# Patient Record
Sex: MALE | Race: ASIAN | NOT HISPANIC OR LATINO | Employment: FULL TIME | URBAN - METROPOLITAN AREA
[De-identification: names, ages, dates, MRNs, and addresses within clinical notes are randomized per-mention and may not be internally consistent; named-entity substitution may affect disease eponyms.]

---

## 2018-08-23 ENCOUNTER — TELEPHONE (OUTPATIENT)
Dept: NEPHROLOGY | Facility: CLINIC | Age: 58
End: 2018-08-23

## 2018-08-23 NOTE — TELEPHONE ENCOUNTER
I called the pt's home phone and it was a rectory, the  said that he can be reached at [facility], the pt is a .     [facility] phone # is [phone].    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

## 2018-08-23 NOTE — TELEPHONE ENCOUNTER
There may be a problem with his appointment time.  We have to ask Dr. Sweet if he can see this patient at 1pm.

## 2021-04-26 PROBLEM — Z79.4 TYPE 2 DIABETES MELLITUS WITH KIDNEY COMPLICATION, WITH LONG-TERM CURRENT USE OF INSULIN: Status: ACTIVE | Noted: 2021-04-26

## 2021-04-26 PROBLEM — R55 SYNCOPE: Status: ACTIVE | Noted: 2021-04-26

## 2021-04-26 PROBLEM — N17.9 AKI (ACUTE KIDNEY INJURY): Status: ACTIVE | Noted: 2021-04-26

## 2021-04-26 PROBLEM — S02.31XA CLOSED FRACTURE OF RIGHT ORBITAL FLOOR: Status: ACTIVE | Noted: 2021-04-26

## 2021-04-26 PROBLEM — I10 ESSENTIAL HYPERTENSION: Status: ACTIVE | Noted: 2021-04-26

## 2021-04-26 PROBLEM — E11.29 TYPE 2 DIABETES MELLITUS WITH KIDNEY COMPLICATION, WITH LONG-TERM CURRENT USE OF INSULIN: Status: ACTIVE | Noted: 2021-04-26

## 2021-04-26 PROBLEM — R91.1 PULMONARY NODULE: Status: ACTIVE | Noted: 2021-04-26

## 2021-04-28 PROBLEM — R79.89 ELEVATED TROPONIN: Status: ACTIVE | Noted: 2021-04-28

## 2021-05-02 PROBLEM — E16.2 HYPOGLYCEMIA: Status: RESOLVED | Noted: 2021-04-27 | Resolved: 2021-05-02

## 2021-05-02 PROBLEM — R79.89 ELEVATED TROPONIN: Status: RESOLVED | Noted: 2021-04-28 | Resolved: 2021-05-02

## 2021-05-02 PROBLEM — E16.2 HYPOGLYCEMIA: Status: ACTIVE | Noted: 2021-04-27

## 2021-05-02 PROBLEM — R55 SYNCOPE: Status: RESOLVED | Noted: 2021-04-26 | Resolved: 2021-05-02

## 2023-01-06 PROBLEM — N18.9 CHRONIC KIDNEY DISEASE, UNSPECIFIED: Status: ACTIVE | Noted: 2023-01-06

## 2023-01-06 PROBLEM — N18.5 CHRONIC KIDNEY DISEASE, STAGE V: Status: ACTIVE | Noted: 2023-01-06

## 2023-06-08 ENCOUNTER — TELEPHONE (OUTPATIENT)
Dept: TRANSPLANT | Facility: CLINIC | Age: 63
End: 2023-06-08
Payer: COMMERCIAL

## 2023-06-12 ENCOUNTER — TELEPHONE (OUTPATIENT)
Dept: TRANSPLANT | Facility: CLINIC | Age: 63
End: 2023-06-12
Payer: COMMERCIAL

## 2023-06-20 ENCOUNTER — TELEPHONE (OUTPATIENT)
Dept: TRANSPLANT | Facility: CLINIC | Age: 63
End: 2023-06-20
Payer: COMMERCIAL

## 2023-06-20 PROBLEM — N18.6 ESRD (END STAGE RENAL DISEASE): Status: ACTIVE | Noted: 2023-06-20

## 2023-06-27 ENCOUNTER — TELEPHONE (OUTPATIENT)
Dept: TRANSPLANT | Facility: CLINIC | Age: 63
End: 2023-06-27
Payer: COMMERCIAL

## 2023-07-19 ENCOUNTER — TELEPHONE (OUTPATIENT)
Dept: TRANSPLANT | Facility: CLINIC | Age: 63
End: 2023-07-19
Payer: COMMERCIAL

## 2023-07-20 ENCOUNTER — TELEPHONE (OUTPATIENT)
Dept: TRANSPLANT | Facility: CLINIC | Age: 63
End: 2023-07-20
Payer: COMMERCIAL

## 2023-07-21 DIAGNOSIS — Z76.82 ORGAN TRANSPLANT CANDIDATE: Primary | ICD-10-CM

## 2023-07-21 DIAGNOSIS — Z91.89 CARDIOVASCULAR EVENT RISK: ICD-10-CM

## 2023-09-08 ENCOUNTER — TELEPHONE (OUTPATIENT)
Dept: TRANSPLANT | Facility: CLINIC | Age: 63
End: 2023-09-08
Payer: MEDICARE

## 2023-09-12 ENCOUNTER — TELEPHONE (OUTPATIENT)
Dept: TRANSPLANT | Facility: CLINIC | Age: 63
End: 2023-09-12
Payer: MEDICARE

## 2023-09-12 NOTE — TELEPHONE ENCOUNTER
MA notes per adherence form   PD PT    FOR THE PAST THREE MONTHS:    0-AMA's  0-No-shows    No concerns with care giving, transportation, or mental health    Scanned in pt's media    Letty Pollard  Abdominal Transplant MA

## 2023-09-13 ENCOUNTER — HOSPITAL ENCOUNTER (OUTPATIENT)
Dept: RADIOLOGY | Facility: HOSPITAL | Age: 63
Discharge: HOME | End: 2023-09-13
Attending: NURSE PRACTITIONER
Payer: COMMERCIAL

## 2023-09-13 ENCOUNTER — OFFICE VISIT (OUTPATIENT)
Dept: TRANSPLANT | Facility: CLINIC | Age: 63
End: 2023-09-13
Payer: COMMERCIAL

## 2023-09-13 ENCOUNTER — TELEPHONE (OUTPATIENT)
Dept: TRANSPLANT | Facility: CLINIC | Age: 63
End: 2023-09-13
Payer: MEDICARE

## 2023-09-13 ENCOUNTER — HOSPITAL ENCOUNTER (OUTPATIENT)
Dept: RADIOLOGY | Facility: HOSPITAL | Age: 63
Discharge: HOME | End: 2023-09-13
Attending: NURSE PRACTITIONER
Payer: MEDICARE

## 2023-09-13 VITALS
TEMPERATURE: 97 F | DIASTOLIC BLOOD PRESSURE: 70 MMHG | SYSTOLIC BLOOD PRESSURE: 146 MMHG | BODY MASS INDEX: 36.41 KG/M2 | HEIGHT: 63 IN | HEART RATE: 66 BPM | OXYGEN SATURATION: 96 % | WEIGHT: 205.5 LBS

## 2023-09-13 DIAGNOSIS — Z76.82 ORGAN TRANSPLANT CANDIDATE: ICD-10-CM

## 2023-09-13 DIAGNOSIS — E11.21 DIABETIC NEPHROPATHY ASSOCIATED WITH TYPE 2 DIABETES MELLITUS: ICD-10-CM

## 2023-09-13 DIAGNOSIS — N18.6 ESRD ON PERITONEAL DIALYSIS: ICD-10-CM

## 2023-09-13 DIAGNOSIS — N25.81 SECONDARY HYPERPARATHYROIDISM: ICD-10-CM

## 2023-09-13 DIAGNOSIS — E11.649 HYPOGLYCEMIA ASSOCIATED WITH TYPE 2 DIABETES MELLITUS: Primary | ICD-10-CM

## 2023-09-13 DIAGNOSIS — I10 ESSENTIAL HYPERTENSION: ICD-10-CM

## 2023-09-13 DIAGNOSIS — Z99.2 ESRD ON PERITONEAL DIALYSIS: ICD-10-CM

## 2023-09-13 DIAGNOSIS — Z01.818 PRE-TRANSPLANT EVALUATION FOR KIDNEY TRANSPLANT: Primary | ICD-10-CM

## 2023-09-13 DIAGNOSIS — Z01.818 PRE-TRANSPLANT EVALUATION FOR KIDNEY TRANSPLANT: ICD-10-CM

## 2023-09-13 LAB — GLUCOSE SERPL-MCNC: 59 MG/DL (ref 70–110)

## 2023-09-13 PROCEDURE — 76770 US EXAM ABDO BACK WALL COMP: CPT | Mod: TC,TXP,59

## 2023-09-13 PROCEDURE — 99205 OFFICE O/P NEW HI 60 MIN: CPT | Mod: S$GLB,TXP,, | Performed by: NURSE PRACTITIONER

## 2023-09-13 PROCEDURE — 3077F SYST BP >= 140 MM HG: CPT | Mod: CPTII,S$GLB,TXP, | Performed by: NURSE PRACTITIONER

## 2023-09-13 PROCEDURE — 3008F PR BODY MASS INDEX (BMI) DOCUMENTED: ICD-10-PCS | Mod: CPTII,S$GLB,TXP, | Performed by: NURSE PRACTITIONER

## 2023-09-13 PROCEDURE — 1160F PR REVIEW ALL MEDS BY PRESCRIBER/CLIN PHARMACIST DOCUMENTED: ICD-10-PCS | Mod: CPTII,S$GLB,TXP, | Performed by: NURSE PRACTITIONER

## 2023-09-13 PROCEDURE — 3044F HG A1C LEVEL LT 7.0%: CPT | Mod: CPTII,S$GLB,TXP, | Performed by: NURSE PRACTITIONER

## 2023-09-13 PROCEDURE — 71046 XR CHEST PA AND LATERAL: ICD-10-PCS | Mod: 26,TXP,, | Performed by: RADIOLOGY

## 2023-09-13 PROCEDURE — 3008F BODY MASS INDEX DOCD: CPT | Mod: CPTII,S$GLB,TXP, | Performed by: NURSE PRACTITIONER

## 2023-09-13 PROCEDURE — 93978 VASCULAR STUDY: CPT | Mod: 26,TXP,, | Performed by: RADIOLOGY

## 2023-09-13 PROCEDURE — 3061F NEG MICROALBUMINURIA REV: CPT | Mod: CPTII,S$GLB,TXP, | Performed by: NURSE PRACTITIONER

## 2023-09-13 PROCEDURE — 82962 GLUCOSE BLOOD TEST: CPT | Mod: S$GLB,TXP,, | Performed by: NURSE PRACTITIONER

## 2023-09-13 PROCEDURE — 3066F PR DOCUMENTATION OF TREATMENT FOR NEPHROPATHY: ICD-10-PCS | Mod: CPTII,S$GLB,TXP, | Performed by: NURSE PRACTITIONER

## 2023-09-13 PROCEDURE — 76770 US EXAM ABDO BACK WALL COMP: CPT | Mod: 26,TXP,, | Performed by: RADIOLOGY

## 2023-09-13 PROCEDURE — 99204 PR OFFICE/OUTPT VISIT, NEW, LEVL IV, 45-59 MIN: ICD-10-PCS | Mod: S$GLB,TXP,, | Performed by: SURGERY

## 2023-09-13 PROCEDURE — 1160F RVW MEDS BY RX/DR IN RCRD: CPT | Mod: CPTII,S$GLB,TXP, | Performed by: NURSE PRACTITIONER

## 2023-09-13 PROCEDURE — 99203 PR OFFICE/OUTPT VISIT, NEW, LEVL III, 30-44 MIN: ICD-10-PCS | Mod: S$GLB,TXP,, | Performed by: REGISTERED NURSE

## 2023-09-13 PROCEDURE — 99999 PR PBB SHADOW E&M-EST. PATIENT-LVL V: ICD-10-PCS | Mod: PBBFAC,TXP,, | Performed by: NURSE PRACTITIONER

## 2023-09-13 PROCEDURE — 3078F PR MOST RECENT DIASTOLIC BLOOD PRESSURE < 80 MM HG: ICD-10-PCS | Mod: CPTII,S$GLB,TXP, | Performed by: NURSE PRACTITIONER

## 2023-09-13 PROCEDURE — 76770 US RETROPERITONEAL COMPLETE: ICD-10-PCS | Mod: 26,TXP,, | Performed by: RADIOLOGY

## 2023-09-13 PROCEDURE — 3078F DIAST BP <80 MM HG: CPT | Mod: CPTII,S$GLB,TXP, | Performed by: NURSE PRACTITIONER

## 2023-09-13 PROCEDURE — 99205 PR OFFICE/OUTPT VISIT, NEW, LEVL V, 60-74 MIN: ICD-10-PCS | Mod: S$GLB,TXP,, | Performed by: NURSE PRACTITIONER

## 2023-09-13 PROCEDURE — 3066F NEPHROPATHY DOC TX: CPT | Mod: CPTII,S$GLB,TXP, | Performed by: NURSE PRACTITIONER

## 2023-09-13 PROCEDURE — 99999 PR PBB SHADOW E&M-EST. PATIENT-LVL V: CPT | Mod: PBBFAC,TXP,, | Performed by: NURSE PRACTITIONER

## 2023-09-13 PROCEDURE — 3044F PR MOST RECENT HEMOGLOBIN A1C LEVEL <7.0%: ICD-10-PCS | Mod: CPTII,S$GLB,TXP, | Performed by: NURSE PRACTITIONER

## 2023-09-13 PROCEDURE — 1159F MED LIST DOCD IN RCRD: CPT | Mod: CPTII,S$GLB,TXP, | Performed by: NURSE PRACTITIONER

## 2023-09-13 PROCEDURE — 93978 VASCULAR STUDY: CPT | Mod: TC,TXP

## 2023-09-13 PROCEDURE — 99204 OFFICE O/P NEW MOD 45 MIN: CPT | Mod: S$GLB,TXP,, | Performed by: SURGERY

## 2023-09-13 PROCEDURE — 72170 X-RAY EXAM OF PELVIS: CPT | Mod: TC,TXP

## 2023-09-13 PROCEDURE — 1159F PR MEDICATION LIST DOCUMENTED IN MEDICAL RECORD: ICD-10-PCS | Mod: CPTII,S$GLB,TXP, | Performed by: NURSE PRACTITIONER

## 2023-09-13 PROCEDURE — 71046 X-RAY EXAM CHEST 2 VIEWS: CPT | Mod: 26,TXP,, | Performed by: RADIOLOGY

## 2023-09-13 PROCEDURE — 3061F PR NEG MICROALBUMINURIA RESULT DOCUMENTED/REVIEW: ICD-10-PCS | Mod: CPTII,S$GLB,TXP, | Performed by: NURSE PRACTITIONER

## 2023-09-13 PROCEDURE — 99203 OFFICE O/P NEW LOW 30 MIN: CPT | Mod: S$GLB,TXP,, | Performed by: REGISTERED NURSE

## 2023-09-13 PROCEDURE — 93978 US DOPP ILIACS BILATERAL: ICD-10-PCS | Mod: 26,TXP,, | Performed by: RADIOLOGY

## 2023-09-13 PROCEDURE — 72170 X-RAY EXAM OF PELVIS: CPT | Mod: 26,TXP,, | Performed by: RADIOLOGY

## 2023-09-13 PROCEDURE — 82962 POCT GLUCOSE, HAND-HELD DEVICE: ICD-10-PCS | Mod: S$GLB,TXP,, | Performed by: NURSE PRACTITIONER

## 2023-09-13 PROCEDURE — 71046 X-RAY EXAM CHEST 2 VIEWS: CPT | Mod: TC,TXP

## 2023-09-13 PROCEDURE — 3077F PR MOST RECENT SYSTOLIC BLOOD PRESSURE >= 140 MM HG: ICD-10-PCS | Mod: CPTII,S$GLB,TXP, | Performed by: NURSE PRACTITIONER

## 2023-09-13 PROCEDURE — 72170 XR PELVIS ROUTINE AP: ICD-10-PCS | Mod: 26,TXP,, | Performed by: RADIOLOGY

## 2023-09-13 RX ORDER — AMLODIPINE BESYLATE 5 MG/1
5 TABLET ORAL DAILY
Status: ON HOLD | COMMUNITY
End: 2024-02-29 | Stop reason: HOSPADM

## 2023-09-13 RX ORDER — FERRIC CITRATE 210 MG/1
3 TABLET, COATED ORAL
Status: ON HOLD | COMMUNITY
Start: 2023-08-16 | End: 2024-02-29 | Stop reason: HOSPADM

## 2023-09-13 RX ORDER — INSULIN LISPRO 100 [IU]/ML
INJECTION, SOLUTION INTRAVENOUS; SUBCUTANEOUS
COMMUNITY
Start: 2023-08-07

## 2023-09-13 NOTE — PROGRESS NOTES
Patient has eaten a granola bar and drank an apple juice. Blood glucose via finger stick now 115. Patient AA/Ox4 with NAD and no patient complaints. He has completed [facility] and is going to his afternoon appointments with his caretaker now. Patient does have an Insulin pump with a monitor that reads blood glucose levels.  Patient was instructed to monitor his blood sugar levels. Patient verbalize understanding.

## 2023-09-13 NOTE — LETTER
September 15, 2023        Fredi Lyon  [address]  [facility] [address]  Phone: [phone]  Fax: [phone]             [facility] Trans[facility] 1st Fl  [address]  [facility] [address]  Phone: [phone]   Patient: Mark Lopez   MR Number: [id number]   YOB: 1960   Date of Visit: 9/13/2023       Dear Dr. Fredi Lyon    Thank you for referring Mark Lopez to me for evaluation. Attached you will find relevant portions of my assessment and plan of care.    If you have questions, please do not hesitate to call me. I look forward to following Mark Lopez along with you.    Sincerely,    Heidi Wyman, BRADLEY    Enclosure    If you would like to receive this communication electronically, please contact [email or web address] or [phone] to request [employer] access.    [employer] is a tool which provides read-only access to select patient information with whom you have a relationship. Its easy to use and provides real time access to review your patients record including encounter summaries, notes, results, and demographic information.    If you feel you have received this communication in error or would no longer like to receive these types of communications, please e-mail [email or web address]

## 2023-09-13 NOTE — PROGRESS NOTES
"[place] LPN received call from [place] in lab dept with glucose result of 49 reported from specimen drawn in Transplant clinic this am. Heidi Wyman NP had just completed her visit with the patient and was notified. Patient AA/Ox4 with NAD. Patient has an insulin pump and was NPO when labs were drawn. Rechecked glucose level via finger stick as ordered and now at 59. Per M. MISAEL Phipps " Tanya Wyman NP instructed " Give patient a snack " Pt given a granola bar and apple. Eating snack without difficulty. Care ongoing.    "

## 2023-09-13 NOTE — PROGRESS NOTES
PHARM.D. PRE-TRANSPLANT NOTE:    This patient's medication therapy was evaluated as part of his pre-transplant evaluation.      The following general pharmacologic concerns were noted: aspirin increases risk of shannon-op bleeding    The following concerns for post-operative pain management were noted: none    The following pharmacologic concerns related to HCV therapy were noted: none      This patient's medication profile was reviewed for considerations for DAA Hepatitis C therapy:    [x]  No current inducers of CYP 3A4 or PGP  [x]  No amiodarone on this patient's EMR profile in the last 24 months  [x]  No past or current atrial fibrillation on this patient's EMR profile       Current Outpatient Medications   Medication Sig Dispense Refill    amLODIPine (NORVASC) 5 MG tablet Take 5 mg by mouth once daily.      ascorbic acid (RIKA-C ORAL) Take 1,400 mg by mouth every evening.      aspirin (ECOTRIN) 81 MG EC tablet Take 81 mg by mouth once daily.      AURYXIA 210 mg iron Tab Take 3 tablets by mouth 3 (three) times daily with meals.      cholecalciferol, vitamin D3, (VITAMIN D3) 50 mcg (2,000 unit) Cap capsule Take 1 capsule by mouth once daily.      febuxostat (ULORIC) 40 mg Tab Take 40 mg by mouth once daily.      furosemide (LASIX) 40 MG tablet Take 80 mg by mouth once daily. Takes once a day      HUMALOG U-100 INSULIN 100 unit/mL injection Inject into the skin. FOR INSULIN PUMP (BASAL 1.2 UNITS/HR)      latanoprost 0.005 % ophthalmic solution Place 1 drop into the right eye every other day. At night      levothyroxine (SYNTHROID) 50 MCG tablet Take 50 mcg by mouth before breakfast.      LOKELMA 10 gram packet Take 10 g by mouth every other day.      loratadine (CLARITIN) 10 mg tablet Take 10 mg by mouth as needed for Allergies.      methylPREDNISolone (MEDROL DOSEPACK) 4 mg tablet Take by mouth. AS NEEDED FOR GOUT      metoprolol tartrate (LOPRESSOR) 25 MG tablet Take 1 tablet (25 mg total) by mouth 2 (two) times  daily. 180 tablet 0    multivit-min-FA-lycopen-lutein (CENTRUM SILVER MEN) 300-600-300 mcg Tab Take 2 tablets by mouth once daily.      NEXLETOL 180 mg Tab Take 1 tablet by mouth once daily.      sodium bicarbonate 650 MG tablet Take 1,300 mg by mouth 2 (two) times daily.      subcutaneous insulin pump Misc Inject 1 Units/hr into the skin continuous. Insulin pump name - Tandum  Basal dose is 2 units/hr      tumeric-ging-olive-oreg-capryl 100 mg-150 mg- 50 mg-150 mg Cap Take 1 tablet by mouth once daily.      UNABLE TO FIND Take 2 tablets by mouth every evening. medication name Alerna Uric Acid support      calcitRIOL (ROCALTROL) 0.25 MCG Cap Take 0.25 mcg by mouth once daily.      MAGNESIUM GLUCONATE ORAL Take by mouth.      ONETOUCH DELICA PLUS LANCET 30 gauge Misc       spironolactone (ALDACTONE) 25 MG tablet Take 25 mg by mouth once daily.       No current facility-administered medications for this visit.           I am available for consultation and can be contacted, as needed by the other members of the Transplant team.

## 2023-09-13 NOTE — PROGRESS NOTES
Transplant Nephrology  Kidney Transplant Recipient Evaluation    Referring Physician: Fredi Lyon  Current Nephrologist: Fredi Lyon    Subjective:   CC:  Initial evaluation of kidney transplant candidacy.    HPI:  Fr. Lopez is a 63 y.o. year old  male who has presented to be evaluated as a potential kidney transplant recipient.  He has ESRD secondary to presumed diabetic nephropathy, no renal biopsy.  Patient is currently on peritoneal dialysis started on 5/19/2023 (initially on HD). Patient is dialyzing on cyclic peritoneal dialysis.  Patient reports that he is tolerating dialysis well. . He has a PD catheter, permacath, and RUE AVF for dialysis access.     DM2 for over 20 years. Denies retinopathy or gastroparesis. Has been much better controlled with insulin pump and continuous glucose monitor, A1c today 5.0.     Has been working hard on weight loss, down 70 pounds since last year through diet and exercise. He is motivated to continue with weight loss journey.    He is a , [employer]. Uses no assistive devices. Looks good, not frail.    Fellow seminarians have expressed interest in donation.    Denies MI, CVA, DVT/PE, or malignancy history.     Normal colonoscopy done 2022.    Current Outpatient Medications   Medication Sig Dispense Refill    amLODIPine (NORVASC) 5 MG tablet Take 5 mg by mouth once daily.      ascorbic acid (RIKA-C ORAL) Take 1,400 mg by mouth every evening.      aspirin (ECOTRIN) 81 MG EC tablet Take 81 mg by mouth once daily.      AURYXIA 210 mg iron Tab Take 3 tablets by mouth 3 (three) times daily with meals.      cholecalciferol, vitamin D3, (VITAMIN D3) 50 mcg (2,000 unit) Cap capsule Take 1 capsule by mouth once daily.      febuxostat (ULORIC) 40 mg Tab Take 40 mg by mouth once daily.      furosemide (LASIX) 40 MG tablet Take 80 mg by mouth once daily. Takes once a day      HUMALOG U-100 INSULIN 100 unit/mL injection Inject into the skin. FOR  INSULIN PUMP (BASAL 1.2 UNITS/HR)      latanoprost 0.005 % ophthalmic solution Place 1 drop into the right eye every other day. At night      levothyroxine (SYNTHROID) 50 MCG tablet Take 50 mcg by mouth before breakfast.      LOKELMA 10 gram packet Take 10 g by mouth every other day.      loratadine (CLARITIN) 10 mg tablet Take 10 mg by mouth as needed for Allergies.      methylPREDNISolone (MEDROL DOSEPACK) 4 mg tablet Take by mouth. AS NEEDED FOR GOUT      metoprolol tartrate (LOPRESSOR) 25 MG tablet Take 1 tablet (25 mg total) by mouth 2 (two) times daily. 180 tablet 0    multivit-min-FA-lycopen-lutein (CENTRUM SILVER MEN) 300-600-300 mcg Tab Take 2 tablets by mouth once daily.      NEXLETOL 180 mg Tab Take 1 tablet by mouth once daily.      sodium bicarbonate 650 MG tablet Take 1,300 mg by mouth 2 (two) times daily.      subcutaneous insulin pump Misc Inject 1 Units/hr into the skin continuous. Insulin pump name - Tandum  Basal dose is 2 units/hr      tumeric-ging-olive-oreg-capryl 100 mg-150 mg- 50 mg-150 mg Cap Take 1 tablet by mouth once daily.      UNABLE TO FIND Take 2 tablets by mouth every evening. medication name Alerna Uric Acid support      calcitRIOL (ROCALTROL) 0.25 MCG Cap Take 0.25 mcg by mouth once daily.      MAGNESIUM GLUCONATE ORAL Take by mouth.      ONETOUCH DELICA PLUS LANCET 30 gauge Misc       spironolactone (ALDACTONE) 25 MG tablet Take 25 mg by mouth once daily.       No current facility-administered medications for this visit.       Past Medical History:   Diagnosis Date    Anemia     Cataract     Diabetes mellitus     Diabetes mellitus, type 2     Glaucoma     Gout, unspecified     HLD (hyperlipidemia)     Hypertension     Hypothyroidism, unspecified     Kidney failure     Renal disorder        Past Medical and Surgical History: Mr. Lopez  has a past medical history of Anemia, Cataract, Diabetes mellitus, Diabetes mellitus, type 2, Glaucoma, Gout, unspecified, HLD (hyperlipidemia),  "Hypertension, Hypothyroidism, unspecified, Kidney failure, and Renal disorder.  He has a past surgical history that includes Knee surgery (Right); Colonoscopy (N/A, 05/18/2022); Dental surgery; insertion, catheter, dialysis, peritoneal, laparoscopic (N/A, 06/20/2023); and Eye surgery (Bilateral).    Past Social and Family History: Mr. Lopez reports that he has never smoked. He has never used smokeless tobacco. He reports that he does not drink alcohol and does not use drugs. His family history includes Cancer in his sister; Diabetes in his brother and father; Heart disease in his father; Hypertension in his father; Kidney disease in his brother.    Review of Systems   Constitutional:  Positive for fatigue. Negative for activity change, appetite change and fever.   HENT:  Negative for congestion, mouth sores and sore throat.    Eyes:  Negative for visual disturbance.   Respiratory:  Negative for cough, chest tightness and shortness of breath.    Cardiovascular:  Negative for chest pain, palpitations and leg swelling.   Gastrointestinal:  Negative for abdominal distention, abdominal pain, constipation, diarrhea and nausea.   Genitourinary:  Negative for difficulty urinating, frequency and hematuria.        Making ~ 3 L urine daily   Musculoskeletal:  Positive for back pain. Negative for arthralgias and gait problem.   Skin:  Negative for wound.   Allergic/Immunologic: Negative for environmental allergies, food allergies and immunocompromised state.   Neurological:  Negative for dizziness, weakness and numbness.   Psychiatric/Behavioral:  Negative for sleep disturbance. The patient is not nervous/anxious.        Objective:   Blood pressure (!) 146/70, pulse 66, temperature 97.3 °F (36.3 °C), temperature source Temporal, height 5' 3.35" (1.609 m), weight 93.2 kg (205 lb 7.5 oz), SpO2 96 %.body mass index is 36 kg/m².    Physical Exam  Vitals and nursing note reviewed.   Constitutional:       Appearance: Normal " appearance.   HENT:      Head: Normocephalic.   Cardiovascular:      Rate and Rhythm: Normal rate and regular rhythm.      Heart sounds: Normal heart sounds.      Comments: Dialysis catheter, no erythema, edema, tenderness or drainage.   Pulmonary:      Effort: Pulmonary effort is normal.      Breath sounds: Normal breath sounds.   Abdominal:      General: Bowel sounds are normal. There is no distension.      Palpations: Abdomen is soft.      Tenderness: There is no abdominal tenderness.      Comments: PD catheter , no erythema, edema, tenderness or drainage.   Musculoskeletal:         General: Normal range of motion.      Comments: RUE AV fistula + thrill    Skin:     General: Skin is warm and dry.   Neurological:      General: No focal deficit present.      Mental Status: He is alert.   Psychiatric:         Behavior: Behavior normal.         Labs:  Lab Results   Component Value Date    WBC 9.55 09/13/2023    HGB 12.5 (L) 09/13/2023    HCT 39.5 (L) 09/13/2023     09/13/2023    K 3.4 (L) 09/13/2023    CL 99 09/13/2023    CO2 24 09/13/2023    BUN 97 (H) 09/13/2023    CREATININE 10.5 (H) 09/13/2023    EGFRNORACEVR 5.0 (A) 09/13/2023    CALCIUM 9.4 09/13/2023    PHOS 6.3 (H) 09/13/2023    MG 2.2 01/25/2023    ALBUMIN 2.8 (L) 09/13/2023    AST 26 09/13/2023    ALT 24 09/13/2023    UTPCR 37574.9 (H) 10/04/2022    .6 (H) 09/13/2023       Lab Results   Component Value Date    BILIRUBINUA Negative 01/25/2023    PROTEINUA 3+ (A) 01/25/2023    NITRITE Negative 01/25/2023    RBCUA 1 01/25/2023    RBCUA 1 01/25/2023    WBCUA 3 01/25/2023    WBCUA 3 01/25/2023       Labs were reviewed with the patient.    Assessment:     1. Pre-transplant evaluation for kidney transplant    2. ESRD on peritoneal dialysis    3. Diabetic nephropathy associated with type 2 diabetes mellitus    4. Essential hypertension    5. Secondary hyperparathyroidism    6. BMI 36.0-36.9,adult      Plan:   Fr. Lopez is a 63 y.o. year old  male  who has presented to be evaluated as a potential kidney transplant recipient.  He has ESRD secondary to presumed diabetic nephropathy, no renal biopsy.  Patient is currently on peritoneal dialysis started on 5/19/2023 (initially on HD). Colonoscopy up to date. Will need afternoon imaging and updated cardiac testing. Encouraged continued weight loss.    Transplant Candidacy:   Based on available information, Mr. Lopez is a suitable kidney transplant candidate.   Meets center eligibility for accepting HCV+ donor offer - Yes.  Patient educated on HCV+ donors. Mark is willing to accept HCV+ donor offer - Yes   Patient is a candidate for KDPI > 85 kidney donor offer - No (weight)  Final determination of transplant candidacy will be made once workup is complete and reviewed by the selection committee.    Patient advised that it is recommended that all transplant candidates, and their close contacts and household members receive Covid vaccination.    UNOS Patient Status  Functional Status: 80% - Normal activity with effort: some symptoms of disease      Heidi Wyman, NP

## 2023-09-13 NOTE — TELEPHONE ENCOUNTER
Reviewed pt transplant labs.  Notified dialysis unit dietitian of the following abnormal labs via fax and requested their most recent nutrition note on this pt.  Once this note is received it will be scanned into pt's chart.     Triglycerides 174  K 3.4  Glucose 49  Albumin 2.8  Phos 6.3

## 2023-09-13 NOTE — PROGRESS NOTES
Transplant Surgery  Kidney Transplant Recipient Evaluation    Referring Physician: Fredi Lyon  Current Nephrologist: Fredi Lyon    Subjective:     Reason for Visit: evaluate transplant candidacy    History of Present Illness: Mark Lopez is a 63 y.o. year old male undergoing transplant evaluation.    Dialysis History: Mark is on peritoneal dialysis.      Transplant History: N/A    Etiology of Renal Disease: Diabetes Mellitus - Type II (based on medical records from referral).    External provider notes reviewed: No    Review of Systems   Constitutional:  Negative for fatigue.   HENT:  Negative for drooling, postnasal drip and sore throat.    Eyes:  Negative for discharge and itching.   Respiratory:  Negative for choking and stridor.    Gastrointestinal:  Negative for rectal pain.   Endocrine: Negative for polydipsia.   Genitourinary:  Negative for enuresis and genital sores.   Musculoskeletal:  Negative for back pain, neck pain and neck stiffness.   Allergic/Immunologic: Negative for immunocompromised state.   Neurological:  Negative for facial asymmetry and numbness.   Hematological:  Negative for adenopathy.   Psychiatric/Behavioral:  Negative for behavioral problems, self-injury and suicidal ideas.    Objective:     Physical Exam:  Constitutional:   Vitals reviewed: yes   Well-nourished and well-groomed: yes  Eyes:   Sclerae icteric: no   Extraocular movements intact: yes  GI:    Bowel sounds normal: yes   Tenderness: no    If yes, quadrant/location: not applicable   Palpable masses: no    If yes, quadrant/location: not applicable   Hepatosplenomegaly: no   Ascites: yes   Hernia: no    If yes, type/location: not applicable   Surgical scars: yes    If yes, type/location: laparoscopic port sites  PD cannula on right side  Resp:   Effort normal: yes   Breath sounds normal: yes    CV:   Regular rate and rhythm: yes   Heart sounds normal: yes   Femoral pulses normal: yes   Extremities edematous:  no  Skin:   Rashes or lesions present: no    If yes, describe:not applicable   Jaundice:: no    Musculoskeletal:   Gait normal: yes   Strength normal: yes  Psych:   Oriented to person, place, and time: yes   Affect and mood normal: yes    Additional comments: not applicable    Diagnostics:  The following labs have been reviewed: CBC  CMP    Counseling: We provided Mark Lopez with a group education session today.  We discussed kidney transplantation at length with him, including risks, potential complications, and alternatives in the management of his renal failure.  The discussion included complications related to anesthesia, bleeding, infection, primary nonfunction, and ATN.  I discussed the typical postoperative course, length of hospitalization, the need for long-term immunosuppression, and the need for long-term routine follow-up.  I discussed living-donor and -donor transplantation and the relative advantages and disadvantages of each.  I also discussed average waiting times for both living donation and  donation.  I discussed national and center-specific survival rates.  I also mentioned the potential benefit of multicenter listing to candidates listed with centers within more than one organ procurement organization.  All questions were answered.    Patient advised that it is recommended that all transplant candidates, and their close contacts and household members receive Covid vaccination.    Final determination of transplant candidacy will be made once evaluation is complete and reviewed by the Kidney & Kidney/Pancreas Selection Committee.    Coronavirus disease (COVID-19) caused by severe acute respiratory virus coronavirus 2 (SARS-C0V 2) is associated with increased mortality in solid organ transplant recipients (SOT) compared to non-transplant patients. Vaccine responses to vaccination are depressed against SARS-CoV2 compared to normal individuals but improve with third vaccination  doses. Vaccination prior to SOT provides both the best opportunity for transplant candidates to develop protective immunity and to reduce the risk of serious COVID19 infections post transplantation. Organ transplant candidates at [facility] will be required to receive SARS-CoV-2 vaccination prior to being listed with a an active status, whenever possible. Exceptions will be made for disability related reasons or for sincerely held [employer] beliefs.          Transplant Surgery - Candidacy   Assessment/Plan:   Mark Lopez has end stage renal disease (ESRD) on dialysis. I see no surgical contraindication to placing a kidney transplant. Based on available information, Mark Lopez is a suitable kidney transplant candidate.     Additional testing to be completed according to the Written Order Guidelines for Adult Pre-kidney and Pancreas Transplant Evaluation (KI-02).  Interpretation of tests and discussion of patient management involves all members of the multidisciplinary transplant team.    Reji Espinosa MD

## 2023-09-13 NOTE — PROGRESS NOTES
PRE-TRANSPLANT INFECTIOUS DISEASE CONSULT    Reason for Visit:  Pre-transplant evaluation  Referring Provider: Dr. Fredi Lyon     History of Present Illness:    63 y.o. male with a history of ESRD 2/2 diabetic nephropathy, currently on peritoneal dialysis, T2DM, HTN, pulmonary nodules presents for pre-kidney transplant evaluation.    Infectious History:  Recent hospital admissions: Yes, in May to start dialysis.   Recent infections: No  Recent or current antibiotic use: No  History of recurrent infections *(sinus / pneumonia / UTI / SBP)*: No  History of diabetic foot wound or bone/joint infection: No  Recent dental infections, issues or procedures: No  History of chicken pox: Yes  History of shingles: No  History of STI: No  History of COVID infection: No    History of Immunosuppression:  Prior chemotherapy / immunosuppression: No  Prior transplant: No  History of splenectomy: No    Tuberculosis:  Prior screening for latent TB: No  Prior diagnosis of latent TB: No  Risk factors for TB *known exposure, incarceration, homelessness*: Yes - 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     Geographical exposures:  Currently lives in [place], [place] with other people (dormitory facility suite).   Lived in the following states: [place], [facility], [place], [facility], [facility], [place], [place], [place]. [place], [place]   Lived or travelled to the [facility] [place]: Yes  International travel: Yes  Travel-associated illness: No    Social/Environmental:  Occupation:     Pets: No   Livestock: No  Fishing / hunting: No  Hobbies: hiking   Water: Well water  Consumption of raw/undercooked meat or seafood?  No  Tobacco: No  Alcohol: No  Recreational drug use:  No  Sexual partners: no        Past Histories:   Past Medical History:   Diagnosis Date    Anemia     Cataract     Diabetes mellitus     Diabetes mellitus, type 2     Glaucoma     Gout, unspecified     HLD  (hyperlipidemia)     Hypertension     Hypothyroidism, unspecified     Kidney failure     Renal disorder      Past Surgical History:   Procedure Laterality Date    COLONOSCOPY N/A 05/18/2022    Procedure: COLONOSCOPY;  Surgeon: Raul Dyer MD;  Location: [facility];  Service: Endoscopy;  Laterality: N/A;    DENTAL SURGERY      EYE SURGERY Bilateral     Cataract    INSERTION, CATHETER, DIALYSIS, PERITONEAL, LAPAROSCOPIC N/A 06/20/2023    Procedure: INSERTION, CATHETER, DIALYSIS, PERITONEAL, LAPAROSCOPIC;  Surgeon: Carlos Alberto Rodgers MD;  Location: [facility] [place] [facility];  Service: General;  Laterality: N/A;    KNEE SURGERY Right      Family History   Problem Relation Age of Onset    Hypertension Father     Diabetes Father     Heart disease Father     Cancer Sister         MM    Diabetes Brother     Kidney disease Brother      Social History     Tobacco Use    Smoking status: Never    Smokeless tobacco: Never   Substance Use Topics    Alcohol use: Never    Drug use: Never     Review of patient's allergies indicates:   Allergen Reactions    Allopurinol analogues Swelling    Latex, natural rubber     Statins-hmg-coa reductase inhibitors Other (See Comments)     Muscle ache    Adhesive Rash         Immunization History:  Received all childhood vaccines: Yes  All household members receive annual flu vaccine: Yes  All household members are up to date on COVID vaccine: Yes      Current antibiotics:  Antibiotics (From admission, onward)      None              Review of Systems  Review of Systems   Constitutional: Negative for chills, fever and weight loss.   Respiratory:  Negative for cough and hemoptysis.    Skin:  Negative for poor wound healing, rash and suspicious lesions.          Objective  Physical Exam  Constitutional:       General: He is not in acute distress.     Appearance: He is well-developed. He is not diaphoretic.   HENT:      Head: Normocephalic and atraumatic.      Mouth/Throat:      Dentition: Normal dentition.  "Does not have dentures. No dental caries or dental abscesses.      Pharynx: No oropharyngeal exudate.   Eyes:      General: No scleral icterus.     Conjunctiva/sclera: Conjunctivae normal.   Cardiovascular:      Rate and Rhythm: Normal rate and regular rhythm.      Heart sounds: No murmur heard.  Pulmonary:      Effort: Pulmonary effort is normal. No respiratory distress.      Breath sounds: Normal breath sounds. No wheezing or rales.   Abdominal:      General: Bowel sounds are normal. There is no distension.      Palpations: Abdomen is soft.      Tenderness: There is no abdominal tenderness. There is no guarding.   Musculoskeletal:      Cervical back: Neck supple.   Lymphadenopathy:      Cervical: No cervical adenopathy.   Skin:     General: Skin is warm and dry.      Findings: No erythema or rash.   Neurological:      Mental Status: He is alert and oriented to person, place, and time.   Psychiatric:         Behavior: Behavior normal.         Thought Content: Thought content normal.         Judgment: Judgment normal.           Labs:    CBC:   Lab Results   Component Value Date    WBC 9.55 09/13/2023    HGB 12.5 (L) 09/13/2023    HCT 39.5 (L) 09/13/2023    MCV 88 09/13/2023     (H) 09/13/2023    GRAN 6.6 09/13/2023    GRAN 68.7 09/13/2023    LYMPH 1.0 09/13/2023    LYMPH 10.5 (L) 09/13/2023    MONO 0.9 09/13/2023    MONO 9.4 09/13/2023    EOSINOPHIL 10.1 (H) 09/13/2023       Syphilis screening: No results found for: "RPR", "PRPQ", "FTAABS"     TB screening: No results found for: "TBGOLDPLUS", "TSPOTSCREN"    HIV screening: No results found for: "PTV86HBBW"    Strongyloides IgG: No results found for: "STRONGANTIGG"    Hepatitis Serologies: No results found for: "HEPAIGG", "HEPBCAB", "HEPBSAB", "HEPBSURFABQU", "HBSAG", "HEPCAB"     Varicella IgG: No results found for: "VARICELLAINT"      Immunization History   Administered Date(s) Administered    COVID-19, MRNA, LN-S, PF (Pfizer) (Gray Cap) 05/23/2022    " COVID-19, MRNA, LN-S, PF (Pfizer) (Purple Cap) 01/29/2021, 02/18/2021, 10/14/2021    Influenza - Quadrivalent 12/10/2019    Influenza - Quadrivalent - MDCK 11/19/2020    Influenza - Quadrivalent - PF *Preferred* (6 months and older) 10/14/2021    Tdap 04/26/2021    Zoster 07/11/2013          Assessment and Plan    1. Risks of Infection: Available serologies were reviewed. No unusual risks of infection or significant barriers to transplantation were identified from the infectious disease standpoint given the information available at this time.    - Acute infectious issues: None   - Pending serologies: Quantiferon gold / T-spot, RPR, Strongyloides IgG, and VZV IgG, Cocci antibody with reflex    - Please call if any pending serologic testing is positive.    2. Immunizations:  Based on the patient's immunization history and serologies, the following immunizations are recommended:  - Hepatitis A    Patient does not have immunity to hepatitis A    Vaccination ordered today: Yes   - Hepatitis B    Patient does not have immunity to hepatitis B    Vaccination ordered today: Yes   - COVID    Current CDC vaccination recommendations were discussed with the patient   - Annual high dose influenza     Vaccination ordered today: Yes   - Prevnar 20    Vaccination ordered today: Yes   - Tdap    Vaccination ordered today: No. Reason for not ordering: vaccination up to date   - Shingrix    Vaccination ordered today: No. Reason for not ordering: vaccination up to date    Recommended Pre-Transplant Immunization Schedule   Vaccine  0m 1m 2m 6m   Pneumococcal conjugate vaccine (Prevnar 20) X      Tetanus-diphtheria-pertussis (Tdap)* X      Hepatitis A Vaccine (Havrix)** X   X   Hepatitis B Vaccine (Heplisav)** X X     Influenza (annual) X      Zoster Recombinant Vaccine (Shingrix) X  X           *Administer booster every 10 years.       **Administer if no immunity demonstrated on serologies               Patient will receive vaccines today  in ID clinic, and will be scheduled for all subsequent doses to be given in ID clinic.    3. Counseling:   I discussed with the patient the risk for increased susceptibility to infections following transplantation including increased risk for infection right after transplant and if rejection should occur.  The patient has been counseled on the importance of vaccinations to decrease risk of infection and severe illness. Specific guidance has been provided to the patient regarding the patient's occupation, hobbies and activities to avoid future infectious complications.     4. Transplant Candidacy: Based on available information, there are no identified significant barriers to transplantation from an infectious disease standpoint.  Final determination of transplant candidacy will be made once evaluation is complete and reviewed by the Selection Committee.      Follow up with infectious disease as needed.       The total time for evaluation and management services performed on 09/13/2023 was greater than 30 minutes.

## 2023-09-13 NOTE — PROGRESS NOTES
INITIAL PATIENT EDUCATION NOTE    Mr. Mark Lopez was seen in pre-kidney transplant clinic for evaluation for kidney, kidney/pancreas or pancreas only transplant.  The patient attended a an individual video education session that discussed/reviewed the following aspects of transplantation: evaluation including diagnostic and laboratory testing,( Chemistries, Hematology, Serologies including HIV and Hepatitis and HLA) required for transplantation and selection committee process, UNOS waitlist management/multiple listings, types of organs offered (KDPI < 85%, KDPI > 85%, PHS risk, DCD, HCV+, HIV+ for HIV+ recipients and enbloc/dual), financial aspects, surgical procedures, dietary instruction pre- and post-transplant, health maintenance pre- and post-transplant, post-transplant hospitalization and outpatient follow-up, potential to participate in a research protocol, and medication management and side effects.  A question and answer session was provided after the presentation.    The patient was seen by all members of the multi-disciplinary team to include: Nephrologist/KALLI, Surgeon, , Transplant Coordinator, , Pharmacist and Dietician (if applicable).    The patient reviewed and signed all consents for evaluation which were witnessed and sent to scanning into the [facility] chart.    The patient was given an education book and plan for further evaluation based on his individual assessment.      Reviewed program requirement for complete COVID vaccination with documentation prior to listing.  COVID education information reviewed with patient. Patient encouraged to be up to date on all vaccinations.       The patient was informed that the transplant team would manage immediate post op pain. If the patient requires long term pain management, they will need to have that pain management addressed by their PCP or previous provider who wrote for long term pain medicines.    The patient was  encouraged to call with any questions or concerns.

## 2023-09-13 NOTE — PROGRESS NOTES
"Rechecked blood glucose via finger stick with blood glucose 67. Patient AA/Ox4, Skin, tan, warm and dry. Patient reports " I did not eat the granola bar because I didn't want to elevate my blood sugar too high. I will eat now and drink more apple juice. Notified Sita Hayden NP and she instructed "have patient eat snack and drink juice then recheck blood glucose via finger stick after patient has eaten snack.     "

## 2023-09-18 ENCOUNTER — TELEPHONE (OUTPATIENT)
Dept: TRANSPLANT | Facility: CLINIC | Age: 63
End: 2023-09-18
Payer: MEDICARE

## 2023-09-18 DIAGNOSIS — Z01.818 PREOP EXAMINATION: Primary | ICD-10-CM

## 2023-09-18 DIAGNOSIS — Z76.82 ORGAN TRANSPLANT CANDIDATE: ICD-10-CM

## 2023-09-25 ENCOUNTER — TELEPHONE (OUTPATIENT)
Dept: CARDIOLOGY | Facility: HOSPITAL | Age: 63
End: 2023-09-25
Payer: MEDICARE

## 2023-09-27 ENCOUNTER — HOSPITAL ENCOUNTER (OUTPATIENT)
Dept: CARDIOLOGY | Facility: HOSPITAL | Age: 63
Discharge: HOME | End: 2023-09-27
Attending: NURSE PRACTITIONER
Payer: MEDICARE

## 2023-09-27 ENCOUNTER — HOSPITAL ENCOUNTER (OUTPATIENT)
Dept: CARDIOLOGY | Facility: HOSPITAL | Age: 63
Discharge: HOME | End: 2023-09-27
Attending: NURSE PRACTITIONER
Payer: COMMERCIAL

## 2023-09-27 VITALS
SYSTOLIC BLOOD PRESSURE: 138 MMHG | HEART RATE: 60 BPM | DIASTOLIC BLOOD PRESSURE: 86 MMHG | WEIGHT: 205 LBS | BODY MASS INDEX: 35 KG/M2 | SYSTOLIC BLOOD PRESSURE: 149 MMHG | HEART RATE: 58 BPM | DIASTOLIC BLOOD PRESSURE: 63 MMHG | HEIGHT: 64 IN | BODY MASS INDEX: 35 KG/M2 | WEIGHT: 205 LBS | HEIGHT: 64 IN

## 2023-09-27 DIAGNOSIS — Z91.89 CARDIOVASCULAR EVENT RISK: ICD-10-CM

## 2023-09-27 DIAGNOSIS — Z76.82 ORGAN TRANSPLANT CANDIDATE: ICD-10-CM

## 2023-09-27 LAB
ASCENDING AORTA: 3.01 CM
AV INDEX (PROSTH): 0.7
AV MEAN GRADIENT: 5 MMHG
AV PEAK GRADIENT: 8 MMHG
AV VALVE AREA BY VELOCITY RATIO: 3.17 CM²
AV VALVE AREA: 3.7 CM²
AV VELOCITY RATIO: 0.6
BSA FOR ECHO PROCEDURE: 2.05 M2
CFR FLOW - ANTERIOR: 1.13
CFR FLOW - INFERIOR: 1.29
CFR FLOW - LATERAL: 2.21
CFR FLOW - MAX: 1.79
CFR FLOW - MIN: 0.94
CFR FLOW - SEPTAL: 1.44
CFR FLOW - WHOLE HEART: 1.52
CV ECHO LV RWT: 0.3 CM
CV PHARM DOSE: 0.4 MG
CV STRESS BASE HR: 60 BPM
DIASTOLIC BLOOD PRESSURE: 86 MMHG
DOP CALC AO PEAK VEL: 1.44 M/S
DOP CALC AO VTI: 36.74 CM
DOP CALC LVOT AREA: 5.3 CM2
DOP CALC LVOT DIAMETER: 2.6 CM
DOP CALC LVOT PEAK VEL: 0.86 M/S
DOP CALC LVOT STROKE VOLUME: 135.9 CM3
DOP CALC MV VTI: 42.04 CM
DOP CALCLVOT PEAK VEL VTI: 25.61 CM
E WAVE DECELERATION TIME: 317.9 MSEC
E/A RATIO: 0.73
E/E' RATIO: 21.78 M/S
ECHO LV POSTERIOR WALL: 0.9 CM (ref 0.6–1.1)
EJECTION FRACTION- HIGH: 59 %
END DIASTOLIC INDEX-HIGH: 155 ML/M2
END DIASTOLIC INDEX-LOW: 91 ML/M2
END SYSTOLIC INDEX-HIGH: 78 ML/M2
END SYSTOLIC INDEX-LOW: 40 ML/M2
FRACTIONAL SHORTENING: 35 % (ref 28–44)
INTERVENTRICULAR SEPTUM: 1 CM (ref 0.6–1.1)
LA MAJOR: 6.1 CM
LA MINOR: 6.41 CM
LA WIDTH: 4.29 CM
LEFT ATRIUM SIZE: 4.86 CM
LEFT ATRIUM VOLUME INDEX MOD: 28.9 ML/M2
LEFT ATRIUM VOLUME INDEX: 56 ML/M2
LEFT ATRIUM VOLUME MOD: 57.31 CM3
LEFT ATRIUM VOLUME: 110.78 CM3
LEFT INTERNAL DIMENSION IN SYSTOLE: 3.89 CM (ref 2.1–4)
LEFT VENTRICLE DIASTOLIC VOLUME INDEX: 85.59 ML/M2
LEFT VENTRICLE DIASTOLIC VOLUME: 169.47 ML
LEFT VENTRICLE MASS INDEX: 117 G/M2
LEFT VENTRICLE SYSTOLIC VOLUME INDEX: 33.1 ML/M2
LEFT VENTRICLE SYSTOLIC VOLUME: 65.55 ML
LEFT VENTRICULAR INTERNAL DIMENSION IN DIASTOLE: 6 CM (ref 3.5–6)
LEFT VENTRICULAR MASS: 231.1 G
LV LATERAL E/E' RATIO: 19.6 M/S
LV SEPTAL E/E' RATIO: 24.5 M/S
MV MEAN GRADIENT: 3 MMHG
MV PEAK A VEL: 1.34 M/S
MV PEAK E VEL: 0.98 M/S
MV PEAK GRADIENT: 8 MMHG
MV STENOSIS PRESSURE HALF TIME: 92.19 MS
MV VALVE AREA BY CONTINUITY EQUATION: 3.23 CM2
MV VALVE AREA P 1/2 METHOD: 2.39 CM2
NUC REST DIASTOLIC VOLUME INDEX: 180
NUC REST EJECTION FRACTION: 57
NUC REST SYSTOLIC VOLUME INDEX: 78
NUC STRESS DIASTOLIC VOLUME INDEX: 179
NUC STRESS EJECTION FRACTION: 60 %
NUC STRESS SYSTOLIC VOLUME INDEX: 72
OHS CV CPX 85 PERCENT MAX PREDICTED HEART RATE MALE: 133
OHS CV CPX MAX PREDICTED HEART RATE: 156
OHS CV CPX PATIENT IS FEMALE: 0
OHS CV CPX PATIENT IS MALE: 1
OHS CV CPX PEAK DIASTOLIC BLOOD PRESSURE: 45 MMHG
OHS CV CPX PEAK HEAR RATE: 64 BPM
OHS CV CPX PEAK RATE PRESSURE PRODUCT: 7424
OHS CV CPX PEAK SYSTOLIC BLOOD PRESSURE: 116 MMHG
OHS CV CPX PERCENT MAX PREDICTED HEART RATE ACHIEVED: 41
OHS CV CPX RATE PRESSURE PRODUCT PRESENTING: 8940
PISA TR MAX VEL: 2.33 M/S
RA MAJOR: 6.06 CM
RA PRESSURE ESTIMATED: 3 MMHG
RA WIDTH: 3.9 CM
REST FLOW - ANTERIOR: 0.8 CC/MIN/G
REST FLOW - INFERIOR: 0.77 CC/MIN/G
REST FLOW - LATERAL: 0.79 CC/MIN/G
REST FLOW - MAX: 1.03 CC/MIN/G
REST FLOW - MIN: 0.47 CC/MIN/G
REST FLOW - SEPTAL: 0.67 CC/MIN/G
REST FLOW - WHOLE HEART: 0.76 CC/MIN/G
RETIRED EF AND QEF - SEE NOTES: 47 %
RIGHT VENTRICULAR END-DIASTOLIC DIMENSION: 3.87 CM
RV TB RVSP: 5 MMHG
SINUS: 3.3 CM
STJ: 3 CM
STRESS FLOW - ANTERIOR: 0.89 CC/MIN/G
STRESS FLOW - INFERIOR: 0.99 CC/MIN/G
STRESS FLOW - LATERAL: 0.95 CC/MIN/G
STRESS FLOW - MAX: 1.25 CC/MIN/G
STRESS FLOW - MIN: 0.63 CC/MIN/G
STRESS FLOW - SEPTAL: 0.97 CC/MIN/G
STRESS FLOW - WHOLE HEART: 0.95 CC/MIN/G
SYSTOLIC BLOOD PRESSURE: 149 MMHG
TDI LATERAL: 0.05 M/S
TDI SEPTAL: 0.04 M/S
TDI: 0.05 M/S
TR MAX PG: 22 MMHG
TRICUSPID ANNULAR PLANE SYSTOLIC EXCURSION: 2.4 CM
TV REST PULMONARY ARTERY PRESSURE: 25 MMHG
Z-SCORE OF LEFT VENTRICULAR DIMENSION IN END DIASTOLE: 0.48
Z-SCORE OF LEFT VENTRICULAR DIMENSION IN END SYSTOLE: 0.81

## 2023-09-27 PROCEDURE — 93016 CARDIAC PET SCAN STRESS (CUPID ONLY): ICD-10-PCS | Mod: TXP,,, | Performed by: INTERNAL MEDICINE

## 2023-09-27 PROCEDURE — 93306 ECHO (CUPID ONLY): ICD-10-PCS | Mod: 26,TXP,, | Performed by: INTERNAL MEDICINE

## 2023-09-27 PROCEDURE — 78434 AQMBF PET REST & RX STRESS: CPT | Mod: TXP

## 2023-09-27 PROCEDURE — 78431 MYOCRD IMG PET RST&STRS CT: CPT | Mod: TXP

## 2023-09-27 PROCEDURE — 78434 CARDIAC PET SCAN STRESS (CUPID ONLY): ICD-10-PCS | Mod: 26,TXP,, | Performed by: INTERNAL MEDICINE

## 2023-09-27 PROCEDURE — 93018 CARDIAC PET SCAN STRESS (CUPID ONLY): ICD-10-PCS | Mod: TXP,,, | Performed by: INTERNAL MEDICINE

## 2023-09-27 PROCEDURE — 93018 CV STRESS TEST I&R ONLY: CPT | Mod: TXP,,, | Performed by: INTERNAL MEDICINE

## 2023-09-27 PROCEDURE — 93016 CV STRESS TEST SUPVJ ONLY: CPT | Mod: TXP,,, | Performed by: INTERNAL MEDICINE

## 2023-09-27 PROCEDURE — 78434 AQMBF PET REST & RX STRESS: CPT | Mod: 26,TXP,, | Performed by: INTERNAL MEDICINE

## 2023-09-27 PROCEDURE — 93306 TTE W/DOPPLER COMPLETE: CPT | Mod: 26,TXP,, | Performed by: INTERNAL MEDICINE

## 2023-09-27 PROCEDURE — 93306 TTE W/DOPPLER COMPLETE: CPT | Mod: TXP

## 2023-09-27 PROCEDURE — 78431 CARDIAC PET SCAN STRESS (CUPID ONLY): ICD-10-PCS | Mod: 26,TXP,, | Performed by: INTERNAL MEDICINE

## 2023-09-27 PROCEDURE — 78431 MYOCRD IMG PET RST&STRS CT: CPT | Mod: 26,TXP,, | Performed by: INTERNAL MEDICINE

## 2023-09-27 PROCEDURE — 63600175 PHARM REV CODE 636 W HCPCS: Mod: TXP | Performed by: NURSE PRACTITIONER

## 2023-09-27 RX ORDER — REGADENOSON 0.08 MG/ML
0.4 INJECTION, SOLUTION INTRAVENOUS
Status: COMPLETED | OUTPATIENT
Start: 2023-09-27 | End: 2023-09-27

## 2023-09-27 RX ADMIN — REGADENOSON 0.4 MG: 0.08 INJECTION, SOLUTION INTRAVENOUS at 11:09

## 2023-10-02 ENCOUNTER — TELEPHONE (OUTPATIENT)
Dept: TRANSPLANT | Facility: CLINIC | Age: 63
End: 2023-10-02
Payer: MEDICARE

## 2023-10-03 ENCOUNTER — TELEPHONE (OUTPATIENT)
Dept: TRANSPLANT | Facility: CLINIC | Age: 63
End: 2023-10-03
Payer: MEDICARE

## 2023-10-04 ENCOUNTER — DOCUMENTATION ONLY (OUTPATIENT)
Dept: TRANSPLANT | Facility: CLINIC | Age: 63
End: 2023-10-04
Payer: MEDICARE

## 2023-10-23 ENCOUNTER — TELEPHONE (OUTPATIENT)
Dept: HEPATOLOGY | Facility: CLINIC | Age: 63
End: 2023-10-23
Payer: MEDICARE

## 2023-10-23 ENCOUNTER — TELEPHONE (OUTPATIENT)
Dept: TRANSPLANT | Facility: CLINIC | Age: 63
End: 2023-10-23
Payer: MEDICARE

## 2023-10-23 NOTE — TELEPHONE ENCOUNTER
----- Message from Dolly Corbin sent at 10/23/2023  9:13 AM CDT -----  Regarding: Appointment      Current appt date:   10/23    Type of appt :    Np Appt    Physician:   Dr Maria Del Carmen Palacios    Reason for rescheduling:    The bridge was closed due to the fog. Pt will not be able to make it.    Caller:    Mark    Contact Preference:    [phone]

## 2023-10-23 NOTE — TELEPHONE ENCOUNTER
----- Message from Dolly Corbin sent at 10/23/2023  9:07 AM CDT -----  Regarding: Reschedule Existing Appointment      Current appt date:   10/23    Type of appt :    Ultrasound & Labs    Physician:   Ms Heidi Wyman    Reason for rescheduling:    The bridge was closed due to the fog. Pt will not be able to make it.    Caller:    Mark    Contact Preference:    [phone]

## 2023-10-30 ENCOUNTER — HOSPITAL ENCOUNTER (OUTPATIENT)
Dept: RADIOLOGY | Facility: HOSPITAL | Age: 63
Discharge: HOME | End: 2023-10-30
Attending: NURSE PRACTITIONER
Payer: COMMERCIAL

## 2023-10-30 ENCOUNTER — OFFICE VISIT (OUTPATIENT)
Dept: HEPATOLOGY | Facility: CLINIC | Age: 63
End: 2023-10-30
Attending: INTERNAL MEDICINE
Payer: COMMERCIAL

## 2023-10-30 VITALS
HEART RATE: 66 BPM | SYSTOLIC BLOOD PRESSURE: 131 MMHG | HEIGHT: 64 IN | BODY MASS INDEX: 35.12 KG/M2 | DIASTOLIC BLOOD PRESSURE: 71 MMHG | WEIGHT: 205.69 LBS | OXYGEN SATURATION: 98 %

## 2023-10-30 DIAGNOSIS — N18.6 ESRD (END STAGE RENAL DISEASE): Primary | ICD-10-CM

## 2023-10-30 DIAGNOSIS — B18.1 CHRONIC VIRAL HEPATITIS B WITHOUT DELTA AGENT AND WITHOUT COMA: Chronic | ICD-10-CM

## 2023-10-30 DIAGNOSIS — Z76.82 ORGAN TRANSPLANT CANDIDATE: ICD-10-CM

## 2023-10-30 DIAGNOSIS — Z01.818 PREOP EXAMINATION: ICD-10-CM

## 2023-10-30 PROCEDURE — 99214 PR OFFICE/OUTPT VISIT, EST, LEVL IV, 30-39 MIN: ICD-10-PCS | Mod: S$GLB,TXP,, | Performed by: INTERNAL MEDICINE

## 2023-10-30 PROCEDURE — 3008F PR BODY MASS INDEX (BMI) DOCUMENTED: ICD-10-PCS | Mod: CPTII,S$GLB,TXP, | Performed by: INTERNAL MEDICINE

## 2023-10-30 PROCEDURE — 3066F PR DOCUMENTATION OF TREATMENT FOR NEPHROPATHY: ICD-10-PCS | Mod: CPTII,S$GLB,TXP, | Performed by: INTERNAL MEDICINE

## 2023-10-30 PROCEDURE — 1160F RVW MEDS BY RX/DR IN RCRD: CPT | Mod: CPTII,S$GLB,TXP, | Performed by: INTERNAL MEDICINE

## 2023-10-30 PROCEDURE — 76700 US EXAM ABDOM COMPLETE: CPT | Mod: TC,TXP

## 2023-10-30 PROCEDURE — 3044F HG A1C LEVEL LT 7.0%: CPT | Mod: CPTII,S$GLB,TXP, | Performed by: INTERNAL MEDICINE

## 2023-10-30 PROCEDURE — 3075F PR MOST RECENT SYSTOLIC BLOOD PRESS GE 130-139MM HG: ICD-10-PCS | Mod: CPTII,S$GLB,TXP, | Performed by: INTERNAL MEDICINE

## 2023-10-30 PROCEDURE — 3044F PR MOST RECENT HEMOGLOBIN A1C LEVEL <7.0%: ICD-10-PCS | Mod: CPTII,S$GLB,TXP, | Performed by: INTERNAL MEDICINE

## 2023-10-30 PROCEDURE — 3078F DIAST BP <80 MM HG: CPT | Mod: CPTII,S$GLB,TXP, | Performed by: INTERNAL MEDICINE

## 2023-10-30 PROCEDURE — 3061F NEG MICROALBUMINURIA REV: CPT | Mod: CPTII,S$GLB,TXP, | Performed by: INTERNAL MEDICINE

## 2023-10-30 PROCEDURE — 76700 US ABDOMEN COMPLETE: ICD-10-PCS | Mod: 26,TXP,, | Performed by: RADIOLOGY

## 2023-10-30 PROCEDURE — 1160F PR REVIEW ALL MEDS BY PRESCRIBER/CLIN PHARMACIST DOCUMENTED: ICD-10-PCS | Mod: CPTII,S$GLB,TXP, | Performed by: INTERNAL MEDICINE

## 2023-10-30 PROCEDURE — 99214 OFFICE O/P EST MOD 30 MIN: CPT | Mod: S$GLB,TXP,, | Performed by: INTERNAL MEDICINE

## 2023-10-30 PROCEDURE — 1159F PR MEDICATION LIST DOCUMENTED IN MEDICAL RECORD: ICD-10-PCS | Mod: CPTII,S$GLB,TXP, | Performed by: INTERNAL MEDICINE

## 2023-10-30 PROCEDURE — 3061F PR NEG MICROALBUMINURIA RESULT DOCUMENTED/REVIEW: ICD-10-PCS | Mod: CPTII,S$GLB,TXP, | Performed by: INTERNAL MEDICINE

## 2023-10-30 PROCEDURE — 3066F NEPHROPATHY DOC TX: CPT | Mod: CPTII,S$GLB,TXP, | Performed by: INTERNAL MEDICINE

## 2023-10-30 PROCEDURE — 99999 PR PBB SHADOW E&M-EST. PATIENT-LVL V: ICD-10-PCS | Mod: PBBFAC,TXP,, | Performed by: INTERNAL MEDICINE

## 2023-10-30 PROCEDURE — 3078F PR MOST RECENT DIASTOLIC BLOOD PRESSURE < 80 MM HG: ICD-10-PCS | Mod: CPTII,S$GLB,TXP, | Performed by: INTERNAL MEDICINE

## 2023-10-30 PROCEDURE — 99999 PR PBB SHADOW E&M-EST. PATIENT-LVL V: CPT | Mod: PBBFAC,TXP,, | Performed by: INTERNAL MEDICINE

## 2023-10-30 PROCEDURE — 76700 US EXAM ABDOM COMPLETE: CPT | Mod: 26,TXP,, | Performed by: RADIOLOGY

## 2023-10-30 PROCEDURE — 3075F SYST BP GE 130 - 139MM HG: CPT | Mod: CPTII,S$GLB,TXP, | Performed by: INTERNAL MEDICINE

## 2023-10-30 PROCEDURE — 1159F MED LIST DOCD IN RCRD: CPT | Mod: CPTII,S$GLB,TXP, | Performed by: INTERNAL MEDICINE

## 2023-10-30 PROCEDURE — 3008F BODY MASS INDEX DOCD: CPT | Mod: CPTII,S$GLB,TXP, | Performed by: INTERNAL MEDICINE

## 2023-10-30 NOTE — PROGRESS NOTES
HEPATOLOGY CONSULTATION    Referring Physician: Beatrice Zee  Current Corresponding Physician: Loren Mensah MD    Reason for Consultation: Consultation for evaluation of Hepatitis B    History of Present Illness: Mark Lopez is a 63 y.o. malewho presents for evaluation of   Chief Complaint   Patient presents with    Hepatitis B   This 63-year-old gentleman has end-stage renal disease due to diabetic nephropathy and is currently on home peritoneal dialysis.  He is in evaluation for kidney transplant.  During the course of his evaluation he was found to be hepatitis-B surface antigen positive.  I do not have a hepatitis-B DNA or E antigen studies.  His liver enzymes are normal.  He is no prior history of chronic liver disease.  He was born in the [place] but his mother was born in [place].  He is 2 younger siblings.  He does not know their hepatitis-B status.  He currently has no symptoms of chronic liver disease.    Past Medical History:   Diagnosis Date    Anemia     Cataract     Diabetes mellitus     Diabetes mellitus, type 2     Glaucoma     Gout, unspecified     HLD (hyperlipidemia)     Hypertension     Hypothyroidism, unspecified     Kidney failure     Renal disorder      Outpatient Encounter Medications as of 10/30/2023   Medication Sig Dispense Refill    amLODIPine (NORVASC) 5 MG tablet Take 5 mg by mouth once daily.      ascorbic acid (RIKA-C ORAL) Take 1,400 mg by mouth every evening.      aspirin (ECOTRIN) 81 MG EC tablet Take 81 mg by mouth once daily.      AURYXIA 210 mg iron Tab Take 3 tablets by mouth 3 (three) times daily with meals.      febuxostat (ULORIC) 40 mg Tab Take 40 mg by mouth once daily.      furosemide (LASIX) 40 MG tablet Take 80 mg by mouth once daily. Takes once a day      HUMALOG U-100 INSULIN 100 unit/mL injection Inject into the skin. FOR INSULIN PUMP (BASAL 1.2 UNITS/HR)      latanoprost 0.005 % ophthalmic solution Place 1 drop into the right eye every other day. At night       levothyroxine (SYNTHROID) 50 MCG tablet Take 50 mcg by mouth before breakfast.      LOKELMA 10 gram packet Take 10 g by mouth every other day.      loratadine (CLARITIN) 10 mg tablet Take 10 mg by mouth as needed for Allergies.      MAGNESIUM GLUCONATE ORAL Take by mouth as needed.      methylPREDNISolone (MEDROL DOSEPACK) 4 mg tablet Take by mouth. AS NEEDED FOR GOUT      multivit-min-FA-lycopen-lutein (CENTRUM SILVER MEN) 300-600-300 mcg Tab Take 2 tablets by mouth once daily.      NEXLETOL 180 mg Tab Take 1 tablet by mouth once daily.      ONETOUCH DELICA PLUS LANCET 30 gauge Misc       sodium bicarbonate 650 MG tablet Take 1,300 mg by mouth 2 (two) times daily.      tumeric-ging-olive-oreg-capryl 100 mg-150 mg- 50 mg-150 mg Cap Take 1 tablet by mouth once daily.      UNABLE TO FIND Take 2 tablets by mouth every evening. medication name Alerna Uric Acid support      metoprolol tartrate (LOPRESSOR) 25 MG tablet Take 1 tablet (25 mg total) by mouth 2 (two) times daily. 180 tablet 0    subcutaneous insulin pump Misc Inject 1 Units/hr into the skin continuous. Insulin pump name - Tandum  Basal dose is 2 units/hr       No facility-administered encounter medications on file as of 10/30/2023.     Review of patient's allergies indicates:   Allergen Reactions    Allopurinol analogues Swelling    Latex, natural rubber     Statins-hmg-coa reductase inhibitors Other (See Comments)     Muscle ache    Adhesive Rash     Family History   Problem Relation Age of Onset    Hypertension Father     Diabetes Father     Heart disease Father     Cancer Sister         MM    Diabetes Brother     Kidney disease Brother        Social History     Socioeconomic History    Marital status: Single   Tobacco Use    Smoking status: Never    Smokeless tobacco: Never   Substance and Sexual Activity    Alcohol use: Never    Drug use: Never    Sexual activity: Not Currently   Social History Narrative    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   HENT:  Negative for congestion, facial swelling and tinnitus.    Eyes:  Negative for visual disturbance.   Respiratory:  Negative for cough, shortness of breath and wheezing.    Cardiovascular:  Negative for chest pain and palpitations.   Gastrointestinal:  Negative for abdominal distention.   Genitourinary:  Negative for dysuria.   Musculoskeletal:  Negative for arthralgias, joint swelling and myalgias.   Neurological:  Negative for syncope and headaches.   Hematological:  Does not bruise/bleed easily.   Psychiatric/Behavioral:  Negative for confusion.      Vitals:    10/30/23 1033   BP: 131/71   Pulse: 66       Physical Exam  Constitutional:       Appearance: He is well-developed.   Eyes:      General: No scleral icterus.  Cardiovascular:      Rate and Rhythm: Normal rate and regular rhythm.      Heart sounds: Normal heart sounds.   Pulmonary:      Effort: Pulmonary effort is normal. No respiratory distress.      Breath sounds: Normal breath sounds. No wheezing.   Abdominal:      General: Bowel sounds are normal. There is no distension.      Palpations: Abdomen is soft. There is no mass.      Tenderness: There is no abdominal tenderness. There is no rebound.       Musculoskeletal:         General: Normal range of motion.   Lymphadenopathy:      Cervical: No cervical adenopathy.   Skin:     General: Skin is warm and dry.   Neurological:      Mental Status: He is alert and oriented to person, place, and time.         MELD 3.0: 18 at 9/13/2023  8:25 AM  MELD-Na: 20 at 9/13/2023  8:25 AM  Calculated from:  Serum Creatinine: On dialysis. Using the maximum value.  Serum Sodium: 141 mmol/L (Using max of 137 mmol/L) at 9/13/2023  8:25 AM  Total Bilirubin: 0.3 mg/dL (Using min of 1 mg/dL) at 9/13/2023  8:25 AM  Serum Albumin: 2.8 g/dL at 9/13/2023  8:25 AM  INR(ratio): 0.9 (Using min of 1) at 9/13/2023  8:25  AM  Age at listing (hypothetical): 63 years  Sex: Male at 9/13/2023  8:25 AM      Lab Results   Component Value Date    GLU 49 (LL) 09/13/2023    BUN 97 (H) 09/13/2023    CREATININE 10.5 (H) 09/13/2023    CALCIUM 9.4 09/13/2023     09/13/2023    K 3.4 (L) 09/13/2023    CL 99 09/13/2023    PROT 6.8 09/13/2023    CO2 24 09/13/2023    ANIONGAP 18 (H) 09/13/2023    WBC 9.55 09/13/2023    RBC 4.51 (L) 09/13/2023    HGB 12.5 (L) 09/13/2023    HCT 39.5 (L) 09/13/2023    MCV 88 09/13/2023    MCH 27.7 09/13/2023    MCHC 31.6 (L) 09/13/2023     Lab Results   Component Value Date    RDW 14.5 09/13/2023     (H) 09/13/2023    MPV 8.9 (L) 09/13/2023    GRAN 6.6 09/13/2023    GRAN 68.7 09/13/2023    LYMPH 1.0 09/13/2023    LYMPH 10.5 (L) 09/13/2023    MONO 0.9 09/13/2023    MONO 9.4 09/13/2023    EOSINOPHIL 10.1 (H) 09/13/2023    BASOPHIL 0.7 09/13/2023    EOS 1.0 (H) 09/13/2023    BASO 0.07 09/13/2023    APTT 33.5 (H) 09/13/2023    GROUPTRH AB POS 09/27/2023    CHOL 171 09/13/2023    TRIG 174 (H) 09/13/2023    HDL 41 09/13/2023    CHOLHDL 24.0 09/13/2023    TOTALCHOLEST 4.2 09/13/2023    ALBUMIN 2.8 (L) 09/13/2023    BILIDIR 0.1 09/13/2023    AST 26 09/13/2023    ALT 24 09/13/2023    ALKPHOS 50 (L) 09/13/2023    MG 2.2 01/25/2023    LABPROT 10.2 09/13/2023    INR 0.9 09/13/2023    PSA 0.77 09/13/2023       Assessment and Plan:  Patient Active Problem List   Diagnosis    NIHARIKA (acute kidney injury)    Pulmonary nodule    Closed fracture of right orbital floor    Type 2 diabetes mellitus with kidney complication, with long-term current use of insulin    Essential hypertension    Chronic kidney disease, unspecified    Chronic kidney disease, stage V    ESRD (end stage renal disease)    Chronic viral hepatitis B without delta agent and without coma     Impression:  Hepatitis-B with unknown E antigen status and unknown viral load.    Plan:  He had an abdominal ultrasound today.  I will be checking his hepatitis-B viral  studies today and if confirmed we will be starting him on antiviral therapy.  He will be enrolled into a hepatoma surveillance program with alpha fetoprotein and ultrasound every 6 months.

## 2023-10-31 ENCOUNTER — TELEPHONE (OUTPATIENT)
Dept: HEPATOLOGY | Facility: CLINIC | Age: 63
End: 2023-10-31
Payer: MEDICARE

## 2023-10-31 NOTE — TELEPHONE ENCOUNTER
----- Message from Jim Conway MA sent at 10/31/2023 11:55 AM CDT -----  Regarding: FW: Lab Client Services  Contact: [phone]    ----- Message -----  From: Checo Marley  Sent: 10/31/2023  12:12 AM CDT  To: Yehuda Knapp MD; Aria Blackman Staff  Subject: Lab Client Services                              Good Morning,     My name is Marley Kessler I work in the Lab Client Services. We had a problem with some lab work on this patient. If someone from your office could call us at [phone] or ext. [phone] that would be great. Anyone in my department can help.      Thank you

## 2023-10-31 NOTE — TELEPHONE ENCOUNTER
Call returned to Client Services at [facility] at [phone] or ext [phone]. Spoke with Deana.  She stated the Patient will need a Redraw for the CMP from the specimen on yesterday.      Call placed to Father Mark.  Will be able to repeat Labs on Tues 11/7/23 at [facility] location.  Verbalized understanding.

## 2023-11-07 ENCOUNTER — LAB VISIT (OUTPATIENT)
Dept: LAB | Facility: HOSPITAL | Age: 63
End: 2023-11-07
Attending: INTERNAL MEDICINE
Payer: COMMERCIAL

## 2023-11-07 DIAGNOSIS — B18.1 CHRONIC VIRAL HEPATITIS B WITHOUT DELTA AGENT AND WITHOUT COMA: Chronic | ICD-10-CM

## 2023-11-07 LAB
ALBUMIN SERPL BCP-MCNC: 2.9 G/DL (ref 3.5–5.2)
ALP SERPL-CCNC: 41 U/L (ref 55–135)
ALT SERPL W/O P-5'-P-CCNC: 16 U/L (ref 10–44)
ANION GAP SERPL CALC-SCNC: 16 MMOL/L (ref 8–16)
AST SERPL-CCNC: 20 U/L (ref 10–40)
BILIRUB SERPL-MCNC: 0.3 MG/DL (ref 0.1–1)
BUN SERPL-MCNC: 104 MG/DL (ref 8–23)
CALCIUM SERPL-MCNC: 9.5 MG/DL (ref 8.7–10.5)
CHLORIDE SERPL-SCNC: 97 MMOL/L (ref 95–110)
CO2 SERPL-SCNC: 28 MMOL/L (ref 23–29)
CREAT SERPL-MCNC: 10.8 MG/DL (ref 0.5–1.4)
EST. GFR  (NO RACE VARIABLE): 4.8 ML/MIN/1.73 M^2
GLUCOSE SERPL-MCNC: 85 MG/DL (ref 70–110)
POTASSIUM SERPL-SCNC: 3.9 MMOL/L (ref 3.5–5.1)
PROT SERPL-MCNC: 6.6 G/DL (ref 6–8.4)
SODIUM SERPL-SCNC: 141 MMOL/L (ref 136–145)

## 2023-11-07 PROCEDURE — 36415 COLL VENOUS BLD VENIPUNCTURE: CPT | Mod: PO,TXP | Performed by: INTERNAL MEDICINE

## 2023-11-07 PROCEDURE — 80053 COMPREHEN METABOLIC PANEL: CPT | Mod: NTX | Performed by: INTERNAL MEDICINE

## 2024-01-02 ENCOUNTER — TELEPHONE (OUTPATIENT)
Dept: TRANSPLANT | Facility: CLINIC | Age: 64
End: 2024-01-02
Payer: MEDICARE

## 2024-01-03 ENCOUNTER — TELEPHONE (OUTPATIENT)
Dept: TRANSPLANT | Facility: CLINIC | Age: 64
End: 2024-01-03
Payer: MEDICARE

## 2024-01-04 ENCOUNTER — TELEPHONE (OUTPATIENT)
Dept: TRANSPLANT | Facility: CLINIC | Age: 64
End: 2024-01-04
Payer: MEDICARE

## 2024-01-05 ENCOUNTER — COMMITTEE REVIEW (OUTPATIENT)
Dept: TRANSPLANT | Facility: CLINIC | Age: 64
End: 2024-01-05
Payer: MEDICARE

## 2024-01-05 ENCOUNTER — EPISODE CHANGES (OUTPATIENT)
Dept: TRANSPLANT | Facility: HOSPITAL | Age: 64
End: 2024-01-05

## 2024-01-05 NOTE — COMMITTEE REVIEW
Native Organ Dx: Diabetes Mellitus - Type II      SELECTION COMMITTEE NOTE    Mark Lopez was presented at selection committee on 1/5/2024 .  Patient met selection criteria for kidney transplant related to ESRD due to   Diabetes Mellitus - Type II.  No absolute contraindications to transplant at this time.  Patient will be placed on the cadaveric wait list pending final financial approval from insurance company.  Patient will return to clinic for routine appointment in 1 year(s). Patient does not meet criteria for High KDPI kidney offer. Patient meets HCV+ kidney offer. Patient does not meet criteria for dual/enbloc, due to weight.  Planned immunosuppression thymoglobulin.  Patient was informed of the decision after the committee meeting by the phone.      Note written by   Nicole Gagnon RN    ===============================================    I was present at the meeting and attest to the general consensus of the committee.   Kory Jackson Jr.

## 2024-01-05 NOTE — LETTER
January 5, 2024    Fredi Lyon MD  [address] [facility] [address]  Phone: [phone]  Fax: [phone]     Dear Dr. Lyon:    Patient: Mark Lopez   MR Number: [id number]   YOB: 1960     Your patient, Mark Lopez, was recently discussed at the [facility] Selection Committee meeting on 1/5/2024. I am happy to inform you that Mark has been approved for transplantation.  He has met selection criteria for a kidney transplant related to ESRD secondary to primary diagnosis of Diabetes Mellitus - Type II. Your patient will be placed on the cadaveric wait list pending final financial approval from insurance company.     We appreciate your confidence in allowing us to participate in your patients care.  If you have any questions or concerns, please do not hesitate to contact me.    Sincerely,      Sailaja Dailey MD  Medical Director, Kidney & Kidney/Pancreas Transplantation    CC:  Mark Lopez (patient)           [facility]

## 2024-01-23 ENCOUNTER — TELEPHONE (OUTPATIENT)
Dept: TRANSPLANT | Facility: CLINIC | Age: 64
End: 2024-01-23
Payer: MEDICARE

## 2024-01-23 ENCOUNTER — EPISODE CHANGES (OUTPATIENT)
Dept: TRANSPLANT | Facility: CLINIC | Age: 64
End: 2024-01-23

## 2024-01-23 DIAGNOSIS — Z76.82 ORGAN TRANSPLANT CANDIDATE: Primary | ICD-10-CM

## 2024-01-23 NOTE — LETTER
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 On 2024, you were placed on  the waiting list for a  donor transplant.    Your candidacy for kidney transplant is based on the following criteria: ESRD due to   Diabetes Mellitus - Type II .    Your transplant coordinator while on the waiting list is Aziza Diamond RN. They can be reached at [phone] or [phone] with any questions.      What to do now?    Ask your living donors to call and begin testing  Give your donors our phone number, [phone]  Make sure your donors have your name and date of birth when they call  You will get transplanted much faster if you have a living donor    Have your blood sent to our Transplant Lab every month  If you are on dialysis - our Transplant Lab will work with your dialysis unit to send your blood every month  If you are not on dialysis   If you live near an [facility] lab, we will schedule you to have blood drawn every month  If you do not live near an [facility] lab, you will be sent blood kits in the mail. You will need to take a kit to your local lab or doctor to have your blood drawn every month and mail to the Transplant Lab.     Call us with ANY CHANGES  Phone numbers - we must be able to reach you anytime of the day or night when a kidney is available  Address  Insurance coverage  Dialysis unit or kidney doctor  [place]: if you have surgery, stay in the hospital, have to get blood, or have an infection    Review your Kidney/Kidney-Pancreas Transplant Guide   This will give you detailed information about what happens when  you are on the waiting list   you are called when a kidney is available    The [facility] has a transplant surgeon and physician available 365 days a year, 24 hours a day to coordinate organ acceptance, procurement, surgical placement and to address urgent patient  care issues.  You will be notified in writing of any changes to our Transplant Centers staffing plan that would impact your ability to receive a transplant.      Attached is a letter from the [employer] for Organ Sharing (UNOS). It describes the services and information offered to patients by UNOS and the Organ Procurement and Transplant Network. We look forward to working with you while on the waiting list.     We would like to inform you of an important OPTN (Organ Procurement and Transplant Network) Policy change that may affect the waiting time for some candidates on our waiting list.     Waiting time is important in identifying who receives offers for kidneys. A long wait time may increase your chance of getting an offer. Wait time is based on a test called eGFR that tells how well your kidneys are working. Wait time could also be based on how long you have been on dialysis. Government and health officials have changed the way this test is used. Before this year, hospitals used an eGFR that would include your race. For Black or  Americans, this eGFR could have shown that their kidneys were working better than they were.    Because of this change, we are looking at everyones record and assessing waiting time for people who are eligible. We will be reviewing everyones medical records and will contact you if you are eligible.     Who can I talk to if I have a question?  You can contact us if you have questions or send a message through [facility].     Please give us time to answer your questions. We are working on this for many patients.    How can I learn more about eGFR and this policy change?  Go to OPTN website > Patients > Kidney > FAQ: Understanding race-neutral eGFR calculations  Full URL: https://optn.transplant.hrsa.gov/patients/by-organ/kidney/understanding-the-proposal-to-require-race-neutral-egfr-calculations/  Call the Organ Procurement and Transplantation Network (OPTN) toll-free  patient services line at 1-412.811.4770    Congratulations,    Your Transplant Partner  [facility]   [facility] [place] [address]  [phone]  lh/enclosure  CC:   Fredi Lyon MD           [facility]                                                 The Organ Procurement and Transplantation Network   Toll-free patient services line: [phone]  Your resource for organ transplant information      Staffed 8:30 am - 5:00 pm ET Monday - Friday   Leave a message 24/7 to receive a call back    The Organ Procurement and Transplantation Network (OPTN) is the national transplant system. It makes the policies that decide how donated organs are matched to patients waiting for a transplant. The OPTN:    Makes sure donated organs get matched to people on the transplant waiting list  Tells people about the donation and transplant processes  Makes sure that the public knows about the need for more organ and tissue donations    The OPTN has a free patient services line that you can call to:  Get more information about:   o Organ donation and organ transplants   o Donation and transplant policies  Get an information kit with:   o A list of transplant hospitals   o Waiting list information  Talk about any questions you may have about your transplant hospital or organ procurement organization. The staff will do their best to help you or point you to others who may help.  Find out how you can volunteer with the OPTN and help shape transplant policy    The patient services line number is: [phone]    Patient services line staff CANNOT answer questions about your own medical care, including:  Waiting list status  Test results  Medical records  You will need to call your transplant hospital for this information.    The following websites have more information about transplantation and donation:  OPTN: https://optn.transplant.hrsa.gov/  For potential living donors  and transplant recipients:   o Living with transplant: https://www.transplantliving.org/   o Living donation process: https://optn.transplant.hrsa.gov/living-donation/     o Financial assistance: https://www.livingdonorassistance.org/  Transplantation data: https://www.srtr.org/  Organ donation: https://www.organdonor.gov/    Volunteer with the OPTN: https://optn.transplant.hrsa.gov/get-involved

## 2024-01-23 NOTE — TELEPHONE ENCOUNTER
"  KIDNEY WAIT LISTING NOTE    Date of Financial clearance to list: 2024    SSN/[facility]:     Organ: Kidney    Last Name: John  First Name: Mark SOSA: 1960       Gender: male        MRN#: [id number]                                   State of Permanent Residence: [address] [place] [id number]    Ethnicity: Not  or /a   Race:          CLINICAL INFORMATION   Candidate Medical Urgency Status: Active (1)  Number of Previous Kidney Transplants: 0  Number of Previous Solid Organ Transplants: 0  Did you enter number of previous kidney or other solid organ transplants? Yes  Is this Candidate a Prior Living Donor: No  (If yes, please generate letter to UNOS with patient's date of donation, recipient SSN, signed by Surgical Director after patient is listed in order to receive priority points).      ABO  ABO Blood Group:   AB POS     ABO Confirmation: (THESE DATES MUST BE PRIOR TO THE LIST DATE AND SUPPORTED BY SEPARATE LAB REPORTS)    Internal Results    Lab Results   Component Value Date    GROUPTRH AB POS 2023    GROUPTRH AB POS 2023     No results found for: "ABO"    External Results    ABO Date 1:    ABO Date 2  Are either of these ABO results based on External Labs? No  (If Yes, STOP and go to source document in Media Tab for verification).    VITALS  Height:  5' 3.35" (1.609 m)   Weight:  93.2 kg (205 lb 7.5 oz)   (Use height from Transplant clinic visits only).  Did you enter height/weight? Yes    HLA    Class I:  Lab Results   Component Value Date    FNEH6HE 26 2023    ZQON4VK 33 2023    EVZT0GH 52 2023    JHND6ZE 58 2023    ZBTDD7DF 4 2023    GQFZT1GI XX 2023    HJEZA4VZ 10 2023    AMOXV4KC 7 2023       Class II:  Lab Results   Component Value Date    YHQYNV38BQ 4 2023    FVFHHS11ZZ 13 2023    GDVLLE611LY 52 2023    KPNGZW6790 53 2023    JHCQS4NZ 8 2023    VWLNT6IY 6 2023 " "      Tested for HLA Antibodies: Yes, no antibodies detected     If result is "Positive" antibodies are detected     If result is "Negative or questionable" no antibodies detected    No results found for: "CIPRAS", "CIIPRAS"    DIALYSIS INFORMATION  Is patient Pre-Dialysis: No     GFR Information  Report GFR being used as the criteria for placement on the kidney list. If not, leave blank  GFR < or = 20 ml/min? n/a  If Yes, Specify value  ___   ml/min     Initial date GFR became 20 or less:   Is GFR obtained from an Outside lab Result? n/a  (If YES verify with source document scanned into media)    If patient on Dialysis:    Is candidate currently on dialysis for ESRD? Yes  If Yes,  Date Chronic Dialysis Started:   5/19/2023  (verify with source document in Media Tab)   Dialysis Unit Name: [facility]  [address] [place] [address]                        Physician Name:  Dr. Sailaja Jackson  NPI#: [id number]    DIABETES INFORMATION  Primary Native Kidney Diagnosis: Diabetes Mellitus - Type II  C-Peptide Value - No results found for: "CPEPTIDE"  Current Diabetes Status: Type II    FOR NON-KIDNEY DEPARTMENT USE ONLY:  Additional Organs Registered? none    Maximum Acceptable Number of HLA Mismatches  ABDR:     6      (0-6)               AB:               (0-4)  ADR:   _____  (0-4)              BDR: _____ (0-4)  A:        _____  (0-2)              B:      _____ (0-2)          DR: ______ (0-2)    Will Recipient Accept?   Accept HBcAB Positive Organ:            Yes  Accept HBV RAHEEM Positive Organ:        No  Accept HCV Antibody Positive Organ: Yes   Accept HCV RAHEEM Positive Organ: Yes    Dual Kidney and En Bloc Opt In : No  Dual  Local:   No  Dual Import:   No  En Bloc Local:   No  En Bloc Import: No     Accept KDPI > 85: Single: No     Local: No     Import: No  Accept KDPI > 85: Dual: No     Local: No     Import: No    ### NURSE TO VERIFY CONSENT AND MAKE ANY NECESSARY CHANGES NEEDED IN " "UNET AT THE TIME OF VERIFICATION ###    Unacceptible Antigens  If yes, list     No results found for: "YR0KRKW", "CIABCLM", "CIIAB"    ### DO NOT LIST IF ANTIGEN VALUE WEAK ###    Hep B Vaccine series completed: yes    Blood Type x2 was verified by myself and Zoë.  Blood type determination and reporting was completed according to the programs protocols and OPTN requirements.         "

## 2024-01-24 DIAGNOSIS — Z76.82 AWAITING ORGAN TRANSPLANT STATUS: Primary | ICD-10-CM

## 2024-02-05 ENCOUNTER — LAB VISIT (OUTPATIENT)
Dept: LAB | Facility: HOSPITAL | Age: 64
End: 2024-02-05
Attending: INTERNAL MEDICINE
Payer: MEDICARE

## 2024-02-05 DIAGNOSIS — Z76.82 ORGAN TRANSPLANT CANDIDATE: ICD-10-CM

## 2024-02-05 PROCEDURE — 86832 HLA CLASS I HIGH DEFIN QUAL: CPT | Mod: PO | Performed by: INTERNAL MEDICINE

## 2024-02-05 PROCEDURE — 86833 HLA CLASS II HIGH DEFIN QUAL: CPT | Mod: PO | Performed by: INTERNAL MEDICINE

## 2024-02-05 PROCEDURE — 86977 RBC SERUM PRETX INCUBJ/INHIB: CPT | Mod: PO | Performed by: INTERNAL MEDICINE

## 2024-02-27 ENCOUNTER — LAB VISIT (OUTPATIENT)
Dept: LAB | Facility: HOSPITAL | Age: 64
End: 2024-02-27
Payer: COMMERCIAL

## 2024-02-27 ENCOUNTER — TELEPHONE (OUTPATIENT)
Dept: TRANSPLANT | Facility: CLINIC | Age: 64
End: 2024-02-27
Payer: MEDICARE

## 2024-02-27 DIAGNOSIS — Z76.82 ORGAN TRANSPLANT CANDIDATE: ICD-10-CM

## 2024-02-27 DIAGNOSIS — Z76.82 AWAITING ORGAN TRANSPLANT STATUS: Primary | ICD-10-CM

## 2024-02-27 DIAGNOSIS — Z76.82 AWAITING ORGAN TRANSPLANT STATUS: ICD-10-CM

## 2024-02-27 LAB
HLA VIRTUAL CROSSMATCH INTERPRETATION: NORMAL
HLVXM TESTING DATE: NORMAL

## 2024-02-27 PROCEDURE — 86825 HLA X-MATH NON-CYTOTOXIC: CPT | Mod: PO,TXP | Performed by: TRANSPLANT SURGERY

## 2024-02-27 PROCEDURE — 86825 HLA X-MATH NON-CYTOTOXIC: CPT | Mod: 91,PO,TXP | Performed by: TRANSPLANT SURGERY

## 2024-02-27 PROCEDURE — 86826 HLA X-MATCH NONCYTOTOXC ADDL: CPT | Mod: 91,PO,TXP | Performed by: TRANSPLANT SURGERY

## 2024-02-27 PROCEDURE — 86826 HLA X-MATCH NONCYTOTOXC ADDL: CPT | Mod: PO,TXP | Performed by: TRANSPLANT SURGERY

## 2024-02-28 ENCOUNTER — HOSPITAL ENCOUNTER (INPATIENT)
Facility: HOSPITAL | Age: 64
LOS: 6 days | Discharge: HOME | DRG: 651 | End: 2024-03-05
Attending: TRANSPLANT SURGERY | Admitting: TRANSPLANT SURGERY
Payer: MEDICARE

## 2024-02-28 DIAGNOSIS — Z00.5 POSITIVE RPR TEST IN CADAVERIC DONOR: ICD-10-CM

## 2024-02-28 DIAGNOSIS — Z99.2 ESRD ON PERITONEAL DIALYSIS: Primary | ICD-10-CM

## 2024-02-28 DIAGNOSIS — Z94.0 STATUS POST DECEASED-DONOR KIDNEY TRANSPLANTATION: ICD-10-CM

## 2024-02-28 DIAGNOSIS — N32.89 BLADDER SPASM: ICD-10-CM

## 2024-02-28 DIAGNOSIS — T86.19 DELAYED GRAFT FUNCTION OF KIDNEY: ICD-10-CM

## 2024-02-28 DIAGNOSIS — A53.9 POSITIVE RPR TEST IN CADAVERIC DONOR: ICD-10-CM

## 2024-02-28 DIAGNOSIS — N18.6 ESRD (END STAGE RENAL DISEASE): ICD-10-CM

## 2024-02-28 DIAGNOSIS — Z79.4 TYPE 2 DIABETES MELLITUS WITH OTHER DIABETIC KIDNEY COMPLICATION, WITH LONG-TERM CURRENT USE OF INSULIN: ICD-10-CM

## 2024-02-28 DIAGNOSIS — Z29.89 PROPHYLACTIC IMMUNOTHERAPY: ICD-10-CM

## 2024-02-28 DIAGNOSIS — Z91.89 AT RISK FOR OPPORTUNISTIC INFECTIONS: ICD-10-CM

## 2024-02-28 DIAGNOSIS — E11.29 TYPE 2 DIABETES MELLITUS WITH OTHER DIABETIC KIDNEY COMPLICATION, WITH LONG-TERM CURRENT USE OF INSULIN: ICD-10-CM

## 2024-02-28 DIAGNOSIS — Z79.60 LONG-TERM USE OF IMMUNOSUPPRESSANT MEDICATION: ICD-10-CM

## 2024-02-28 DIAGNOSIS — N18.6 ESRD ON PERITONEAL DIALYSIS: Primary | ICD-10-CM

## 2024-02-28 DIAGNOSIS — Z01.818 PRE-OP TESTING: ICD-10-CM

## 2024-02-28 DIAGNOSIS — E87.70 FLUID OVERLOAD: ICD-10-CM

## 2024-02-28 DIAGNOSIS — D64.9 ACUTE ON CHRONIC ANEMIA: ICD-10-CM

## 2024-02-28 DIAGNOSIS — I10 ESSENTIAL HYPERTENSION: Primary | ICD-10-CM

## 2024-02-28 LAB
ABO + RH BLD: NORMAL
ALBUMIN SERPL BCP-MCNC: 3.2 G/DL (ref 3.5–5.2)
ALP SERPL-CCNC: 44 U/L (ref 55–135)
ALT SERPL W/O P-5'-P-CCNC: 20 U/L (ref 10–44)
ANION GAP SERPL CALC-SCNC: 17 MMOL/L (ref 8–16)
APPEARANCE FLD: CLEAR
APTT PPP: 28 SEC (ref 21–32)
AST SERPL-CCNC: 21 U/L (ref 10–40)
B CELL RESULTS - XM ALLO: NEGATIVE
B CELL RESULTS - XM ALLO: NEGATIVE
B MCS AVERAGE - XM ALLO: -39.8
B MCS AVERAGE - XM ALLO: -40.9
BASOPHILS # BLD AUTO: 0.06 K/UL (ref 0–0.2)
BASOPHILS NFR BLD: 0.6 % (ref 0–1.9)
BASOPHILS NFR FLD MANUAL: 1 %
BILIRUB SERPL-MCNC: 0.3 MG/DL (ref 0.1–1)
BLD GP AB SCN CELLS X3 SERPL QL: NORMAL
BODY FLD TYPE: NORMAL
BUN SERPL-MCNC: 99 MG/DL (ref 8–23)
CALCIUM SERPL-MCNC: 10 MG/DL (ref 8.7–10.5)
CHLORIDE SERPL-SCNC: 99 MMOL/L (ref 95–110)
CO2 SERPL-SCNC: 26 MMOL/L (ref 23–29)
COLOR FLD: COLORLESS
CREAT SERPL-MCNC: 12.7 MG/DL (ref 0.5–1.4)
CREAT UR-MCNC: 59 MG/DL (ref 23–375)
DIFFERENTIAL METHOD BLD: ABNORMAL
DONOR MRN: NORMAL
DONOR MRN: NORMAL
EOSINOPHIL # BLD AUTO: 1.2 K/UL (ref 0–0.5)
EOSINOPHIL NFR BLD: 12.8 % (ref 0–8)
EOSINOPHIL NFR FLD MANUAL: 7 %
ERYTHROCYTE [DISTWIDTH] IN BLOOD BY AUTOMATED COUNT: 12.9 % (ref 11.5–14.5)
EST. GFR  (NO RACE VARIABLE): 4 ML/MIN/1.73 M^2
FXMAL TESTING DATE: NORMAL
FXMAL TESTING DATE: NORMAL
GLUCOSE SERPL-MCNC: 98 MG/DL (ref 70–110)
HBV CORE AB SERPL QL IA: NORMAL
HBV CORE IGM SERPL QL IA: NORMAL
HBV SURFACE AG SERPL QL IA: NORMAL
HCT VFR BLD AUTO: 27.5 % (ref 40–54)
HCV AB SERPL QL IA: NORMAL
HGB BLD-MCNC: 9 G/DL (ref 14–18)
HIV 1+2 AB+HIV1 P24 AG SERPL QL IA: NORMAL
HLA FLOW CROSSMATCH (ALLO) INTERPRETATION: NORMAL
IMM GRANULOCYTES # BLD AUTO: 0.07 K/UL (ref 0–0.04)
IMM GRANULOCYTES NFR BLD AUTO: 0.7 % (ref 0–0.5)
INR PPP: 1 (ref 0.8–1.2)
LYMPHOCYTES # BLD AUTO: 1 K/UL (ref 1–4.8)
LYMPHOCYTES NFR BLD: 10 % (ref 18–48)
LYMPHOCYTES NFR FLD MANUAL: 32 %
MCH RBC QN AUTO: 31 PG (ref 27–31)
MCHC RBC AUTO-ENTMCNC: 32.7 G/DL (ref 32–36)
MCV RBC AUTO: 95 FL (ref 82–98)
MESOTHL CELL NFR FLD MANUAL: 1 %
MONOCYTES # BLD AUTO: 0.8 K/UL (ref 0.3–1)
MONOCYTES NFR BLD: 8.2 % (ref 4–15)
MONOS+MACROS NFR FLD MANUAL: 59 %
NEUTROPHILS # BLD AUTO: 6.4 K/UL (ref 1.8–7.7)
NEUTROPHILS NFR BLD: 67.7 % (ref 38–73)
NRBC BLD-RTO: 0 /100 WBC
PHOSPHATE SERPL-MCNC: 8 MG/DL (ref 2.7–4.5)
PLATELET # BLD AUTO: 356 K/UL (ref 150–450)
PMV BLD AUTO: 9.3 FL (ref 9.2–12.9)
POCT GLUCOSE: 97 MG/DL (ref 70–110)
POTASSIUM SERPL-SCNC: 4.1 MMOL/L (ref 3.5–5.1)
PROT SERPL-MCNC: 7 G/DL (ref 6–8.4)
PROT UR-MCNC: 92 MG/DL (ref 0–15)
PROT/CREAT UR: 1.56 MG/G{CREAT} (ref 0–0.2)
PROTHROMBIN TIME: 10.3 SEC (ref 9–12.5)
RBC # BLD AUTO: 2.9 M/UL (ref 4.6–6.2)
SARS-COV-2 RDRP RESP QL NAA+PROBE: NEGATIVE
SERUM COLLECTION DT - XM ALLO: NORMAL
SERUM COLLECTION DT - XM ALLO: NORMAL
SODIUM SERPL-SCNC: 142 MMOL/L (ref 136–145)
SPECIMEN OUTDATE: NORMAL
T CELL RESULTS - XM ALLO: NEGATIVE
T CELL RESULTS - XM ALLO: NEGATIVE
T MCS AVERAGE - XM ALLO: -16
T MCS AVERAGE - XM ALLO: -19.1
WBC # BLD AUTO: 9.48 K/UL (ref 3.9–12.7)
WBC # FLD: 13 /CU MM

## 2024-02-28 PROCEDURE — 86705 HEP B CORE ANTIBODY IGM: CPT | Performed by: PHYSICIAN ASSISTANT

## 2024-02-28 PROCEDURE — 86704 HEP B CORE ANTIBODY TOTAL: CPT | Performed by: PHYSICIAN ASSISTANT

## 2024-02-28 PROCEDURE — 85610 PROTHROMBIN TIME: CPT | Performed by: PHYSICIAN ASSISTANT

## 2024-02-28 PROCEDURE — U0002 COVID-19 LAB TEST NON-CDC: HCPCS | Performed by: PHYSICIAN ASSISTANT

## 2024-02-28 PROCEDURE — 84100 ASSAY OF PHOSPHORUS: CPT | Performed by: PHYSICIAN ASSISTANT

## 2024-02-28 PROCEDURE — 89051 BODY FLUID CELL COUNT: CPT | Performed by: PHYSICIAN ASSISTANT

## 2024-02-28 PROCEDURE — 93005 ELECTROCARDIOGRAM TRACING: CPT

## 2024-02-28 PROCEDURE — 80053 COMPREHEN METABOLIC PANEL: CPT | Performed by: PHYSICIAN ASSISTANT

## 2024-02-28 PROCEDURE — 87522 HEPATITIS C REVRS TRNSCRPJ: CPT | Performed by: PHYSICIAN ASSISTANT

## 2024-02-28 PROCEDURE — 85025 COMPLETE CBC W/AUTO DIFF WBC: CPT | Performed by: PHYSICIAN ASSISTANT

## 2024-02-28 PROCEDURE — 84156 ASSAY OF PROTEIN URINE: CPT | Performed by: PHYSICIAN ASSISTANT

## 2024-02-28 PROCEDURE — 86803 HEPATITIS C AB TEST: CPT | Performed by: PHYSICIAN ASSISTANT

## 2024-02-28 PROCEDURE — 86850 RBC ANTIBODY SCREEN: CPT | Performed by: PHYSICIAN ASSISTANT

## 2024-02-28 PROCEDURE — 87340 HEPATITIS B SURFACE AG IA: CPT | Performed by: PHYSICIAN ASSISTANT

## 2024-02-28 PROCEDURE — 85730 THROMBOPLASTIN TIME PARTIAL: CPT | Performed by: PHYSICIAN ASSISTANT

## 2024-02-28 PROCEDURE — 87389 HIV-1 AG W/HIV-1&-2 AB AG IA: CPT | Performed by: PHYSICIAN ASSISTANT

## 2024-02-28 PROCEDURE — 87075 CULTR BACTERIA EXCEPT BLOOD: CPT | Performed by: PHYSICIAN ASSISTANT

## 2024-02-28 PROCEDURE — 20600001 HC STEP DOWN PRIVATE ROOM: Mod: NTX

## 2024-02-28 PROCEDURE — 63600175 PHARM REV CODE 636 W HCPCS: Mod: NTX | Performed by: PHYSICIAN ASSISTANT

## 2024-02-28 PROCEDURE — 27200950 HC CAPD SUPPORT: Mod: NTX

## 2024-02-28 PROCEDURE — 99223 1ST HOSP IP/OBS HIGH 75: CPT | Mod: NTX,,,

## 2024-02-28 PROCEDURE — 87205 SMEAR GRAM STAIN: CPT | Performed by: PHYSICIAN ASSISTANT

## 2024-02-28 PROCEDURE — 87070 CULTURE OTHR SPECIMN AEROBIC: CPT | Performed by: PHYSICIAN ASSISTANT

## 2024-02-28 PROCEDURE — 93010 ELECTROCARDIOGRAM REPORT: CPT | Mod: ,,, | Performed by: INTERNAL MEDICINE

## 2024-02-28 PROCEDURE — 86706 HEP B SURFACE ANTIBODY: CPT | Performed by: PHYSICIAN ASSISTANT

## 2024-02-28 RX ORDER — HEPARIN SODIUM 5000 [USP'U]/ML
5000 INJECTION, SOLUTION INTRAVENOUS; SUBCUTANEOUS ONCE
Status: COMPLETED | OUTPATIENT
Start: 2024-02-28 | End: 2024-02-28

## 2024-02-28 RX ORDER — DIPHENHYDRAMINE HYDROCHLORIDE 50 MG/ML
50 INJECTION INTRAMUSCULAR; INTRAVENOUS ONCE
Status: COMPLETED | OUTPATIENT
Start: 2024-02-28 | End: 2024-02-29

## 2024-02-28 RX ORDER — MUPIROCIN 20 MG/G
OINTMENT TOPICAL
Status: DISCONTINUED | OUTPATIENT
Start: 2024-02-28 | End: 2024-02-29

## 2024-02-28 RX ORDER — DIPHENHYDRAMINE HYDROCHLORIDE 50 MG/ML
50 INJECTION INTRAMUSCULAR; INTRAVENOUS ONCE
Status: CANCELLED | OUTPATIENT
Start: 2024-02-28 | End: 2024-02-28

## 2024-02-28 RX ORDER — SODIUM CHLORIDE 0.9 % (FLUSH) 0.9 %
10 SYRINGE (ML) INJECTION
Status: CANCELLED | OUTPATIENT
Start: 2024-02-28

## 2024-02-28 RX ORDER — ACETAMINOPHEN 650 MG/20.3ML
650 LIQUID ORAL ONCE
Status: CANCELLED | OUTPATIENT
Start: 2024-02-28 | End: 2024-02-28

## 2024-02-28 RX ORDER — MUPIROCIN 20 MG/G
OINTMENT TOPICAL
Status: CANCELLED | OUTPATIENT
Start: 2024-02-28

## 2024-02-28 RX ORDER — SODIUM CHLORIDE 0.9 % (FLUSH) 0.9 %
10 SYRINGE (ML) INJECTION
Status: DISCONTINUED | OUTPATIENT
Start: 2024-02-28 | End: 2024-03-05 | Stop reason: HOSPADM

## 2024-02-28 RX ORDER — EPINEPHRINE 1 MG/ML
1 INJECTION, SOLUTION, CONCENTRATE INTRAVENOUS ONCE AS NEEDED
Status: DISCONTINUED | OUTPATIENT
Start: 2024-02-28 | End: 2024-03-03

## 2024-02-28 RX ORDER — PREGABALIN 75 MG/1
75 CAPSULE ORAL
Status: DISCONTINUED | OUTPATIENT
Start: 2024-02-28 | End: 2024-02-29

## 2024-02-28 RX ORDER — PREGABALIN 75 MG/1
75 CAPSULE ORAL
Status: CANCELLED | OUTPATIENT
Start: 2024-02-28

## 2024-02-28 RX ORDER — ACETAMINOPHEN 325 MG/1
650 TABLET ORAL ONCE AS NEEDED
Status: COMPLETED | OUTPATIENT
Start: 2024-02-28 | End: 2024-03-02

## 2024-02-28 RX ORDER — PREDNISONE 20 MG/1
20 TABLET ORAL DAILY
Status: DISCONTINUED | OUTPATIENT
Start: 2024-03-02 | End: 2024-03-05 | Stop reason: HOSPADM

## 2024-02-28 RX ORDER — DIPHENHYDRAMINE HCL 25 MG
50 CAPSULE ORAL ONCE AS NEEDED
Status: DISCONTINUED | OUTPATIENT
Start: 2024-02-28 | End: 2024-03-03

## 2024-02-28 RX ORDER — HEPARIN SODIUM 5000 [USP'U]/ML
5000 INJECTION, SOLUTION INTRAVENOUS; SUBCUTANEOUS ONCE
Status: CANCELLED | OUTPATIENT
Start: 2024-02-28 | End: 2024-02-28

## 2024-02-28 RX ORDER — ACETAMINOPHEN 650 MG/20.3ML
650 LIQUID ORAL ONCE
Status: COMPLETED | OUTPATIENT
Start: 2024-02-28 | End: 2024-02-29

## 2024-02-28 RX ADMIN — HEPARIN SODIUM 5000 UNITS: 5000 INJECTION INTRAVENOUS; SUBCUTANEOUS at 09:02

## 2024-02-28 NOTE — TELEPHONE ENCOUNTER
ON-CALL NOTE    UNOS# [id number]    Notified by Jimbo Aguilra, , that Mark Lopez is eligible for kidney offer.  Spoke with patient and identified no acute medical issues with telephone assessment. Protocol script read to patient regarding PHS risk criteria donor offer . Patient verbalized understanding, all questions answered, patient accepts organ offer. Notified by Jimbo Aguilar that virtual crossmatch was not able to be done..  Current sample of blood is available from date 2/5/2024 for crossmatch.  Patient reports no sensitizing event since last blood sample for PRA received. Notified Sita in HLA Lab to perform a prospective  crossmatch per guideline.    Patient was asked if they have had a positive COVID-19 test or if they have any signs or symptoms. Informed patient that they will be tested for COVID-19 upon arrival to the hospital, unless have a previous positive result. If tested and result is positive, the transplant will not be able to occur, they will be inactivated on the wait list for 21 days per protocol and required to quarantine.     Patient notified of plan to remain at home on standby and await updates . Patient states understanding.  He was given the contact information to call coordinator with any questions or concerns.

## 2024-02-28 NOTE — HPI
Mark Lopez is a 64 year old male with a history of ESRD secondary to presumed diabetic nephropathy. Patient is currently on peritoneal dialysis started on 2023 (initially on HD). Patient is dialyzing on cyclic peritoneal dialysis. Patient reports that he is tolerating dialysis well. He has a PD catheter, permacath, and RUE AVF for dialysis access. Patient also with insulin pump. He now presents as primary candidate for a  donor kidney transplant. OR time set for 24 at 0800 AM. Pre-op labs and imaging ordered and will be reviewed prior to transplant. Will receive Thymoglobulin for induction.

## 2024-02-28 NOTE — ASSESSMENT & PLAN NOTE
- ESRD d/t DM currently on cyclic PD  - Admit for  donor kidney transplant  - OR time 8 AM on 24  - Pre op labs and imaging to be reviewed prior to transplant

## 2024-02-29 ENCOUNTER — ANESTHESIA EVENT (OUTPATIENT)
Dept: SURGERY | Facility: HOSPITAL | Age: 64
DRG: 651 | End: 2024-02-29
Payer: MEDICARE

## 2024-02-29 ENCOUNTER — ANESTHESIA (OUTPATIENT)
Dept: SURGERY | Facility: HOSPITAL | Age: 64
DRG: 651 | End: 2024-02-29
Payer: MEDICARE

## 2024-02-29 PROBLEM — N18.6 ESRD (END STAGE RENAL DISEASE): Status: RESOLVED | Noted: 2023-06-20 | Resolved: 2024-02-29

## 2024-02-29 PROBLEM — Z79.60 LONG-TERM USE OF IMMUNOSUPPRESSANT MEDICATION: Status: ACTIVE | Noted: 2024-02-29

## 2024-02-29 PROBLEM — Z29.89 PROPHYLACTIC IMMUNOTHERAPY: Status: ACTIVE | Noted: 2024-02-29

## 2024-02-29 PROBLEM — Z91.89 AT RISK FOR OPPORTUNISTIC INFECTIONS: Status: ACTIVE | Noted: 2024-02-29

## 2024-02-29 PROBLEM — Z94.0 STATUS POST DECEASED-DONOR KIDNEY TRANSPLANTATION: Status: ACTIVE | Noted: 2024-02-29

## 2024-02-29 PROBLEM — D64.9 ACUTE ON CHRONIC ANEMIA: Status: ACTIVE | Noted: 2024-02-29

## 2024-02-29 LAB
ALBUMIN SERPL BCP-MCNC: 2.1 G/DL (ref 3.5–5.2)
ALBUMIN SERPL BCP-MCNC: 2.5 G/DL (ref 3.5–5.2)
ALBUMIN SERPL BCP-MCNC: 2.5 G/DL (ref 3.5–5.2)
ALBUMIN SERPL BCP-MCNC: 2.6 G/DL (ref 3.5–5.2)
ANION GAP SERPL CALC-SCNC: 18 MMOL/L (ref 8–16)
ANION GAP SERPL CALC-SCNC: 18 MMOL/L (ref 8–16)
ANION GAP SERPL CALC-SCNC: 19 MMOL/L (ref 8–16)
ANION GAP SERPL CALC-SCNC: 22 MMOL/L (ref 8–16)
BASOPHILS # BLD AUTO: 0.01 K/UL (ref 0–0.2)
BASOPHILS # BLD AUTO: 0.02 K/UL (ref 0–0.2)
BASOPHILS # BLD AUTO: 0.03 K/UL (ref 0–0.2)
BASOPHILS NFR BLD: 0.1 % (ref 0–1.9)
BUN SERPL-MCNC: 102 MG/DL (ref 8–23)
BUN SERPL-MCNC: 105 MG/DL (ref 8–23)
BUN SERPL-MCNC: 105 MG/DL (ref 8–23)
BUN SERPL-MCNC: 112 MG/DL (ref 8–23)
CALCIUM SERPL-MCNC: 7.9 MG/DL (ref 8.7–10.5)
CALCIUM SERPL-MCNC: 8.5 MG/DL (ref 8.7–10.5)
CALCIUM SERPL-MCNC: 8.7 MG/DL (ref 8.7–10.5)
CALCIUM SERPL-MCNC: 9.1 MG/DL (ref 8.7–10.5)
CHLORIDE SERPL-SCNC: 100 MMOL/L (ref 95–110)
CHLORIDE SERPL-SCNC: 94 MMOL/L (ref 95–110)
CHLORIDE SERPL-SCNC: 97 MMOL/L (ref 95–110)
CHLORIDE SERPL-SCNC: 97 MMOL/L (ref 95–110)
CO2 SERPL-SCNC: 16 MMOL/L (ref 23–29)
CO2 SERPL-SCNC: 19 MMOL/L (ref 23–29)
CO2 SERPL-SCNC: 20 MMOL/L (ref 23–29)
CO2 SERPL-SCNC: 21 MMOL/L (ref 23–29)
CREAT SERPL-MCNC: 11.8 MG/DL (ref 0.5–1.4)
CREAT SERPL-MCNC: 12.7 MG/DL (ref 0.5–1.4)
CREAT SERPL-MCNC: 13 MG/DL (ref 0.5–1.4)
CREAT SERPL-MCNC: 13.2 MG/DL (ref 0.5–1.4)
DIFFERENTIAL METHOD BLD: ABNORMAL
EOSINOPHIL # BLD AUTO: 0 K/UL (ref 0–0.5)
EOSINOPHIL NFR BLD: 0 % (ref 0–8)
EOSINOPHIL NFR BLD: 0.1 % (ref 0–8)
EOSINOPHIL NFR BLD: 0.1 % (ref 0–8)
ERYTHROCYTE [DISTWIDTH] IN BLOOD BY AUTOMATED COUNT: 13.2 % (ref 11.5–14.5)
ERYTHROCYTE [DISTWIDTH] IN BLOOD BY AUTOMATED COUNT: 13.4 % (ref 11.5–14.5)
ERYTHROCYTE [DISTWIDTH] IN BLOOD BY AUTOMATED COUNT: 13.5 % (ref 11.5–14.5)
EST. GFR  (NO RACE VARIABLE): 3.8 ML/MIN/1.73 M^2
EST. GFR  (NO RACE VARIABLE): 3.8 ML/MIN/1.73 M^2
EST. GFR  (NO RACE VARIABLE): 4 ML/MIN/1.73 M^2
EST. GFR  (NO RACE VARIABLE): 4.3 ML/MIN/1.73 M^2
GLUCOSE SERPL-MCNC: 235 MG/DL (ref 70–110)
GLUCOSE SERPL-MCNC: 237 MG/DL (ref 70–110)
GLUCOSE SERPL-MCNC: 238 MG/DL (ref 70–110)
GLUCOSE SERPL-MCNC: 279 MG/DL (ref 70–110)
HCT VFR BLD AUTO: 22.4 % (ref 40–54)
HCT VFR BLD AUTO: 27.4 % (ref 40–54)
HCT VFR BLD AUTO: 27.9 % (ref 40–54)
HCT VFR BLD AUTO: 29.6 % (ref 40–54)
HCV RNA SERPL QL NAA+PROBE: NOT DETECTED
HCV RNA SPEC NAA+PROBE-ACNC: NOT DETECTED IU/ML
HGB BLD-MCNC: 7.3 G/DL (ref 14–18)
HGB BLD-MCNC: 8.7 G/DL (ref 14–18)
HGB BLD-MCNC: 9 G/DL (ref 14–18)
IMM GRANULOCYTES # BLD AUTO: 0.15 K/UL (ref 0–0.04)
IMM GRANULOCYTES # BLD AUTO: 0.16 K/UL (ref 0–0.04)
IMM GRANULOCYTES # BLD AUTO: 0.26 K/UL (ref 0–0.04)
IMM GRANULOCYTES NFR BLD AUTO: 0.7 % (ref 0–0.5)
IMM GRANULOCYTES NFR BLD AUTO: 1 % (ref 0–0.5)
IMM GRANULOCYTES NFR BLD AUTO: 1 % (ref 0–0.5)
LYMPHOCYTES # BLD AUTO: 0 K/UL (ref 1–4.8)
LYMPHOCYTES # BLD AUTO: 0 K/UL (ref 1–4.8)
LYMPHOCYTES # BLD AUTO: 0.1 K/UL (ref 1–4.8)
LYMPHOCYTES NFR BLD: 0 % (ref 18–48)
LYMPHOCYTES NFR BLD: 0.1 % (ref 18–48)
LYMPHOCYTES NFR BLD: 0.3 % (ref 18–48)
MCH RBC QN AUTO: 31 PG (ref 27–31)
MCH RBC QN AUTO: 31.1 PG (ref 27–31)
MCH RBC QN AUTO: 31.6 PG (ref 27–31)
MCHC RBC AUTO-ENTMCNC: 31.8 G/DL (ref 32–36)
MCHC RBC AUTO-ENTMCNC: 32.3 G/DL (ref 32–36)
MCHC RBC AUTO-ENTMCNC: 32.6 G/DL (ref 32–36)
MCV RBC AUTO: 96 FL (ref 82–98)
MCV RBC AUTO: 97 FL (ref 82–98)
MCV RBC AUTO: 98 FL (ref 82–98)
MONOCYTES # BLD AUTO: 0.3 K/UL (ref 0.3–1)
MONOCYTES # BLD AUTO: 0.4 K/UL (ref 0.3–1)
MONOCYTES # BLD AUTO: 0.7 K/UL (ref 0.3–1)
MONOCYTES NFR BLD: 1.9 % (ref 4–15)
MONOCYTES NFR BLD: 2.1 % (ref 4–15)
MONOCYTES NFR BLD: 2.6 % (ref 4–15)
NEUTROPHILS # BLD AUTO: 14.9 K/UL (ref 1.8–7.7)
NEUTROPHILS # BLD AUTO: 21.9 K/UL (ref 1.8–7.7)
NEUTROPHILS # BLD AUTO: 24.8 K/UL (ref 1.8–7.7)
NEUTROPHILS NFR BLD: 95.9 % (ref 38–73)
NEUTROPHILS NFR BLD: 96.6 % (ref 38–73)
NEUTROPHILS NFR BLD: 97.3 % (ref 38–73)
NRBC BLD-RTO: 0 /100 WBC
OHS QRS DURATION: 108 MS
OHS QTC CALCULATION: 477 MS
PHOSPHATE SERPL-MCNC: 7.3 MG/DL (ref 2.7–4.5)
PHOSPHATE SERPL-MCNC: 8.3 MG/DL (ref 2.7–4.5)
PHOSPHATE SERPL-MCNC: 8.4 MG/DL (ref 2.7–4.5)
PHOSPHATE SERPL-MCNC: 8.9 MG/DL (ref 2.7–4.5)
PLATELET # BLD AUTO: 269 K/UL (ref 150–450)
PLATELET # BLD AUTO: 359 K/UL (ref 150–450)
PLATELET # BLD AUTO: 372 K/UL (ref 150–450)
PMV BLD AUTO: 9.6 FL (ref 9.2–12.9)
PMV BLD AUTO: 9.6 FL (ref 9.2–12.9)
PMV BLD AUTO: 9.9 FL (ref 9.2–12.9)
POCT GLUCOSE: 269 MG/DL (ref 70–110)
POCT GLUCOSE: 277 MG/DL (ref 70–110)
POCT GLUCOSE: 304 MG/DL (ref 70–110)
POCT GLUCOSE: 309 MG/DL (ref 70–110)
POCT GLUCOSE: 316 MG/DL (ref 70–110)
POCT GLUCOSE: 90 MG/DL (ref 70–110)
POTASSIUM SERPL-SCNC: 4 MMOL/L (ref 3.5–5.1)
POTASSIUM SERPL-SCNC: 4.2 MMOL/L (ref 3.5–5.1)
POTASSIUM SERPL-SCNC: 4.7 MMOL/L (ref 3.5–5.1)
POTASSIUM SERPL-SCNC: 5 MMOL/L (ref 3.5–5.1)
RBC # BLD AUTO: 2.31 M/UL (ref 4.6–6.2)
RBC # BLD AUTO: 2.8 M/UL (ref 4.6–6.2)
RBC # BLD AUTO: 2.9 M/UL (ref 4.6–6.2)
SODIUM SERPL-SCNC: 132 MMOL/L (ref 136–145)
SODIUM SERPL-SCNC: 135 MMOL/L (ref 136–145)
SODIUM SERPL-SCNC: 136 MMOL/L (ref 136–145)
SODIUM SERPL-SCNC: 138 MMOL/L (ref 136–145)
TROPONIN I SERPL DL<=0.01 NG/ML-MCNC: 0.04 NG/ML (ref 0–0.03)
WBC # BLD AUTO: 15.41 K/UL (ref 3.9–12.7)
WBC # BLD AUTO: 22.53 K/UL (ref 3.9–12.7)
WBC # BLD AUTO: 25.88 K/UL (ref 3.9–12.7)

## 2024-02-29 PROCEDURE — 82962 GLUCOSE BLOOD TEST: CPT | Performed by: TRANSPLANT SURGERY

## 2024-02-29 PROCEDURE — 27000221 HC OXYGEN, UP TO 24 HOURS

## 2024-02-29 PROCEDURE — 36000930 HC OR TIME LEV VII 1ST 15 MIN: Performed by: TRANSPLANT SURGERY

## 2024-02-29 PROCEDURE — 99233 SBSQ HOSP IP/OBS HIGH 50: CPT | Mod: ,,, | Performed by: PHYSICIAN ASSISTANT

## 2024-02-29 PROCEDURE — 50360 RNL ALTRNSPLJ W/O RCP NFRCT: CPT | Mod: RT,GC,, | Performed by: TRANSPLANT SURGERY

## 2024-02-29 PROCEDURE — 25000003 PHARM REV CODE 250: Performed by: NURSE ANESTHETIST, CERTIFIED REGISTERED

## 2024-02-29 PROCEDURE — D9220A PRA ANESTHESIA: Mod: CRNA,,, | Performed by: NURSE ANESTHETIST, CERTIFIED REGISTERED

## 2024-02-29 PROCEDURE — 63600175 PHARM REV CODE 636 W HCPCS

## 2024-02-29 PROCEDURE — 0T160ZB BYPASS RIGHT URETER TO BLADDER, OPEN APPROACH: ICD-10-PCS | Performed by: TRANSPLANT SURGERY

## 2024-02-29 PROCEDURE — 94799 UNLISTED PULMONARY SVC/PX: CPT | Mod: XB

## 2024-02-29 PROCEDURE — 36000931 HC OR TIME LEV VII EA ADD 15 MIN: Performed by: TRANSPLANT SURGERY

## 2024-02-29 PROCEDURE — 71000015 HC POSTOP RECOV 1ST HR: Performed by: TRANSPLANT SURGERY

## 2024-02-29 PROCEDURE — 99223 1ST HOSP IP/OBS HIGH 75: CPT | Mod: ,,, | Performed by: INTERNAL MEDICINE

## 2024-02-29 PROCEDURE — 36556 INSERT NON-TUNNEL CV CATH: CPT | Mod: 59,,, | Performed by: ANESTHESIOLOGY

## 2024-02-29 PROCEDURE — 99222 1ST HOSP IP/OBS MODERATE 55: CPT | Mod: ,,,

## 2024-02-29 PROCEDURE — 80069 RENAL FUNCTION PANEL: CPT | Mod: 91 | Performed by: PHYSICIAN ASSISTANT

## 2024-02-29 PROCEDURE — 37000008 HC ANESTHESIA 1ST 15 MINUTES: Performed by: TRANSPLANT SURGERY

## 2024-02-29 PROCEDURE — 63600175 PHARM REV CODE 636 W HCPCS: Performed by: TRANSPLANT SURGERY

## 2024-02-29 PROCEDURE — 63600175 PHARM REV CODE 636 W HCPCS: Performed by: ANESTHESIOLOGY

## 2024-02-29 PROCEDURE — 25000003 PHARM REV CODE 250: Mod: NTX

## 2024-02-29 PROCEDURE — 37000009 HC ANESTHESIA EA ADD 15 MINS: Performed by: TRANSPLANT SURGERY

## 2024-02-29 PROCEDURE — 25000003 PHARM REV CODE 250

## 2024-02-29 PROCEDURE — 85014 HEMATOCRIT: CPT

## 2024-02-29 PROCEDURE — D9220A PRA ANESTHESIA: Mod: ANES,,, | Performed by: ANESTHESIOLOGY

## 2024-02-29 PROCEDURE — 94761 N-INVAS EAR/PLS OXIMETRY MLT: CPT

## 2024-02-29 PROCEDURE — 71000033 HC RECOVERY, INTIAL HOUR: Performed by: TRANSPLANT SURGERY

## 2024-02-29 PROCEDURE — 81200001 HC KIDNEY ACQUISITION - CADAVER

## 2024-02-29 PROCEDURE — 85025 COMPLETE CBC W/AUTO DIFF WBC: CPT | Performed by: PHYSICIAN ASSISTANT

## 2024-02-29 PROCEDURE — 50605 INSERT URETERAL SUPPORT: CPT | Mod: 51,RT,GC, | Performed by: TRANSPLANT SURGERY

## 2024-02-29 PROCEDURE — 63600175 PHARM REV CODE 636 W HCPCS: Performed by: NURSE ANESTHETIST, CERTIFIED REGISTERED

## 2024-02-29 PROCEDURE — 0TY00Z0 TRANSPLANTATION OF RIGHT KIDNEY, ALLOGENEIC, OPEN APPROACH: ICD-10-PCS | Performed by: TRANSPLANT SURGERY

## 2024-02-29 PROCEDURE — 84484 ASSAY OF TROPONIN QUANT: CPT

## 2024-02-29 PROCEDURE — 76937 US GUIDE VASCULAR ACCESS: CPT | Mod: 26,,, | Performed by: ANESTHESIOLOGY

## 2024-02-29 PROCEDURE — S5010 5% DEXTROSE AND 0.45% SALINE: HCPCS

## 2024-02-29 PROCEDURE — 27201423 OPTIME MED/SURG SUP & DEVICES STERILE SUPPLY: Performed by: TRANSPLANT SURGERY

## 2024-02-29 PROCEDURE — 63600175 PHARM REV CODE 636 W HCPCS: Mod: NTX | Performed by: PHYSICIAN ASSISTANT

## 2024-02-29 PROCEDURE — 20600001 HC STEP DOWN PRIVATE ROOM

## 2024-02-29 PROCEDURE — 71000016 HC POSTOP RECOV ADDL HR: Performed by: TRANSPLANT SURGERY

## 2024-02-29 PROCEDURE — 81300002 HC KIDNEY TRANSPORT, GROUND 4-5 HOURS

## 2024-02-29 PROCEDURE — 63600175 PHARM REV CODE 636 W HCPCS: Performed by: PHYSICIAN ASSISTANT

## 2024-02-29 PROCEDURE — 25000003 PHARM REV CODE 250: Performed by: PHYSICIAN ASSISTANT

## 2024-02-29 PROCEDURE — 80069 RENAL FUNCTION PANEL: CPT | Mod: 91

## 2024-02-29 PROCEDURE — C2617 STENT, NON-COR, TEM W/O DEL: HCPCS | Performed by: TRANSPLANT SURGERY

## 2024-02-29 PROCEDURE — 25000003 PHARM REV CODE 250: Mod: NTX | Performed by: PHYSICIAN ASSISTANT

## 2024-02-29 PROCEDURE — 63600175 PHARM REV CODE 636 W HCPCS: Mod: NTX

## 2024-02-29 PROCEDURE — 36620 INSERTION CATHETER ARTERY: CPT | Mod: 59,,, | Performed by: ANESTHESIOLOGY

## 2024-02-29 DEVICE — STENT DOUBLE J 6FRMX12CM
Type: IMPLANTABLE DEVICE | Site: KIDNEY | Status: NON-FUNCTIONAL
Removed: 2024-04-30

## 2024-02-29 RX ORDER — OXYCODONE HYDROCHLORIDE 5 MG/1
5 TABLET ORAL
Status: DISCONTINUED | OUTPATIENT
Start: 2024-02-29 | End: 2024-02-29

## 2024-02-29 RX ORDER — MANNITOL 20 G/100ML
INJECTION, SOLUTION INTRAVENOUS
Status: DISCONTINUED | OUTPATIENT
Start: 2024-02-29 | End: 2024-02-29

## 2024-02-29 RX ORDER — HALOPERIDOL 5 MG/ML
0.5 INJECTION INTRAMUSCULAR EVERY 10 MIN PRN
Status: DISCONTINUED | OUTPATIENT
Start: 2024-02-29 | End: 2024-02-29

## 2024-02-29 RX ORDER — FENTANYL CITRATE 50 UG/ML
INJECTION, SOLUTION INTRAMUSCULAR; INTRAVENOUS
Status: DISCONTINUED | OUTPATIENT
Start: 2024-02-29 | End: 2024-02-29

## 2024-02-29 RX ORDER — SULFAMETHOXAZOLE AND TRIMETHOPRIM 400; 80 MG/1; MG/1
1 TABLET ORAL EVERY MORNING
Qty: 30 TABLET | Refills: 5 | Status: SHIPPED | OUTPATIENT
Start: 2024-02-29 | End: 2024-03-05

## 2024-02-29 RX ORDER — LEVOTHYROXINE SODIUM 50 UG/1
50 TABLET ORAL
Qty: 30 TABLET | Refills: 5 | Status: SHIPPED | OUTPATIENT
Start: 2024-02-29

## 2024-02-29 RX ORDER — LEVOTHYROXINE SODIUM 50 UG/1
50 TABLET ORAL
Status: DISCONTINUED | OUTPATIENT
Start: 2024-03-01 | End: 2024-03-05 | Stop reason: HOSPADM

## 2024-02-29 RX ORDER — SULFAMETHOXAZOLE AND TRIMETHOPRIM 400; 80 MG/1; MG/1
1 TABLET ORAL EVERY MORNING
Status: DISCONTINUED | OUTPATIENT
Start: 2024-03-10 | End: 2024-03-05 | Stop reason: HOSPADM

## 2024-02-29 RX ORDER — HYDROMORPHONE HYDROCHLORIDE 1 MG/ML
0.2 INJECTION, SOLUTION INTRAMUSCULAR; INTRAVENOUS; SUBCUTANEOUS EVERY 5 MIN PRN
Status: DISCONTINUED | OUTPATIENT
Start: 2024-02-29 | End: 2024-03-03

## 2024-02-29 RX ORDER — LATANOPROST 50 UG/ML
1 SOLUTION/ DROPS OPHTHALMIC EVERY OTHER DAY
Qty: 2.5 ML | Refills: 5 | Status: SHIPPED | OUTPATIENT
Start: 2024-02-29

## 2024-02-29 RX ORDER — FUROSEMIDE 10 MG/ML
100 INJECTION INTRAMUSCULAR; INTRAVENOUS ONCE
Status: COMPLETED | OUTPATIENT
Start: 2024-02-29 | End: 2024-02-29

## 2024-02-29 RX ORDER — HYDRALAZINE HYDROCHLORIDE 20 MG/ML
INJECTION INTRAMUSCULAR; INTRAVENOUS
Status: DISCONTINUED | OUTPATIENT
Start: 2024-02-29 | End: 2024-02-29

## 2024-02-29 RX ORDER — VALGANCICLOVIR 450 MG/1
900 TABLET, FILM COATED ORAL EVERY MORNING
Qty: 60 TABLET | Refills: 5 | Status: SHIPPED | OUTPATIENT
Start: 2024-02-29 | End: 2024-03-05

## 2024-02-29 RX ORDER — INSULIN ASPART 100 [IU]/ML
0-5 INJECTION, SOLUTION INTRAVENOUS; SUBCUTANEOUS
Status: DISCONTINUED | OUTPATIENT
Start: 2024-02-29 | End: 2024-02-29

## 2024-02-29 RX ORDER — PREDNISONE 5 MG/1
TABLET ORAL
Qty: 75 TABLET | Refills: 5 | Status: SHIPPED | OUTPATIENT
Start: 2024-03-02

## 2024-02-29 RX ORDER — PHENYLEPHRINE HYDROCHLORIDE 10 MG/ML
INJECTION INTRAVENOUS
Status: DISCONTINUED | OUTPATIENT
Start: 2024-02-29 | End: 2024-02-29

## 2024-02-29 RX ORDER — GLUCAGON 1 MG
1 KIT INJECTION
Status: DISCONTINUED | OUTPATIENT
Start: 2024-02-29 | End: 2024-03-03

## 2024-02-29 RX ORDER — IBUPROFEN 200 MG
16 TABLET ORAL
Status: DISCONTINUED | OUTPATIENT
Start: 2024-02-29 | End: 2024-02-29

## 2024-02-29 RX ORDER — NICARDIPINE HYDROCHLORIDE 2.5 MG/ML
INJECTION INTRAVENOUS
Status: DISCONTINUED | OUTPATIENT
Start: 2024-02-29 | End: 2024-02-29

## 2024-02-29 RX ORDER — PROPOFOL 10 MG/ML
VIAL (ML) INTRAVENOUS
Status: DISCONTINUED | OUTPATIENT
Start: 2024-02-29 | End: 2024-02-29

## 2024-02-29 RX ORDER — GLUCAGON 1 MG
1 KIT INJECTION CONTINUOUS PRN
Status: DISCONTINUED | OUTPATIENT
Start: 2024-02-29 | End: 2024-03-03

## 2024-02-29 RX ORDER — ACETAMINOPHEN 325 MG/1
650 TABLET ORAL EVERY 8 HOURS
Status: DISPENSED | OUTPATIENT
Start: 2024-02-29 | End: 2024-03-02

## 2024-02-29 RX ORDER — FAMOTIDINE 20 MG/1
20 TABLET, FILM COATED ORAL NIGHTLY
Qty: 30 TABLET | Refills: 0 | OUTPATIENT
Start: 2024-02-28 | End: 2024-03-09

## 2024-02-29 RX ORDER — BISACODYL 5 MG
10 TABLET, DELAYED RELEASE (ENTERIC COATED) ORAL NIGHTLY
Status: DISCONTINUED | OUTPATIENT
Start: 2024-02-29 | End: 2024-03-05 | Stop reason: HOSPADM

## 2024-02-29 RX ORDER — OXYCODONE HYDROCHLORIDE 5 MG/1
5 TABLET ORAL EVERY 6 HOURS PRN
Status: DISCONTINUED | OUTPATIENT
Start: 2024-02-29 | End: 2024-03-03

## 2024-02-29 RX ORDER — TACROLIMUS 1 MG/1
6 CAPSULE ORAL EVERY 12 HOURS
Qty: 360 CAPSULE | Refills: 11 | Status: SHIPPED | OUTPATIENT
Start: 2024-02-29 | End: 2024-03-05

## 2024-02-29 RX ORDER — HYDROMORPHONE HYDROCHLORIDE 1 MG/ML
INJECTION, SOLUTION INTRAMUSCULAR; INTRAVENOUS; SUBCUTANEOUS
Status: COMPLETED
Start: 2024-02-29 | End: 2024-02-29

## 2024-02-29 RX ORDER — IBUPROFEN 200 MG
16 TABLET ORAL
Status: DISCONTINUED | OUTPATIENT
Start: 2024-02-29 | End: 2024-03-03

## 2024-02-29 RX ORDER — DIPHENHYDRAMINE HCL 25 MG
50 CAPSULE ORAL ONCE AS NEEDED
Status: DISCONTINUED | OUTPATIENT
Start: 2024-02-29 | End: 2024-03-03

## 2024-02-29 RX ORDER — KETAMINE HCL IN 0.9 % NACL 50 MG/5 ML
SYRINGE (ML) INTRAVENOUS
Status: DISCONTINUED | OUTPATIENT
Start: 2024-02-29 | End: 2024-02-29

## 2024-02-29 RX ORDER — SODIUM CHLORIDE 0.9 % (FLUSH) 0.9 %
10 SYRINGE (ML) INJECTION
Status: DISCONTINUED | OUTPATIENT
Start: 2024-02-29 | End: 2024-03-03

## 2024-02-29 RX ORDER — HEPARIN SODIUM 5000 [USP'U]/ML
5000 INJECTION, SOLUTION INTRAVENOUS; SUBCUTANEOUS EVERY 8 HOURS
Status: DISCONTINUED | OUTPATIENT
Start: 2024-02-29 | End: 2024-03-05 | Stop reason: HOSPADM

## 2024-02-29 RX ORDER — TACROLIMUS 1 MG/1
3 CAPSULE ORAL 2 TIMES DAILY
Status: DISCONTINUED | OUTPATIENT
Start: 2024-02-29 | End: 2024-03-01

## 2024-02-29 RX ORDER — LABETALOL HYDROCHLORIDE 5 MG/ML
INJECTION, SOLUTION INTRAVENOUS
Status: DISCONTINUED | OUTPATIENT
Start: 2024-02-29 | End: 2024-02-29

## 2024-02-29 RX ORDER — MYCOPHENOLATE MOFETIL 250 MG/1
1000 CAPSULE ORAL 2 TIMES DAILY
Status: DISCONTINUED | OUTPATIENT
Start: 2024-02-29 | End: 2024-03-05 | Stop reason: HOSPADM

## 2024-02-29 RX ORDER — HYDROMORPHONE HYDROCHLORIDE 1 MG/ML
0.2 INJECTION, SOLUTION INTRAMUSCULAR; INTRAVENOUS; SUBCUTANEOUS EVERY 5 MIN PRN
Status: DISCONTINUED | OUTPATIENT
Start: 2024-02-29 | End: 2024-02-29

## 2024-02-29 RX ORDER — ROCURONIUM BROMIDE 10 MG/ML
INJECTION, SOLUTION INTRAVENOUS
Status: DISCONTINUED | OUTPATIENT
Start: 2024-02-29 | End: 2024-02-29

## 2024-02-29 RX ORDER — TRAMADOL HYDROCHLORIDE 50 MG/1
50 TABLET ORAL EVERY 6 HOURS PRN
Status: DISCONTINUED | OUTPATIENT
Start: 2024-02-29 | End: 2024-03-03

## 2024-02-29 RX ORDER — DEXTROSE MONOHYDRATE AND SODIUM CHLORIDE 5; .45 G/100ML; G/100ML
INJECTION, SOLUTION INTRAVENOUS CONTINUOUS
Status: DISCONTINUED | OUTPATIENT
Start: 2024-02-29 | End: 2024-02-29

## 2024-02-29 RX ORDER — SODIUM CHLORIDE 9 MG/ML
INJECTION, SOLUTION INTRAVENOUS CONTINUOUS
Status: DISCONTINUED | OUTPATIENT
Start: 2024-02-29 | End: 2024-02-29

## 2024-02-29 RX ORDER — ONDANSETRON HYDROCHLORIDE 2 MG/ML
4 INJECTION, SOLUTION INTRAVENOUS DAILY PRN
Status: DISCONTINUED | OUTPATIENT
Start: 2024-02-29 | End: 2024-02-29

## 2024-02-29 RX ORDER — INSULIN ASPART 100 [IU]/ML
8 INJECTION, SOLUTION INTRAVENOUS; SUBCUTANEOUS
Status: DISCONTINUED | OUTPATIENT
Start: 2024-02-29 | End: 2024-03-01

## 2024-02-29 RX ORDER — ONDANSETRON HYDROCHLORIDE 2 MG/ML
4 INJECTION, SOLUTION INTRAVENOUS EVERY 6 HOURS PRN
Status: DISCONTINUED | OUTPATIENT
Start: 2024-02-29 | End: 2024-03-05 | Stop reason: HOSPADM

## 2024-02-29 RX ORDER — METHYLPREDNISOLONE SOD SUCC 125 MG
125 VIAL (EA) INJECTION ONCE
Status: COMPLETED | OUTPATIENT
Start: 2024-03-02 | End: 2024-03-02

## 2024-02-29 RX ORDER — PREDNISONE 20 MG/1
20 TABLET ORAL DAILY
Status: DISCONTINUED | OUTPATIENT
Start: 2024-03-03 | End: 2024-02-29

## 2024-02-29 RX ORDER — MUPIROCIN 20 MG/G
1 OINTMENT TOPICAL 2 TIMES DAILY
Status: COMPLETED | OUTPATIENT
Start: 2024-02-29 | End: 2024-03-04

## 2024-02-29 RX ORDER — VALGANCICLOVIR 450 MG/1
450 TABLET, FILM COATED ORAL EVERY MORNING
Status: DISCONTINUED | OUTPATIENT
Start: 2024-03-10 | End: 2024-03-05 | Stop reason: HOSPADM

## 2024-02-29 RX ORDER — SODIUM CHLORIDE 0.9 % (FLUSH) 0.9 %
3 SYRINGE (ML) INJECTION
Status: DISCONTINUED | OUTPATIENT
Start: 2024-02-29 | End: 2024-02-29

## 2024-02-29 RX ORDER — LIDOCAINE HYDROCHLORIDE 20 MG/ML
INJECTION INTRAVENOUS
Status: DISCONTINUED | OUTPATIENT
Start: 2024-02-29 | End: 2024-02-29

## 2024-02-29 RX ORDER — ACETAMINOPHEN 325 MG/1
650 TABLET ORAL
Status: DISPENSED | OUTPATIENT
Start: 2024-03-01 | End: 2024-03-03

## 2024-02-29 RX ORDER — MYCOPHENOLATE MOFETIL 250 MG/1
1000 CAPSULE ORAL 2 TIMES DAILY
Qty: 240 CAPSULE | Refills: 11 | Status: SHIPPED | OUTPATIENT
Start: 2024-02-29 | End: 2024-03-27

## 2024-02-29 RX ORDER — INSULIN ASPART 100 [IU]/ML
0-10 INJECTION, SOLUTION INTRAVENOUS; SUBCUTANEOUS
Status: DISCONTINUED | OUTPATIENT
Start: 2024-02-29 | End: 2024-03-05 | Stop reason: HOSPADM

## 2024-02-29 RX ORDER — IBUPROFEN 200 MG
24 TABLET ORAL
Status: DISCONTINUED | OUTPATIENT
Start: 2024-02-29 | End: 2024-03-03

## 2024-02-29 RX ORDER — FUROSEMIDE 10 MG/ML
INJECTION INTRAMUSCULAR; INTRAVENOUS
Status: DISCONTINUED | OUTPATIENT
Start: 2024-02-29 | End: 2024-02-29

## 2024-02-29 RX ORDER — CEFAZOLIN SODIUM 1 G/3ML
INJECTION, POWDER, FOR SOLUTION INTRAMUSCULAR; INTRAVENOUS
Status: DISCONTINUED | OUTPATIENT
Start: 2024-02-29 | End: 2024-02-29 | Stop reason: HOSPADM

## 2024-02-29 RX ORDER — DOCUSATE SODIUM 100 MG/1
100 CAPSULE, LIQUID FILLED ORAL 3 TIMES DAILY
Status: DISCONTINUED | OUTPATIENT
Start: 2024-02-29 | End: 2024-03-05 | Stop reason: HOSPADM

## 2024-02-29 RX ORDER — HEPARIN SODIUM 10000 [USP'U]/ML
INJECTION, SOLUTION INTRAVENOUS; SUBCUTANEOUS
Status: DISCONTINUED | OUTPATIENT
Start: 2024-02-29 | End: 2024-02-29 | Stop reason: HOSPADM

## 2024-02-29 RX ORDER — DIPHENHYDRAMINE HCL 25 MG
25 CAPSULE ORAL
Status: COMPLETED | OUTPATIENT
Start: 2024-03-01 | End: 2024-03-02

## 2024-02-29 RX ORDER — METHYLPREDNISOLONE SOD SUCC 125 MG
250 VIAL (EA) INJECTION ONCE
Status: COMPLETED | OUTPATIENT
Start: 2024-03-01 | End: 2024-03-01

## 2024-02-29 RX ORDER — MIDAZOLAM HYDROCHLORIDE 1 MG/ML
INJECTION INTRAMUSCULAR; INTRAVENOUS
Status: DISCONTINUED | OUTPATIENT
Start: 2024-02-29 | End: 2024-02-29

## 2024-02-29 RX ORDER — ONDANSETRON HYDROCHLORIDE 2 MG/ML
INJECTION, SOLUTION INTRAVENOUS
Status: DISCONTINUED | OUTPATIENT
Start: 2024-02-29 | End: 2024-02-29

## 2024-02-29 RX ORDER — FAMOTIDINE 20 MG/1
20 TABLET, FILM COATED ORAL NIGHTLY
Status: DISCONTINUED | OUTPATIENT
Start: 2024-02-29 | End: 2024-03-05 | Stop reason: HOSPADM

## 2024-02-29 RX ORDER — AMLODIPINE BESYLATE 5 MG/1
5 TABLET ORAL DAILY
Status: DISCONTINUED | OUTPATIENT
Start: 2024-02-29 | End: 2024-02-29

## 2024-02-29 RX ORDER — HYDROMORPHONE HYDROCHLORIDE 2 MG/ML
INJECTION, SOLUTION INTRAMUSCULAR; INTRAVENOUS; SUBCUTANEOUS
Status: DISCONTINUED | OUTPATIENT
Start: 2024-02-29 | End: 2024-02-29

## 2024-02-29 RX ORDER — EPINEPHRINE 1 MG/ML
1 INJECTION, SOLUTION, CONCENTRATE INTRAVENOUS ONCE AS NEEDED
Status: DISCONTINUED | OUTPATIENT
Start: 2024-02-29 | End: 2024-03-03

## 2024-02-29 RX ORDER — OXYCODONE AND ACETAMINOPHEN 5; 325 MG/1; MG/1
1 TABLET ORAL
Status: DISCONTINUED | OUTPATIENT
Start: 2024-02-29 | End: 2024-03-03

## 2024-02-29 RX ADMIN — LABETALOL HYDROCHLORIDE 10 MG: 5 INJECTION, SOLUTION INTRAVENOUS at 04:02

## 2024-02-29 RX ADMIN — HYDROMORPHONE HYDROCHLORIDE 0.2 MG: 1 INJECTION, SOLUTION INTRAMUSCULAR; INTRAVENOUS; SUBCUTANEOUS at 07:02

## 2024-02-29 RX ADMIN — MANNITOL 25 G: 20 INJECTION, SOLUTION INTRAVENOUS at 04:02

## 2024-02-29 RX ADMIN — OXYCODONE 5 MG: 5 TABLET ORAL at 07:02

## 2024-02-29 RX ADMIN — ACETAMINOPHEN 650 MG: 325 TABLET ORAL at 08:02

## 2024-02-29 RX ADMIN — MYCOPHENOLATE MOFETIL 1000 MG: 250 CAPSULE ORAL at 10:02

## 2024-02-29 RX ADMIN — TACROLIMUS 3 MG: 1 CAPSULE ORAL at 05:02

## 2024-02-29 RX ADMIN — Medication 10 MG: at 04:02

## 2024-02-29 RX ADMIN — INSULIN ASPART 8 UNITS: 100 INJECTION, SOLUTION INTRAVENOUS; SUBCUTANEOUS at 05:02

## 2024-02-29 RX ADMIN — PROPOFOL 150 MG: 10 INJECTION, EMULSION INTRAVENOUS at 02:02

## 2024-02-29 RX ADMIN — DOCUSATE SODIUM 100 MG: 100 CAPSULE, LIQUID FILLED ORAL at 01:02

## 2024-02-29 RX ADMIN — Medication 20 MG: at 04:02

## 2024-02-29 RX ADMIN — ANTI-THYMOCYTE GLOBULIN (RABBIT) 100 MG: 5 INJECTION, POWDER, LYOPHILIZED, FOR SOLUTION INTRAVENOUS at 04:02

## 2024-02-29 RX ADMIN — HYDRALAZINE HYDROCHLORIDE 10 MG: 20 INJECTION, SOLUTION INTRAMUSCULAR; INTRAVENOUS at 04:02

## 2024-02-29 RX ADMIN — PHENYLEPHRINE HYDROCHLORIDE 100 MCG: 10 INJECTION INTRAVENOUS at 04:02

## 2024-02-29 RX ADMIN — BISACODYL 10 MG: 5 TABLET, COATED ORAL at 08:02

## 2024-02-29 RX ADMIN — INSULIN ASPART 3 UNITS: 100 INJECTION, SOLUTION INTRAVENOUS; SUBCUTANEOUS at 07:02

## 2024-02-29 RX ADMIN — DEXTROSE AND SODIUM CHLORIDE: 5; 450 INJECTION, SOLUTION INTRAVENOUS at 08:02

## 2024-02-29 RX ADMIN — HEPARIN SODIUM 5000 UNITS: 5000 INJECTION INTRAVENOUS; SUBCUTANEOUS at 08:02

## 2024-02-29 RX ADMIN — SODIUM CHLORIDE, SODIUM GLUCONATE, SODIUM ACETATE, POTASSIUM CHLORIDE, MAGNESIUM CHLORIDE, SODIUM PHOSPHATE, DIBASIC, AND POTASSIUM PHOSPHATE: .53; .5; .37; .037; .03; .012; .00082 INJECTION, SOLUTION INTRAVENOUS at 06:02

## 2024-02-29 RX ADMIN — HYDROMORPHONE HYDROCHLORIDE 0.4 MG: 2 INJECTION INTRAMUSCULAR; INTRAVENOUS; SUBCUTANEOUS at 04:02

## 2024-02-29 RX ADMIN — ACETAMINOPHEN 650 MG: 325 TABLET ORAL at 01:02

## 2024-02-29 RX ADMIN — NICARDIPINE HYDROCHLORIDE 0.2 MG: 25 INJECTION, SOLUTION INTRAVENOUS at 06:02

## 2024-02-29 RX ADMIN — SODIUM CHLORIDE, SODIUM GLUCONATE, SODIUM ACETATE, POTASSIUM CHLORIDE, MAGNESIUM CHLORIDE, SODIUM PHOSPHATE, DIBASIC, AND POTASSIUM PHOSPHATE: .53; .5; .37; .037; .03; .012; .00082 INJECTION, SOLUTION INTRAVENOUS at 03:02

## 2024-02-29 RX ADMIN — MIDAZOLAM HYDROCHLORIDE 2 MG: 1 INJECTION, SOLUTION INTRAMUSCULAR; INTRAVENOUS at 02:02

## 2024-02-29 RX ADMIN — ROCURONIUM BROMIDE 60 MG: 10 INJECTION, SOLUTION INTRAVENOUS at 02:02

## 2024-02-29 RX ADMIN — INSULIN ASPART 4 UNITS: 100 INJECTION, SOLUTION INTRAVENOUS; SUBCUTANEOUS at 12:02

## 2024-02-29 RX ADMIN — OXYCODONE 5 MG: 5 TABLET ORAL at 11:02

## 2024-02-29 RX ADMIN — HYDROMORPHONE HYDROCHLORIDE 0.2 MG: 2 INJECTION INTRAMUSCULAR; INTRAVENOUS; SUBCUTANEOUS at 04:02

## 2024-02-29 RX ADMIN — INSULIN HUMAN 1.2 UNITS/HR: 1 INJECTION, SOLUTION INTRAVENOUS at 01:02

## 2024-02-29 RX ADMIN — ROCURONIUM BROMIDE 40 MG: 10 INJECTION, SOLUTION INTRAVENOUS at 03:02

## 2024-02-29 RX ADMIN — PROPOFOL 50 MG: 10 INJECTION, EMULSION INTRAVENOUS at 04:02

## 2024-02-29 RX ADMIN — FUROSEMIDE 100 MG: 10 INJECTION, SOLUTION INTRAMUSCULAR; INTRAVENOUS at 10:02

## 2024-02-29 RX ADMIN — CEFAZOLIN 2 G: 2 INJECTION, POWDER, FOR SOLUTION INTRAMUSCULAR; INTRAVENOUS at 08:02

## 2024-02-29 RX ADMIN — HEPARIN SODIUM 5000 UNITS: 5000 INJECTION INTRAVENOUS; SUBCUTANEOUS at 01:02

## 2024-02-29 RX ADMIN — LIDOCAINE HYDROCHLORIDE 80 MG: 20 INJECTION INTRAVENOUS at 02:02

## 2024-02-29 RX ADMIN — METHYLPREDNISOLONE SODIUM SUCCINATE 500 MG: 500 INJECTION INTRAMUSCULAR; INTRAVENOUS at 03:02

## 2024-02-29 RX ADMIN — CEFAZOLIN 2 G: 2 INJECTION, POWDER, FOR SOLUTION INTRAMUSCULAR; INTRAVENOUS at 11:02

## 2024-02-29 RX ADMIN — PROPOFOL 50 MG: 10 INJECTION, EMULSION INTRAVENOUS at 05:02

## 2024-02-29 RX ADMIN — SUGAMMADEX 372 MG: 100 INJECTION, SOLUTION INTRAVENOUS at 06:02

## 2024-02-29 RX ADMIN — INSULIN ASPART 8 UNITS: 100 INJECTION, SOLUTION INTRAVENOUS; SUBCUTANEOUS at 08:02

## 2024-02-29 RX ADMIN — TACROLIMUS 3 MG: 1 CAPSULE ORAL at 08:02

## 2024-02-29 RX ADMIN — FAMOTIDINE 20 MG: 20 TABLET, FILM COATED ORAL at 08:02

## 2024-02-29 RX ADMIN — ONDANSETRON 4 MG: 2 INJECTION INTRAMUSCULAR; INTRAVENOUS at 06:02

## 2024-02-29 RX ADMIN — DIPHENHYDRAMINE HYDROCHLORIDE 50 MG: 50 INJECTION, SOLUTION INTRAMUSCULAR; INTRAVENOUS at 02:02

## 2024-02-29 RX ADMIN — ROCURONIUM BROMIDE 20 MG: 10 INJECTION, SOLUTION INTRAVENOUS at 03:02

## 2024-02-29 RX ADMIN — DOCUSATE SODIUM 100 MG: 100 CAPSULE, LIQUID FILLED ORAL at 08:02

## 2024-02-29 RX ADMIN — THERA TABS 1 TABLET: TAB at 08:02

## 2024-02-29 RX ADMIN — SODIUM CHLORIDE 500 ML: 9 INJECTION, SOLUTION INTRAVENOUS at 12:02

## 2024-02-29 RX ADMIN — AMLODIPINE BESYLATE 5 MG: 5 TABLET ORAL at 08:02

## 2024-02-29 RX ADMIN — MYCOPHENOLATE MOFETIL 1000 MG: 250 CAPSULE ORAL at 08:02

## 2024-02-29 RX ADMIN — FENTANYL CITRATE 50 MCG: 50 INJECTION, SOLUTION INTRAMUSCULAR; INTRAVENOUS at 03:02

## 2024-02-29 RX ADMIN — FENTANYL CITRATE 50 MCG: 50 INJECTION, SOLUTION INTRAMUSCULAR; INTRAVENOUS at 02:02

## 2024-02-29 RX ADMIN — ACETAMINOPHEN 650 MG: 650 SOLUTION ORAL at 02:02

## 2024-02-29 RX ADMIN — INSULIN HUMAN 1.5 UNITS/HR: 1 INJECTION, SOLUTION INTRAVENOUS at 10:02

## 2024-02-29 RX ADMIN — Medication 20 MG: at 03:02

## 2024-02-29 RX ADMIN — FUROSEMIDE 100 MG: 10 INJECTION, SOLUTION INTRAMUSCULAR; INTRAVENOUS at 04:02

## 2024-02-29 RX ADMIN — MUPIROCIN 1 G: 20 OINTMENT TOPICAL at 08:02

## 2024-02-29 RX ADMIN — ROCURONIUM BROMIDE 20 MG: 10 INJECTION, SOLUTION INTRAVENOUS at 06:02

## 2024-02-29 RX ADMIN — CEFAZOLIN 2 G: 2 INJECTION, POWDER, FOR SOLUTION INTRAMUSCULAR; INTRAVENOUS at 02:02

## 2024-02-29 RX ADMIN — SODIUM CHLORIDE, SODIUM GLUCONATE, SODIUM ACETATE, POTASSIUM CHLORIDE, MAGNESIUM CHLORIDE, SODIUM PHOSPHATE, DIBASIC, AND POTASSIUM PHOSPHATE: .53; .5; .37; .037; .03; .012; .00082 INJECTION, SOLUTION INTRAVENOUS at 02:02

## 2024-02-29 NOTE — PROGRESS NOTES
Pt admitted to TSU room 23476, oriented to call bell. NPO at 2300 for KTX in AM. OR time 0800. Bed in low locked pos, call light within reach.

## 2024-02-29 NOTE — OP NOTE
Operative Report    Date of Procedure: 2/29/2024  Date of Transplant (UNOS): 2/29/24    Surgeons:  Surgeon(s) and Role:     * Pino Law Jr., MD - Primary     * Shala Albarran MD - Resident - Assisting    First Assistant Attestation:  The indicated resident served as first assistant for this procedure.    Pre-operative Diagnosis: ESRD, requiring chronic dialysis secondary to Diabetes Mellitus - Type II  Post-operative Diagnosis: Same    Procedure(s) Performed:   Back Table Preparation of Right Kidney    Donation after Brain Death Kidney transplant    Anesthesia: General endotracheal    Preamble  Indications and Patient Counseling: The patient is a 64 y.o. year-old male with end-stage kidney disease secondary to Diabetes Mellitus - Type II who has been evaluated for a kidney transplant.  The procedure was thoroughly discussed with the patient, including potential risks, complications, and alternatives.  Specific complications mentioned included death, graft non-function, bleeding, infection, and rejection, as well as the possibility of other complications not specifically mentioned.    Donor Risk Factors: Prior to the operation, the patient was advised of any donor-specific risk factors requiring specific disclosure.  Factors in this case included PHS risk criteria .      Specific PHS donor risk criteria for the organ donor include:  Drug injection for nonmedical reasons    All questions were answered, the patient voiced appropriate understanding, and he agreed to proceed with the planned procedure.    ABO Confirmation: Immediately following arrival of the donor organ and prior to implantation, a formal ABO confirmation was done according to hospital and UNOS policies.  I confirmed the UNOS ID number ([id number]) of the donor organ and the donor and recipient ABO types, directly verifying these data by comparison with the UNOS Match Run report ([id number]).  This confirmation was personally done by an attending  surgeon and circulating nurse, and is officially documented elsewhere.    Time-Out: A complete time out was carried out prior to incision, with confirmation of patient identity, correct procedure, correct operative site, appropriate antibiotic prophylaxis, review of any known allergies, and presence of all needed equipment.    Procedure in Detail  Prior to starting the operation, the right kidney  was prepared on the back table. Arterial anatomy was single. Venous anatomy was single. Ureteral anatomy was single. Back table vascular reconstruction was not required .  Unneeded fat was removed from the kidney, the vessels were cleaned of adherent tissue and tested for leaks, and the kidney was maintained at ice temperature in organ preservation solution until it was brought to the operative field.     The patient was brought into the operating room and placed in a supine position on the OR table.  After the induction of general endotracheal anesthesia, lines were placed by the anesthesiologist.  The urinary bladder was catheterized and irrigated with antibiotic solution.  There was no tension on the axillae and all pressure points were padded.  Sequential compression boots were used as were Davi Huggers.  The abdomen was prepped and draped in the usual sterile fashion.  Skin was incised over the right with a knife and deepened with electrocautery.  The peritoneum and its contents were swept medially, exposing the right external iliac artery and the right external iliac vein.  The Bookwalter retractor was used to provide exposure.  Overlying lymphatics were ligated or cauterized and the vessels were dissected free for a length compatible with anastomosis.  The kidney was brought to the OR table at 2/29/2024  4:06 AM.  Venous control was obtained with a vascular clamp.  A venotomy was made, the vein irrigated, and an vena caval patch to right external iliac vein anastomosis was created with 5-0 polypropylene.  Arterial  control was obtained with a vascular clamp.  Arteriotomy was made, the artery irrigated, and an end renal to right external iliac artery anastomosis was created with 6-0 polypropylene.  The kidney was unclamped and reperfused at 2/29/2024  4:37 AM.  Reperfusion quality was good. Intraoperative urine production was observed.  After hemostasis was obtained, a Lich uretero-neocystostomy was created.  The bladder was filled and identified, opened, and the anastomosis created using 6-0 PDS.  The bladder muscle was closed over the distal ureter to create an antireflux tunnel.  A ureteral stent was used.  Note the bladder anastomosis was difficult.  With the kidney well perfused and sitting appropriately without tension on the anastomoses, viscera were replaced in their usual position.  The wound was closed after a final check for hemostasis.  Overall, the graft quality was assessed to be good. At the end of the case the needle, sponge and instrument counts were all correct.  Sterile dressings were applied and the patient was brought to the recovery room/ICU in good condition.    Estimated Blood Loss: 200 mL  Fluids Administered:    Drains: 19f Fausto drains x 1  Specimens: none    Findings:    Organ Transplanted: Right Kidney    Arterial Anatomy: single  Number of Arteries: 1  Configuration of Multiple Arteries: not applicable  Venous Anatomy: single  Number of Veins: 1  Ureteral Anatomy: single  Number of Ureters: 1  Reperfusion Quality: good  Overall Graft Quality: good  Intraoperative Urine Production: yes  Curry: not to be removed before  6  days.  Ureteral Stent: Yes    Ischemic Times:   Anastomosis (warm ischemia) time: 31 minutes   Cold ischemia time: 995 minutes  Total ischemia time: 1026 minutes    Donor Data:  UNOS ID:  [id number]  UNOS Match Run:  [id number]  Donor Type:  Donation after Brain Death  Donor CMV Status:  Positive  Donor HBcAB:  Negative  Donor HCV Status:  Negative

## 2024-02-29 NOTE — TRANSFER OF CARE
"Anesthesia Transfer of Care Note    Patient: Mark Lopez    Procedure(s) Performed: Procedure(s) (LRB):  TRANSPLANT, KIDNEY (N/A)    Patient location: PACU    Anesthesia Type: general    Transport from OR: Transported from OR on 6-10 L/min O2 by face mask with adequate spontaneous ventilation    Post pain: adequate analgesia    Post assessment: no apparent anesthetic complications and tolerated procedure well    Post vital signs: stable    Level of consciousness: sedated    Nausea/Vomiting: no nausea/vomiting    Complications: none    Transfer of care protocol was followed      Last vitals: Visit Vitals  /69   Pulse 69   Temp 36.7 °C (98 °F) (Oral)   Resp 18   Ht 5' 4" (1.626 m)   Wt 93 kg (205 lb 0.4 oz)   SpO2 98%   BMI 35.19 kg/m²     "

## 2024-02-29 NOTE — HOSPITAL COURSE
Patient now s/p DBD Ktx on 2/29/24 for presumed diabetic nephropathy (donor RPR+, CMV D+, R-, CIT ~16.5hrs). Patient PD prior to surgery (prev HD, fistula not working), reports making some urine, ~1 cup/day. PD cath remains in place.  6 day cramer for difficult bladder anastomosis. JEAN-PIERRE x 1. CIT ~16.5 hrs. PCN G #1 given 3/1 for +RPR donor cultures. Permacath placed 3/4 for DGF.Last HD 3/2.    Interval Hx: No acute events overnight. Patient continues to make more urine. UOP  4L. JEAN-PIERRE x 1 in place. Kidney function stable, no indication for dialysis today. Pt with pain 2/2 catheter. KUB with stent in proper place. Per Dr Law, OK to remove today.  Lab work stable. Miralax on board for constipation. Caregiver not available until tomorrow. Cont to monitor.

## 2024-02-29 NOTE — ANESTHESIA PROCEDURE NOTES
Arterial    Diagnosis: Kidney Tx    Patient location during procedure: done in OR  Timeout: 2/29/2024 2:38 AM  Procedure end time: 2/29/2024 2:42 AM    Staffing  Authorizing Provider: Ish Hankins MD  Performing Provider: Nabor Jang DO    Staffing  Performed by: Nabor Jang DO  Authorized by: Ish Hankins MD    Anesthesiologist was present at the time of the procedure.    Preanesthetic Checklist  Completed: patient identified, IV checked, site marked, risks and benefits discussed, surgical consent, monitors and equipment checked, pre-op evaluation, timeout performed and anesthesia consent givenArterial  Skin Prep: chlorhexidine gluconate  Local Infiltration: none  Orientation: left  Location: radial    Catheter Size: 20 G  Catheter placement by Ultrasound guidance. Heme positive aspiration all ports.   Vessel Caliber: medium, patent, compressibility normal  Vascular Doppler:  not done  Needle advanced into vessel with real time Ultrasound guidance.Insertion Attempts: 1  Assessment  Dressing: secured with tape and tegaderm  Patient: Tolerated well

## 2024-02-29 NOTE — H&P
[facility] - Transplant Stepdown  Kidney Transplant  H&P      Subjective:     Chief Complaint/Reason for Admission: Kidney transplant    History of Present Illness:  Mark Lopez is a 64 year old male with a history of ESRD secondary to presumed diabetic nephropathy. Patient is currently on peritoneal dialysis started on 2023 (initially on HD). Patient is dialyzing on cyclic peritoneal dialysis. Patient reports that he is tolerating dialysis well. He has a PD catheter, permacath, and RUE AVF for dialysis access. Patient also with insulin pump. He now presents as primary candidate for a  donor kidney transplant. OR time set for 24 at 0800 AM. Pre-op labs and imaging ordered and will be reviewed prior to transplant. Will receive Thymoglobulin for induction.              Dialysis History: Mr. Flores with ESRD, requiring chronic dialysis who is on peritoneal dialysis started on 23. Patient is dialyzing on cyclic peritoneal dialysis.  Patient reports that he is tolerating dialysis well.   Date of Last Dialysis: 24    Native urine output per day: 0 mL    Previous Transplant: no    PTA Medications   Medication Sig    amLODIPine (NORVASC) 5 MG tablet Take 5 mg by mouth once daily.    ascorbic acid (RIKA-C ORAL) Take 1,400 mg by mouth every evening.    aspirin (ECOTRIN) 81 MG EC tablet Take 81 mg by mouth once daily.    AURYXIA 210 mg iron Tab Take 3 tablets by mouth 3 (three) times daily with meals.    febuxostat (ULORIC) 40 mg Tab Take 40 mg by mouth once daily.    furosemide (LASIX) 40 MG tablet Take 80 mg by mouth once daily. Takes once a day    HUMALOG U-100 INSULIN 100 unit/mL injection Inject into the skin. FOR INSULIN PUMP (BASAL 1.2 UNITS/HR)    latanoprost 0.005 % ophthalmic solution Place 1 drop into the right eye every other day. At night    levothyroxine (SYNTHROID) 50 MCG tablet Take 50 mcg by mouth before breakfast.    LOKELMA 10 gram packet Take 10 g by mouth every other day.     loratadine (CLARITIN) 10 mg tablet Take 10 mg by mouth as needed for Allergies.    MAGNESIUM GLUCONATE ORAL Take by mouth as needed.    methylPREDNISolone (MEDROL DOSEPACK) 4 mg tablet Take by mouth. AS NEEDED FOR GOUT    metoprolol tartrate (LOPRESSOR) 25 MG tablet Take 1 tablet (25 mg total) by mouth 2 (two) times daily.    multivit-min-FA-lycopen-lutein (CENTRUM SILVER MEN) 300-600-300 mcg Tab Take 2 tablets by mouth once daily.    NEXLETOL 180 mg Tab Take 1 tablet by mouth once daily.    ONETOUCH DELICA PLUS LANCET 30 gauge Misc     sodium bicarbonate 650 MG tablet Take 1,300 mg by mouth 2 (two) times daily.    subcutaneous insulin pump Misc Inject 1 Units/hr into the skin continuous. Insulin pump name - Tandum  Basal dose is 2 units/hr    tumeric-ging-olive-oreg-capryl 100 mg-150 mg- 50 mg-150 mg Cap Take 1 tablet by mouth once daily.    UNABLE TO FIND Take 2 tablets by mouth every evening. medication name Alerna Uric Acid support       Review of patient's allergies indicates:   Allergen Reactions    Allopurinol analogues Swelling    Latex, natural rubber     Statins-hmg-coa reductase inhibitors Other (See Comments)     Muscle ache    Adhesive Rash       Past Medical History:   Diagnosis Date    Anemia     Cataract     Diabetes mellitus     Diabetes mellitus, type 2     Glaucoma     Gout, unspecified     HLD (hyperlipidemia)     Hypertension     Hypothyroidism, unspecified     Kidney failure     Renal disorder      Past Surgical History:   Procedure Laterality Date    COLONOSCOPY N/A 05/18/2022    Procedure: COLONOSCOPY;  Surgeon: Raul Dyer MD;  Location: [facility] ENDO;  Service: Endoscopy;  Laterality: N/A;    DENTAL SURGERY      EYE SURGERY Bilateral     Cataract    INSERTION, CATHETER, DIALYSIS, PERITONEAL, LAPAROSCOPIC N/A 06/20/2023    Procedure: INSERTION, CATHETER, DIALYSIS, PERITONEAL, LAPAROSCOPIC;  Surgeon: Carlos Alberto Rodgers MD;  Location: [facility] OR;  Service: General;  Laterality: N/A;     "KNEE SURGERY Right      Family History       Problem Relation (Age of Onset)    Cancer Sister    Diabetes Father, Brother    Heart disease Father    Hypertension Father    Kidney disease Brother          Tobacco Use    Smoking status: Never    Smokeless tobacco: Never   Substance and Sexual Activity    Alcohol use: Never    Drug use: Never    Sexual activity: Not Currently        Review of Systems   Constitutional:  Negative for appetite change, chills and fever.   HENT:  Negative for facial swelling and trouble swallowing.    Respiratory:  Negative for cough, shortness of breath, wheezing and stridor.    Cardiovascular:  Negative for leg swelling.   Gastrointestinal:  Positive for abdominal distention (hernia present). Negative for constipation, diarrhea, nausea and vomiting.   Genitourinary:  Positive for decreased urine volume.   Skin:  Negative for wound.   Neurological:  Negative for tremors, syncope, speech difficulty and weakness.   Psychiatric/Behavioral:  Negative for agitation, behavioral problems, confusion, decreased concentration and hallucinations.      Objective:     Vital Signs (Most Recent):  Temp: 98 °F (36.7 °C) (02/28/24 2015)  Pulse: 64 (02/28/24 2015)  Resp: 18 (02/28/24 2015)  BP: 132/70 (02/28/24 2015)  SpO2: 99 % (02/28/24 2015)  Height: 5' 4" (162.6 cm)  Weight: 93 kg (205 lb 0.4 oz)  Body mass index is 35.19 kg/m².      Physical Exam  Vitals and nursing note reviewed.   Constitutional:       General: He is not in acute distress.     Appearance: Normal appearance. He is not ill-appearing or toxic-appearing.   HENT:      Head: Atraumatic.   Eyes:      General: No scleral icterus.     Conjunctiva/sclera: Conjunctivae normal.   Cardiovascular:      Rate and Rhythm: Normal rate and regular rhythm.      Pulses: Normal pulses.   Pulmonary:      Effort: Pulmonary effort is normal. No respiratory distress.      Breath sounds: Normal breath sounds. No wheezing or rales.   Abdominal:      General: " Bowel sounds are normal.      Palpations: Abdomen is soft.      Tenderness: There is no abdominal tenderness. There is no guarding or rebound.      Hernia: A hernia is present.      Comments: Hernia present    PD cath in place. Dressing CDI.    Musculoskeletal:      Right lower leg: No edema.      Left lower leg: No edema.   Skin:     General: Skin is warm and dry.      Capillary Refill: Capillary refill takes 2 to 3 seconds.   Neurological:      Mental Status: He is alert and oriented to person, place, and time.   Psychiatric:         Attention and Perception: Attention normal.         Mood and Affect: Mood normal.         Speech: Speech normal.         Behavior: Behavior normal. Behavior is cooperative.         Thought Content: Thought content normal.         Judgment: Judgment normal.          Laboratory  CBC:   Recent Labs   Lab 24   WBC 9.48   RBC 2.90*   HGB 9.0*   HCT 27.5*      MCV 95   MCH 31.0   MCHC 32.7     CMP:   Recent Labs   Lab 24   GLU 98   CALCIUM 10.0   ALBUMIN 3.2*   PROT 7.0      K 4.1   CO2 26   CL 99   BUN 99*   CREATININE 12.7*   ALKPHOS 44*   ALT 20   AST 21     Labs within the past 24 hours have been reviewed.    Diagnostic Results:  Admit diagnostic tests pending    Patient was SARS-CoV-2 /COVID-19 tested with negative results.   Assessment/Plan:     Cardiac/Vascular  Essential hypertension  - restart home antihypertensive meds slowly post transplant to avoid hypotension      Renal/  * ESRD (end stage renal disease)  - ESRD d/t DM currently on cyclic PD  - Admit for  donor kidney transplant  - OR time 8 AM on 24  - Pre op labs and imaging to be reviewed prior to transplant    Endocrine  Type 2 diabetes mellitus with kidney complication, with long-term current use of insulin  - endocrine consulted; appreciate assistance           The patient presents for kidney transplant.  There are no apparent contraindications to proceeding with the  planned transplant.  The patient understands that the transplant could potentially be cancelled pending detailed assessment of the donor organ.  He will receive Thymoglobulin induction.  A complete discussion of the transplant procedure, including risks, complications, and alternatives, as well as any donor-specific risk factors requiring specific disclosure, will be carried out by the responsible staff surgeon prior to the procedure.     Discharge Planning:  Not a candidate at this time     Medical decision making for this encounter includes review of pertinent labs and diagnostic studies, assessment and planning, discussions with consulting providers, discussion with patient/family, and participation in multidisciplinary rounds. Time spent caring for patient: 30 minutes    Urmila Mccoy NP  Kidney Transplant  [facility]

## 2024-02-29 NOTE — SUBJECTIVE & OBJECTIVE
Subjective:     Chief Complaint/Reason for Admission: Kidney transplant    History of Present Illness:  Mark Lopez is a 64 year old male with a history of ESRD secondary to presumed diabetic nephropathy. Patient is currently on peritoneal dialysis started on 2023 (initially on HD). Patient is dialyzing on cyclic peritoneal dialysis. Patient reports that he is tolerating dialysis well. He has a PD catheter, permacath, and RUE AVF for dialysis access. Patient also with insulin pump. He now presents as primary candidate for a  donor kidney transplant. OR time set for 24 at 0800 AM. Pre-op labs and imaging ordered and will be reviewed prior to transplant. Will receive Thymoglobulin for induction.              Dialysis History: Mr. Flores with ESRD, requiring chronic dialysis who is on peritoneal dialysis started on 23. Patient is dialyzing on cyclic peritoneal dialysis.  Patient reports that he is tolerating dialysis well.   Date of Last Dialysis: 24    Native urine output per day: 0 mL    Previous Transplant: no    PTA Medications   Medication Sig    amLODIPine (NORVASC) 5 MG tablet Take 5 mg by mouth once daily.    ascorbic acid (RIKA-C ORAL) Take 1,400 mg by mouth every evening.    aspirin (ECOTRIN) 81 MG EC tablet Take 81 mg by mouth once daily.    AURYXIA 210 mg iron Tab Take 3 tablets by mouth 3 (three) times daily with meals.    febuxostat (ULORIC) 40 mg Tab Take 40 mg by mouth once daily.    furosemide (LASIX) 40 MG tablet Take 80 mg by mouth once daily. Takes once a day    HUMALOG U-100 INSULIN 100 unit/mL injection Inject into the skin. FOR INSULIN PUMP (BASAL 1.2 UNITS/HR)    latanoprost 0.005 % ophthalmic solution Place 1 drop into the right eye every other day. At night    levothyroxine (SYNTHROID) 50 MCG tablet Take 50 mcg by mouth before breakfast.    LOKELMA 10 gram packet Take 10 g by mouth every other day.    loratadine (CLARITIN) 10 mg tablet Take 10 mg by mouth as needed  for Allergies.    MAGNESIUM GLUCONATE ORAL Take by mouth as needed.    methylPREDNISolone (MEDROL DOSEPACK) 4 mg tablet Take by mouth. AS NEEDED FOR GOUT    metoprolol tartrate (LOPRESSOR) 25 MG tablet Take 1 tablet (25 mg total) by mouth 2 (two) times daily.    multivit-min-FA-lycopen-lutein (CENTRUM SILVER MEN) 300-600-300 mcg Tab Take 2 tablets by mouth once daily.    NEXLETOL 180 mg Tab Take 1 tablet by mouth once daily.    ONETOUCH DELICA PLUS LANCET 30 gauge Misc     sodium bicarbonate 650 MG tablet Take 1,300 mg by mouth 2 (two) times daily.    subcutaneous insulin pump Misc Inject 1 Units/hr into the skin continuous. Insulin pump name - Tandum  Basal dose is 2 units/hr    tumeric-ging-olive-oreg-capryl 100 mg-150 mg- 50 mg-150 mg Cap Take 1 tablet by mouth once daily.    UNABLE TO FIND Take 2 tablets by mouth every evening. medication name Alerna Uric Acid support       Review of patient's allergies indicates:   Allergen Reactions    Allopurinol analogues Swelling    Latex, natural rubber     Statins-hmg-coa reductase inhibitors Other (See Comments)     Muscle ache    Adhesive Rash       Past Medical History:   Diagnosis Date    Anemia     Cataract     Diabetes mellitus     Diabetes mellitus, type 2     Glaucoma     Gout, unspecified     HLD (hyperlipidemia)     Hypertension     Hypothyroidism, unspecified     Kidney failure     Renal disorder      Past Surgical History:   Procedure Laterality Date    COLONOSCOPY N/A 05/18/2022    Procedure: COLONOSCOPY;  Surgeon: Raul Dyer MD;  Location: [facility] ENDO;  Service: Endoscopy;  Laterality: N/A;    DENTAL SURGERY      EYE SURGERY Bilateral     Cataract    INSERTION, CATHETER, DIALYSIS, PERITONEAL, LAPAROSCOPIC N/A 06/20/2023    Procedure: INSERTION, CATHETER, DIALYSIS, PERITONEAL, LAPAROSCOPIC;  Surgeon: Carlos Alberto Rodgers MD;  Location: [facility] OR;  Service: General;  Laterality: N/A;    KNEE SURGERY Right      Family History       Problem Relation (Age  "of Onset)    Cancer Sister    Diabetes Father, Brother    Heart disease Father    Hypertension Father    Kidney disease Brother          Tobacco Use    Smoking status: Never    Smokeless tobacco: Never   Substance and Sexual Activity    Alcohol use: Never    Drug use: Never    Sexual activity: Not Currently        Review of Systems   Constitutional:  Negative for appetite change, chills and fever.   HENT:  Negative for facial swelling and trouble swallowing.    Respiratory:  Negative for cough, shortness of breath, wheezing and stridor.    Cardiovascular:  Negative for leg swelling.   Gastrointestinal:  Positive for abdominal distention (hernia present). Negative for constipation, diarrhea, nausea and vomiting.   Genitourinary:  Positive for decreased urine volume.   Skin:  Negative for wound.   Neurological:  Negative for tremors, syncope, speech difficulty and weakness.   Psychiatric/Behavioral:  Negative for agitation, behavioral problems, confusion, decreased concentration and hallucinations.      Objective:     Vital Signs (Most Recent):  Temp: 98 °F (36.7 °C) (02/28/24 2015)  Pulse: 64 (02/28/24 2015)  Resp: 18 (02/28/24 2015)  BP: 132/70 (02/28/24 2015)  SpO2: 99 % (02/28/24 2015)  Height: 5' 4" (162.6 cm)  Weight: 93 kg (205 lb 0.4 oz)  Body mass index is 35.19 kg/m².      Physical Exam  Vitals and nursing note reviewed.   Constitutional:       General: He is not in acute distress.     Appearance: Normal appearance. He is not ill-appearing or toxic-appearing.   HENT:      Head: Atraumatic.   Eyes:      General: No scleral icterus.     Conjunctiva/sclera: Conjunctivae normal.   Cardiovascular:      Rate and Rhythm: Normal rate and regular rhythm.      Pulses: Normal pulses.   Pulmonary:      Effort: Pulmonary effort is normal. No respiratory distress.      Breath sounds: Normal breath sounds. No wheezing or rales.   Abdominal:      General: Bowel sounds are normal.      Palpations: Abdomen is soft.      " Tenderness: There is no abdominal tenderness. There is no guarding or rebound.      Hernia: A hernia is present.      Comments: Hernia present    PD cath in place. Dressing CDI.    Musculoskeletal:      Right lower leg: No edema.      Left lower leg: No edema.   Skin:     General: Skin is warm and dry.      Capillary Refill: Capillary refill takes 2 to 3 seconds.   Neurological:      Mental Status: He is alert and oriented to person, place, and time.   Psychiatric:         Attention and Perception: Attention normal.         Mood and Affect: Mood normal.         Speech: Speech normal.         Behavior: Behavior normal. Behavior is cooperative.         Thought Content: Thought content normal.         Judgment: Judgment normal.          Laboratory  CBC:   Recent Labs   Lab 02/28/24 2027   WBC 9.48   RBC 2.90*   HGB 9.0*   HCT 27.5*      MCV 95   MCH 31.0   MCHC 32.7     CMP:   Recent Labs   Lab 02/28/24 2027   GLU 98   CALCIUM 10.0   ALBUMIN 3.2*   PROT 7.0      K 4.1   CO2 26   CL 99   BUN 99*   CREATININE 12.7*   ALKPHOS 44*   ALT 20   AST 21     Labs within the past 24 hours have been reviewed.    Diagnostic Results:  Admit diagnostic tests pending    Patient was SARS-CoV-2 /COVID-19 tested with negative results.

## 2024-02-29 NOTE — NURSING TRANSFER
Nursing Transfer Note      2/29/2024   9:01 AM    Nurse giving handoff:khanh glover   Nurse receiving handoff:chalino    Reason patient is being transferred:post op    Transfer To: \[facility]\"" [id number]    Transfer via bed    Transported by transport    Order for Tele Monitor? No    Additional Lines: Curry Catheter    Medicines sent: insulin; IV d5 .45 NS; iv globulin infusion, nasal ointment;     Any special needs or follow-up needed: routine     Patient belongings transferred with patient: Yes    Chart send with patient: Yes    Patient reassessed at: 9:00

## 2024-02-29 NOTE — HPI
"Mr. Lopez is a 64M with PMH of ESRD 2/2 diabetic nephropathy s/p kidney transplant on 2/29/24 (CMV +/-, thymo induction), DM2, HTN, hypothyroidism, HLD, gout. Infectious disease consulted for "RPR positive donor".     Patient stating he feels well but tired since coming out of the OR this morning. Has some "stinging" pain at incision site but is tolerable. Doing ok otherwise.     "

## 2024-02-29 NOTE — ASSESSMENT & PLAN NOTE
- H/H stable post-op. Expected to improve. No overt signs of bleeding  - cont to trend cbc. Monitor

## 2024-02-29 NOTE — CONSULTS
"[facility] - Transplant Stepdown  Infectious Disease  Consult Note    Patient Name: Mark Lopez  MRN: [id number]  Admission Date: 2024  Hospital Length of Stay: 1 days  Attending Physician: Pino Law Jr., *  Primary Care Provider: Loren Mensah MD     Isolation Status: No active isolations    Patient information was obtained from patient and ER records.      Inpatient consult to Infectious Diseases  Consult performed by: Fatoumata Ruiz DO  Consult ordered by: Shala Albarran MD        Assessment/Plan:     Renal/  * Status post -donor kidney transplantation  64M with PMH of ESRD 2/2 diabetic nephropathy s/p kidney transplant on 24 (CMV +/-, thymo induction), DM2, HTN, hypothyroidism, HLD, gout. Infectious disease consulted for "RPR positive donor".     Recommendations  IM PCN 2.4 mil units weekly x 3        Thank you for your consult. I will sign off. Please contact us if you have any additional questions.    Fatoumata Ruiz DO  Infectious Disease  [facility] - Transplant Stepdown    Subjective:     Principal Problem: Status post -donor kidney transplantation    HPI: Mr. Lopez is a 64M with PMH of ESRD 2/2 diabetic nephropathy s/p kidney transplant on 24 (CMV +/-, thymo induction), DM2, HTN, hypothyroidism, HLD, gout. Infectious disease consulted for "RPR positive donor".     Patient stating he feels well but tired since coming out of the OR this morning. Has some "stinging" pain at incision site but is tolerable. Doing ok otherwise.       Past Medical History:   Diagnosis Date    Anemia     Cataract     Diabetes mellitus     Diabetes mellitus, type 2     Glaucoma     Gout, unspecified     HLD (hyperlipidemia)     Hypertension     Hypothyroidism, unspecified     Kidney failure     Renal disorder        Past Surgical History:   Procedure Laterality Date    COLONOSCOPY N/A 2022    Procedure: COLONOSCOPY;  Surgeon: Raul Dyer MD;  Location: [facility];  Service: " Endoscopy;  Laterality: N/A;    DENTAL SURGERY      EYE SURGERY Bilateral     Cataract    INSERTION, CATHETER, DIALYSIS, PERITONEAL, LAPAROSCOPIC N/A 06/20/2023    Procedure: INSERTION, CATHETER, DIALYSIS, PERITONEAL, LAPAROSCOPIC;  Surgeon: Carlos Alberto Rodgers MD;  [facility];  Service: General;  Laterality: N/A;    KNEE SURGERY Right        Review of patient's allergies indicates:   Allergen Reactions    Allopurinol analogues Swelling    Latex, natural rubber     Statins-hmg-coa reductase inhibitors Other (See Comments)     Muscle ache    Adhesive Rash       Medications:  Medications Prior to Admission   Medication Sig    aspirin (ECOTRIN) 81 MG EC tablet Take 81 mg by mouth once daily.    HUMALOG U-100 INSULIN 100 unit/mL injection Inject into the skin. FOR INSULIN PUMP (BASAL 1.2 UNITS/HR)    metoprolol tartrate (LOPRESSOR) 25 MG tablet Take 1 tablet (25 mg total) by mouth 2 (two) times daily.    multivit-min-FA-lycopen-lutein (CENTRUM SILVER MEN) 300-600-300 mcg Tab Take 2 tablets by mouth once daily.    NEXLETOL 180 mg Tab Take 1 tablet by mouth once daily.    ONETOUCH DELICA PLUS LANCET 30 gauge Misc     subcutaneous insulin pump Misc Inject 1 Units/hr into the skin continuous. Insulin pump name - Tandum  Basal dose is 2 units/hr    [DISCONTINUED] amLODIPine (NORVASC) 5 MG tablet Take 5 mg by mouth once daily.    [DISCONTINUED] ascorbic acid (RIKA-C ORAL) Take 1,400 mg by mouth every evening.    [DISCONTINUED] AURYXIA 210 mg iron Tab Take 3 tablets by mouth 3 (three) times daily with meals.    [DISCONTINUED] febuxostat (ULORIC) 40 mg Tab Take 40 mg by mouth once daily.    [DISCONTINUED] furosemide (LASIX) 40 MG tablet Take 80 mg by mouth once daily. Takes once a day    [DISCONTINUED] latanoprost 0.005 % ophthalmic solution Place 1 drop into the right eye every other day. At night    [DISCONTINUED] levothyroxine (SYNTHROID) 50 MCG tablet Take 50 mcg by mouth before breakfast.    [DISCONTINUED] LOKELMA  10 gram packet Take 10 g by mouth every other day.    [DISCONTINUED] loratadine (CLARITIN) 10 mg tablet Take 10 mg by mouth as needed for Allergies.    [DISCONTINUED] MAGNESIUM GLUCONATE ORAL Take by mouth as needed.    [DISCONTINUED] methylPREDNISolone (MEDROL DOSEPACK) 4 mg tablet Take by mouth. AS NEEDED FOR GOUT    [DISCONTINUED] sodium bicarbonate 650 MG tablet Take 1,300 mg by mouth 2 (two) times daily.    [DISCONTINUED] tumeric-ging-olive-oreg-capryl 100 mg-150 mg- 50 mg-150 mg Cap Take 1 tablet by mouth once daily.    [DISCONTINUED] UNABLE TO FIND Take 2 tablets by mouth every evening. medication name Alerna Uric Acid support     Antibiotics (From admission, onward)      Start     Stop Route Frequency Ordered    03/10/24 0800  sulfamethoxazole-trimethoprim 400-80mg per tablet 1 tablet         -- Oral Every morning 02/29/24 0651    02/29/24 1130  ceFAZolin 2 g in dextrose 5 % in water (D5W) 50 mL IVPB (MB+)  (Med - Antibiotics - No PCN allergy or mild to moderate PCN allergy)         03/01/24 0329 IV Every 8 hours (non-standard times) 02/29/24 0651    02/29/24 0900  mupirocin 2 % ointment 1 g         03/05/24 0859 Nasl 2 times daily 02/29/24 0651          Antifungals (From admission, onward)      None          Antivirals (From admission, onward)          Stop Route Frequency     valGANciclovir         -- Oral Every morning             Immunization History   Administered Date(s) Administered    COVID-19, MRNA, LN-S, PF (Pfizer) (Gray Cap) 05/23/2022    COVID-19, MRNA, LN-S, PF (Pfizer) (Purple Cap) 01/29/2021, 02/18/2021, 10/14/2021    COVID-19, mRNA, LNP-S, bivalent booster, PF (PFIZER OMICRON) 09/16/2022, 08/07/2023    Influenza - Quadrivalent 12/10/2019    Influenza - Quadrivalent - MDCK 11/19/2020, 11/29/2022    Influenza - Quadrivalent - PF *Preferred* (6 months and older) 10/14/2021    Tdap 04/26/2021    Zoster 07/11/2013       Family History       Problem Relation (Age of Onset)    Cancer Sister     Diabetes Father, Brother    Heart disease Father    Hypertension Father    Kidney disease Brother          Social History     Socioeconomic History    Marital status: Single   Tobacco Use    Smoking status: Never    Smokeless tobacco: Never   Substance and Sexual Activity    Alcohol use: Never    Drug use: Never    Sexual activity: Not Currently   Social History Narrative    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   Respiratory:  Negative for cough and shortness of breath.    Cardiovascular:  Negative for chest pain.   Gastrointestinal:  Positive for abdominal pain. Negative for constipation, diarrhea, nausea and vomiting.   Musculoskeletal:  Negative for arthralgias and myalgias.   Skin:  Negative for rash.   Neurological:  Negative for weakness and headaches.     Objective:     Vital Signs (Most Recent):  Temp: 97.9 °F (36.6 °C) (02/29/24 1120)  Pulse: 94 (02/29/24 1202)  Resp: 12 (02/29/24 1202)  BP: (!) 102/56 (02/29/24 1202)  SpO2: 98 % (02/29/24 1202) Vital Signs (24h Range):  Temp:  [97.5 °F (36.4 °C)-98.8 °F (37.1 °C)] 97.9 °F (36.6 °C)  Pulse:  [] 94  Resp:  [10-26] 12  SpO2:  [92 %-100 %] 98 %  BP: ()/(54-70) 102/56     Weight: 93 kg (205 lb 0.4 oz)  Body mass index is 35.19 kg/m².    Estimated Creatinine Clearance: 6 mL/min (A) (based on SCr of 12.7 mg/dL (H)).     Physical Exam  Vitals reviewed.   Constitutional:       General: He is not in acute distress.     Appearance: Normal appearance. He is not ill-appearing.   HENT:      Head: Normocephalic and atraumatic.   Eyes:      Extraocular Movements: Extraocular movements intact.      Conjunctiva/sclera: Conjunctivae normal.   Cardiovascular:      Rate and Rhythm: Normal rate and regular rhythm.      Heart sounds: No murmur heard.  Pulmonary:      Effort: Pulmonary effort is normal. No respiratory distress.      Breath sounds: Normal breath sounds.   Abdominal:      General: Abdomen is  flat. Bowel sounds are normal.      Palpations: Abdomen is soft.      Tenderness: There is abdominal tenderness.      Comments: Incision site with dressing that is clean, dry, intact   Musculoskeletal:      Cervical back: Normal range of motion.   Skin:     General: Skin is warm and dry.   Neurological:      General: No focal deficit present.      Mental Status: He is alert and oriented to person, place, and time.   Psychiatric:         Mood and Affect: Mood normal.         Behavior: Behavior normal.         Thought Content: Thought content normal.          Significant Labs: All pertinent labs within the past 24 hours have been reviewed.  Recent Lab Results  (Last 5 results in the past 24 hours)        02/29/24  1223   02/29/24  1112   02/29/24  0712   02/29/24  0708   02/29/24  0000        Albumin   2.5   2.6           Anion Gap   18   18           Baso #   0.01             Basophil %   0.1             BUN   105   102           Calcium   9.1   8.5           Chloride   97   100           CO2   21   20           Creatinine   12.7   11.8           Urine Creatinine               Differential Method   Automated             eGFR   4.0   4.3           Eos #   0.0             Eos %   0.1             Glucose   238   235           Gran # (ANC)   14.9             Gran %   96.6             Hematocrit   27.9   29.6           Hemoglobin   9.0             Immature Grans (Abs)   0.15  Comment: Mild elevation in immature granulocytes is non specific and   can be seen in a variety of conditions including stress response,   acute inflammation, trauma and pregnancy. Correlation with other   laboratory and clinical findings is essential.               Immature Granulocytes   1.0             Lymph #   0.0             Lymph %   0.1             MCH   31.0             MCHC   32.3             MCV   96             Mono #   0.3             Mono %   2.1             MPV   9.6             nRBC   0             Phosphorus Level   8.4   7.3            Platelet Count   359             POCT Glucose 309       269   90       Potassium   4.2   4.0           Prot/Creat Ratio, Urine               Urine Protein               RBC   2.90             RDW   13.2             Sodium   136   138           WBC   15.41                                    Significant Imaging: I have reviewed all pertinent imaging results/findings within the past 24 hours.

## 2024-02-29 NOTE — ASSESSMENT & PLAN NOTE
BG goal: 140-180    - Start insulin transition drip at 1.2 u/h with stepdown parameters.   - Start Novolog 8 units TIDWM (0.5 u/kg/day WBD given steroid therapy)   - Increase correction scale to moderate dose correction (150/25) prn ac/hs/0200 as BG not improved on LDC   - POCT Glucose before meals, at bedtime and at 2 am  - Hypoglycemia protocol in place      ** Please notify Endocrine for any change and/or advance in diet**  ** Please call Endocrine for any BG related issues **     Discharge Planning:   TBD. Please notify endocrinology prior to discharge.

## 2024-02-29 NOTE — ANESTHESIA PROCEDURE NOTES
Intubation    Date/Time: 2/29/2024 2:38 AM    Performed by: Cynthia Harrington CRNA  Authorized by: Ish Hankins MD    Intubation:     Induction:  Intravenous    Intubated:  Postinduction    Mask Ventilation:  Easy mask    Attempts:  1    Attempted By:  CRNA    Method of Intubation:  Video laryngoscopy    Blade:  [place] 3    Laryngeal View Grade: Grade I - full view of cords      Difficult Airway Encountered?: No      Complications:  None    Airway Device:  Oral endotracheal tube    Airway Device Size:  7.5    Style/Cuff Inflation:  Cuffed (inflated to minimal occlusive pressure)    Tube secured:  21    Secured at:  The lips    Placement Verified By:  Capnometry    Complicating Factors:  None    Findings Post-Intubation:  BS equal bilateral and atraumatic/condition of teeth unchanged

## 2024-02-29 NOTE — TELEPHONE ENCOUNTER
16:55= Spoke to pt, instructed NPO now, pt verbalized understanding and agreed.  ETA 19:00pm.    15:00- received admit orders from [facility], Dr Law has requested to  admit  now with tentative OR time 2/29/24 at 8:00am, but also to be considered as back-up to the primary recipient for the other kidney, OR time 21:00 tonight.  Left voicemail message for pt  to call back as soon as possible.    9:00am- Received report from [facility], pt is on standby at home for back-up kidney offer, UNOS# [id number].

## 2024-02-29 NOTE — PLAN OF CARE
Recommendations    1. Continue Renal diet     2. If PO intake <50%, add ONS Novasource renal BID     3. RD to monitor and follow    Goals: Meet % EEN, EPN by RD f/u  Nutrition Goal Status: new  Communication of RD Recs:  (POC)

## 2024-02-29 NOTE — ANESTHESIA POSTPROCEDURE EVALUATION
Anesthesia Post Evaluation    Patient: Mark Lopez    Procedure(s) Performed: Procedure(s) (LRB):  TRANSPLANT, KIDNEY (N/A)    Final Anesthesia Type: general      Patient location during evaluation: PACU  Patient participation: Yes- Able to Participate  Level of consciousness: awake and sedated  Post-procedure vital signs: reviewed and stable  Pain management: adequate  Airway patency: patent    PONV status at discharge: No PONV  Anesthetic complications: no      Cardiovascular status: stable  Respiratory status: unassisted, spontaneous ventilation and room air  Hydration status: euvolemic  Follow-up not needed.              Vitals Value Taken Time   /70 02/29/24 0748   Temp 36.8 °C (98.2 °F) 02/29/24 0747   Pulse 103 02/29/24 0801   Resp 19 02/29/24 0801   SpO2 94 % 02/29/24 0801   Vitals shown include unvalidated device data.      No case tracking events are documented in the log.      Pain/[place] Score: Pain Rating Prior to Med Admin: 9 (2/29/2024  7:47 AM)

## 2024-02-29 NOTE — ANESTHESIA PROCEDURE NOTES
Central Line    Diagnosis: Kidney  Patient location during procedure: done in OR  Procedure start time: 2/29/2024 3:03 AM  Timeout: 2/29/2024 3:00 AM  Procedure end time: 2/29/2024 3:11 AM    Staffing  Authorizing Provider: Ish Hankins MD  Performing Provider: Cynthia Harrington CRNA    Staffing  Performed: resident/CRNA   Anesthesiologist: Ish Hankins MD  Resident/CRNA: Nabor Jang DO  Performed by: Cynthia Harrington CRNA  Authorized by: Ish Hankins MD    Anesthesiologist was present at the time of the procedure.  Preanesthetic Checklist  Completed: patient identified, IV checked, site marked, risks and benefits discussed, surgical consent, monitors and equipment checked, pre-op evaluation, timeout performed and anesthesia consent given  Indication   Indication: hemodialysis     Anesthesia   general anesthesia    Central Line   Skin Prep: skin prepped with ChloraPrep, skin prep agent completely dried prior to procedure  Sterile Barriers Followed: Yes    All five maximal barriers used- gloves, gown, cap, mask, and large sterile sheet    hand hygiene performed prior to central venous catheter insertion  Location: right internal jugular.   Catheter type: dialysis (trialysis)  Catheter Size: 14 Fr (13 Fr)  Ultrasound: vascular probe with ultrasound   Vessel Caliber: medium, patent  Vascular Doppler:  not done, compressibility normal  Needle advanced into vessel with real time Ultrasound guidance.  Guidewire confirmed in vessel.  Image recorded and saved.  sterile gel and probe cover used in ultrasound-guided central venous catheter insertion   Manometry: Venous cannualation confirmed by visual estimation of blood vessel pressure using manometry.  Insertion Attempts: 1   Securement:line sutured, chlorhexidine patch, sterile dressing applied and blood return through all ports    Post-Procedure   X-Ray: successful placement   Adverse Events:none      Guidewire Guidewire removed intact. Guidewire removed  intact, verified with nurse.

## 2024-02-29 NOTE — SUBJECTIVE & OBJECTIVE
Subjective:   History of Present Illness:  Mark Lopez is a 64 year old male with a history of ESRD secondary to presumed diabetic nephropathy. Patient is currently on peritoneal dialysis started on 2023 (initially on HD). Patient is dialyzing on cyclic peritoneal dialysis. Patient reports that he is tolerating dialysis well. He has a PD catheter, permacath, and RUE AVF for dialysis access. Patient also with insulin pump. He now presents as primary candidate for a  donor kidney transplant. OR time set for 24 at 0800 AM. Pre-op labs and imaging ordered and will be reviewed prior to transplant. Will receive Thymoglobulin for induction.              Mr. Lopez is a 64 y.o. year old male who is status post Kidney Transplant - 2024  (#1).  His maintenance immunosuppression consists of:   Immunosuppressants (From admission, onward)      Start     Stop Route Frequency Ordered    24 0900  antithymocyte globulin (rabbit) 100 mg, hydrocortisone sodium succinate (SOLU-CORTEF) 20 mg in sodium chloride 0.9% 500 mL (FOR PERIPHERAL LINE ADMINISTRATION ONLY)  (IP TXP KIDNEY POST-OP THYMO WITH PERIPHERAL PRECHECKED)         24 0859 IV Daily 24 0651    24 1000  mycophenolate capsule 1,000 mg  (IP TXP KIDNEY POST-OP THYMO WITH PERIPHERAL PRECHECKED)         -- Oral 2 times daily 24 0651    24 0800  tacrolimus capsule 3 mg  (IP TXP KIDNEY POST-OP THYMO WITH PERIPHERAL PRECHECKED)         -- Oral 2 times daily 24 0651              Hospital Course:  Patient now s/p DBD Ktx on 24 for presumed diabetic nephropathy (donor RPR+, CMV D+, R-, CIT ~16.5hrs). Patient PD prior to surgery (prev HD, has RUE AVF), reports making some urine, ~1 cup/day. PD cath remains in place.  6 day cramer. JEAN-PIERRE x 1. CIT ~16.5 hrs. Making minimal UOP post-op (~25cc/hr). Little response to lasix. Trending lab work. BP 90-100s. 500cc bolus ordered. Received norvasc post-op. Discontinued BP meds for  now. Cont to monitor.    Past Medical, Surgical, Family, and Social History:   Unchanged from H&P.    Scheduled Meds:   acetaminophen  650 mg Oral Q8H    [START ON 3/1/2024] acetaminophen  650 mg Oral Q24H    [START ON 3/1/2024] antithymocyte globulin (rabbit) 100 mg, hydrocortisone sodium succinate (SOLU-CORTEF) 20 mg in sodium chloride 0.9% 500 mL (FOR PERIPHERAL LINE ADMINISTRATION ONLY)  1.5 mg/kg (Adjusted) Intravenous Daily    bisacodyL  10 mg Oral QHS    ceFAZolin (Ancef) IV (PEDS and ADULTS)  2 g Intravenous Q8H    [START ON 3/1/2024] diphenhydrAMINE  25 mg Oral Q24H    docusate sodium  100 mg Oral TID    famotidine  20 mg Oral QHS    heparin (porcine)  5,000 Units Subcutaneous Q8H    [START ON 3/1/2024] levothyroxine  50 mcg Oral Before breakfast    [START ON 3/2/2024] methylPREDNISolone sodium succinate injection  125 mg Intravenous Once    [START ON 3/1/2024] methylPREDNISolone sodium succinate injection  250 mg Intravenous Once    multivitamin  1 tablet Oral Daily    mupirocin  1 g Nasal BID    mycophenolate  1,000 mg Oral BID    [START ON 3/2/2024] predniSONE  20 mg Oral Daily    sodium chloride 0.9%  500 mL Intravenous Once    [START ON 3/10/2024] sulfamethoxazole-trimethoprim 400-80mg  1 tablet Oral Daily AM    tacrolimus  3 mg Oral BID    [START ON 3/10/2024] valGANciclovir  450 mg Oral Daily AM     Continuous Infusions:   sodium chloride 0.9% Stopped (02/29/24 0800)    dextrose 5 % and 0.45 % NaCl 50 mL/hr at 02/29/24 0806    glucagon (human recombinant)       PRN Meds:acetaminophen, dextrose 10%, dextrose 10%, diphenhydrAMINE, diphenhydrAMINE, EPINEPHrine (PF), EPINEPHrine (PF), glucagon (human recombinant), glucose, glucose, haloperidol lactate, haloperidol lactate, hydrocortisone sodium succinate, hydrocortisone sodium succinate, HYDROmorphone, insulin aspart U-100, ondansetron, ondansetron, oxyCODONE, oxyCODONE-acetaminophen, sodium chloride 0.9%, sodium chloride 0.9%, traMADoL    Intake/Output  "- Last 3 Shifts         02/27 0700  02/28 0659 02/28 0700  02/29 0659 02/29 0700  03/01 0659    P.O.  0     I.V. (mL/kg)  1125 (12.1) 166.9 (1.8)    IV Piggyback  850     Total Intake(mL/kg)  1975 (21.2) 166.9 (1.8)    Urine (mL/kg/hr)  145 112 (0.2)    Drains   20    Stool  0     Blood  200     Total Output  345 132    Net  +1630 +34.9           Stool Occurrence  0 x              Review of Systems   Constitutional:  Negative for chills and fever.   Respiratory:  Negative for cough.    Cardiovascular:  Negative for leg swelling.   Gastrointestinal:  Positive for abdominal pain (incisional). Negative for abdominal distention, constipation, diarrhea, nausea and vomiting.   Genitourinary:  Negative for decreased urine volume, difficulty urinating, dysuria and frequency.   Allergic/Immunologic: Positive for immunocompromised state.   Psychiatric/Behavioral:  Negative for agitation, confusion and decreased concentration. The patient is not nervous/anxious.       Objective:     Vital Signs (Most Recent):  Temp: 97.9 °F (36.6 °C) (02/29/24 1120)  Pulse: 97 (02/29/24 1133)  Resp: 14 (02/29/24 1133)  BP: (!) 97/54 (02/29/24 1133)  SpO2: 99 % (02/29/24 1133) Vital Signs (24h Range):  Temp:  [97.5 °F (36.4 °C)-98.8 °F (37.1 °C)] 97.9 °F (36.6 °C)  Pulse:  [] 97  Resp:  [10-26] 14  SpO2:  [92 %-100 %] 99 %  BP: ()/(54-70) 97/54     Weight: 93 kg (205 lb 0.4 oz)  Height: 5' 4" (162.6 cm)  Body mass index is 35.19 kg/m².     Physical Exam  Vitals and nursing note reviewed.   Constitutional:       General: He is not in acute distress.     Appearance: He is well-developed. He is not diaphoretic.   Cardiovascular:      Rate and Rhythm: Normal rate and regular rhythm.      Heart sounds: No murmur heard.     No friction rub.   Pulmonary:      Breath sounds: Normal breath sounds. No wheezing or rales.   Abdominal:      General: There is no distension.      Palpations: Abdomen is soft.      Tenderness: There is abdominal " tenderness. There is no guarding or rebound.      Comments: RLQ incision  JEAN-PIERRE x 1- ss drainage   Musculoskeletal:      Right lower leg: Edema present.      Left lower leg: Edema present.   Skin:     General: Skin is warm and dry.   Neurological:      Mental Status: He is alert and oriented to person, place, and time.   Psychiatric:         Behavior: Behavior normal.         Thought Content: Thought content normal.          Laboratory:  CBC:   Recent Labs   Lab 02/28/24 2027 02/29/24 0712 02/29/24  1112   WBC 9.48  --  15.41*   RBC 2.90*  --  2.90*   HGB 9.0*  --  9.0*   HCT 27.5* 29.6* 27.9*     --  359   MCV 95  --  96   MCH 31.0  --  31.0   MCHC 32.7  --  32.3     CMP:   Recent Labs   Lab 02/28/24 2027 02/29/24 0712 02/29/24  1112   GLU 98 235* 238*   CALCIUM 10.0 8.5* 9.1   ALBUMIN 3.2* 2.6* 2.5*   PROT 7.0  --   --     138 136   K 4.1 4.0 4.2   CO2 26 20* 21*   CL 99 100 97   BUN 99* 102* 105*   CREATININE 12.7* 11.8* 12.7*   ALKPHOS 44*  --   --    ALT 20  --   --    AST 21  --   --      Labs within the past 24 hours have been reviewed.    Diagnostic Results:  None

## 2024-02-29 NOTE — PROGRESS NOTES
TRANSPLANT NOTE:      ORGAN:  RIGHT KIDNEY   Disease Etiology: Diabetes Mellitus - Type II  Donor Type:  Donation after Brain Death   CDC High Risk:  Yes   Donor CMV Status:   Donor HBcAB:  Negative   Donor HCV Status:  Negative   Peak cPRA % (within 1 year of transplant): 52      Mark Lopez is a 64 y.o. male s/p   Donation after Brain Death  kidney transplant on 2/29/2024 (Kidney) for Diabetes Mellitus - Type II.   This patient will receive 3 doses of Thymoglobulin for induction.  This patients maintenance immunosuppression will include a steroid taper per protocol to 5mg daily, Prograf, and Cellcept maintenance.  Opportunistic infection prophylaxis will include Valcyte for 6 months (CMV D + , R - ) and Bactrim for 6 months.  Patient is to begin self medications upon transfer to the [facility], and I plan to meet with this patient and his/her support person prior to discharge to review the medication section of the Kidney Transplant Education Manual.  I have reviewed the pre-op medications and have restarted those, as appropriate.

## 2024-02-29 NOTE — CONSULTS
"  [facility] - Transplant Stepdown  Adult Nutrition  Consult Note    SUMMARY     Recommendations    1. Continue Renal diet     2. If PO intake <50%, add ONS Novasource renal BID     3. RD to monitor and follow    Goals: Meet % EEN, EPN by RD f/u  Nutrition Goal Status: new  Communication of RD Recs:  (POC)    Assessment and Plan    Nutrition Problem  Increased nutrient needs     Related to (etiology):   Increased demand for nutrient due to sx    Signs and Symptoms (as evidenced by):   S/p DBD Kidney transplant (2/29).    Interventions/Recommendations (treatment strategy):  Collaboration with other providers  Nutrition education    Nutrition Diagnosis Status:   New        Reason for Assessment    Reason For Assessment: consult  Diagnosis:  (s/p kidney transplant)  Relevant Medical History: T2DM, HTN, anemia  Interdisciplinary Rounds: did not attend    General Information Comments:   S/p DBD Kidney transplant (2/29). Pt has not eaten anything since sx, reports no appetite at this time. Appears fatigue. Reports decrease appetite PTA. Denies N/V. Per wt hx,  lb, wt stable. Pt appears nourished. Does not meet criteria for malnutrition at this time. Not appropriate for education at this time. Handout left at bedside, RD to review with pt when appropriate.     Nutrition Discharge Planning: Food safety handout provided on 2/29, RD to review with pt when appropriate    Nutrition Risk Screen    Nutrition Risk Screen: no indicators present    Nutrition/Diet History    Spiritual, Cultural Beliefs, Mandaen Practices, Values that Affect Care: no    Anthropometrics    Temp: 97.9 °F (36.6 °C)  Height: 5' 4" (162.6 cm)  Height (inches): 64 in  Weight Method: Standard Scale  Weight: 93 kg (205 lb 0.4 oz)  Weight (lb): 205.03 lb  Ideal Body Weight (IBW), Male: 130 lb  % Ideal Body Weight, Male (lb): 157.72 %  BMI (Calculated): 35.2       Lab/Procedures/Meds    Pertinent Labs Reviewed: reviewed  Pertinent Labs Comments: " glucose 238, , Cr 12.7, albumin 2.5, Phos 8.4, eGFR 4.0, H/H 9.0/27.9  Pertinent Medications Reviewed: reviewed  Pertinent Medications Comments: prednisone, methylprednisolone, levothyroxine, MVI, famotidine, bisacodyl, heparin, mycophenolate, docusate, tacrolimus, insulin    Estimated/Assessed Needs    Weight Used For Calorie Calculations: 93 kg (205 lb)  Energy Calorie Requirements (kcal): 1787 kcal  Energy Need Method: Bailey-St Jeor (PAL 1.1)  Protein Requirements: 89-118g (1.5-2g/kg IBW)  Weight Used For Protein Calculations: 59 kg (130 lb) (IBW)  Fluid Requirements (mL): 1ml/kcal or per MD  Estimated Fluid Requirement Method: RDA Method  RDA Method (mL): 1787  CHO Requirement: 223g      Nutrition Prescription Ordered    Current Diet Order: Renal    Evaluation of Received Nutrient/Fluid Intake    I/O: + 1.62 L since admit  Energy Calories Required: not meeting needs  Protein Required: not meeting needs  Comments: LBM 2/27    Nutrition Risk    Level of Risk/Frequency of Follow-up:  (1x/week)       Monitor and Evaluation    Food and Nutrient Intake: food and beverage intake, energy intake  Food and Nutrient Adminstration: diet order  Knowledge/Beliefs/Attitudes: food and nutrition knowledge/skill  Physical Activity and Function: nutrition-related ADLs and IADLs  Anthropometric Measurements: height/length, weight, weight change, body mass index  Biochemical Data, Medical Tests and Procedures: electrolyte and renal panel, gastrointestinal profile, glucose/endocrine profile, inflammatory profile, lipid profile  Nutrition-Focused Physical Findings: overall appearance       Nutrition Follow-Up    RD Follow-up?: Yes

## 2024-02-29 NOTE — SUBJECTIVE & OBJECTIVE
Past Medical History:   Diagnosis Date    Anemia     Cataract     Diabetes mellitus     Diabetes mellitus, type 2     Glaucoma     Gout, unspecified     HLD (hyperlipidemia)     Hypertension     Hypothyroidism, unspecified     Kidney failure     Renal disorder        Past Surgical History:   Procedure Laterality Date    COLONOSCOPY N/A 05/18/2022    Procedure: COLONOSCOPY;  Surgeon: Raul Dyer MD;  Location: [facility] ENDO;  Service: Endoscopy;  Laterality: N/A;    DENTAL SURGERY      EYE SURGERY Bilateral     Cataract    INSERTION, CATHETER, DIALYSIS, PERITONEAL, LAPAROSCOPIC N/A 06/20/2023    Procedure: INSERTION, CATHETER, DIALYSIS, PERITONEAL, LAPAROSCOPIC;  Surgeon: Carlos Alberto Rodgers MD;  Location: [facility] OR;  Service: General;  Laterality: N/A;    KNEE SURGERY Right        Review of patient's allergies indicates:   Allergen Reactions    Allopurinol analogues Swelling    Latex, natural rubber     Statins-hmg-coa reductase inhibitors Other (See Comments)     Muscle ache    Adhesive Rash       Medications:  Medications Prior to Admission   Medication Sig    aspirin (ECOTRIN) 81 MG EC tablet Take 81 mg by mouth once daily.    HUMALOG U-100 INSULIN 100 unit/mL injection Inject into the skin. FOR INSULIN PUMP (BASAL 1.2 UNITS/HR)    metoprolol tartrate (LOPRESSOR) 25 MG tablet Take 1 tablet (25 mg total) by mouth 2 (two) times daily.    multivit-min-FA-lycopen-lutein (CENTRUM SILVER MEN) 300-600-300 mcg Tab Take 2 tablets by mouth once daily.    NEXLETOL 180 mg Tab Take 1 tablet by mouth once daily.    ONETOUCH DELICA PLUS LANCET 30 gauge Misc     subcutaneous insulin pump Misc Inject 1 Units/hr into the skin continuous. Insulin pump name - Tandum  Basal dose is 2 units/hr    [DISCONTINUED] amLODIPine (NORVASC) 5 MG tablet Take 5 mg by mouth once daily.    [DISCONTINUED] ascorbic acid (RIKA-C ORAL) Take 1,400 mg by mouth every evening.    [DISCONTINUED] AURYXIA 210 mg iron Tab Take 3 tablets by mouth 3  (three) times daily with meals.    [DISCONTINUED] febuxostat (ULORIC) 40 mg Tab Take 40 mg by mouth once daily.    [DISCONTINUED] furosemide (LASIX) 40 MG tablet Take 80 mg by mouth once daily. Takes once a day    [DISCONTINUED] latanoprost 0.005 % ophthalmic solution Place 1 drop into the right eye every other day. At night    [DISCONTINUED] levothyroxine (SYNTHROID) 50 MCG tablet Take 50 mcg by mouth before breakfast.    [DISCONTINUED] LOKELMA 10 gram packet Take 10 g by mouth every other day.    [DISCONTINUED] loratadine (CLARITIN) 10 mg tablet Take 10 mg by mouth as needed for Allergies.    [DISCONTINUED] MAGNESIUM GLUCONATE ORAL Take by mouth as needed.    [DISCONTINUED] methylPREDNISolone (MEDROL DOSEPACK) 4 mg tablet Take by mouth. AS NEEDED FOR GOUT    [DISCONTINUED] sodium bicarbonate 650 MG tablet Take 1,300 mg by mouth 2 (two) times daily.    [DISCONTINUED] tumeric-ging-olive-oreg-capryl 100 mg-150 mg- 50 mg-150 mg Cap Take 1 tablet by mouth once daily.    [DISCONTINUED] UNABLE TO FIND Take 2 tablets by mouth every evening. medication name Alerna Uric Acid support     Antibiotics (From admission, onward)      Start     Stop Route Frequency Ordered    03/10/24 0800  sulfamethoxazole-trimethoprim 400-80mg per tablet 1 tablet         -- Oral Every morning 02/29/24 0651    02/29/24 1130  ceFAZolin 2 g in dextrose 5 % in water (D5W) 50 mL IVPB (MB+)  (Med - Antibiotics - No PCN allergy or mild to moderate PCN allergy)         03/01/24 0329 IV Every 8 hours (non-standard times) 02/29/24 0651    02/29/24 0900  mupirocin 2 % ointment 1 g         03/05/24 0859 Nasl 2 times daily 02/29/24 0651          Antifungals (From admission, onward)      None          Antivirals (From admission, onward)          Stop Route Frequency     valGANciclovir         -- Oral Every morning             Immunization History   Administered Date(s) Administered    COVID-19, MRNA, LN-S, PF (Pfizer) (Gray Cap) 05/23/2022    COVID-19, MRNA,  LN-S, PF (Pfizer) (Purple Cap) 01/29/2021, 02/18/2021, 10/14/2021    COVID-19, mRNA, LNP-S, bivalent booster, PF (PFIZER OMICRON) 09/16/2022, 08/07/2023    Influenza - Quadrivalent 12/10/2019    Influenza - Quadrivalent - MDCK 11/19/2020, 11/29/2022    Influenza - Quadrivalent - PF *Preferred* (6 months and older) 10/14/2021    Tdap 04/26/2021    Zoster 07/11/2013       Family History       Problem Relation (Age of Onset)    Cancer Sister    Diabetes Father, Brother    Heart disease Father    Hypertension Father    Kidney disease Brother          Social History     Socioeconomic History    Marital status: Single   Tobacco Use    Smoking status: Never    Smokeless tobacco: Never   Substance and Sexual Activity    Alcohol use: Never    Drug use: Never    Sexual activity: Not Currently   Social History Narrative    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   Respiratory:  Negative for cough and shortness of breath.    Cardiovascular:  Negative for chest pain.   Gastrointestinal:  Positive for abdominal pain. Negative for constipation, diarrhea, nausea and vomiting.   Musculoskeletal:  Negative for arthralgias and myalgias.   Skin:  Negative for rash.   Neurological:  Negative for weakness and headaches.     Objective:     Vital Signs (Most Recent):  Temp: 97.9 °F (36.6 °C) (02/29/24 1120)  Pulse: 94 (02/29/24 1202)  Resp: 12 (02/29/24 1202)  BP: (!) 102/56 (02/29/24 1202)  SpO2: 98 % (02/29/24 1202) Vital Signs (24h Range):  Temp:  [97.5 °F (36.4 °C)-98.8 °F (37.1 °C)] 97.9 °F (36.6 °C)  Pulse:  [] 94  Resp:  [10-26] 12  SpO2:  [92 %-100 %] 98 %  BP: ()/(54-70) 102/56     Weight: 93 kg (205 lb 0.4 oz)  Body mass index is 35.19 kg/m².    Estimated Creatinine Clearance: 6 mL/min (A) (based on SCr of 12.7 mg/dL (H)).     Physical Exam  Vitals reviewed.   Constitutional:       General: He is not in acute distress.     Appearance: Normal appearance. He is not  ill-appearing.   HENT:      Head: Normocephalic and atraumatic.   Eyes:      Extraocular Movements: Extraocular movements intact.      Conjunctiva/sclera: Conjunctivae normal.   Cardiovascular:      Rate and Rhythm: Normal rate and regular rhythm.      Heart sounds: No murmur heard.  Pulmonary:      Effort: Pulmonary effort is normal. No respiratory distress.      Breath sounds: Normal breath sounds.   Abdominal:      General: Abdomen is flat. Bowel sounds are normal.      Palpations: Abdomen is soft.      Tenderness: There is abdominal tenderness.      Comments: Incision site with dressing that is clean, dry, intact   Musculoskeletal:      Cervical back: Normal range of motion.   Skin:     General: Skin is warm and dry.   Neurological:      General: No focal deficit present.      Mental Status: He is alert and oriented to person, place, and time.   Psychiatric:         Mood and Affect: Mood normal.         Behavior: Behavior normal.         Thought Content: Thought content normal.          Significant Labs: All pertinent labs within the past 24 hours have been reviewed.  Recent Lab Results  (Last 5 results in the past 24 hours)        02/29/24  1223   02/29/24  1112   02/29/24  0712   02/29/24  0708   02/29/24  0000        Albumin   2.5   2.6           Anion Gap   18   18           Baso #   0.01             Basophil %   0.1             BUN   105   102           Calcium   9.1   8.5           Chloride   97   100           CO2   21   20           Creatinine   12.7   11.8           Urine Creatinine               Differential Method   Automated             eGFR   4.0   4.3           Eos #   0.0             Eos %   0.1             Glucose   238   235           Gran # (ANC)   14.9             Gran %   96.6             Hematocrit   27.9   29.6           Hemoglobin   9.0             Immature Grans (Abs)   0.15  Comment: Mild elevation in immature granulocytes is non specific and   can be seen in a variety of conditions  including stress response,   acute inflammation, trauma and pregnancy. Correlation with other   laboratory and clinical findings is essential.               Immature Granulocytes   1.0             Lymph #   0.0             Lymph %   0.1             MCH   31.0             MCHC   32.3             MCV   96             Mono #   0.3             Mono %   2.1             MPV   9.6             nRBC   0             Phosphorus Level   8.4   7.3           Platelet Count   359             POCT Glucose 309       269   90       Potassium   4.2   4.0           Prot/Creat Ratio, Urine               Urine Protein               RBC   2.90             RDW   13.2             Sodium   136   138           WBC   15.41                                    Significant Imaging: I have reviewed all pertinent imaging results/findings within the past 24 hours.

## 2024-02-29 NOTE — PROGRESS NOTES
[facility] - [facility]plant Stepdown  Kidney Transplant  Progress Note      Reason for Follow-up: Reassessment of Kidney Transplant - 2024  (#1) recipient and management of immunosuppression.    ORGAN:  RIGHT KIDNEY   Donor Type:  Donation after Brain Death   Donor CMV Status: Positive  Donor HBcAB:Negative  Donor HCV Status:Negative  Donor HBV RAHEME:   Donor HCV RAHEEM: Negative      Subjective:   History of Present Illness:  Mark Lopez is a 64 year old male with a history of ESRD secondary to presumed diabetic nephropathy. Patient is currently on peritoneal dialysis started on 2023 (initially on HD). Patient is dialyzing on cyclic peritoneal dialysis. Patient reports that he is tolerating dialysis well. He has a PD catheter, permacath, and RUE AVF for dialysis access. Patient also with insulin pump. He now presents as primary candidate for a  donor kidney transplant. OR time set for 24 at 0800 AM. Pre-op labs and imaging ordered and will be reviewed prior to transplant. Will receive Thymoglobulin for induction.              Mr. Lopez is a 64 y.o. year old male who is status post Kidney Transplant - 2024  (#1).  His maintenance immunosuppression consists of:   Immunosuppressants (From admission, onward)      Start     Stop Route Frequency Ordered    24 0900  antithymocyte globulin (rabbit) 100 mg, hydrocortisone sodium succinate (SOLU-CORTEF) 20 mg in sodium chloride 0.9% 500 mL (FOR PERIPHERAL LINE ADMINISTRATION ONLY)  (IP TXP KIDNEY POST-OP THYMO WITH PERIPHERAL PRECHECKED)         24 0859 IV Daily 24 0651    24 1000  mycophenolate capsule 1,000 mg  (IP TXP KIDNEY POST-OP THYMO WITH PERIPHERAL PRECHECKED)         -- Oral 2 times daily 24 0651    24 0800  tacrolimus capsule 3 mg  (IP TXP KIDNEY POST-OP THYMO WITH PERIPHERAL PRECHECKED)         -- Oral 2 times daily 24 0651              Hospital Course:  Patient now s/p DBD Ktx on 24 for presumed  diabetic nephropathy (donor RPR+, CMV D+, R-, CIT ~16.5hrs). Patient PD prior to surgery (prev HD, has RUE AVF), reports making some urine, ~1 cup/day. PD cath remains in place.  6 day cramer. JEAN-PIERRE x 1. CIT ~16.5 hrs. Making minimal UOP post-op (~25cc/hr). Little response to lasix. Trending lab work. BP 90-100s. 500cc bolus ordered. Received norvasc post-op. Discontinued BP meds for now. Cont to monitor.    Past Medical, Surgical, Family, and Social History:   Unchanged from H&P.    Scheduled Meds:   acetaminophen  650 mg Oral Q8H    [START ON 3/1/2024] acetaminophen  650 mg Oral Q24H    [START ON 3/1/2024] antithymocyte globulin (rabbit) 100 mg, hydrocortisone sodium succinate (SOLU-CORTEF) 20 mg in sodium chloride 0.9% 500 mL (FOR PERIPHERAL LINE ADMINISTRATION ONLY)  1.5 mg/kg (Adjusted) Intravenous Daily    bisacodyL  10 mg Oral QHS    ceFAZolin (Ancef) IV (PEDS and ADULTS)  2 g Intravenous Q8H    [START ON 3/1/2024] diphenhydrAMINE  25 mg Oral Q24H    docusate sodium  100 mg Oral TID    famotidine  20 mg Oral QHS    heparin (porcine)  5,000 Units Subcutaneous Q8H    [START ON 3/1/2024] levothyroxine  50 mcg Oral Before breakfast    [START ON 3/2/2024] methylPREDNISolone sodium succinate injection  125 mg Intravenous Once    [START ON 3/1/2024] methylPREDNISolone sodium succinate injection  250 mg Intravenous Once    multivitamin  1 tablet Oral Daily    mupirocin  1 g Nasal BID    mycophenolate  1,000 mg Oral BID    [START ON 3/2/2024] predniSONE  20 mg Oral Daily    sodium chloride 0.9%  500 mL Intravenous Once    [START ON 3/10/2024] sulfamethoxazole-trimethoprim 400-80mg  1 tablet Oral Daily AM    tacrolimus  3 mg Oral BID    [START ON 3/10/2024] valGANciclovir  450 mg Oral Daily AM     Continuous Infusions:   sodium chloride 0.9% Stopped (02/29/24 0800)    dextrose 5 % and 0.45 % NaCl 50 mL/hr at 02/29/24 0806    glucagon (human recombinant)       PRN Meds:acetaminophen, dextrose 10%, dextrose 10%,  "diphenhydrAMINE, diphenhydrAMINE, EPINEPHrine (PF), EPINEPHrine (PF), glucagon (human recombinant), glucose, glucose, haloperidol lactate, haloperidol lactate, hydrocortisone sodium succinate, hydrocortisone sodium succinate, HYDROmorphone, insulin aspart U-100, ondansetron, ondansetron, oxyCODONE, oxyCODONE-acetaminophen, sodium chloride 0.9%, sodium chloride 0.9%, traMADoL    Intake/Output - Last 3 Shifts         02/27 0700 02/28 0659 02/28 0700 02/29 0659 02/29 0700 03/01 0659    P.O.  0     I.V. (mL/kg)  1125 (12.1) 166.9 (1.8)    IV Piggyback  850     Total Intake(mL/kg)  1975 (21.2) 166.9 (1.8)    Urine (mL/kg/hr)  145 112 (0.2)    Drains   20    Stool  0     Blood  200     Total Output  345 132    Net  +1630 +34.9           Stool Occurrence  0 x              Review of Systems   Constitutional:  Negative for chills and fever.   Respiratory:  Negative for cough.    Cardiovascular:  Negative for leg swelling.   Gastrointestinal:  Positive for abdominal pain (incisional). Negative for abdominal distention, constipation, diarrhea, nausea and vomiting.   Genitourinary:  Negative for decreased urine volume, difficulty urinating, dysuria and frequency.   Allergic/Immunologic: Positive for immunocompromised state.   Psychiatric/Behavioral:  Negative for agitation, confusion and decreased concentration. The patient is not nervous/anxious.       Objective:     Vital Signs (Most Recent):  Temp: 97.9 °F (36.6 °C) (02/29/24 1120)  Pulse: 97 (02/29/24 1133)  Resp: 14 (02/29/24 1133)  BP: (!) 97/54 (02/29/24 1133)  SpO2: 99 % (02/29/24 1133) Vital Signs (24h Range):  Temp:  [97.5 °F (36.4 °C)-98.8 °F (37.1 °C)] 97.9 °F (36.6 °C)  Pulse:  [] 97  Resp:  [10-26] 14  SpO2:  [92 %-100 %] 99 %  BP: ()/(54-70) 97/54     Weight: 93 kg (205 lb 0.4 oz)  Height: 5' 4" (162.6 cm)  Body mass index is 35.19 kg/m².     Physical Exam  Vitals and nursing note reviewed.   Constitutional:       General: He is not in acute " distress.     Appearance: He is well-developed. He is not diaphoretic.   Cardiovascular:      Rate and Rhythm: Normal rate and regular rhythm.      Heart sounds: No murmur heard.     No friction rub.   Pulmonary:      Breath sounds: Normal breath sounds. No wheezing or rales.   Abdominal:      General: There is no distension.      Palpations: Abdomen is soft.      Tenderness: There is abdominal tenderness. There is no guarding or rebound.      Comments: RLQ incision  JEAN-PIERRE x 1- ss drainage   Musculoskeletal:      Right lower leg: Edema present.      Left lower leg: Edema present.   Skin:     General: Skin is warm and dry.   Neurological:      Mental Status: He is alert and oriented to person, place, and time.   Psychiatric:         Behavior: Behavior normal.         Thought Content: Thought content normal.          Laboratory:  CBC:   Recent Labs   Lab 24  1112   WBC 9.48  --  15.41*   RBC 2.90*  --  2.90*   HGB 9.0*  --  9.0*   HCT 27.5* 29.6* 27.9*     --  359   MCV 95  --  96   MCH 31.0  --  31.0   MCHC 32.7  --  32.3     CMP:   Recent Labs   Lab 24  0712 24  1112   GLU 98 235* 238*   CALCIUM 10.0 8.5* 9.1   ALBUMIN 3.2* 2.6* 2.5*   PROT 7.0  --   --     138 136   K 4.1 4.0 4.2   CO2 26 20* 21*   CL 99 100 97   BUN 99* 102* 105*   CREATININE 12.7* 11.8* 12.7*   ALKPHOS 44*  --   --    ALT 20  --   --    AST 21  --   --      Labs within the past 24 hours have been reviewed.    Diagnostic Results:  None  Assessment/Plan:     * Status post -donor kidney transplantation  - DBD Ktx on  for diabetic nephropathy.   - 6 day ebla 1 JEAN-PIERRE drain  - minimal UOP (~25cc/hr). No response to IV Lasix 100mg post-op  - 500cc bolus given for soft pressures.  - Kidney US to be obtained tomorrow  - Possible need for HD overnight pending labs      Acute on chronic anemia  - H/H stable post-op. Expected to improve. No overt signs of bleeding  - cont to  "trend cbc. Monitor        At risk for opportunistic infections  - cont OI prophylaxis per protocol.      Long-term use of immunosuppressant medication  - see "prophylactic immuno"      Prophylactic immunotherapy  - Continue Cellcept and Prograf. Will monitor for signs of toxic side effects, check daily tacrolimus troughs, and change meds accordingly.       Essential hypertension  - hold home meds due to hypotension       Type 2 diabetes mellitus with kidney complication, with long-term current use of insulin  - endocrine consulted; appreciate assistance           Discharge Planning: not current candidate   Medical decision making for this encounter includes review of pertinent labs and diagnostic studies, assessment and planning, discussions with consulting providers, discussion with patient/family, and participation in multidisciplinary rounds. Time spent caring for patient: 60 minutes    Lilly La PA-C  Kidney Transplant  [facility] - Transplant Step[facility]  "

## 2024-02-29 NOTE — ASSESSMENT & PLAN NOTE
"64M with PMH of ESRD 2/2 diabetic nephropathy s/p kidney transplant on 2/29/24 (CMV +/-, thymo induction), DM2, HTN, hypothyroidism, HLD, gout. Infectious disease consulted for "RPR positive donor".     Recommendations  IM PCN 2.4 mil units weekly x 3  "

## 2024-02-29 NOTE — ASSESSMENT & PLAN NOTE
- Continue Cellcept and Prograf. Will monitor for signs of toxic side effects, check daily tacrolimus troughs, and change meds accordingly.

## 2024-02-29 NOTE — CONSULTS
[facility] - Transplant Stepdown  Endocrinology  Diabetes Consult Note    Consult Requested by: Pino Law Jr., *   Reason for admit: Status post -donor kidney transplantation    HISTORY OF PRESENT ILLNESS:  Reason for Consult: Management of T2DM, Hyperglycemia     Surgical Procedure and Date: kidney transplant 2024    Diabetes diagnosis year:  per chart review     Home Diabetes Medications:  Tandem insulin pump   ICR 1:5   ISF 1:25  Basal rate 1.2 u/hr (unclear if in automode (pt somnolent))    How often checking glucose at home? >4 x day   BG readings on regimen: 180s-200s, occasional 300s  Hypoglycemia on the regimen?  Yes, once a week overnight, 2-3x during the week during the day   Missed doses on regimen?  n/a    Diabetes Complications include:     Hyperglycemia, Hypoglycemia , Hypoglycemia unawareness, Diabetic nephropathy  , and Diabetic chronic kidney disease         Complicating diabetes co morbidities:   Glucocorticoid use       HPI:   Patient is a 64 y.o. male with history of ESRD secondary to presumed diabetic nephropathy. Patient is currently on peritoneal dialysis started on 2023 (initially on HD). Patient is dialyzing on cyclic peritoneal dialysis. Patient reports that he is tolerating dialysis well. He has a PD catheter, permacath, and RUE AVF for dialysis access. Patient also with insulin pump. He now presents as primary candidate for a  donor kidney transplant. OR time set for 24 at 0800 AM. Pre-op labs and imaging ordered and will be reviewed prior to transplant. Will receive Thymoglobulin for induction. Endocrine consulted for BG management.         Interval HPI:   No acute events overnight. Patient in room 51760/10936 A. Blood glucose stable. BG above goal on current insulin regimen (SSI ). Steroid use- Methylprednisolone  and Hydrocortisone  500/20 mg QD.   Day of Surgery  Renal function- Abnormal - Cr 11.8    Vasopressors-  None     Diet renal      Eating:   <25%  Nausea: No  Hypoglycemia and intervention: No  Fever: No  TPN and/or TF: No    PMH, PSH, FH, SH updated and reviewed     ROS:  Review of Systems   Constitutional:  Negative for chills and fever.   Respiratory:  Negative for cough and shortness of breath.    Cardiovascular:  Negative for chest pain.   Gastrointestinal:  Positive for abdominal pain. Negative for constipation, diarrhea, nausea and vomiting.   Musculoskeletal:  Negative for arthralgias and myalgias.   Skin:  Negative for rash.   Neurological:  Negative for weakness and headaches.       Current Medications and/or Treatments Impacting Glycemic Control  Immunotherapy:    Immunosuppressants           Stop Route Frequency     antithymocyte globulin (rabbit) 100 mg, hydrocortisone sodium succinate (SOLU-CORTEF) 20 mg in sodium chloride 0.9% 500 mL (FOR PERIPHERAL LINE ADMINISTRATION ONLY)         03/03/24 0859 IV Daily     mycophenolate capsule 1,000 mg         -- Oral 2 times daily     tacrolimus capsule 3 mg         -- Oral 2 times daily          Steroids:   Hormones (From admission, onward)      Start     Stop Route Frequency Ordered    03/02/24 0900  predniSONE tablet 20 mg         -- Oral Daily 02/28/24 2058 03/02/24 0800  methylPREDNISolone sodium succinate injection 125 mg  (IP TXP KIDNEY POST-OP THYMO WITH PERIPHERAL PRECHECKED)         -- IV Once 02/29/24 0651    03/01/24 0900  antithymocyte globulin (rabbit) 100 mg, hydrocortisone sodium succinate (SOLU-CORTEF) 20 mg in sodium chloride 0.9% 500 mL (FOR PERIPHERAL LINE ADMINISTRATION ONLY)  (IP TXP KIDNEY POST-OP THYMO WITH PERIPHERAL PRECHECKED)         03/03/24 0859 IV Daily 02/29/24 0651    03/01/24 0800  methylPREDNISolone sodium succinate injection 250 mg  (IP TXP KIDNEY POST-OP THYMO WITH PERIPHERAL PRECHECKED)         -- IV Once 02/29/24 0651    02/29/24 0750  hydrocortisone sodium succinate injection 100 mg  (IP TXP KIDNEY POST-OP THYMO WITH PERIPHERAL PRECHECKED)          07/27/35 2350 IV Once as needed 02/29/24 0651    02/28/24 2144  hydrocortisone sodium succinate injection 100 mg         07/27/35 1344 IV Once as needed 02/28/24 2058          Pressors:    Autonomic Drugs (From admission, onward)      Start     Stop Route Frequency Ordered    02/29/24 0750  EPINEPHrine (PF) injection 1 mg  (IP TXP KIDNEY POST-OP THYMO WITH PERIPHERAL PRECHECKED)         07/27/35 2350 SubQ Once as needed 02/29/24 0651    02/28/24 2144  EPINEPHrine (PF) injection 1 mg         07/27/35 1344 SubQ Once as needed 02/28/24 2058          Hyperglycemia/Diabetes Medications:   Antihyperglycemics (From admission, onward)      Start     Stop Route Frequency Ordered    02/29/24 0749  insulin aspart U-100 pen 0-5 Units  (INSULIN - LOW CORRECTION DOSE (Recommended for BMI <25, GFR<15 or on dialysis, Age > 70, type 1 diabetes, ESLD, severe CHF or patients on <70 units of insulin daily))         -- SubQ Before meals & nightly PRN 02/29/24 0651             PHYSICAL EXAMINATION:  Vitals:    02/29/24 0936   BP: (!) 110/56   Pulse: 101   Resp: 14   Temp: 97.5 °F (36.4 °C)     Body mass index is 35.19 kg/m².     Physical Exam  Constitutional:       General: He is not in acute distress.     Appearance: Normal appearance. He is not ill-appearing.   HENT:      Head: Normocephalic and atraumatic.      Right Ear: External ear normal.      Left Ear: External ear normal.      Nose: Nose normal.   Pulmonary:      Effort: Pulmonary effort is normal. No respiratory distress.   Neurological:      Mental Status: He is alert.   Psychiatric:         Mood and Affect: Mood normal.         Behavior: Behavior normal.      Comments: Somnolent             Labs Reviewed and Include   Recent Labs   Lab 02/28/24 2027 02/29/24  0712 02/29/24  1112   GLU 98   < > 238*   CALCIUM 10.0   < > 9.1   ALBUMIN 3.2*   < > 2.5*   PROT 7.0  --   --       < > 136   K 4.1   < > 4.2   CO2 26   < > 21*   CL 99   < > 97   BUN 99*   < > 105*  "  CREATININE 12.7*   < > 12.7*   ALKPHOS 44*  --   --    ALT 20  --   --    AST 21  --   --    BILITOT 0.3  --   --     < > = values in this interval not displayed.     Lab Results   Component Value Date    WBC 15.41 (H) 2024    HGB 9.0 (L) 2024    HCT 27.9 (L) 2024    MCV 96 2024     2024     No results for input(s): "TSH", "FREET4" in the last 168 hours.  Lab Results   Component Value Date    HGBA1C 5.0 2023       Nutritional status:   Body mass index is 35.19 kg/m².  Lab Results   Component Value Date    ALBUMIN 2.5 (L) 2024    ALBUMIN 2.6 (L) 2024    ALBUMIN 3.2 (L) 2024     No results found for: "PREALBUMIN"    Estimated Creatinine Clearance: 6 mL/min (A) (based on SCr of 12.7 mg/dL (H)).    Accu-Checks  Recent Labs     24  2118 24  0000 24  0708 24  1223   POCTGLUCOSE 97 90 269* 309*        ASSESSMENT and PLAN    Renal/  * Status post -donor kidney transplantation  Managed per primary team        ID  At risk for opportunistic infections  Optimize BG control         Endocrine  Type 2 diabetes mellitus with kidney complication, with long-term current use of insulin  BG goal: 140-180    - Start insulin transition drip at 1.2 u/h with stepdown parameters.   - Start Novolog 8 units TIDWM (0.5 u/kg/day WBD given steroid therapy)   - Increase correction scale to moderate dose correction (150/25) prn ac/hs/0200 as BG not improved on LDC   - POCT Glucose before meals, at bedtime and at 2 am  - Hypoglycemia protocol in place      ** Please notify Endocrine for any change and/or advance in diet**  ** Please call Endocrine for any BG related issues **     Discharge Planning:   TBD. Please notify endocrinology prior to discharge.            Plan discussed with patient, family, and RN at bedside.     Holly Cardozo PA-C  Endocrinology  [facility] - Transplant Stepdown  "

## 2024-02-29 NOTE — ANESTHESIA PREPROCEDURE EVALUATION
[facility]  Anesthesia Pre-Operative Evaluation         Patient Name: Mark Lopez  YOB: 1960  MRN: [id number]    SUBJECTIVE:     Pre-operative evaluation for Procedure(s) (LRB):  TRANSPLANT, KIDNEY (N/A)     02/29/2024    Mark Lopez is a 64 y.o. male w/ a significant PMHx of HTN, DM2, and ESRD secondary to diabetic nephropathy. Patient is dialyzing on cyclic peritoneal dialysis.     Last Hgb 9  Last K 4.1      2D ECHO:  TTE:  Results for orders placed during the hospital encounter of 09/27/23    Echo    Interpretation Summary    Left Ventricle: The left ventricle is mildly dilated. Normal wall thickness. There is eccentric hypertrophy. Normal wall motion. There is normal systolic function with a visually estimated ejection fraction of 55 - 60%. Grade I diastolic dysfunction.    Right Ventricle: Normal right ventricular cavity size. Wall thickness is normal. Right ventricle wall motion  is normal. Systolic function is normal.    Left Atrium: Left atrium is severely dilated.    Right Atrium: Right atrium is dilated.    Pulmonary Artery: The estimated pulmonary artery systolic pressure is 25 mmHg.    IVC/SVC: Normal venous pressure at 3 mmHg.    Pericardium: Ascites present.      JEZ:  No results found for this or any previous visit.    Prev airway:     LDA:        Hemodialysis Catheter right subclavian (Active)   Site Assessment No drainage;No redness;No swelling;No warmth 06/20/23 1040   Dressing Type Gauze 06/20/23 0917   Dressing Status Clean;Dry;Intact 06/20/23 1108   Dressing Intervention Integrity maintained 06/20/23 1040   Number of days:             Peripheral IV - Single Lumen 20 G Left Antecubital (Active)   Site Assessment Clean;Dry;Intact 02/28/24 2228   Number of days:             Hemodialysis AV Fistula Right upper arm (Active)   Site Assessment Clean;Dry;Intact;No redness;No swelling 06/20/23 1108   Patency Present;Thrill;Bruit 02/28/24 2228   Number of  days:        Drips:       Patient Active Problem List   Diagnosis    NIHARIKA (acute kidney injury)    Pulmonary nodule    Closed fracture of right orbital floor    Type 2 diabetes mellitus with kidney complication, with long-term current use of insulin    Essential hypertension    Chronic kidney disease, unspecified    Chronic kidney disease, stage V    ESRD (end stage renal disease)    Chronic viral hepatitis B without delta agent and without coma       Review of patient's allergies indicates:   Allergen Reactions    Allopurinol analogues Swelling    Latex, natural rubber     Statins-hmg-coa reductase inhibitors Other (See Comments)     Muscle ache    Adhesive Rash       Current Inpatient Medications:    acetaminophen  650 mg Per NG tube Once    antithymocyte globulin (rabbit) 100 mg, hydrocortisone sodium succinate (SOLU-CORTEF) 20 mg in sodium chloride 0.9% 500 mL (FOR PERIPHERAL LINE ADMINISTRATION ONLY)  1.5 mg/kg (Adjusted) Intravenous Once    diphenhydrAMINE  50 mg Intravenous Once    methylPREDNISolone sodium succinate injection  500 mg Intravenous Once    [START ON 3/2/2024] predniSONE  20 mg Oral Daily       Current Outpatient Medications   Medication Instructions    amLODIPine (NORVASC) 5 mg, Oral, Daily    ascorbic acid (RIKA-C ORAL) 1,400 mg, Oral, Nightly    aspirin (ECOTRIN) 81 mg, Oral, Daily    AURYXIA 210 mg iron Tab 3 tablets, Oral, 3 times daily with meals    febuxostat (ULORIC) 40 mg, Oral, Daily    furosemide (LASIX) 80 mg, Oral, Daily, Takes once a day    HUMALOG U-100 INSULIN 100 unit/mL injection Subcutaneous, FOR INSULIN PUMP (BASAL 1.2 UNITS/HR)    latanoprost 0.005 % ophthalmic solution 1 drop, Right Eye, Every other day, At night     levothyroxine (SYNTHROID) 50 mcg, Oral, Before breakfast    LOKELMA 10 g, Oral, Every other day    loratadine (CLARITIN) 10 mg, Oral, As needed (PRN)    MAGNESIUM GLUCONATE ORAL Oral, As needed (PRN)    methylPREDNISolone (MEDROL  DOSEPACK) 4 mg tablet Oral, AS NEEDED FOR GOUT<BR><BR>    metoprolol tartrate (LOPRESSOR) 25 mg, Oral, 2 times daily    multivit-min-FA-lycopen-lutein (CENTRUM SILVER MEN) 300-600-300 mcg Tab 2 tablets, Oral, Daily    NEXLETOL 180 mg Tab 1 tablet, Oral, Daily    ONETOUCH DELICA PLUS LANCET 30 gauge Misc No dose, route, or frequency recorded.    sodium bicarbonate 1,300 mg, Oral, 2 times daily    subcutaneous insulin pump 1 Units/hr, Subcutaneous, Continuous, Insulin pump name - Tandum<BR>Basal dose is 2 units/hr     tumeric-ging-olive-oreg-capryl 100 mg-150 mg- 50 mg-150 mg Cap 1 tablet, Oral, Daily    UNABLE TO FIND 2 tablets, Oral, Nightly, medication name Alerna Uric Acid support       Surgical History  Past Surgical History:   Procedure Laterality Date    COLONOSCOPY N/A 05/18/2022    Procedure: COLONOSCOPY;  Surgeon: Raul Dyer MD;  Location: [facility] ENDO;  Service: Endoscopy;  Laterality: N/A;    DENTAL SURGERY      EYE SURGERY Bilateral     Cataract    INSERTION, CATHETER, DIALYSIS, PERITONEAL, LAPAROSCOPIC N/A 06/20/2023    Procedure: INSERTION, CATHETER, DIALYSIS, PERITONEAL, LAPAROSCOPIC;  Surgeon: Carlos Alberto Rodgers MD;  Location: [facility] OR;  Service: General;  Laterality: N/A;    KNEE SURGERY Right        Social History:  Tobacco Use: Low Risk  (1/24/2024)    Patient History     Smoking Tobacco Use: Never     Smokeless Tobacco Use: Never     Passive Exposure: Not on file     Alcohol Use: Not on file         OBJECTIVE:     Vital Signs Range (Last 24H):  Temp:  [36.7 °C (98 °F)]   Pulse:  [64-69]   Resp:  [18]   BP: (121-132)/(69-70)   SpO2:  [98 %-99 %]       Significant Labs:  Lab Results   Component Value Date    WBC 9.48 02/28/2024    HGB 9.0 (L) 02/28/2024    HCT 27.5 (L) 02/28/2024     02/28/2024    INR 1.0 02/28/2024       Lab Results   Component Value Date     02/28/2024    K 4.1 02/28/2024    CL 99 02/28/2024    CREATININE 12.7 (H) 02/28/2024    BUN 99 (H)  02/28/2024    CO2 26 02/28/2024       Lab Results   Component Value Date    HGBA1C 5.0 09/13/2023       EKG:   Results for orders placed or performed during the hospital encounter of 07/07/22   EKG 12-lead    Collection Time: 07/07/22  1:52 PM    Narrative    Test Reason : M25.519,    Vent. Rate : 074 BPM     Atrial Rate : 074 BPM     P-R Int : 160 ms          QRS Dur : 104 ms      QT Int : 398 ms       P-R-T Axes : 052 101 -07 degrees     QTc Int : 441 ms    Normal sinus rhythm  Possible Anterior infarct ,age undetermined  Abnormal ECG  When compared with ECG of 26-APR-2021 11:52,  No significant change was found  Confirmed by Sumit LANE MD, Paul F. ([id number]) on 7/12/2022 6:14:10 AM    Referred By: MARY JANE   SELF           Confirmed By:Jesus Arana III, MD       ASSESSMENT/PLAN:       Pre-op Assessment    I have reviewed the Patient Summary Reports.     I have reviewed the Nursing Notes. I have reviewed the NPO Status.   I have reviewed the Medications.     Review of Systems      Physical Exam  General: Well nourished and Cooperative    Airway:  Mallampati: II / II  Mouth Opening: Normal  TM Distance: 4 - 6 cm  Tongue: Normal  Neck ROM: Normal ROM    Dental:  Intact    Chest/Lungs:  Clear to auscultation, Normal Respiratory Rate    Heart:  Rate: Normal  Rhythm: Regular Rhythm  Sounds: Normal      Anesthesia Plan  Type of Anesthesia, risks & benefits discussed:    Anesthesia Type: Gen ETT  Intra-op Monitoring Plan: Standard ASA Monitors and Art Line  Post Op Pain Control Plan: multimodal analgesia and IV/PO Opioids PRN  Induction:  IV  Airway Plan: Video and Direct  Informed Consent: Informed consent signed with the Patient and all parties understand the risks and agree with anesthesia plan.  All questions answered.   ASA Score: 3  Day of Surgery Review of History & Physical: H&P Update referred to the surgeon/provider.    Ready For Surgery From Anesthesia Perspective.     .

## 2024-02-29 NOTE — ASSESSMENT & PLAN NOTE
- DBD Ktx on 2/29 for diabetic nephropathy.   - 6 day cramer, 1 JEAN-PIERRE drain  - minimal UOP (~25cc/hr). No response to IV Lasix 100mg post-op  - 500cc bolus given for soft pressures.  - Kidney US to be obtained tomorrow  - Possible need for HD overnight pending labs

## 2024-02-29 NOTE — HPI
Reason for Consult: Management of T2DM, Hyperglycemia     Surgical Procedure and Date: kidney transplant 2024    Diabetes diagnosis year:  per chart review     Home Diabetes Medications:  Tandem insulin pump   ICR 1:5   ISF 1:25  Basal rate 1.2 u/hr + control IQ     How often checking glucose at home? >4 x day   BG readings on regimen: 180s-200s, occasional 300s  Hypoglycemia on the regimen?  Yes, once a week overnight, 2-3x during the week during the day   Missed doses on regimen?  n/a    Diabetes Complications include:     Hyperglycemia, Hypoglycemia , Hypoglycemia unawareness, Diabetic nephropathy  , and Diabetic chronic kidney disease         Complicating diabetes co morbidities:   Glucocorticoid use       HPI:   Patient is a 64 y.o. male with history of ESRD secondary to presumed diabetic nephropathy. Patient is currently on peritoneal dialysis started on 2023 (initially on HD). Patient is dialyzing on cyclic peritoneal dialysis. Patient reports that he is tolerating dialysis well. He has a PD catheter, permacath, and RUE AVF for dialysis access. Patient also with insulin pump. He now presents as primary candidate for a  donor kidney transplant. OR time set for 24 at 0800 AM. Pre-op labs and imaging ordered and will be reviewed prior to transplant. Will receive Thymoglobulin for induction. Endocrine consulted for BG management.

## 2024-02-29 NOTE — PLAN OF CARE
Pre-operative Discussion Note  Kidney Transplant Surgery    Mark Lopez is a 64 y.o. male with ESRD, requiring chronic dialysis admitted for kidney transplant.  I discussed the planned procedure in detail, including expected hospital course and outcomes, benefits, risks, and potential complications.  Complications discussed included death, graft failure, bleeding, infection, vascular thrombosis, and rejection.  I discussed the risks of anesthesia, as well as the potential need for re-operation.  The possibility of other complications not specifically mentioned was also discussed.  Also, I discussed the need for lifelong immunosuppression and the possibility of serious complications from immunosuppressive drugs.    The discussion included the risks that the patient will incur if he elects to not have the proposed procedure.    Relevant donor-specific risk factors were disclosed and discussed with the patient, including:   PHS: I discussed the use of organs from donors with [facility] risk criteria, including the testing protocols utilized, as well as data from the literature regarding the likelihood of transmission of hepatitis or HIV. The patient that he is willing to consider such grafts.    Specific [facility] donor risk criteria for the organ donor include:  Drug injection for nonmedical reasons    HCV: n/a    The patient was SARS-CoV-2 /COVID-19 tested with negative results.    COVID-19: I discussed the possibility of COVID-19 transmission with the patient. Although RAHEEM testing is available for the virus using a technique which in theory should be very accurate, there is no data yet regarding the likelihood of a  false-negative test leading to virus transmission. Based on accuracy of testing for other viruses, it is expected that this risk is extremely small.     I also discussed that transplant immunosuppression will increase susceptibility to COVID-19 and other viruses, and that although we use stringent precautions to  protect patients from infection, it is possible for a transplant recipient to contract this infection. If COVID-19 infection should occur, it would be a serious matter with a significant risk of death.    All questions were answered.  The patient and available family members voice understanding and agree to proceed with the transplant.    UNOS Patient Status  Note on scores:  ICU = 10 = total assistance  TSU = 20-30 = partial assistance  Outpatient admitted for transplant requiring medical care in last year = 40-50 = partial assistance  Scores 60 or higher indicate no assistance, meaning no need for medical care in last year. This would be very unusual for a transplant candidate.    UNOS Patient Status  Functional Status: 50% - Requires considerable assistance and frequent medical care  Physical Capacity: No Limitations    Discussed with patient in his TSU room today at approx 1am.   Pino Law Jr

## 2024-02-29 NOTE — SUBJECTIVE & OBJECTIVE
Interval HPI:   No acute events overnight. Patient in room 02072/12091 A. Blood glucose stable. BG above goal on current insulin regimen (SSI ). Steroid use- Methylprednisolone  and Hydrocortisone  500/20 mg QD.   Day of Surgery  Renal function- Abnormal - Cr 11.8    Vasopressors-  None     Diet renal     Eating:   <25%  Nausea: No  Hypoglycemia and intervention: No  Fever: No  TPN and/or TF: No    PMH, PSH, FH, SH updated and reviewed     ROS:  Review of Systems   Constitutional:  Negative for chills and fever.   Respiratory:  Negative for cough and shortness of breath.    Cardiovascular:  Negative for chest pain.   Gastrointestinal:  Positive for abdominal pain. Negative for constipation, diarrhea, nausea and vomiting.   Musculoskeletal:  Negative for arthralgias and myalgias.   Skin:  Negative for rash.   Neurological:  Negative for weakness and headaches.       Current Medications and/or Treatments Impacting Glycemic Control  Immunotherapy:    Immunosuppressants           Stop Route Frequency     antithymocyte globulin (rabbit) 100 mg, hydrocortisone sodium succinate (SOLU-CORTEF) 20 mg in sodium chloride 0.9% 500 mL (FOR PERIPHERAL LINE ADMINISTRATION ONLY)         03/03/24 0859 IV Daily     mycophenolate capsule 1,000 mg         -- Oral 2 times daily     tacrolimus capsule 3 mg         -- Oral 2 times daily          Steroids:   Hormones (From admission, onward)      Start     Stop Route Frequency Ordered    03/02/24 0900  predniSONE tablet 20 mg         -- Oral Daily 02/28/24 2058 03/02/24 0800  methylPREDNISolone sodium succinate injection 125 mg  (IP TXP KIDNEY POST-OP THYMO WITH PERIPHERAL PRECHECKED)         -- IV Once 02/29/24 0651    03/01/24 0900  antithymocyte globulin (rabbit) 100 mg, hydrocortisone sodium succinate (SOLU-CORTEF) 20 mg in sodium chloride 0.9% 500 mL (FOR PERIPHERAL LINE ADMINISTRATION ONLY)  (IP TXP KIDNEY POST-OP THYMO WITH PERIPHERAL PRECHECKED)         03/03/24 0859 IV Daily  02/29/24 0651    03/01/24 0800  methylPREDNISolone sodium succinate injection 250 mg  (IP TXP KIDNEY POST-OP THYMO WITH PERIPHERAL PRECHECKED)         -- IV Once 02/29/24 0651    02/29/24 0750  hydrocortisone sodium succinate injection 100 mg  (IP TXP KIDNEY POST-OP THYMO WITH PERIPHERAL PRECHECKED)         07/27/35 2350 IV Once as needed 02/29/24 0651    02/28/24 2144  hydrocortisone sodium succinate injection 100 mg         07/27/35 1344 IV Once as needed 02/28/24 2058          Pressors:    Autonomic Drugs (From admission, onward)      Start     Stop Route Frequency Ordered    02/29/24 0750  EPINEPHrine (PF) injection 1 mg  (IP TXP KIDNEY POST-OP THYMO WITH PERIPHERAL PRECHECKED)         07/27/35 2350 SubQ Once as needed 02/29/24 0651    02/28/24 2144  EPINEPHrine (PF) injection 1 mg         07/27/35 1344 SubQ Once as needed 02/28/24 2058          Hyperglycemia/Diabetes Medications:   Antihyperglycemics (From admission, onward)      Start     Stop Route Frequency Ordered    02/29/24 0749  insulin aspart U-100 pen 0-5 Units  (INSULIN - LOW CORRECTION DOSE (Recommended for BMI <25, GFR<15 or on dialysis, Age > 70, type 1 diabetes, ESLD, severe CHF or patients on <70 units of insulin daily))         -- SubQ Before meals & nightly PRN 02/29/24 0651             PHYSICAL EXAMINATION:  Vitals:    02/29/24 0936   BP: (!) 110/56   Pulse: 101   Resp: 14   Temp: 97.5 °F (36.4 °C)     Body mass index is 35.19 kg/m².     Physical Exam  Constitutional:       General: He is not in acute distress.     Appearance: Normal appearance. He is not ill-appearing.   HENT:      Head: Normocephalic and atraumatic.      Right Ear: External ear normal.      Left Ear: External ear normal.      Nose: Nose normal.   Pulmonary:      Effort: Pulmonary effort is normal. No respiratory distress.   Neurological:      Mental Status: He is alert.   Psychiatric:         Mood and Affect: Mood normal.         Behavior: Behavior normal.      Comments:  Somnolent

## 2024-03-01 PROBLEM — Z00.5 POSITIVE RPR TEST IN CADAVERIC DONOR: Status: ACTIVE | Noted: 2024-03-01

## 2024-03-01 PROBLEM — A53.9 POSITIVE RPR TEST IN CADAVERIC DONOR: Status: ACTIVE | Noted: 2024-03-01

## 2024-03-01 PROBLEM — T86.19 DELAYED GRAFT FUNCTION OF KIDNEY: Status: ACTIVE | Noted: 2024-03-01

## 2024-03-01 LAB
ALBUMIN SERPL BCP-MCNC: 2.3 G/DL (ref 3.5–5.2)
ANION GAP SERPL CALC-SCNC: 20 MMOL/L (ref 8–16)
ASCENDING AORTA: 3.32 CM
AV INDEX (PROSTH): 0.62
AV MEAN GRADIENT: 4 MMHG
AV PEAK GRADIENT: 8 MMHG
AV VALVE AREA BY VELOCITY RATIO: 2.5 CM²
AV VALVE AREA: 2.61 CM²
AV VELOCITY RATIO: 0.6
BASOPHILS # BLD AUTO: 0.02 K/UL (ref 0–0.2)
BASOPHILS # BLD AUTO: 0.04 K/UL (ref 0–0.2)
BASOPHILS NFR BLD: 0.1 % (ref 0–1.9)
BASOPHILS NFR BLD: 0.2 % (ref 0–1.9)
BLD PROD TYP BPU: NORMAL
BLD PROD TYP BPU: NORMAL
BLOOD UNIT EXPIRATION DATE: NORMAL
BLOOD UNIT EXPIRATION DATE: NORMAL
BLOOD UNIT TYPE CODE: 6200
BLOOD UNIT TYPE CODE: 6200
BLOOD UNIT TYPE: NORMAL
BLOOD UNIT TYPE: NORMAL
BSA FOR ECHO PROCEDURE: 2.1 M2
BUN SERPL-MCNC: 117 MG/DL (ref 8–23)
CALCIUM SERPL-MCNC: 8.3 MG/DL (ref 8.7–10.5)
CHLORIDE SERPL-SCNC: 93 MMOL/L (ref 95–110)
CO2 SERPL-SCNC: 19 MMOL/L (ref 23–29)
CODING SYSTEM: NORMAL
CODING SYSTEM: NORMAL
CREAT SERPL-MCNC: 13.7 MG/DL (ref 0.5–1.4)
CROSSMATCH INTERPRETATION: NORMAL
CROSSMATCH INTERPRETATION: NORMAL
CV ECHO LV RWT: 0.38 CM
DIFFERENTIAL METHOD BLD: ABNORMAL
DIFFERENTIAL METHOD BLD: ABNORMAL
DISPENSE STATUS: NORMAL
DISPENSE STATUS: NORMAL
DOP CALC AO PEAK VEL: 1.44 M/S
DOP CALC AO VTI: 26.24 CM
DOP CALC LVOT AREA: 4.2 CM2
DOP CALC LVOT DIAMETER: 2.31 CM
DOP CALC LVOT PEAK VEL: 0.86 M/S
DOP CALC LVOT STROKE VOLUME: 68.36 CM3
DOP CALCLVOT PEAK VEL VTI: 16.32 CM
E WAVE DECELERATION TIME: 258.62 MSEC
E/A RATIO: 0.71
E/E' RATIO: 11 M/S
ECHO LV POSTERIOR WALL: 0.92 CM (ref 0.6–1.1)
EJECTION FRACTION: 65 %
EOSINOPHIL # BLD AUTO: 0 K/UL (ref 0–0.5)
EOSINOPHIL # BLD AUTO: 0 K/UL (ref 0–0.5)
EOSINOPHIL NFR BLD: 0 % (ref 0–8)
EOSINOPHIL NFR BLD: 0 % (ref 0–8)
ERYTHROCYTE [DISTWIDTH] IN BLOOD BY AUTOMATED COUNT: 13.5 % (ref 11.5–14.5)
ERYTHROCYTE [DISTWIDTH] IN BLOOD BY AUTOMATED COUNT: 13.7 % (ref 11.5–14.5)
EST. GFR  (NO RACE VARIABLE): 3.6 ML/MIN/1.73 M^2
ESTIMATED AVG GLUCOSE: 120 MG/DL (ref 68–131)
FRACTIONAL SHORTENING: 29 % (ref 28–44)
GLUCOSE SERPL-MCNC: 176 MG/DL (ref 70–110)
GLUCOSE SERPL-MCNC: 188 MG/DL (ref 70–110)
HBA1C MFR BLD: 5.8 % (ref 4–5.6)
HCO3 UR-SCNC: 22 MMOL/L (ref 24–28)
HCT VFR BLD AUTO: 21.5 % (ref 40–54)
HCT VFR BLD AUTO: 22.1 % (ref 40–54)
HCT VFR BLD CALC: 27 %PCV (ref 36–54)
HGB BLD-MCNC: 7.1 G/DL (ref 14–18)
HGB BLD-MCNC: 7.2 G/DL (ref 14–18)
HR MV ECHO: 86 BPM
IMM GRANULOCYTES # BLD AUTO: 0.1 K/UL (ref 0–0.04)
IMM GRANULOCYTES # BLD AUTO: 0.11 K/UL (ref 0–0.04)
IMM GRANULOCYTES NFR BLD AUTO: 0.5 % (ref 0–0.5)
IMM GRANULOCYTES NFR BLD AUTO: 0.6 % (ref 0–0.5)
INTERVENTRICULAR SEPTUM: 0.88 CM (ref 0.6–1.1)
IVRT: 68.51 MSEC
LA MAJOR: 5.46 CM
LA MINOR: 5.29 CM
LA WIDTH: 4.01 CM
LEFT ATRIUM SIZE: 2.9 CM
LEFT ATRIUM VOLUME INDEX MOD: 27.3 ML/M2
LEFT ATRIUM VOLUME INDEX: 26.3 ML/M2
LEFT ATRIUM VOLUME MOD: 55.1 CM3
LEFT ATRIUM VOLUME: 53.12 CM3
LEFT INTERNAL DIMENSION IN SYSTOLE: 3.44 CM (ref 2.1–4)
LEFT VENTRICLE DIASTOLIC VOLUME INDEX: 54.84 ML/M2
LEFT VENTRICLE DIASTOLIC VOLUME: 110.78 ML
LEFT VENTRICLE MASS INDEX: 75 G/M2
LEFT VENTRICLE SYSTOLIC VOLUME INDEX: 24.1 ML/M2
LEFT VENTRICLE SYSTOLIC VOLUME: 48.78 ML
LEFT VENTRICULAR INTERNAL DIMENSION IN DIASTOLE: 4.86 CM (ref 3.5–6)
LEFT VENTRICULAR MASS: 150.87 G
LV LATERAL E/E' RATIO: 9.63 M/S
LV SEPTAL E/E' RATIO: 12.83 M/S
LYMPHOCYTES # BLD AUTO: 0 K/UL (ref 1–4.8)
LYMPHOCYTES # BLD AUTO: 0.1 K/UL (ref 1–4.8)
LYMPHOCYTES NFR BLD: 0.2 % (ref 18–48)
LYMPHOCYTES NFR BLD: 0.3 % (ref 18–48)
MAGNESIUM SERPL-MCNC: 2.3 MG/DL (ref 1.6–2.6)
MCH RBC QN AUTO: 30.9 PG (ref 27–31)
MCH RBC QN AUTO: 31.7 PG (ref 27–31)
MCHC RBC AUTO-ENTMCNC: 32.1 G/DL (ref 32–36)
MCHC RBC AUTO-ENTMCNC: 33.5 G/DL (ref 32–36)
MCV RBC AUTO: 95 FL (ref 82–98)
MCV RBC AUTO: 96 FL (ref 82–98)
MONOCYTES # BLD AUTO: 0.4 K/UL (ref 0.3–1)
MONOCYTES # BLD AUTO: 0.4 K/UL (ref 0.3–1)
MONOCYTES NFR BLD: 1.8 % (ref 4–15)
MONOCYTES NFR BLD: 2 % (ref 4–15)
MV A" WAVE DURATION": 15.98 MSEC
MV MEAN GRADIENT: 3 MMHG
MV PEAK A VEL: 1.09 M/S
MV PEAK E VEL: 0.77 M/S
MV PEAK GRADIENT: 6 MMHG
MV STENOSIS PRESSURE HALF TIME: 75 MS
MV VALVE AREA P 1/2 METHOD: 2.93 CM2
NEUTROPHILS # BLD AUTO: 18.2 K/UL (ref 1.8–7.7)
NEUTROPHILS # BLD AUTO: 19.3 K/UL (ref 1.8–7.7)
NEUTROPHILS NFR BLD: 97.1 % (ref 38–73)
NEUTROPHILS NFR BLD: 97.2 % (ref 38–73)
NRBC BLD-RTO: 0 /100 WBC
NRBC BLD-RTO: 0 /100 WBC
NUM UNITS TRANS PACKED RBC: NORMAL
NUM UNITS TRANS PACKED RBC: NORMAL
PCO2 BLDA: 40.1 MMHG (ref 35–45)
PH SMN: 7.35 [PH] (ref 7.35–7.45)
PHOSPHATE SERPL-MCNC: 9.7 MG/DL (ref 2.7–4.5)
PISA TR MAX VEL: 2.9 M/S
PLATELET # BLD AUTO: 233 K/UL (ref 150–450)
PLATELET # BLD AUTO: 251 K/UL (ref 150–450)
PLATELET BLD QL SMEAR: ABNORMAL
PMV BLD AUTO: 10.2 FL (ref 9.2–12.9)
PMV BLD AUTO: 10.3 FL (ref 9.2–12.9)
PO2 BLDA: 223 MMHG (ref 80–100)
POC BE: -4 MMOL/L
POC IONIZED CALCIUM: 1.09 MMOL/L (ref 1.06–1.42)
POC SATURATED O2: 100 % (ref 95–100)
POC TCO2: 23 MMOL/L (ref 23–27)
POCT GLUCOSE: 123 MG/DL (ref 70–110)
POCT GLUCOSE: 226 MG/DL (ref 70–110)
POCT GLUCOSE: 231 MG/DL (ref 70–110)
POCT GLUCOSE: 258 MG/DL (ref 70–110)
POCT GLUCOSE: 271 MG/DL (ref 70–110)
POTASSIUM BLD-SCNC: 3.8 MMOL/L (ref 3.5–5.1)
POTASSIUM SERPL-SCNC: 4.5 MMOL/L (ref 3.5–5.1)
POTASSIUM SERPL-SCNC: 4.8 MMOL/L (ref 3.5–5.1)
PULM VEIN S/D RATIO: 0.84
PV PEAK D VEL: 0.31 M/S
PV PEAK S VEL: 0.26 M/S
RA MAJOR: 3.94 CM
RA PRESSURE ESTIMATED: 3 MMHG
RA WIDTH: 2.75 CM
RBC # BLD AUTO: 2.27 M/UL (ref 4.6–6.2)
RBC # BLD AUTO: 2.3 M/UL (ref 4.6–6.2)
RIGHT VENTRICULAR END-DIASTOLIC DIMENSION: 2.79 CM
RV TB RVSP: 6 MMHG
SAMPLE: ABNORMAL
SINUS: 3.4 CM
SODIUM BLD-SCNC: 133 MMOL/L (ref 136–145)
SODIUM SERPL-SCNC: 132 MMOL/L (ref 136–145)
STJ: 2.87 CM
TACROLIMUS BLD-MCNC: 4.5 NG/ML (ref 5–15)
TDI LATERAL: 0.08 M/S
TDI SEPTAL: 0.06 M/S
TDI: 0.07 M/S
TR MAX PG: 34 MMHG
TRICUSPID ANNULAR PLANE SYSTOLIC EXCURSION: 2.74 CM
TROPONIN I SERPL DL<=0.01 NG/ML-MCNC: 0.03 NG/ML (ref 0–0.03)
TV REST PULMONARY ARTERY PRESSURE: 37 MMHG
WBC # BLD AUTO: 18.74 K/UL (ref 3.9–12.7)
WBC # BLD AUTO: 19.79 K/UL (ref 3.9–12.7)
Z-SCORE OF LEFT VENTRICULAR DIMENSION IN END DIASTOLE: -2.03
Z-SCORE OF LEFT VENTRICULAR DIMENSION IN END SYSTOLE: -0.47

## 2024-03-01 PROCEDURE — 99223 1ST HOSP IP/OBS HIGH 75: CPT | Mod: ,,,

## 2024-03-01 PROCEDURE — P9016 RBC LEUKOCYTES REDUCED: HCPCS | Performed by: PHYSICIAN ASSISTANT

## 2024-03-01 PROCEDURE — 63600175 PHARM REV CODE 636 W HCPCS

## 2024-03-01 PROCEDURE — 87040 BLOOD CULTURE FOR BACTERIA: CPT | Performed by: PHYSICIAN ASSISTANT

## 2024-03-01 PROCEDURE — 83735 ASSAY OF MAGNESIUM: CPT

## 2024-03-01 PROCEDURE — 25000003 PHARM REV CODE 250: Performed by: TRANSPLANT SURGERY

## 2024-03-01 PROCEDURE — 25000003 PHARM REV CODE 250: Performed by: INTERNAL MEDICINE

## 2024-03-01 PROCEDURE — 99233 SBSQ HOSP IP/OBS HIGH 50: CPT | Mod: 24,,, | Performed by: PHYSICIAN ASSISTANT

## 2024-03-01 PROCEDURE — 25000003 PHARM REV CODE 250

## 2024-03-01 PROCEDURE — 85025 COMPLETE CBC W/AUTO DIFF WBC: CPT | Mod: 91

## 2024-03-01 PROCEDURE — 85025 COMPLETE CBC W/AUTO DIFF WBC: CPT

## 2024-03-01 PROCEDURE — 25000003 PHARM REV CODE 250: Performed by: PHYSICIAN ASSISTANT

## 2024-03-01 PROCEDURE — 80069 RENAL FUNCTION PANEL: CPT

## 2024-03-01 PROCEDURE — 63600175 PHARM REV CODE 636 W HCPCS: Performed by: PHYSICIAN ASSISTANT

## 2024-03-01 PROCEDURE — 80197 ASSAY OF TACROLIMUS: CPT

## 2024-03-01 PROCEDURE — 84132 ASSAY OF SERUM POTASSIUM: CPT

## 2024-03-01 PROCEDURE — 36430 TRANSFUSION BLD/BLD COMPNT: CPT

## 2024-03-01 PROCEDURE — 90935 HEMODIALYSIS ONE EVALUATION: CPT

## 2024-03-01 PROCEDURE — 5A1D70Z PERFORMANCE OF URINARY FILTRATION, INTERMITTENT, LESS THAN 6 HOURS PER DAY: ICD-10-PCS | Performed by: TRANSPLANT SURGERY

## 2024-03-01 PROCEDURE — 20600001 HC STEP DOWN PRIVATE ROOM

## 2024-03-01 PROCEDURE — 99232 SBSQ HOSP IP/OBS MODERATE 35: CPT | Mod: ,,,

## 2024-03-01 PROCEDURE — 86920 COMPATIBILITY TEST SPIN: CPT | Performed by: PHYSICIAN ASSISTANT

## 2024-03-01 PROCEDURE — 84484 ASSAY OF TROPONIN QUANT: CPT

## 2024-03-01 PROCEDURE — 30233N1 TRANSFUSION OF NONAUTOLOGOUS RED BLOOD CELLS INTO PERIPHERAL VEIN, PERCUTANEOUS APPROACH: ICD-10-PCS | Performed by: TRANSPLANT SURGERY

## 2024-03-01 PROCEDURE — 83036 HEMOGLOBIN GLYCOSYLATED A1C: CPT | Performed by: TRANSPLANT SURGERY

## 2024-03-01 PROCEDURE — 63600175 PHARM REV CODE 636 W HCPCS: Mod: JZ,JG | Performed by: PHYSICIAN ASSISTANT

## 2024-03-01 RX ORDER — LATANOPROST 50 UG/ML
1 SOLUTION/ DROPS OPHTHALMIC NIGHTLY
Status: DISCONTINUED | OUTPATIENT
Start: 2024-03-01 | End: 2024-03-05 | Stop reason: HOSPADM

## 2024-03-01 RX ORDER — TACROLIMUS 1 MG/1
5 CAPSULE ORAL 2 TIMES DAILY
Status: DISCONTINUED | OUTPATIENT
Start: 2024-03-01 | End: 2024-03-02

## 2024-03-01 RX ORDER — MIDODRINE HYDROCHLORIDE 5 MG/1
5 TABLET ORAL 3 TIMES DAILY PRN
Status: DISCONTINUED | OUTPATIENT
Start: 2024-03-01 | End: 2024-03-01

## 2024-03-01 RX ORDER — METHYLPREDNISOLONE SOD SUCC 125 MG
VIAL (EA) INJECTION
Status: COMPLETED
Start: 2024-03-01 | End: 2024-03-01

## 2024-03-01 RX ORDER — INSULIN ASPART 100 [IU]/ML
7-10 INJECTION, SOLUTION INTRAVENOUS; SUBCUTANEOUS
Status: DISCONTINUED | OUTPATIENT
Start: 2024-03-01 | End: 2024-03-02

## 2024-03-01 RX ORDER — SEVELAMER CARBONATE 800 MG/1
1600 TABLET, FILM COATED ORAL
Status: DISCONTINUED | OUTPATIENT
Start: 2024-03-01 | End: 2024-03-05 | Stop reason: HOSPADM

## 2024-03-01 RX ORDER — HYDROCODONE BITARTRATE AND ACETAMINOPHEN 500; 5 MG/1; MG/1
TABLET ORAL
Status: DISCONTINUED | OUTPATIENT
Start: 2024-03-01 | End: 2024-03-03

## 2024-03-01 RX ORDER — SODIUM CHLORIDE 9 MG/ML
INJECTION, SOLUTION INTRAVENOUS ONCE
Status: CANCELLED | OUTPATIENT
Start: 2024-03-01 | End: 2024-03-01

## 2024-03-01 RX ORDER — CALCITRIOL 0.5 UG/1
0.5 CAPSULE ORAL DAILY
Status: DISCONTINUED | OUTPATIENT
Start: 2024-03-01 | End: 2024-03-05 | Stop reason: HOSPADM

## 2024-03-01 RX ORDER — ALUMINUM HYDROXIDE, MAGNESIUM HYDROXIDE, AND SIMETHICONE 2400; 240; 2400 MG/30ML; MG/30ML; MG/30ML
30 SUSPENSION ORAL EVERY 6 HOURS PRN
Status: DISCONTINUED | OUTPATIENT
Start: 2024-03-01 | End: 2024-03-05 | Stop reason: HOSPADM

## 2024-03-01 RX ORDER — MIDODRINE HYDROCHLORIDE 5 MG/1
5 TABLET ORAL EVERY 8 HOURS
Status: DISCONTINUED | OUTPATIENT
Start: 2024-03-01 | End: 2024-03-05 | Stop reason: HOSPADM

## 2024-03-01 RX ORDER — SODIUM CHLORIDE 9 MG/ML
INJECTION, SOLUTION INTRAVENOUS
Status: CANCELLED | OUTPATIENT
Start: 2024-03-01

## 2024-03-01 RX ADMIN — HEPARIN SODIUM 5000 UNITS: 5000 INJECTION INTRAVENOUS; SUBCUTANEOUS at 05:03

## 2024-03-01 RX ADMIN — PENICILLIN G BENZATHINE 2.4 MILLION UNITS: 2400000 INJECTION, SUSPENSION INTRAMUSCULAR at 05:03

## 2024-03-01 RX ADMIN — HEPARIN SODIUM 5000 UNITS: 5000 INJECTION INTRAVENOUS; SUBCUTANEOUS at 08:03

## 2024-03-01 RX ADMIN — TACROLIMUS 3 MG: 1 CAPSULE ORAL at 08:03

## 2024-03-01 RX ADMIN — ANTI-THYMOCYTE GLOBULIN (RABBIT) 100 MG: 5 INJECTION, POWDER, LYOPHILIZED, FOR SOLUTION INTRAVENOUS at 10:03

## 2024-03-01 RX ADMIN — INSULIN HUMAN 2 UNITS/HR: 1 INJECTION, SOLUTION INTRAVENOUS at 10:03

## 2024-03-01 RX ADMIN — INSULIN HUMAN 1.5 UNITS/HR: 1 INJECTION, SOLUTION INTRAVENOUS at 08:03

## 2024-03-01 RX ADMIN — ALUMINUM HYDROXIDE, MAGNESIUM HYDROXIDE, AND DIMETHICONE 30 ML: 400; 400; 40 SUSPENSION ORAL at 11:03

## 2024-03-01 RX ADMIN — MUPIROCIN 1 G: 20 OINTMENT TOPICAL at 08:03

## 2024-03-01 RX ADMIN — LATANOPROST 1 DROP: 50 SOLUTION OPHTHALMIC at 08:03

## 2024-03-01 RX ADMIN — BISACODYL 10 MG: 5 TABLET, COATED ORAL at 08:03

## 2024-03-01 RX ADMIN — METHYLPREDNISOLONE SODIUM SUCCINATE 250 MG: 125 INJECTION, POWDER, FOR SOLUTION INTRAMUSCULAR; INTRAVENOUS at 09:03

## 2024-03-01 RX ADMIN — FAMOTIDINE 20 MG: 20 TABLET, FILM COATED ORAL at 08:03

## 2024-03-01 RX ADMIN — SEVELAMER CARBONATE 1600 MG: 800 TABLET, FILM COATED ORAL at 05:03

## 2024-03-01 RX ADMIN — MIDODRINE HYDROCHLORIDE 5 MG: 5 TABLET ORAL at 09:03

## 2024-03-01 RX ADMIN — TRAMADOL HYDROCHLORIDE 50 MG: 50 TABLET, COATED ORAL at 05:03

## 2024-03-01 RX ADMIN — THERA TABS 1 TABLET: TAB at 08:03

## 2024-03-01 RX ADMIN — TACROLIMUS 5 MG: 1 CAPSULE ORAL at 05:03

## 2024-03-01 RX ADMIN — CALCITRIOL CAPSULES 0.5 MCG 0.5 MCG: 0.5 CAPSULE ORAL at 10:03

## 2024-03-01 RX ADMIN — ACETAMINOPHEN 650 MG: 325 TABLET ORAL at 05:03

## 2024-03-01 RX ADMIN — INSULIN ASPART 6 UNITS: 100 INJECTION, SOLUTION INTRAVENOUS; SUBCUTANEOUS at 01:03

## 2024-03-01 RX ADMIN — MUPIROCIN 1 G: 20 OINTMENT TOPICAL at 09:03

## 2024-03-01 RX ADMIN — DOCUSATE SODIUM 100 MG: 100 CAPSULE, LIQUID FILLED ORAL at 08:03

## 2024-03-01 RX ADMIN — Medication 250 MG: at 09:03

## 2024-03-01 RX ADMIN — MYCOPHENOLATE MOFETIL 1000 MG: 250 CAPSULE ORAL at 08:03

## 2024-03-01 RX ADMIN — LEVOTHYROXINE SODIUM 50 MCG: 50 TABLET ORAL at 05:03

## 2024-03-01 RX ADMIN — ACETAMINOPHEN 650 MG: 325 TABLET ORAL at 09:03

## 2024-03-01 RX ADMIN — INSULIN ASPART 4 UNITS: 100 INJECTION, SOLUTION INTRAVENOUS; SUBCUTANEOUS at 09:03

## 2024-03-01 RX ADMIN — DIPHENHYDRAMINE HYDROCHLORIDE 25 MG: 25 CAPSULE ORAL at 09:03

## 2024-03-01 RX ADMIN — INSULIN ASPART 6 UNITS: 100 INJECTION, SOLUTION INTRAVENOUS; SUBCUTANEOUS at 02:03

## 2024-03-01 NOTE — PROGRESS NOTES
[facility] - Transplant Stepdown  Endocrinology  Progress Note    Admit Date: 2024     Reason for Consult: Management of T2DM, Hyperglycemia     Surgical Procedure and Date: kidney transplant 2024    Diabetes diagnosis year:  per chart review     Home Diabetes Medications:  Tandem insulin pump   ICR 1:5   ISF 1:25  Basal rate 1.2 u/hr (unclear if in automode (pt somnolent))    How often checking glucose at home? >4 x day   BG readings on regimen: 180s-200s, occasional 300s  Hypoglycemia on the regimen?  Yes, once a week overnight, 2-3x during the week during the day   Missed doses on regimen?  n/a    Diabetes Complications include:     Hyperglycemia, Hypoglycemia , Hypoglycemia unawareness, Diabetic nephropathy  , and Diabetic chronic kidney disease         Complicating diabetes co morbidities:   Glucocorticoid use       HPI:   Patient is a 64 y.o. male with history of ESRD secondary to presumed diabetic nephropathy. Patient is currently on peritoneal dialysis started on 2023 (initially on HD). Patient is dialyzing on cyclic peritoneal dialysis. Patient reports that he is tolerating dialysis well. He has a PD catheter, permacath, and RUE AVF for dialysis access. Patient also with insulin pump. He now presents as primary candidate for a  donor kidney transplant. OR time set for 24 at 0800 AM. Pre-op labs and imaging ordered and will be reviewed prior to transplant. Will receive Thymoglobulin for induction. Endocrine consulted for BG management.         Interval HPI:  No acute events overnight. Patient in room 36456/02000 A. Blood glucose stable. BG above goal on current insulin regimen (Transition Insulin Drip). Steroid use- Methylprednisolone  250 mg QD.   1 Day Post-Op  Renal function- Abnormal - 13.7 Cr    Vasopressors-  None     Diet diabetic Renal; 2000 Calorie; Standard Tray  Diet NPO Except for: Sips with Medication     Eatin%  Nausea: No  Hypoglycemia and intervention:  "No  Fever: No  TPN and/or TF: No      BP (!) 120/59   Pulse 78   Temp 98 °F (36.7 °C) (Oral)   Resp 18   Ht 5' 4" (1.626 m)   Wt 97.5 kg (215 lb)   SpO2 97%   BMI 36.90 kg/m²     Labs Reviewed and Include    Recent Labs   Lab 03/01/24  0337   *   CALCIUM 8.3*   ALBUMIN 2.3*   *   K 4.5   CO2 19*   CL 93*   *   CREATININE 13.7*     Lab Results   Component Value Date    WBC 18.74 (H) 03/01/2024    HGB 7.1 (L) 03/01/2024    HCT 22.1 (L) 03/01/2024    MCV 96 03/01/2024     03/01/2024     No results for input(s): "TSH", "FREET4" in the last 168 hours.  Lab Results   Component Value Date    HGBA1C 5.8 (H) 03/01/2024       Nutritional status:   Body mass index is 36.9 kg/m².  Lab Results   Component Value Date    ALBUMIN 2.3 (L) 03/01/2024    ALBUMIN 2.1 (L) 02/29/2024    ALBUMIN 2.5 (L) 02/29/2024     No results found for: "PREALBUMIN"    Estimated Creatinine Clearance: 5.7 mL/min (A) (based on SCr of 13.7 mg/dL (H)).    Accu-Checks  Recent Labs     02/28/24  2118 02/29/24  0000 02/29/24  0708 02/29/24  1223 02/29/24  1537 02/29/24  1734 02/29/24  2042 03/01/24  0205 03/01/24  0806 03/01/24  1303   POCTGLUCOSE 97 90 269* 309* 277* 316* 304* 271* 226* 258*       Current Medications and/or Treatments Impacting Glycemic Control  Immunotherapy:    Immunosuppressants           Stop Route Frequency     tacrolimus capsule 5 mg         -- Oral 2 times daily     antithymocyte globulin (rabbit) 100 mg, hydrocortisone sodium succinate (SOLU-CORTEF) 20 mg in sodium chloride 0.9% 500 mL (FOR PERIPHERAL LINE ADMINISTRATION ONLY)         03/03/24 0859 IV Daily     mycophenolate capsule 1,000 mg         -- Oral 2 times daily          Steroids:   Hormones (From admission, onward)      Start     Stop Route Frequency Ordered    03/02/24 0900  predniSONE tablet 20 mg         -- Oral Daily 02/28/24 2058 03/02/24 0800  methylPREDNISolone sodium succinate injection 125 mg  (IP TXP KIDNEY POST-OP THYMO WITH " PERIPHERAL PRECHECKED)         -- IV Once 24 0651    24 0900  antithymocyte globulin (rabbit) 100 mg, hydrocortisone sodium succinate (SOLU-CORTEF) 20 mg in sodium chloride 0.9% 500 mL (FOR PERIPHERAL LINE ADMINISTRATION ONLY)  (IP TXP KIDNEY POST-OP THYMO WITH PERIPHERAL PRECHECKED)         24 0859 IV Daily 24 0651    24 0800  methylPREDNISolone sodium succinate injection 250 mg  (IP TXP KIDNEY POST-OP THYMO WITH PERIPHERAL PRECHECKED)         -- IV Once 24 0651    24 0750  hydrocortisone sodium succinate injection 100 mg  (IP TXP KIDNEY POST-OP THYMO WITH PERIPHERAL PRECHECKED)         35 2350 IV Once as needed 24 0651    24 2144  hydrocortisone sodium succinate injection 100 mg         35 1344 IV Once as needed 24          Pressors:    Autonomic Drugs (From admission, onward)      Start     Stop Route Frequency Ordered    24 1052  midodrine tablet 5 mg         -- Oral 3 times daily PRN 24 0953    24 0750  EPINEPHrine (PF) injection 1 mg  (IP TXP KIDNEY POST-OP THYMO WITH PERIPHERAL PRECHECKED)         35 2350 SubQ Once as needed 24 0651    24 2144  EPINEPHrine (PF) injection 1 mg         35 1344 SubQ Once as needed 24          Hyperglycemia/Diabetes Medications:   Antihyperglycemics (From admission, onward)      Start     Stop Route Frequency Ordered    24 1245  insulin aspart U-100 pen 7-10 Units         -- SubQ 3 times daily with meals 24 1230    24 0830  insulin regular in 0.9 % NaCl 100 unit/100 mL (1 unit/mL) infusion        Question:  Enter initial dose (Units/hr):  Answer:  2    -- IV Continuous 24 0821    24 1328  insulin aspart U-100 pen 0-10 Units         -- SubQ Before meals, nightly and at 0200 PRN 24 1228            ASSESSMENT and PLAN    Renal/  * Status post -donor kidney transplantation  Managed per primary  team        ID  At risk for opportunistic infections  Optimize BG control         Endocrine  Type 2 diabetes mellitus with kidney complication, with long-term current use of insulin  BG goal: 140-180    - Increase insulin transition drip to 2.0 u/h with stepdown parameters.   - Start Novolog 7-10 units TIDWM (Administer    7    units if patient eats 25-50% of meal, administer   10   units if patient eats > 50% of meal)  - MDC (150/25) prn ac/hs/0200   - POCT Glucose before meals, at bedtime and at 2 am  - Hypoglycemia protocol in place      ** Please notify Endocrine for any change and/or advance in diet**  ** Please call Endocrine for any BG related issues **     Discharge Planning:   TBD. Please notify endocrinology prior to discharge.            Holly Cardozo PA-C  Endocrinology  [facility] - Transplant Stepdown

## 2024-03-01 NOTE — PROGRESS NOTES
EDUCATION NOTE:    Met with Mark Ibarra John and his caregivers to provide teaching re: immunosuppressant medications.  Reviewed medication section of the Kidney Transplant Education book that was provided.  Emphasized the importance of compliance, role of the blue medication card, concerns for drug interactions, and process of obtaining refills.  Counseled regarding Prograf, Cellcept , prednisone, including directions for use, monitoring, how to handle missed doses, and side effects.  Father John verbalized understanding and had the opportunity to ask questions.

## 2024-03-01 NOTE — PHYSICIAN QUERY
PT Name: Mark Lopez  MR #: [id number]    DOCUMENTATION CLARIFICATION      CDS/: Holly CheneyRN               Contact information: [email or web address]    This form is a permanent document in the medical record.      Query Date: 2024    By submitting this query, we are merely seeking further clarification of documentation. Please utilize your independent clinical judgment when addressing the question(s) below.    The Medical Record contains the following:   Indicators  Supporting Clinical Findings Location in Medical Record   x Anemia documented PMH:  Anemia    Acute on chronic anemia  - H/H stable post-op. Expected to improve. No overt signs of bleeding  - cont to trend cbc. Monitor   H&P   ID Consult     TK PN    x H&H  24 20:27 24 07:12 24 11:12 24 15:40 24 20:52 24 00:15 24 03:37   Hemoglobin 9.0 (L)  9.0 (L) 8.7 (L) 7.3 (L) 7.2 (L) 7.1 (L)   Hematocrit 27.5 (L) 29.6 (L) 27.9 (L) 27.4 (L) 22.4 (L) 21.5 (L) 22.1 (L)    Labs -3/1             x BP                    HR Pulse: 64   BP: 132/70     Pulse:  [] 97  BP: ()/(54-70) 97/54 H&P       TK PN         Bleeding     x Procedure/Surgery Performed/EBL Pre-operative Diagnosis:    ESRD, requiring chronic dialysis secondary to Diabetes Mellitus - Type II  Post-operative Diagnosis:    Same  Procedure(s) Performed:    Back Table Preparation of Right Kidney    Donation after Brain Death Kidney transplant  Estimated Blood Loss:    200 mL   Op Note     Transfusion(s)     x Acute/Chronic illness Mark Lopez is a 64 year old male with a history of ESRD secondary to presumed diabetic nephropathy. Patient is currently on peritoneal dialysis started on 2023 (initially on HD). Patient is dialyzing on cyclic peritoneal dialysis.    Status post -donor kidney transplantation  Acute on chronic anemia  At risk for opportunistic infections  Long-term use of  immunosuppressant medication  Prophylactic immunotherapy  Essential hypertension  Type 2 diabetes mellitus with kidney complication, with long-term current use of insulin   TK PN 2/29    Treatments      Other         Provider, please further specify the Anemia diagnosis associated with above clinical findings.   [   ] Acute blood loss anemia      [   ] Acute blood loss anemia expected post-operatively      [  X ] Anemia of chronic kidney disease (please specify stage): ___5__________________     [   ] Anemia due to chronic disease (please specify): ______________________     [   ] Other Hematological Diagnosis (please specify): _____________________       Please document in your progress notes daily for the duration of treatment, until resolved, and include in your discharge summary.    Form No. [id number]

## 2024-03-01 NOTE — PROGRESS NOTES
[facility]plant Step[facility]  Kidney Transplant  Progress Note      Reason for Follow-up: Reassessment of Kidney Transplant - 2024  (#1) recipient and management of immunosuppression.    ORGAN:  RIGHT KIDNEY   Donor Type:  Donation after Brain Death   Donor CMV Status: Positive  Donor HBcAB:Negative  Donor HCV Status:Negative  Donor HBV RAHEEM:   Donor HCV RAHEEM: Negative      Subjective:   History of Present Illness:  Mark Lopez is a 64 year old male with a history of ESRD secondary to presumed diabetic nephropathy. Patient is currently on peritoneal dialysis started on 2023 (initially on HD). Patient is dialyzing on cyclic peritoneal dialysis. Patient reports that he is tolerating dialysis well. He has a PD catheter, permacath, and RUE AVF for dialysis access. Patient also with insulin pump. He now presents as primary candidate for a  donor kidney transplant. OR time set for 24 at 0800 AM. Pre-op labs and imaging ordered and will be reviewed prior to transplant. Will receive Thymoglobulin for induction.              Mr. Lopez is a 64 y.o. year old male who is status post Kidney Transplant - 2024  (#1).  His maintenance immunosuppression consists of:   Immunosuppressants (From admission, onward)      Start     Stop Route Frequency Ordered    24 0900  antithymocyte globulin (rabbit) 100 mg, hydrocortisone sodium succinate (SOLU-CORTEF) 20 mg in sodium chloride 0.9% 500 mL (FOR PERIPHERAL LINE ADMINISTRATION ONLY)  (IP TXP KIDNEY POST-OP THYMO WITH PERIPHERAL PRECHECKED)         24 0859 IV Daily 24 0651    24 1000  mycophenolate capsule 1,000 mg  (IP TXP KIDNEY POST-OP THYMO WITH PERIPHERAL PRECHECKED)         -- Oral 2 times daily 24 0651    24 0800  tacrolimus capsule 3 mg  (IP TXP KIDNEY POST-OP THYMO WITH PERIPHERAL PRECHECKED)         -- Oral 2 times daily 24 0651            Hospital Course:  Patient now s/p DBD Ktx on 24 for presumed  diabetic nephropathy (donor RPR+, CMV D+, R-, CIT ~16.5hrs). Patient PD prior to surgery (prev HD, fistula not working), reports making some urine, ~1 cup/day. PD cath remains in place.  6 day cramer for difficult bladder anastomosis. JEAN-PIERRE x 1. CIT ~16.5 hrs.     Interval Hx: NAEON. Pt remains with minimal UOP despite IVF and lasix. Plan for HD today. Will transfuse 2u prbc with dialysis given decrease in H/H. Drain output 115 bloody drainage. Kidney US on POD 0 reviewed with surgeon. Stable. Holding BOP meds for hypotension. BP slowly improving but remains soft. PRN Midodrine with HD. Will receive PCN #1 for donor RPR+ today. Will plan for perm cath placement Monday- suspect outpatient HD needs post discharge.       Past Medical, Surgical, Family, and Social History:   Unchanged from H&P.    Scheduled Meds:   acetaminophen  650 mg Oral Q8H    acetaminophen  650 mg Oral Q24H    antithymocyte globulin (rabbit) 100 mg, hydrocortisone sodium succinate (SOLU-CORTEF) 20 mg in sodium chloride 0.9% 500 mL (FOR PERIPHERAL LINE ADMINISTRATION ONLY)  1.5 mg/kg (Adjusted) Intravenous Daily    bisacodyL  10 mg Oral QHS    calcitRIOL  0.5 mcg Oral Daily    diphenhydrAMINE  25 mg Oral Q24H    docusate sodium  100 mg Oral TID    famotidine  20 mg Oral QHS    heparin (porcine)  5,000 Units Subcutaneous Q8H    insulin aspart U-100  8 Units Subcutaneous TIDWM    latanoprost  1 drop Both Eyes QHS    levothyroxine  50 mcg Oral Before breakfast    [START ON 3/2/2024] methylPREDNISolone sodium succinate injection  125 mg Intravenous Once    methylPREDNISolone sodium succinate injection  250 mg Intravenous Once    multivitamin  1 tablet Oral Daily    mupirocin  1 g Nasal BID    mycophenolate  1,000 mg Oral BID    penicillin G benzathine  2.4 Million Units Intramuscular Weekly    [START ON 3/2/2024] predniSONE  20 mg Oral Daily    sevelamer carbonate  1,600 mg Oral TID WM    [START ON 3/10/2024] sulfamethoxazole-trimethoprim 400-80mg  1  tablet Oral Daily AM    tacrolimus  3 mg Oral BID    [START ON 3/10/2024] valGANciclovir  450 mg Oral Daily AM     Continuous Infusions:   glucagon (human recombinant)      insulin regular 1 units/mL infusion orderable (TRANSFER) 2 Units/hr (03/01/24 1031)     PRN Meds:0.9%  NaCl infusion (for blood administration), acetaminophen, dextrose 10%, dextrose 10%, dextrose 10%, dextrose 10%, diphenhydrAMINE, diphenhydrAMINE, EPINEPHrine (PF), EPINEPHrine (PF), glucagon (human recombinant), glucagon (human recombinant), glucose, glucose, glucose, glucose, hydrocortisone sodium succinate, hydrocortisone sodium succinate, HYDROmorphone, insulin aspart U-100, midodrine, ondansetron, oxyCODONE, oxyCODONE-acetaminophen, sodium chloride 0.9%, sodium chloride 0.9%, traMADoL    Intake/Output - Last 3 Shifts         02/28 0700  02/29 0659 02/29 0700 03/01 0659 03/01 0700 03/02 0659    P.O. 0 897 180    I.V. (mL/kg) 1125 (12.1) 520.7 (5.3)     Blood   278    IV Piggyback 850 597.8     Total Intake(mL/kg) 1975 (21.2) 2015.5 (20.6) 458 (4.7)    Urine (mL/kg/hr) 145 316 (0.1) 150 (0.3)    Drains  115 15    Stool 0      Blood 200      Total Output 345 431 165    Net +1630 +1584.5 +293           Stool Occurrence 0 x               Review of Systems   Constitutional:  Negative for chills and fever.   Respiratory:  Negative for cough and shortness of breath.    Cardiovascular:  Negative for chest pain and leg swelling.   Gastrointestinal:  Positive for abdominal pain (incisional). Negative for abdominal distention, constipation, diarrhea, nausea and vomiting.   Genitourinary:  Negative for decreased urine volume, difficulty urinating, dysuria and frequency.   Allergic/Immunologic: Positive for immunocompromised state.   Neurological:  Negative for headaches.   Psychiatric/Behavioral:  Negative for agitation, confusion and decreased concentration. The patient is not nervous/anxious.       Objective:     Vital Signs (Most Recent):  Temp:  "97.8 °F (36.6 °C) (03/01/24 1130)  Pulse: 83 (03/01/24 1130)  Resp: 18 (03/01/24 1130)  BP: (!) 125/58 (03/01/24 1130)  SpO2: 95 % (03/01/24 1130) Vital Signs (24h Range):  Temp:  [97.6 °F (36.4 °C)-98.5 °F (36.9 °C)] 97.8 °F (36.6 °C)  Pulse:  [72-94] 83  Resp:  [12-18] 18  SpO2:  [91 %-99 %] 95 %  BP: ()/(49-62) 125/58     Weight: 97.5 kg (215 lb)  Height: 5' 4" (162.6 cm)  Body mass index is 36.9 kg/m².     Physical Exam  Vitals and nursing note reviewed.   Constitutional:       General: He is not in acute distress.     Appearance: He is well-developed. He is not diaphoretic.   Cardiovascular:      Rate and Rhythm: Normal rate and regular rhythm.      Heart sounds: No murmur heard.     No friction rub.   Pulmonary:      Breath sounds: Normal breath sounds. No wheezing or rales.   Abdominal:      General: There is no distension.      Palpations: Abdomen is soft.      Tenderness: There is abdominal tenderness. There is no guarding or rebound.      Comments: RLQ incision  JEAN-PIERRE x 1- bloody drainage   Genitourinary:     Comments: Curry to gravity- minimal drainage   Musculoskeletal:      Right lower leg: Edema present.      Left lower leg: Edema present.   Skin:     General: Skin is warm and dry.   Neurological:      Mental Status: He is alert and oriented to person, place, and time.   Psychiatric:         Mood and Affect: Mood normal.         Behavior: Behavior normal.         Thought Content: Thought content normal.          Laboratory:  CBC:   Recent Labs   Lab 02/29/24 2052 03/01/24  0015 03/01/24  0337   WBC 22.53* 19.79* 18.74*   RBC 2.31* 2.27* 2.30*   HGB 7.3* 7.2* 7.1*   HCT 22.4* 21.5* 22.1*    251 233   MCV 97 95 96   MCH 31.6* 31.7* 30.9   MCHC 32.6 33.5 32.1     CMP:   Recent Labs   Lab 02/28/24 2027 02/29/24  0712 02/29/24  1540 02/29/24 2052 03/01/24  0015 03/01/24  0337   GLU 98   < > 237* 279*  --  176*   CALCIUM 10.0   < > 8.7 7.9*  --  8.3*   ALBUMIN 3.2*   < > 2.5* 2.1*  --  2.3* "   PROT 7.0  --   --   --   --   --       < > 135* 132*  --  132*   K 4.1   < > 5.0 4.7 4.8 4.5   CO2 26   < > 16* 19*  --  19*   CL 99   < > 97 94*  --  93*   BUN 99*   < > 105* 112*  --  117*   CREATININE 12.7*   < > 13.0* 13.2*  --  13.7*   ALKPHOS 44*  --   --   --   --   --    ALT 20  --   --   --   --   --    AST 21  --   --   --   --   --     < > = values in this interval not displayed.     Labs within the past 24 hours have been reviewed.    Diagnostic Results:  US - Kidney: Results for orders placed during the hospital encounter of 24    US Transplant Kidney With Doppler    Narrative  EXAMINATION:  US TRANSPLANT KIDNEY WITH DOPPLER    CLINICAL HISTORY:  s/p kidney txp with decreased UOP and rising Cr;    TECHNIQUE:  Real time gray scale and doppler ultrasound was performed over the patient's renal allograft.    COMPARISON:  None    FINDINGS:  Renal allograft in the right lower quadrant.  The allograft measures 10.9 cm. Normal perfusion. No hydronephrosis.  Stent is present.    Fluid collection medial to the allograft in the subcutaneous tissue, likely hematoma measuring 3.9 x 2.2 x 7.8 cm.    Vasculature:    Resistive indices ranged from 0.73 to 0.80.    Main renal artery peak systolic velocity: 264cm/sec with normal waveform.    Renal artery/iliac ratio: 1.8.    The main renal vein is patent with a velocity of 187 cm/sec.    Impression  Satisfactory gray scale and doppler evaluation of renal allograft with intraparenchymal resistive indices at the upper limits of normal to slightly elevated.    Small fluid collection in the subcutaneous tissue, likely hematoma.      Electronically signed by: Grabiel Godoy MD  Date:    2024  Time:    16:59  Assessment/Plan:     * Status post -donor kidney transplantation  - DBD Ktx on  for diabetic nephropathy.   - 6 day cramer (difficult bladder anastomosis), 1 JEAN-PIERRE drain  - minimal UOP (~25cc/hr). No response to IV Lasix 100mg post-op  - 500cc  "bolus given for soft pressures.  - Kidney US obtained POD 0- reviewed with surgeon  - Plan for HD today      Delayed graft function of kidney  - expected 2/2 prolonged CIT  - Plan for HD 3/1  - prev PD, will likely need perm cath placement Monday for anticipated outpatient HD needs.      Positive RPR test in cadaveric donor  - Per ID recs, plan for PCN G x 3 weekly  - dose #1 on 3/1      Acute on chronic anemia  - H/H decreased. Plan to transfuse 2u prbc on 3/1 with dialysis  - cont to trend cbc. Monitor        At risk for opportunistic infections  - cont OI prophylaxis per protocol.      Long-term use of immunosuppressant medication  - see "prophylactic immuno"      Prophylactic immunotherapy  - Continue Cellcept and Prograf. Will monitor for signs of toxic side effects, check daily tacrolimus troughs, and change meds accordingly.       Essential hypertension  - hold home meds due to hypotension       Type 2 diabetes mellitus with kidney complication, with long-term current use of insulin  - endocrine consulted; appreciate assistance           Discharge Planning: not current candidate   Medical decision making for this encounter includes review of pertinent labs and diagnostic studies, assessment and planning, discussions with consulting providers, discussion with patient/family, and participation in multidisciplinary rounds. Time spent caring for patient: 60 minutes    Lilly La PA-C  Kidney Transplant  [facility] - [facility]  "

## 2024-03-01 NOTE — PROGRESS NOTES
03/01/24 1710   Trialysis (Dialysis) Catheter 02/29/24 right internal jugular   Placement Date: 02/29/24   Location: right internal jugular   Site Assessment No drainage   Line Securement Device Secured with sutureless device   Dressing Type CHG impregnated dressing/sponge   Dressing Status Clean;Dry;Intact   Date on Dressing 02/29/24   Dressing Due to be Changed 03/07/24   Venous Patency/Care flushed w/o difficulty;normal saline locked   Arterial Patency/Care flushed w/o difficulty;normal saline locked   During Hemodialysis Assessment   Blood Flow Rate (mL/min) 350 mL/min   Dialysate Flow Rate (mL/min) 500 ml/min   Ultrafiltration Rate (mL/Hr) 0 mL/Hr   Arteriovenous Lines Secure Yes   Arterial Pressure (mmHg) -120 mmHg   Venous Pressure (mmHg) 80   UF Removed (mL) 560 mL   TMP 40   Venous Line in Air Detector Yes   Intra-Hemodialysis Comments HD completed   Post-Hemodialysis Assessment   Rinseback Volume (mL) 250 mL   Blood Volume Processed (Liters) 59.9 L   Dialyzer Clearance Moderately streaked   Duration of Treatment 180 minutes   Total UF (mL) 560 mL   Net Fluid Removal 0   Patient Response to Treatment Tolerated well     Patient left COREY by stretcher NAD.

## 2024-03-01 NOTE — PROGRESS NOTES
03/01/24 1358 03/01/24 1408   Trialysis (Dialysis) Catheter 02/29/24 right internal jugular   Placement Date: 02/29/24   Location: right internal jugular   Site Assessment No drainage  --    Dressing Type CHG impregnated dressing/sponge  --    Dressing Status Clean;Dry;Intact  --    Date on Dressing 02/29/24  --    Dressing Due to be Changed 03/07/24  --    Venous Patency/Care accessed;blood return present;flushed w/o difficulty  --    Arterial Patency/Care accessed;blood return present;flushed w/o difficulty  --    During Hemodialysis Assessment   Blood Flow Rate (mL/min)  --  350 mL/min   Dialysate Flow Rate (mL/min)  --  500 ml/min   Ultrafiltration Rate (mL/Hr)  --  720 mL/Hr   Arteriovenous Lines Secure  --  Yes   Arterial Pressure (mmHg)  --  -120 mmHg   Venous Pressure (mmHg)  --  80   Blood Volume Processed (Liters)  --  0 L   UF Removed (mL)  --  0 mL   TMP  --  40   Venous Line in Air Detector  --  Yes   Intake (mL)  --  250 mL   Intra-Hemodialysis Comments  --  HD started     Patient arrived clotilde by stretcher. HD started via right IJ CVC. (PD Patient)

## 2024-03-01 NOTE — SUBJECTIVE & OBJECTIVE
"Interval HPI:  No acute events overnight. Patient in room 77268/44359 A. Blood glucose stable. BG above goal on current insulin regimen (Transition Insulin Drip). Steroid use- Methylprednisolone  250 mg QD.   1 Day Post-Op  Renal function- Abnormal - 13.7 Cr    Vasopressors-  None     Diet diabetic Renal; 2000 Calorie; Standard Tray  Diet NPO Except for: Sips with Medication     Eatin%  Nausea: No  Hypoglycemia and intervention: No  Fever: No  TPN and/or TF: No      BP (!) 120/59   Pulse 78   Temp 98 °F (36.7 °C) (Oral)   Resp 18   Ht 5' 4" (1.626 m)   Wt 97.5 kg (215 lb)   SpO2 97%   BMI 36.90 kg/m²     Labs Reviewed and Include    Recent Labs   Lab 24  0337   *   CALCIUM 8.3*   ALBUMIN 2.3*   *   K 4.5   CO2 19*   CL 93*   *   CREATININE 13.7*     Lab Results   Component Value Date    WBC 18.74 (H) 2024    HGB 7.1 (L) 2024    HCT 22.1 (L) 2024    MCV 96 2024     2024     No results for input(s): "TSH", "FREET4" in the last 168 hours.  Lab Results   Component Value Date    HGBA1C 5.8 (H) 2024       Nutritional status:   Body mass index is 36.9 kg/m².  Lab Results   Component Value Date    ALBUMIN 2.3 (L) 2024    ALBUMIN 2.1 (L) 2024    ALBUMIN 2.5 (L) 2024     No results found for: "PREALBUMIN"    Estimated Creatinine Clearance: 5.7 mL/min (A) (based on SCr of 13.7 mg/dL (H)).    Accu-Checks  Recent Labs     24  2118 24  0000 24  0708 24  1223 24  1537 24  1734 24  2042 24  0205 24  0806 24  1303   POCTGLUCOSE 97 90 269* 309* 277* 316* 304* 271* 226* 258*       Current Medications and/or Treatments Impacting Glycemic Control  Immunotherapy:    Immunosuppressants           Stop Route Frequency     tacrolimus capsule 5 mg         -- Oral 2 times daily     antithymocyte globulin (rabbit) 100 mg, hydrocortisone sodium succinate (SOLU-CORTEF) 20 mg in sodium " chloride 0.9% 500 mL (FOR PERIPHERAL LINE ADMINISTRATION ONLY)         03/03/24 0859 IV Daily     mycophenolate capsule 1,000 mg         -- Oral 2 times daily          Steroids:   Hormones (From admission, onward)      Start     Stop Route Frequency Ordered    03/02/24 0900  predniSONE tablet 20 mg         -- Oral Daily 02/28/24 2058 03/02/24 0800  methylPREDNISolone sodium succinate injection 125 mg  (IP TXP KIDNEY POST-OP THYMO WITH PERIPHERAL PRECHECKED)         -- IV Once 02/29/24 0651    03/01/24 0900  antithymocyte globulin (rabbit) 100 mg, hydrocortisone sodium succinate (SOLU-CORTEF) 20 mg in sodium chloride 0.9% 500 mL (FOR PERIPHERAL LINE ADMINISTRATION ONLY)  (IP TXP KIDNEY POST-OP THYMO WITH PERIPHERAL PRECHECKED)         03/03/24 0859 IV Daily 02/29/24 0651    03/01/24 0800  methylPREDNISolone sodium succinate injection 250 mg  (IP TXP KIDNEY POST-OP THYMO WITH PERIPHERAL PRECHECKED)         -- IV Once 02/29/24 0651    02/29/24 0750  hydrocortisone sodium succinate injection 100 mg  (IP TXP KIDNEY POST-OP THYMO WITH PERIPHERAL PRECHECKED)         07/27/35 2350 IV Once as needed 02/29/24 0651    02/28/24 2144  hydrocortisone sodium succinate injection 100 mg         07/27/35 1344 IV Once as needed 02/28/24 2058          Pressors:    Autonomic Drugs (From admission, onward)      Start     Stop Route Frequency Ordered    03/01/24 1052  midodrine tablet 5 mg         -- Oral 3 times daily PRN 03/01/24 0953    02/29/24 0750  EPINEPHrine (PF) injection 1 mg  (IP TXP KIDNEY POST-OP THYMO WITH PERIPHERAL PRECHECKED)         07/27/35 2350 SubQ Once as needed 02/29/24 0651    02/28/24 2144  EPINEPHrine (PF) injection 1 mg         07/27/35 1344 SubQ Once as needed 02/28/24 2058          Hyperglycemia/Diabetes Medications:   Antihyperglycemics (From admission, onward)      Start     Stop Route Frequency Ordered    03/01/24 1245  insulin aspart U-100 pen 7-10 Units         -- SubQ 3 times daily with meals 03/01/24  1230    03/01/24 0830  insulin regular in 0.9 % NaCl 100 unit/100 mL (1 unit/mL) infusion        Question:  Enter initial dose (Units/hr):  Answer:  2    -- IV Continuous 03/01/24 0821    02/29/24 1328  insulin aspart U-100 pen 0-10 Units         -- SubQ Before meals, nightly and at 0200 PRN 02/29/24 1228

## 2024-03-01 NOTE — ASSESSMENT & PLAN NOTE
BG goal: 140-180    - Increase insulin transition drip to 2.0 u/h with stepdown parameters.   - Start Novolog 7-10 units TIDWM (Administer    7    units if patient eats 25-50% of meal, administer   10   units if patient eats > 50% of meal)  - MDC (150/25) prn /hs/0200   - POCT Glucose before meals, at bedtime and at 2 am  - Hypoglycemia protocol in place      ** Please notify Endocrine for any change and/or advance in diet**  ** Please call Endocrine for any BG related issues **     Discharge Planning:   TBD. Please notify endocrinology prior to discharge.

## 2024-03-01 NOTE — PLAN OF CARE
Pt is POD # 0 for Kidney transplant. He has mostly sleep in between care. More awake this afternoon. Bowel sounds are hypoactive. Pt insisting he has to have BM. Ambulated to bathroom x2 with assist. Curry intact draining minimal urine. Checking labs frequently. Next check at 8pm. He ate about 25% of dinner and drank a boost. Endocrine following for blood sugar control. Started on transitional insulin gtt at 1.2u/hr. he verbalized understanding of plan of care.

## 2024-03-01 NOTE — PLAN OF CARE
Pt AAOx4. BP 90s-110s. BS @2100 304, covered with 8 units. BS @0200 271, covered with 6units. Insulin Regular increased to 1.5units/hr. D/C NS. D/C D5 1/2. Cr 13.2, H&H trending down. HD scheduled for today. Echo scheduled for today for fluid overload. Kidney US revealed small fluid collection, possible hematoma site assessed throughout shift no signs of bruising or bleeding. Self meds ready. IS teaching taught with demonstration. Bed locked in lowest position, side rails up, non skid socks on, and call light within reach.

## 2024-03-01 NOTE — ASSESSMENT & PLAN NOTE
- DBD Ktx on 2/29 for diabetic nephropathy.   - 6 day cramer (difficult bladder anastomosis), 1 JEAN-PIERRE drain  - minimal UOP (~25cc/hr). No response to IV Lasix 100mg post-op  - 500cc bolus given for soft pressures.  - Kidney US obtained POD 0- reviewed with surgeon  - Plan for HD today

## 2024-03-01 NOTE — CONSULTS
Tunneled Catheter Placement Consult Note  Interventional Radiology      Date: 3/1/2024   Primary team: [facility] [place] [facility]ANSPLANT SURGERY, Pino Law Jr., *   Room/bed: 23587/47133 A    Inpatient consult to Interventional Radiology  Consult performed by: Chanelle Rosales PA-C  Consult ordered by: Lilly La PA-C         SUBJECTIVE:     Chief Complaint:  need for HD    History of Present Illness:  Mark Lopez is a 64 y.o. male with a past medical history of ESRD previously on PD who was admitted on 2/28/24 for kidney transplant. Pt had a kidney transplant on 2/29/2024.      Interventional Radiology has been consulted for tunneled catheter placement for dialysis.     WBC is 18, patient is afebrile, and no blood cultures obtained this admission. The pt is hemodynamically stable.     Review of Systems   Constitutional: Negative.    HENT: Negative.     Respiratory: Negative.     Cardiovascular: Negative.    Gastrointestinal: Negative.    Musculoskeletal: Negative.    Skin: Negative.    Neurological: Negative.    Psychiatric/Behavioral: Negative.          Scheduled Meds:   acetaminophen  650 mg Oral Q8H    acetaminophen  650 mg Oral Q24H    antithymocyte globulin (rabbit) 100 mg, hydrocortisone sodium succinate (SOLU-CORTEF) 20 mg in sodium chloride 0.9% 500 mL (FOR PERIPHERAL LINE ADMINISTRATION ONLY)  1.5 mg/kg (Adjusted) Intravenous Daily    bisacodyL  10 mg Oral QHS    calcitRIOL  0.5 mcg Oral Daily    diphenhydrAMINE  25 mg Oral Q24H    docusate sodium  100 mg Oral TID    famotidine  20 mg Oral QHS    heparin (porcine)  5,000 Units Subcutaneous Q8H    insulin aspart U-100  7-10 Units Subcutaneous TIDWM    latanoprost  1 drop Both Eyes QHS    levothyroxine  50 mcg Oral Before breakfast    [START ON 3/2/2024] methylPREDNISolone sodium succinate injection  125 mg Intravenous Once    methylPREDNISolone sodium succinate injection  250 mg Intravenous Once    multivitamin  1 tablet Oral Daily     mupirocin  1 g Nasal BID    mycophenolate  1,000 mg Oral BID    penicillin G benzathine  2.4 Million Units Intramuscular Weekly    [START ON 3/2/2024] predniSONE  20 mg Oral Daily    sevelamer carbonate  1,600 mg Oral TID WM    [START ON 3/10/2024] sulfamethoxazole-trimethoprim 400-80mg  1 tablet Oral Daily AM    tacrolimus  5 mg Oral BID    [START ON 3/10/2024] valGANciclovir  450 mg Oral Daily AM     Continuous Infusions:   glucagon (human recombinant)      insulin regular 1 units/mL infusion orderable (TRANSFER) 2 Units/hr (03/01/24 1031)     PRN Meds:0.9%  NaCl infusion (for blood administration), acetaminophen, dextrose 10%, dextrose 10%, dextrose 10%, dextrose 10%, diphenhydrAMINE, diphenhydrAMINE, EPINEPHrine (PF), EPINEPHrine (PF), glucagon (human recombinant), glucagon (human recombinant), glucose, glucose, glucose, glucose, hydrocortisone sodium succinate, hydrocortisone sodium succinate, HYDROmorphone, insulin aspart U-100, midodrine, ondansetron, oxyCODONE, oxyCODONE-acetaminophen, sodium chloride 0.9%, sodium chloride 0.9%, traMADoL    Review of patient's allergies indicates:   Allergen Reactions    Allopurinol analogues Swelling    Latex, natural rubber     Statins-hmg-coa reductase inhibitors Other (See Comments)     Muscle ache    Adhesive Rash       Past Medical History:   Diagnosis Date    Anemia     Cataract     Diabetes mellitus     Diabetes mellitus, type 2     Glaucoma     Gout, unspecified     HLD (hyperlipidemia)     Hypertension     Hypothyroidism, unspecified     Kidney failure     Renal disorder      Past Surgical History:   Procedure Laterality Date    COLONOSCOPY N/A 05/18/2022    Procedure: COLONOSCOPY;  Surgeon: Raul Dyer MD;  Location: [facility];  Service: Endoscopy;  Laterality: N/A;    DENTAL SURGERY      EYE SURGERY Bilateral     Cataract    INSERTION, CATHETER, DIALYSIS, PERITONEAL, LAPAROSCOPIC N/A 06/20/2023    Procedure: INSERTION, CATHETER, DIALYSIS, PERITONEAL,  LAPAROSCOPIC;  Surgeon: Carlos Alberto Rodgers MD;  Location: [facility] OR;  Service: General;  Laterality: N/A;    KIDNEY TRANSPLANT N/A 2/29/2024    Procedure: TRANSPLANT, KIDNEY;  Surgeon: Pino Law Jr., MD;  [facility];  Service: Transplant;  Laterality: N/A;    KNEE SURGERY Right      Family History   Problem Relation Age of Onset    Hypertension Father     Diabetes Father     Heart disease Father     Cancer Sister         MM    Diabetes Brother     Kidney disease Brother      Social History     Tobacco Use    Smoking status: Never    Smokeless tobacco: Never   Substance Use Topics    Alcohol use: Never    Drug use: Never       OBJECTIVE:     Vital Signs (Most Recent)  Temp: 98.1 °F (36.7 °C) (03/01/24 1245)  Pulse: 93 (03/01/24 1245)  Resp: 18 (03/01/24 1245)  BP: 128/60 (03/01/24 1245)  SpO2: 99 % (03/01/24 1245)    Physical Exam:   Physical Exam  Constitutional:       General: He is not in acute distress.     Appearance: He is obese.   HENT:      Head: Normocephalic.   Cardiovascular:      Rate and Rhythm: Normal rate and regular rhythm.   Pulmonary:      Effort: Pulmonary effort is normal.   Abdominal:      General: Abdomen is flat.   Neurological:      Mental Status: He is alert and oriented to person, place, and time. Mental status is at baseline.   Psychiatric:         Mood and Affect: Mood normal.         Behavior: Behavior normal.         Laboratory  Lab Results   Component Value Date    INR 1.0 02/28/2024       Lab Results   Component Value Date    WBC 18.74 (H) 03/01/2024    HGB 7.1 (L) 03/01/2024    HCT 22.1 (L) 03/01/2024    MCV 96 03/01/2024     03/01/2024      Lab Results   Component Value Date     (H) 03/01/2024     (L) 03/01/2024    K 4.5 03/01/2024    CL 93 (L) 03/01/2024    CO2 19 (L) 03/01/2024     (H) 03/01/2024    CREATININE 13.7 (H) 03/01/2024    CALCIUM 8.3 (L) 03/01/2024    MG 2.3 03/01/2024    ALT 20 02/28/2024    AST 21 02/28/2024     ALBUMIN 2.3 (L) 03/01/2024    BILITOT 0.3 02/28/2024    BILIDIR 0.1 09/13/2023       ASA/Mallampati  ASA: 3  Mallampati: 2      ASSESSMENT/PLAN:     Assessment:  64 y.o. male with a past medical history of kidney transplant 2/29/2024 who has been referred to IR for tunneled catheter placement for HD. He was previously on PD. Transplant recommending a TDC given recent transplant.     Plan:  Recommend obtaining blood cultures given leukotycosis. If blood cultures remain negative x 48 hours, can proceed with tunneled catheter placement for HD on 3/4/24.   Sedation plan: up to moderate  Anticoagulation history reviewed.   Coagulation labs reviewed.  Thank you for the consult. Please contact with questions via [employer] secure chat.    Chanelle Rosales PA-C  Interventional Radiology  3/1/2024

## 2024-03-01 NOTE — ASSESSMENT & PLAN NOTE
- expected 2/2 prolonged CIT  - Plan for HD 3/1  - prev PD, will likely need perm cath placement Monday for anticipated outpatient HD needs.

## 2024-03-01 NOTE — PROGRESS NOTES
Admit Note     Met with patient to assess needs. Patient is a 64 y.o. single male, admitted for for kidney transplant.      Patient admitted from home on 2/28/2024 .  At this time, patient presents as alert and oriented x 4, pleasant, good eye contact, well groomed, recall good, concentration/judgement good, average intelligence, calm, communicative, cooperative, and asking and answering questions appropriately.  At this time, patients caregiver presents as  not present at time of SW visit .    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 at [address] [place] [address].  Support system includes pt's friend / [employer] superior Fr. Jonathan Rose, O.P. and pt's brother Pino Lopez ([phone]) in [place], [place].  Patient does not have dependents that are need of being cared for.     Patients primary caregiver is Fr. Jonathan Rose, patients friend and [employer] superior , phone number [phone].  Confirmed patients contact information is [phone] (home);   Telephone Information:   Mobile [phone]   .    During admission, patient's caregiver plans to stay at home.  Confirmed patient and patients caregivers do have access to reliable transportation.    Cognitive Status/Learning     Patient reports reading ability as college and states patient does not have difficulty with N/A.  Patient reports patient learns best by written information.   Needed: No.   Highest education level: Post-College Graduate Degree    Vocation/Disability   .  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    Adherence     Patient reports a high level of adherence to patients health care regimen.  Adherence counseling and education provided. Patient verbalizes  understanding.    Substance Use    Patient reports the following substance usage.    Tobacco: none, patient denies any use.  Alcohol: none, patient denies any use.  Illicit Drugs/Non-prescribed Medications: none, patient denies any use.  Patient states clear understanding of the potential impact of substance use.  Substance abstinence/cessation counseling, education and resources provided and reviewed.     Services Utilizing/ADLS    Infusion Service: Prior to admission, patient utilizing? no  Home Health: Prior to admission, patient utilizing? no  DME: Prior to admission, no  Pulmonary/Cardiac Rehab: Prior to admission, no  Dialysis:  Prior to admission, yes - PD prior to transplant  Transplant Specialty Pharmacy:  Prior to admission, no; patient provides permission to release necessary information to specialty pharmacy for medications post-tranplant. Resources provided and patient is choosing [facility] pharmacy at this time.    Prior to admission, patient reports patient was independent with ADLS and was driving.  Patient reports patient is not able to care for self at this time due to compromised medical condition (as documented in medical record) and physical weakness..  Patient indicates a willingness to care for self once medically cleared to do so.    Insurance/Medications    Insured by   Payer/Plan Subscr  Sex Relation Sub. Ins. ID Effective Group Num   1. MEDICARE - ME* KRISTIE MAURER 1960 Male Self [id number]                                    [address]   2. Synchris CROSS * KRISTIE MAURER 1960 Male Self [id number]                                   [address] [address]      Primary Insurance (for UNOS reporting): Public Insurance - Medicare FFS (Fee For Service) - A, B, & D ([facility] Part D)  Secondary Insurance (for UNOS reporting): None    Patient reports patient is able to obtain and afford medications at this time and at time of discharge.    Living Will/Healthcare  Power of     Patient states patient has a LW and/or HCPA.  Patient states [facility] ([employer] Order - [phone]) designated as HCPA in his documents.  SW strongly encouraged pt to provide said documents to [facility] HIM office for entering into EMR.   provided education regarding LW and HCPA and the completion of forms.    Coping/Mental Health    Patient is coping adequately with the aid of  family members, friends, and bib.  Patient denies mental health difficulties.     Discharge Planning    At time of discharge, patient plans to return to patient's home under the care of friend / [employer] superior Fr. Jonathan Rose.  Patients friend will transport patient.  Per rounds today, expected discharge date has not been medically determined at this time. Patient verbalizes understanding and is involved in treatment planning and discharge process.    SW discussed with patient, per KTS rounds, possibility of pt needing an OP HD chair to be made available by time of hospital discharge due to potential for DGF.  Pt verbalized understanding and agreement regarding same.    Additional Concerns    Patient is being followed for needs, education, resources, information, emotional support, supportive counseling, and for supportive and skilled discharge plan of care.  providing ongoing psychosocial support, education, resources and d/c planning as needed.  SW remains available.  remains available. Patient denies additional needs and/or concerns at this time. Patient verbalizes understanding and agreement with information reviewed, social work availability, and how to access available resources as needed.

## 2024-03-01 NOTE — SUBJECTIVE & OBJECTIVE
Subjective:   History of Present Illness:  Mark Lopez is a 64 year old male with a history of ESRD secondary to presumed diabetic nephropathy. Patient is currently on peritoneal dialysis started on 2023 (initially on HD). Patient is dialyzing on cyclic peritoneal dialysis. Patient reports that he is tolerating dialysis well. He has a PD catheter, permacath, and RUE AVF for dialysis access. Patient also with insulin pump. He now presents as primary candidate for a  donor kidney transplant. OR time set for 24 at 0800 AM. Pre-op labs and imaging ordered and will be reviewed prior to transplant. Will receive Thymoglobulin for induction.              Mr. Lopez is a 64 y.o. year old male who is status post Kidney Transplant - 2024  (#1).  His maintenance immunosuppression consists of:   Immunosuppressants (From admission, onward)      Start     Stop Route Frequency Ordered    24 0900  antithymocyte globulin (rabbit) 100 mg, hydrocortisone sodium succinate (SOLU-CORTEF) 20 mg in sodium chloride 0.9% 500 mL (FOR PERIPHERAL LINE ADMINISTRATION ONLY)  (IP TXP KIDNEY POST-OP THYMO WITH PERIPHERAL PRECHECKED)         24 0859 IV Daily 24 0651    24 1000  mycophenolate capsule 1,000 mg  (IP TXP KIDNEY POST-OP THYMO WITH PERIPHERAL PRECHECKED)         -- Oral 2 times daily 24 0651    24 0800  tacrolimus capsule 3 mg  (IP TXP KIDNEY POST-OP THYMO WITH PERIPHERAL PRECHECKED)         -- Oral 2 times daily 24 0651            Hospital Course:  Patient now s/p DBD Ktx on 24 for presumed diabetic nephropathy (donor RPR+, CMV D+, R-, CIT ~16.5hrs). Patient PD prior to surgery (prev HD, fistula not working), reports making some urine, ~1 cup/day. PD cath remains in place.  6 day cramer for difficult bladder anastomosis. JEAN-PIERRE x 1. CIT ~16.5 hrs.     Interval Hx: NAEON. Pt remains with minimal UOP despite IVF and lasix. Plan for HD today. Will transfuse 2u prbc with dialysis  given decrease in H/H. Drain output 115 bloody drainage. Kidney US on POD 0 reviewed with surgeon. Stable. Holding BOP meds for hypotension. BP slowly improving but remains soft. PRN Midodrine with HD. Will receive PCN #1 for donor RPR+ today. Will plan for perm cath placement Monday- suspect outpatient HD needs post discharge.       Past Medical, Surgical, Family, and Social History:   Unchanged from H&P.    Scheduled Meds:   acetaminophen  650 mg Oral Q8H    acetaminophen  650 mg Oral Q24H    antithymocyte globulin (rabbit) 100 mg, hydrocortisone sodium succinate (SOLU-CORTEF) 20 mg in sodium chloride 0.9% 500 mL (FOR PERIPHERAL LINE ADMINISTRATION ONLY)  1.5 mg/kg (Adjusted) Intravenous Daily    bisacodyL  10 mg Oral QHS    calcitRIOL  0.5 mcg Oral Daily    diphenhydrAMINE  25 mg Oral Q24H    docusate sodium  100 mg Oral TID    famotidine  20 mg Oral QHS    heparin (porcine)  5,000 Units Subcutaneous Q8H    insulin aspart U-100  8 Units Subcutaneous TIDWM    latanoprost  1 drop Both Eyes QHS    levothyroxine  50 mcg Oral Before breakfast    [START ON 3/2/2024] methylPREDNISolone sodium succinate injection  125 mg Intravenous Once    methylPREDNISolone sodium succinate injection  250 mg Intravenous Once    multivitamin  1 tablet Oral Daily    mupirocin  1 g Nasal BID    mycophenolate  1,000 mg Oral BID    penicillin G benzathine  2.4 Million Units Intramuscular Weekly    [START ON 3/2/2024] predniSONE  20 mg Oral Daily    sevelamer carbonate  1,600 mg Oral TID WM    [START ON 3/10/2024] sulfamethoxazole-trimethoprim 400-80mg  1 tablet Oral Daily AM    tacrolimus  3 mg Oral BID    [START ON 3/10/2024] valGANciclovir  450 mg Oral Daily AM     Continuous Infusions:   glucagon (human recombinant)      insulin regular 1 units/mL infusion orderable (TRANSFER) 2 Units/hr (03/01/24 1031)     PRN Meds:0.9%  NaCl infusion (for blood administration), acetaminophen, dextrose 10%, dextrose 10%, dextrose 10%, dextrose 10%,  "diphenhydrAMINE, diphenhydrAMINE, EPINEPHrine (PF), EPINEPHrine (PF), glucagon (human recombinant), glucagon (human recombinant), glucose, glucose, glucose, glucose, hydrocortisone sodium succinate, hydrocortisone sodium succinate, HYDROmorphone, insulin aspart U-100, midodrine, ondansetron, oxyCODONE, oxyCODONE-acetaminophen, sodium chloride 0.9%, sodium chloride 0.9%, traMADoL    Intake/Output - Last 3 Shifts         02/28 0700 02/29 0659 02/29 0700 03/01 0659 03/01 0700 03/02 0659    P.O. 0 897 180    I.V. (mL/kg) 1125 (12.1) 520.7 (5.3)     Blood   278    IV Piggyback 850 597.8     Total Intake(mL/kg) 1975 (21.2) 2015.5 (20.6) 458 (4.7)    Urine (mL/kg/hr) 145 316 (0.1) 150 (0.3)    Drains  115 15    Stool 0      Blood 200      Total Output 345 431 165    Net +1630 +1584.5 +293           Stool Occurrence 0 x               Review of Systems   Constitutional:  Negative for chills and fever.   Respiratory:  Negative for cough and shortness of breath.    Cardiovascular:  Negative for chest pain and leg swelling.   Gastrointestinal:  Positive for abdominal pain (incisional). Negative for abdominal distention, constipation, diarrhea, nausea and vomiting.   Genitourinary:  Negative for decreased urine volume, difficulty urinating, dysuria and frequency.   Allergic/Immunologic: Positive for immunocompromised state.   Neurological:  Negative for headaches.   Psychiatric/Behavioral:  Negative for agitation, confusion and decreased concentration. The patient is not nervous/anxious.       Objective:     Vital Signs (Most Recent):  Temp: 97.8 °F (36.6 °C) (03/01/24 1130)  Pulse: 83 (03/01/24 1130)  Resp: 18 (03/01/24 1130)  BP: (!) 125/58 (03/01/24 1130)  SpO2: 95 % (03/01/24 1130) Vital Signs (24h Range):  Temp:  [97.6 °F (36.4 °C)-98.5 °F (36.9 °C)] 97.8 °F (36.6 °C)  Pulse:  [72-94] 83  Resp:  [12-18] 18  SpO2:  [91 %-99 %] 95 %  BP: ()/(49-62) 125/58     Weight: 97.5 kg (215 lb)  Height: 5' 4" (162.6 cm)  Body " mass index is 36.9 kg/m².     Physical Exam  Vitals and nursing note reviewed.   Constitutional:       General: He is not in acute distress.     Appearance: He is well-developed. He is not diaphoretic.   Cardiovascular:      Rate and Rhythm: Normal rate and regular rhythm.      Heart sounds: No murmur heard.     No friction rub.   Pulmonary:      Breath sounds: Normal breath sounds. No wheezing or rales.   Abdominal:      General: There is no distension.      Palpations: Abdomen is soft.      Tenderness: There is abdominal tenderness. There is no guarding or rebound.      Comments: RLQ incision  JEAN-PIERRE x 1- bloody drainage   Genitourinary:     Comments: Curry to gravity- minimal drainage   Musculoskeletal:      Right lower leg: Edema present.      Left lower leg: Edema present.   Skin:     General: Skin is warm and dry.   Neurological:      Mental Status: He is alert and oriented to person, place, and time.   Psychiatric:         Mood and Affect: Mood normal.         Behavior: Behavior normal.         Thought Content: Thought content normal.          Laboratory:  CBC:   Recent Labs   Lab 02/29/24 2052 03/01/24  0015 03/01/24  0337   WBC 22.53* 19.79* 18.74*   RBC 2.31* 2.27* 2.30*   HGB 7.3* 7.2* 7.1*   HCT 22.4* 21.5* 22.1*    251 233   MCV 97 95 96   MCH 31.6* 31.7* 30.9   MCHC 32.6 33.5 32.1     CMP:   Recent Labs   Lab 02/28/24 2027 02/29/24  0712 02/29/24  1540 02/29/24 2052 03/01/24  0015 03/01/24  0337   GLU 98   < > 237* 279*  --  176*   CALCIUM 10.0   < > 8.7 7.9*  --  8.3*   ALBUMIN 3.2*   < > 2.5* 2.1*  --  2.3*   PROT 7.0  --   --   --   --   --       < > 135* 132*  --  132*   K 4.1   < > 5.0 4.7 4.8 4.5   CO2 26   < > 16* 19*  --  19*   CL 99   < > 97 94*  --  93*   BUN 99*   < > 105* 112*  --  117*   CREATININE 12.7*   < > 13.0* 13.2*  --  13.7*   ALKPHOS 44*  --   --   --   --   --    ALT 20  --   --   --   --   --    AST 21  --   --   --   --   --     < > = values in this interval not  displayed.     Labs within the past 24 hours have been reviewed.    Diagnostic Results:  US - Kidney: Results for orders placed during the hospital encounter of 02/28/24    US Transplant Kidney With Doppler    Narrative  EXAMINATION:  US TRANSPLANT KIDNEY WITH DOPPLER    CLINICAL HISTORY:  s/p kidney txp with decreased UOP and rising Cr;    TECHNIQUE:  Real time gray scale and doppler ultrasound was performed over the patient's renal allograft.    COMPARISON:  None    FINDINGS:  Renal allograft in the right lower quadrant.  The allograft measures 10.9 cm. Normal perfusion. No hydronephrosis.  Stent is present.    Fluid collection medial to the allograft in the subcutaneous tissue, likely hematoma measuring 3.9 x 2.2 x 7.8 cm.    Vasculature:    Resistive indices ranged from 0.73 to 0.80.    Main renal artery peak systolic velocity: 264cm/sec with normal waveform.    Renal artery/iliac ratio: 1.8.    The main renal vein is patent with a velocity of 187 cm/sec.    Impression  Satisfactory gray scale and doppler evaluation of renal allograft with intraparenchymal resistive indices at the upper limits of normal to slightly elevated.    Small fluid collection in the subcutaneous tissue, likely hematoma.      Electronically signed by: Grabiel Godoy MD  Date:    02/29/2024  Time:    16:59

## 2024-03-01 NOTE — PLAN OF CARE
Patient is AAOx4. OOB with standby assist. VSS. Echo completed. Received HD today, 3 hrs, 0 off. He received 2 units RBC, tolerated well. Thymo #2 incomplete. Orders placed for 3/4/2024 NPO at midnight, IR consult for tunnel cath placement. JEAN-PIERRE site CDI. PD catheter in place, shannon wound CDI. Incision RLQ island boarder removed shannon wound CDI. Trialysis catheter secured, dressing CDI. Insulin Regular continuous infusion 2 units/hr. Bed lowest setting, locked, 2x rails up, call light within reach.

## 2024-03-02 LAB
ALBUMIN SERPL BCP-MCNC: 2.8 G/DL (ref 3.5–5.2)
ANION GAP SERPL CALC-SCNC: 16 MMOL/L (ref 8–16)
BASOPHILS # BLD AUTO: 0.01 K/UL (ref 0–0.2)
BASOPHILS NFR BLD: 0.1 % (ref 0–1.9)
BUN SERPL-MCNC: 73 MG/DL (ref 8–23)
CALCIUM SERPL-MCNC: 8.8 MG/DL (ref 8.7–10.5)
CHLORIDE SERPL-SCNC: 92 MMOL/L (ref 95–110)
CO2 SERPL-SCNC: 22 MMOL/L (ref 23–29)
CREAT SERPL-MCNC: 8.3 MG/DL (ref 0.5–1.4)
DIFFERENTIAL METHOD BLD: ABNORMAL
EOSINOPHIL # BLD AUTO: 0 K/UL (ref 0–0.5)
EOSINOPHIL NFR BLD: 0 % (ref 0–8)
ERYTHROCYTE [DISTWIDTH] IN BLOOD BY AUTOMATED COUNT: 15.7 % (ref 11.5–14.5)
EST. GFR  (NO RACE VARIABLE): 6.6 ML/MIN/1.73 M^2
GLUCOSE SERPL-MCNC: 228 MG/DL (ref 70–110)
HBV SURFACE AB SER QL IA: POSITIVE
HBV SURFACE AB SERPL IA-ACNC: 660 MIU/ML
HCT VFR BLD AUTO: 26.3 % (ref 40–54)
HGB BLD-MCNC: 9.1 G/DL (ref 14–18)
IMM GRANULOCYTES # BLD AUTO: 0.07 K/UL (ref 0–0.04)
IMM GRANULOCYTES NFR BLD AUTO: 0.7 % (ref 0–0.5)
LYMPHOCYTES # BLD AUTO: 0 K/UL (ref 1–4.8)
LYMPHOCYTES NFR BLD: 0.3 % (ref 18–48)
MAGNESIUM SERPL-MCNC: 2.5 MG/DL (ref 1.6–2.6)
MCH RBC QN AUTO: 31.6 PG (ref 27–31)
MCHC RBC AUTO-ENTMCNC: 34.6 G/DL (ref 32–36)
MCV RBC AUTO: 91 FL (ref 82–98)
MONOCYTES # BLD AUTO: 0.3 K/UL (ref 0.3–1)
MONOCYTES NFR BLD: 2.7 % (ref 4–15)
NEUTROPHILS # BLD AUTO: 9.7 K/UL (ref 1.8–7.7)
NEUTROPHILS NFR BLD: 96.2 % (ref 38–73)
NRBC BLD-RTO: 1 /100 WBC
PHOSPHATE SERPL-MCNC: 6.7 MG/DL (ref 2.7–4.5)
PLATELET # BLD AUTO: 166 K/UL (ref 150–450)
PMV BLD AUTO: 10.4 FL (ref 9.2–12.9)
POCT GLUCOSE: 189 MG/DL (ref 70–110)
POCT GLUCOSE: 218 MG/DL (ref 70–110)
POCT GLUCOSE: 233 MG/DL (ref 70–110)
POCT GLUCOSE: 277 MG/DL (ref 70–110)
POCT GLUCOSE: 307 MG/DL (ref 70–110)
POTASSIUM SERPL-SCNC: 4.4 MMOL/L (ref 3.5–5.1)
RBC # BLD AUTO: 2.88 M/UL (ref 4.6–6.2)
SODIUM SERPL-SCNC: 130 MMOL/L (ref 136–145)
TACROLIMUS BLD-MCNC: 5.1 NG/ML (ref 5–15)
WBC # BLD AUTO: 10.12 K/UL (ref 3.9–12.7)

## 2024-03-02 PROCEDURE — 25000003 PHARM REV CODE 250: Performed by: INTERNAL MEDICINE

## 2024-03-02 PROCEDURE — 85025 COMPLETE CBC W/AUTO DIFF WBC: CPT

## 2024-03-02 PROCEDURE — 80197 ASSAY OF TACROLIMUS: CPT

## 2024-03-02 PROCEDURE — 83735 ASSAY OF MAGNESIUM: CPT

## 2024-03-02 PROCEDURE — 90935 HEMODIALYSIS ONE EVALUATION: CPT

## 2024-03-02 PROCEDURE — 80069 RENAL FUNCTION PANEL: CPT

## 2024-03-02 PROCEDURE — 25000003 PHARM REV CODE 250: Performed by: PHYSICIAN ASSISTANT

## 2024-03-02 PROCEDURE — 99232 SBSQ HOSP IP/OBS MODERATE 35: CPT | Mod: ,,,

## 2024-03-02 PROCEDURE — 25000003 PHARM REV CODE 250

## 2024-03-02 PROCEDURE — 20600001 HC STEP DOWN PRIVATE ROOM

## 2024-03-02 PROCEDURE — 25000003 PHARM REV CODE 250: Performed by: NURSE PRACTITIONER

## 2024-03-02 PROCEDURE — 63600175 PHARM REV CODE 636 W HCPCS

## 2024-03-02 PROCEDURE — 63600175 PHARM REV CODE 636 W HCPCS: Performed by: PHYSICIAN ASSISTANT

## 2024-03-02 PROCEDURE — 63600175 PHARM REV CODE 636 W HCPCS: Performed by: INTERNAL MEDICINE

## 2024-03-02 RX ORDER — SODIUM CHLORIDE 9 MG/ML
INJECTION, SOLUTION INTRAVENOUS
Status: CANCELLED | OUTPATIENT
Start: 2024-03-02

## 2024-03-02 RX ORDER — INSULIN ASPART 100 [IU]/ML
12 INJECTION, SOLUTION INTRAVENOUS; SUBCUTANEOUS
Status: DISCONTINUED | OUTPATIENT
Start: 2024-03-02 | End: 2024-03-03

## 2024-03-02 RX ORDER — TACROLIMUS 1 MG/1
6 CAPSULE ORAL 2 TIMES DAILY
Status: DISCONTINUED | OUTPATIENT
Start: 2024-03-02 | End: 2024-03-03

## 2024-03-02 RX ORDER — DIPHENHYDRAMINE HCL 25 MG
25 CAPSULE ORAL ONCE
Status: COMPLETED | OUTPATIENT
Start: 2024-03-02 | End: 2024-03-02

## 2024-03-02 RX ORDER — ACETAMINOPHEN 325 MG/1
650 TABLET ORAL ONCE
Status: COMPLETED | OUTPATIENT
Start: 2024-03-02 | End: 2024-03-02

## 2024-03-02 RX ORDER — SODIUM CHLORIDE 9 MG/ML
INJECTION, SOLUTION INTRAVENOUS ONCE
Status: CANCELLED | OUTPATIENT
Start: 2024-03-02 | End: 2024-03-02

## 2024-03-02 RX ORDER — TACROLIMUS 1 MG/1
1 CAPSULE ORAL ONCE
Status: COMPLETED | OUTPATIENT
Start: 2024-03-02 | End: 2024-03-02

## 2024-03-02 RX ADMIN — BISACODYL 10 MG: 5 TABLET, COATED ORAL at 09:03

## 2024-03-02 RX ADMIN — MYCOPHENOLATE MOFETIL 1000 MG: 250 CAPSULE ORAL at 09:03

## 2024-03-02 RX ADMIN — MYCOPHENOLATE MOFETIL 1000 MG: 250 CAPSULE ORAL at 07:03

## 2024-03-02 RX ADMIN — ACETAMINOPHEN 650 MG: 325 TABLET ORAL at 05:03

## 2024-03-02 RX ADMIN — INSULIN ASPART 4 UNITS: 100 INJECTION, SOLUTION INTRAVENOUS; SUBCUTANEOUS at 09:03

## 2024-03-02 RX ADMIN — HEPARIN SODIUM 5000 UNITS: 5000 INJECTION INTRAVENOUS; SUBCUTANEOUS at 02:03

## 2024-03-02 RX ADMIN — INSULIN ASPART 8 UNITS: 100 INJECTION, SOLUTION INTRAVENOUS; SUBCUTANEOUS at 02:03

## 2024-03-02 RX ADMIN — THERA TABS 1 TABLET: TAB at 08:03

## 2024-03-02 RX ADMIN — HEPARIN SODIUM 5000 UNITS: 5000 INJECTION INTRAVENOUS; SUBCUTANEOUS at 09:03

## 2024-03-02 RX ADMIN — INSULIN ASPART 6 UNITS: 100 INJECTION, SOLUTION INTRAVENOUS; SUBCUTANEOUS at 12:03

## 2024-03-02 RX ADMIN — INSULIN ASPART 12 UNITS: 100 INJECTION, SOLUTION INTRAVENOUS; SUBCUTANEOUS at 12:03

## 2024-03-02 RX ADMIN — PREDNISONE 20 MG: 20 TABLET ORAL at 08:03

## 2024-03-02 RX ADMIN — SEVELAMER CARBONATE 1600 MG: 800 TABLET, FILM COATED ORAL at 05:03

## 2024-03-02 RX ADMIN — TACROLIMUS 1 MG: 1 CAPSULE ORAL at 01:03

## 2024-03-02 RX ADMIN — DIPHENHYDRAMINE HYDROCHLORIDE 25 MG: 25 CAPSULE ORAL at 01:03

## 2024-03-02 RX ADMIN — TRAMADOL HYDROCHLORIDE 50 MG: 50 TABLET, COATED ORAL at 09:03

## 2024-03-02 RX ADMIN — CALCITRIOL CAPSULES 0.5 MCG 0.5 MCG: 0.5 CAPSULE ORAL at 07:03

## 2024-03-02 RX ADMIN — DOCUSATE SODIUM 100 MG: 100 CAPSULE, LIQUID FILLED ORAL at 09:03

## 2024-03-02 RX ADMIN — FAMOTIDINE 20 MG: 20 TABLET, FILM COATED ORAL at 09:03

## 2024-03-02 RX ADMIN — ANTI-THYMOCYTE GLOBULIN (RABBIT) 100 MG: 5 INJECTION, POWDER, LYOPHILIZED, FOR SOLUTION INTRAVENOUS at 05:03

## 2024-03-02 RX ADMIN — INSULIN ASPART 12 UNITS: 100 INJECTION, SOLUTION INTRAVENOUS; SUBCUTANEOUS at 09:03

## 2024-03-02 RX ADMIN — INSULIN ASPART 12 UNITS: 100 INJECTION, SOLUTION INTRAVENOUS; SUBCUTANEOUS at 05:03

## 2024-03-02 RX ADMIN — METHYLPREDNISOLONE SODIUM SUCCINATE 125 MG: 125 INJECTION, POWDER, FOR SOLUTION INTRAMUSCULAR; INTRAVENOUS at 12:03

## 2024-03-02 RX ADMIN — DIPHENHYDRAMINE HYDROCHLORIDE 25 MG: 25 CAPSULE ORAL at 05:03

## 2024-03-02 RX ADMIN — MIDODRINE HYDROCHLORIDE 5 MG: 5 TABLET ORAL at 09:03

## 2024-03-02 RX ADMIN — MUPIROCIN 1 G: 20 OINTMENT TOPICAL at 09:03

## 2024-03-02 RX ADMIN — LATANOPROST 1 DROP: 50 SOLUTION OPHTHALMIC at 09:03

## 2024-03-02 RX ADMIN — DOCUSATE SODIUM 100 MG: 100 CAPSULE, LIQUID FILLED ORAL at 01:03

## 2024-03-02 RX ADMIN — LEVOTHYROXINE SODIUM 50 MCG: 50 TABLET ORAL at 05:03

## 2024-03-02 RX ADMIN — ACETAMINOPHEN 650 MG: 325 TABLET ORAL at 01:03

## 2024-03-02 RX ADMIN — INSULIN ASPART 2 UNITS: 100 INJECTION, SOLUTION INTRAVENOUS; SUBCUTANEOUS at 05:03

## 2024-03-02 RX ADMIN — SEVELAMER CARBONATE 1600 MG: 800 TABLET, FILM COATED ORAL at 01:03

## 2024-03-02 RX ADMIN — TACROLIMUS 5 MG: 1 CAPSULE ORAL at 08:03

## 2024-03-02 RX ADMIN — HEPARIN SODIUM 5000 UNITS: 5000 INJECTION INTRAVENOUS; SUBCUTANEOUS at 05:03

## 2024-03-02 RX ADMIN — MIDODRINE HYDROCHLORIDE 5 MG: 5 TABLET ORAL at 05:03

## 2024-03-02 RX ADMIN — TACROLIMUS 6 MG: 1 CAPSULE ORAL at 05:03

## 2024-03-02 RX ADMIN — INSULIN HUMAN 1.8 UNITS/HR: 1 INJECTION, SOLUTION INTRAVENOUS at 07:03

## 2024-03-02 RX ADMIN — SEVELAMER CARBONATE 1600 MG: 800 TABLET, FILM COATED ORAL at 08:03

## 2024-03-02 NOTE — PROGRESS NOTES
03/02/24 1709   Trialysis (Dialysis) Catheter 02/29/24 right internal jugular   Placement Date: 02/29/24   Location: right internal jugular   Site Assessment No drainage   Line Securement Device Secured with sutureless device   Dressing Type CHG impregnated dressing/sponge   Dressing Status Clean;Dry;Intact   Date on Dressing 03/02/24   Dressing Due to be Changed 03/09/24   Venous Patency/Care flushed w/o difficulty;normal saline locked   Arterial Patency/Care flushed w/o difficulty;normal saline locked   During Hemodialysis Assessment   Blood Flow Rate (mL/min) 350 mL/min   Dialysate Flow Rate (mL/min) 500 ml/min   Ultrafiltration Rate (mL/Hr) 720 mL/Hr   Arteriovenous Lines Secure Yes   Arterial Pressure (mmHg) -100 mmHg   Venous Pressure (mmHg) 90   UF Removed (mL) 2000 mL   TMP 20   Venous Line in Air Detector Yes   Intra-Hemodialysis Comments HD completed   Post-Hemodialysis Assessment   Rinseback Volume (mL) 250 mL   Blood Volume Processed (Liters) 60.3 L   Dialyzer Clearance Moderately streaked   Duration of Treatment 180 minutes   Total UF (mL) 2000 mL   Net Fluid Removal 1500   Patient Response to Treatment Tolerated well   Post-Treatment Weight 96.8 kg (213 lb 6.5 oz)   Treatment Weight Change -1.4     Patient left COREY by wheelchair NAD.

## 2024-03-02 NOTE — ASSESSMENT & PLAN NOTE
BG goal: 140-180    - Adjust insulin transition drip parameters to 1.8 u/h with stepdown parameters.   - Increase Novolog to 12 units TIDWM   - MDC (150/25) prn /hs/0200   - POCT Glucose before meals, at bedtime and at 2 am  - Hypoglycemia protocol in place      ** Please notify Endocrine for any change and/or advance in diet**  ** Please call Endocrine for any BG related issues **     Discharge Planning:   TBD. Please notify endocrinology prior to discharge.

## 2024-03-02 NOTE — SUBJECTIVE & OBJECTIVE
"Interval HPI:   No acute events overnight. Patient in room 16818/17729 A. Blood glucose stable. BG at and above goal on current insulin regimen (Transition Insulin Drip). Steroid use- Prednisone 20 mg QD.   2 Days Post-Op  Renal function- Abnormal - 8.3 cr    Vasopressors-  None     Diet diabetic Renal; 2000 Calorie; Standard Tray  Diet NPO Except for: Sips with Medication     Eatin%  Nausea: No  Hypoglycemia and intervention: No  Fever: No  TPN and/or TF: No      BP (!) 145/70 (Patient Position: Lying)   Pulse 87   Temp 98.1 °F (36.7 °C) (Oral)   Resp 18   Ht 5' 4" (1.626 m)   Wt 97.4 kg (214 lb 11.7 oz)   SpO2 95%   BMI 36.86 kg/m²     Labs Reviewed and Include    Recent Labs   Lab 24  0616   *   CALCIUM 8.8   ALBUMIN 2.8*   *   K 4.4   CO2 22*   CL 92*   BUN 73*   CREATININE 8.3*     Lab Results   Component Value Date    WBC 10.12 2024    HGB 9.1 (L) 2024    HCT 26.3 (L) 2024    MCV 91 2024     2024     No results for input(s): "TSH", "FREET4" in the last 168 hours.  Lab Results   Component Value Date    HGBA1C 5.8 (H) 2024       Nutritional status:   Body mass index is 36.86 kg/m².  Lab Results   Component Value Date    ALBUMIN 2.8 (L) 2024    ALBUMIN 2.3 (L) 2024    ALBUMIN 2.1 (L) 2024     No results found for: "PREALBUMIN"    Estimated Creatinine Clearance: 9.3 mL/min (A) (based on SCr of 8.3 mg/dL (H)).    Accu-Checks  Recent Labs     24  1537 24  1734 24  2042 24  0205 24  0806 24  1303 24  1744 24  2124 24  0202 24  0753   POCTGLUCOSE 277* 316* 304* 271* 226* 258* 123* 231* 307* 233*       Current Medications and/or Treatments Impacting Glycemic Control  Immunotherapy:    Immunosuppressants           Stop Route Frequency     tacrolimus capsule 5 mg         -- Oral 2 times daily     antithymocyte globulin (rabbit) 100 mg, hydrocortisone sodium succinate " (SOLU-CORTEF) 20 mg in sodium chloride 0.9% 500 mL (FOR PERIPHERAL LINE ADMINISTRATION ONLY)         03/03/24 0859 IV Daily     mycophenolate capsule 1,000 mg         -- Oral 2 times daily          Steroids:   Hormones (From admission, onward)      Start     Stop Route Frequency Ordered    03/02/24 0900  predniSONE tablet 20 mg         -- Oral Daily 02/28/24 2058 03/02/24 0800  methylPREDNISolone sodium succinate injection 125 mg  (IP TXP KIDNEY POST-OP THYMO WITH PERIPHERAL PRECHECKED)         -- IV Once 02/29/24 0651    03/01/24 0900  antithymocyte globulin (rabbit) 100 mg, hydrocortisone sodium succinate (SOLU-CORTEF) 20 mg in sodium chloride 0.9% 500 mL (FOR PERIPHERAL LINE ADMINISTRATION ONLY)  (IP TXP KIDNEY POST-OP THYMO WITH PERIPHERAL PRECHECKED)         03/03/24 0859 IV Daily 02/29/24 0651    02/29/24 0750  hydrocortisone sodium succinate injection 100 mg  (IP TXP KIDNEY POST-OP THYMO WITH PERIPHERAL PRECHECKED)         07/27/35 2350 IV Once as needed 02/29/24 0651    02/28/24 2144  hydrocortisone sodium succinate injection 100 mg         07/27/35 1344 IV Once as needed 02/28/24 2058          Pressors:    Autonomic Drugs (From admission, onward)      Start     Stop Route Frequency Ordered    03/01/24 2200  midodrine tablet 5 mg         -- Oral Every 8 hours 03/01/24 1526    02/29/24 0750  EPINEPHrine (PF) injection 1 mg  (IP TXP KIDNEY POST-OP THYMO WITH PERIPHERAL PRECHECKED)         07/27/35 2350 SubQ Once as needed 02/29/24 0651    02/28/24 2144  EPINEPHrine (PF) injection 1 mg         07/27/35 1344 SubQ Once as needed 02/28/24 2058          Hyperglycemia/Diabetes Medications:   Antihyperglycemics (From admission, onward)      Start     Stop Route Frequency Ordered    03/02/24 0715  insulin aspart U-100 pen 12 Units         -- SubQ 3 times daily with meals 03/02/24 0708    03/02/24 0715  insulin regular in 0.9 % NaCl 100 unit/100 mL (1 unit/mL) infusion        Question:  Enter initial dose  (Units/hr):  Answer:  1.8    -- IV Continuous 03/02/24 0709    02/29/24 1328  insulin aspart U-100 pen 0-10 Units         -- SubQ Before meals, nightly and at 0200 PRN 02/29/24 1228

## 2024-03-02 NOTE — PLAN OF CARE
Patient is AAOx4. OOB to chair independently. VSS. JEAN-PIERRE drain, no drainage, CDI. Incision RLQ, staples intact, open to air, CDI. PD catheter site CDI, catheter taped to abdomen. Curry in place, secured to thigh, care completed. Insulin dose change to 1.8 units/hr continuous. Trialysis dressing changed due to previous dressing coming loose. Patient received HD, 3 hrs, 1.5L off, tolerated well. Thymo #2 infusing, started @ 1800. Bed lowest setting, locked, 2x rails up, call light within reach.

## 2024-03-02 NOTE — PROGRESS NOTES
[facility] - Transplant Step[facility]  Kidney Transplant  Progress Note      Reason for Follow-up: Reassessment of Kidney Transplant - 2024  (#1) recipient and management of immunosuppression.    ORGAN:  RIGHT KIDNEY   Donor Type:  Donation after Brain Death   Donor CMV Status: Positive  Donor HBcAB:Negative  Donor HCV Status:Negative  Donor HBV RAHEEM:   Donor HCV RAHEEM: Negative      Subjective:   History of Present Illness:  Mark Lopez is a 64 year old male with a history of ESRD secondary to presumed diabetic nephropathy. Patient is currently on peritoneal dialysis started on 2023 (initially on HD). Patient is dialyzing on cyclic peritoneal dialysis. Patient reports that he is tolerating dialysis well. He has a PD catheter, permacath, and RUE AVF for dialysis access. Patient also with insulin pump. He now presents as primary candidate for a  donor kidney transplant. OR time set for 24 at 0800 AM. Pre-op labs and imaging ordered and will be reviewed prior to transplant. Will receive Thymoglobulin for induction.              Mr. Lopez is a 64 y.o. year old male who is status post Kidney Transplant - 2024  (#1).  His maintenance immunosuppression consists of:   Immunosuppressants (From admission, onward)      Start     Stop Route Frequency Ordered    24 1800  tacrolimus capsule 5 mg  (IP TXP KIDNEY POST-OP THYMO WITH PERIPHERAL PRECHECKED)         -- Oral 2 times daily 24 1253    24 0900  antithymocyte globulin (rabbit) 100 mg, hydrocortisone sodium succinate (SOLU-CORTEF) 20 mg in sodium chloride 0.9% 500 mL (FOR PERIPHERAL LINE ADMINISTRATION ONLY)  (IP TXP KIDNEY POST-OP THYMO WITH PERIPHERAL PRECHECKED)         24 0859 IV Daily 24 0651    24 1000  mycophenolate capsule 1,000 mg  (IP TXP KIDNEY POST-OP THYMO WITH PERIPHERAL PRECHECKED)         -- Oral 2 times daily 24 0651            Hospital Course:  Patient now s/p DBD Ktx on 24 for presumed  diabetic nephropathy (donor RPR+, CMV D+, R-, CIT ~16.5hrs). Patient PD prior to surgery (prev HD, fistula not working), reports making some urine, ~1 cup/day. PD cath remains in place.  6 day cramer for difficult bladder anastomosis. JEAN-PIERRE x 1. CIT ~16.5 hrs.     Interval Hx: NAEON. Pt remains had some increase in UOP after lasix. Completed HD yesterday. Drain output 80 bloody drainage. Kidney US on POD 0 reviewed with surgeon. Stable. Holding BOP meds for hypotension. BP slowly improving but remains soft. PRN Midodrine with HD. Will receive PCN #1 for donor RPR+ today. Will plan for perm cath placement Monday- suspect outpatient HD needs post discharge.       Past Medical, Surgical, Family, and Social History:   Unchanged from H&P.    Scheduled Meds:   acetaminophen  650 mg Oral Q8H    acetaminophen  650 mg Oral Q24H    antithymocyte globulin (rabbit) 100 mg, hydrocortisone sodium succinate (SOLU-CORTEF) 20 mg in sodium chloride 0.9% 500 mL (FOR PERIPHERAL LINE ADMINISTRATION ONLY)  1.5 mg/kg (Adjusted) Intravenous Daily    bisacodyL  10 mg Oral QHS    calcitRIOL  0.5 mcg Oral Daily    diphenhydrAMINE  25 mg Oral Q24H    docusate sodium  100 mg Oral TID    famotidine  20 mg Oral QHS    heparin (porcine)  5,000 Units Subcutaneous Q8H    insulin aspart U-100  12 Units Subcutaneous TIDWM    latanoprost  1 drop Both Eyes QHS    levothyroxine  50 mcg Oral Before breakfast    methylPREDNISolone sodium succinate injection  125 mg Intravenous Once    midodrine  5 mg Oral Q8H    multivitamin  1 tablet Oral Daily    mupirocin  1 g Nasal BID    mycophenolate  1,000 mg Oral BID    penicillin G benzathine  2.4 Million Units Intramuscular Weekly    predniSONE  20 mg Oral Daily    sevelamer carbonate  1,600 mg Oral TID WM    [START ON 3/10/2024] sulfamethoxazole-trimethoprim 400-80mg  1 tablet Oral Daily AM    tacrolimus  5 mg Oral BID    [START ON 3/10/2024] valGANciclovir  450 mg Oral Daily AM     Continuous Infusions:   glucagon  (human recombinant)      insulin regular 1 units/mL infusion orderable (TRANSFER) 1.8 Units/hr (03/02/24 0756)     PRN Meds:0.9%  NaCl infusion (for blood administration), acetaminophen, aluminum & magnesium hydroxide-simethicone, dextrose 10%, dextrose 10%, dextrose 10%, dextrose 10%, diphenhydrAMINE, diphenhydrAMINE, EPINEPHrine (PF), EPINEPHrine (PF), glucagon (human recombinant), glucagon (human recombinant), glucose, glucose, glucose, glucose, hydrocortisone sodium succinate, hydrocortisone sodium succinate, HYDROmorphone, insulin aspart U-100, ondansetron, oxyCODONE, oxyCODONE-acetaminophen, sodium chloride 0.9%, sodium chloride 0.9%, traMADoL    Intake/Output - Last 3 Shifts         02/29 0700  03/01 0659 03/01 0700  03/02 0659 03/02 0700  03/03 0659    P.O. 897 570 180    I.V. (mL/kg) 520.7 (5.3) 63.3 (0.6)     Blood  596.8     IV Piggyback 597.8 508.1     Total Intake(mL/kg) 2015.5 (20.6) 1738.2 (17.8) 180 (1.8)    Urine (mL/kg/hr) 316 (0.1) 350 (0.1) 100 (0.2)    Drains 115 80 10    Other  560     Stool  0     Blood       Total Output 431 990 110    Net +1584.5 +748.2 +70           Stool Occurrence  0 x              Review of Systems   Constitutional:  Negative for chills and fever.   Respiratory:  Negative for cough and shortness of breath.    Cardiovascular:  Negative for chest pain and leg swelling.   Gastrointestinal:  Positive for abdominal pain (incisional). Negative for abdominal distention, constipation, diarrhea, nausea and vomiting.   Genitourinary:  Negative for decreased urine volume, difficulty urinating, dysuria and frequency.   Skin:  Positive for wound.   Allergic/Immunologic: Positive for immunocompromised state.   Neurological:  Negative for headaches.   Psychiatric/Behavioral:  Negative for agitation, confusion and decreased concentration. The patient is not nervous/anxious.       Objective:     Vital Signs (Most Recent):  Temp: 98 °F (36.7 °C) (03/02/24 1125)  Pulse: 91 (03/02/24  "1125)  Resp: 18 (03/02/24 1125)  BP: (!) 167/75 (03/02/24 1125)  SpO2: 96 % (03/02/24 1125) Vital Signs (24h Range):  Temp:  [97.7 °F (36.5 °C)-98.8 °F (37.1 °C)] 98 °F (36.7 °C)  Pulse:  [78-94] 91  Resp:  [16-18] 18  SpO2:  [93 %-99 %] 96 %  BP: ()/(53-77) 167/75     Weight: 97.4 kg (214 lb 11.7 oz)  Height: 5' 4" (162.6 cm)  Body mass index is 36.86 kg/m².    Physical Exam  Vitals and nursing note reviewed.   Constitutional:       General: He is not in acute distress.     Appearance: He is well-developed. He is not diaphoretic.   Cardiovascular:      Rate and Rhythm: Normal rate and regular rhythm.      Heart sounds: No murmur heard.     No friction rub.   Pulmonary:      Breath sounds: Normal breath sounds. No wheezing or rales.   Abdominal:      General: There is no distension.      Palpations: Abdomen is soft.      Tenderness: There is abdominal tenderness. There is no guarding or rebound.      Comments: RLQ incision  JEAN-PIERRE x 1- bloody drainage   Genitourinary:     Comments: Curry to gravity- clear yellow output  Musculoskeletal:      Right lower leg: Edema present.      Left lower leg: Edema present.   Skin:     General: Skin is warm and dry.   Neurological:      Mental Status: He is alert and oriented to person, place, and time.   Psychiatric:         Mood and Affect: Mood normal.         Behavior: Behavior normal.         Thought Content: Thought content normal.          Laboratory:  CBC:   Recent Labs   Lab 03/01/24  0015 03/01/24  0337 03/02/24  0616   WBC 19.79* 18.74* 10.12   RBC 2.27* 2.30* 2.88*   HGB 7.2* 7.1* 9.1*   HCT 21.5* 22.1* 26.3*    233 166   MCV 95 96 91   MCH 31.7* 30.9 31.6*   MCHC 33.5 32.1 34.6       CMP:   Recent Labs   Lab 02/28/24 2027 02/29/24  0712 02/29/24 2052 03/01/24  0015 03/01/24  0337 03/02/24  0616   GLU 98   < > 279*  --  176* 228*   CALCIUM 10.0   < > 7.9*  --  8.3* 8.8   ALBUMIN 3.2*   < > 2.1*  --  2.3* 2.8*   PROT 7.0  --   --   --   --   --       < > " 132*  --  132* 130*   K 4.1   < > 4.7 4.8 4.5 4.4   CO2 26   < > 19*  --  19* 22*   CL 99   < > 94*  --  93* 92*   BUN 99*   < > 112*  --  117* 73*   CREATININE 12.7*   < > 13.2*  --  13.7* 8.3*   ALKPHOS 44*  --   --   --   --   --    ALT 20  --   --   --   --   --    AST 21  --   --   --   --   --     < > = values in this interval not displayed.       Labs within the past 24 hours have been reviewed.    Diagnostic Results:  US - Kidney: Results for orders placed during the hospital encounter of 24    US Transplant Kidney With Doppler    Narrative  EXAMINATION:  US TRANSPLANT KIDNEY WITH DOPPLER    CLINICAL HISTORY:  s/p kidney txp with decreased UOP and rising Cr;    TECHNIQUE:  Real time gray scale and doppler ultrasound was performed over the patient's renal allograft.    COMPARISON:  None    FINDINGS:  Renal allograft in the right lower quadrant.  The allograft measures 10.9 cm. Normal perfusion. No hydronephrosis.  Stent is present.    Fluid collection medial to the allograft in the subcutaneous tissue, likely hematoma measuring 3.9 x 2.2 x 7.8 cm.    Vasculature:    Resistive indices ranged from 0.73 to 0.80.    Main renal artery peak systolic velocity: 264cm/sec with normal waveform.    Renal artery/iliac ratio: 1.8.    The main renal vein is patent with a velocity of 187 cm/sec.    Impression  Satisfactory gray scale and doppler evaluation of renal allograft with intraparenchymal resistive indices at the upper limits of normal to slightly elevated.    Small fluid collection in the subcutaneous tissue, likely hematoma.      Electronically signed by: Grabiel Godoy MD  Date:    2024  Time:    16:59  Assessment/Plan:     * Status post -donor kidney transplantation  - DBD Ktx on  for diabetic nephropathy.   - 6 day cramer (difficult bladder anastomosis), 1 JEAN-PIERRE drain  - 350cc urine in past 24 hours.  - Kidney US obtained POD 0- reviewed with surgeon  - Last HD 3/1/2024.  Plan for another  "HD session today.      Delayed graft function of kidney  - expected 2/2 prolonged CIT  - Plan for HD 3/1  - prev PD, will likely need perm cath placement Monday for anticipated outpatient HD needs.      Positive RPR test in cadaveric donor  - Per ID recs, plan for PCN G x 3 weekly  - dose #1 on 3/1      Acute on chronic anemia  - Transfused 2u prbc on 3/1 with dialysis with appropriate response  - cont to trend cbc. Monitor        At risk for opportunistic infections  - cont OI prophylaxis per protocol.      Long-term use of immunosuppressant medication  - see "prophylactic immuno"      Prophylactic immunotherapy  - Continue Cellcept and Prograf. Will monitor for signs of toxic side effects, check daily tacrolimus troughs, and change meds accordingly.       Essential hypertension  - hold home meds due to hypotension       Type 2 diabetes mellitus with kidney complication, with long-term current use of insulin  - endocrine consulted; appreciate assistance           Discharge Planning:  Not medically ready for discharge  Medical decision making for this encounter includes review of pertinent labs and diagnostic studies, assessment and planning, discussions with consulting providers, discussion with patient/family, and participation in multidisciplinary rounds. Time spent caring for patient: 30 minutes    JAY SIERRA MD  Kidney Transplant  [facility] - Transplant Step[facility]  "

## 2024-03-02 NOTE — ASSESSMENT & PLAN NOTE
- expected 2/2 prolonged CIT  - Last HD 3/2 for DGF, tolerated well  - prev on PD, IR consulted for perm cath placement Monday for anticipated outpatient HD needs  - pt making more urine, Lasix 120 mg 3/3  - monitor Cr and UOP to confirm still needs tunneled cath Monday

## 2024-03-02 NOTE — ASSESSMENT & PLAN NOTE
- Transfused 2u prbc on 3/1 with dialysis with appropriate response  - cont to trend cbc. Monitor

## 2024-03-02 NOTE — PROGRESS NOTES
[facility] - Transplant Stepdown  Endocrinology  Progress Note    Admit Date: 2024     Reason for Consult: Management of T2DM, Hyperglycemia     Surgical Procedure and Date: kidney transplant 2024    Diabetes diagnosis year:  per chart review     Home Diabetes Medications:  Tandem insulin pump   ICR 1:5   ISF 1:25  Basal rate 1.2 u/hr (unclear if in automode (pt somnolent))    How often checking glucose at home? >4 x day   BG readings on regimen: 180s-200s, occasional 300s  Hypoglycemia on the regimen?  Yes, once a week overnight, 2-3x during the week during the day   Missed doses on regimen?  n/a    Diabetes Complications include:     Hyperglycemia, Hypoglycemia , Hypoglycemia unawareness, Diabetic nephropathy  , and Diabetic chronic kidney disease         Complicating diabetes co morbidities:   Glucocorticoid use       HPI:   Patient is a 64 y.o. male with history of ESRD secondary to presumed diabetic nephropathy. Patient is currently on peritoneal dialysis started on 2023 (initially on HD). Patient is dialyzing on cyclic peritoneal dialysis. Patient reports that he is tolerating dialysis well. He has a PD catheter, permacath, and RUE AVF for dialysis access. Patient also with insulin pump. He now presents as primary candidate for a  donor kidney transplant. OR time set for 24 at 0800 AM. Pre-op labs and imaging ordered and will be reviewed prior to transplant. Will receive Thymoglobulin for induction. Endocrine consulted for BG management.         Interval HPI:   No acute events overnight. Patient in room 01661/24694 A. Blood glucose stable. BG at and above goal on current insulin regimen (Transition Insulin Drip). Steroid use- Prednisone 20 mg QD.   2 Days Post-Op  Renal function- Abnormal - 8.3 cr    Vasopressors-  None     Diet diabetic Renal; 2000 Calorie; Standard Tray  Diet NPO Except for: Sips with Medication     Eatin%  Nausea: No  Hypoglycemia and intervention:  "No  Fever: No  TPN and/or TF: No      BP (!) 145/70 (Patient Position: Lying)   Pulse 87   Temp 98.1 °F (36.7 °C) (Oral)   Resp 18   Ht 5' 4" (1.626 m)   Wt 97.4 kg (214 lb 11.7 oz)   SpO2 95%   BMI 36.86 kg/m²     Labs Reviewed and Include    Recent Labs   Lab 03/02/24  0616   *   CALCIUM 8.8   ALBUMIN 2.8*   *   K 4.4   CO2 22*   CL 92*   BUN 73*   CREATININE 8.3*     Lab Results   Component Value Date    WBC 10.12 03/02/2024    HGB 9.1 (L) 03/02/2024    HCT 26.3 (L) 03/02/2024    MCV 91 03/02/2024     03/02/2024     No results for input(s): "TSH", "FREET4" in the last 168 hours.  Lab Results   Component Value Date    HGBA1C 5.8 (H) 03/01/2024       Nutritional status:   Body mass index is 36.86 kg/m².  Lab Results   Component Value Date    ALBUMIN 2.8 (L) 03/02/2024    ALBUMIN 2.3 (L) 03/01/2024    ALBUMIN 2.1 (L) 02/29/2024     No results found for: "PREALBUMIN"    Estimated Creatinine Clearance: 9.3 mL/min (A) (based on SCr of 8.3 mg/dL (H)).    Accu-Checks  Recent Labs     02/29/24  1537 02/29/24  1734 02/29/24  2042 03/01/24  0205 03/01/24  0806 03/01/24  1303 03/01/24  1744 03/01/24  2124 03/02/24  0202 03/02/24  0753   POCTGLUCOSE 277* 316* 304* 271* 226* 258* 123* 231* 307* 233*       Current Medications and/or Treatments Impacting Glycemic Control  Immunotherapy:    Immunosuppressants           Stop Route Frequency     tacrolimus capsule 5 mg         -- Oral 2 times daily     antithymocyte globulin (rabbit) 100 mg, hydrocortisone sodium succinate (SOLU-CORTEF) 20 mg in sodium chloride 0.9% 500 mL (FOR PERIPHERAL LINE ADMINISTRATION ONLY)         03/03/24 0859 IV Daily     mycophenolate capsule 1,000 mg         -- Oral 2 times daily          Steroids:   Hormones (From admission, onward)      Start     Stop Route Frequency Ordered    03/02/24 0900  predniSONE tablet 20 mg         -- Oral Daily 02/28/24 2058 03/02/24 0800  methylPREDNISolone sodium succinate injection 125 mg  " (IP TXP KIDNEY POST-OP THYMO WITH PERIPHERAL PRECHECKED)         -- IV Once 24 0651    24 0900  antithymocyte globulin (rabbit) 100 mg, hydrocortisone sodium succinate (SOLU-CORTEF) 20 mg in sodium chloride 0.9% 500 mL (FOR PERIPHERAL LINE ADMINISTRATION ONLY)  (IP TXP KIDNEY POST-OP THYMO WITH PERIPHERAL PRECHECKED)         24 0859 IV Daily 24 0651    24 0750  hydrocortisone sodium succinate injection 100 mg  (IP TXP KIDNEY POST-OP THYMO WITH PERIPHERAL PRECHECKED)         35 2350 IV Once as needed 24 0651    24 2144  hydrocortisone sodium succinate injection 100 mg         35 1344 IV Once as needed 24          Pressors:    Autonomic Drugs (From admission, onward)      Start     Stop Route Frequency Ordered    24 2200  midodrine tablet 5 mg         -- Oral Every 8 hours 24 1526    24 0750  EPINEPHrine (PF) injection 1 mg  (IP TXP KIDNEY POST-OP THYMO WITH PERIPHERAL PRECHECKED)         35 2350 SubQ Once as needed 24 0651    24 2144  EPINEPHrine (PF) injection 1 mg         35 1344 SubQ Once as needed 24          Hyperglycemia/Diabetes Medications:   Antihyperglycemics (From admission, onward)      Start     Stop Route Frequency Ordered    24 0715  insulin aspart U-100 pen 12 Units         -- SubQ 3 times daily with meals 24 0708    24 0715  insulin regular in 0.9 % NaCl 100 unit/100 mL (1 unit/mL) infusion        Question:  Enter initial dose (Units/hr):  Answer:  1.8    -- IV Continuous 24 0709    24 1328  insulin aspart U-100 pen 0-10 Units         -- SubQ Before meals, nightly and at 0200 PRN 24 1228            ASSESSMENT and PLAN    Renal/  * Status post -donor kidney transplantation  Managed per primary team        ID  At risk for opportunistic infections  Optimize BG control         Endocrine  Type 2 diabetes mellitus with kidney complication, with  long-term current use of insulin  BG goal: 140-180    - Adjust insulin transition drip parameters to 1.8 u/h with stepdown parameters.   - Increase Novolog to 12 units TIDWM   - [facility] (150/25) prn /hs/0200   - POCT Glucose before meals, at bedtime and at 2 am  - Hypoglycemia protocol in place      ** Please notify Endocrine for any change and/or advance in diet**  ** Please call Endocrine for any BG related issues **     Discharge Planning:   TBD. Please notify endocrinology prior to discharge.            Holly Cardozo PA-C  Endocrinology  [facility] - Transplant Step[facility]

## 2024-03-02 NOTE — SUBJECTIVE & OBJECTIVE
Subjective:   History of Present Illness:  Mark Lopez is a 64 year old male with a history of ESRD secondary to presumed diabetic nephropathy. Patient is currently on peritoneal dialysis started on 2023 (initially on HD). Patient is dialyzing on cyclic peritoneal dialysis. Patient reports that he is tolerating dialysis well. He has a PD catheter, permacath, and RUE AVF for dialysis access. Patient also with insulin pump. He now presents as primary candidate for a  donor kidney transplant. OR time set for 24 at 0800 AM. Pre-op labs and imaging ordered and will be reviewed prior to transplant. Will receive Thymoglobulin for induction.              Mr. Lopez is a 64 y.o. year old male who is status post Kidney Transplant - 2024  (#1).  His maintenance immunosuppression consists of:   Immunosuppressants (From admission, onward)      Start     Stop Route Frequency Ordered    24 1800  tacrolimus capsule 5 mg  (IP TXP KIDNEY POST-OP THYMO WITH PERIPHERAL PRECHECKED)         -- Oral 2 times daily 24 1253    24 0900  antithymocyte globulin (rabbit) 100 mg, hydrocortisone sodium succinate (SOLU-CORTEF) 20 mg in sodium chloride 0.9% 500 mL (FOR PERIPHERAL LINE ADMINISTRATION ONLY)  (IP TXP KIDNEY POST-OP THYMO WITH PERIPHERAL PRECHECKED)         24 0859 IV Daily 24 0651    24 1000  mycophenolate capsule 1,000 mg  (IP TXP KIDNEY POST-OP THYMO WITH PERIPHERAL PRECHECKED)         -- Oral 2 times daily 24 0651            Hospital Course:  Patient now s/p DBD Ktx on 24 for presumed diabetic nephropathy (donor RPR+, CMV D+, R-, CIT ~16.5hrs). Patient PD prior to surgery (prev HD, fistula not working), reports making some urine, ~1 cup/day. PD cath remains in place.  6 day cramer for difficult bladder anastomosis. JEAN-PIERRE x 1. CIT ~16.5 hrs.     Interval Hx: NAEON. Pt remains had some increase in UOP after lasix. Completed HD yesterday. Drain output 80 bloody drainage.  Kidney US on POD 0 reviewed with surgeon. Stable. Holding BOP meds for hypotension. BP slowly improving but remains soft. PRN Midodrine with HD. Will receive PCN #1 for donor RPR+ today. Will plan for perm cath placement Monday- suspect outpatient HD needs post discharge.       Past Medical, Surgical, Family, and Social History:   Unchanged from H&P.    Scheduled Meds:   acetaminophen  650 mg Oral Q8H    acetaminophen  650 mg Oral Q24H    antithymocyte globulin (rabbit) 100 mg, hydrocortisone sodium succinate (SOLU-CORTEF) 20 mg in sodium chloride 0.9% 500 mL (FOR PERIPHERAL LINE ADMINISTRATION ONLY)  1.5 mg/kg (Adjusted) Intravenous Daily    bisacodyL  10 mg Oral QHS    calcitRIOL  0.5 mcg Oral Daily    diphenhydrAMINE  25 mg Oral Q24H    docusate sodium  100 mg Oral TID    famotidine  20 mg Oral QHS    heparin (porcine)  5,000 Units Subcutaneous Q8H    insulin aspart U-100  12 Units Subcutaneous TIDWM    latanoprost  1 drop Both Eyes QHS    levothyroxine  50 mcg Oral Before breakfast    methylPREDNISolone sodium succinate injection  125 mg Intravenous Once    midodrine  5 mg Oral Q8H    multivitamin  1 tablet Oral Daily    mupirocin  1 g Nasal BID    mycophenolate  1,000 mg Oral BID    penicillin G benzathine  2.4 Million Units Intramuscular Weekly    predniSONE  20 mg Oral Daily    sevelamer carbonate  1,600 mg Oral TID WM    [START ON 3/10/2024] sulfamethoxazole-trimethoprim 400-80mg  1 tablet Oral Daily AM    tacrolimus  5 mg Oral BID    [START ON 3/10/2024] valGANciclovir  450 mg Oral Daily AM     Continuous Infusions:   glucagon (human recombinant)      insulin regular 1 units/mL infusion orderable (TRANSFER) 1.8 Units/hr (03/02/24 0756)     PRN Meds:0.9%  NaCl infusion (for blood administration), acetaminophen, aluminum & magnesium hydroxide-simethicone, dextrose 10%, dextrose 10%, dextrose 10%, dextrose 10%, diphenhydrAMINE, diphenhydrAMINE, EPINEPHrine (PF), EPINEPHrine (PF), glucagon (human recombinant),  "glucagon (human recombinant), glucose, glucose, glucose, glucose, hydrocortisone sodium succinate, hydrocortisone sodium succinate, HYDROmorphone, insulin aspart U-100, ondansetron, oxyCODONE, oxyCODONE-acetaminophen, sodium chloride 0.9%, sodium chloride 0.9%, traMADoL    Intake/Output - Last 3 Shifts         02/29 0700  03/01 0659 03/01 0700  03/02 0659 03/02 0700  03/03 0659    P.O. 897 570 180    I.V. (mL/kg) 520.7 (5.3) 63.3 (0.6)     Blood  596.8     IV Piggyback 597.8 508.1     Total Intake(mL/kg) 2015.5 (20.6) 1738.2 (17.8) 180 (1.8)    Urine (mL/kg/hr) 316 (0.1) 350 (0.1) 100 (0.2)    Drains 115 80 10    Other  560     Stool  0     Blood       Total Output 431 990 110    Net +1584.5 +748.2 +70           Stool Occurrence  0 x              Review of Systems   Constitutional:  Negative for chills and fever.   Respiratory:  Negative for cough and shortness of breath.    Cardiovascular:  Negative for chest pain and leg swelling.   Gastrointestinal:  Positive for abdominal pain (incisional). Negative for abdominal distention, constipation, diarrhea, nausea and vomiting.   Genitourinary:  Negative for decreased urine volume, difficulty urinating, dysuria and frequency.   Skin:  Positive for wound.   Allergic/Immunologic: Positive for immunocompromised state.   Neurological:  Negative for headaches.   Psychiatric/Behavioral:  Negative for agitation, confusion and decreased concentration. The patient is not nervous/anxious.       Objective:     Vital Signs (Most Recent):  Temp: 98 °F (36.7 °C) (03/02/24 1125)  Pulse: 91 (03/02/24 1125)  Resp: 18 (03/02/24 1125)  BP: (!) 167/75 (03/02/24 1125)  SpO2: 96 % (03/02/24 1125) Vital Signs (24h Range):  Temp:  [97.7 °F (36.5 °C)-98.8 °F (37.1 °C)] 98 °F (36.7 °C)  Pulse:  [78-94] 91  Resp:  [16-18] 18  SpO2:  [93 %-99 %] 96 %  BP: ()/(53-77) 167/75     Weight: 97.4 kg (214 lb 11.7 oz)  Height: 5' 4" (162.6 cm)  Body mass index is 36.86 kg/m².     Physical Exam  Vitals " and nursing note reviewed.   Constitutional:       General: He is not in acute distress.     Appearance: He is well-developed. He is not diaphoretic.   Cardiovascular:      Rate and Rhythm: Normal rate and regular rhythm.      Heart sounds: No murmur heard.     No friction rub.   Pulmonary:      Breath sounds: Normal breath sounds. No wheezing or rales.   Abdominal:      General: There is no distension.      Palpations: Abdomen is soft.      Tenderness: There is abdominal tenderness. There is no guarding or rebound.      Comments: RLQ incision  JEAN-PIERRE x 1- bloody drainage   Genitourinary:     Comments: Curry to gravity- clear yellow output  Musculoskeletal:      Right lower leg: Edema present.      Left lower leg: Edema present.   Skin:     General: Skin is warm and dry.   Neurological:      Mental Status: He is alert and oriented to person, place, and time.   Psychiatric:         Mood and Affect: Mood normal.         Behavior: Behavior normal.         Thought Content: Thought content normal.          Laboratory:  CBC:   Recent Labs   Lab 03/01/24  0015 03/01/24  0337 03/02/24  0616   WBC 19.79* 18.74* 10.12   RBC 2.27* 2.30* 2.88*   HGB 7.2* 7.1* 9.1*   HCT 21.5* 22.1* 26.3*    233 166   MCV 95 96 91   MCH 31.7* 30.9 31.6*   MCHC 33.5 32.1 34.6       CMP:   Recent Labs   Lab 02/28/24 2027 02/29/24  0712 02/29/24 2052 03/01/24  0015 03/01/24  0337 03/02/24  0616   GLU 98   < > 279*  --  176* 228*   CALCIUM 10.0   < > 7.9*  --  8.3* 8.8   ALBUMIN 3.2*   < > 2.1*  --  2.3* 2.8*   PROT 7.0  --   --   --   --   --       < > 132*  --  132* 130*   K 4.1   < > 4.7 4.8 4.5 4.4   CO2 26   < > 19*  --  19* 22*   CL 99   < > 94*  --  93* 92*   BUN 99*   < > 112*  --  117* 73*   CREATININE 12.7*   < > 13.2*  --  13.7* 8.3*   ALKPHOS 44*  --   --   --   --   --    ALT 20  --   --   --   --   --    AST 21  --   --   --   --   --     < > = values in this interval not displayed.       Labs within the past 24 hours have  been reviewed.    Diagnostic Results:  US - Kidney: Results for orders placed during the hospital encounter of 02/28/24    US Transplant Kidney With Doppler    Narrative  EXAMINATION:  US TRANSPLANT KIDNEY WITH DOPPLER    CLINICAL HISTORY:  s/p kidney txp with decreased UOP and rising Cr;    TECHNIQUE:  Real time gray scale and doppler ultrasound was performed over the patient's renal allograft.    COMPARISON:  None    FINDINGS:  Renal allograft in the right lower quadrant.  The allograft measures 10.9 cm. Normal perfusion. No hydronephrosis.  Stent is present.    Fluid collection medial to the allograft in the subcutaneous tissue, likely hematoma measuring 3.9 x 2.2 x 7.8 cm.    Vasculature:    Resistive indices ranged from 0.73 to 0.80.    Main renal artery peak systolic velocity: 264cm/sec with normal waveform.    Renal artery/iliac ratio: 1.8.    The main renal vein is patent with a velocity of 187 cm/sec.    Impression  Satisfactory gray scale and doppler evaluation of renal allograft with intraparenchymal resistive indices at the upper limits of normal to slightly elevated.    Small fluid collection in the subcutaneous tissue, likely hematoma.      Electronically signed by: Grabiel Godoy MD  Date:    02/29/2024  Time:    16:59

## 2024-03-02 NOTE — ASSESSMENT & PLAN NOTE
- DBD Ktx on 2/29 for diabetic nephropathy.   - 6 day cramer (difficult bladder anastomosis), 1 JEAN-PIERRE drain  - 350cc urine in past 24 hours.  - Kidney US obtained POD 0- reviewed with surgeon  - Last HD 3/1/2024.  Plan for another HD session today.

## 2024-03-02 NOTE — PROGRESS NOTES
03/02/24 1355 03/02/24 1408   [facility]ysis (Dialysis) Catheter 02/29/24 right internal jugular   Placement Date: 02/29/24   Location: right internal jugular   Site Assessment No drainage  --    Dressing Type CHG impregnated dressing/sponge  --    Dressing Status Clean;Dry;Intact  --    Date on Dressing 03/02/24  --    Dressing Due to be Changed 03/09/24  --    Venous Patency/Care blood return present;flushed w/o difficulty  --    Arterial Patency/Care blood return present;flushed w/o difficulty  --    Flows Good  --    During Hemodialysis Assessment   Blood Flow Rate (mL/min)  --  350 mL/min   Dialysate Flow Rate (mL/min)  --  500 ml/min   Ultrafiltration Rate (mL/Hr)  --  720 mL/Hr   Arteriovenous Lines Secure  --  Yes   Arterial Pressure (mmHg)  --  -110 mmHg   Venous Pressure (mmHg)  --  90   Blood Volume Processed (Liters)  --  0 L   UF Removed (mL)  --  0 mL   TMP  --  20   Venous Line in Air Detector  --  Yes   Intake (mL)  --  250 mL   Intra-Hemodialysis Comments  --  HD started     Patient arrived COREY by wheelchair. HD started via right IJ cvc. (PD Patient)

## 2024-03-02 NOTE — PROGRESS NOTES
H/H 9.1/26.3 per Libby from lab. Improved from previous day and after receiving 2 units of RBC. Creatinine 13.7. Notified Anju VARGAS NP. Dialysis scheduled for this am.

## 2024-03-03 PROBLEM — N32.89 BLADDER SPASM: Status: ACTIVE | Noted: 2024-03-03

## 2024-03-03 LAB
ALBUMIN SERPL BCP-MCNC: 2.7 G/DL (ref 3.5–5.2)
ANION GAP SERPL CALC-SCNC: 12 MMOL/L (ref 8–16)
ANISOCYTOSIS BLD QL SMEAR: SLIGHT
BASO STIPL BLD QL SMEAR: ABNORMAL
BASOPHILS # BLD AUTO: 0 K/UL (ref 0–0.2)
BASOPHILS NFR BLD: 0 % (ref 0–1.9)
BUN SERPL-MCNC: 56 MG/DL (ref 8–23)
CALCIUM SERPL-MCNC: 9.3 MG/DL (ref 8.7–10.5)
CHLORIDE SERPL-SCNC: 96 MMOL/L (ref 95–110)
CO2 SERPL-SCNC: 21 MMOL/L (ref 23–29)
CREAT SERPL-MCNC: 5.6 MG/DL (ref 0.5–1.4)
DIFFERENTIAL METHOD BLD: ABNORMAL
EOSINOPHIL # BLD AUTO: 0 K/UL (ref 0–0.5)
EOSINOPHIL NFR BLD: 0 % (ref 0–8)
ERYTHROCYTE [DISTWIDTH] IN BLOOD BY AUTOMATED COUNT: 15.4 % (ref 11.5–14.5)
EST. GFR  (NO RACE VARIABLE): 10.6 ML/MIN/1.73 M^2
GLUCOSE SERPL-MCNC: 126 MG/DL (ref 70–110)
HCT VFR BLD AUTO: 23.7 % (ref 40–54)
HGB BLD-MCNC: 7.9 G/DL (ref 14–18)
IMM GRANULOCYTES # BLD AUTO: 0.03 K/UL (ref 0–0.04)
IMM GRANULOCYTES NFR BLD AUTO: 0.6 % (ref 0–0.5)
LYMPHOCYTES # BLD AUTO: 0 K/UL (ref 1–4.8)
LYMPHOCYTES NFR BLD: 0.4 % (ref 18–48)
MAGNESIUM SERPL-MCNC: 2.5 MG/DL (ref 1.6–2.6)
MCH RBC QN AUTO: 30.7 PG (ref 27–31)
MCHC RBC AUTO-ENTMCNC: 33.3 G/DL (ref 32–36)
MCV RBC AUTO: 92 FL (ref 82–98)
MONOCYTES # BLD AUTO: 0.4 K/UL (ref 0.3–1)
MONOCYTES NFR BLD: 7.4 % (ref 4–15)
NEUTROPHILS # BLD AUTO: 4.7 K/UL (ref 1.8–7.7)
NEUTROPHILS NFR BLD: 91.6 % (ref 38–73)
NRBC BLD-RTO: 0 /100 WBC
OVALOCYTES BLD QL SMEAR: ABNORMAL
PHOSPHATE SERPL-MCNC: 5.2 MG/DL (ref 2.7–4.5)
PLATELET # BLD AUTO: 106 K/UL (ref 150–450)
PMV BLD AUTO: 11.2 FL (ref 9.2–12.9)
POCT GLUCOSE: 164 MG/DL (ref 70–110)
POCT GLUCOSE: 234 MG/DL (ref 70–110)
POCT GLUCOSE: 238 MG/DL (ref 70–110)
POCT GLUCOSE: 274 MG/DL (ref 70–110)
POCT GLUCOSE: 90 MG/DL (ref 70–110)
POCT GLUCOSE: 95 MG/DL (ref 70–110)
POIKILOCYTOSIS BLD QL SMEAR: SLIGHT
POLYCHROMASIA BLD QL SMEAR: ABNORMAL
POTASSIUM SERPL-SCNC: 4.3 MMOL/L (ref 3.5–5.1)
RBC # BLD AUTO: 2.57 M/UL (ref 4.6–6.2)
SODIUM SERPL-SCNC: 129 MMOL/L (ref 136–145)
TACROLIMUS BLD-MCNC: 4.8 NG/ML (ref 5–15)
WBC # BLD AUTO: 5.13 K/UL (ref 3.9–12.7)

## 2024-03-03 PROCEDURE — 83735 ASSAY OF MAGNESIUM: CPT

## 2024-03-03 PROCEDURE — 99233 SBSQ HOSP IP/OBS HIGH 50: CPT | Mod: 24,,, | Performed by: NURSE PRACTITIONER

## 2024-03-03 PROCEDURE — 80197 ASSAY OF TACROLIMUS: CPT

## 2024-03-03 PROCEDURE — 25000003 PHARM REV CODE 250

## 2024-03-03 PROCEDURE — 63600175 PHARM REV CODE 636 W HCPCS: Performed by: INTERNAL MEDICINE

## 2024-03-03 PROCEDURE — 25000003 PHARM REV CODE 250: Performed by: NURSE PRACTITIONER

## 2024-03-03 PROCEDURE — 63600175 PHARM REV CODE 636 W HCPCS

## 2024-03-03 PROCEDURE — 80069 RENAL FUNCTION PANEL: CPT

## 2024-03-03 PROCEDURE — 99232 SBSQ HOSP IP/OBS MODERATE 35: CPT | Mod: ,,,

## 2024-03-03 PROCEDURE — 25000003 PHARM REV CODE 250: Performed by: STUDENT IN AN ORGANIZED HEALTH CARE EDUCATION/TRAINING PROGRAM

## 2024-03-03 PROCEDURE — 25000003 PHARM REV CODE 250: Performed by: PHYSICIAN ASSISTANT

## 2024-03-03 PROCEDURE — 20600001 HC STEP DOWN PRIVATE ROOM

## 2024-03-03 PROCEDURE — 25000003 PHARM REV CODE 250: Performed by: INTERNAL MEDICINE

## 2024-03-03 PROCEDURE — 85025 COMPLETE CBC W/AUTO DIFF WBC: CPT

## 2024-03-03 RX ORDER — OXYBUTYNIN CHLORIDE 5 MG/1
5 TABLET ORAL 3 TIMES DAILY
Status: DISCONTINUED | OUTPATIENT
Start: 2024-03-03 | End: 2024-03-05 | Stop reason: HOSPADM

## 2024-03-03 RX ORDER — TACROLIMUS 1 MG/1
8 CAPSULE ORAL 2 TIMES DAILY
Status: DISCONTINUED | OUTPATIENT
Start: 2024-03-03 | End: 2024-03-05 | Stop reason: HOSPADM

## 2024-03-03 RX ORDER — OXYCODONE HYDROCHLORIDE 5 MG/1
5 TABLET ORAL EVERY 6 HOURS PRN
Status: CANCELLED | OUTPATIENT
Start: 2024-03-03

## 2024-03-03 RX ORDER — TRAMADOL HYDROCHLORIDE 50 MG/1
50 TABLET ORAL EVERY 6 HOURS PRN
Status: CANCELLED | OUTPATIENT
Start: 2024-03-03

## 2024-03-03 RX ORDER — TRAMADOL HYDROCHLORIDE 50 MG/1
50 TABLET ORAL EVERY 6 HOURS PRN
Status: DISCONTINUED | OUTPATIENT
Start: 2024-03-03 | End: 2024-03-05 | Stop reason: HOSPADM

## 2024-03-03 RX ORDER — FUROSEMIDE 10 MG/ML
120 INJECTION INTRAMUSCULAR; INTRAVENOUS ONCE
Status: COMPLETED | OUTPATIENT
Start: 2024-03-03 | End: 2024-03-03

## 2024-03-03 RX ORDER — INSULIN ASPART 100 [IU]/ML
16 INJECTION, SOLUTION INTRAVENOUS; SUBCUTANEOUS
Status: DISCONTINUED | OUTPATIENT
Start: 2024-03-03 | End: 2024-03-03

## 2024-03-03 RX ORDER — OXYCODONE HYDROCHLORIDE 5 MG/1
5 TABLET ORAL EVERY 6 HOURS PRN
Status: DISCONTINUED | OUTPATIENT
Start: 2024-03-03 | End: 2024-03-04

## 2024-03-03 RX ORDER — OXYCODONE HYDROCHLORIDE 10 MG/1
10 TABLET ORAL EVERY 6 HOURS PRN
Status: CANCELLED | OUTPATIENT
Start: 2024-03-03

## 2024-03-03 RX ORDER — OXYCODONE HYDROCHLORIDE 10 MG/1
10 TABLET ORAL EVERY 6 HOURS PRN
Status: DISCONTINUED | OUTPATIENT
Start: 2024-03-03 | End: 2024-03-04

## 2024-03-03 RX ORDER — TACROLIMUS 1 MG/1
2 CAPSULE ORAL ONCE
Status: COMPLETED | OUTPATIENT
Start: 2024-03-03 | End: 2024-03-03

## 2024-03-03 RX ADMIN — INSULIN ASPART 4 UNITS: 100 INJECTION, SOLUTION INTRAVENOUS; SUBCUTANEOUS at 05:03

## 2024-03-03 RX ADMIN — TRAMADOL HYDROCHLORIDE 50 MG: 50 TABLET, COATED ORAL at 06:03

## 2024-03-03 RX ADMIN — DOCUSATE SODIUM 100 MG: 100 CAPSULE, LIQUID FILLED ORAL at 08:03

## 2024-03-03 RX ADMIN — INSULIN ASPART 12 UNITS: 100 INJECTION, SOLUTION INTRAVENOUS; SUBCUTANEOUS at 12:03

## 2024-03-03 RX ADMIN — INSULIN ASPART 12 UNITS: 100 INJECTION, SOLUTION INTRAVENOUS; SUBCUTANEOUS at 08:03

## 2024-03-03 RX ADMIN — DOCUSATE SODIUM 100 MG: 100 CAPSULE, LIQUID FILLED ORAL at 03:03

## 2024-03-03 RX ADMIN — OXYCODONE 5 MG: 5 TABLET ORAL at 12:03

## 2024-03-03 RX ADMIN — MIDODRINE HYDROCHLORIDE 5 MG: 5 TABLET ORAL at 05:03

## 2024-03-03 RX ADMIN — SEVELAMER CARBONATE 1600 MG: 800 TABLET, FILM COATED ORAL at 08:03

## 2024-03-03 RX ADMIN — PREDNISONE 20 MG: 20 TABLET ORAL at 08:03

## 2024-03-03 RX ADMIN — INSULIN ASPART 2 UNITS: 100 INJECTION, SOLUTION INTRAVENOUS; SUBCUTANEOUS at 08:03

## 2024-03-03 RX ADMIN — FAMOTIDINE 20 MG: 20 TABLET, FILM COATED ORAL at 08:03

## 2024-03-03 RX ADMIN — MUPIROCIN 1 G: 20 OINTMENT TOPICAL at 08:03

## 2024-03-03 RX ADMIN — INSULIN ASPART 4 UNITS: 100 INJECTION, SOLUTION INTRAVENOUS; SUBCUTANEOUS at 08:03

## 2024-03-03 RX ADMIN — OXYCODONE HYDROCHLORIDE 10 MG: 10 TABLET ORAL at 08:03

## 2024-03-03 RX ADMIN — THERA TABS 1 TABLET: TAB at 08:03

## 2024-03-03 RX ADMIN — SEVELAMER CARBONATE 1600 MG: 800 TABLET, FILM COATED ORAL at 12:03

## 2024-03-03 RX ADMIN — FUROSEMIDE 120 MG: 10 INJECTION, SOLUTION INTRAVENOUS at 11:03

## 2024-03-03 RX ADMIN — MYCOPHENOLATE MOFETIL 1000 MG: 250 CAPSULE ORAL at 08:03

## 2024-03-03 RX ADMIN — TACROLIMUS 2 MG: 1 CAPSULE ORAL at 12:03

## 2024-03-03 RX ADMIN — CALCITRIOL CAPSULES 0.5 MCG 0.5 MCG: 0.5 CAPSULE ORAL at 08:03

## 2024-03-03 RX ADMIN — TACROLIMUS 6 MG: 1 CAPSULE ORAL at 08:03

## 2024-03-03 RX ADMIN — INSULIN HUMAN 1.5 UNITS/HR: 1 INJECTION, SOLUTION INTRAVENOUS at 01:03

## 2024-03-03 RX ADMIN — HEPARIN SODIUM 5000 UNITS: 5000 INJECTION INTRAVENOUS; SUBCUTANEOUS at 03:03

## 2024-03-03 RX ADMIN — OXYCODONE 5 MG: 5 TABLET ORAL at 02:03

## 2024-03-03 RX ADMIN — OXYBUTYNIN CHLORIDE 5 MG: 5 TABLET ORAL at 08:03

## 2024-03-03 RX ADMIN — INSULIN HUMAN 1.2 UNITS/HR: 1 INJECTION, SOLUTION INTRAVENOUS at 09:03

## 2024-03-03 RX ADMIN — HEPARIN SODIUM 5000 UNITS: 5000 INJECTION INTRAVENOUS; SUBCUTANEOUS at 05:03

## 2024-03-03 RX ADMIN — BISACODYL 10 MG: 5 TABLET, COATED ORAL at 08:03

## 2024-03-03 RX ADMIN — OXYCODONE 5 MG: 5 TABLET ORAL at 06:03

## 2024-03-03 RX ADMIN — LATANOPROST 1 DROP: 50 SOLUTION OPHTHALMIC at 08:03

## 2024-03-03 RX ADMIN — SEVELAMER CARBONATE 1600 MG: 800 TABLET, FILM COATED ORAL at 05:03

## 2024-03-03 RX ADMIN — HEPARIN SODIUM 5000 UNITS: 5000 INJECTION INTRAVENOUS; SUBCUTANEOUS at 08:03

## 2024-03-03 RX ADMIN — TACROLIMUS 8 MG: 1 CAPSULE ORAL at 05:03

## 2024-03-03 RX ADMIN — OXYBUTYNIN CHLORIDE 5 MG: 5 TABLET ORAL at 03:03

## 2024-03-03 RX ADMIN — LEVOTHYROXINE SODIUM 50 MCG: 50 TABLET ORAL at 05:03

## 2024-03-03 RX ADMIN — INSULIN ASPART 6 UNITS: 100 INJECTION, SOLUTION INTRAVENOUS; SUBCUTANEOUS at 12:03

## 2024-03-03 NOTE — SUBJECTIVE & OBJECTIVE
Subjective:   History of Present Illness:  Mark Lopez is a 64 year old male with a history of ESRD secondary to presumed diabetic nephropathy. Patient is currently on peritoneal dialysis started on 2023 (initially on HD). Patient is dialyzing on cyclic peritoneal dialysis. Patient reports that he is tolerating dialysis well. He has a PD catheter, permacath, and RUE AVF for dialysis access. Patient also with insulin pump. He now presents as primary candidate for a  donor kidney transplant. OR time set for 24 at 0800 AM. Pre-op labs and imaging ordered and will be reviewed prior to transplant. Will receive Thymoglobulin for induction.              Mr. Lopez is a 64 y.o. year old male who is status post Kidney Transplant - 2024  (#1).  His maintenance immunosuppression consists of:   Immunosuppressants (From admission, onward)      Start     Stop Route Frequency Ordered    24 1800  tacrolimus capsule 8 mg  (IP TXP KIDNEY POST-OP THYMO WITH PERIPHERAL PRECHECKED)         -- Oral 2 times daily 24 1228    24 1000  mycophenolate capsule 1,000 mg  (IP TXP KIDNEY POST-OP THYMO WITH PERIPHERAL PRECHECKED)         -- Oral 2 times daily 24 0651            Hospital Course:  Patient now s/p DBD Ktx on 24 for presumed diabetic nephropathy (donor RPR+, CMV D+, R-, CIT ~16.5hrs). Patient PD prior to surgery (prev HD, fistula not working), reports making some urine, ~1 cup/day. PD cath remains in place.  6 day cramer for difficult bladder anastomosis. JEAN-PIERRE x 1. CIT ~16.5 hrs.     Interval Hx: No acute events overnight. Pt has 6 day cramer due to difficult anastomosis. Pt c/o bladder spasms/pressure. Ditropan ordered. Reported spasm better but continued to c/o pain, increased Oxy to 10 mg for severe pain. Pt is making more urine- 570ml past 24 hours. Lasix 120 mg given 3/3. Last HD 3/ w 2L removed. Cont to monitor delta Cr. IR has been consulted for placement of tunneled cath 3/4. IR  will call before sending for pt to confirm line still necessary. Blood cx NGTD. RLQ incision CDI. PD cath CDI. JEAN-PIERRE drain w 85cc serosang drainage. Pt had Kidney US on POD 0 reviewed with surgeon and ok.  BP improved, order to GIVE Midodrine for SBP <100. He received PCN #1 for donor RPR+ today. He is toleraring diet, denies n/v, passing flatus, LBM 3/3. NPO at MN. Monitor.      Past Medical, Surgical, Family, and Social History:   Unchanged from H&P.    Scheduled Meds:   bisacodyL  10 mg Oral QHS    calcitRIOL  0.5 mcg Oral Daily    docusate sodium  100 mg Oral TID    famotidine  20 mg Oral QHS    heparin (porcine)  5,000 Units Subcutaneous Q8H    latanoprost  1 drop Both Eyes QHS    levothyroxine  50 mcg Oral Before breakfast    midodrine  5 mg Oral Q8H    multivitamin  1 tablet Oral Daily    mupirocin  1 g Nasal BID    mycophenolate  1,000 mg Oral BID    oxybutynin  5 mg Oral TID    penicillin G benzathine  2.4 Million Units Intramuscular Weekly    predniSONE  20 mg Oral Daily    sevelamer carbonate  1,600 mg Oral TID WM    [START ON 3/10/2024] sulfamethoxazole-trimethoprim 400-80mg  1 tablet Oral Daily AM    tacrolimus  8 mg Oral BID    [START ON 3/10/2024] valGANciclovir  450 mg Oral Daily AM     Continuous Infusions:      PRN Meds:aluminum & magnesium hydroxide-simethicone, dextrose 10%, dextrose 10%, insulin aspart U-100, ondansetron, oxyCODONE, sodium chloride 0.9%, traMADoL    Intake/Output - Last 3 Shifts         03/01 0700  03/02 0659 03/02 0700  03/03 0659 03/03 0700  03/04 0659    P.O. 570 1300     I.V. (mL/kg) 63.3 (0.6) 35.5 (0.4)     Blood 596.8      Other  160     IV Piggyback 508.1 499.8     Total Intake(mL/kg) 1738.2 (17.8) 1995.3 (20.5)     Urine (mL/kg/hr) 350 (0.1) 570 (0.2)     Drains 80 85     Other 560 2000     Stool 0 0     Total Output 990 2655     Net +748.2 -659.8            Stool Occurrence 0 x 1 x              Review of Systems   Constitutional:  Negative for chills and fever.  "  Respiratory:  Negative for cough and shortness of breath.    Cardiovascular:  Negative for chest pain and leg swelling.   Gastrointestinal:  Positive for abdominal pain (incisional). Negative for abdominal distention, constipation, diarrhea, nausea and vomiting.   Genitourinary:  Negative for decreased urine volume, difficulty urinating, dysuria and frequency.   Skin:  Positive for wound.   Allergic/Immunologic: Positive for immunocompromised state.   Neurological:  Negative for headaches.   Psychiatric/Behavioral:  Negative for agitation, confusion and decreased concentration. The patient is not nervous/anxious.       Objective:     Vital Signs (Most Recent):  Temp: 97.9 °F (36.6 °C) (03/03/24 1113)  Pulse: 73 (03/03/24 1113)  Resp: 18 (03/03/24 1239)  BP: (!) 146/66 (03/03/24 1113)  SpO2: 96 % (03/03/24 1113) Vital Signs (24h Range):  Temp:  [97.8 °F (36.6 °C)-98.4 °F (36.9 °C)] 97.9 °F (36.6 °C)  Pulse:  [] 73  Resp:  [18-20] 18  SpO2:  [96 %-99 %] 96 %  BP: (119-167)/(60-92) 146/66     Weight: 97.4 kg (214 lb 11.7 oz)  Height: 5' 4" (162.6 cm)  Body mass index is 36.86 kg/m².     Physical Exam  Vitals and nursing note reviewed.   Constitutional:       General: He is not in acute distress.     Appearance: He is well-developed. He is not diaphoretic.   Cardiovascular:      Rate and Rhythm: Normal rate and regular rhythm.      Heart sounds: No murmur heard.     No friction rub.   Pulmonary:      Effort: Pulmonary effort is normal.      Breath sounds: Normal breath sounds. No wheezing or rales.   Abdominal:      General: There is no distension.      Palpations: Abdomen is soft.      Tenderness: There is abdominal tenderness. There is no guarding or rebound.      Comments: RLQ incision  JEAN-PIERRE x 1- serosang drainage   Genitourinary:     Comments: Curry to gravity- clear yellow output  Musculoskeletal:      Right lower leg: Edema present.      Left lower leg: Edema present.   Skin:     General: Skin is warm and " dry.      Capillary Refill: Capillary refill takes 2 to 3 seconds.   Neurological:      General: No focal deficit present.      Mental Status: He is alert and oriented to person, place, and time.   Psychiatric:         Mood and Affect: Mood normal.         Behavior: Behavior normal.         Thought Content: Thought content normal.         Judgment: Judgment normal.          Laboratory:  CBC:   Recent Labs   Lab 03/01/24 0337 03/02/24  0616 03/03/24  0548   WBC 18.74* 10.12 5.13   RBC 2.30* 2.88* 2.57*   HGB 7.1* 9.1* 7.9*   HCT 22.1* 26.3* 23.7*    166 106*   MCV 96 91 92   MCH 30.9 31.6* 30.7   MCHC 32.1 34.6 33.3       CMP:   Recent Labs   Lab 02/28/24 2027 02/29/24 0712 03/01/24 0337 03/02/24  0616 03/03/24  0548   GLU 98   < > 176* 228* 126*   CALCIUM 10.0   < > 8.3* 8.8 9.3   ALBUMIN 3.2*   < > 2.3* 2.8* 2.7*   PROT 7.0  --   --   --   --       < > 132* 130* 129*   K 4.1   < > 4.5 4.4 4.3   CO2 26   < > 19* 22* 21*   CL 99   < > 93* 92* 96   BUN 99*   < > 117* 73* 56*   CREATININE 12.7*   < > 13.7* 8.3* 5.6*   ALKPHOS 44*  --   --   --   --    ALT 20  --   --   --   --    AST 21  --   --   --   --     < > = values in this interval not displayed.       Labs within the past 24 hours have been reviewed.    Diagnostic Results:  US - Kidney: Results for orders placed during the hospital encounter of 02/28/24    US Transplant Kidney With Doppler    Narrative  EXAMINATION:  US TRANSPLANT KIDNEY WITH DOPPLER    CLINICAL HISTORY:  s/p kidney txp with decreased UOP and rising Cr;    TECHNIQUE:  Real time gray scale and doppler ultrasound was performed over the patient's renal allograft.    COMPARISON:  None    FINDINGS:  Renal allograft in the right lower quadrant.  The allograft measures 10.9 cm. Normal perfusion. No hydronephrosis.  Stent is present.    Fluid collection medial to the allograft in the subcutaneous tissue, likely hematoma measuring 3.9 x 2.2 x 7.8 cm.    Vasculature:    Resistive indices  ranged from 0.73 to 0.80.    Main renal artery peak systolic velocity: 264cm/sec with normal waveform.    Renal artery/iliac ratio: 1.8.    The main renal vein is patent with a velocity of 187 cm/sec.    Impression  Satisfactory gray scale and doppler evaluation of renal allograft with intraparenchymal resistive indices at the upper limits of normal to slightly elevated.    Small fluid collection in the subcutaneous tissue, likely hematoma.      Electronically signed by: Grabiel Godoy MD  Date:    02/29/2024  Time:    16:59

## 2024-03-03 NOTE — ASSESSMENT & PLAN NOTE
- DBD Ktx on 2/29 for diabetic nephropathy.   - 6 day cramer (difficult bladder anastomosis), 1 JEAN-PIERRE drain  - 350cc urine in past 24 hours.  - Kidney US obtained POD 0- reviewed with surgeon  - Last HD 3/2/2024 2L removed  - making more urine, lasix 120mg challenge 3/3  - cont to trend CR  - strict I/Os

## 2024-03-03 NOTE — SUBJECTIVE & OBJECTIVE
"Interval HPI:   No acute events overnight. Patient in room 94182/62787 A. Blood glucose stable. BG at and above goal on current insulin regimen (Transition Insulin Drip). Steroid use- Prednisone 20 mg QD.   2 Days Post-Op  Renal function- Abnormal - 5.6 cr    Vasopressors-  None      Diet diabetic Renal; 2000 Calorie; Standard Tray  Diet NPO Except for: Sips with Medication      Eatin%  Nausea: No  Hypoglycemia and intervention: No  Fever: No  TPN and/or TF: No        BP (!) 146/66   Pulse 73   Temp 97.9 °F (36.6 °C)   Resp 18   Ht 5' 4" (1.626 m)   Wt 97.4 kg (214 lb 11.7 oz)   SpO2 96%   BMI 36.86 kg/m²     Labs Reviewed and Include    Recent Labs   Lab 24  0548   *   CALCIUM 9.3   ALBUMIN 2.7*   *   K 4.3   CO2 21*   CL 96   BUN 56*   CREATININE 5.6*     Lab Results   Component Value Date    WBC 5.13 2024    HGB 7.9 (L) 2024    HCT 23.7 (L) 2024    MCV 92 2024     (L) 2024     No results for input(s): "TSH", "FREET4" in the last 168 hours.  Lab Results   Component Value Date    HGBA1C 5.8 (H) 2024       Nutritional status:   Body mass index is 36.86 kg/m².  Lab Results   Component Value Date    ALBUMIN 2.7 (L) 2024    ALBUMIN 2.8 (L) 2024    ALBUMIN 2.3 (L) 2024     No results found for: "PREALBUMIN"    Estimated Creatinine Clearance: 13.9 mL/min (A) (based on SCr of 5.6 mg/dL (H)).    Accu-Checks  Recent Labs     24  2124 24  0202 24  0753 24  1237 24  1728 24  2112 24  0159 24  0233 24  0837 24  1242   POCTGLUCOSE 231* 307* 233* 277* 189* 218* 90 95 164* 274*       Current Medications and/or Treatments Impacting Glycemic Control  Immunotherapy:    Immunosuppressants           Stop Route Frequency     tacrolimus capsule 8 mg         -- Oral 2 times daily     mycophenolate capsule 1,000 mg         -- Oral 2 times daily          Steroids:   Hormones (From " admission, onward)      Start     Stop Route Frequency Ordered    03/02/24 0900  predniSONE tablet 20 mg         -- Oral Daily 02/28/24 2058 02/29/24 0750  hydrocortisone sodium succinate injection 100 mg  (IP TXP KIDNEY POST-OP THYMO WITH PERIPHERAL PRECHECKED)         07/27/35 2350 IV Once as needed 02/29/24 0651    02/28/24 2144  hydrocortisone sodium succinate injection 100 mg         07/27/35 1344 IV Once as needed 02/28/24 2058          Pressors:    Autonomic Drugs (From admission, onward)      Start     Stop Route Frequency Ordered    03/01/24 2200  midodrine tablet 5 mg         -- Oral Every 8 hours 03/01/24 1526    02/29/24 0750  EPINEPHrine (PF) injection 1 mg  (IP TXP KIDNEY POST-OP THYMO WITH PERIPHERAL PRECHECKED)         07/27/35 2350 SubQ Once as needed 02/29/24 0651    02/28/24 2144  EPINEPHrine (PF) injection 1 mg         07/27/35 1344 SubQ Once as needed 02/28/24 2058          Hyperglycemia/Diabetes Medications:   Antihyperglycemics (From admission, onward)      Start     Stop Route Frequency Ordered    03/03/24 1645  insulin aspart U-100 pen 16 Units         -- SubQ 3 times daily with meals 03/03/24 1259    03/03/24 1315  insulin regular in 0.9 % NaCl 100 unit/100 mL (1 unit/mL) infusion        Question:  Enter initial dose (Units/hr):  Answer:  1.5    -- IV Continuous 03/03/24 1300    02/29/24 1328  insulin aspart U-100 pen 0-10 Units         -- SubQ Before meals, nightly and at 0200 PRN 02/29/24 1228

## 2024-03-03 NOTE — PROGRESS NOTES
[facility]  Endocrinology  Progress Note    Admit Date: 2024     Reason for Consult: Management of T2DM, Hyperglycemia     Surgical Procedure and Date: kidney transplant 2024    Diabetes diagnosis year:  per chart review     Home Diabetes Medications:  Tandem insulin pump   ICR 1:5   ISF 1:25  Basal rate 1.2 u/hr (unclear if in automode (pt somnolent))    How often checking glucose at home? >4 x day   BG readings on regimen: 180s-200s, occasional 300s  Hypoglycemia on the regimen?  Yes, once a week overnight, 2-3x during the week during the day   Missed doses on regimen?  n/a    Diabetes Complications include:     Hyperglycemia, Hypoglycemia , Hypoglycemia unawareness, Diabetic nephropathy  , and Diabetic chronic kidney disease         Complicating diabetes co morbidities:   Glucocorticoid use       HPI:   Patient is a 64 y.o. male with history of ESRD secondary to presumed diabetic nephropathy. Patient is currently on peritoneal dialysis started on 2023 (initially on HD). Patient is dialyzing on cyclic peritoneal dialysis. Patient reports that he is tolerating dialysis well. He has a PD catheter, permacath, and RUE AVF for dialysis access. Patient also with insulin pump. He now presents as primary candidate for a  donor kidney transplant. OR time set for 24 at 0800 AM. Pre-op labs and imaging ordered and will be reviewed prior to transplant. Will receive Thymoglobulin for induction. Endocrine consulted for BG management.         Interval HPI:   No acute events overnight. Patient in room 17537/42526 A. Blood glucose stable. BG at and above goal on current insulin regimen (Transition Insulin Drip). Steroid use- Prednisone 20 mg QD.   2 Days Post-Op  Renal function- Abnormal - 5.6 cr    Vasopressors-  None      Diet diabetic Renal; 2000 Calorie; Standard Tray  Diet NPO Except for: Sips with Medication      Eatin%  Nausea: No  Hypoglycemia and intervention:  "No  Fever: No  TPN and/or TF: No        BP (!) 146/66   Pulse 73   Temp 97.9 °F (36.6 °C)   Resp 18   Ht 5' 4" (1.626 m)   Wt 97.4 kg (214 lb 11.7 oz)   SpO2 96%   BMI 36.86 kg/m²     Labs Reviewed and Include    Recent Labs   Lab 03/03/24  0548   *   CALCIUM 9.3   ALBUMIN 2.7*   *   K 4.3   CO2 21*   CL 96   BUN 56*   CREATININE 5.6*     Lab Results   Component Value Date    WBC 5.13 03/03/2024    HGB 7.9 (L) 03/03/2024    HCT 23.7 (L) 03/03/2024    MCV 92 03/03/2024     (L) 03/03/2024     No results for input(s): "TSH", "FREET4" in the last 168 hours.  Lab Results   Component Value Date    HGBA1C 5.8 (H) 03/01/2024       Nutritional status:   Body mass index is 36.86 kg/m².  Lab Results   Component Value Date    ALBUMIN 2.7 (L) 03/03/2024    ALBUMIN 2.8 (L) 03/02/2024    ALBUMIN 2.3 (L) 03/01/2024     No results found for: "PREALBUMIN"    Estimated Creatinine Clearance: 13.9 mL/min (A) (based on SCr of 5.6 mg/dL (H)).    Accu-Checks  Recent Labs     03/01/24 2124 03/02/24  0202 03/02/24  0753 03/02/24  1237 03/02/24  1728 03/02/24  2112 03/03/24  0159 03/03/24  0233 03/03/24  0837 03/03/24  1242   POCTGLUCOSE 231* 307* 233* 277* 189* 218* 90 95 164* 274*       Current Medications and/or Treatments Impacting Glycemic Control  Immunotherapy:    Immunosuppressants           Stop Route Frequency     tacrolimus capsule 8 mg         -- Oral 2 times daily     mycophenolate capsule 1,000 mg         -- Oral 2 times daily          Steroids:   Hormones (From admission, onward)      Start     Stop Route Frequency Ordered    03/02/24 0900  predniSONE tablet 20 mg         -- Oral Daily 02/28/24 2058 02/29/24 0750  hydrocortisone sodium succinate injection 100 mg  (IP TXP KIDNEY POST-OP THYMO WITH PERIPHERAL PRECHECKED)         07/27/35 2350 IV Once as needed 02/29/24 0651    02/28/24 2144  hydrocortisone sodium succinate injection 100 mg         07/27/35 1344 IV Once as needed 02/28/24 2058      "     Pressors:    Autonomic Drugs (From admission, onward)      Start     Stop Route Frequency Ordered    24 2200  midodrine tablet 5 mg         -- Oral Every 8 hours 24 1526    24 0750  EPINEPHrine (PF) injection 1 mg  (IP TXP KIDNEY POST-OP THYMO WITH PERIPHERAL PRECHECKED)         35 2350 SubQ Once as needed 24 0651    24 2144  EPINEPHrine (PF) injection 1 mg         35 1344 SubQ Once as needed 24 2058          Hyperglycemia/Diabetes Medications:   Antihyperglycemics (From admission, onward)      Start     Stop Route Frequency Ordered    24 1645  insulin aspart U-100 pen 16 Units         -- SubQ 3 times daily with meals 24 1259    24 1315  insulin regular in 0.9 % NaCl 100 unit/100 mL (1 unit/mL) infusion        Question:  Enter initial dose (Units/hr):  Answer:  1.5    -- IV Continuous 24 1300    24 1328  insulin aspart U-100 pen 0-10 Units         -- SubQ Before meals, nightly and at 0200 PRN 24 1228            ASSESSMENT and PLAN    Renal/  * Status post -donor kidney transplantation  Managed per primary team        ID  At risk for opportunistic infections  Optimize BG control         Endocrine  Type 2 diabetes mellitus with kidney complication, with long-term current use of insulin  BG goal: 140-180    - Adjust insulin transition drip parameters to 1.5 u/h with stepdown parameters as fasting BG below goal, with goal of transitioning to patient's home insulin pump tomorrow (states he will have all of his supplies brought in at that time)   - Increase Novolog to 16 units TIDWM   - MDC (150/25) prn ac/hs/0200   - POCT Glucose before meals, at bedtime and at 2 am  - Hypoglycemia protocol in place      ** Please notify Endocrine for any change and/or advance in diet**  ** Please call Endocrine for any BG related issues **     Discharge Planning:   TBD. Please notify endocrinology prior to discharge.            Holly  ERINN Cardozo  Endocrinology  [facility]

## 2024-03-03 NOTE — PROGRESS NOTES
[facility]  Kidney Transplant  Progress Note      Reason for Follow-up: Reassessment of Kidney Transplant - 2024  (#1) recipient and management of immunosuppression.    ORGAN:  RIGHT KIDNEY   Donor Type:  Donation after Brain Death   Donor CMV Status: Positive  Donor HBcAB:Negative  Donor HCV Status:Negative  Donor HBV RAHEEM:   Donor HCV RAHEEM: Negative      Subjective:   History of Present Illness:  Mark Lopez is a 64 year old male with a history of ESRD secondary to presumed diabetic nephropathy. Patient is currently on peritoneal dialysis started on 2023 (initially on HD). Patient is dialyzing on cyclic peritoneal dialysis. Patient reports that he is tolerating dialysis well. He has a PD catheter, permacath, and RUE AVF for dialysis access. Patient also with insulin pump. He now presents as primary candidate for a  donor kidney transplant. OR time set for 24 at 0800 AM. Pre-op labs and imaging ordered and will be reviewed prior to transplant. Will receive Thymoglobulin for induction.              Mr. Lopez is a 64 y.o. year old male who is status post Kidney Transplant - 2024  (#1).  His maintenance immunosuppression consists of:   Immunosuppressants (From admission, onward)      Start     Stop Route Frequency Ordered    24 1800  tacrolimus capsule 8 mg  (IP TXP KIDNEY POST-OP THYMO WITH PERIPHERAL PRECHECKED)         -- Oral 2 times daily 24 1228    24 1000  mycophenolate capsule 1,000 mg  (IP TXP KIDNEY POST-OP THYMO WITH PERIPHERAL PRECHECKED)         -- Oral 2 times daily 24 0651            Hospital Course:  Patient now s/p DBD Ktx on 24 for presumed diabetic nephropathy (donor RPR+, CMV D+, R-, CIT ~16.5hrs). Patient PD prior to surgery (prev HD, fistula not working), reports making some urine, ~1 cup/day. PD cath remains in place.  6 day cramer for difficult bladder anastomosis. JEAN-PIERRE x 1. CIT ~16.5 hrs.     Interval Hx: No acute events  overnight. Pt has 6 day cramer due to difficult anastomosis. Pt c/o bladder spasms/pressure. Ditropan ordered. Reported spasm better but continued to c/o pain, increased Oxy to 10 mg for severe pain. Pt is making more urine- 570ml past 24 hours. Lasix 120 mg given 3/3. Last HD 3/2 w 2L removed. Cont to monitor delta Cr. IR has been consulted for placement of tunneled cath 3/4. IR will call before sending for pt to confirm line still necessary. Blood cx NGTD. RLQ incision CDI. PD cath CDI. JEAN-PIERRE drain w 85cc serosang drainage. Pt had Kidney US on POD 0 reviewed with surgeon and ok.  BP improved, order to GIVE Midodrine for SBP <100. He received PCN #1 for donor RPR+ today. He is toleraring diet, denies n/v, passing flatus, LBM 3/3. NPO at MN. Monitor.      Past Medical, Surgical, Family, and Social History:   Unchanged from H&P.    Scheduled Meds:   bisacodyL  10 mg Oral QHS    calcitRIOL  0.5 mcg Oral Daily    docusate sodium  100 mg Oral TID    famotidine  20 mg Oral QHS    heparin (porcine)  5,000 Units Subcutaneous Q8H    latanoprost  1 drop Both Eyes QHS    levothyroxine  50 mcg Oral Before breakfast    midodrine  5 mg Oral Q8H    multivitamin  1 tablet Oral Daily    mupirocin  1 g Nasal BID    mycophenolate  1,000 mg Oral BID    oxybutynin  5 mg Oral TID    penicillin G benzathine  2.4 Million Units Intramuscular Weekly    predniSONE  20 mg Oral Daily    sevelamer carbonate  1,600 mg Oral TID WM    [START ON 3/10/2024] sulfamethoxazole-trimethoprim 400-80mg  1 tablet Oral Daily AM    tacrolimus  8 mg Oral BID    [START ON 3/10/2024] valGANciclovir  450 mg Oral Daily AM     Continuous Infusions:      PRN Meds:aluminum & magnesium hydroxide-simethicone, dextrose 10%, dextrose 10%, insulin aspart U-100, ondansetron, oxyCODONE, sodium chloride 0.9%, traMADoL    Intake/Output - Last 3 Shifts         03/01 0700  03/02 0659 03/02 0700  03/03 0659 03/03 0700  03/04 0659    P.O. 570 1300     I.V. (mL/kg) 63.3 (0.6) 35.5  "(0.4)     Blood 596.8      Other  160     IV Piggyback 508.1 499.8     Total Intake(mL/kg) 1738.2 (17.8) 1995.3 (20.5)     Urine (mL/kg/hr) 350 (0.1) 570 (0.2)     Drains 80 85     Other 560 2000     Stool 0 0     Total Output 990 2655     Net +748.2 -659.8            Stool Occurrence 0 x 1 x              Review of Systems   Constitutional:  Negative for chills and fever.   Respiratory:  Negative for cough and shortness of breath.    Cardiovascular:  Negative for chest pain and leg swelling.   Gastrointestinal:  Positive for abdominal pain (incisional). Negative for abdominal distention, constipation, diarrhea, nausea and vomiting.   Genitourinary:  Negative for decreased urine volume, difficulty urinating, dysuria and frequency.   Skin:  Positive for wound.   Allergic/Immunologic: Positive for immunocompromised state.   Neurological:  Negative for headaches.   Psychiatric/Behavioral:  Negative for agitation, confusion and decreased concentration. The patient is not nervous/anxious.       Objective:     Vital Signs (Most Recent):  Temp: 97.9 °F (36.6 °C) (03/03/24 1113)  Pulse: 73 (03/03/24 1113)  Resp: 18 (03/03/24 1239)  BP: (!) 146/66 (03/03/24 1113)  SpO2: 96 % (03/03/24 1113) Vital Signs (24h Range):  Temp:  [97.8 °F (36.6 °C)-98.4 °F (36.9 °C)] 97.9 °F (36.6 °C)  Pulse:  [] 73  Resp:  [18-20] 18  SpO2:  [96 %-99 %] 96 %  BP: (119-167)/(60-92) 146/66     Weight: 97.4 kg (214 lb 11.7 oz)  Height: 5' 4" (162.6 cm)  Body mass index is 36.86 kg/m².    Physical Exam  Vitals and nursing note reviewed.   Constitutional:       General: He is not in acute distress.     Appearance: He is well-developed. He is not diaphoretic.   Cardiovascular:      Rate and Rhythm: Normal rate and regular rhythm.      Heart sounds: No murmur heard.     No friction rub.   Pulmonary:      Effort: Pulmonary effort is normal.      Breath sounds: Normal breath sounds. No wheezing or rales.   Abdominal:      General: There is no " distension.      Palpations: Abdomen is soft.      Tenderness: There is abdominal tenderness. There is no guarding or rebound.      Comments: RLQ incision  JEAN-PIERRE x 1- serosang drainage   Genitourinary:     Comments: Curry to gravity- clear yellow output  Musculoskeletal:      Right lower leg: Edema present.      Left lower leg: Edema present.   Skin:     General: Skin is warm and dry.      Capillary Refill: Capillary refill takes 2 to 3 seconds.   Neurological:      General: No focal deficit present.      Mental Status: He is alert and oriented to person, place, and time.   Psychiatric:         Mood and Affect: Mood normal.         Behavior: Behavior normal.         Thought Content: Thought content normal.         Judgment: Judgment normal.          Laboratory:  CBC:   Recent Labs   Lab 03/01/24 0337 03/02/24  0616 03/03/24  0548   WBC 18.74* 10.12 5.13   RBC 2.30* 2.88* 2.57*   HGB 7.1* 9.1* 7.9*   HCT 22.1* 26.3* 23.7*    166 106*   MCV 96 91 92   MCH 30.9 31.6* 30.7   MCHC 32.1 34.6 33.3       CMP:   Recent Labs   Lab 02/28/24 2027 02/29/24  0712 03/01/24  0337 03/02/24  0616 03/03/24  0548   GLU 98   < > 176* 228* 126*   CALCIUM 10.0   < > 8.3* 8.8 9.3   ALBUMIN 3.2*   < > 2.3* 2.8* 2.7*   PROT 7.0  --   --   --   --       < > 132* 130* 129*   K 4.1   < > 4.5 4.4 4.3   CO2 26   < > 19* 22* 21*   CL 99   < > 93* 92* 96   BUN 99*   < > 117* 73* 56*   CREATININE 12.7*   < > 13.7* 8.3* 5.6*   ALKPHOS 44*  --   --   --   --    ALT 20  --   --   --   --    AST 21  --   --   --   --     < > = values in this interval not displayed.       Labs within the past 24 hours have been reviewed.    Diagnostic Results:  US - Kidney: Results for orders placed during the hospital encounter of 02/28/24    US Transplant Kidney With Doppler    Narrative  EXAMINATION:  US TRANSPLANT KIDNEY WITH DOPPLER    CLINICAL HISTORY:  s/p kidney txp with decreased UOP and rising Cr;    TECHNIQUE:  Real time gray scale and doppler  ultrasound was performed over the patient's renal allograft.    COMPARISON:  None    FINDINGS:  Renal allograft in the right lower quadrant.  The allograft measures 10.9 cm. Normal perfusion. No hydronephrosis.  Stent is present.    Fluid collection medial to the allograft in the subcutaneous tissue, likely hematoma measuring 3.9 x 2.2 x 7.8 cm.    Vasculature:    Resistive indices ranged from 0.73 to 0.80.    Main renal artery peak systolic velocity: 264cm/sec with normal waveform.    Renal artery/iliac ratio: 1.8.    The main renal vein is patent with a velocity of 187 cm/sec.    Impression  Satisfactory gray scale and doppler evaluation of renal allograft with intraparenchymal resistive indices at the upper limits of normal to slightly elevated.    Small fluid collection in the subcutaneous tissue, likely hematoma.      Electronically signed by: Grabiel Godoy MD  Date:    2024  Time:    16:59  Assessment/Plan:     * Status post -donor kidney transplantation  - DBD Ktx on  for diabetic nephropathy.   - 6 day cramer (difficult bladder anastomosis), 1 JEAN-PIERRE drain  - 350cc urine in past 24 hours.  - Kidney US obtained POD 0- reviewed with surgeon  - Last HD 3/2/2024 2L removed  - making more urine, lasix 120mg challenge 3/3  - cont to trend CR  - strict I/Os    Bladder spasm  - improved w Ditropan  - 6 day cramer- can likely d/c once cramer removed      Delayed graft function of kidney  - expected 2/2 prolonged CIT  - Last HD 3/2 for DGF, tolerated well  - prev on PD, IR consulted for perm cath placement Monday for anticipated outpatient HD needs  - pt making more urine, Lasix 120 mg 3/3  - monitor Cr and UOP to confirm still needs tunneled cath Monday      Positive RPR test in cadaveric donor  - Per ID recs, plan for PCN G x 3 weekly  - dose #1 on 3/1      Acute on chronic anemia  - Transfused 2u prbc on 3/1 with dialysis with appropriate response  - cont to trend cbc. Monitor        At risk for  "opportunistic infections  - cont OI prophylaxis per protocol.      Long-term use of immunosuppressant medication  - see "prophylactic immuno"      Prophylactic immunotherapy  - Continue Cellcept and Prograf. Will monitor for signs of toxic side effects, check daily tacrolimus troughs, and change meds accordingly.       Essential hypertension  - hold home meds due to hypotension   - giving Midodrine for BP<100      Type 2 diabetes mellitus with kidney complication, with long-term current use of insulin  - endocrine consulted; appreciate recs        Discharge Planning:  Discussed plan of care.  No plan for discharge today.    Medical decision making for this encounter includes review of pertinent labs and diagnostic studies, assessment and planning, discussions with consulting providers, discussion with patient/family, and participation in multidisciplinary rounds. Time spent caring for patient: 60 minutes    Jenna Rene NP  Kidney Transplant  [facility]  "

## 2024-03-03 NOTE — PLAN OF CARE
Patient AAOx4. OOB independently. VSS. Pain management updated to oxycodone 10 mg q6, tramadol 50 mg q6. Updated blue card and med box. Med box ?%. Insulin Regular 1.5 units/hr dc'd. Oxybutynin 5 mg TIB for bladder spasms. NPO @ midnight for tunnel cath placement tomorrow. Cr trending down 5.6. JEAN-PIERRE drain site CDI, no leaking. Incision CDI, staples intact. PD cathter taped to abdomen, site CDI. Bed lowest setting, locked, 2x rails up, call light within reach.

## 2024-03-03 NOTE — ASSESSMENT & PLAN NOTE
BG goal: 140-180    - Adjust insulin transition drip parameters to 1.5 u/h with stepdown parameters as fasting BG below goal, with goal of transitioning to patient's home insulin pump tomorrow (states he will have all of his supplies brought in at that time)   - Increase Novolog to 16 units TIDWM   - [facility] (150/25) prn ac/hs/0200   - POCT Glucose before meals, at bedtime and at 2 am  - Hypoglycemia protocol in place      ** Please notify Endocrine for any change and/or advance in diet**  ** Please call Endocrine for any BG related issues **     Discharge Planning:   TBD. Please notify endocrinology prior to discharge.

## 2024-03-04 LAB
ALBUMIN SERPL BCP-MCNC: 2.8 G/DL (ref 3.5–5.2)
ANION GAP SERPL CALC-SCNC: 12 MMOL/L (ref 8–16)
BACTERIA FLD AEROBE CULT: NO GROWTH
BASOPHILS # BLD AUTO: 0 K/UL (ref 0–0.2)
BASOPHILS NFR BLD: 0 % (ref 0–1.9)
BUN SERPL-MCNC: 88 MG/DL (ref 8–23)
CALCIUM SERPL-MCNC: 9 MG/DL (ref 8.7–10.5)
CHLORIDE SERPL-SCNC: 93 MMOL/L (ref 95–110)
CLASS I ANTIBODIES - LUMINEX: NORMAL
CLASS I ANTIBODY COMMENTS - LUMINEX: NORMAL
CLASS II ANTIBODIES - LUMINEX: NORMAL
CO2 SERPL-SCNC: 23 MMOL/L (ref 23–29)
CPRA %: 52
CREAT SERPL-MCNC: 6.4 MG/DL (ref 0.5–1.4)
DIFFERENTIAL METHOD BLD: ABNORMAL
EOSINOPHIL # BLD AUTO: 0 K/UL (ref 0–0.5)
EOSINOPHIL NFR BLD: 0.2 % (ref 0–8)
ERYTHROCYTE [DISTWIDTH] IN BLOOD BY AUTOMATED COUNT: 14.7 % (ref 11.5–14.5)
EST. GFR  (NO RACE VARIABLE): 9 ML/MIN/1.73 M^2
GLUCOSE SERPL-MCNC: 93 MG/DL (ref 70–110)
GRAM STN SPEC: NORMAL
GRAM STN SPEC: NORMAL
HCT VFR BLD AUTO: 22.5 % (ref 40–54)
HGB BLD-MCNC: 7.7 G/DL (ref 14–18)
HPRA INTERPRETATION: NORMAL
IMM GRANULOCYTES # BLD AUTO: 0.07 K/UL (ref 0–0.04)
IMM GRANULOCYTES NFR BLD AUTO: 1.1 % (ref 0–0.5)
LYMPHOCYTES # BLD AUTO: 0 K/UL (ref 1–4.8)
LYMPHOCYTES NFR BLD: 0.2 % (ref 18–48)
MAGNESIUM SERPL-MCNC: 2.8 MG/DL (ref 1.6–2.6)
MCH RBC QN AUTO: 31.6 PG (ref 27–31)
MCHC RBC AUTO-ENTMCNC: 34.2 G/DL (ref 32–36)
MCV RBC AUTO: 92 FL (ref 82–98)
MONOCYTES # BLD AUTO: 0.6 K/UL (ref 0.3–1)
MONOCYTES NFR BLD: 9.1 % (ref 4–15)
NEUTROPHILS # BLD AUTO: 5.7 K/UL (ref 1.8–7.7)
NEUTROPHILS NFR BLD: 89.4 % (ref 38–73)
NRBC BLD-RTO: 1 /100 WBC
PHOSPHATE SERPL-MCNC: 6.4 MG/DL (ref 2.7–4.5)
PLATELET # BLD AUTO: 97 K/UL (ref 150–450)
PMV BLD AUTO: 11.4 FL (ref 9.2–12.9)
POCT GLUCOSE: 121 MG/DL (ref 70–110)
POCT GLUCOSE: 132 MG/DL (ref 70–110)
POCT GLUCOSE: 153 MG/DL (ref 70–110)
POCT GLUCOSE: 197 MG/DL (ref 70–110)
POTASSIUM SERPL-SCNC: 4.3 MMOL/L (ref 3.5–5.1)
RBC # BLD AUTO: 2.44 M/UL (ref 4.6–6.2)
SERUM COLLECTION DT - LUMINEX CLASS I: NORMAL
SERUM COLLECTION DT - LUMINEX CLASS II: NORMAL
SODIUM SERPL-SCNC: 128 MMOL/L (ref 136–145)
SPCL1 TESTING DATE: NORMAL
SPCL2 TESTING DATE: NORMAL
SPLUA TESTING DATE: NORMAL
TACROLIMUS BLD-MCNC: 5.9 NG/ML (ref 5–15)
WBC # BLD AUTO: 6.38 K/UL (ref 3.9–12.7)

## 2024-03-04 PROCEDURE — 99233 SBSQ HOSP IP/OBS HIGH 50: CPT | Mod: 24,,, | Performed by: PHYSICIAN ASSISTANT

## 2024-03-04 PROCEDURE — 85025 COMPLETE CBC W/AUTO DIFF WBC: CPT

## 2024-03-04 PROCEDURE — 63600175 PHARM REV CODE 636 W HCPCS

## 2024-03-04 PROCEDURE — 02H633Z INSERTION OF INFUSION DEVICE INTO RIGHT ATRIUM, PERCUTANEOUS APPROACH: ICD-10-PCS | Performed by: STUDENT IN AN ORGANIZED HEALTH CARE EDUCATION/TRAINING PROGRAM

## 2024-03-04 PROCEDURE — 25000003 PHARM REV CODE 250: Performed by: STUDENT IN AN ORGANIZED HEALTH CARE EDUCATION/TRAINING PROGRAM

## 2024-03-04 PROCEDURE — 25000003 PHARM REV CODE 250

## 2024-03-04 PROCEDURE — 80069 RENAL FUNCTION PANEL: CPT

## 2024-03-04 PROCEDURE — 63600175 PHARM REV CODE 636 W HCPCS: Performed by: CLINICAL NURSE SPECIALIST

## 2024-03-04 PROCEDURE — 25000003 PHARM REV CODE 250: Performed by: NURSE PRACTITIONER

## 2024-03-04 PROCEDURE — 63600175 PHARM REV CODE 636 W HCPCS: Performed by: INTERNAL MEDICINE

## 2024-03-04 PROCEDURE — 80197 ASSAY OF TACROLIMUS: CPT

## 2024-03-04 PROCEDURE — 25000003 PHARM REV CODE 250: Performed by: CLINICAL NURSE SPECIALIST

## 2024-03-04 PROCEDURE — 25000003 PHARM REV CODE 250: Performed by: PHYSICIAN ASSISTANT

## 2024-03-04 PROCEDURE — 63600175 PHARM REV CODE 636 W HCPCS: Performed by: STUDENT IN AN ORGANIZED HEALTH CARE EDUCATION/TRAINING PROGRAM

## 2024-03-04 PROCEDURE — 20600001 HC STEP DOWN PRIVATE ROOM

## 2024-03-04 PROCEDURE — 99232 SBSQ HOSP IP/OBS MODERATE 35: CPT | Mod: ,,,

## 2024-03-04 PROCEDURE — 83735 ASSAY OF MAGNESIUM: CPT

## 2024-03-04 PROCEDURE — 0JH63XZ INSERTION OF TUNNELED VASCULAR ACCESS DEVICE INTO CHEST SUBCUTANEOUS TISSUE AND FASCIA, PERCUTANEOUS APPROACH: ICD-10-PCS | Performed by: STUDENT IN AN ORGANIZED HEALTH CARE EDUCATION/TRAINING PROGRAM

## 2024-03-04 RX ORDER — OXYBUTYNIN CHLORIDE 5 MG/1
5 TABLET ORAL 3 TIMES DAILY PRN
Qty: 90 TABLET | Refills: 11 | Status: ON HOLD | OUTPATIENT
Start: 2024-03-04 | End: 2024-03-10

## 2024-03-04 RX ORDER — POLYETHYLENE GLYCOL 3350 17 G/17G
17 POWDER, FOR SOLUTION ORAL DAILY
Status: DISCONTINUED | OUTPATIENT
Start: 2024-03-04 | End: 2024-03-05 | Stop reason: HOSPADM

## 2024-03-04 RX ORDER — GLUCAGON 1 MG
1 KIT INJECTION
Status: DISCONTINUED | OUTPATIENT
Start: 2024-03-04 | End: 2024-03-05 | Stop reason: HOSPADM

## 2024-03-04 RX ORDER — OXYCODONE HYDROCHLORIDE 10 MG/1
10 TABLET ORAL EVERY 4 HOURS PRN
Status: DISCONTINUED | OUTPATIENT
Start: 2024-03-04 | End: 2024-03-05 | Stop reason: HOSPADM

## 2024-03-04 RX ORDER — OXYCODONE HYDROCHLORIDE 5 MG/1
5 TABLET ORAL ONCE
Status: COMPLETED | OUTPATIENT
Start: 2024-03-04 | End: 2024-03-04

## 2024-03-04 RX ORDER — CALCITRIOL 0.5 UG/1
0.5 CAPSULE ORAL DAILY
Qty: 30 CAPSULE | Refills: 5 | Status: SHIPPED | OUTPATIENT
Start: 2024-03-05 | End: 2024-05-29 | Stop reason: ALTCHOICE

## 2024-03-04 RX ORDER — HYDROMORPHONE HYDROCHLORIDE 1 MG/ML
0.5 INJECTION, SOLUTION INTRAMUSCULAR; INTRAVENOUS; SUBCUTANEOUS ONCE
Status: COMPLETED | OUTPATIENT
Start: 2024-03-04 | End: 2024-03-04

## 2024-03-04 RX ORDER — SEVELAMER CARBONATE 800 MG/1
1600 TABLET, FILM COATED ORAL
Qty: 180 TABLET | Refills: 11 | Status: ON HOLD | OUTPATIENT
Start: 2024-03-04 | End: 2024-03-10 | Stop reason: HOSPADM

## 2024-03-04 RX ORDER — GLUCAGON 1 MG
1 KIT INJECTION
Status: CANCELLED | OUTPATIENT
Start: 2024-03-04

## 2024-03-04 RX ORDER — IBUPROFEN 200 MG
24 TABLET ORAL
Status: DISCONTINUED | OUTPATIENT
Start: 2024-03-04 | End: 2024-03-05 | Stop reason: HOSPADM

## 2024-03-04 RX ORDER — MIDAZOLAM HYDROCHLORIDE 1 MG/ML
INJECTION INTRAMUSCULAR; INTRAVENOUS
Status: COMPLETED | OUTPATIENT
Start: 2024-03-04 | End: 2024-03-04

## 2024-03-04 RX ORDER — IBUPROFEN 200 MG
24 TABLET ORAL
Status: CANCELLED | OUTPATIENT
Start: 2024-03-04

## 2024-03-04 RX ORDER — KETOCONAZOLE 200 MG/1
100 TABLET ORAL DAILY
Qty: 15 TABLET | Refills: 5 | Status: SHIPPED | OUTPATIENT
Start: 2024-03-04 | End: 2024-03-11

## 2024-03-04 RX ORDER — IBUPROFEN 200 MG
16 TABLET ORAL
Status: DISCONTINUED | OUTPATIENT
Start: 2024-03-04 | End: 2024-03-05 | Stop reason: HOSPADM

## 2024-03-04 RX ORDER — HEPARIN SODIUM 1000 [USP'U]/ML
INJECTION, SOLUTION INTRAVENOUS; SUBCUTANEOUS
Status: COMPLETED | OUTPATIENT
Start: 2024-03-04 | End: 2024-03-04

## 2024-03-04 RX ORDER — FENTANYL CITRATE 50 UG/ML
INJECTION, SOLUTION INTRAMUSCULAR; INTRAVENOUS
Status: COMPLETED | OUTPATIENT
Start: 2024-03-04 | End: 2024-03-04

## 2024-03-04 RX ORDER — IBUPROFEN 200 MG
16 TABLET ORAL
Status: CANCELLED | OUTPATIENT
Start: 2024-03-04

## 2024-03-04 RX ORDER — INSULIN ASPART 100 [IU]/ML
12 INJECTION, SOLUTION INTRAVENOUS; SUBCUTANEOUS
Status: DISCONTINUED | OUTPATIENT
Start: 2024-03-04 | End: 2024-03-05 | Stop reason: HOSPADM

## 2024-03-04 RX ORDER — OXYCODONE HYDROCHLORIDE 5 MG/1
5 TABLET ORAL EVERY 4 HOURS PRN
Status: DISCONTINUED | OUTPATIENT
Start: 2024-03-04 | End: 2024-03-05 | Stop reason: HOSPADM

## 2024-03-04 RX ADMIN — OXYBUTYNIN CHLORIDE 5 MG: 5 TABLET ORAL at 01:03

## 2024-03-04 RX ADMIN — MYCOPHENOLATE MOFETIL 1000 MG: 250 CAPSULE ORAL at 08:03

## 2024-03-04 RX ADMIN — OXYCODONE 5 MG: 5 TABLET ORAL at 02:03

## 2024-03-04 RX ADMIN — HEPARIN SODIUM 3200 UNITS: 1000 INJECTION, SOLUTION INTRAVENOUS; SUBCUTANEOUS at 09:03

## 2024-03-04 RX ADMIN — TRAMADOL HYDROCHLORIDE 50 MG: 50 TABLET, COATED ORAL at 06:03

## 2024-03-04 RX ADMIN — KETOCONAZOLE 100 MG: 200 TABLET ORAL at 01:03

## 2024-03-04 RX ADMIN — HEPARIN SODIUM 5000 UNITS: 5000 INJECTION INTRAVENOUS; SUBCUTANEOUS at 05:03

## 2024-03-04 RX ADMIN — MUPIROCIN 1 G: 20 OINTMENT TOPICAL at 02:03

## 2024-03-04 RX ADMIN — SEVELAMER CARBONATE 1600 MG: 800 TABLET, FILM COATED ORAL at 05:03

## 2024-03-04 RX ADMIN — HYDROMORPHONE HYDROCHLORIDE 0.5 MG: 0.5 INJECTION, SOLUTION INTRAMUSCULAR; INTRAVENOUS; SUBCUTANEOUS at 07:03

## 2024-03-04 RX ADMIN — DEXTROSE MONOHYDRATE 1 G: 2.5 INJECTION INTRAVENOUS at 09:03

## 2024-03-04 RX ADMIN — BISACODYL 10 MG: 5 TABLET, COATED ORAL at 09:03

## 2024-03-04 RX ADMIN — FENTANYL CITRATE 25 MCG: 50 INJECTION, SOLUTION INTRAMUSCULAR; INTRAVENOUS at 09:03

## 2024-03-04 RX ADMIN — HEPARIN SODIUM 5000 UNITS: 5000 INJECTION INTRAVENOUS; SUBCUTANEOUS at 02:03

## 2024-03-04 RX ADMIN — LATANOPROST 1 DROP: 50 SOLUTION OPHTHALMIC at 09:03

## 2024-03-04 RX ADMIN — PREDNISONE 20 MG: 20 TABLET ORAL at 08:03

## 2024-03-04 RX ADMIN — OXYBUTYNIN CHLORIDE 5 MG: 5 TABLET ORAL at 09:03

## 2024-03-04 RX ADMIN — MUPIROCIN 1 G: 20 OINTMENT TOPICAL at 09:03

## 2024-03-04 RX ADMIN — MYCOPHENOLATE MOFETIL 1000 MG: 250 CAPSULE ORAL at 09:03

## 2024-03-04 RX ADMIN — MIDAZOLAM HYDROCHLORIDE 1 MG: 2 INJECTION, SOLUTION INTRAMUSCULAR; INTRAVENOUS at 09:03

## 2024-03-04 RX ADMIN — TACROLIMUS 8 MG: 1 CAPSULE ORAL at 05:03

## 2024-03-04 RX ADMIN — OXYCODONE HYDROCHLORIDE 10 MG: 10 TABLET ORAL at 02:03

## 2024-03-04 RX ADMIN — INSULIN ASPART 12 UNITS: 100 INJECTION, SOLUTION INTRAVENOUS; SUBCUTANEOUS at 01:03

## 2024-03-04 RX ADMIN — INSULIN ASPART 12 UNITS: 100 INJECTION, SOLUTION INTRAVENOUS; SUBCUTANEOUS at 06:03

## 2024-03-04 RX ADMIN — DOCUSATE SODIUM 100 MG: 100 CAPSULE, LIQUID FILLED ORAL at 08:03

## 2024-03-04 RX ADMIN — OXYCODONE HYDROCHLORIDE 10 MG: 10 TABLET ORAL at 06:03

## 2024-03-04 RX ADMIN — OXYCODONE 5 MG: 5 TABLET ORAL at 05:03

## 2024-03-04 RX ADMIN — HEPARIN SODIUM 5000 UNITS: 5000 INJECTION INTRAVENOUS; SUBCUTANEOUS at 09:03

## 2024-03-04 RX ADMIN — THERA TABS 1 TABLET: TAB at 08:03

## 2024-03-04 RX ADMIN — TACROLIMUS 8 MG: 1 CAPSULE ORAL at 07:03

## 2024-03-04 RX ADMIN — LEVOTHYROXINE SODIUM 50 MCG: 50 TABLET ORAL at 05:03

## 2024-03-04 RX ADMIN — POLYETHYLENE GLYCOL 3350 17 G: 17 POWDER, FOR SOLUTION ORAL at 01:03

## 2024-03-04 RX ADMIN — FAMOTIDINE 20 MG: 20 TABLET, FILM COATED ORAL at 09:03

## 2024-03-04 RX ADMIN — CALCITRIOL CAPSULES 0.5 MCG 0.5 MCG: 0.5 CAPSULE ORAL at 08:03

## 2024-03-04 RX ADMIN — SEVELAMER CARBONATE 1600 MG: 800 TABLET, FILM COATED ORAL at 01:03

## 2024-03-04 NOTE — PROGRESS NOTES
"[facility] - [facility] Stepdown  Adult Nutrition  Progress Note    SUMMARY       Recommendations    Recommendation:    1. Continue diabetic renal diet    2. Change Boost gluose to Novasource Renal BID    3. RD to monitor and follow    Goals:Meet 75%-100% EEN/EPN by RD follow-up  Nutrition Goal Status: progressing towards goal  Communication of RD Recs:  (POC)    Assessment and Plan    Nutrition Problem  Increased nutrient needs      Related to (etiology):   Increased demand for nutrient due to sx     Signs and Symptoms (as evidenced by):   S/p DBD Kidney transplant (2/29).     Interventions/Recommendations (treatment strategy):  Collaboration with other providers  Nutrition education    Nutrition Diagnosis Status:  Continue    Reason for Assessment    Reason For Assessment: RD follow-up  Diagnosis: transplant/postoperative complications (s/p kidney transplant)  Relevant Medical History: T2D, HTN, anemia  Interdisciplinary Rounds: did not attend    General Information Comments:  RD f/u: Pt currently on diabetic renal diet 2000kcal. Pt educated on food safety. Pt currently consumes boost glucose TID. Pt reports decreased appetite, stating he is only eating the veggies on the tray. Pt endorses c/o light constipation, nausea, and trouble swallowing dry meats. Pt states current laxatives ineffective, he uses Miralax at home.    Nutrition Discharge Planning: Post transplant nutrition education provided on 3/4/24. Food safety/drug interactions emphasized. General healthy/low salt diet recommended. Education material left at bedside. No other needs identified.    Nutrition Risk Screen    Nutrition Risk Screen: no indicators present    Nutrition/Diet History    Spiritual, Cultural Beliefs, Yarsani Practices, Values that Affect Care: no  Factors Affecting Nutritional Intake: decreased appetite, difficulty/impaired swallowing    Anthropometrics    Temp: 97.1 °F (36.2 °C)  Height: 5' 4" (162.6 cm)  Height (inches): 64 " in  Weight Method: Bed Scale  Weight: 96.7 kg (213 lb 3 oz)  Weight (lb): 213.19 lb  Ideal Body Weight (IBW), Male: 130 lb  % Ideal Body Weight, Male (lb): 163.99 %  BMI (Calculated): 36.6  BMI Grade: 35 - 39.9 - obesity - grade II  Usual Body Weight (UBW), k kg (dry weight)  % Usual Body Weight: 110.12  % Weight Change From Usual Weight: 9.89 %     Lab/Procedures/Meds    Pertinent Labs Reviewed: reviewed  Pertinent Labs Comments: Na 128; Mg 2.8; BUN 88; Cr 6.4; Alb 2.8; Phos 6.4; eGFR 9.0  Pertinent Medications Reviewed: reviewed  Pertinent Medications Comments: heparin, prednisone, sevelamer carbonate, calcitriol, famotidine, MVI, insulin; mycophenolate, tacrolimus, levothyroxine; valganciclovir    Estimated/Assessed Needs    Weight Used For Calorie Calculations: 59 kg (130 lb) (IBW)  Energy Calorie Requirements (kcal): 2053-8646 kcal (25-30 kcal/kg IBW)  Energy Need Method: Kcal/kg  Protein Requirements:  g (1.2-2.0 g/kg IBW)  Weight Used For Protein Calculations: 59 kg (130 lb) (IBW)  Fluid Requirements (mL): 1ml/kcal or per MD  Estimated Fluid Requirement Method: RDA Method  RDA Method (mL): 1474  CHO Requirement: 184- 221 g    Nutrition Prescription Ordered    Current Diet Order: diabetic renal  Oral Nutrition Supplement: boost glucose    Evaluation of Received Nutrient/Fluid Intake    I/O: + 2.2 L since admit  Energy Calories Required: not meeting needs  Protein Required: not meeting needs  Comments: LBM: 3/4  Tolerance: tolerating  % Intake of Estimated Energy Needs: 25 - 50 %  % Meal Intake: 25 - 50 %    Nutrition Risk    Level of Risk/Frequency of Follow-up:  (1x weekly)     Monitor and Evaluation    Food and Nutrient Intake: food and beverage intake, energy intake  Food and Nutrient Adminstration: diet order  Knowledge/Beliefs/Attitudes: food and nutrition knowledge/skill  Physical Activity and Function: nutrition-related ADLs and IADLs  Anthropometric Measurements: height/length, weight, body  mass index, weight change  Biochemical Data, Medical Tests and Procedures: electrolyte and renal panel, gastrointestinal profile, glucose/endocrine profile, inflammatory profile, lipid profile  Nutrition-Focused Physical Findings: overall appearance     Nutrition Follow-Up    RD Follow-up?: Yes

## 2024-03-04 NOTE — PROGRESS NOTES
[facility]  Endocrinology  Progress Note    Admit Date: 2024     Reason for Consult: Management of T2DM, Hyperglycemia     Surgical Procedure and Date: kidney transplant 2024    Diabetes diagnosis year:  per chart review     Home Diabetes Medications:  Tandem insulin pump   ICR 1:5   ISF 1:25  Basal rate 1.2 u/hr + control IQ     How often checking glucose at home? >4 x day   BG readings on regimen: 180s-200s, occasional 300s  Hypoglycemia on the regimen?  Yes, once a week overnight, 2-3x during the week during the day   Missed doses on regimen?  n/a    Diabetes Complications include:     Hyperglycemia, Hypoglycemia , Hypoglycemia unawareness, Diabetic nephropathy  , and Diabetic chronic kidney disease         Complicating diabetes co morbidities:   Glucocorticoid use       HPI:   Patient is a 64 y.o. male with history of ESRD secondary to presumed diabetic nephropathy. Patient is currently on peritoneal dialysis started on 2023 (initially on HD). Patient is dialyzing on cyclic peritoneal dialysis. Patient reports that he is tolerating dialysis well. He has a PD catheter, permacath, and RUE AVF for dialysis access. Patient also with insulin pump. He now presents as primary candidate for a  donor kidney transplant. OR time set for 24 at 0800 AM. Pre-op labs and imaging ordered and will be reviewed prior to transplant. Will receive Thymoglobulin for induction. Endocrine consulted for BG management.         No new subjective & objective note has been filed under this hospital service since the last note was generated.        ASSESSMENT and PLAN    Renal/  * Status post -donor kidney transplantation  Managed per primary team        ID  At risk for opportunistic infections  Optimize BG control         Endocrine  Type 2 diabetes mellitus with kidney complication, with long-term current use of insulin  BG goal: 140-180      - transition insulin drip d/c'd by  primary team.   - Restart Novolog- 12 units TIDWM, [facility] (150/25) prn ac/hs/0200  - Patient to resume home insulin pump this afternoon  - [facility] [place] (150/25) prn ac/hs/0200   - POCT Glucose before meals, at bedtime and at 2 am  - Hypoglycemia protocol in place      ** Please notify Endocrine for any change and/or advance in diet**  ** Please call Endocrine for any BG related issues **     Discharge Planning:   TBD. Please notify endocrinology prior to discharge.            Holly Cardozo PA-C  Endocrinology  [facility] - Trans[facility]

## 2024-03-04 NOTE — PLAN OF CARE
Pt arrived to IR room 189 via stretcher w/ RN. ESPINOZA x4. Name//allergies/procedure verified. Pt will be monitored by RN @ bedside throughout procedure/sedation.

## 2024-03-04 NOTE — ASSESSMENT & PLAN NOTE
- expected 2/2 prolonged CIT  - Last HD 3/2 for DGF, tolerated well  - prev on PD, IR consulted for perm cath placement Monday for anticipated outpatient HD needs  - pt making more urine, Lasix 120 mg 3/3  - Plan for perm cath on 3/4. Not yet clearing, anticipate outpt HD needs

## 2024-03-04 NOTE — PROGRESS NOTES
[facility] - [facility]plant [facility]  Kidney Transplant  Progress Note      Reason for Follow-up: Reassessment of Kidney Transplant - 2024  (#1) recipient and management of immunosuppression.    ORGAN:  RIGHT KIDNEY   Donor Type:  Donation after Brain Death   Donor CMV Status: Positive  Donor HBcAB:Negative  Donor HCV Status:Negative  Donor HBV RAHEEM:   Donor HCV RAHEEM: Negative      Subjective:   History of Present Illness:  Mark Lopez is a 64 year old male with a history of ESRD secondary to presumed diabetic nephropathy. Patient is currently on peritoneal dialysis started on 2023 (initially on HD). Patient is dialyzing on cyclic peritoneal dialysis. Patient reports that he is tolerating dialysis well. He has a PD catheter, permacath, and RUE AVF for dialysis access. Patient also with insulin pump. He now presents as primary candidate for a  donor kidney transplant. OR time set for 24 at 0800 AM. Pre-op labs and imaging ordered and will be reviewed prior to transplant. Will receive Thymoglobulin for induction.              Mr. Lopez is a 64 y.o. year old male who is status post Kidney Transplant - 2024  (#1).  His maintenance immunosuppression consists of:   Immunosuppressants (From admission, onward)      Start     Stop Route Frequency Ordered    24 1800  tacrolimus capsule 8 mg  (IP TXP KIDNEY POST-OP THYMO WITH PERIPHERAL PRECHECKED)         -- Oral 2 times daily 24 1228    24 1000  mycophenolate capsule 1,000 mg  (IP TXP KIDNEY POST-OP THYMO WITH PERIPHERAL PRECHECKED)         -- Oral 2 times daily 24 0651            Hospital Course:  Patient now s/p DBD Ktx on 24 for presumed diabetic nephropathy (donor RPR+, CMV D+, R-, CIT ~16.5hrs). Patient PD prior to surgery (prev HD, fistula not working), reports making some urine, ~1 cup/day. PD cath remains in place.  6 day cramer for difficult bladder anastomosis. JEAN-PIERRE x 1. CIT ~16.5 hrs. PCN G #1 given 3/1 for +RPR donor  cultures.    Interval Hx: No acute events overnight. Patient continues to make more urine. UOP >2L. JEAN-PIERRE x 1 in place. Kidney not yet clearing. Planning for perm cath placement today for anticipated outpatient HD needs. Last HD 3/2. Holding off today. C/o cramer pain this morning. KUB with stent in proper place. Cont ditropan. Lab work stable. Miralax added for constipation. Cont to monitor.       Past Medical, Surgical, Family, and Social History:   Unchanged from H&P.    Scheduled Meds:   bisacodyL  10 mg Oral QHS    calcitRIOL  0.5 mcg Oral Daily    docusate sodium  100 mg Oral TID    famotidine  20 mg Oral QHS    heparin (porcine)  5,000 Units Subcutaneous Q8H    insulin aspart U-100  12 Units Subcutaneous TIDWM    ketoconazole  100 mg Oral Daily    latanoprost  1 drop Both Eyes QHS    levothyroxine  50 mcg Oral Before breakfast    midodrine  5 mg Oral Q8H    multivitamin  1 tablet Oral Daily    mupirocin  1 g Nasal BID    mycophenolate  1,000 mg Oral BID    oxybutynin  5 mg Oral TID    penicillin G benzathine  2.4 Million Units Intramuscular Weekly    polyethylene glycol  17 g Oral Daily    predniSONE  20 mg Oral Daily    sevelamer carbonate  1,600 mg Oral TID WM    [START ON 3/10/2024] sulfamethoxazole-trimethoprim 400-80mg  1 tablet Oral Daily AM    tacrolimus  8 mg Oral BID    [START ON 3/10/2024] valGANciclovir  450 mg Oral Daily AM     Continuous Infusions:  PRN Meds:aluminum & magnesium hydroxide-simethicone, dextrose 10%, dextrose 10%, dextrose 10%, dextrose 10%, glucagon (human recombinant), glucose, glucose, insulin aspart U-100, ondansetron, oxyCODONE, oxyCODONE, sodium chloride 0.9%, traMADoL    Intake/Output - Last 3 Shifts         03/02 0700  03/03 0659 03/03 0700  03/04 0659 03/04 0700  03/05 0659    P.O. 1300 1080 720    I.V. (mL/kg) 35.5 (0.4) 5.3 (0.1)     Blood       Other 160      IV Piggyback 499.8      Total Intake(mL/kg) 1995.3 (20.5) 1085.3 (11.2) 720 (7.4)    Urine (mL/kg/hr) 570 (0.2) 2050  "(0.9) 1425 (2.1)    Drains 85 115 60    Other 2000      Stool 0 0     Total Output 2655 2165 1485    Net -659.8 -1079.8 -765           Stool Occurrence 1 x 0 x              Review of Systems   Constitutional:  Negative for chills and fever.   Respiratory:  Negative for cough and shortness of breath.    Cardiovascular:  Negative for chest pain and leg swelling.   Gastrointestinal:  Positive for abdominal pain (incisional). Negative for abdominal distention, constipation, diarrhea, nausea and vomiting.   Genitourinary:  Negative for decreased urine volume, difficulty urinating, dysuria and frequency.   Skin:  Positive for wound.   Allergic/Immunologic: Positive for immunocompromised state.   Neurological:  Negative for headaches.   Psychiatric/Behavioral:  Negative for agitation, confusion and decreased concentration. The patient is not nervous/anxious.       Objective:     Vital Signs (Most Recent):  Temp: 97.1 °F (36.2 °C) (03/04/24 1000)  Pulse: 82 (03/04/24 1230)  Resp: 18 (03/04/24 1400)  BP: (!) 146/68 (03/04/24 1230)  SpO2: 99 % (03/04/24 1230) Vital Signs (24h Range):  Temp:  [97.1 °F (36.2 °C)-98.4 °F (36.9 °C)] 97.1 °F (36.2 °C)  Pulse:  [67-90] 82  Resp:  [10-20] 18  SpO2:  [94 %-100 %] 99 %  BP: (127-167)/(60-97) 146/68     Weight: 96.7 kg (213 lb 3 oz)  Height: 5' 4" (162.6 cm)  Body mass index is 36.59 kg/m².     Physical Exam  Vitals and nursing note reviewed.   Constitutional:       General: He is not in acute distress.     Appearance: He is well-developed. He is not diaphoretic.   Cardiovascular:      Rate and Rhythm: Normal rate and regular rhythm.      Heart sounds: No murmur heard.     No friction rub.   Pulmonary:      Effort: Pulmonary effort is normal.      Breath sounds: Normal breath sounds. No wheezing or rales.   Abdominal:      General: There is no distension.      Palpations: Abdomen is soft.      Tenderness: There is abdominal tenderness. There is no guarding or rebound.      Comments: " RLQ incision  JEAN-PIERRE x 1- serosang drainage   Genitourinary:     Comments: Cramer to gravity- clear yellow output  Musculoskeletal:      Right lower leg: Edema present.      Left lower leg: Edema present.   Skin:     General: Skin is warm and dry.      Capillary Refill: Capillary refill takes 2 to 3 seconds.   Neurological:      General: No focal deficit present.      Mental Status: He is alert and oriented to person, place, and time.   Psychiatric:         Mood and Affect: Mood normal.         Behavior: Behavior normal.         Thought Content: Thought content normal.         Judgment: Judgment normal.          Laboratory:  CBC:   Recent Labs   Lab 24  0616 24  0548 24  0600   WBC 10.12 5.13 6.38   RBC 2.88* 2.57* 2.44*   HGB 9.1* 7.9* 7.7*   HCT 26.3* 23.7* 22.5*    106* 97*   MCV 91 92 92   MCH 31.6* 30.7 31.6*   MCHC 34.6 33.3 34.2     CMP:   Recent Labs   Lab 247 24  0712 24  0616 24  0548 24  0600   GLU 98   < > 228* 126* 93   CALCIUM 10.0   < > 8.8 9.3 9.0   ALBUMIN 3.2*   < > 2.8* 2.7* 2.8*   PROT 7.0  --   --   --   --       < > 130* 129* 128*   K 4.1   < > 4.4 4.3 4.3   CO2 26   < > 22* 21* 23   CL 99   < > 92* 96 93*   BUN 99*   < > 73* 56* 88*   CREATININE 12.7*   < > 8.3* 5.6* 6.4*   ALKPHOS 44*  --   --   --   --    ALT 20  --   --   --   --    AST 21  --   --   --   --     < > = values in this interval not displayed.     Labs within the past 24 hours have been reviewed.    Diagnostic Results:  None  Assessment/Plan:     * Status post -donor kidney transplantation  - DBD Ktx on  for diabetic nephropathy.   - 6 day cramer (difficult bladder anastomosis), 1 JEAN-PIERRE drain  - 350cc urine in past 24 hours.  - Kidney US obtained POD 0- reviewed with surgeon  - Last HD 3/2/2024 2L removed  - making more urine, lasix 120mg challenge 3/3 with good response   - cont to trend CR  - strict I/Os    Bladder spasm  - improved w Ditropan  - 6 day  "cramer- can likely d/c once cramer removed      Delayed graft function of kidney  - expected 2/2 prolonged CIT  - Last HD 3/2 for DGF, tolerated well  - prev on PD, IR consulted for perm cath placement Monday for anticipated outpatient HD needs  - pt making more urine, Lasix 120 mg 3/3  - Plan for perm cath on 3/4. 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 Monitor        At risk for opportunistic infections  - cont OI prophylaxis per protocol.      Long-term use of immunosuppressant medication  - see "prophylactic immuno"      Prophylactic immunotherapy  - Continue Cellcept and Prograf. Will monitor for signs of toxic side effects, check daily tacrolimus troughs, and change meds accordingly.       Essential hypertension  - hold home meds due to hypotension   - giving Midodrine for BP<100      Type 2 diabetes mellitus with kidney complication, with long-term current use of insulin  - endocrine consulted; appreciate recs        Discharge Planning: not current candidate   Medical decision making for this encounter includes review of pertinent labs and diagnostic studies, assessment and planning, discussions with consulting providers, discussion with patient/family, and participation in multidisciplinary rounds. Time spent caring for patient: 60 minutes    Lilly La PA-C  Kidney Transplant  [facility] - [facility]  "

## 2024-03-04 NOTE — PROCEDURES
Interventional Radiology Post-Procedure Note    Pre Op Diagnosis: ESRD    Post Op Diagnosis: Same    Procedure: Tunneled central venous catheter placement    Procedure performed by:  Olive Gutiérrez MD    Written Informed Consent Obtained: Yes    Specimen Removed: No    Estimated Blood Loss:  Minimal     Findings:   The right internal jugular vein is sonographically patent.  Successful placement of right-sided tunneled 14.5 Fr central venous catheter with catheter tip in the right atrium.     The patient tolerated the procedure without complication. The central venous catheter is ready for immediate use.     Olive Gutiérrez MD  Interventional Radiology

## 2024-03-04 NOTE — ASSESSMENT & PLAN NOTE
BG goal: 140-180    - Restart Novolog- 12 units TIDWM   - transition insulin drip d/c per primary team.   - Patient to resume home insulin pump this afternoon - will place insulin pump order set at that time   [place] [facility] [place] ac/hs/0200   - POCT Glucose before meals, at bedtime and at 2 am  - Hypoglycemia protocol in place      ** Please notify Endocrine for any change and/or advance in diet**  ** Please call Endocrine for any BG related issues **     Discharge Planning:   TBD. Please notify endocrinology prior to discharge.

## 2024-03-04 NOTE — H&P
Radiology History & Physical      SUBJECTIVE:     Chief Complaint: renal dysfunction    History of Present Illness:  Mark Lopez is a 64 y.o. male who presents for HD tunneled catheter placement. Refer to full consult note dated 03/01/2024.    Past Medical History:   Diagnosis Date    Anemia     Cataract     Diabetes mellitus     Diabetes mellitus, type 2     Glaucoma     Gout, unspecified     HLD (hyperlipidemia)     Hypertension     Hypothyroidism, unspecified     Kidney failure     Renal disorder      Past Surgical History:   Procedure Laterality Date    COLONOSCOPY N/A 05/18/2022    Procedure: COLONOSCOPY;  Surgeon: Raul Dyer MD;  Location: [facility];  Service: Endoscopy;  Laterality: N/A;    DENTAL SURGERY      EYE SURGERY Bilateral     Cataract    INSERTION, CATHETER, DIALYSIS, PERITONEAL, LAPAROSCOPIC N/A 06/20/2023    Procedure: INSERTION, CATHETER, DIALYSIS, PERITONEAL, LAPAROSCOPIC;  Surgeon: Carlos Alberto Rodgers MD;  Location: [facility] OR;  Service: General;  Laterality: N/A;    KIDNEY TRANSPLANT N/A 2/29/2024    Procedure: TRANSPLANT, KIDNEY;  Surgeon: Pino Law Jr., MD;  Location: [address];  Service: Transplant;  Laterality: N/A;    KNEE SURGERY Right        Home Meds:   Prior to Admission medications    Medication Sig Start Date End Date Taking? Authorizing Provider   aspirin (ECOTRIN) 81 MG EC tablet Take 81 mg by mouth once daily.    Provider, Historical   famotidine (PEPCID) 20 MG tablet Take 1 tablet (20 mg total) by mouth every evening. Stop 3/29/24 2/28/24 3/31/24  Pino Law Jr., MD   HUMALOG U-100 INSULIN 100 unit/mL injection Inject into the skin. FOR INSULIN PUMP (BASAL 1.2 UNITS/HR) 8/7/23   Provider, Historical   latanoprost 0.005 % ophthalmic solution Place 1 drop into the right eye every other day. At night 2/29/24   Pino Law Jr., MD   levothyroxine (SYNTHROID) 50 MCG tablet Take 1 tablet (50 mcg total) by mouth before breakfast. 2/29/24    Pino Law Jr., MD   metoprolol tartrate (LOPRESSOR) 25 MG tablet Take 1 tablet (25 mg total) by mouth 2 (two) times daily. 5/1/21 10/17/23  Dariel Wei MD   multivit-min-FA-lycopen-lutein (CENTRUM SILVER MEN) 300-600-300 mcg Tab Take 2 tablets by mouth once daily.    Provider, Historical   multivitamin Tab Take 1 tablet by mouth once daily. 3/1/24   Pino Law Jr., MD   mycophenolate (CELLCEPT) 250 mg Cap Take 4 capsules (1,000 mg total) by mouth 2 (two) times daily. 2/29/24 2/28/25  Pino Law Jr., MD   NEXLETOL 180 mg Tab Take 1 tablet by mouth once daily. 5/6/22   Provider, Historical   ONETOUCH DELICA PLUS LANCET 30 gauge Misc  4/11/22   Provider, Historical   predniSONE (DELTASONE) 5 MG tablet Take by mouth daily: 20mg 3/2-3/8, 15mg 3/9-3/15, 10mg 3/16-3/22/2024, 5mg 3/23/2024- 3/2/24   Pino Law Jr., MD   subcutaneous insulin pump Misc Inject 1 Units/hr into the skin continuous. Insulin pump name - Tandum  Basal dose is 2 units/hr 5/1/21 9/13/23  Dariel Wei MD   sulfamethoxazole-trimethoprim 400-80mg (BACTRIM,SEPTRA) 400-80 mg per tablet Take 1 tablet by mouth every morning. Stop 8/27/24 2/29/24 8/27/24  Pino Law Jr., MD   tacrolimus (PROGRAF) 1 MG Cap Take 6 capsules (6 mg total) by mouth every 12 (twelve) hours. 2/29/24 2/28/25  Pino Law Jr., MD   valGANciclovir (VALCYTE) 450 mg Tab Take 2 tablets (900 mg total) by mouth every morning. Stop 8/27/24 2/29/24 8/27/24  Pino Law Jr., MD     Anticoagulants/Antiplatelets:  reviewed    Allergies:   Review of patient's allergies indicates:   Allergen Reactions    Allopurinol analogues Swelling    Latex, natural rubber     Statins-hmg-coa reductase inhibitors Other (See Comments)     Muscle ache    Adhesive Rash     Sedation History:  no adverse reactions    Review of Systems:   As documented in patient provider's H&P.      OBJECTIVE:     Vital Signs (Most Recent)  Temp: 97.7 °F (36.5 °C)  (03/04/24 0738)  Pulse: 83 (03/04/24 0738)  Resp: 16 (03/04/24 0738)  BP: 127/60 (03/04/24 0738)  SpO2: (!) 94 % (03/04/24 0738)    Physical Exam:  ASA: III  Mallampati: II    General: no acute distress  Mental Status: alert and oriented to person, place and time  HEENT: normocephalic, atraumatic  Chest: unlabored breathing  Heart: regular heart rate  Abdomen: nondistended  Extremity: moves all extremities    Laboratory  Lab Results   Component Value Date    INR 1.0 02/28/2024       Lab Results   Component Value Date    WBC 6.38 03/04/2024    HGB 7.7 (L) 03/04/2024    HCT 22.5 (L) 03/04/2024    MCV 92 03/04/2024    PLT 97 (L) 03/04/2024      Lab Results   Component Value Date    GLU 93 03/04/2024     (L) 03/04/2024    K 4.3 03/04/2024    CL 93 (L) 03/04/2024    CO2 23 03/04/2024    BUN 88 (H) 03/04/2024    CREATININE 6.4 (H) 03/04/2024    CALCIUM 9.0 03/04/2024    MG 2.8 (H) 03/04/2024    ALT 20 02/28/2024    AST 21 02/28/2024    ALBUMIN 2.8 (L) 03/04/2024    BILITOT 0.3 02/28/2024    BILIDIR 0.1 09/13/2023       ASSESSMENT/PLAN:     Sedation Plan: up to moderate.  Patient will undergo HD tunneled catheter placement.    Ian Reyes MD PGY-3  Department of Radiology  [facility]

## 2024-03-04 NOTE — SUBJECTIVE & OBJECTIVE
Subjective:   History of Present Illness:  Mark Lopez is a 64 year old male with a history of ESRD secondary to presumed diabetic nephropathy. Patient is currently on peritoneal dialysis started on 2023 (initially on HD). Patient is dialyzing on cyclic peritoneal dialysis. Patient reports that he is tolerating dialysis well. He has a PD catheter, permacath, and RUE AVF for dialysis access. Patient also with insulin pump. He now presents as primary candidate for a  donor kidney transplant. OR time set for 24 at 0800 AM. Pre-op labs and imaging ordered and will be reviewed prior to transplant. Will receive Thymoglobulin for induction.              Mr. Lopez is a 64 y.o. year old male who is status post Kidney Transplant - 2024  (#1).  His maintenance immunosuppression consists of:   Immunosuppressants (From admission, onward)      Start     Stop Route Frequency Ordered    24 1800  tacrolimus capsule 8 mg  (IP TXP KIDNEY POST-OP THYMO WITH PERIPHERAL PRECHECKED)         -- Oral 2 times daily 24 1228    24 1000  mycophenolate capsule 1,000 mg  (IP TXP KIDNEY POST-OP THYMO WITH PERIPHERAL PRECHECKED)         -- Oral 2 times daily 24 0651            Hospital Course:  Patient now s/p DBD Ktx on 24 for presumed diabetic nephropathy (donor RPR+, CMV D+, R-, CIT ~16.5hrs). Patient PD prior to surgery (prev HD, fistula not working), reports making some urine, ~1 cup/day. PD cath remains in place.  6 day cramer for difficult bladder anastomosis. JEAN-PIERRE x 1. CIT ~16.5 hrs. PCN G #1 given 3/1 for +RPR donor cultures.    Interval Hx: No acute events overnight. Patient continues to make more urine. UOP >2L. JEAN-PIERRE x 1 in place. Kidney not yet clearing. Planning for perm cath placement today for anticipated outpatient HD needs. Last HD 3/2. Holding off today. C/o cramer pain this morning. KUB with stent in proper place. Cont ditropan. Lab work stable. Miralax added for constipation. Cont to  monitor.       Past Medical, Surgical, Family, and Social History:   Unchanged from H&P.    Scheduled Meds:   bisacodyL  10 mg Oral QHS    calcitRIOL  0.5 mcg Oral Daily    docusate sodium  100 mg Oral TID    famotidine  20 mg Oral QHS    heparin (porcine)  5,000 Units Subcutaneous Q8H    insulin aspart U-100  12 Units Subcutaneous TIDWM    ketoconazole  100 mg Oral Daily    latanoprost  1 drop Both Eyes QHS    levothyroxine  50 mcg Oral Before breakfast    midodrine  5 mg Oral Q8H    multivitamin  1 tablet Oral Daily    mupirocin  1 g Nasal BID    mycophenolate  1,000 mg Oral BID    oxybutynin  5 mg Oral TID    penicillin G benzathine  2.4 Million Units Intramuscular Weekly    polyethylene glycol  17 g Oral Daily    predniSONE  20 mg Oral Daily    sevelamer carbonate  1,600 mg Oral TID WM    [START ON 3/10/2024] sulfamethoxazole-trimethoprim 400-80mg  1 tablet Oral Daily AM    tacrolimus  8 mg Oral BID    [START ON 3/10/2024] valGANciclovir  450 mg Oral Daily AM     Continuous Infusions:  PRN Meds:aluminum & magnesium hydroxide-simethicone, dextrose 10%, dextrose 10%, dextrose 10%, dextrose 10%, glucagon (human recombinant), glucose, glucose, insulin aspart U-100, ondansetron, oxyCODONE, oxyCODONE, sodium chloride 0.9%, traMADoL    Intake/Output - Last 3 Shifts         03/02 0700  03/03 0659 03/03 0700  03/04 0659 03/04 0700  03/05 0659    P.O. 1300 1080 720    I.V. (mL/kg) 35.5 (0.4) 5.3 (0.1)     Blood       Other 160      IV Piggyback 499.8      Total Intake(mL/kg) 1995.3 (20.5) 1085.3 (11.2) 720 (7.4)    Urine (mL/kg/hr) 570 (0.2) 2050 (0.9) 1425 (2.1)    Drains 85 115 60    Other 2000      Stool 0 0     Total Output 2655 2165 1485    Net -659.8 -1079.8 -765           Stool Occurrence 1 x 0 x              Review of Systems   Constitutional:  Negative for chills and fever.   Respiratory:  Negative for cough and shortness of breath.    Cardiovascular:  Negative for chest pain and leg swelling.   Gastrointestinal:  " Positive for abdominal pain (incisional). Negative for abdominal distention, constipation, diarrhea, nausea and vomiting.   Genitourinary:  Negative for decreased urine volume, difficulty urinating, dysuria and frequency.   Skin:  Positive for wound.   Allergic/Immunologic: Positive for immunocompromised state.   Neurological:  Negative for headaches.   Psychiatric/Behavioral:  Negative for agitation, confusion and decreased concentration. The patient is not nervous/anxious.       Objective:     Vital Signs (Most Recent):  Temp: 97.1 °F (36.2 °C) (03/04/24 1000)  Pulse: 82 (03/04/24 1230)  Resp: 18 (03/04/24 1400)  BP: (!) 146/68 (03/04/24 1230)  SpO2: 99 % (03/04/24 1230) Vital Signs (24h Range):  Temp:  [97.1 °F (36.2 °C)-98.4 °F (36.9 °C)] 97.1 °F (36.2 °C)  Pulse:  [67-90] 82  Resp:  [10-20] 18  SpO2:  [94 %-100 %] 99 %  BP: (127-167)/(60-97) 146/68     Weight: 96.7 kg (213 lb 3 oz)  Height: 5' 4" (162.6 cm)  Body mass index is 36.59 kg/m².     Physical Exam  Vitals and nursing note reviewed.   Constitutional:       General: He is not in acute distress.     Appearance: He is well-developed. He is not diaphoretic.   Cardiovascular:      Rate and Rhythm: Normal rate and regular rhythm.      Heart sounds: No murmur heard.     No friction rub.   Pulmonary:      Effort: Pulmonary effort is normal.      Breath sounds: Normal breath sounds. No wheezing or rales.   Abdominal:      General: There is no distension.      Palpations: Abdomen is soft.      Tenderness: There is abdominal tenderness. There is no guarding or rebound.      Comments: RLQ incision  JEAN-PIERRE x 1- serosang drainage   Genitourinary:     Comments: Curry to gravity- clear yellow output  Musculoskeletal:      Right lower leg: Edema present.      Left lower leg: Edema present.   Skin:     General: Skin is warm and dry.      Capillary Refill: Capillary refill takes 2 to 3 seconds.   Neurological:      General: No focal deficit present.      Mental Status: He " is alert and oriented to person, place, and time.   Psychiatric:         Mood and Affect: Mood normal.         Behavior: Behavior normal.         Thought Content: Thought content normal.         Judgment: Judgment normal.          Laboratory:  CBC:   Recent Labs   Lab 03/02/24 0616 03/03/24  0548 03/04/24  0600   WBC 10.12 5.13 6.38   RBC 2.88* 2.57* 2.44*   HGB 9.1* 7.9* 7.7*   HCT 26.3* 23.7* 22.5*    106* 97*   MCV 91 92 92   MCH 31.6* 30.7 31.6*   MCHC 34.6 33.3 34.2     CMP:   Recent Labs   Lab 02/28/24 2027 02/29/24 0712 03/02/24 0616 03/03/24  0548 03/04/24  0600   GLU 98   < > 228* 126* 93   CALCIUM 10.0   < > 8.8 9.3 9.0   ALBUMIN 3.2*   < > 2.8* 2.7* 2.8*   PROT 7.0  --   --   --   --       < > 130* 129* 128*   K 4.1   < > 4.4 4.3 4.3   CO2 26   < > 22* 21* 23   CL 99   < > 92* 96 93*   BUN 99*   < > 73* 56* 88*   CREATININE 12.7*   < > 8.3* 5.6* 6.4*   ALKPHOS 44*  --   --   --   --    ALT 20  --   --   --   --    AST 21  --   --   --   --     < > = values in this interval not displayed.     Labs within the past 24 hours have been reviewed.    Diagnostic Results:  None

## 2024-03-04 NOTE — NURSING
Recovery complete from HD cath placement in IR. After pressure was held at HD cath site, hemostasis was achieved at 10:05. Dressing changed at 10:45. No new bleeding; dressing CDI. If new bleeding at HD cath site noted, hold pressure along entire tunneled track for 10 minutes and notify provider. Report called to BARBARA Lang.

## 2024-03-04 NOTE — PLAN OF CARE
Pt transferred to [facility] room 7 via stretcher w/ RN. VSS post procedure bedside report given to [facility]PU RNCody.

## 2024-03-04 NOTE — PROGRESS NOTES
Patient returned to unit from IR. Permcath in place to RIJ - dressing CDI - no bleeding noted. VSS. Patient sleepy but easily arouseable. Call bell placed within reach.

## 2024-03-04 NOTE — SUBJECTIVE & OBJECTIVE
"Interval HPI:   No acute events overnight. Patient in room 66513/72357 A. Blood glucose stable. BG at and below goal on current insulin regimen (SSI ). Steroid use- Prednisone 20 mg QD.   4 Days Post-Op  Renal function- Abnormal - 6.4 cr   Vasopressors-  None     Diet NPO Except for: Sips with Medication     Eating:   NPO  Nausea: No  Hypoglycemia and intervention: No  Fever: No  TPN and/or TF: No    /64   Pulse 74   Temp 97.1 °F (36.2 °C) (Temporal)   Resp 16   Ht 5' 4" (1.626 m)   Wt 96.7 kg (213 lb 3 oz)   SpO2 100%   BMI 36.59 kg/m²     Labs Reviewed and Include    Recent Labs   Lab 03/04/24  0600   GLU 93   CALCIUM 9.0   ALBUMIN 2.8*   *   K 4.3   CO2 23   CL 93*   BUN 88*   CREATININE 6.4*     Lab Results   Component Value Date    WBC 6.38 03/04/2024    HGB 7.7 (L) 03/04/2024    HCT 22.5 (L) 03/04/2024    MCV 92 03/04/2024    PLT 97 (L) 03/04/2024     No results for input(s): "TSH", "FREET4" in the last 168 hours.  Lab Results   Component Value Date    HGBA1C 5.8 (H) 03/01/2024       Nutritional status:   Body mass index is 36.59 kg/m².  Lab Results   Component Value Date    ALBUMIN 2.8 (L) 03/04/2024    ALBUMIN 2.7 (L) 03/03/2024    ALBUMIN 2.8 (L) 03/02/2024     No results found for: "PREALBUMIN"    Estimated Creatinine Clearance: 12.1 mL/min (A) (based on SCr of 6.4 mg/dL (H)).    Accu-Checks  Recent Labs     03/02/24  1728 03/02/24  2112 03/03/24  0159 03/03/24  0233 03/03/24  0837 03/03/24  1242 03/03/24  1700 03/03/24  2038 03/04/24  0139 03/04/24  0754   POCTGLUCOSE 189* 218* 90 95 164* 274* 234* 238* 132* 121*       Current Medications and/or Treatments Impacting Glycemic Control  Immunotherapy:    Immunosuppressants           Stop Route Frequency     tacrolimus capsule 8 mg         -- Oral 2 times daily     mycophenolate capsule 1,000 mg         -- Oral 2 times daily          Steroids:   Hormones (From admission, onward)      Start     Stop Route Frequency Ordered    03/02/24 0900  " predniSONE tablet 20 mg         -- Oral Daily 02/28/24 2058          Pressors:    Autonomic Drugs (From admission, onward)      Start     Stop Route Frequency Ordered    03/01/24 2200  midodrine tablet 5 mg         -- Oral Every 8 hours 03/01/24 1526          Hyperglycemia/Diabetes Medications:   Antihyperglycemics (From admission, onward)      Start     Stop Route Frequency Ordered    02/29/24 1328  insulin aspart U-100 pen 0-10 Units         -- SubQ Before meals, nightly and at 0200 PRN 02/29/24 1228

## 2024-03-04 NOTE — PLAN OF CARE
Recommendations:     1. Continue diabetic renal diet     2. Change Boost gluose to Nov[employer] Renal BID     3. RD to monitor and follow     Goals: Meet 75%-100% EEN/EPN by RD follow-up

## 2024-03-04 NOTE — NURSING
Pt got to [facility] 7 and he started bleeding from his new HD line, pressure applied and head raised and provider call and notified.  Provider came to assess and bleeding had stopped so he advised to keep monitoring it and notify him of changes.

## 2024-03-04 NOTE — ASSESSMENT & PLAN NOTE
- DBD Ktx on 2/29 for diabetic nephropathy.   - 6 day cramer (difficult bladder anastomosis), 1 JEAN-PIERRE drain  - 350cc urine in past 24 hours.  - Kidney US obtained POD 0- reviewed with surgeon  - Last HD 3/2/2024 2L removed  - making more urine, lasix 120mg challenge 3/3 with good response   - cont to trend CR  - strict I/Os

## 2024-03-05 VITALS
TEMPERATURE: 98 F | DIASTOLIC BLOOD PRESSURE: 72 MMHG | BODY MASS INDEX: 36.4 KG/M2 | HEART RATE: 89 BPM | WEIGHT: 213.19 LBS | SYSTOLIC BLOOD PRESSURE: 150 MMHG | HEIGHT: 64 IN | OXYGEN SATURATION: 92 % | RESPIRATION RATE: 16 BRPM

## 2024-03-05 DIAGNOSIS — Z94.0 KIDNEY REPLACED BY TRANSPLANT: Primary | ICD-10-CM

## 2024-03-05 PROBLEM — N32.89 BLADDER SPASM: Status: RESOLVED | Noted: 2024-03-03 | Resolved: 2024-03-05

## 2024-03-05 LAB
ALBUMIN SERPL BCP-MCNC: 3 G/DL (ref 3.5–5.2)
ANION GAP SERPL CALC-SCNC: 14 MMOL/L (ref 8–16)
BASOPHILS # BLD AUTO: 0.01 K/UL (ref 0–0.2)
BASOPHILS NFR BLD: 0.1 % (ref 0–1.9)
BUN SERPL-MCNC: 94 MG/DL (ref 8–23)
CALCIUM SERPL-MCNC: 9.7 MG/DL (ref 8.7–10.5)
CHLORIDE SERPL-SCNC: 91 MMOL/L (ref 95–110)
CO2 SERPL-SCNC: 23 MMOL/L (ref 23–29)
CREAT SERPL-MCNC: 6.2 MG/DL (ref 0.5–1.4)
DIFFERENTIAL METHOD BLD: ABNORMAL
EOSINOPHIL # BLD AUTO: 0 K/UL (ref 0–0.5)
EOSINOPHIL NFR BLD: 0 % (ref 0–8)
ERYTHROCYTE [DISTWIDTH] IN BLOOD BY AUTOMATED COUNT: 14.6 % (ref 11.5–14.5)
EST. GFR  (NO RACE VARIABLE): 9.4 ML/MIN/1.73 M^2
GLUCOSE SERPL-MCNC: 156 MG/DL (ref 70–110)
HCT VFR BLD AUTO: 24.7 % (ref 40–54)
HGB BLD-MCNC: 8.4 G/DL (ref 14–18)
IMM GRANULOCYTES # BLD AUTO: 0.13 K/UL (ref 0–0.04)
IMM GRANULOCYTES NFR BLD AUTO: 1.6 % (ref 0–0.5)
LYMPHOCYTES # BLD AUTO: 0 K/UL (ref 1–4.8)
LYMPHOCYTES NFR BLD: 0.5 % (ref 18–48)
MAGNESIUM SERPL-MCNC: 3 MG/DL (ref 1.6–2.6)
MCH RBC QN AUTO: 30.7 PG (ref 27–31)
MCHC RBC AUTO-ENTMCNC: 34 G/DL (ref 32–36)
MCV RBC AUTO: 90 FL (ref 82–98)
MONOCYTES # BLD AUTO: 0.7 K/UL (ref 0.3–1)
MONOCYTES NFR BLD: 8.8 % (ref 4–15)
NEUTROPHILS # BLD AUTO: 7.2 K/UL (ref 1.8–7.7)
NEUTROPHILS NFR BLD: 89 % (ref 38–73)
NRBC BLD-RTO: 0 /100 WBC
PHOSPHATE SERPL-MCNC: 6.4 MG/DL (ref 2.7–4.5)
PLATELET # BLD AUTO: 98 K/UL (ref 150–450)
PMV BLD AUTO: 11.5 FL (ref 9.2–12.9)
POCT GLUCOSE: 172 MG/DL (ref 70–110)
POCT GLUCOSE: 203 MG/DL (ref 70–110)
POCT GLUCOSE: 291 MG/DL (ref 70–110)
POTASSIUM SERPL-SCNC: 4.2 MMOL/L (ref 3.5–5.1)
RBC # BLD AUTO: 2.74 M/UL (ref 4.6–6.2)
SODIUM SERPL-SCNC: 128 MMOL/L (ref 136–145)
TACROLIMUS BLD-MCNC: 7 NG/ML (ref 5–15)
WBC # BLD AUTO: 8.08 K/UL (ref 3.9–12.7)

## 2024-03-05 PROCEDURE — 25000003 PHARM REV CODE 250: Performed by: PHYSICIAN ASSISTANT

## 2024-03-05 PROCEDURE — 99239 HOSP IP/OBS DSCHRG MGMT >30: CPT | Mod: 24,,, | Performed by: PHYSICIAN ASSISTANT

## 2024-03-05 PROCEDURE — 99232 SBSQ HOSP IP/OBS MODERATE 35: CPT | Mod: ,,, | Performed by: NURSE PRACTITIONER

## 2024-03-05 PROCEDURE — 90945 DIALYSIS ONE EVALUATION: CPT

## 2024-03-05 PROCEDURE — 25000003 PHARM REV CODE 250: Performed by: NURSE PRACTITIONER

## 2024-03-05 PROCEDURE — 63600175 PHARM REV CODE 636 W HCPCS

## 2024-03-05 PROCEDURE — 25000003 PHARM REV CODE 250: Performed by: CLINICAL NURSE SPECIALIST

## 2024-03-05 PROCEDURE — 80069 RENAL FUNCTION PANEL: CPT

## 2024-03-05 PROCEDURE — 25000003 PHARM REV CODE 250: Performed by: STUDENT IN AN ORGANIZED HEALTH CARE EDUCATION/TRAINING PROGRAM

## 2024-03-05 PROCEDURE — 63600175 PHARM REV CODE 636 W HCPCS: Performed by: INTERNAL MEDICINE

## 2024-03-05 PROCEDURE — 80197 ASSAY OF TACROLIMUS: CPT

## 2024-03-05 PROCEDURE — 63600175 PHARM REV CODE 636 W HCPCS: Performed by: PHYSICIAN ASSISTANT

## 2024-03-05 PROCEDURE — 83735 ASSAY OF MAGNESIUM: CPT

## 2024-03-05 PROCEDURE — 85025 COMPLETE CBC W/AUTO DIFF WBC: CPT

## 2024-03-05 PROCEDURE — 25000003 PHARM REV CODE 250

## 2024-03-05 RX ORDER — FUROSEMIDE 10 MG/ML
80 INJECTION INTRAMUSCULAR; INTRAVENOUS ONCE
Status: COMPLETED | OUTPATIENT
Start: 2024-03-05 | End: 2024-03-05

## 2024-03-05 RX ORDER — TACROLIMUS 1 MG/1
8 CAPSULE ORAL EVERY 12 HOURS
Qty: 480 CAPSULE | Refills: 11 | Status: CANCELLED | OUTPATIENT
Start: 2024-03-05 | End: 2025-03-05

## 2024-03-05 RX ORDER — SULFAMETHOXAZOLE AND TRIMETHOPRIM 400; 80 MG/1; MG/1
1 TABLET ORAL
Qty: 12 TABLET | Refills: 5 | Status: ON HOLD | OUTPATIENT
Start: 2024-03-06 | End: 2024-03-19

## 2024-03-05 RX ORDER — VALGANCICLOVIR 450 MG/1
450 TABLET, FILM COATED ORAL
Qty: 12 TABLET | Refills: 5 | Status: SHIPPED | OUTPATIENT
Start: 2024-03-06 | End: 2024-03-27

## 2024-03-05 RX ORDER — OXYCODONE HYDROCHLORIDE 10 MG/1
10 TABLET ORAL EVERY 4 HOURS PRN
Qty: 30 TABLET | Refills: 0 | Status: SHIPPED | OUTPATIENT
Start: 2024-03-05 | End: 2024-05-13 | Stop reason: ALTCHOICE

## 2024-03-05 RX ORDER — TACROLIMUS 1 MG/1
8 CAPSULE ORAL EVERY 12 HOURS
Qty: 480 CAPSULE | Refills: 11 | Status: SHIPPED | OUTPATIENT
Start: 2024-03-05 | End: 2024-03-06 | Stop reason: DRUGHIGH

## 2024-03-05 RX ADMIN — INSULIN ASPART 2 UNITS: 100 INJECTION, SOLUTION INTRAVENOUS; SUBCUTANEOUS at 09:03

## 2024-03-05 RX ADMIN — INSULIN ASPART 6 UNITS: 100 INJECTION, SOLUTION INTRAVENOUS; SUBCUTANEOUS at 12:03

## 2024-03-05 RX ADMIN — PREDNISONE 20 MG: 20 TABLET ORAL at 08:03

## 2024-03-05 RX ADMIN — LEVOTHYROXINE SODIUM 50 MCG: 50 TABLET ORAL at 05:03

## 2024-03-05 RX ADMIN — THERA TABS 1 TABLET: TAB at 08:03

## 2024-03-05 RX ADMIN — OXYBUTYNIN CHLORIDE 5 MG: 5 TABLET ORAL at 08:03

## 2024-03-05 RX ADMIN — INSULIN ASPART 4 UNITS: 100 INJECTION, SOLUTION INTRAVENOUS; SUBCUTANEOUS at 03:03

## 2024-03-05 RX ADMIN — OXYCODONE HYDROCHLORIDE 10 MG: 10 TABLET ORAL at 12:03

## 2024-03-05 RX ADMIN — HEPARIN SODIUM 5000 UNITS: 5000 INJECTION INTRAVENOUS; SUBCUTANEOUS at 05:03

## 2024-03-05 RX ADMIN — CALCITRIOL CAPSULES 0.5 MCG 0.5 MCG: 0.5 CAPSULE ORAL at 08:03

## 2024-03-05 RX ADMIN — FUROSEMIDE 80 MG: 10 INJECTION, SOLUTION INTRAVENOUS at 11:03

## 2024-03-05 RX ADMIN — POLYETHYLENE GLYCOL 3350 17 G: 17 POWDER, FOR SOLUTION ORAL at 08:03

## 2024-03-05 RX ADMIN — MYCOPHENOLATE MOFETIL 1000 MG: 250 CAPSULE ORAL at 08:03

## 2024-03-05 RX ADMIN — OXYCODONE 5 MG: 5 TABLET ORAL at 08:03

## 2024-03-05 RX ADMIN — TRAMADOL HYDROCHLORIDE 50 MG: 50 TABLET, COATED ORAL at 10:03

## 2024-03-05 RX ADMIN — TACROLIMUS 8 MG: 1 CAPSULE ORAL at 08:03

## 2024-03-05 RX ADMIN — SEVELAMER CARBONATE 1600 MG: 800 TABLET, FILM COATED ORAL at 08:03

## 2024-03-05 RX ADMIN — SEVELAMER CARBONATE 1600 MG: 800 TABLET, FILM COATED ORAL at 01:03

## 2024-03-05 RX ADMIN — INSULIN ASPART 12 UNITS: 100 INJECTION, SOLUTION INTRAVENOUS; SUBCUTANEOUS at 09:03

## 2024-03-05 RX ADMIN — KETOCONAZOLE 100 MG: 200 TABLET ORAL at 08:03

## 2024-03-05 NOTE — SUBJECTIVE & OBJECTIVE
Subjective:   History of Present Illness:  Mark Lopez is a 64 year old male with a history of ESRD secondary to presumed diabetic nephropathy. Patient is currently on peritoneal dialysis started on 2023 (initially on HD). Patient is dialyzing on cyclic peritoneal dialysis. Patient reports that he is tolerating dialysis well. He has a PD catheter, permacath, and RUE AVF for dialysis access. Patient also with insulin pump. He now presents as primary candidate for a  donor kidney transplant. OR time set for 24 at 0800 AM. Pre-op labs and imaging ordered and will be reviewed prior to transplant. Will receive Thymoglobulin for induction.    Hospital Course:  Patient now s/p DBD Ktx on 24 for presumed diabetic nephropathy (donor RPR+, CMV D+, R-, CIT ~16.5hrs). Patient PD prior to surgery (prev HD, fistula not working), reports making some urine, ~1 cup/day. PD cath remains in place.  6 day cramer for difficult bladder anastomosis. JEAN-PIERRE x 1. CIT ~16.5 hrs. PCN G #1 given 3/1 for +RPR donor cultures. Permacath placed 3/4 for DGF.Last HD 3/2.    Interval Hx: No acute events overnight. Patient continues to make more urine. UOP  4L. JEAN-PIERRE x 1 in place. Kidney function stable, no indication for dialysis today. Pt with pain 2/2 catheter. KUB with stent in proper place. Per Dr Law, OK to remove today.   Now voiding spontaneously. Miralax on board for constipation. Caregiver not available until tomorrow. Cont to monitor.       Past Medical, Surgical, Family, and Social History:   Unchanged from H&P.    Scheduled Meds:   bisacodyL  10 mg Oral QHS    calcitRIOL  0.5 mcg Oral Daily    docusate sodium  100 mg Oral TID    famotidine  20 mg Oral QHS    heparin (porcine)  5,000 Units Subcutaneous Q8H    insulin aspart U-100  12 Units Subcutaneous TIDWM    ketoconazole  100 mg Oral Daily    latanoprost  1 drop Both Eyes QHS    levothyroxine  50 mcg Oral Before breakfast    midodrine  5 mg Oral Q8H    multivitamin   1 tablet Oral Daily    mycophenolate  1,000 mg Oral BID    oxybutynin  5 mg Oral TID    penicillin G benzathine  2.4 Million Units Intramuscular Weekly    polyethylene glycol  17 g Oral Daily    predniSONE  20 mg Oral Daily    sevelamer carbonate  1,600 mg Oral TID WM    [START ON 3/10/2024] sulfamethoxazole-trimethoprim 400-80mg  1 tablet Oral Daily AM    tacrolimus  8 mg Oral BID    [START ON 3/10/2024] valGANciclovir  450 mg Oral Daily AM     Continuous Infusions:  PRN Meds:aluminum & magnesium hydroxide-simethicone, dextrose 10%, dextrose 10%, dextrose 10%, dextrose 10%, glucagon (human recombinant), glucose, glucose, insulin aspart U-100, ondansetron, oxyCODONE, oxyCODONE, sodium chloride 0.9%, traMADoL    Intake/Output - Last 3 Shifts         03/03 0700  03/04 0659 03/04 0700  03/05 0659 03/05 0700  03/06 0659    P.O. 1080 1740     I.V. (mL/kg) 5.3 (0.1)      Other       IV Piggyback       Total Intake(mL/kg) 1085.3 (11.2) 1740 (18)     Urine (mL/kg/hr) 2050 (0.9) 3875 (1.7) 850 (1.3)    Drains 115 180 20    Other       Stool 0 0     Total Output 2165 4055 870    Net -1079.8 -2315 -870           Stool Occurrence 0 x 0 x              Review of Systems   Constitutional:  Negative for chills and fever.   Respiratory:  Negative for cough and shortness of breath.    Cardiovascular:  Negative for chest pain and leg swelling.   Gastrointestinal:  Positive for abdominal pain (incisional). Negative for abdominal distention, constipation, diarrhea, nausea and vomiting.   Genitourinary:  Negative for decreased urine volume, difficulty urinating, dysuria and frequency.   Skin:  Positive for wound.   Allergic/Immunologic: Positive for immunocompromised state.   Neurological:  Negative for headaches.   Psychiatric/Behavioral:  Negative for agitation, confusion and decreased concentration. The patient is not nervous/anxious.       Objective:     Vital Signs (Most Recent):  Temp: 97.5 °F (36.4 °C) (03/05/24 0736)  Pulse: 89  "(03/05/24 0736)  Resp: 16 (03/05/24 1045)  BP: (!) 150/72 (03/05/24 0736)  SpO2: (!) 92 % (03/05/24 0736) Vital Signs (24h Range):  Temp:  [97.5 °F (36.4 °C)-98 °F (36.7 °C)] 97.5 °F (36.4 °C)  Pulse:  [] 89  Resp:  [14-20] 16  SpO2:  [92 %-98 %] 92 %  BP: (142-168)/(67-80) 150/72     Weight: 96.7 kg (213 lb 3 oz)  Height: 5' 4" (162.6 cm)  Body mass index is 36.59 kg/m².     Physical Exam  Vitals and nursing note reviewed.   Constitutional:       General: He is not in acute distress.     Appearance: He is well-developed. He is not diaphoretic.   Cardiovascular:      Rate and Rhythm: Normal rate and regular rhythm.      Heart sounds: No murmur heard.     No friction rub.   Pulmonary:      Effort: Pulmonary effort is normal.      Breath sounds: Normal breath sounds. No wheezing or rales.   Abdominal:      General: There is no distension.      Palpations: Abdomen is soft.      Tenderness: There is abdominal tenderness. There is no guarding or rebound.      Comments: RLQ incision  JEAN-PIERRE x 1- serosang drainage   Musculoskeletal:      Right lower leg: Edema present.      Left lower leg: Edema present.   Skin:     General: Skin is warm and dry.      Capillary Refill: Capillary refill takes 2 to 3 seconds.   Neurological:      General: No focal deficit present.      Mental Status: He is alert and oriented to person, place, and time.   Psychiatric:         Mood and Affect: Mood normal.         Behavior: Behavior normal.         Thought Content: Thought content normal.         Judgment: Judgment normal.          Laboratory:  CBC:   Recent Labs   Lab 03/03/24  0548 03/04/24  0600 03/05/24  0557   WBC 5.13 6.38 8.08   RBC 2.57* 2.44* 2.74*   HGB 7.9* 7.7* 8.4*   HCT 23.7* 22.5* 24.7*   * 97* 98*   MCV 92 92 90   MCH 30.7 31.6* 30.7   MCHC 33.3 34.2 34.0     CMP:   Recent Labs   Lab 02/28/24 2027 02/29/24  0712 03/03/24  0548 03/04/24  0600 03/05/24  0557   GLU 98   < > 126* 93 156*   CALCIUM 10.0   < > 9.3 9.0 9.7 "   ALBUMIN 3.2*   < > 2.7* 2.8* 3.0*   PROT 7.0  --   --   --   --       < > 129* 128* 128*   K 4.1   < > 4.3 4.3 4.2   CO2 26   < > 21* 23 23   CL 99   < > 96 93* 91*   BUN 99*   < > 56* 88* 94*   CREATININE 12.7*   < > 5.6* 6.4* 6.2*   ALKPHOS 44*  --   --   --   --    ALT 20  --   --   --   --    AST 21  --   --   --   --     < > = values in this interval not displayed.     Labs within the past 24 hours have been reviewed.    Diagnostic Results: Reviewed.

## 2024-03-05 NOTE — DISCHARGE SUMMARY
[facility] - Transplant Stepdown  Kidney Transplant  Discharge Summary    Patient Name: Mark Lopez  MRN: [id number]  Admission Date: 2024  Hospital Length of Stay: 6 days  Discharge Date and Time:  2024 2:53 PM  Attending Physician: Pino Law Jr., *   Discharging Provider: Urmila Leiva PA-C  Primary Care Provider: Loren Mensah MD    HPI:   Mark Lopez is a 64 year old male with a history of ESRD secondary to presumed diabetic nephropathy. Patient is currently on peritoneal dialysis started on 2023 (initially on HD). Patient is dialyzing on cyclic peritoneal dialysis. Patient reports that he is tolerating dialysis well. He has a PD catheter, permacath, and RUE AVF for dialysis access. Patient also with insulin pump. He now presents as primary candidate for a  donor kidney transplant. OR time set for 24 at 0800 AM. Pre-op labs and imaging ordered and will be reviewed prior to transplant. Will receive Thymoglobulin for induction.    Procedure(s) (LRB):  TRANSPLANT, KIDNEY (N/A)     Hospital Course:    Patient now s/p DBD Ktx on 24 for presumed diabetic nephropathy (donor RPR+, CMV D+, R-, CIT ~16.5hrs). Patient PD prior to surgery (prev HD, fistula not working), reports making some urine, ~1 cup/day. PD cath remains in place.  6 day cramer for difficult bladder anastomosis. EJAN-PIERRE x 1. CIT ~16.5 hrs. PCN G #1 given 3/1 for +RPR donor cultures - needs weekly x 3 total per ID. Pt initially with DGF.  Underwent HD on 3/2. UOP started to increase and he is now making great urine (2-4L/day). Cr slow to clear. Underwent permacath placement 3/4/24. Cr stable at 6.2. Will discharge per DGF protocol with HD chair MWF. Cramer removed on POD 5 per surgeon and voiding spontaneously without issues. Drain remains in place, will follow up in surgery clinic Friday 3/11/24. Dialysis RN flushed PD and changed dressing prior to DC. Pt tolerating diet, passing flatus (+BM), pain controlled. He  has met with coordinator and SW to received education. He will follow up with labs and in clinic tomorrow. Pt expressed understanding of discharge instructions and importance of follow up.     Goals of Care Treatment Preferences:  Code Status: Full Code      Final Active Diagnoses:    Diagnosis Date Noted POA    PRINCIPAL PROBLEM:  Status post -donor kidney transplantation [Z94.0] 2024 Not Applicable    Bladder spasm [N32.89] 2024 No    Positive RPR test in cadaveric donor [Z00.5, A53.9] 2024 Not Applicable    Delayed graft function of kidney [T86.19] 2024 No    Prophylactic immunotherapy [Z29.89] 2024 Not Applicable    Long-term use of immunosuppressant medication [Z79.60] 2024 Not Applicable    At risk for opportunistic infections [Z91.89] 2024 No    Acute on chronic anemia [D64.9] 2024 Yes    Type 2 diabetes mellitus with kidney complication, with long-term current use of insulin [E11.29, Z79.4] 2021 Not Applicable    Essential hypertension [I10] 2021 Yes      Problems Resolved During this Admission:    Diagnosis Date Noted Date Resolved POA    ESRD (end stage renal disease) [N18.6] 2023 Yes       Treatments: As above    Consults (From admission, onward)          Status Ordering Provider     Inpatient consult to Interventional Radiology  Once        Provider:  (Not yet assigned)    Completed DANNA MEYERS     Inpatient consult to Registered Dietitian/Nutritionist  Once        Provider:  (Not yet assigned)    Completed QUE DUNCAN     Inpatient consult to Transplant Nephrology (KTM)  Once        Provider:  (Not yet assigned)    Acknowledged QUE DUNCAN     Inpatient consult to Endocrinology  Once        Provider:  (Not yet assigned)    Completed QUE DUNCAN     Inpatient consult to Infectious Diseases  Once        Provider:  (Not yet assigned)    Completed QUE DUNCAN     Inpatient consult to Endocrinology  Once         Provider:  (Not yet assigned)    QUE Forrester            Pending Diagnostic Studies:       None          Significant Diagnostic Studies: Labs: BMP:   Recent Labs   Lab 03/04/24  0600 03/05/24  0557   GLU 93 156*   * 128*   K 4.3 4.2   CL 93* 91*   CO2 23 23   BUN 88* 94*   CREATININE 6.4* 6.2*   CALCIUM 9.0 9.7   MG 2.8* 3.0*   , CBC   Recent Labs   Lab 03/04/24  0600 03/05/24  0557   WBC 6.38 8.08   HGB 7.7* 8.4*   HCT 22.5* 24.7*   PLT 97* 98*   , and All labs within the past 24 hours have been reviewed    Discharged Condition: good    Disposition:     Follow Up:    Patient Instructions:   No discharge procedures on file.  Medications:  Reconciled Home Medications:      Medication List        START taking these medications      calcitRIOL 0.5 MCG Cap  Commonly known as: ROCALTROL  Take 1 capsule (0.5 mcg total) by mouth once daily.     famotidine 20 MG tablet  Commonly known as: PEPCID  Take 1 tablet (20 mg total) by mouth every evening. Stop 3/29/24     ketoconazole 200 mg Tab  Commonly known as: NIZORAL  Take HALF tablet (100 mg total) by mouth once daily.     multivitamin Tab  Take 1 tablet by mouth once daily.     mycophenolate 250 mg Cap  Commonly known as: CELLCEPT  Take 4 capsules (1,000 mg total) by mouth 2 (two) times daily.     oxybutynin 5 MG Tab  Commonly known as: DITROPAN  Take 1 tablet (5 mg total) by mouth 3 (three) times daily as needed (bladder spasms).     oxyCODONE 10 mg Tab immediate release tablet  Commonly known as: ROXICODONE  Take 1 tablet (10 mg total) by mouth every 4 (four) hours as needed for Pain.     predniSONE 5 MG tablet  Commonly known as: DELTASONE  Take by mouth daily: 20mg 3/2-3/8, 15mg 3/9-3/15, 10mg 3/16-3/22/2024, 5mg 3/23/2024-     sevelamer carbonate 800 mg Tab  Commonly known as: RENVELA  Take 2 tablets (1,600 mg total) by mouth 3 (three) times daily with meals.     sulfamethoxazole-trimethoprim 400-80mg 400-80 mg per tablet  Commonly known as:  BACTRIM,SEPTRA  Take 1 tablet by mouth every Mon, Wed, Fri. Stop 8/27/24  Start taking on: March 6, 2024     tacrolimus 1 MG Cap  Commonly known as: PROGRAF  Take 8 capsules (8 mg total) by mouth every 12 (twelve) hours.     valGANciclovir 450 mg Tab  Commonly known as: VALCYTE  Take 1 tablet (450 mg total) by mouth every Mon, Wed, Fri. Stop 8/27/24  Start taking on: March 6, 2024            CONTINUE taking these medications      aspirin 81 MG EC tablet  Commonly known as: ECOTRIN  Take 81 mg by mouth once daily.     latanoprost 0.005 % ophthalmic solution  Place 1 drop into the right eye every other day. At night     levothyroxine 50 MCG tablet  Commonly known as: SYNTHROID  Take 1 tablet (50 mcg total) by mouth before breakfast.     ONETOUCH DELICA PLUS LANCET 30 gauge Misc  Generic drug: lancets     subcutaneous insulin pump Misc  Inject 1 Units/hr into the skin continuous. Insulin pump name - Tandum  Basal dose is 2 units/hr            STOP taking these medications      amLODIPine 5 MG tablet  Commonly known as: NORVASC     AURYXIA 210 mg iron Tab  Generic drug: ferric citrate     CENTRUM SILVER --300 mcg Tab  Generic drug: mv-min-folic-K1-lycopen-lutein     febuxostat 40 mg Tab  Commonly known as: ULORIC     furosemide 40 MG tablet  Commonly known as: LASIX     LOKELMA 10 gram packet  Generic drug: sodium zirconium cyclosilicate     loratadine 10 mg tablet  Commonly known as: CLARITIN     MAGNESIUM GLUCONATE ORAL     methylPREDNISolone 4 mg tablet  Commonly known as: MEDROL DOSEPACK     metoprolol tartrate 25 MG tablet  Commonly known as: LOPRESSOR     NEXLETOL 180 mg Tab  Generic drug: bempedoic acid     sodium bicarbonate 650 MG tablet     turmeric-ging-olive-oreg-capry 100 mg-150 mg- 50 mg-150 mg Cap     UNABLE TO FIND     RIKA-C ORAL            ASK your doctor about these medications      HumaLOG U-100 Insulin 100 unit/mL injection  Generic drug: insulin lispro  Inject into the skin. FOR  INSULIN PUMP (BASAL 1.2 UNITS/HR)            Time spent caring for patient (Greater than 1/2 spent in direct face-to-face contact): > 30 minutes    Urmila Leiva PA-C  Kidney Transplant  [facility] - Transplant Stepdown

## 2024-03-05 NOTE — PROGRESS NOTES
Discharge instructions given to and reviewed with patient. Reviewed upcoming appointments and when to call MD/coordinator. Dressing to [facility] CDI - patient reminded to leave dressing in place for 24 hours, then okay to remove. Dressing with date and time. Patient restarted on home insulin pump - endocrine aware. Patient voiding without difficulty following Curry catheter removal. No BM x3 days but reports passing gas. Miralax continued. JEAN-PIERRE bulb replaced d/t presence of clot. Patient and caregiver instructed on how to empty JEAN-PIERRE - plan is for drain to be d/c'd on Friday. Also instructed to secure drain to clothing to prevent it being pulled. PD cath flushed per dialysis RN prior to discharge.     Patient transported off unit via wheelchair per PCT.

## 2024-03-05 NOTE — PLAN OF CARE
Patient with continued c/o Curry discomfort. Pain medications adjusted; Ditropan continued TID. Curry irrigated this AM - no clots noted. Urine remains clear/punch-colored - output >2L this shift. Creatinine trending up to 6.4. Permcath placed today in IR. Self-meds pulled with 100% accuracy. Miralax started daily. Patient up ad katerina in room.

## 2024-03-05 NOTE — SUBJECTIVE & OBJECTIVE
"Interval HPI:   No acute events overnight. Patient in room 07161/33117 A. Blood glucose stable. BG at and below goal on current insulin regimen (SSI ). Steroid use- Prednisone 20 mg QD.   5 Days Post-Op  Renal function- Abnormal - 6.2 cr   Vasopressors-  None     Diet diabetic Renal; 2000 Calorie; Standard Tray  Diet Adult Regular     Eating:   NPO  Nausea: No  Hypoglycemia and intervention: No  Fever: No  TPN and/or TF: No    BP (!) 150/72 (Patient Position: Sitting)   Pulse 89   Temp 97.5 °F (36.4 °C) (Oral)   Resp 16   Ht 5' 4" (1.626 m)   Wt 96.7 kg (213 lb 3 oz)   SpO2 (!) 92%   BMI 36.59 kg/m²     Labs Reviewed and Include    Recent Labs   Lab 03/05/24  0557   *   CALCIUM 9.7   ALBUMIN 3.0*   *   K 4.2   CO2 23   CL 91*   BUN 94*   CREATININE 6.2*     Lab Results   Component Value Date    WBC 8.08 03/05/2024    HGB 8.4 (L) 03/05/2024    HCT 24.7 (L) 03/05/2024    MCV 90 03/05/2024    PLT 98 (L) 03/05/2024     No results for input(s): "TSH", "FREET4" in the last 168 hours.  Lab Results   Component Value Date    HGBA1C 5.8 (H) 03/01/2024       Nutritional status:   Body mass index is 36.59 kg/m².  Lab Results   Component Value Date    ALBUMIN 3.0 (L) 03/05/2024    ALBUMIN 2.8 (L) 03/04/2024    ALBUMIN 2.7 (L) 03/03/2024     No results found for: "PREALBUMIN"    Estimated Creatinine Clearance: 12.5 mL/min (A) (based on SCr of 6.2 mg/dL (H)).    Accu-Checks  Recent Labs     03/03/24  1242 03/03/24  1700 03/03/24  2038 03/04/24  0139 03/04/24  0754 03/04/24  1244 03/04/24  1736 03/05/24  0001 03/05/24  0358 03/05/24  0859   POCTGLUCOSE 274* 234* 238* 132* 121* 153* 197* 291* 203* 172*       Current Medications and/or Treatments Impacting Glycemic Control  Immunotherapy:    Immunosuppressants           Stop Route Frequency     tacrolimus capsule 8 mg         -- Oral 2 times daily     mycophenolate capsule 1,000 mg         -- Oral 2 times daily          Steroids:   Hormones (From admission, " onward)      Start     Stop Route Frequency Ordered    03/02/24 0900  predniSONE tablet 20 mg         -- Oral Daily 02/28/24 2058          Pressors:    Autonomic Drugs (From admission, onward)      Start     Stop Route Frequency Ordered    03/01/24 2200  midodrine tablet 5 mg         -- Oral Every 8 hours 03/01/24 1526          Hyperglycemia/Diabetes Medications:   Antihyperglycemics (From admission, onward)      Start     Stop Route Frequency Ordered    03/04/24 1345  insulin aspart U-100 pen 12 Units         -- SubQ 3 times daily with meals 03/04/24 1335    02/29/24 1328  insulin aspart U-100 pen 0-10 Units         -- SubQ Before meals, nightly and at 0200 PRN 02/29/24 1228

## 2024-03-05 NOTE — ASSESSMENT & PLAN NOTE
- DBD Ktx on 2/29 for diabetic nephropathy.   - 6 day cramer (difficult bladder anastomosis), 1 JEAN-PIERRE drain  - Kidney US obtained POD 0- reviewed with surgeon  - Last HD 3/2/2024 2L removed. Permcath placed 3/4/24.  - making more urine, lasix 120mg challenge 3/3 with good response   - making > 2L/day.Kidney function stable. No need for HD today. Cont IV lasix.   - strict I/Os

## 2024-03-05 NOTE — PROGRESS NOTES
Patient admitted for Kidney transplant.Transplant coordinator met with the patient on rounds to introduce self and explain the coordinator role. The post-transplant teaching book was given. Transplant Coordinator explained that she will follow the patient while in the hospital and assist with discharge.     ESRD 2/2 DMII  SCD, KDPI 71%, PHS increased risk  Thymo induction  CMV D+,R- (Mismatch)

## 2024-03-05 NOTE — PROGRESS NOTES
[facility] - Transplant Stepdown  Endocrinology  Progress Note    Admit Date: 2024     Reason for Consult: Management of T2DM, Hyperglycemia     Surgical Procedure and Date: kidney transplant 2024    Diabetes diagnosis year:  per chart review     Home Diabetes Medications:  Tandem insulin pump   ICR 1:5   ISF 1:25  Basal rate 1.2 u/hr + control IQ     How often checking glucose at home? >4 x day   BG readings on regimen: 180s-200s, occasional 300s  Hypoglycemia on the regimen?  Yes, once a week overnight, 2-3x during the week during the day   Missed doses on regimen?  n/a    Diabetes Complications include:     Hyperglycemia, Hypoglycemia , Hypoglycemia unawareness, Diabetic nephropathy  , and Diabetic chronic kidney disease         Complicating diabetes co morbidities:   Glucocorticoid use       HPI:   Patient is a 64 y.o. male with history of ESRD secondary to presumed diabetic nephropathy. Patient is currently on peritoneal dialysis started on 2023 (initially on HD). Patient is dialyzing on cyclic peritoneal dialysis. Patient reports that he is tolerating dialysis well. He has a PD catheter, permacath, and RUE AVF for dialysis access. Patient also with insulin pump. He now presents as primary candidate for a  donor kidney transplant. OR time set for 24 at 0800 AM. Pre-op labs and imaging ordered and will be reviewed prior to transplant. Will receive Thymoglobulin for induction. Endocrine consulted for BG management.         Interval HPI:   No acute events overnight. Patient in room 23399/02640 A. Blood glucose stable. BG at and below goal on current insulin regimen (SSI ). Steroid use- Prednisone 20 mg QD.   5 Days Post-Op  Renal function- Abnormal - 6.2 cr   Vasopressors-  None     Diet diabetic Renal; 2000 Calorie; Standard Tray  Diet Adult Regular     Eating:   NPO  Nausea: No  Hypoglycemia and intervention: No  Fever: No  TPN and/or TF: No    BP (!) 150/72 (Patient Position:  "Sitting)   Pulse 89   Temp 97.5 °F (36.4 °C) (Oral)   Resp 16   Ht 5' 4" (1.626 m)   Wt 96.7 kg (213 lb 3 oz)   SpO2 (!) 92%   BMI 36.59 kg/m²     Labs Reviewed and Include    Recent Labs   Lab 03/05/24  0557   *   CALCIUM 9.7   ALBUMIN 3.0*   *   K 4.2   CO2 23   CL 91*   BUN 94*   CREATININE 6.2*     Lab Results   Component Value Date    WBC 8.08 03/05/2024    HGB 8.4 (L) 03/05/2024    HCT 24.7 (L) 03/05/2024    MCV 90 03/05/2024    PLT 98 (L) 03/05/2024     No results for input(s): "TSH", "FREET4" in the last 168 hours.  Lab Results   Component Value Date    HGBA1C 5.8 (H) 03/01/2024       Nutritional status:   Body mass index is 36.59 kg/m².  Lab Results   Component Value Date    ALBUMIN 3.0 (L) 03/05/2024    ALBUMIN 2.8 (L) 03/04/2024    ALBUMIN 2.7 (L) 03/03/2024     No results found for: "PREALBUMIN"    Estimated Creatinine Clearance: 12.5 mL/min (A) (based on SCr of 6.2 mg/dL (H)).    Accu-Checks  Recent Labs     03/03/24  1242 03/03/24  1700 03/03/24  2038 03/04/24  0139 03/04/24  0754 03/04/24  1244 03/04/24  1736 03/05/24  0001 03/05/24  0358 03/05/24  0859   POCTGLUCOSE 274* 234* 238* 132* 121* 153* 197* 291* 203* 172*       Current Medications and/or Treatments Impacting Glycemic Control  Immunotherapy:    Immunosuppressants           Stop Route Frequency     tacrolimus capsule 8 mg         -- Oral 2 times daily     mycophenolate capsule 1,000 mg         -- Oral 2 times daily          Steroids:   Hormones (From admission, onward)      Start     Stop Route Frequency Ordered    03/02/24 0900  predniSONE tablet 20 mg         -- Oral Daily 02/28/24 2058          Pressors:    Autonomic Drugs (From admission, onward)      Start     Stop Route Frequency Ordered    03/01/24 2200  midodrine tablet 5 mg         -- Oral Every 8 hours 03/01/24 1526          Hyperglycemia/Diabetes Medications:   Antihyperglycemics (From admission, onward)      Start     Stop Route Frequency Ordered    03/04/24 " 1345  insulin aspart U-100 pen 12 Units         -- SubQ 3 times daily with meals 24 1335    24 1328  insulin aspart U-100 pen 0-10 Units         -- SubQ Before meals, nightly and at 0200 PRN 24 1228            ASSESSMENT and PLAN    Renal/  * Status post -donor kidney transplantation  Managed per primary team        ID  At risk for opportunistic infections  Optimize BG control         Endocrine  Type 2 diabetes mellitus with kidney complication, with long-term current use of insulin  Endocrinology consulted for BG management.   BG goal 140-180    - Novolog (Insulin Aspart) 12 units TIDWM and prn for BG excursions [facility] SSI (150/25)  - BG checks AC/HS  - Hypoglycemia protocol in place    ** Please notify Endocrine for any change and/or advance in diet**  ** Please call Endocrine for any BG related issues **    Discharge Planning:   Resume home insulin pump.              Gilbert Green, DNP, FNP  Endocrinology  [facility] - Transplant Stepdown

## 2024-03-05 NOTE — PLAN OF CARE
AAO x4. VSS. On room air. Afebrile. Pt independent in room. Curry discomfort continues. PRN oxy given for pain w/ relief. Curry output fruit punch color. ; SS given per order. Pt to switch to home pump today; realized he is without dexcom transmitter and has plans for family to bring today. JEAN-PIERRE output SS. Incision CDI. Continuing self med teaching. Bed locked and in lowest settings. Call bell in reach.

## 2024-03-05 NOTE — ASSESSMENT & PLAN NOTE
Endocrinology consulted for BG management.   BG goal 140-180    - Novolog (Insulin Aspart) 12 units TIDWM and prn for BG excursions [facility] SSI (150/25)  - BG checks AC/HS  - Hypoglycemia protocol in place    ** Please notify Endocrine for any change and/or advance in diet**  ** Please call Endocrine for any BG related issues **    Discharge Planning:   Resume home insulin pump.

## 2024-03-05 NOTE — PROGRESS NOTES
Transplant Coordinator  2/29-3/5/24    Patient admitted for a cadaveric kidney transplant froma  [facility] increased risk donor. ESRD 2/2 DMII. SCD< KDPI 71%CMV D+, R-(mismatch) Thymo induction.     PD cath remains in place.      Cr very slow to clear, 6.2 at time of d/c Last in-pt HD 3/3/24.   Permacth placed 3/4/24.  Out pt HD [facility] MWF 2:30, if needed.    RLQ incision RAMYA with staples  Torres removed priro to d/c. JEAN-PIERRE remains in place---pt to come to clinic Friday 3/8/24 to assess drain.    Dialysis RN flushed PD and changed dressing prior to DC on 3/5/24..   Labs 3/6/24 and RTC to meet with coordinator/pharmD

## 2024-03-05 NOTE — ASSESSMENT & PLAN NOTE
- expected 2/2 prolonged CIT  - Last HD 3/2 for DGF, tolerated well  - prev on PD, IR consulted for perm cath placement Monday for anticipated outpatient HD needs  - pt making more urine, Lasix 120 mg 3/3  - Perm cath on 3/4.

## 2024-03-05 NOTE — PROGRESS NOTES
Transplant Teaching Book given to patient, Mark Lopez, on 3/4/2024.  During the course of the hospital stay the patient received information regarding kidney transplant. Teaching and instruction were completed.  Areas that were discussed included: how to contact the Transplant Team, the importance of measuring intake of fluids and urine output, and monitoring vital signs such as blood pressure, temperature, and daily weights.  Parameters for which to report abnormal findings were given.  Appointment were provided along with the rational for the importance of lab work and clinic visits.  A written medication list was provided.  The importance of immunosuppressive medications, their common side effects, and treatment to prevent or minimize side effects has been reviewed.  Signs and symptoms of rejection and infection along with various treatments were reviewed.  The need to avoid infection was discussed.  Wound care and special consideration regarding activities of daily living were explained.  Written and verbal teaching of the above information was given.     Discussed with the patient and caregiver the importance of maintaining COVID-19 precautions; wearing a mask, good handwashing, and social distancing.  Also, to report any signs or symptoms (fever, difficulty breathing, loss of taste/smell, etc.), suspected exposure, or COVID testing, immediately to the transplant program.     Education was provided to the patient and his caregiver Fr. Glass.

## 2024-03-05 NOTE — PROGRESS NOTES
Transplant  Discharge Note:    Pt will discharge to patient's home under the care of Fr. Castillo Wilfred Rose, patient's friend and [employer] superior , phone number [phone].  Patient reports, if  Rose is unavailable, he will also have another friend / colleague Fr. Gordon Glass ([phone]) for caregiver support, education, and follow-up.      JUDY reviewed with patient, per KTS rounds, pt still needing an OP HD chair to be made available post-hospital discharge due to potential DGF.  JUDY completed referral to [facility] and provided pt with printout of [facility] OP HD schedule (MWF 3 pm).  Pt verbalized understanding and agreement regarding same.    Pt aware of, involved in, and coping well with this discharge plan. Pt did not have any concerns with the discharge plan at this time. No additional psychosocial needs indicated for discharge at this time.  JUDY remains available at [phone].

## 2024-03-05 NOTE — PROGRESS NOTES
03/05/24 1658        Peritoneal Dialysis Catheter 06/20/23 1011 Geneva-neck/flanged collar Right lower abdomen   Placement Date/Time: 06/20/23 1011   Present Prior to Hospital Arrival?: No  Inserted by: MD  Catheter Type: Geneva-neck/flanged collar  Catheter Location: Right lower abdomen   Site Assessment Clean;Dry;Intact;No redness;No swelling;No warmth;No drainage   Dressing Intervention Sterile dressing change   Status Clamped   Dressing Status Clean;Dry;Intact   Dressing Gauze   Securement secured to abdomen with tape   Clamp Status clamped   Exchange completed   Peritoneal Dialysis   Exchange Type Manual   Peritoneal Treatment Status Completed   Dianeal Solution Dextrose 1.5% in 2000 mL   Manual Peritoneal Dialysis   Manual Fill Volume (mL) 300 mL   Manual Drain Volume (mL) 300 mL   Effluent Appearance Esmer   Manual Treatment Comments capd manual flush completed     CAPD manual flush completed. Pt line clamped and capped with [employer] stay safe cap.

## 2024-03-05 NOTE — PROGRESS NOTES
DISCHARGE NOTE:    Mark Lopez is a 64 y.o. male s/p  RIGHT KIDNEY   Donation after Brain Death  transplant on 2/29/2024 (Kidney) for ESRD secondary to Diabetes Mellitus - Type II.      Past Medical History:   Diagnosis Date    Anemia     Cataract     Diabetes mellitus     Diabetes mellitus, type 2     Glaucoma     Gout, unspecified     HLD (hyperlipidemia)     Hypertension     Hypothyroidism, unspecified     Kidney failure     Renal disorder        Hospital Course: Patient's hospital course complicated by DGF (last HD 3/2). Urine output increased (with lasix use inpatient) 2L. Permacath placed (patient on PD prior to txp) and patient has HD chair set up.    Allergies:   Review of patient's allergies indicates:   Allergen Reactions    Allopurinol analogues Swelling    Latex, natural rubber     Statins-hmg-coa reductase inhibitors Other (See Comments)     Muscle ache    Adhesive Rash       Patient Pharmacy: [facility]    Discharge Medications:     Medication List        START taking these medications      calcitRIOL 0.5 MCG Cap  Commonly known as: ROCALTROL  Take 1 capsule (0.5 mcg total) by mouth once daily.     famotidine 20 MG tablet  Commonly known as: PEPCID  Take 1 tablet (20 mg total) by mouth every evening. Stop 3/29/24     ketoconazole 200 mg Tab  Commonly known as: NIZORAL  Take HALF tablet (100 mg total) by mouth once daily.     multivitamin Tab  Take 1 tablet by mouth once daily.     mycophenolate 250 mg Cap  Commonly known as: CELLCEPT  Take 4 capsules (1,000 mg total) by mouth 2 (two) times daily.     oxybutynin 5 MG Tab  Commonly known as: DITROPAN  Take 1 tablet (5 mg total) by mouth 3 (three) times daily as needed (bladder spasms).     oxyCODONE 10 mg Tab immediate release tablet  Commonly known as: ROXICODONE  Take 1 tablet (10 mg total) by mouth every 4 (four) hours as needed for Pain.     predniSONE 5 MG tablet  Commonly known as: DELTASONE  Take by mouth daily: 20mg 3/2-3/8, 15mg 3/9-3/15, 10mg  3/16-3/22/2024, 5mg 3/23/2024-     sevelamer carbonate 800 mg Tab  Commonly known as: RENVELA  Take 2 tablets (1,600 mg total) by mouth 3 (three) times daily with meals.     sulfamethoxazole-trimethoprim 400-80mg 400-80 mg per tablet  Commonly known as: BACTRIM,SEPTRA  Take 1 tablet by mouth every Mon, Wed, Fri. Stop 8/27/24  Start taking on: March 6, 2024     tacrolimus 1 MG Cap  Commonly known as: PROGRAF  Take 8 capsules (8 mg total) by mouth every 12 (twelve) hours.     valGANciclovir 450 mg Tab  Commonly known as: VALCYTE  Take 1 tablet (450 mg total) by mouth every Mon, Wed, Fri. Stop 8/27/24  Start taking on: March 6, 2024            CONTINUE taking these medications      aspirin 81 MG EC tablet  Commonly known as: ECOTRIN     latanoprost 0.005 % ophthalmic solution  Place 1 drop into the right eye every other day. At night     levothyroxine 50 MCG tablet  Commonly known as: SYNTHROID  Take 1 tablet (50 mcg total) by mouth before breakfast.     ONETOUCH DELICA PLUS LANCET 30 gauge Misc  Generic drug: lancets     subcutaneous insulin pump Misc  Inject 1 Units/hr into the skin continuous. Insulin pump name - Tandum  Basal dose is 2 units/hr            STOP taking these medications      amLODIPine 5 MG tablet  Commonly known as: NORVASC     AURYXIA 210 mg iron Tab  Generic drug: ferric citrate     CENTRUM SILVER --300 mcg Tab  Generic drug: mv-min-folic-K1-lycopen-lutein     febuxostat 40 mg Tab  Commonly known as: ULORIC     furosemide 40 MG tablet  Commonly known as: LASIX     LOKELMA 10 gram packet  Generic drug: sodium zirconium cyclosilicate     loratadine 10 mg tablet  Commonly known as: CLARITIN     MAGNESIUM GLUCONATE ORAL     methylPREDNISolone 4 mg tablet  Commonly known as: MEDROL DOSEPACK     metoprolol tartrate 25 MG tablet  Commonly known as: LOPRESSOR     NEXLETOL 180 mg Tab  Generic drug: bempedoic acid     sodium bicarbonate 650 MG tablet     turmeric-ging-olive-oreg-capry 100  mg-150 mg- 50 mg-150 mg Cap     UNABLE TO FIND     RIKA-C ORAL            ASK your doctor about these medications      HumaLOG U-100 Insulin 100 unit/mL injection  Generic drug: insulin lispro               Where to Get Your Medications        These medications were sent to [facility]  [address] [facility] [place] [address]      Hours: Mon-Fri 7a-7p, Sat-Sun 10a-4p Phone: [phone]   calcitRIOL 0.5 MCG Cap  famotidine 20 MG tablet  ketoconazole 200 mg Tab  latanoprost 0.005 % ophthalmic solution  levothyroxine 50 MCG tablet  multivitamin Tab  mycophenolate 250 mg Cap  oxybutynin 5 MG Tab  oxyCODONE 10 mg Tab immediate release tablet  predniSONE 5 MG tablet  sevelamer carbonate 800 mg Tab  sulfamethoxazole-trimethoprim 400-80mg 400-80 mg per tablet  tacrolimus 1 MG Cap  valGANciclovir 450 mg Tab          Pharmacy Interventions/Recommendations:  1) Transplant Immunosuppression: Induction Thymo, and maintenance Prograf (with keto 100mg), MMF, and pred (not a candidate for ESW due to PRA).     2) Opportunistic Infection prophylaxis: Bactrim and Valcyte    3) Osteoporosis Prevention measures (liver txp): NA    4) Patient Counseling/Education: Demonstrated the use of the BP cuff, thermometer.    5) Follow-Up/Discharge Needs:  Patient's calcitriol requires a PA. Synthroid and latanoprost were RTS.    6) Patient Assistance Information: None    7) The following medications have been placed on HOLD and should be restarted in the outpatient setting (when appropriate): None    Mark and his caregiver verbalized their understanding and had the opportunity to ask questions.

## 2024-03-06 ENCOUNTER — CLINICAL SUPPORT (OUTPATIENT)
Dept: TRANSPLANT | Facility: CLINIC | Age: 64
End: 2024-03-06
Payer: MEDICARE

## 2024-03-06 ENCOUNTER — PATIENT MESSAGE (OUTPATIENT)
Dept: TRANSPLANT | Facility: CLINIC | Age: 64
End: 2024-03-06

## 2024-03-06 ENCOUNTER — LAB VISIT (OUTPATIENT)
Dept: LAB | Facility: HOSPITAL | Age: 64
End: 2024-03-06
Payer: MEDICARE

## 2024-03-06 VITALS
SYSTOLIC BLOOD PRESSURE: 159 MMHG | BODY MASS INDEX: 36.55 KG/M2 | WEIGHT: 214.06 LBS | TEMPERATURE: 97 F | HEART RATE: 108 BPM | DIASTOLIC BLOOD PRESSURE: 73 MMHG | HEIGHT: 64 IN | OXYGEN SATURATION: 100 %

## 2024-03-06 DIAGNOSIS — N25.81 SECONDARY HYPERPARATHYROIDISM OF RENAL ORIGIN: ICD-10-CM

## 2024-03-06 DIAGNOSIS — Z94.0 KIDNEY REPLACED BY TRANSPLANT: ICD-10-CM

## 2024-03-06 DIAGNOSIS — B25.9 CYTOMEGALOVIRUS INFECTION, UNSPECIFIED CYTOMEGALOVIRAL INFECTION TYPE: ICD-10-CM

## 2024-03-06 DIAGNOSIS — E78.49 STEROID-INDUCED HYPERLIPIDEMIA: ICD-10-CM

## 2024-03-06 DIAGNOSIS — Z94.0 KIDNEY REPLACED BY TRANSPLANT: Primary | ICD-10-CM

## 2024-03-06 DIAGNOSIS — T38.0X5A STEROID-INDUCED HYPERLIPIDEMIA: ICD-10-CM

## 2024-03-06 DIAGNOSIS — Z94.0 IMMUNOSUPPRESSIVE MANAGEMENT ENCOUNTER FOLLOWING KIDNEY TRANSPLANT: Primary | ICD-10-CM

## 2024-03-06 DIAGNOSIS — Z91.89 INCREASED INFECTION RISK: ICD-10-CM

## 2024-03-06 DIAGNOSIS — Z79.899 IMMUNOSUPPRESSIVE MANAGEMENT ENCOUNTER FOLLOWING KIDNEY TRANSPLANT: Primary | ICD-10-CM

## 2024-03-06 DIAGNOSIS — D51.0 PERNICIOUS ANEMIA: ICD-10-CM

## 2024-03-06 DIAGNOSIS — T86.10 COMPLICATION OF TRANSPLANTED KIDNEY, UNSPECIFIED COMPLICATION: ICD-10-CM

## 2024-03-06 DIAGNOSIS — N28.9 KIDNEY DISEASE: ICD-10-CM

## 2024-03-06 DIAGNOSIS — R80.9 PROTEINURIA, UNSPECIFIED TYPE: ICD-10-CM

## 2024-03-06 DIAGNOSIS — N39.0 URINARY TRACT INFECTION WITHOUT HEMATURIA, SITE UNSPECIFIED: ICD-10-CM

## 2024-03-06 LAB
25(OH)D3+25(OH)D2 SERPL-MCNC: 26 NG/ML (ref 30–96)
ALBUMIN SERPL BCP-MCNC: 3.2 G/DL (ref 3.5–5.2)
ANION GAP SERPL CALC-SCNC: 15 MMOL/L (ref 8–16)
BACTERIA BLD CULT: NORMAL
BACTERIA BLD CULT: NORMAL
BACTERIA SPEC ANAEROBE CULT: NORMAL
BASOPHILS # BLD AUTO: 0.02 K/UL (ref 0–0.2)
BASOPHILS NFR BLD: 0.2 % (ref 0–1.9)
BUN SERPL-MCNC: 104 MG/DL (ref 8–23)
CALCIUM SERPL-MCNC: 9.7 MG/DL (ref 8.7–10.5)
CHLORIDE SERPL-SCNC: 90 MMOL/L (ref 95–110)
CO2 SERPL-SCNC: 22 MMOL/L (ref 23–29)
CREAT SERPL-MCNC: 6.2 MG/DL (ref 0.5–1.4)
DIFFERENTIAL METHOD BLD: ABNORMAL
EOSINOPHIL # BLD AUTO: 0.1 K/UL (ref 0–0.5)
EOSINOPHIL NFR BLD: 0.8 % (ref 0–8)
ERYTHROCYTE [DISTWIDTH] IN BLOOD BY AUTOMATED COUNT: 14.6 % (ref 11.5–14.5)
EST. GFR  (NO RACE VARIABLE): 9.4 ML/MIN/1.73 M^2
GLUCOSE SERPL-MCNC: 123 MG/DL (ref 70–110)
HCT VFR BLD AUTO: 23.9 % (ref 40–54)
HGB BLD-MCNC: 8.2 G/DL (ref 14–18)
IMM GRANULOCYTES # BLD AUTO: 0.29 K/UL (ref 0–0.04)
IMM GRANULOCYTES NFR BLD AUTO: 3.4 % (ref 0–0.5)
LYMPHOCYTES # BLD AUTO: 0 K/UL (ref 1–4.8)
LYMPHOCYTES NFR BLD: 0.5 % (ref 18–48)
MAGNESIUM SERPL-MCNC: 2.9 MG/DL (ref 1.6–2.6)
MCH RBC QN AUTO: 31.2 PG (ref 27–31)
MCHC RBC AUTO-ENTMCNC: 34.3 G/DL (ref 32–36)
MCV RBC AUTO: 91 FL (ref 82–98)
MONOCYTES # BLD AUTO: 0.7 K/UL (ref 0.3–1)
MONOCYTES NFR BLD: 8.5 % (ref 4–15)
NEUTROPHILS # BLD AUTO: 7.4 K/UL (ref 1.8–7.7)
NEUTROPHILS NFR BLD: 86.6 % (ref 38–73)
NRBC BLD-RTO: 0 /100 WBC
PHOSPHATE SERPL-MCNC: 6.2 MG/DL (ref 2.7–4.5)
PLATELET # BLD AUTO: 129 K/UL (ref 150–450)
PMV BLD AUTO: 11.4 FL (ref 9.2–12.9)
POTASSIUM SERPL-SCNC: 4.3 MMOL/L (ref 3.5–5.1)
PTH-INTACT SERPL-MCNC: 330.6 PG/ML (ref 9–77)
RBC # BLD AUTO: 2.63 M/UL (ref 4.6–6.2)
SODIUM SERPL-SCNC: 127 MMOL/L (ref 136–145)
TACROLIMUS BLD-MCNC: 12.3 NG/ML (ref 5–15)
WBC # BLD AUTO: 8.54 K/UL (ref 3.9–12.7)

## 2024-03-06 PROCEDURE — 85025 COMPLETE CBC W/AUTO DIFF WBC: CPT | Performed by: INTERNAL MEDICINE

## 2024-03-06 PROCEDURE — 80197 ASSAY OF TACROLIMUS: CPT | Performed by: INTERNAL MEDICINE

## 2024-03-06 PROCEDURE — 36415 COLL VENOUS BLD VENIPUNCTURE: CPT | Performed by: INTERNAL MEDICINE

## 2024-03-06 PROCEDURE — 80069 RENAL FUNCTION PANEL: CPT | Performed by: INTERNAL MEDICINE

## 2024-03-06 PROCEDURE — 82306 VITAMIN D 25 HYDROXY: CPT | Performed by: INTERNAL MEDICINE

## 2024-03-06 PROCEDURE — 99213 OFFICE O/P EST LOW 20 MIN: CPT | Mod: PBBFAC

## 2024-03-06 PROCEDURE — 83970 ASSAY OF PARATHORMONE: CPT | Performed by: INTERNAL MEDICINE

## 2024-03-06 PROCEDURE — 99999 PR PBB SHADOW E&M-EST. PATIENT-LVL III: CPT | Mod: PBBFAC,,,

## 2024-03-06 PROCEDURE — 83735 ASSAY OF MAGNESIUM: CPT | Performed by: INTERNAL MEDICINE

## 2024-03-06 NOTE — PHYSICIAN QUERY
PT Name: Mark Lopez  MR #: [id number]  DOCUMENTATION CLARIFICATION     CDS/: Holly CheneyRN               Contact information: [email or web address]  This form is a permanent document in the medical record.     Query Date: 2024    By submitting this query, we are merely seeking further clarification of documentation. Please utilize your independent clinical judgment when addressing the question(s) below.    The Medical Record contains the following:   Indicators Supporting Clinical Findings Location in Medical Record   x CKD or Chronic Kidney (Renal) Failure/Disease ESRD (end stage renal disease)  - ESRD d/t DM currently on cyclic PD  - Admit for  donor kidney transplant  - OR time 8 AM on 24  - Pre op labs and imaging to be reviewed prior to transplant    Mark Lopez is a 64 year old male with a history of ESRD secondary to presumed diabetic nephropathy. Patient is currently on peritoneal dialysis started on 2023 (initially on HD).    Pre-operative Diagnosis:    ESRD, requiring chronic dialysis secondary to Diabetes Mellitus - Type II  Post-operative Diagnosis:    Same   Procedure(s) Performed:    Back Table Preparation of Right Kidney    Donation after Brain Death Kidney transplant    [  X ]Anemia of chronic kidney disease (please specify stage): ___5__________________     H&P             H&P       Op Note                 Physician Query Response 3/1   x BUN/Creatinine                          GFR  24 20:27 24 07:12 24 11:12 24 15:40 24 20:52 24 03:37   BUN 99 (H) 102 (H) 105 (H) 105 (H) 112 (H) 117 (H)   Creatinine 12.7 (H) 11.8 (H) 12.7 (H) 13.0 (H) 13.2 (H) 13.7 (H)      24 03:37 24 06:16 24 05:48 24 06:00 24 05:57 24 08:40    (H) 73 (H) 56 (H) 88 (H) 94 (H) 104 (H)   Creatinine 13.7 (H) 8.3 (H) 5.6 (H) 6.4 (H) 6.2 (H) 6.2 (H)   eGFR 3.6 ! 6.6 ! 10.6 ! 9.0 ! 9.4 ! 9.4 !       05/08/23 16:19 06/20/23 08:47 09/13/23 08:25 11/07/23 11:49    (H)  120 (H) 76 (H) 97 (H) 104 (H)   Creatinine 10.60 (H)  10.60 (H) 7.60 (H) 10.5 (H) 10.8 (H)   eGFR 5 !  5 ! 7 ! 5.0 ! 4.8 !    Labs 2/28- 3/6                              Labs 5/8/23-11/7/23  (Previous encounters)                    Dehydration      Nausea / Vomiting      Dialysis / CRRT      Medication      Treatment     x Other Chronic Conditions Mark Lopez is a 64 year old male with a history of ESRD secondary to presumed diabetic nephropathy. Patient is currently on peritoneal dialysis started on 5/19/2023 (initially on HD).  Patient is dialyzing on cyclic peritoneal dialysis.  Acute on chronic anemia  At risk for opportunistic infections  Long-term use of immunosuppressant medication  Prophylactic immunotherapy  Essential hypertension  Type 2 diabetes mellitus with kidney complication, with long-term current use of insulin   TK PN 3/4    Other         Provider, due to conflicting documentation,please further clarify the stage of Chronic Kidney Disease (CKD):    [  X ] Chronic Kidney Disease (CKD) stage 5 - Kidney failure eGFR <15     [   ] Chronic Kidney Disease (CKD) stage 5 on chronic dialysis     [   ] ESRD secondary to presumed diabetic nephropathy      [   ] End Stage Renal Disease (ESRD) - Kidney failure, requiring renal replacement therapy or transplant, eGFR <15 (for 3 or more months)     [   ] Other (please specify): _________________       Please document in your progress notes daily for the duration of treatment until resolved and include in your discharge summary.    Reference:     SATHISH Jack MD, & MANUEL Arnold MD, MS. (2020, June 18). Definition and staging of chronic kidney disease in adults ([id number] MILO Terrazas MD, ScD & [id number] LEO Alvarez MD, MSc, Eds.). Retrieved October 21, 2020, from  https://www.[employer].Medio/contents/definition-and-staging-of-chronic-kidney-disease-in-adults?search=ckd%20staging&source=search_result&selectedTitle=1~150&usage_type=default&display_rank=1    Form No. 18125

## 2024-03-07 ENCOUNTER — TELEPHONE (OUTPATIENT)
Dept: TRANSPLANT | Facility: CLINIC | Age: 64
End: 2024-03-07
Payer: MEDICARE

## 2024-03-07 RX ORDER — TACROLIMUS 1 MG/1
6 CAPSULE ORAL EVERY 12 HOURS
Qty: 360 CAPSULE | Refills: 11 | Status: ON HOLD | OUTPATIENT
Start: 2024-03-07 | End: 2024-03-10

## 2024-03-07 NOTE — TELEPHONE ENCOUNTER
Coordinator returned patient's call. Patient called to report he took a laxative suppository and it worked a little plus he's been passing a lot of gas. He reports he also took Miralax then he'll repeat another suppository tonight & thinks that will do it.     Reminded patient of his fasting lab and clinic appointment tomorrow. Patient verbalized understanding.       ----- Message from Leticia Mccallum sent at 3/7/2024 12:58 PM CST -----  Regarding: Patient advice  Contact: Pt  [phone]            Name of Caller:  Mark     Contact Preference:  [phone]    Nature of Call:  Requesting a call back have concerns about being constipated for the last seven  days and about reducing medication

## 2024-03-07 NOTE — TELEPHONE ENCOUNTER
----- Message from Leticia Mccallum sent at 3/7/2024 12:58 PM CST -----  Regarding: Patient advice  Contact: Pt  [phone]            Name of Caller:  Mark Valdez Preference:  [phone]    Nature of Call:  Requesting a call back have concerns about being constipated for the last seven  days and about reducing medication

## 2024-03-07 NOTE — TELEPHONE ENCOUNTER
Attempted to contact patient via telephone but was unsuccessful. Message left on patient's voicemail with medication instructions to HOLD his prograf dose tonight then decrease his dose starting tomorrow morning to 6mg twice daily.     My [facility] message also sent to patient:    Souravy Father John,    Dr. Alexander reviewed your 3/6/24 lab results. Your prograf level is higher than it should be. She wants you to please HOLD your dose tonight (3/6/24) then restart tomorrow morning on a lower dose of 6mg twice daily. Please update your blue medication card with the new prograf dose of 6mg twice daily.     Thanks,     Veronica     ----- Message from Veronica Simmons RN sent at 3/6/2024  5:22 PM CST -----    ----- Message -----  From: Hope Alexander DO  Sent: 3/6/2024   2:46 PM CST  To: [facility] Post-Kidney Transplant Clinical    Labs reviewed and patient informed to go for HD today for 3 hrs without any UF  High FK. Hold FK tonight and decrease dose to FK 6 mg BID starting tomorrow

## 2024-03-08 ENCOUNTER — TELEPHONE (OUTPATIENT)
Dept: INFECTIOUS DISEASES | Facility: CLINIC | Age: 64
End: 2024-03-08
Payer: MEDICARE

## 2024-03-08 ENCOUNTER — CLINICAL SUPPORT (OUTPATIENT)
Dept: INFECTIOUS DISEASES | Facility: CLINIC | Age: 64
DRG: 696 | End: 2024-03-08
Payer: MEDICARE

## 2024-03-08 ENCOUNTER — OFFICE VISIT (OUTPATIENT)
Dept: TRANSPLANT | Facility: CLINIC | Age: 64
DRG: 696 | End: 2024-03-08
Payer: MEDICARE

## 2024-03-08 VITALS
DIASTOLIC BLOOD PRESSURE: 62 MMHG | RESPIRATION RATE: 18 BRPM | SYSTOLIC BLOOD PRESSURE: 138 MMHG | OXYGEN SATURATION: 100 % | WEIGHT: 210.31 LBS | TEMPERATURE: 97 F | HEART RATE: 112 BPM | HEIGHT: 64 IN | BODY MASS INDEX: 35.9 KG/M2

## 2024-03-08 DIAGNOSIS — Z94.0 KIDNEY REPLACED BY TRANSPLANT: ICD-10-CM

## 2024-03-08 DIAGNOSIS — R60.9 EDEMA, UNSPECIFIED TYPE: Primary | ICD-10-CM

## 2024-03-08 DIAGNOSIS — A53.9 SYPHILIS: Primary | ICD-10-CM

## 2024-03-08 DIAGNOSIS — Z51.81 ENCOUNTER FOR THERAPEUTIC DRUG MONITORING: Primary | ICD-10-CM

## 2024-03-08 LAB
OHS QRS DURATION: 106 MS
OHS QTC CALCULATION: 510 MS

## 2024-03-08 PROCEDURE — 99214 OFFICE O/P EST MOD 30 MIN: CPT | Mod: PBBFAC,25 | Performed by: SURGERY

## 2024-03-08 PROCEDURE — 96372 THER/PROPH/DIAG INJ SC/IM: CPT | Mod: PBBFAC,59

## 2024-03-08 PROCEDURE — 99999 PR PBB SHADOW E&M-EST. PATIENT-LVL IV: CPT | Mod: PBBFAC,,, | Performed by: SURGERY

## 2024-03-08 PROCEDURE — 99999PBSHW PR PBB SHADOW TECHNICAL ONLY FILED TO HB: Mod: JZ,PBBFAC,,

## 2024-03-08 PROCEDURE — 99214 OFFICE O/P EST MOD 30 MIN: CPT | Mod: 24,S$PBB,, | Performed by: SURGERY

## 2024-03-08 RX ORDER — FUROSEMIDE 40 MG/1
40 TABLET ORAL DAILY
Qty: 30 TABLET | Refills: 0 | Status: ON HOLD | OUTPATIENT
Start: 2024-03-08 | End: 2024-03-10 | Stop reason: HOSPADM

## 2024-03-08 RX ADMIN — PENICILLIN G BENZATHINE 2.4 MILLION UNITS: 1200000 INJECTION, SUSPENSION INTRAMUSCULAR at 10:03

## 2024-03-08 NOTE — PROGRESS NOTES
Transplant Surgery  Kidney Transplant Recipient Follow-up    Referring Physician: Fredi Lyon  Current Nephrologist: Fredi Lyon    Subjective:     Chief Complaint: Mark Lopez is a 64 y.o. year old  male who is status post Kidney transplant performed on 2/29/2024.  Patient being managed for DGF.    ORGAN:  RIGHT KIDNEY  Disease Etiology: Diabetes Mellitus - Type II  Donor Type:  Donation after Brain Death  Donor CMV Status:  Positive  Donor HBcAB:  Negative  Donor HCV Status:  Negative    History of Present Illness: He reports  constipation .  From a transplant perspective, he reports normal urination.  Mark is here for management of his immunosuppression medication.  Mark states that his immunosuppression is being well tolerated.  Hypertension is not present.    External provider notes reviewed: No    Review of Systems   Constitutional:  Negative for fatigue.   HENT:  Negative for drooling, postnasal drip and sore throat.    Eyes:  Negative for discharge and itching.   Respiratory:  Negative for choking and stridor.    Gastrointestinal:  Negative for rectal pain.   Endocrine: Negative for polydipsia.   Genitourinary:  Negative for enuresis and genital sores.   Musculoskeletal:  Negative for back pain, neck pain and neck stiffness.   Allergic/Immunologic: Positive for immunocompromised state.   Neurological:  Negative for facial asymmetry and numbness.   Hematological:  Negative for adenopathy.   Psychiatric/Behavioral:  Negative for behavioral problems, self-injury and suicidal ideas.      Objective:     Physical Exam  Abdominal:          Comments: Healing well   Lab Results   Component Value Date    CREATININE 4.5 (H) 03/08/2024    BUN 71 (H) 03/08/2024     Lab Results   Component Value Date    WBC 10.41 03/08/2024    HGB 7.7 (L) 03/08/2024    HCT 23.6 (L) 03/08/2024    HCT 27 (L) 02/29/2024     03/08/2024     Lab Results   Component Value Date    TACROLIMUS 12.3 03/06/2024        Diagnostics:  The following labs have been reviewed: CBC  CMP    Assessment and Plan:        S/P Kidney transplant.  Chronic immunosuppressive medications for rejection prophylaxis at target.  Plan: no adjustment needed.  Continue monitoring symptoms, labs and drug levels for drug-related toxicity and side effects.  Renal hypertension at target.  Leave JEAN-PIERRE in place - draining 250 cc per day serosanguinous    Additional testing to be completed according to the Kidney: Written Order Guideline for Kidney Transplant Follow-Up (KI-09)    Interpretation of tests and discussion of patient management involves all members of the multidisciplinary transplant team.  Patient advised that it is recommended that all transplant candidates, and their close contacts and household members receive Covid vaccination.  Follow-up: Patient reminded to call with any health changes, since these can be early signs of significant complications.  Also, I advised the patient to be sure any new medications or changes of old medications are discussed with either a pharmacist, or physician knowledgeable with transplant to avoid rejection/drug toxicity related to significant drug interactions.    Reji Espinosa MD       [facility] Patient Status  Functional Status: 80% - Normal activity with effort: some symptoms of disease  Physical Capacity: No Limitations

## 2024-03-08 NOTE — LETTER
March 8, 2024        Fredi Lyon  [address]  [facility] [address]  Phone: [phone]  Fax: [phone]             [facility] 1st Fl  [address]  [facility] [address]  Phone: [phone]   Patient: Mark Lopez   MR Number: [id number]   YOB: 1960   Date of Visit: 3/8/2024       Dear Dr. Fredi Lyon    Thank you for referring Mark Lopez to me for evaluation. Attached you will find relevant portions of my assessment and plan of care.    If you have questions, please do not hesitate to call me. I look forward to following Mark Lopez along with you.    Sincerely,    Reji Espinosa MD    Enclosure    If you would like to receive this communication electronically, please contact [email or web address] or [phone] to request [employer] access.    [employer] is a tool which provides read-only access to select patient information with whom you have a relationship. Its easy to use and provides real time access to review your patients record including encounter summaries, notes, results, and demographic information.    If you feel you have received this communication in error or would no longer like to receive these types of communications, please e-mail [email or web address]

## 2024-03-08 NOTE — TELEPHONE ENCOUNTER
Patient seen in Transplant clinic by surgeon & transplant coordinator. Dr. Espinosa wants patient's JEAN-PIERRE drain to remain in until next Tues or Wed. Patient will be reassessed at that time.     Reviewed with Dr. Alexander:  Hey Dr. Alexander, Father John had stat labs renal panel isn't back. slight weight gain yesterday was 209.6 today 210.4 c/o some SOB. edema of LE noted. Still c/o constipated despite taking laxatives. I'm going to get with PharmD regarding the constipation.     Plan given by Dr. Alexander:  Have him go for HD today for 3 hrs. Have him get 1 L removed for the SOB as well as LE swelling Will start holding HD on Monday and see if we can get him off    Also, start him on lasix 40 mg daily.     Patient also seen in clinic by PharmKATIE Bethea. She recommends patient takes magnesium citrate for c/o constipation. She provided him with instructions. Patient verbalized understanding.

## 2024-03-08 NOTE — TELEPHONE ENCOUNTER
You saw this pt in the Hospital and ordered Bicillin x1 dose. He needs two more doses. I'm adding it under your name I angely that is ok. The pt is here in the ID clinic now and needs to get his injection. Thanks

## 2024-03-08 NOTE — PROGRESS NOTES
Patient received  the second dose of Bicillin 2.4 Mill units in the bilateral buttocks. Pt tolerated well. Pt asked to wait in the clinic 15 minutes after injection in the event of an allergic reaction. Pt verbalized understanding. Pt left in NAD.

## 2024-03-09 ENCOUNTER — NURSE TRIAGE (OUTPATIENT)
Dept: ADMINISTRATIVE | Facility: CLINIC | Age: 64
End: 2024-03-09
Payer: MEDICARE

## 2024-03-09 ENCOUNTER — TELEPHONE (OUTPATIENT)
Dept: TRANSPLANT | Facility: CLINIC | Age: 64
End: 2024-03-09
Payer: MEDICARE

## 2024-03-09 PROBLEM — R33.8 ACUTE URINARY RETENTION: Status: ACTIVE | Noted: 2024-03-09

## 2024-03-09 NOTE — PROGRESS NOTES
"1ST NURSING VISIT POST DISCHARGE NOTE    1st RN appointment with Mark Lopez post discharge 3/5/24 s/p kidney transplant 2/29/24.  Patient reports abdominal pain.  Patient says that he that he is not sleeping well.  Incision intact with staples & JEAN-PIERRE drain.  Patient has JEAN-PIERRE drain, right tunneled HD catheter and PD catheter.  Patient that he is able to explain daily incision care and showering instructions.  Reviewed I&O monitoring, measuring, and recording, and the need for hydration (i.e., at least 2 liters of water daily with minimal caffeine and no grapefruit products).  Medication list and rationale were reviewed.  Patient did bring blue medication card and medication bottles for review with PharmD.  Patient reports that he has not stopped Dulcolax and has continued Colace.  Patient has not had a bowel movement.  Patient expressed understanding of daily care including BID VS, medications, and I&O documentation.  Patient made aware of today's creatinine level: 6.2.  Patient aware that coordinator will review today's labs with a transplant physician and call the patient with any dose changes indicated.  Next lab appointment scheduled for 3/8/24.  First post-operative transplant team appointment with labs scheduled for 3/13/24.    Using the Kidney Transplant Patient Reference Manual, the patient submitted his open book "Self-assessment of Kidney Transplant Patient Knowledge" test, which was completed in the transplant clinic this morning before 1st nursing visit.  This test includes questions regarding critical dose medications commonly used after kidney transplant, medication dosing and side effects, importance of timed lab draws, important signs and symptoms to report 24/7 immediately post-transplant as well as how to contact the transplant team 24/7.    Test Score: 24/25    After completing the test, the patient was given a copy of the Self-assessment Answer Key to reinforce accurate learning of test content.  " Patient expressed his understanding of the value of the information included in the self-assessment test.    Patient instructed on correct procedure for collection of midstream clean catch urine specimen and vebalizes understanding.

## 2024-03-10 ENCOUNTER — HOSPITAL ENCOUNTER (INPATIENT)
Facility: HOSPITAL | Age: 64
LOS: 1 days | Discharge: HOME | DRG: 696 | End: 2024-03-10
Attending: INTERNAL MEDICINE | Admitting: INTERNAL MEDICINE
Payer: MEDICARE

## 2024-03-10 VITALS
HEART RATE: 89 BPM | BODY MASS INDEX: 35.24 KG/M2 | DIASTOLIC BLOOD PRESSURE: 71 MMHG | TEMPERATURE: 98 F | RESPIRATION RATE: 18 BRPM | HEIGHT: 64 IN | WEIGHT: 206.44 LBS | OXYGEN SATURATION: 96 % | SYSTOLIC BLOOD PRESSURE: 156 MMHG

## 2024-03-10 DIAGNOSIS — Z79.4 TYPE 2 DIABETES MELLITUS WITH OTHER DIABETIC KIDNEY COMPLICATION, WITH LONG-TERM CURRENT USE OF INSULIN: ICD-10-CM

## 2024-03-10 DIAGNOSIS — E11.29 TYPE 2 DIABETES MELLITUS WITH OTHER DIABETIC KIDNEY COMPLICATION, WITH LONG-TERM CURRENT USE OF INSULIN: ICD-10-CM

## 2024-03-10 DIAGNOSIS — Z94.0 KIDNEY REPLACED BY TRANSPLANT: ICD-10-CM

## 2024-03-10 DIAGNOSIS — Z94.0 STATUS POST DECEASED-DONOR KIDNEY TRANSPLANTATION: ICD-10-CM

## 2024-03-10 DIAGNOSIS — R00.0 TACHYCARDIA: ICD-10-CM

## 2024-03-10 DIAGNOSIS — R33.8 ACUTE URINARY RETENTION: Primary | ICD-10-CM

## 2024-03-10 DIAGNOSIS — R33.9 URINARY RETENTION: ICD-10-CM

## 2024-03-10 PROBLEM — Z96.41 INSULIN PUMP IN PLACE: Status: ACTIVE | Noted: 2024-03-10

## 2024-03-10 LAB
ALBUMIN SERPL BCP-MCNC: 3.3 G/DL (ref 3.5–5.2)
ALP SERPL-CCNC: 69 U/L (ref 55–135)
ALT SERPL W/O P-5'-P-CCNC: 17 U/L (ref 10–44)
ANION GAP SERPL CALC-SCNC: 14 MMOL/L (ref 8–16)
AST SERPL-CCNC: 30 U/L (ref 10–40)
BACTERIA #/AREA URNS AUTO: ABNORMAL /HPF
BASOPHILS # BLD AUTO: 0.03 K/UL (ref 0–0.2)
BASOPHILS NFR BLD: 0.2 % (ref 0–1.9)
BILIRUB SERPL-MCNC: 0.4 MG/DL (ref 0.1–1)
BILIRUB UR QL STRIP: NEGATIVE
BODY FLUID SOURCE, CREATININE: NORMAL
BUN SERPL-MCNC: 57 MG/DL (ref 8–23)
CALCIUM SERPL-MCNC: 9.6 MG/DL (ref 8.7–10.5)
CHLORIDE SERPL-SCNC: 100 MMOL/L (ref 95–110)
CLARITY UR REFRACT.AUTO: ABNORMAL
CO2 SERPL-SCNC: 19 MMOL/L (ref 23–29)
COLOR UR AUTO: YELLOW
CREAT FLD-MCNC: 2.8 MG/DL
CREAT SERPL-MCNC: 2.8 MG/DL (ref 0.5–1.4)
DIFFERENTIAL METHOD BLD: ABNORMAL
EOSINOPHIL # BLD AUTO: 0.1 K/UL (ref 0–0.5)
EOSINOPHIL NFR BLD: 0.5 % (ref 0–8)
ERYTHROCYTE [DISTWIDTH] IN BLOOD BY AUTOMATED COUNT: 15 % (ref 11.5–14.5)
EST. GFR  (NO RACE VARIABLE): 24.3 ML/MIN/1.73 M^2
GLUCOSE SERPL-MCNC: 117 MG/DL (ref 70–110)
GLUCOSE UR QL STRIP: ABNORMAL
HCT VFR BLD AUTO: 27 % (ref 40–54)
HGB BLD-MCNC: 8.8 G/DL (ref 14–18)
HGB UR QL STRIP: ABNORMAL
HYALINE CASTS UR QL AUTO: 0 /LPF
IMM GRANULOCYTES # BLD AUTO: 0.39 K/UL (ref 0–0.04)
IMM GRANULOCYTES NFR BLD AUTO: 2.8 % (ref 0–0.5)
KETONES UR QL STRIP: NEGATIVE
LACTATE SERPL-SCNC: 0.7 MMOL/L (ref 0.5–2.2)
LACTATE SERPL-SCNC: 0.8 MMOL/L (ref 0.5–2.2)
LEUKOCYTE ESTERASE UR QL STRIP: ABNORMAL
LYMPHOCYTES # BLD AUTO: 0 K/UL (ref 1–4.8)
LYMPHOCYTES NFR BLD: 0.3 % (ref 18–48)
MAGNESIUM SERPL-MCNC: 2.5 MG/DL (ref 1.6–2.6)
MCH RBC QN AUTO: 30.8 PG (ref 27–31)
MCHC RBC AUTO-ENTMCNC: 32.6 G/DL (ref 32–36)
MCV RBC AUTO: 94 FL (ref 82–98)
MICROSCOPIC COMMENT: ABNORMAL
MONOCYTES # BLD AUTO: 1 K/UL (ref 0.3–1)
MONOCYTES NFR BLD: 7.4 % (ref 4–15)
NEUTROPHILS # BLD AUTO: 12.6 K/UL (ref 1.8–7.7)
NEUTROPHILS NFR BLD: 88.8 % (ref 38–73)
NITRITE UR QL STRIP: NEGATIVE
NRBC BLD-RTO: 0 /100 WBC
OHS QRS DURATION: 110 MS
OHS QTC CALCULATION: 508 MS
PH UR STRIP: 5 [PH] (ref 5–8)
PHOSPHATE SERPL-MCNC: 3.1 MG/DL (ref 2.7–4.5)
PLATELET # BLD AUTO: 391 K/UL (ref 150–450)
PMV BLD AUTO: 9.5 FL (ref 9.2–12.9)
POTASSIUM SERPL-SCNC: 3.7 MMOL/L (ref 3.5–5.1)
PROT SERPL-MCNC: 6.1 G/DL (ref 6–8.4)
PROT UR QL STRIP: ABNORMAL
RBC # BLD AUTO: 2.86 M/UL (ref 4.6–6.2)
RBC #/AREA URNS AUTO: >100 /HPF (ref 0–4)
SODIUM SERPL-SCNC: 133 MMOL/L (ref 136–145)
SP GR UR STRIP: 1.01 (ref 1–1.03)
SQUAMOUS #/AREA URNS AUTO: 0 /HPF
TACROLIMUS BLD-MCNC: 15.1 NG/ML (ref 5–15)
URN SPEC COLLECT METH UR: ABNORMAL
WBC # BLD AUTO: 14.12 K/UL (ref 3.9–12.7)
WBC #/AREA URNS AUTO: 21 /HPF (ref 0–5)

## 2024-03-10 PROCEDURE — 84100 ASSAY OF PHOSPHORUS: CPT | Performed by: NURSE PRACTITIONER

## 2024-03-10 PROCEDURE — 83735 ASSAY OF MAGNESIUM: CPT | Performed by: NURSE PRACTITIONER

## 2024-03-10 PROCEDURE — 81001 URINALYSIS AUTO W/SCOPE: CPT | Performed by: NURSE PRACTITIONER

## 2024-03-10 PROCEDURE — 99223 1ST HOSP IP/OBS HIGH 75: CPT | Mod: AI,,, | Performed by: NURSE PRACTITIONER

## 2024-03-10 PROCEDURE — 83605 ASSAY OF LACTIC ACID: CPT | Performed by: INTERNAL MEDICINE

## 2024-03-10 PROCEDURE — 87086 URINE CULTURE/COLONY COUNT: CPT | Performed by: NURSE PRACTITIONER

## 2024-03-10 PROCEDURE — 80053 COMPREHEN METABOLIC PANEL: CPT | Performed by: NURSE PRACTITIONER

## 2024-03-10 PROCEDURE — 80197 ASSAY OF TACROLIMUS: CPT | Performed by: NURSE PRACTITIONER

## 2024-03-10 PROCEDURE — 93005 ELECTROCARDIOGRAM TRACING: CPT

## 2024-03-10 PROCEDURE — 25000003 PHARM REV CODE 250: Performed by: INTERNAL MEDICINE

## 2024-03-10 PROCEDURE — 63600175 PHARM REV CODE 636 W HCPCS: Performed by: NURSE PRACTITIONER

## 2024-03-10 PROCEDURE — 85025 COMPLETE CBC W/AUTO DIFF WBC: CPT | Performed by: NURSE PRACTITIONER

## 2024-03-10 PROCEDURE — 36415 COLL VENOUS BLD VENIPUNCTURE: CPT | Mod: XB | Performed by: INTERNAL MEDICINE

## 2024-03-10 PROCEDURE — 82570 ASSAY OF URINE CREATININE: CPT | Performed by: NURSE PRACTITIONER

## 2024-03-10 PROCEDURE — 93010 ELECTROCARDIOGRAM REPORT: CPT | Mod: ,,, | Performed by: INTERNAL MEDICINE

## 2024-03-10 PROCEDURE — 36415 COLL VENOUS BLD VENIPUNCTURE: CPT | Performed by: NURSE PRACTITIONER

## 2024-03-10 PROCEDURE — 20600001 HC STEP DOWN PRIVATE ROOM

## 2024-03-10 PROCEDURE — 94760 N-INVAS EAR/PLS OXIMETRY 1: CPT

## 2024-03-10 PROCEDURE — 25000003 PHARM REV CODE 250: Performed by: NURSE PRACTITIONER

## 2024-03-10 PROCEDURE — 99222 1ST HOSP IP/OBS MODERATE 55: CPT | Mod: ,,, | Performed by: NURSE PRACTITIONER

## 2024-03-10 RX ORDER — ONDANSETRON 8 MG/1
8 TABLET, ORALLY DISINTEGRATING ORAL EVERY 8 HOURS PRN
Status: DISCONTINUED | OUTPATIENT
Start: 2024-03-10 | End: 2024-03-10 | Stop reason: HOSPADM

## 2024-03-10 RX ORDER — LEVOTHYROXINE SODIUM 50 UG/1
50 TABLET ORAL
Status: DISCONTINUED | OUTPATIENT
Start: 2024-03-10 | End: 2024-03-10 | Stop reason: HOSPADM

## 2024-03-10 RX ORDER — PREDNISONE 20 MG/1
20 TABLET ORAL DAILY
Status: DISCONTINUED | OUTPATIENT
Start: 2024-03-10 | End: 2024-03-10 | Stop reason: HOSPADM

## 2024-03-10 RX ORDER — TACROLIMUS 1 MG/1
4 CAPSULE ORAL 2 TIMES DAILY
Status: DISCONTINUED | OUTPATIENT
Start: 2024-03-11 | End: 2024-03-10 | Stop reason: HOSPADM

## 2024-03-10 RX ORDER — SODIUM BICARBONATE 650 MG/1
1300 TABLET ORAL 2 TIMES DAILY
Qty: 120 TABLET | Refills: 11 | Status: SHIPPED | OUTPATIENT
Start: 2024-03-10 | End: 2024-04-17

## 2024-03-10 RX ORDER — GLYCERIN 1 G/1
1 SUPPOSITORY RECTAL ONCE
Status: COMPLETED | OUTPATIENT
Start: 2024-03-10 | End: 2024-03-10

## 2024-03-10 RX ORDER — CALCITRIOL 0.5 UG/1
0.5 CAPSULE ORAL DAILY
Status: DISCONTINUED | OUTPATIENT
Start: 2024-03-10 | End: 2024-03-10 | Stop reason: HOSPADM

## 2024-03-10 RX ORDER — TACROLIMUS 1 MG/1
4 CAPSULE ORAL EVERY 12 HOURS
Qty: 240 CAPSULE | Refills: 11 | Status: SHIPPED | OUTPATIENT
Start: 2024-03-10 | End: 2024-03-11 | Stop reason: DRUGHIGH

## 2024-03-10 RX ORDER — ACETAMINOPHEN 325 MG/1
650 TABLET ORAL EVERY 6 HOURS PRN
Status: DISCONTINUED | OUTPATIENT
Start: 2024-03-10 | End: 2024-03-10 | Stop reason: HOSPADM

## 2024-03-10 RX ORDER — MYCOPHENOLATE MOFETIL 250 MG/1
1000 CAPSULE ORAL 2 TIMES DAILY
Status: DISCONTINUED | OUTPATIENT
Start: 2024-03-10 | End: 2024-03-10 | Stop reason: HOSPADM

## 2024-03-10 RX ORDER — SULFAMETHOXAZOLE AND TRIMETHOPRIM 400; 80 MG/1; MG/1
1 TABLET ORAL
Status: DISCONTINUED | OUTPATIENT
Start: 2024-03-11 | End: 2024-03-10 | Stop reason: HOSPADM

## 2024-03-10 RX ORDER — LATANOPROST 50 UG/ML
1 SOLUTION/ DROPS OPHTHALMIC EVERY OTHER DAY
Status: DISCONTINUED | OUTPATIENT
Start: 2024-03-10 | End: 2024-03-10 | Stop reason: HOSPADM

## 2024-03-10 RX ORDER — DOCUSATE SODIUM 100 MG/1
200 CAPSULE, LIQUID FILLED ORAL DAILY
Status: DISCONTINUED | OUTPATIENT
Start: 2024-03-10 | End: 2024-03-10 | Stop reason: HOSPADM

## 2024-03-10 RX ORDER — SODIUM CHLORIDE 0.9 % (FLUSH) 0.9 %
3 SYRINGE (ML) INJECTION EVERY 8 HOURS
Status: DISCONTINUED | OUTPATIENT
Start: 2024-03-10 | End: 2024-03-10 | Stop reason: HOSPADM

## 2024-03-10 RX ORDER — SEVELAMER CARBONATE 800 MG/1
1600 TABLET, FILM COATED ORAL
Status: DISCONTINUED | OUTPATIENT
Start: 2024-03-10 | End: 2024-03-10

## 2024-03-10 RX ORDER — PANTOPRAZOLE SODIUM 40 MG/1
40 TABLET, DELAYED RELEASE ORAL DAILY
Status: DISCONTINUED | OUTPATIENT
Start: 2024-03-10 | End: 2024-03-10 | Stop reason: HOSPADM

## 2024-03-10 RX ORDER — CARVEDILOL 6.25 MG/1
6.25 TABLET ORAL 2 TIMES DAILY
Status: DISCONTINUED | OUTPATIENT
Start: 2024-03-10 | End: 2024-03-10 | Stop reason: HOSPADM

## 2024-03-10 RX ORDER — OXYCODONE HYDROCHLORIDE 5 MG/1
10 TABLET ORAL EVERY 4 HOURS PRN
Status: DISCONTINUED | OUTPATIENT
Start: 2024-03-10 | End: 2024-03-10 | Stop reason: HOSPADM

## 2024-03-10 RX ORDER — OXYBUTYNIN CHLORIDE 5 MG/1
5 TABLET ORAL 3 TIMES DAILY PRN
Status: DISCONTINUED | OUTPATIENT
Start: 2024-03-10 | End: 2024-03-10 | Stop reason: HOSPADM

## 2024-03-10 RX ORDER — CARVEDILOL 6.25 MG/1
6.25 TABLET ORAL 2 TIMES DAILY
Qty: 60 TABLET | Refills: 11 | Status: SHIPPED | OUTPATIENT
Start: 2024-03-10 | End: 2024-05-13

## 2024-03-10 RX ORDER — VALGANCICLOVIR 450 MG/1
450 TABLET, FILM COATED ORAL
Status: DISCONTINUED | OUTPATIENT
Start: 2024-03-11 | End: 2024-03-10 | Stop reason: HOSPADM

## 2024-03-10 RX ORDER — SODIUM BICARBONATE 650 MG/1
1300 TABLET ORAL 2 TIMES DAILY
Status: DISCONTINUED | OUTPATIENT
Start: 2024-03-10 | End: 2024-03-10 | Stop reason: HOSPADM

## 2024-03-10 RX ADMIN — CALCITRIOL 0.5 MCG: 0.5 CAPSULE, LIQUID FILLED ORAL at 09:03

## 2024-03-10 RX ADMIN — GLYCERIN 1 SUPPOSITORY: 2 SUPPOSITORY RECTAL at 06:03

## 2024-03-10 RX ADMIN — PREDNISONE 20 MG: 20 TABLET ORAL at 09:03

## 2024-03-10 RX ADMIN — SODIUM BICARBONATE 650 MG TABLET 1300 MG: at 01:03

## 2024-03-10 RX ADMIN — TACROLIMUS 6 MG: 5 CAPSULE ORAL at 09:03

## 2024-03-10 RX ADMIN — MYCOPHENOLATE MOFETIL 1000 MG: 250 CAPSULE ORAL at 09:03

## 2024-03-10 RX ADMIN — PANTOPRAZOLE SODIUM 40 MG: 40 TABLET, DELAYED RELEASE ORAL at 09:03

## 2024-03-10 RX ADMIN — KETOCONAZOLE 100 MG: 200 TABLET ORAL at 09:03

## 2024-03-10 RX ADMIN — SEVELAMER CARBONATE 1600 MG: 800 TABLET, FILM COATED ORAL at 09:03

## 2024-03-10 RX ADMIN — DOCUSATE SODIUM 200 MG: 100 CAPSULE, LIQUID FILLED ORAL at 09:03

## 2024-03-10 RX ADMIN — CARVEDILOL 6.25 MG: 6.25 TABLET, FILM COATED ORAL at 01:03

## 2024-03-10 NOTE — SUBJECTIVE & OBJECTIVE
Subjective:     Chief Complaint/Reason for Admission: Decreased UOP      History of Present Illness:  Mark Lopez is a 63 y/o male s/p DBD Ktx on 2/29/24 for presumed diabetic nephropathy (donor RPR+, CMV D+, R-, CIT ~16.5hrs). CIT ~16.5 hrs. PCN G #1 given 3/1 for +RPR donor cultures - needs weekly x 3 total per ID. Pt initially with DGF. Underwent HD on 3/2, with permcath placement 3/4/24. Increased UOP (2-4L/day). Cr slow to clear. Cr stable at 6.2. Cramer removed on POD 5 per voiding spontaneously. Patient was discharged with JEAN-PIERRE.  Patient seen in clinic with 250 cc per 24 ours out in drain. Patient being managed for DGF.  Patient reports on 3/9/24 increased JEAN-PIERRE out-put 260 ml's  & 1475 UOP  with 3000 Ml's oral intake over the past 24 hours.  Patient discussed with Dr Alexander will be direct admit for cramer placement, send JEAN-PIERRE for creatinine, Kidney U/S for possible urine leak.        Facility-Administered Medications Prior to Admission   Medication    penicillin G benzathine (BICILLIN LA) injection 2.4 Million Units     PTA Medications   Medication Sig    aspirin (ECOTRIN) 81 MG EC tablet Take 81 mg by mouth once daily.    calcitRIOL (ROCALTROL) 0.5 MCG Cap Take 1 capsule (0.5 mcg total) by mouth once daily.    furosemide (LASIX) 40 MG tablet Take 1 tablet (40 mg total) by mouth once daily.    HUMALOG U-100 INSULIN 100 unit/mL injection Inject into the skin. FOR INSULIN PUMP (BASAL 1.2 UNITS/HR)    ketoconazole (NIZORAL) 200 mg Tab Take HALF tablet (100 mg total) by mouth once daily.    latanoprost 0.005 % ophthalmic solution Place 1 drop into the right eye every other day. At night    levothyroxine (SYNTHROID) 50 MCG tablet Take 1 tablet (50 mcg total) by mouth before breakfast.    multivitamin Tab Take 1 tablet by mouth once daily.    mycophenolate (CELLCEPT) 250 mg Cap Take 4 capsules (1,000 mg total) by mouth 2 (two) times daily.    ONETOUCH DELICA PLUS LANCET 30 gauge Misc     oxyCODONE (ROXICODONE) 10 mg Tab  immediate release tablet Take 1 tablet (10 mg total) by mouth every 4 (four) hours as needed for Pain.    pantoprazole (PROTONIX) 40 MG tablet Take 1 tablet (40 mg total) by mouth once daily.    predniSONE (DELTASONE) 5 MG tablet Take by mouth daily: 20mg 3/2-3/8, 15mg 3/9-3/15, 10mg 3/16-3/22/2024, 5mg 3/23/2024-    sevelamer carbonate (RENVELA) 800 mg Tab Take 2 tablets (1,600 mg total) by mouth 3 (three) times daily with meals.    subcutaneous insulin pump Misc Inject 1 Units/hr into the skin continuous. Insulin pump name - Tandum  Basal dose is 2 units/hr    sulfamethoxazole-trimethoprim 400-80mg (BACTRIM,SEPTRA) 400-80 mg per tablet Take 1 tablet by mouth every Mon, Wed, Fri. Stop 8/27/24    tacrolimus (PROGRAF) 1 MG Cap Take 6 capsules (6 mg total) by mouth every 12 (twelve) hours. Z94.0;Kidney    valGANciclovir (VALCYTE) 450 mg Tab Take 1 tablet (450 mg total) by mouth every Mon, Wed, Fri. Stop 8/27/24    [DISCONTINUED] oxybutynin (DITROPAN) 5 MG Tab Take 1 tablet (5 mg total) by mouth 3 (three) times daily as needed (bladder spasms).       Review of patient's allergies indicates:   Allergen Reactions    Allopurinol analogues Swelling    Latex, natural rubber     Statins-hmg-coa reductase inhibitors Other (See Comments)     Muscle ache    Adhesive Rash       Past Medical History:   Diagnosis Date    Anemia     Cataract     Diabetes mellitus     Diabetes mellitus, type 2     Glaucoma     Gout, unspecified     HLD (hyperlipidemia)     Hypertension     Hypothyroidism, unspecified     Kidney failure     Renal disorder      Past Surgical History:   Procedure Laterality Date    COLONOSCOPY N/A 05/18/2022    Procedure: COLONOSCOPY;  Surgeon: Raul Dyer MD;  Location: [facility];  Service: Endoscopy;  Laterality: N/A;    DENTAL SURGERY      EYE SURGERY Bilateral     Cataract    INSERTION, CATHETER, DIALYSIS, PERITONEAL, LAPAROSCOPIC N/A 06/20/2023    Procedure: INSERTION, CATHETER, DIALYSIS, PERITONEAL,  "LAPAROSCOPIC;  Surgeon: Carlos Alberto Rodgers MD;  Location: [facility];  Service: General;  Laterality: N/A;    KIDNEY TRANSPLANT N/A 2/29/2024    Procedure: TRANSPLANT, KIDNEY;  Surgeon: Pino Law Jr., MD;  Location: [facility] OR [address] [place] [address];  Service: Transplant;  Laterality: N/A;    KNEE SURGERY Right      Family History       Problem Relation (Age of Onset)    Cancer Sister    Diabetes Father, Brother    Heart disease Father    Hypertension Father    Kidney disease Brother          Tobacco Use    Smoking status: Never    Smokeless tobacco: Never   Substance and Sexual Activity    Alcohol use: Never    Drug use: Never    Sexual activity: Not Currently        Review of Systems   Constitutional:  Negative for activity change and appetite change.   Eyes:  Negative for visual disturbance.   Respiratory:  Negative for cough and shortness of breath.    Cardiovascular:  Negative for chest pain, palpitations and leg swelling.   Gastrointestinal:  Positive for abdominal distention and constipation. Negative for abdominal pain, diarrhea, nausea and vomiting.   Genitourinary:  Negative for difficulty urinating.   Musculoskeletal:  Negative for arthralgias.   Skin:  Positive for wound.   Allergic/Immunologic: Positive for immunocompromised state.   Neurological:  Negative for dizziness.   Hematological:  Negative for adenopathy. Does not bruise/bleed easily.   Psychiatric/Behavioral:  Negative for agitation.      Objective:     Vital Signs (Most Recent):  Temp: 99 °F (37.2 °C) (03/10/24 0354)  Pulse: (!) 124 (03/10/24 0354)  Resp: 16 (03/10/24 0354)  BP: (!) 135/95 (03/10/24 0354)  SpO2: 100 % (03/10/24 0354)  Height: 5' 4" (162.6 cm)  Weight: 93.6 kg (206 lb 7.4 oz)  Body mass index is 35.44 kg/m².      Physical Exam  Vitals and nursing note reviewed.   Constitutional:       Appearance: He is well-developed.   HENT:      Head: Normocephalic.   Eyes:      Conjunctiva/sclera: Conjunctivae normal.   Cardiovascular:      Rate and " Rhythm: Normal rate and regular rhythm.      Heart sounds: No murmur heard.  Pulmonary:      Effort: Pulmonary effort is normal.      Breath sounds: Normal breath sounds.   Abdominal:      General: Bowel sounds are normal. There is no distension.      Palpations: Abdomen is soft.      Tenderness: There is no abdominal tenderness.          Comments: serosanguinous   Musculoskeletal:         General: Normal range of motion.      Cervical back: Normal range of motion.   Skin:     General: Skin is warm and dry.      Capillary Refill: Capillary refill takes less than 2 seconds.   Neurological:      Mental Status: He is alert and oriented to person, place, and time.   Psychiatric:         Behavior: Behavior normal.         Thought Content: Thought content normal.         Judgment: Judgment normal.          Laboratory  CBC:   Recent Labs   Lab 03/06/24  0840 03/08/24  0829 03/08/24 1958   WBC 8.54 10.41 11.31   RBC 2.63* 2.53* 2.66*   HGB 8.2* 7.7* 8.2*   HCT 23.9* 23.6* 24.9*   * 273 293   MCV 91 93 94   MCH 31.2* 30.4 30.8   MCHC 34.3 32.6 32.9     BMP:   Recent Labs   Lab 03/06/24  0840 03/08/24  0829 03/08/24 1958   * 104 129*   * 132* 137   K 4.3 4.4 4.1   CL 90* 96 105   CO2 22* 22* 17*   * 71* 31*   CREATININE 6.2* 4.5* 2.5*   CALCIUM 9.7 9.4 9.5     Labs within the past 24 hours have been reviewed.    Diagnostic Results:  US - Kidney: Results for orders placed during the hospital encounter of 02/28/24    US Transplant Kidney With Doppler    Narrative  EXAMINATION:  US TRANSPLANT KIDNEY WITH DOPPLER    CLINICAL HISTORY:  s/p kidney txp with decreased UOP and rising Cr;    TECHNIQUE:  Real time gray scale and doppler ultrasound was performed over the patient's renal allograft.    COMPARISON:  None    FINDINGS:  Renal allograft in the right lower quadrant.  The allograft measures 10.9 cm. Normal perfusion. No hydronephrosis.  Stent is present.    Fluid collection medial to the allograft in  the subcutaneous tissue, likely hematoma measuring 3.9 x 2.2 x 7.8 cm.    Vasculature:    Resistive indices ranged from 0.73 to 0.80.    Main renal artery peak systolic velocity: 264cm/sec with normal waveform.    Renal artery/iliac ratio: 1.8.    The main renal vein is patent with a velocity of 187 cm/sec.    Impression  Satisfactory gray scale and doppler evaluation of renal allograft with intraparenchymal resistive indices at the upper limits of normal to slightly elevated.    Small fluid collection in the subcutaneous tissue, likely hematoma.      Electronically signed by: Grabiel Godoy MD  Date:    02/29/2024  Time:    16:59

## 2024-03-10 NOTE — CONSULTS
[facility] - Transplant Stepdown  Endocrinology  Diabetes Consult Note    Consult Requested by: Hope Alexander DO   Reason for admit: Acute urinary retention    HISTORY OF PRESENT ILLNESS:  Reason for Consult: Management of T2DM, Hyperglycemia      Surgical Procedure and Date: kidney transplant 2/29/2024     Diabetes diagnosis year: 1999 per chart review      Home Diabetes Medications:  Tandem insulin pump   ICR 1:5   ISF 1:25  Basal rate 1.2 u/hr + control IQ      How often checking glucose at home? >4 x day   BG readings on regimen: 180s-200s, occasional 300s  Hypoglycemia on the regimen?  Yes, once a week overnight, 2-3x during the week during the day   Missed doses on regimen?  n/a     Diabetes Complications include:     Hyperglycemia, Hypoglycemia , Hypoglycemia unawareness, Diabetic nephropathy  , and Diabetic chronic kidney disease          Complicating diabetes co morbidities:   Glucocorticoid use         HPI: Mark Lopez is a 63 y/o male s/p DBD Ktx on 2/29/24 for presumed diabetic nephropathy (donor RPR+, CMV D+, R-, CIT ~16.5hrs). CIT ~16.5 hrs. PCN G #1 given 3/1 for +RPR donor cultures - needs weekly x 3 total per ID. Pt initially with DGF. Underwent HD on 3/2, with permcath placement 3/4/24. Increased UOP (2-4L/day). Cr slow to clear. Cr stable at 6.2. Cramer removed on POD 5 per voiding spontaneously. Patient was discharged with JEAN-PIERRE.  Patient seen in clinic with 250 cc per 24 ours out in drain. Patient being managed for DGF. Patient reports on 3/9/24 increased JEAN-PIERRE out-put 260 ml's  & 1475 UOP  with 3000 Ml's oral intake over the past 24 hours.  Patient discussed with Dr Alexander will be direct admit for cramer placement, send JEAN-PIERRE for creatinine, Kidney U/S for possible urine leak.  Endocrine consulted to manage hyperglycemia and type 2 diabetes.     Lab Results   Component Value Date    HGBA1C 5.8 (H) 03/01/2024           Interval HPI:   No acute events overnight. Patient in room 21532/29537 A. Blood glucose  stable. BG above goal on current insulin regimen (SSI ). Steroid use- Methylprednisolone  and Hydrocortisone  500/20 mg QD.      Renal function- Abnormal - Cr 11.8    Vasopressors-  None     Diet renal  Diet diabetic     Eating:   <25%  Nausea: No  Hypoglycemia and intervention: No  Fever: No  TPN and/or TF: No    PMH, PSH, FH, SH updated and reviewed     ROS:  Review of Systems   Constitutional:  Negative for chills and fever.   Respiratory:  Negative for cough and shortness of breath.    Cardiovascular:  Negative for chest pain.   Gastrointestinal:  Positive for abdominal pain. Negative for constipation, diarrhea, nausea and vomiting.   Musculoskeletal:  Negative for arthralgias and myalgias.   Skin:  Negative for rash.   Neurological:  Negative for weakness and headaches.       Current Medications and/or Treatments Impacting Glycemic Control  Immunotherapy:    Immunosuppressants           Stop Route Frequency     tacrolimus capsule 4 mg         -- Oral 2 times daily     mycophenolate capsule 1,000 mg         -- Oral 2 times daily          Steroids:   Hormones (From admission, onward)      Start     Stop Route Frequency Ordered    03/10/24 0900  predniSONE tablet 20 mg         -- Oral Daily 03/10/24 0402          Pressors:    Autonomic Drugs (From admission, onward)      None          Hyperglycemia/Diabetes Medications:   Antihyperglycemics (From admission, onward)      None             PHYSICAL EXAMINATION:  Vitals:    03/10/24 0743   BP: (!) 156/76   Pulse: 105   Resp: 18   Temp: 98.4 °F (36.9 °C)     Body mass index is 35.44 kg/m².     Physical Exam  Constitutional:       General: He is not in acute distress.     Appearance: Normal appearance. He is not ill-appearing.   HENT:      Head: Normocephalic and atraumatic.      Right Ear: External ear normal.      Left Ear: External ear normal.      Nose: Nose normal.   Pulmonary:      Effort: Pulmonary effort is normal. No respiratory distress.   Neurological:       "Mental Status: He is alert.   Psychiatric:         Mood and Affect: Mood normal.         Behavior: Behavior normal.      Comments: Somnolent             Labs Reviewed and Include   Recent Labs   Lab 03/10/24  0557   *   CALCIUM 9.6   ALBUMIN 3.3*   PROT 6.1   *   K 3.7   CO2 19*      BUN 57*   CREATININE 2.8*   ALKPHOS 69   ALT 17   AST 30   BILITOT 0.4     Lab Results   Component Value Date    WBC 14.12 (H) 03/10/2024    HGB 8.8 (L) 03/10/2024    HCT 27.0 (L) 03/10/2024    MCV 94 03/10/2024     03/10/2024     No results for input(s): "TSH", "FREET4" in the last 168 hours.  Lab Results   Component Value Date    HGBA1C 5.8 (H) 2024       Nutritional status:   Body mass index is 35.44 kg/m².  Lab Results   Component Value Date    ALBUMIN 3.3 (L) 03/10/2024    ALBUMIN 3.2 (L) 2024    ALBUMIN 3.1 (L) 2024     No results found for: "PREALBUMIN"    Estimated Creatinine Clearance: 27.2 mL/min (A) (based on SCr of 2.8 mg/dL (H)).    Accu-Checks  No results for input(s): "POCTGLUCOSE" in the last 72 hours.     ASSESSMENT and PLAN    Renal/  * Acute urinary retention    Managed per primary team  Avoid hypoglycemia      Status post -donor kidney transplantation  Managed per primary team  Avoid hypoglycemia        ID  At risk for opportunistic infections  Optimize BG control to improve wound healing        Endocrine  Insulin pump in place  At time of evaluation, pt meets criteria to continue home insulin pump usage.  - Has all adequate supplies   - Insulin pump site change on 3/10/2024    - Bolus settings reviewed    - No changes to home regimen.   - Nurse to check BG qac/hs/0200 & record in epic   - Patient to input glucose into pump and use bolus wizard for prandial needs   - Will continue to monitor accuchecks and titrate insulin as clinically indicated .     - Discussed above plan with patient, patient verbalized understanding.   - Understands in case of pump malfunction " or cognitive decline in which pt can no longer safely use insulin pump, will transition to SC MDI.    PLEASE CONTACT ENDOCRINE IF INSULIN PUMP FAILS          Type 2 diabetes mellitus with kidney complication, with long-term current use of insulin  Endocrinology consulted for BG management.   BG goal 140-180    - Continue Home Insulin Pump  - BG checks AC/HS  - Hypoglycemia protocol in place    ** Please notify Endocrine for any change and/or advance in diet**  ** Please call Endocrine for any BG related issues **    Discharge Planning:   TBD. Please notify endocrinology prior to discharge.            Plan discussed with patient, family, and RN at bedside.        Gilbert Green, DNP, FNP  Endocrinology  [facility] - Transplant Stepdown

## 2024-03-10 NOTE — SUBJECTIVE & OBJECTIVE
Interval HPI:   No acute events overnight. Patient in room 32954/21001 A. Blood glucose stable. BG above goal on current insulin regimen (SSI ). Steroid use- Methylprednisolone  and Hydrocortisone  500/20 mg QD.      Renal function- Abnormal - Cr 11.8    Vasopressors-  None     Diet renal  Diet diabetic     Eating:   <25%  Nausea: No  Hypoglycemia and intervention: No  Fever: No  TPN and/or TF: No    PMH, PSH, FH, SH updated and reviewed     ROS:  Review of Systems   Constitutional:  Negative for chills and fever.   Respiratory:  Negative for cough and shortness of breath.    Cardiovascular:  Negative for chest pain.   Gastrointestinal:  Positive for abdominal pain. Negative for constipation, diarrhea, nausea and vomiting.   Musculoskeletal:  Negative for arthralgias and myalgias.   Skin:  Negative for rash.   Neurological:  Negative for weakness and headaches.       Current Medications and/or Treatments Impacting Glycemic Control  Immunotherapy:    Immunosuppressants           Stop Route Frequency     tacrolimus capsule 4 mg         -- Oral 2 times daily     mycophenolate capsule 1,000 mg         -- Oral 2 times daily          Steroids:   Hormones (From admission, onward)      Start     Stop Route Frequency Ordered    03/10/24 0900  predniSONE tablet 20 mg         -- Oral Daily 03/10/24 0402          Pressors:    Autonomic Drugs (From admission, onward)      None          Hyperglycemia/Diabetes Medications:   Antihyperglycemics (From admission, onward)      None             PHYSICAL EXAMINATION:  Vitals:    03/10/24 0743   BP: (!) 156/76   Pulse: 105   Resp: 18   Temp: 98.4 °F (36.9 °C)     Body mass index is 35.44 kg/m².     Physical Exam  Constitutional:       General: He is not in acute distress.     Appearance: Normal appearance. He is not ill-appearing.   HENT:      Head: Normocephalic and atraumatic.      Right Ear: External ear normal.      Left Ear: External ear normal.      Nose: Nose normal.   Pulmonary:       Effort: Pulmonary effort is normal. No respiratory distress.   Neurological:      Mental Status: He is alert.   Psychiatric:         Mood and Affect: Mood normal.         Behavior: Behavior normal.      Comments: Somnolent

## 2024-03-10 NOTE — PROGRESS NOTES
Discharge Medication Note:    Hospital Course:  Mr Lopez is s/p kidney transplant 2/29/24 for concern for decreased UOP. Inpatient, urine outpt sufficient and kidney u/s normal.  Medication changes this admission include:  starting coreg and bicarb, and discontinuing renvela and furosemide.  Tacro level high on admission - pt instructed to hold tonight's dose and restart tomorrow morning at a lower dose of 4mg BID.     Met with Mark Fred Rosasid at discharge to review discharge medications and to update the blue medication card.           Medication List        START taking these medications      carvediloL 6.25 MG tablet  Commonly known as: COREG  Take 1 tablet (6.25 mg total) by mouth 2 (two) times daily.     sodium bicarbonate 650 MG tablet  Take 2 tablets (1,300 mg total) by mouth 2 (two) times daily.            CHANGE how you take these medications      tacrolimus 1 MG Cap  Commonly known as: PROGRAF  Take 4 capsules (4 mg total) by mouth every 12 (twelve) hours. Z94.0;Kidney  What changed: how much to take            CONTINUE taking these medications      aspirin 81 MG EC tablet  Commonly known as: ECOTRIN     calcitRIOL 0.5 MCG Cap  Commonly known as: ROCALTROL  Take 1 capsule (0.5 mcg total) by mouth once daily.     HumaLOG U-100 Insulin 100 unit/mL injection  Generic drug: insulin lispro     ketoconazole 200 mg Tab  Commonly known as: NIZORAL  Take HALF tablet (100 mg total) by mouth once daily.     latanoprost 0.005 % ophthalmic solution  Place 1 drop into the right eye every other day. At night     levothyroxine 50 MCG tablet  Commonly known as: SYNTHROID  Take 1 tablet (50 mcg total) by mouth before breakfast.     multivitamin Tab  Take 1 tablet by mouth once daily.     mycophenolate 250 mg Cap  Commonly known as: CELLCEPT  Take 4 capsules (1,000 mg total) by mouth 2 (two) times daily.     ONETOUCH DELICA PLUS LANCET 30 gauge Misc  Generic drug: lancets     oxyCODONE 10 mg Tab immediate release  tablet  Commonly known as: ROXICODONE  Take 1 tablet (10 mg total) by mouth every 4 (four) hours as needed for Pain.     pantoprazole 40 MG tablet  Commonly known as: PROTONIX  Take 1 tablet (40 mg total) by mouth once daily.     predniSONE 5 MG tablet  Commonly known as: DELTASONE  Take by mouth daily: 20mg 3/2-3/8, 15mg 3/9-3/15, 10mg 3/16-3/22/2024, 5mg 3/23/2024-     subcutaneous insulin pump Misc  Inject 1 Units/hr into the skin continuous. Insulin pump name - Tandum  Basal dose is 2 units/hr     sulfamethoxazole-trimethoprim 400-80mg 400-80 mg per tablet  Commonly known as: BACTRIM,SEPTRA  Take 1 tablet by mouth every Mon, Wed, Fri. Stop 8/27/24     valGANciclovir 450 mg Tab  Commonly known as: VALCYTE  Take 1 tablet (450 mg total) by mouth every Mon, Wed, Fri. Stop 8/27/24            STOP taking these medications      furosemide 40 MG tablet  Commonly known as: LASIX     sevelamer carbonate 800 mg Tab  Commonly known as: RENVELA               Where to Get Your Medications        These medications were sent to [facility] [address] [address]      Hours: Mon-Fri 7a-7p, Sat-Sun 10a-4p Phone: [phone]   carvediloL 6.25 MG tablet  sodium bicarbonate 650 MG tablet  tacrolimus 1 MG Cap            The following medications have been placed on HOLD and should be restarted in the outpatient setting (when appropriate): none    Mark Lopez verbalized understanding and had the opportunity to ask questions.

## 2024-03-10 NOTE — PLAN OF CARE
Cr=2.8 inc from 2.5. KT U/S completed. HD completed 3/8. WBC inc to 14.12 from 11.31. Afebrile. GK=201, 109. Lactic acid done X2= 0.8 & 0.7. K+=3.7. Mg+2=2.5. Phos=3.1. Sevelamer discontinued. CO2=19. Sodium bicarb ordered. Tolerating po well. Voiding with frequency and urgency. Had BM this am. Ambulating to BR independently. JEAN-PIERRE drain in place draining Sang thin drainage to bulb and sero/sang drainage from around JEAN-PIERRE insertion site. YARED Rain aware. Pt using own Insulin pump and Dexcom. Endocrine checked settings and approved for pt to continue to use own. Denies pain. Edema remains to BLE. Plan to discharge home today.

## 2024-03-10 NOTE — HOSPITAL COURSE
Patient is voiding without difficulty and JEAN-PIERRE drain with 80 ml's serosanguinous drainage - JEAN-PIERRE drain sent for Creatinine 2.8 and serum Cr 2.8 - negative for urine leak. Kidney U/S **.  Noted tachycardia and elevation in Blood pressure - EKG NSR, patient started on coreg 6.25 BID.    Discharge instructions reviewed by pharmacist, nursing and transplant coordinator.  Patient verbalized understanding and are in agreement with discharge from hospital today.  Patient is medically stable for discharge. Patient will f/u with labs per transplant coordinator.           OF NOTE: Prograf level elevated decreased prograf .. But continue Keto for now - will adjust Prograf level out-pt and hopefully d/c keto and later date.

## 2024-03-10 NOTE — ASSESSMENT & PLAN NOTE
Endocrinology consulted for BG management.   BG goal 140-180    - Continue Home Insulin Pump  - BG checks AC/HS  - Hypoglycemia protocol in place    ** Please notify Endocrine for any change and/or advance in diet**  ** Please call Endocrine for any BG related issues **    Discharge Planning:   TBD. Please notify endocrinology prior to discharge.

## 2024-03-10 NOTE — TELEPHONE ENCOUNTER
TAD From Dr HARVEY Alexander,  Direct admit to [facility] for complications from Kidney Transplant.  Reviewed with MD and requesting coordinator to call patient and assess JEAN-PIERRE output.  Just spoke with patient and JEAN-PIERRE output since 2:30am is 350ml's, He states he has consume 3.7 liters of fluid today with a output of 300ml's. 3/8 weight 210.4lbs and 3/9 weight 202.8lbs. He denies SOB or Edema.   ____________________  Patient agrees with Direct admit and will present to the ER shortly for hospital admit.  All appropriate personnel has been notified of admit and [facility] # [phone].

## 2024-03-10 NOTE — HPI
Reason for Consult: Management of T2DM, Hyperglycemia      Surgical Procedure and Date: kidney transplant 2/29/2024     Diabetes diagnosis year: 1999 per chart review      Home Diabetes Medications:  Tandem insulin pump   ICR 1:5   ISF 1:25  Basal rate 1.2 u/hr + control IQ      How often checking glucose at home? >4 x day   BG readings on regimen: 180s-200s, occasional 300s  Hypoglycemia on the regimen?  Yes, once a week overnight, 2-3x during the week during the day   Missed doses on regimen?  n/a     Diabetes Complications include:     Hyperglycemia, Hypoglycemia , Hypoglycemia unawareness, Diabetic nephropathy  , and Diabetic chronic kidney disease          Complicating diabetes co morbidities:   Glucocorticoid use         HPI: Mark Lopez is a 65 y/o male s/p DBD Ktx on 2/29/24 for presumed diabetic nephropathy (donor RPR+, CMV D+, R-, CIT ~16.5hrs). CIT ~16.5 hrs. PCN G #1 given 3/1 for +RPR donor cultures - needs weekly x 3 total per ID. Pt initially with DGF. Underwent HD on 3/2, with permcath placement 3/4/24. Increased UOP (2-4L/day). Cr slow to clear. Cr stable at 6.2. Cramer removed on POD 5 per voiding spontaneously. Patient was discharged with JEAN-PIERRE.  Patient seen in clinic with 250 cc per 24 ours out in drain. Patient being managed for DGF. Patient reports on 3/9/24 increased JEAN-PIERRE out-put 260 ml's  & 1475 UOP  with 3000 Ml's oral intake over the past 24 hours.  Patient discussed with Dr Alexander will be direct admit for cramer placement, send JEAN-PIERRE for creatinine, Kidney U/S for possible urine leak.  Endocrine consulted to manage hyperglycemia and type 2 diabetes.     Lab Results   Component Value Date    HGBA1C 5.8 (H) 03/01/2024

## 2024-03-10 NOTE — TELEPHONE ENCOUNTER
"Pt reports that they are considering removing the bulb but that there is 320 cc in one day of "kidney fluid". Reports magenta colored fluid. Denies fever and SOB.  Review of the chart reveals JEAN-PIERRE drain in place. Denies increased leg swelling. Pt is constipated and reports 3 small stools. Denies any issues with post-op incision. Drainage from JEAN-PIERRE drain yesterday was 160 but 320 cc today. Pt has never had this much drainage before.     Dispo is to call PCP now per OOC protocol.    BPA 16: Recommends to call Transplant Medicine Nephrologist  to discuss unusual non-urgent symptom.     Spoke with Dr. Hope Alexander who reports she is transplant medicine nephrologist. Recommends patient go to ED now. Notified patient of recommendation. Pt declined and states he will go in the morning or earlier if he shows any symptoms. Reiterated dispo and stated that patient should go to ED now. Patient continues to decline disposition.       Reason for Disposition   [1] Caller has URGENT question AND [2] triager unable to answer question    Additional Information   Negative: Sounds like a life-threatening emergency to the triager   Negative: [1] Widespread rash AND [2] bright red, sunburn-like   Negative: [1] SEVERE headache AND [2] after spinal (epidural) anesthesia   Negative: [1] Vomiting AND [2] persists > 4 hours   Negative: [1] Vomiting AND [2] abdomen looks much more swollen than usual   Negative: [1] Drinking very little AND [2] dehydration suspected (e.g., no urine > 12 hours, very dry mouth, very lightheaded)   Negative: Patient sounds very sick or weak to the triager   Negative: Sounds like a serious complication to the triager   Negative: Fever > 100.4 F (38.0 C)   Negative: [1] SEVERE post-op pain (e.g., excruciating, pain scale 8-10) AND [2] not controlled with pain medications     4/10    Protocols used: Post-Op Symptoms and [id number]-[id number]    "

## 2024-03-10 NOTE — ASSESSMENT & PLAN NOTE
-s/p DBD Ktx on 2/29/24 for presumed diabetic nephropathy (donor RPR+, CMV D+, R-, CIT ~16.5hrs).  -. Patient being managed for DGF  - 3/9 decreased UOP, admission for cramer placement   - monitor

## 2024-03-10 NOTE — NURSING
Discharge instructions given and reviewed. Needed Rx delivered to room. Verbalized understanding.

## 2024-03-10 NOTE — H&P
[facility]  Kidney Transplant  H&P      Subjective:     Chief Complaint/Reason for Admission: Decreased UOP      History of Present Illness:  Mark Lopez is a 63 y/o male s/p DBD Ktx on 2/29/24 for presumed diabetic nephropathy (donor RPR+, CMV D+, R-, CIT ~16.5hrs). CIT ~16.5 hrs. PCN G #1 given 3/1 for +RPR donor cultures - needs weekly x 3 total per ID. Pt initially with DGF. Underwent HD on 3/2, with permcath placement 3/4/24. Increased UOP (2-4L/day). Cr slow to clear. Cr stable at 6.2. Cramer removed on POD 5 per voiding spontaneously. Patient was discharged with JEAN-PIERRE.  Patient seen in clinic with 250 cc per 24 ours out in drain. Patient being managed for DGF.  Patient reports on 3/9/24 increased JEAN-PIERRE out-put 260 ml's  & 1475 UOP  with 3000 Ml's oral intake over the past 24 hours.  Patient discussed with Dr Alexander will be direct admit for cramer placement, send JEAN-PIERRE for creatinine, Kidney U/S for possible urine leak.        Facility-Administered Medications Prior to Admission   Medication    penicillin G benzathine (BICILLIN LA) injection 2.4 Million Units     PTA Medications   Medication Sig    aspirin (ECOTRIN) 81 MG EC tablet Take 81 mg by mouth once daily.    calcitRIOL (ROCALTROL) 0.5 MCG Cap Take 1 capsule (0.5 mcg total) by mouth once daily.    furosemide (LASIX) 40 MG tablet Take 1 tablet (40 mg total) by mouth once daily.    HUMALOG U-100 INSULIN 100 unit/mL injection Inject into the skin. FOR INSULIN PUMP (BASAL 1.2 UNITS/HR)    ketoconazole (NIZORAL) 200 mg Tab Take HALF tablet (100 mg total) by mouth once daily.    latanoprost 0.005 % ophthalmic solution Place 1 drop into the right eye every other day. At night    levothyroxine (SYNTHROID) 50 MCG tablet Take 1 tablet (50 mcg total) by mouth before breakfast.    multivitamin Tab Take 1 tablet by mouth once daily.    mycophenolate (CELLCEPT) 250 mg Cap Take 4 capsules (1,000 mg total) by mouth 2 (two) times daily.    ONETOUCH DELICA PLUS  LANCET 30 gauge Misc     oxyCODONE (ROXICODONE) 10 mg Tab immediate release tablet Take 1 tablet (10 mg total) by mouth every 4 (four) hours as needed for Pain.    pantoprazole (PROTONIX) 40 MG tablet Take 1 tablet (40 mg total) by mouth once daily.    predniSONE (DELTASONE) 5 MG tablet Take by mouth daily: 20mg 3/2-3/8, 15mg 3/9-3/15, 10mg 3/16-3/22/2024, 5mg 3/23/2024-    sevelamer carbonate (RENVELA) 800 mg Tab Take 2 tablets (1,600 mg total) by mouth 3 (three) times daily with meals.    subcutaneous insulin pump Misc Inject 1 Units/hr into the skin continuous. Insulin pump name - Tandum  Basal dose is 2 units/hr    sulfamethoxazole-trimethoprim 400-80mg (BACTRIM,SEPTRA) 400-80 mg per tablet Take 1 tablet by mouth every Mon, Wed, Fri. Stop 8/27/24    tacrolimus (PROGRAF) 1 MG Cap Take 6 capsules (6 mg total) by mouth every 12 (twelve) hours. Z94.0;Kidney    valGANciclovir (VALCYTE) 450 mg Tab Take 1 tablet (450 mg total) by mouth every Mon, Wed, Fri. Stop 8/27/24    [DISCONTINUED] oxybutynin (DITROPAN) 5 MG Tab Take 1 tablet (5 mg total) by mouth 3 (three) times daily as needed (bladder spasms).       Review of patient's allergies indicates:   Allergen Reactions    Allopurinol analogues Swelling    Latex, natural rubber     Statins-hmg-coa reductase inhibitors Other (See Comments)     Muscle ache    Adhesive Rash       Past Medical History:   Diagnosis Date    Anemia     Cataract     Diabetes mellitus     Diabetes mellitus, type 2     Glaucoma     Gout, unspecified     HLD (hyperlipidemia)     Hypertension     Hypothyroidism, unspecified     Kidney failure     Renal disorder      Past Surgical History:   Procedure Laterality Date    COLONOSCOPY N/A 05/18/2022    Procedure: COLONOSCOPY;  Surgeon: Raul Dyer MD;  Location: [facility];  Service: Endoscopy;  Laterality: N/A;    DENTAL SURGERY      EYE SURGERY Bilateral     Cataract    INSERTION, CATHETER, DIALYSIS, PERITONEAL, LAPAROSCOPIC N/A 06/20/2023     "Procedure: INSERTION, CATHETER, DIALYSIS, PERITONEAL, LAPAROSCOPIC;  Surgeon: Carlos Alberto Rodgers MD;  Location: [facility];  Service: General;  Laterality: N/A;    KIDNEY TRANSPLANT N/A 2/29/2024    Procedure: TRANSPLANT, KIDNEY;  Surgeon: Pino Law Jr., MD;  Location: [facility] OR [address] [place] [address];  Service: Transplant;  Laterality: N/A;    KNEE SURGERY Right      Family History       Problem Relation (Age of Onset)    Cancer Sister    Diabetes Father, Brother    Heart disease Father    Hypertension Father    Kidney disease Brother          Tobacco Use    Smoking status: Never    Smokeless tobacco: Never   Substance and Sexual Activity    Alcohol use: Never    Drug use: Never    Sexual activity: Not Currently        Review of Systems   Constitutional:  Negative for activity change and appetite change.   Eyes:  Negative for visual disturbance.   Respiratory:  Negative for cough and shortness of breath.    Cardiovascular:  Negative for chest pain, palpitations and leg swelling.   Gastrointestinal:  Positive for abdominal distention and constipation. Negative for abdominal pain, diarrhea, nausea and vomiting.   Genitourinary:  Negative for difficulty urinating.   Musculoskeletal:  Negative for arthralgias.   Skin:  Positive for wound.   Allergic/Immunologic: Positive for immunocompromised state.   Neurological:  Negative for dizziness.   Hematological:  Negative for adenopathy. Does not bruise/bleed easily.   Psychiatric/Behavioral:  Negative for agitation.      Objective:     Vital Signs (Most Recent):  Temp: 99 °F (37.2 °C) (03/10/24 0354)  Pulse: (!) 124 (03/10/24 0354)  Resp: 16 (03/10/24 0354)  BP: (!) 135/95 (03/10/24 0354)  SpO2: 100 % (03/10/24 0354)  Height: 5' 4" (162.6 cm)  Weight: 93.6 kg (206 lb 7.4 oz)  Body mass index is 35.44 kg/m².      Physical Exam  Vitals and nursing note reviewed.   Constitutional:       Appearance: He is well-developed.   HENT:      Head: Normocephalic.   Eyes:      " Conjunctiva/sclera: Conjunctivae normal.   Cardiovascular:      Rate and Rhythm: Normal rate and regular rhythm.      Heart sounds: No murmur heard.  Pulmonary:      Effort: Pulmonary effort is normal.      Breath sounds: Normal breath sounds.   Abdominal:      General: Bowel sounds are normal. There is no distension.      Palpations: Abdomen is soft.      Tenderness: There is no abdominal tenderness.          Comments: serosanguinous   Musculoskeletal:         General: Normal range of motion.      Cervical back: Normal range of motion.   Skin:     General: Skin is warm and dry.      Capillary Refill: Capillary refill takes less than 2 seconds.   Neurological:      Mental Status: He is alert and oriented to person, place, and time.   Psychiatric:         Behavior: Behavior normal.         Thought Content: Thought content normal.         Judgment: Judgment normal.          Laboratory  CBC:   Recent Labs   Lab 03/06/24 0840 03/08/24 0829 03/08/24 1958   WBC 8.54 10.41 11.31   RBC 2.63* 2.53* 2.66*   HGB 8.2* 7.7* 8.2*   HCT 23.9* 23.6* 24.9*   * 273 293   MCV 91 93 94   MCH 31.2* 30.4 30.8   MCHC 34.3 32.6 32.9     BMP:   Recent Labs   Lab 03/06/24  0840 03/08/24 0829 03/08/24 1958   * 104 129*   * 132* 137   K 4.3 4.4 4.1   CL 90* 96 105   CO2 22* 22* 17*   * 71* 31*   CREATININE 6.2* 4.5* 2.5*   CALCIUM 9.7 9.4 9.5     Labs within the past 24 hours have been reviewed.    Diagnostic Results:  US - Kidney: Results for orders placed during the hospital encounter of 02/28/24    US Transplant Kidney With Doppler    Narrative  EXAMINATION:  US TRANSPLANT KIDNEY WITH DOPPLER    CLINICAL HISTORY:  s/p kidney txp with decreased UOP and rising Cr;    TECHNIQUE:  Real time gray scale and doppler ultrasound was performed over the patient's renal allograft.    COMPARISON:  None    FINDINGS:  Renal allograft in the right lower quadrant.  The allograft measures 10.9 cm. Normal perfusion. No  hydronephrosis.  Stent is present.    Fluid collection medial to the allograft in the subcutaneous tissue, likely hematoma measuring 3.9 x 2.2 x 7.8 cm.    Vasculature:    Resistive indices ranged from 0.73 to 0.80.    Main renal artery peak systolic velocity: 264cm/sec with normal waveform.    Renal artery/iliac ratio: 1.8.    The main renal vein is patent with a velocity of 187 cm/sec.    Impression  Satisfactory gray scale and doppler evaluation of renal allograft with intraparenchymal resistive indices at the upper limits of normal to slightly elevated.    Small fluid collection in the subcutaneous tissue, likely hematoma.      Electronically signed by: Grabiel Gdooy MD  Date:    2024  Time:    16:59      Assessment/Plan:     Cardiac/Vascular  Essential hypertension  - continue home medications      Renal/  * Acute urinary retention    - JEAN-PIERRE drain sent for creatinine  - monitor        Status post -donor kidney transplantation  -s/p DBD Ktx on 24 for presumed diabetic nephropathy (donor RPR+, CMV D+, R-, CIT ~16.5hrs).  -. Patient being managed for DGF  - 3/9 decreased UOP, admission for cramer placement   - monitor      ID  Positive RPR test in cadaveric donor  - PCN G #1 given 3/1, 3/8 for +RPR donor cultures - needs weekly x 3 total per ID.       At risk for opportunistic infections  - resume appropriate OI prophylaxis per protocol.       Immunology/Multi System  Prophylactic immunotherapy  - continue prograf  - continue to monitor for toxic side effects and check daily levels. Will adjust for therapeutic dose        Oncology  Acute on chronic anemia  - H/H stable  - no overt signs of bleed  - continue to monitor with daily CBC      Endocrine  Type 2 diabetes mellitus with kidney complication, with long-term current use of insulin  - endocrine consulted  - insulin pump use at home         Palliative Care  Long-term use of immunosuppressant medication  - see prophylactic  immunotherapy            Discharge Planning:  Not candidate for discharge at this time     Medical decision making for this encounter includes review of pertinent labs and diagnostic studies, assessment and planning, discussions with consulting providers, discussion with patient/family, and participation in multidisciplinary rounds. Time spent caring for patient: 60 minutes    DESIRAE Aceves  Kidney Transplant  [facility] - [facility]

## 2024-03-10 NOTE — NURSING
@Nurses Note -- 4 Eyes      3/10/2024   6:49 AM      Skin assessed during: Admit      [] No Altered Skin Integrity Present    []Prevention Measures Documented      [] Yes- Altered Skin Integrity Present or Discovered   [x] LDA Added if Not in Epic (Describe Wound)--K transplant incision, drains present. No other altered skin integrity noted.    [] New Altered Skin Integrity was Present on Admit and Documented in LDA   [] Wound Image Taken    Wound Care Consulted? No    Attending Nurse:  Luz Cornejo RN/Staff Member:   Leland

## 2024-03-10 NOTE — ASSESSMENT & PLAN NOTE
05/18/2018    Heide Noriega  [address] [facility] [address]  Phone: [phone]  Fax: [phone]         Dear Dr. Heide Noriega    Patient: Ann Elizabeth Canavan     MR Number: [id number]     YOB: 1962       Thank you for the referral of Ann Elizabeth Canavan to our lung transplant program. An initial appointment with the transplant team has been scheduled for June 14, 2018. You will receive an after-visit summary following the completion of your patients appointment in our clinic.    Thank you again for your trust in our program. If there is anything we can do for you or your staff, please feel free to contact us at [phone].    Sincerely,         Koffi Shore MD   Director, Lung Transplantation   Pulmonary & Critical Care Medicine    [facility]  [facility] [place] [address]  [phone]             - continue home medications

## 2024-03-10 NOTE — HPI
Mark Lopez is a 65 y/o male s/p DBD Ktx on 2/29/24 for presumed diabetic nephropathy (donor RPR+, CMV D+, R-, CIT ~16.5hrs). CIT ~16.5 hrs. PCN G #1 given 3/1 for +RPR donor cultures - needs weekly x 3 total per ID. Pt initially with DGF. Underwent HD on 3/2, with permcath placement 3/4/24. Increased UOP (2-4L/day). Cr slow to clear. Cr stable at 6.2. Cramer removed on POD 5 per voiding spontaneously. Patient was discharged with JEAN-PIERRE.  Patient seen in clinic with 250 cc per 24 ours out in drain. Patient being managed for DGF.  Patient reports on 3/9/24 increased JEAN-PIERRE out-put 260 ml's  & 1475 UOP  with 3000 Ml's oral intake over the past 24 hours.  Patient discussed with Dr Alexander will be direct admit for cramer placement, send JEAN-PIERRE for creatinine, Kidney U/S for possible urine leak.

## 2024-03-10 NOTE — ASSESSMENT & PLAN NOTE
At time of evaluation, pt meets criteria to continue home insulin pump usage.  - Has all adequate supplies   - Insulin pump site change on 3/10/2024    - Bolus settings reviewed    - No changes to home regimen.   - Nurse to check BG qac/hs/0200 & record in epic   - Patient to input glucose into pump and use bolus wizard for prandial needs   - Will continue to monitor accuchecks and titrate insulin as clinically indicated .     - Discussed above plan with patient, patient verbalized understanding.   - Understands in case of pump malfunction or cognitive decline in which pt can no longer safely use insulin pump, will transition to SC MDI.    PLEASE CONTACT ENDOCRINE IF INSULIN PUMP FAILS

## 2024-03-11 ENCOUNTER — TELEPHONE (OUTPATIENT)
Dept: TRANSPLANT | Facility: CLINIC | Age: 64
End: 2024-03-11
Payer: MEDICARE

## 2024-03-11 ENCOUNTER — PATIENT MESSAGE (OUTPATIENT)
Dept: TRANSPLANT | Facility: CLINIC | Age: 64
End: 2024-03-11
Payer: MEDICARE

## 2024-03-11 DIAGNOSIS — Z94.0 KIDNEY REPLACED BY TRANSPLANT: Primary | ICD-10-CM

## 2024-03-11 LAB — BACTERIA UR CULT: NORMAL

## 2024-03-11 NOTE — TELEPHONE ENCOUNTER
----- Message from Hope Alexander DO sent at 3/11/2024  3:29 PM CDT -----  Have patient hold HD today.   High Fk level. Have him stop keto 100 mg daily and increase FK to 5 mg BID

## 2024-03-11 NOTE — TELEPHONE ENCOUNTER
Coordinator attempted to contact patient via telephone for his daily readings but was unsuccessful. Message left on patient's voicemail to return call or reply to [facility] message. Waiting for patient to reply.     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 I'm calling to get numbers from your urine output and fluid intake, drainage amount, your weight, blood pressure and heart rate reading and also find out how you are feeling. Are you experiencing any shortness of breath, swelling or any distress.     You can reply to this message with your readings.     Thanks,     Veronica

## 2024-03-12 ENCOUNTER — TELEPHONE (OUTPATIENT)
Dept: TRANSPLANT | Facility: CLINIC | Age: 64
End: 2024-03-12
Payer: MEDICARE

## 2024-03-12 RX ORDER — TACROLIMUS 1 MG/1
5 CAPSULE ORAL EVERY 12 HOURS
Qty: 300 CAPSULE | Refills: 11 | Status: ON HOLD | OUTPATIENT
Start: 2024-03-12 | End: 2024-03-19

## 2024-03-12 NOTE — TELEPHONE ENCOUNTER
----- Message from Violetta Hernandez sent at 3/12/2024  8:33 AM CDT -----  Regarding: Insurance Questions  Contact: [phone]  CONSULT/ADVISORY    Name of Caller:  KRISTIE MAURER [[id number]    Contact Preference:  [phone]    Nature of Call:  Pt is requesting a call back from Veronica to discuss his [employer] insurance and if a current rx list was sent to them?

## 2024-03-13 ENCOUNTER — HOSPITAL ENCOUNTER (INPATIENT)
Facility: HOSPITAL | Age: 64
LOS: 6 days | Discharge: HOME | DRG: 699 | End: 2024-03-19
Attending: TRANSPLANT SURGERY | Admitting: TRANSPLANT SURGERY
Payer: MEDICARE

## 2024-03-13 ENCOUNTER — SOCIAL WORK (OUTPATIENT)
Dept: TRANSPLANT | Facility: CLINIC | Age: 64
End: 2024-03-13

## 2024-03-13 ENCOUNTER — OFFICE VISIT (OUTPATIENT)
Dept: TRANSPLANT | Facility: CLINIC | Age: 64
End: 2024-03-13
Payer: MEDICARE

## 2024-03-13 ENCOUNTER — OFFICE VISIT (OUTPATIENT)
Dept: TRANSPLANT | Facility: CLINIC | Age: 64
DRG: 699 | End: 2024-03-13
Payer: MEDICARE

## 2024-03-13 DIAGNOSIS — Z79.4 TYPE 2 DIABETES MELLITUS WITH DIABETIC NEPHROPATHY, WITH LONG-TERM CURRENT USE OF INSULIN: ICD-10-CM

## 2024-03-13 DIAGNOSIS — N17.9 AKI (ACUTE KIDNEY INJURY): ICD-10-CM

## 2024-03-13 DIAGNOSIS — A53.9 POSITIVE RPR TEST IN CADAVERIC DONOR: ICD-10-CM

## 2024-03-13 DIAGNOSIS — D72.829 LEUKOCYTOSIS, UNSPECIFIED TYPE: Primary | ICD-10-CM

## 2024-03-13 DIAGNOSIS — I10 BENIGN ESSENTIAL HTN: ICD-10-CM

## 2024-03-13 DIAGNOSIS — E11.29 TYPE 2 DIABETES MELLITUS WITH OTHER DIABETIC KIDNEY COMPLICATION, WITH LONG-TERM CURRENT USE OF INSULIN: ICD-10-CM

## 2024-03-13 DIAGNOSIS — E11.21 TYPE 2 DIABETES MELLITUS WITH DIABETIC NEPHROPATHY, WITH LONG-TERM CURRENT USE OF INSULIN: ICD-10-CM

## 2024-03-13 DIAGNOSIS — D64.9 ACUTE ON CHRONIC ANEMIA: ICD-10-CM

## 2024-03-13 DIAGNOSIS — T86.898 URINE LEAK FROM TRANSPLANTED URETER: ICD-10-CM

## 2024-03-13 DIAGNOSIS — Z79.60 LONG-TERM USE OF IMMUNOSUPPRESSANT MEDICATION: ICD-10-CM

## 2024-03-13 DIAGNOSIS — E87.20 ACIDOSIS: ICD-10-CM

## 2024-03-13 DIAGNOSIS — T86.19 DELAYED GRAFT FUNCTION OF KIDNEY: ICD-10-CM

## 2024-03-13 DIAGNOSIS — Z00.5 POSITIVE RPR TEST IN CADAVERIC DONOR: ICD-10-CM

## 2024-03-13 DIAGNOSIS — N39.0 URINARY TRACT INFECTION WITHOUT HEMATURIA, SITE UNSPECIFIED: ICD-10-CM

## 2024-03-13 DIAGNOSIS — Z94.0 STATUS POST DECEASED-DONOR KIDNEY TRANSPLANTATION: ICD-10-CM

## 2024-03-13 DIAGNOSIS — D72.829 LEUKOCYTOSIS, UNSPECIFIED TYPE: ICD-10-CM

## 2024-03-13 DIAGNOSIS — Z96.41 INSULIN PUMP IN PLACE: ICD-10-CM

## 2024-03-13 DIAGNOSIS — Z29.89 PROPHYLACTIC IMMUNOTHERAPY: ICD-10-CM

## 2024-03-13 DIAGNOSIS — T86.19 DELAYED GRAFT FUNCTION OF KIDNEY TRANSPLANT DUE TO ATN REQUIRING ACUTE DIALYSIS: ICD-10-CM

## 2024-03-13 DIAGNOSIS — Z94.0 KIDNEY REPLACED BY TRANSPLANT: ICD-10-CM

## 2024-03-13 DIAGNOSIS — Z94.0 S/P KIDNEY TRANSPLANT: ICD-10-CM

## 2024-03-13 DIAGNOSIS — Z91.89 AT RISK FOR OPPORTUNISTIC INFECTIONS: ICD-10-CM

## 2024-03-13 DIAGNOSIS — Z94.0 STATUS POST DECEASED-DONOR KIDNEY TRANSPLANTATION: Primary | ICD-10-CM

## 2024-03-13 DIAGNOSIS — T86.10 COMPLICATION OF TRANSPLANTED KIDNEY, UNSPECIFIED COMPLICATION: Primary | ICD-10-CM

## 2024-03-13 DIAGNOSIS — D72.829 LEUKOCYTOSIS: ICD-10-CM

## 2024-03-13 DIAGNOSIS — Z99.2 DELAYED GRAFT FUNCTION OF KIDNEY TRANSPLANT DUE TO ATN REQUIRING ACUTE DIALYSIS: ICD-10-CM

## 2024-03-13 DIAGNOSIS — A53.9 SYPHILIS: ICD-10-CM

## 2024-03-13 DIAGNOSIS — I10 ESSENTIAL HYPERTENSION: ICD-10-CM

## 2024-03-13 DIAGNOSIS — Z51.81 ENCOUNTER FOR THERAPEUTIC DRUG MONITORING: ICD-10-CM

## 2024-03-13 DIAGNOSIS — Z79.4 TYPE 2 DIABETES MELLITUS WITH OTHER DIABETIC KIDNEY COMPLICATION, WITH LONG-TERM CURRENT USE OF INSULIN: ICD-10-CM

## 2024-03-13 LAB
APPEARANCE FLD: NORMAL
BODY FLD TYPE: NORMAL
BODY FLUID SOURCE, CREATININE: NORMAL
COLOR FLD: NORMAL
CREAT FLD-MCNC: 29.7 MG/DL
GRAM STN SPEC: NORMAL
LYMPHOCYTES NFR FLD MANUAL: 1 %
MONOS+MACROS NFR FLD MANUAL: 5 %
NEUTROPHILS NFR FLD MANUAL: 94 %
SARS-COV-2 RDRP RESP QL NAA+PROBE: NEGATIVE
WBC # FLD: 92 /CU MM

## 2024-03-13 PROCEDURE — 87205 SMEAR GRAM STAIN: CPT | Performed by: TRANSPLANT SURGERY

## 2024-03-13 PROCEDURE — U0002 COVID-19 LAB TEST NON-CDC: HCPCS | Performed by: PHYSICIAN ASSISTANT

## 2024-03-13 PROCEDURE — 99215 OFFICE O/P EST HI 40 MIN: CPT | Mod: 24,S$PBB,, | Performed by: TRANSPLANT SURGERY

## 2024-03-13 PROCEDURE — 89051 BODY FLUID CELL COUNT: CPT | Performed by: TRANSPLANT SURGERY

## 2024-03-13 PROCEDURE — 82570 ASSAY OF URINE CREATININE: CPT | Performed by: TRANSPLANT SURGERY

## 2024-03-13 PROCEDURE — 25000003 PHARM REV CODE 250: Performed by: CLINICAL NURSE SPECIALIST

## 2024-03-13 PROCEDURE — 63600175 PHARM REV CODE 636 W HCPCS: Performed by: PHYSICIAN ASSISTANT

## 2024-03-13 PROCEDURE — 25000003 PHARM REV CODE 250: Performed by: PHYSICIAN ASSISTANT

## 2024-03-13 PROCEDURE — 20600001 HC STEP DOWN PRIVATE ROOM

## 2024-03-13 PROCEDURE — 99223 1ST HOSP IP/OBS HIGH 75: CPT | Mod: ,,, | Performed by: PHYSICIAN ASSISTANT

## 2024-03-13 PROCEDURE — 99215 OFFICE O/P EST HI 40 MIN: CPT | Mod: S$PBB,,, | Performed by: INTERNAL MEDICINE

## 2024-03-13 RX ORDER — CALCITRIOL 0.5 UG/1
0.5 CAPSULE ORAL DAILY
Status: DISCONTINUED | OUTPATIENT
Start: 2024-03-14 | End: 2024-03-20 | Stop reason: HOSPADM

## 2024-03-13 RX ORDER — IBUPROFEN 200 MG
24 TABLET ORAL
Status: DISCONTINUED | OUTPATIENT
Start: 2024-03-13 | End: 2024-03-20 | Stop reason: HOSPADM

## 2024-03-13 RX ORDER — LEVOTHYROXINE SODIUM 50 UG/1
50 TABLET ORAL
Status: DISCONTINUED | OUTPATIENT
Start: 2024-03-14 | End: 2024-03-20 | Stop reason: HOSPADM

## 2024-03-13 RX ORDER — INSULIN ASPART 100 [IU]/ML
0-10 INJECTION, SOLUTION INTRAVENOUS; SUBCUTANEOUS CONTINUOUS
Status: DISCONTINUED | OUTPATIENT
Start: 2024-03-13 | End: 2024-03-20 | Stop reason: HOSPADM

## 2024-03-13 RX ORDER — LIDOCAINE HYDROCHLORIDE 20 MG/ML
JELLY TOPICAL
Status: DISCONTINUED | OUTPATIENT
Start: 2024-03-13 | End: 2024-03-20 | Stop reason: HOSPADM

## 2024-03-13 RX ORDER — SODIUM BICARBONATE 650 MG/1
1300 TABLET ORAL 2 TIMES DAILY
Status: DISCONTINUED | OUTPATIENT
Start: 2024-03-13 | End: 2024-03-16

## 2024-03-13 RX ORDER — OXYCODONE HYDROCHLORIDE 10 MG/1
10 TABLET ORAL EVERY 4 HOURS PRN
Status: DISCONTINUED | OUTPATIENT
Start: 2024-03-13 | End: 2024-03-20 | Stop reason: HOSPADM

## 2024-03-13 RX ORDER — ASPIRIN 81 MG/1
81 TABLET ORAL DAILY
Status: DISCONTINUED | OUTPATIENT
Start: 2024-03-14 | End: 2024-03-20 | Stop reason: HOSPADM

## 2024-03-13 RX ORDER — BISACODYL 10 MG/1
10 SUPPOSITORY RECTAL DAILY PRN
Status: DISCONTINUED | OUTPATIENT
Start: 2024-03-13 | End: 2024-03-20 | Stop reason: HOSPADM

## 2024-03-13 RX ORDER — ONDANSETRON 8 MG/1
8 TABLET, ORALLY DISINTEGRATING ORAL EVERY 8 HOURS PRN
Status: DISCONTINUED | OUTPATIENT
Start: 2024-03-13 | End: 2024-03-20 | Stop reason: HOSPADM

## 2024-03-13 RX ORDER — TALC
6 POWDER (GRAM) TOPICAL NIGHTLY PRN
Status: DISCONTINUED | OUTPATIENT
Start: 2024-03-13 | End: 2024-03-20 | Stop reason: HOSPADM

## 2024-03-13 RX ORDER — PANTOPRAZOLE SODIUM 40 MG/1
40 TABLET, DELAYED RELEASE ORAL DAILY
Status: DISCONTINUED | OUTPATIENT
Start: 2024-03-14 | End: 2024-03-20 | Stop reason: HOSPADM

## 2024-03-13 RX ORDER — IBUPROFEN 200 MG
16 TABLET ORAL
Status: DISCONTINUED | OUTPATIENT
Start: 2024-03-13 | End: 2024-03-20 | Stop reason: HOSPADM

## 2024-03-13 RX ORDER — SODIUM CHLORIDE 0.9 % (FLUSH) 0.9 %
10 SYRINGE (ML) INJECTION
Status: DISCONTINUED | OUTPATIENT
Start: 2024-03-13 | End: 2024-03-20 | Stop reason: HOSPADM

## 2024-03-13 RX ORDER — ACETAMINOPHEN 325 MG/1
650 TABLET ORAL EVERY 8 HOURS PRN
Status: DISCONTINUED | OUTPATIENT
Start: 2024-03-13 | End: 2024-03-20 | Stop reason: HOSPADM

## 2024-03-13 RX ORDER — VALGANCICLOVIR 450 MG/1
450 TABLET, FILM COATED ORAL
Status: DISCONTINUED | OUTPATIENT
Start: 2024-03-13 | End: 2024-03-16

## 2024-03-13 RX ORDER — INSULIN ASPART 100 [IU]/ML
0-15 INJECTION, SOLUTION INTRAVENOUS; SUBCUTANEOUS
Status: DISCONTINUED | OUTPATIENT
Start: 2024-03-13 | End: 2024-03-20 | Stop reason: HOSPADM

## 2024-03-13 RX ORDER — CARVEDILOL 6.25 MG/1
6.25 TABLET ORAL 2 TIMES DAILY
Status: DISCONTINUED | OUTPATIENT
Start: 2024-03-13 | End: 2024-03-20 | Stop reason: HOSPADM

## 2024-03-13 RX ORDER — TACROLIMUS 5 MG/1
5 CAPSULE ORAL 2 TIMES DAILY
Status: DISCONTINUED | OUTPATIENT
Start: 2024-03-13 | End: 2024-03-16

## 2024-03-13 RX ORDER — GLUCAGON 1 MG
1 KIT INJECTION
Status: DISCONTINUED | OUTPATIENT
Start: 2024-03-13 | End: 2024-03-20 | Stop reason: HOSPADM

## 2024-03-13 RX ADMIN — CEFEPIME 1 G: 1 INJECTION, POWDER, FOR SOLUTION INTRAMUSCULAR; INTRAVENOUS at 09:03

## 2024-03-13 RX ADMIN — LIDOCAINE HYDROCHLORIDE 10 ML: 20 JELLY TOPICAL at 09:03

## 2024-03-13 RX ADMIN — OXYCODONE HYDROCHLORIDE 10 MG: 10 TABLET ORAL at 08:03

## 2024-03-13 RX ADMIN — CARVEDILOL 6.25 MG: 6.25 TABLET, FILM COATED ORAL at 08:03

## 2024-03-13 RX ADMIN — INSULIN ASPART 10 UNITS: 100 INJECTION, SOLUTION INTRAVENOUS; SUBCUTANEOUS at 09:03

## 2024-03-13 RX ADMIN — SODIUM BICARBONATE 650 MG TABLET 1300 MG: at 08:03

## 2024-03-13 RX ADMIN — TACROLIMUS 5 MG: 5 CAPSULE ORAL at 06:03

## 2024-03-13 NOTE — NURSING
Nurses Note -- 4 Eyes      3/13/2024   6:40 PM      Skin assessed during: Admit      [x] No Altered Skin Integrity Present    []Prevention Measures Documented      [] Yes- Altered Skin Integrity Present or Discovered   [] LDA Added if Not in Epic (Describe Wound)   [] New Altered Skin Integrity was Present on Admit and Documented in LDA   [] Wound Image Taken    Wound Care Consulted? No    Attending Nurse:  Nanette Cornejo RN/Staff Member:   Briana

## 2024-03-13 NOTE — ASSESSMENT & PLAN NOTE
- He presented to outpatient transplant clinic 3/13 with report of significant increase in drain output (~950) and increased leakage from incision. Lab work notable for leukocytosis.   - JEAN-PIERRE drain creatinine 29 (prev 2.8 on 3/10), significant for urine leak.   - Plan for CT a/p to assess collection and current drain placement. Abx started.   - Curry to be placed.   -  with Cr 2.8.   - Plan to admit for infectious work-up and urine leak. Blood/urine cx pending.

## 2024-03-13 NOTE — PROGRESS NOTES
JUDY met with patient's caregiver, . Gordon Glass, in transplant clinic per Fr. Victoria's request.  Fr. Victoria reports concern regarding the lack of understanding of patient's post-transplant care needs (including dietary restrictions, physical activity restrictions, and preparing for scheduled appointments) among patient's caregiver team.  Fr. Victoria reports pt's previously identified caregiver, . Jonathan Rose, has not been directly involved in pt's care nor in the arranging of pt's 24/7 post-transplant caregiver support among the other priests / brothers / friars that live with patient at the [facility].  Fr. Victoria reports not being available to patient 24/7 and the other [facility] residents are uncertain about how to care for patient.  Fr. Victoria reports the situation is being made worse by the fact that the patient also does not communicate openly and regularly with his housemates and seems to lack insight into the importance of following all post-transplant instructions.  Fr. Victoria requesting review of post-transplant education and inquiring about available education which can easily be distributed among the multiple [facility] residents who could assist patient at his residence.  JUDY informed Fr. Victoria that his request will be forwarded to appropriate transplant providers to provide requested education and that Dr. Alexander will address these concerns further with pt during his clinic appointment today.  JUDY provided all of the above updates to Dr. Alexander who states plans of re-admitting pt into the hospital today.  JUDY provided updates on all of the above to fellow JUDY Bolivar for further f/u in hospital.  JUDY remains available for further psychosocial support and will f/u as needed.

## 2024-03-13 NOTE — ASSESSMENT & PLAN NOTE
- Post-op DGF 2/2 DCD and long cold time  - Last HD 3/8   - BUN elevated, but no AMS, no n/v, 1+ BLE edema but lungs clear not requiring oxygen. Hold HD today.  - Assess need for HD daily.

## 2024-03-13 NOTE — HPI
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 PD catheter remains in place. 6 day cramer (removed without issue), surgery went well. Post op:  PCN G #1 given 3/1 for +RPR donor cultures; needs weekly x 3 total per ID.   DGF: not clearing/low UOP; MWF chair, last 3/8/24  Drain remained in place at time of DC. Re-admitted 3/10 for increase in JEAN-PIERRE drain, drain Cr 2.8 (same as serum). US 3/10 with 2 fluid collections: SQ collection + deeper collection with drain terminating in the collection.     He presented to outpatient transplant clinic 3/13 with report of significant increase in drain output (~950) and increased leakage from incision. Lab work notable for leukocytosis. JEAN-PIERRE drain creatinine 29 (prev 2.8 on 3/10), significant for urine leak. Plan for CT a/p to assess collection and current drain placement. Abx started. Cramer to be placed.  with Cr 2.8. Plan to admit for infectious work-up and urine leak. Blood/urine cx pending. Assess need for HD daily. D/w Dr. Stokes.

## 2024-03-13 NOTE — SUBJECTIVE & OBJECTIVE
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 PD catheter remains in place. 6 day cramer (removed without issue), surgery went well. Post op:  PCN G #1 given 3/1 for +RPR donor cultures; needs weekly x 3 total per ID.   DGF: not clearing/low UOP; MWF chair, last 3/8/24  Drain remained in place at time of DC. Re-admitted 3/10 for increase in JEAN-PIERRE drain, drain Cr 2.8 (same as serum). US 3/10 with 2 fluid collections: SQ collection + deeper collection with drain terminating in the collection.     He presented to outpatient transplant clinic 3/13 with report of significant increase in drain output (~950) and increased leakage from incision. Lab work notable for leukocytosis. JEAN-PIERRE drain creatinine 29 (prev 2.8 on 3/10), significant for urine leak. Plan for CT a/p to assess collection and current drain placement. Abx started. Cramer to be placed.  with Cr 2.8. Plan to admit for infectious work-up and urine leak. Blood/urine cx pending. Assess need for HD daily. D/w Dr. Stokes.        Facility-Administered Medications Prior to Admission   Medication    penicillin G benzathine (BICILLIN LA) injection 2.4 Million Units     PTA Medications   Medication Sig    aspirin (ECOTRIN) 81 MG EC tablet Take 81 mg by mouth once daily.    calcitRIOL (ROCALTROL) 0.5 MCG Cap Take 1 capsule (0.5 mcg total) by mouth once daily.    carvediloL (COREG) 6.25 MG tablet Take 1 tablet (6.25 mg total) by mouth 2 (two) times daily.    HUMALOG U-100 INSULIN 100 unit/mL injection Inject into the skin. FOR INSULIN PUMP (BASAL 1.2 UNITS/HR)    latanoprost 0.005 % ophthalmic solution Place 1 drop into the right eye every other day. At night    levothyroxine (SYNTHROID) 50 MCG tablet Take 1 tablet (50 mcg total) by mouth before breakfast.    multivitamin Tab Take 1  tablet by mouth once daily.    mycophenolate (CELLCEPT) 250 mg Cap Take 4 capsules (1,000 mg total) by mouth 2 (two) times daily.    ONETOUCH DELICA PLUS LANCET 30 gauge Misc     oxyCODONE (ROXICODONE) 10 mg Tab immediate release tablet Take 1 tablet (10 mg total) by mouth every 4 (four) hours as needed for Pain.    pantoprazole (PROTONIX) 40 MG tablet Take 1 tablet (40 mg total) by mouth once daily.    predniSONE (DELTASONE) 5 MG tablet Take by mouth daily: 20mg 3/2-3/8, 15mg 3/9-3/15, 10mg 3/16-3/22/2024, 5mg 3/23/2024-    sodium bicarbonate 650 MG tablet Take 2 tablets (1,300 mg total) by mouth 2 (two) times daily.    subcutaneous insulin pump Misc Inject 1 Units/hr into the skin continuous. Insulin pump name - Tandum  Basal dose is 2 units/hr    sulfamethoxazole-trimethoprim 400-80mg (BACTRIM,SEPTRA) 400-80 mg per tablet Take 1 tablet by mouth every Mon, Wed, Fri. Stop 8/27/24    tacrolimus (PROGRAF) 1 MG Cap Take 5 capsules (5 mg total) by mouth every 12 (twelve) hours. Z94.0;Kidney txp on 2/29/2024    valGANciclovir (VALCYTE) 450 mg Tab Take 1 tablet (450 mg total) by mouth every Mon, Wed, Fri. Stop 8/27/24       Review of patient's allergies indicates:   Allergen Reactions    Allopurinol analogues Swelling    Latex, natural rubber     Statins-hmg-coa reductase inhibitors Other (See Comments)     Muscle ache    Adhesive Rash       Past Medical History:   Diagnosis Date    Anemia     Cataract     Diabetes mellitus     Diabetes mellitus, type 2     Glaucoma     Gout, unspecified     HLD (hyperlipidemia)     Hypertension     Hypothyroidism, unspecified     Kidney failure     Renal disorder      Past Surgical History:   Procedure Laterality Date    COLONOSCOPY N/A 05/18/2022    Procedure: COLONOSCOPY;  Surgeon: Raul Dyer MD;  Location: [facility];  Service: Endoscopy;  Laterality: N/A;    DENTAL SURGERY      EYE SURGERY Bilateral     Cataract    INSERTION, CATHETER, DIALYSIS, PERITONEAL, LAPAROSCOPIC N/A  "06/20/2023    Procedure: INSERTION, CATHETER, DIALYSIS, PERITONEAL, LAPAROSCOPIC;  Surgeon: Carlos Alberto Rodgers MD;  Location: [facility] OR;  Service: General;  Laterality: N/A;    KIDNEY TRANSPLANT N/A 2/29/2024    Procedure: TRANSPLANT, KIDNEY;  Surgeon: Pino Law Jr., MD;  Location: [facility] OR [address] [place] [address];  Service: Transplant;  Laterality: N/A;    KNEE SURGERY Right      Family History       Problem Relation (Age of Onset)    Cancer Sister    Diabetes Father, Brother    Heart disease Father    Hypertension Father    Kidney disease Brother          Tobacco Use    Smoking status: Never    Smokeless tobacco: Never   Substance and Sexual Activity    Alcohol use: Never    Drug use: Never    Sexual activity: Not Currently        Review of Systems   Constitutional:  Negative for chills and fever.   HENT:  Negative for facial swelling and trouble swallowing.    Eyes:  Negative for photophobia and redness.   Respiratory:  Negative for cough, shortness of breath, wheezing and stridor.    Cardiovascular:  Positive for leg swelling. Negative for chest pain and palpitations.   Gastrointestinal:  Positive for abdominal pain. Negative for abdominal distention, diarrhea, nausea and vomiting.   Genitourinary:  Positive for decreased urine volume.   Musculoskeletal:  Negative for neck pain and neck stiffness.   Allergic/Immunologic: Positive for immunocompromised state.   Neurological:  Negative for dizziness and light-headedness.   Psychiatric/Behavioral:  Negative for agitation, behavioral problems and confusion.      Objective:     Vital Signs (Most Recent):  Temp: 97.8 °F (36.6 °C) (03/13/24 1715)  Pulse: 100 (03/13/24 1715)  Resp: 20 (03/13/24 1715)  BP: (!) 130/59 (03/13/24 1715)  SpO2: 100 % (03/13/24 1715)  Height: 5' 4" (162.6 cm)  Weight: 89.9 kg (198 lb 3.1 oz)  Body mass index is 34.02 kg/m².      Physical Exam  Vitals and nursing note reviewed.   Constitutional:       General: He is not in acute distress.  HENT:      " Head: Normocephalic and atraumatic.   Eyes:      General: No scleral icterus.        Right eye: No discharge.         Left eye: No discharge.   Cardiovascular:      Rate and Rhythm: Normal rate and regular rhythm.      Heart sounds: No murmur heard.  Pulmonary:      Effort: Pulmonary effort is normal. No respiratory distress.      Breath sounds: No wheezing or rales.   Abdominal:      General: Bowel sounds are normal. There is no distension.      Tenderness: There is abdominal tenderness (incisional). There is no guarding.      Comments: PD catheter in place no s/s/I  R JEAN-PIERRE drain ss output    Musculoskeletal:      Right lower leg: Edema present.      Left lower leg: Edema present.   Skin:     General: Skin is warm and dry.      Capillary Refill: Capillary refill takes less than 2 seconds.      Coloration: Skin is not jaundiced.   Neurological:      General: No focal deficit present.      Mental Status: He is alert and oriented to person, place, and time.   Psychiatric:         Mood and Affect: Mood normal.         Behavior: Behavior normal.         Thought Content: Thought content normal.          Laboratory  CBC:   Recent Labs   Lab 03/10/24  0557 03/11/24  0855 03/13/24  0940   WBC 14.12* 12.44 16.62*   RBC 2.86* 2.66* 2.71*   HGB 8.8* 8.3* 8.5*   HCT 27.0* 25.3* 26.3*    432 517*   MCV 94 95 97   MCH 30.8 31.2* 31.4*   MCHC 32.6 32.8 32.3     CMP:   Recent Labs   Lab 03/08/24  1958 03/10/24  0557 03/11/24  0855 03/13/24  0940   * 117* 131* 111*   CALCIUM 9.5 9.6 10.0 10.5   ALBUMIN 3.2* 3.3* 3.0* 3.4*   PROT 6.2 6.1  --   --     133* 138 141   K 4.1 3.7 4.0 4.8   CO2 17* 19* 22* 22*    100 106 110   BUN 31* 57* 80* 112*   CREATININE 2.5* 2.8* 3.1* 2.8*   ALKPHOS 58 69  --   --    ALT 11 17  --   --    AST 32 30  --   --        Diagnostic Results:  CT ordered

## 2024-03-13 NOTE — PROGRESS NOTES
Transplant Surgery  Kidney Transplant Recipient Follow-up    Referring Physician: Fredi Lyon  Current Nephrologist: Fredi Lyon    Subjective:     Chief Complaint: Mark Lopez is a 64 y.o. year old  male who is status post Kidney transplant performed on 2/29/2024.    ORGAN:  RIGHT KIDNEY  Disease Etiology: Diabetes Mellitus - Type II  Donor Type:  Donation after Brain Death  Donor CMV Status:  Positive  Donor HBcAB:  Negative  Donor HCV Status:  Negative    History of Present Illness: He reports  heavy leakage from JEAN-PIERRE and decreased UOP .  From a transplant perspective, he reports pain controlled with medications.  Mark is here for management of his immunosuppression medication.  Mark states that his immunosuppression is being well tolerated.  Hypertension is is reportedly well controlled.    External provider notes reviewed: Yes    Review of Systems    Objective:     Physical Exam  Constitutional:       Appearance: He is well-developed.   HENT:      Head: Normocephalic and atraumatic.   Eyes:      Pupils: Pupils are equal, round, and reactive to light.   Neck:      Vascular: No JVD.   Cardiovascular:      Rate and Rhythm: Normal rate and regular rhythm.      Heart sounds: Normal heart sounds.   Pulmonary:      Effort: Pulmonary effort is normal.      Breath sounds: Normal breath sounds. No stridor.   Abdominal:      General: There is no distension.      Palpations: Abdomen is soft.      Tenderness: There is no abdominal tenderness.          Comments: Obese abdomen   Musculoskeletal:         General: Normal range of motion.   Skin:     General: Skin is warm and dry.   Neurological:      Mental Status: He is alert and oriented to person, place, and time.   Psychiatric:         Behavior: Behavior normal.       Lab Results   Component Value Date    CREATININE 3.1 (H) 03/11/2024    BUN 80 (H) 03/11/2024     Lab Results   Component Value Date    WBC 16.62 (H) 03/13/2024    HGB 8.5 (L) 03/13/2024    HCT  26.3 (L) 03/13/2024    HCT 27 (L) 02/29/2024     (H) 03/13/2024     Lab Results   Component Value Date    TACROLIMUS 11.3 03/11/2024       Diagnostics:  The following labs have been reviewed: CBC  CMP  TACROLIMUS LEVEL  The following radiology images have been independently reviewed and interpreted: pending imaging;   Cell counts in body fluid pending    Assessment and Plan:        S/P Kidney transplant.  Large fluid drainage, lymphocele vs seroma vs urine leak, body fluid studies pending;   Stent still in place    Chronic immunosuppressive medications for rejection prophylaxis at target.  Plan: no adjustment needed.  Continue monitoring symptoms, labs and drug levels for drug-related toxicity and side effects.  Renal hypertension at target.    Additional testing to be completed according to the Kidney: Written Order Guideline for Kidney Transplant Follow-Up (KI-09)    Interpretation of tests and discussion of patient management involves all members of the multidisciplinary transplant team.  Patient advised that it is recommended that all transplant candidates, and their close contacts and household members receive Covid vaccination.  Follow-up: Patient reminded to call with any health changes, since these can be early signs of significant complications.  Also, I advised the patient to be sure any new medications or changes of old medications are discussed with either a pharmacist, or physician knowledgeable with transplant to avoid rejection/drug toxicity related to significant drug interactions.    MD BRENTON Servin Jr Patient Status  Functional Status: 70% - Cares for self: unable to carry on normal activity or active work  Physical Capacity: No Limitations

## 2024-03-13 NOTE — PROGRESS NOTES
Kidney Post-Transplant Assessment    Referring Physician: Fredi Lyon  Current Nephrologist: Fredi Lyon    ORGAN: RIGHT KIDNEY  Donor Type: donation after brain death  PHS Increased Risk: yes  Cold Ischemia: 995 mins  Induction Medications: Thymo     Subjective:     CC:  Reassessment of renal allograft function and management of chronic immunosuppression.    HPI:  Mr. Lopez is a 64 y.o. year old  male who received a donation after brain death kidney transplant on 2/29/24. Surgery notable for a 5 day Curry as well as placement of a JEAN-PIERRE drain with patient re-admitted over the weekend due to concern for increase in JEAN-PIERRE drain (250-350) with normal UOP. US kidney transplant obtained with SQ fluid collection as well as a lateral collection with drain terminating within it.     Patient last received HD on 3/8 with UOP of 1.5 L. Patient however has been noticing an increase in JEAN-PIERRE drain output with 950 cc drained yesterday with significant amount leaking around the JEAN-PIERRE . Patient otherwise denies any fever, HA, runny nose, watery eyes. Does admit to a cough which he states is not often. Does experience some LUNA. Denies any CP, abdominal pain, diarrhea, dysuria or frequency.     SBP at home less than 130  Sugars: on insulin pump with sugars below 200 on continuous glucose monitor    Review of Systems   Constitutional:  Positive for activity change. Negative for appetite change, chills and fever.   HENT:  Negative for congestion, sneezing and sore throat.    Eyes:  Negative for pain and itching.   Respiratory:  Positive for cough and shortness of breath. Negative for apnea and wheezing.    Cardiovascular:  Negative for chest pain.   Gastrointestinal:  Negative for abdominal pain, constipation, diarrhea, nausea and vomiting.   Genitourinary:  Negative for difficulty urinating, dysuria and frequency.   Musculoskeletal:  Negative for back pain, joint swelling and neck pain.   Skin:  Negative for color change.    Neurological:  Negative for dizziness, light-headedness and headaches.   Psychiatric/Behavioral:  Negative for behavioral problems and confusion. The patient is not nervous/anxious.        Objective:   There were no vitals taken for this visit.body mass index is unknown because there is no height or weight on file.    Physical Exam  Vitals reviewed.   Constitutional:       General: He is not in acute distress.     Appearance: Normal appearance. He is not ill-appearing or toxic-appearing.      Comments: Appears stated age   HENT:      Head: Normocephalic and atraumatic.      Right Ear: External ear normal.      Left Ear: External ear normal.      Nose: Nose normal.   Eyes:      General: No scleral icterus.     Conjunctiva/sclera: Conjunctivae normal.   Cardiovascular:      Rate and Rhythm: Normal rate and regular rhythm.      Pulses: Normal pulses.      Heart sounds: Normal heart sounds. No murmur heard.     No friction rub.   Pulmonary:      Effort: Pulmonary effort is normal. No respiratory distress.      Breath sounds: Normal breath sounds. No wheezing or rhonchi.   Abdominal:      General: Bowel sounds are normal. There is no distension.      Palpations: Abdomen is soft.      Tenderness: There is no abdominal tenderness. There is no guarding.      Comments: Parveen drain with light pink serosangious fluid collection   Staples present    Musculoskeletal:         General: No swelling or deformity. Normal range of motion.      Cervical back: Normal range of motion and neck supple. No tenderness.      Right lower leg: No edema.      Left lower leg: No edema.   Skin:     General: Skin is warm and dry.      Coloration: Skin is not jaundiced.      Findings: No erythema or rash.   Neurological:      General: No focal deficit present.      Mental Status: He is alert and oriented to person, place, and time.      Motor: No weakness.   Psychiatric:         Mood and Affect: Mood normal.         Behavior: Behavior normal.  "        Labs:  Lab Results   Component Value Date    WBC 16.62 (H) 2024    HGB 8.5 (L) 2024    HCT 26.3 (L) 2024     2024    K 4.8 2024     2024    CO2 22 (L) 2024     (H) 2024    CREATININE 2.8 (H) 2024    EGFRNORACEVR 24.3 (A) 2024    CALCIUM 10.5 2024    PHOS 3.1 2024    MG 2.5 2024    ALBUMIN 3.4 (L) 2024    AST 30 03/10/2024    ALT 17 03/10/2024    UTPCR 1.56 (H) 2024    .6 (H) 2024    TACROLIMUS 11.3 2024       No results found for: "EXTANC", "EXTWBC", "EXTSEGS", "EXTPLATELETS", "EXTHEMOGLOBI", "EXTHEMATOCRI", "EXTCREATININ", "EXTSODIUM", "EXTPOTASSIUM", "EXTBUN", "EXTCO2", "EXTCALCIUM", "EXTPHOSPHORU", "EXTGLUCOSE", "EXTALBUMIN", "EXTAST", "EXTALT", "EXTBILITOTAL", "EXTLIPASE", "EXTAMYLASE"    No results found for: "EXTCYCLOSLVL", "EXTSIROLIMUS", "EXTTACROLVL", "EXTPROTCRE", "EXTPTHINTACT", "EXTPROTEINUA", "EXTWBCUA", "EXTRBCUA"    Labs were reviewed with the patient    Assessment and Plan:    64 yr old  male with ESRD secondary to presumed DM (no prior kidney biopsy) who received a  donor kidney transplant on 24. 52% PRA. Crossmatch negative. Thymo induction    Post transplant course notable for persistent PD catheter (will need to be removed at a later date), a 5 day cramer (removed on 3/5/24) as well as placement of JEAN-PIERRE drain which has started to have significant increase in UOP. Patient seen in clinic with significant drain output, a mild decrease in UOP as well as leukocytosis    1) Leukocytosis:   - obtain blood cultures as well as UA and UC   - await JEAN-PIERRE drain creatinine to determine need for hospital admission   - recently treated with PCN for donor positive RPR  2) s/p  donor kidney transplant:   - improving creatinine but remains with elevated BUN likely from pre-renal azotemia vs steroids.   - discussed case with transplant surgery and await JEAN-PIERRE drain " creatinine. Management per transplant surgery    - last HD on 3/8/24. Would recommend to hold HD and monitor after fluid hydration   - await FK level but elevated previously with keto 100 mg stopped and FK increased to 5 mg BID   - cont on MMF 1 gram BID   - cont on prednisone taper   - cont on Valcyte and Bactrim MWF  3) HTN:   - cont on Coreg 6.25 mg BID   4) Type II DM:   - on insulin pump with acceptable levels   - will have patient follow up with his endocrinologist  5) Metabolic acidosis   - cont on sodium bicarb 1300   6) Hypothyroidism:   - cont on Synthroid     Hope Alexander,    Transplant Nephrology      Greater than 50 min was spent on this visit. This includes face to face time and non-face to face time preparing to see the patient (eg, review of tests), obtaining and/or reviewing separately obtained history, documenting clinical information in the electronic or other health record, independently interpreting results and communicating results to the patient/family/caregiver, or care coordinator.

## 2024-03-13 NOTE — NURSING
Pt admitted to unit for urine leak & WBC 17.  Cr 2.8. AAOx4, independent, VSS. Home insulin pump in use. Endocrine to be consulted. CT ordered. Abx started. Curry to be placed. Pt is Covid negative. PD cath CDI. NPO at midnight. Bed in low/locked position, call light/personal belongings w/in reach, non-slip socks in place, pt remains free from falls, WCTM.

## 2024-03-13 NOTE — LETTER
March 13, 2024        Fredi Lyon  [address]  [facility] [address]  Phone: [phone]  Fax: [phone]             [facility] 1st Fl  [address]  [facility] [address]  Phone: [phone]   Patient: Mark Lopez   MR Number: [id number]   YOB: 1960   Date of Visit: 3/13/2024       Dear Dr. Fredi Lyon    Thank you for referring Mark Lopez to me for evaluation. Attached you will find relevant portions of my assessment and plan of care.    If you have questions, please do not hesitate to call me. I look forward to following Mark Lopez along with you.    Sincerely,    Hope Alexander, DO    Enclosure    If you would like to receive this communication electronically, please contact [email or web address] or [phone] to request [employer] Link access.    [employer] is a tool which provides read-only access to select patient information with whom you have a relationship. Its easy to use and provides real time access to review your patients record including encounter summaries, notes, results, and demographic information.    If you feel you have received this communication in error or would no longer like to receive these types of communications, please e-mail [email or web address]

## 2024-03-13 NOTE — ASSESSMENT & PLAN NOTE
- Continue Prograf. Will monitor for signs of toxic side effects, check daily tacrolimus troughs, and change meds accordingly.   - MMF held for leukocytosis   - Continue steroids.

## 2024-03-13 NOTE — ASSESSMENT & PLAN NOTE
- obtain blood cultures as well as UA and Urine cx   - recently treated with PCN for donor positive RPR

## 2024-03-13 NOTE — ASSESSMENT & PLAN NOTE
"- S/p DBD Ktx on 2/29/24 for presumed diabetic nephropathy (donor RPR+, CMV D+, R-, CIT ~16.5hrs).  - Patient being managed for DGF  - A/w increased drainage from incision and ouput from drain (~950cc)  - See "urine leak."  "

## 2024-03-14 LAB
ALBUMIN SERPL BCP-MCNC: 3.4 G/DL (ref 3.5–5.2)
ANION GAP SERPL CALC-SCNC: 10 MMOL/L (ref 8–16)
BASOPHILS # BLD AUTO: 0.04 K/UL (ref 0–0.2)
BASOPHILS NFR BLD: 0.3 % (ref 0–1.9)
BUN SERPL-MCNC: 111 MG/DL (ref 8–23)
CALCIUM SERPL-MCNC: 10.3 MG/DL (ref 8.7–10.5)
CHLORIDE SERPL-SCNC: 104 MMOL/L (ref 95–110)
CO2 SERPL-SCNC: 19 MMOL/L (ref 23–29)
CREAT SERPL-MCNC: 2.8 MG/DL (ref 0.5–1.4)
DIFFERENTIAL METHOD BLD: ABNORMAL
EOSINOPHIL # BLD AUTO: 0.1 K/UL (ref 0–0.5)
EOSINOPHIL NFR BLD: 0.8 % (ref 0–8)
ERYTHROCYTE [DISTWIDTH] IN BLOOD BY AUTOMATED COUNT: 16.2 % (ref 11.5–14.5)
EST. GFR  (NO RACE VARIABLE): 24.3 ML/MIN/1.73 M^2
GLUCOSE SERPL-MCNC: 59 MG/DL (ref 70–110)
HCT VFR BLD AUTO: 27.5 % (ref 40–54)
HGB BLD-MCNC: 8.8 G/DL (ref 14–18)
IMM GRANULOCYTES # BLD AUTO: 0.19 K/UL (ref 0–0.04)
IMM GRANULOCYTES NFR BLD AUTO: 1.2 % (ref 0–0.5)
LYMPHOCYTES # BLD AUTO: 0.1 K/UL (ref 1–4.8)
LYMPHOCYTES NFR BLD: 0.4 % (ref 18–48)
MAGNESIUM SERPL-MCNC: 2.6 MG/DL (ref 1.6–2.6)
MCH RBC QN AUTO: 31.8 PG (ref 27–31)
MCHC RBC AUTO-ENTMCNC: 32 G/DL (ref 32–36)
MCV RBC AUTO: 99 FL (ref 82–98)
MONOCYTES # BLD AUTO: 1.1 K/UL (ref 0.3–1)
MONOCYTES NFR BLD: 6.8 % (ref 4–15)
NEUTROPHILS # BLD AUTO: 14.4 K/UL (ref 1.8–7.7)
NEUTROPHILS NFR BLD: 90.5 % (ref 38–73)
NRBC BLD-RTO: 0 /100 WBC
PHOSPHATE SERPL-MCNC: 3.1 MG/DL (ref 2.7–4.5)
PLATELET # BLD AUTO: 517 K/UL (ref 150–450)
PMV BLD AUTO: 9.1 FL (ref 9.2–12.9)
POCT GLUCOSE: 160 MG/DL (ref 70–110)
POCT GLUCOSE: 166 MG/DL (ref 70–110)
POCT GLUCOSE: 178 MG/DL (ref 70–110)
POCT GLUCOSE: 211 MG/DL (ref 70–110)
POCT GLUCOSE: 63 MG/DL (ref 70–110)
POCT GLUCOSE: 79 MG/DL (ref 70–110)
POCT GLUCOSE: 83 MG/DL (ref 70–110)
POTASSIUM SERPL-SCNC: 4.8 MMOL/L (ref 3.5–5.1)
RBC # BLD AUTO: 2.77 M/UL (ref 4.6–6.2)
SODIUM SERPL-SCNC: 133 MMOL/L (ref 136–145)
TACROLIMUS BLD-MCNC: 8.9 NG/ML (ref 5–15)
URATE SERPL-MCNC: 9 MG/DL (ref 3.4–7)
WBC # BLD AUTO: 15.9 K/UL (ref 3.9–12.7)

## 2024-03-14 PROCEDURE — 83735 ASSAY OF MAGNESIUM: CPT | Performed by: PHYSICIAN ASSISTANT

## 2024-03-14 PROCEDURE — 36415 COLL VENOUS BLD VENIPUNCTURE: CPT | Mod: XB | Performed by: NURSE PRACTITIONER

## 2024-03-14 PROCEDURE — 20600001 HC STEP DOWN PRIVATE ROOM

## 2024-03-14 PROCEDURE — 80069 RENAL FUNCTION PANEL: CPT | Performed by: PHYSICIAN ASSISTANT

## 2024-03-14 PROCEDURE — 25000003 PHARM REV CODE 250: Performed by: PHYSICIAN ASSISTANT

## 2024-03-14 PROCEDURE — 85025 COMPLETE CBC W/AUTO DIFF WBC: CPT | Performed by: PHYSICIAN ASSISTANT

## 2024-03-14 PROCEDURE — 84550 ASSAY OF BLOOD/URIC ACID: CPT | Performed by: NURSE PRACTITIONER

## 2024-03-14 PROCEDURE — 80197 ASSAY OF TACROLIMUS: CPT | Performed by: PHYSICIAN ASSISTANT

## 2024-03-14 PROCEDURE — 99223 1ST HOSP IP/OBS HIGH 75: CPT | Mod: ,,, | Performed by: NURSE PRACTITIONER

## 2024-03-14 PROCEDURE — 80100014 HC HEMODIALYSIS 1:1

## 2024-03-14 PROCEDURE — 36415 COLL VENOUS BLD VENIPUNCTURE: CPT | Performed by: PHYSICIAN ASSISTANT

## 2024-03-14 PROCEDURE — 99233 SBSQ HOSP IP/OBS HIGH 50: CPT | Mod: ,,, | Performed by: NURSE PRACTITIONER

## 2024-03-14 PROCEDURE — 5A1D70Z PERFORMANCE OF URINARY FILTRATION, INTERMITTENT, LESS THAN 6 HOURS PER DAY: ICD-10-PCS | Performed by: SURGERY

## 2024-03-14 PROCEDURE — 63600175 PHARM REV CODE 636 W HCPCS: Performed by: PHYSICIAN ASSISTANT

## 2024-03-14 RX ORDER — SODIUM CHLORIDE 9 MG/ML
INJECTION, SOLUTION INTRAVENOUS
Status: CANCELLED | OUTPATIENT
Start: 2024-03-14

## 2024-03-14 RX ORDER — SODIUM CHLORIDE 9 MG/ML
INJECTION, SOLUTION INTRAVENOUS ONCE
Status: CANCELLED | OUTPATIENT
Start: 2024-03-14 | End: 2024-03-14

## 2024-03-14 RX ADMIN — TACROLIMUS 5 MG: 5 CAPSULE ORAL at 06:03

## 2024-03-14 RX ADMIN — CARVEDILOL 6.25 MG: 6.25 TABLET, FILM COATED ORAL at 08:03

## 2024-03-14 RX ADMIN — Medication 16 G: at 03:03

## 2024-03-14 RX ADMIN — PANTOPRAZOLE SODIUM 40 MG: 40 TABLET, DELAYED RELEASE ORAL at 08:03

## 2024-03-14 RX ADMIN — TACROLIMUS 5 MG: 5 CAPSULE ORAL at 08:03

## 2024-03-14 RX ADMIN — SODIUM BICARBONATE 650 MG TABLET 1300 MG: at 08:03

## 2024-03-14 RX ADMIN — PREDNISONE 15 MG: 5 TABLET ORAL at 08:03

## 2024-03-14 RX ADMIN — CALCITRIOL 0.5 MCG: 0.5 CAPSULE, LIQUID FILLED ORAL at 08:03

## 2024-03-14 RX ADMIN — LEVOTHYROXINE SODIUM 50 MCG: 50 TABLET ORAL at 06:03

## 2024-03-14 RX ADMIN — ASPIRIN 81 MG: 81 TABLET, COATED ORAL at 08:03

## 2024-03-14 RX ADMIN — CEFEPIME 1 G: 1 INJECTION, POWDER, FOR SOLUTION INTRAMUSCULAR; INTRAVENOUS at 08:03

## 2024-03-14 NOTE — ASSESSMENT & PLAN NOTE
Endocrinology consulted for BG management.   BG goal 140-180     Patient with hypoglycemia noted overnight. Pump suspended at that time and resumed this morning. Will monitor closely. If continues to have hypoglycemia on home pump will need to adjust settings.     - Continue Home Insulin Pump  - BG checks /HS/0200  - Hypoglycemia protocol in place     ** Please notify Endocrine for any change and/or advance in diet**  ** Please call Endocrine for any BG related issues **     Discharge Planning:   TBD. Please notify endocrinology prior to discharge.

## 2024-03-14 NOTE — NURSING
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 Pt is on RA, afebrile, SBP in 110s-120s. Pt endorses pain the bilateral shins and lower legs states it feels like "gout pain" moderately controlled w/ PRN regimen. Pt denies any N/V LBM: 3/14(initially c/o constipation and requested suppository however constipation resolved spontaneously and pt was able to have large formed stool this shift). Pt ate 50% of meal has insulin pump in place follow home insulin pump protocol per endocrine bedtime BG in 200s however 0200 B (pt asymptomatic)-given PRN glucose tablets BG recovered to 83 endocrine on-call made aware requested that pt suspends pump insulin admin-insulin  admin currently suspended while pt is NPO awaiting endocrine consult. 4 eyes skin assessment completed w/ Beatrice RN pt skin overall intact has RLQ incision CDI w/ staples intact and R anastacio site w/ sutures-dressing C/D/I. Safety maintained, monitoring ongoing.     -16Fr cramer placed this shift w/ 100cc of clear yellow urine in bag upon insertion-placed w/o difficulty pt tolerated insertion fairly well-topical lidocaine used per order     -CT A/P completed results pending.    -R ANASTACIO-dressing changed this shift output ss dressing currently C/D/I     -IV ABX -Cefepime continued this shift  "

## 2024-03-14 NOTE — NURSING
Pt AAOx4, independent, VSS. Cr 2.8. Uric acid 9.0. Home insulin pump in use. CT a/p with fluid collections, no intervention @ this time - favors hematoma vs seroma. Continue Cefepime. Curry in place, will need for a/b 1 week. U/O 1.3 L. JEAN-PIERRE o/p 670 cc. PD cath CDI. Incision cleaned with betadine TID. HD today for clearance. Bed in low/locked position, call light/personal belongings w/in reach, non-slip socks in place, pt remains free from falls, WCTM.

## 2024-03-14 NOTE — HPI
Reason for Consult: Management of T2DM, Hyperglycemia      Surgical Procedure and Date: kidney transplant 2/29/2024     Diabetes diagnosis year: 1999 per chart review      Home Diabetes Medications:  Tandem insulin pump   ICR 1:5   ISF 1:25  Basal rate 1.2 u/hr + control IQ      How often checking glucose at home? >4 x day   BG readings on regimen: 100-200s  Hypoglycemia on the regimen?  Yes, once a week overnight, 2-3x during the week during the day   Missed doses on regimen?  n/a     Diabetes Complications include:     Hyperglycemia, Hypoglycemia , Hypoglycemia unawareness, Diabetic nephropathy  , and Diabetic chronic kidney disease          Complicating diabetes co morbidities:   Glucocorticoid use        HPI:   Patient is a 64 y.o. male with a diagnosis of DBD Ktx on 2/29/24 for presumed diabetic nephropathy (donor RPR+, CMV D+, R-, CIT ~16.5hrs). PD catheter remains in place. 6 day cramer (removed without issue), surgery went well. He presented to outpatient transplant clinic 3/13 with report of significant increase in drain output (~950) and increased leakage from incision. Lab work notable for leukocytosis. JEAN-PIERRE drain creatinine 29 (prev 2.8 on 3/10), significant for urine leak. Plan for CT a/p to assess collection and current drain placement. Abx started. Cramer to be placed.  with Cr 2.8. Plan to admit for infectious work-up and urine leak. Endocrinology consulted for management of T2DM.

## 2024-03-14 NOTE — CONSULTS
[facility] - Transplant Stepdown  Endocrinology  Diabetes Consult Note    Consult Requested by: Mark Bennett MD   Reason for admit: Urine leak from transplanted ureter    HISTORY OF PRESENT ILLNESS:  Reason for Consult: Management of T2DM, Hyperglycemia      Surgical Procedure and Date: kidney transplant 2024     Diabetes diagnosis year:  per chart review      Home Diabetes Medications:  Tandem insulin pump   ICR 1:5   ISF 1:25  Basal rate 1.2 u/hr + control IQ      How often checking glucose at home? >4 x day   BG readings on regimen: 100-200s  Hypoglycemia on the regimen?  Yes, once a week overnight, 2-3x during the week during the day   Missed doses on regimen?  n/a     Diabetes Complications include:     Hyperglycemia, Hypoglycemia , Hypoglycemia unawareness, Diabetic nephropathy  , and Diabetic chronic kidney disease          Complicating diabetes co morbidities:   Glucocorticoid use        HPI:   Patient is a 64 y.o. 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 PD catheter remains in place. 6 day cramer (removed without issue), surgery went well. He presented to outpatient transplant clinic 3/13 with report of significant increase in drain output (~950) and increased leakage from incision. Lab work notable for leukocytosis. JEAN-PIERRE drain creatinine 29 (prev 2.8 on 3/10), significant for urine leak. Plan for CT a/p to assess collection and current drain placement. Abx started. Cramer to be placed.  with Cr 2.8. Plan to admit for infectious work-up and urine leak. Endocrinology consulted for management of T2DM.          Interval HPI:   Overnight events:  Remains in TSU. BG below goal ranges with hypoglycemia overnight. Insulin pump suspended at that time. BG trending back up today and insulin pump resumed. Creatinine 2.8. Receiving Prednisone 15 mg.   Eatin%  Nausea: No  Hypoglycemia and intervention: No  Fever: No  TPN  and/or TF: No  If yes, type of TF/TPN and rate: n/a    PMH, PSH, FH, SH reviewed     ROS:  Constitutional: Negative for weight changes.  Eyes: Negative for visual disturbance.  Respiratory: Negative for cough.   Cardiovascular: Negative for chest pain.  Gastrointestinal: Negative for nausea.  Endocrine: Negative for polyuria, polydipsia.  Musculoskeletal: Negative for back pain.  Skin: Negative for rash.  Neurological: Negative for syncope.  Psychiatric/Behavioral: Negative for depression.      Review of Systems    Current Medications and/or Treatments Impacting Glycemic Control  Immunotherapy:    Immunosuppressants           Stop Route Frequency     tacrolimus capsule 5 mg         -- Oral 2 times daily          Steroids:   Hormones (From admission, onward)      Start     Stop Route Frequency Ordered    03/14/24 0900  predniSONE tablet 15 mg         -- Oral Daily 03/13/24 1649    03/13/24 1649  melatonin tablet 6 mg         -- Oral Nightly PRN 03/13/24 1649          Pressors:    Autonomic Drugs (From admission, onward)      None          Hyperglycemia/Diabetes Medications:   Antihyperglycemics (From admission, onward)      Start     Stop Route Frequency Ordered    03/13/24 2115  insulin aspart U-100 insulin pump from home 0-10 Units        Question Answer Comment   Target number 120    Basal Rate #1 1.2    Basal rate #1 time 7140-4290        -- SubQ Continuous 03/13/24 2012 03/13/24 2111  insulin aspart U-100 insulin pump from home 0-15 Units        Question Answer Comment   Target number 120    Carbohydrate coverage #1 1:5    Carbohydrate coverage #1 time 6941-5469    Sensitivity #1 1:25    Sensitivity #1 time [id number]        -- SubQ As needed (PRN) 03/13/24 2012             PHYSICAL EXAMINATION:  Vitals:    03/14/24 1134   BP: (!) 124/58   Pulse: 84   Resp: 18   Temp: 98.4 °F (36.9 °C)     Body mass index is 34.02 kg/m².     Physical Exam  Constitutional: Well developed, well nourished, obese, NAD.  ENT:  "External ears no masses with nose patent; normal hearing.  Neck: Supple; trachea midline.  Cardiovascular: Normal heart sounds, no LE edema. DP +2 bilaterally.  Lungs: Normal effort; lungs anterior bilaterally clear to auscultation.  Abdomen: Soft, no masses, no hernias.  MS: No clubbing or cyanosis of nails noted; unable to assess gait.  Skin: No rashes, lesions, or ulcers; no nodules. Injection sites are ok. No lipo hypertropthy or atrophy.  Psychiatric: Good judgement and insight; normal mood and affect.  Neurological: Cranial nerves are grossly intact.   Foot: Nails in good condition, no amputations noted.         Labs Reviewed and Include   Recent Labs   Lab 03/14/24  0759   GLU 59*   CALCIUM 10.3   ALBUMIN 3.4*   *   K 4.8   CO2 19*      *   CREATININE 2.8*     Lab Results   Component Value Date    WBC 15.90 (H) 03/14/2024    HGB 8.8 (L) 03/14/2024    HCT 27.5 (L) 03/14/2024    MCV 99 (H) 03/14/2024     (H) 03/14/2024     No results for input(s): "TSH", "FREET4" in the last 168 hours.  Lab Results   Component Value Date    HGBA1C 5.8 (H) 03/01/2024       Nutritional status:   Body mass index is 34.02 kg/m².  Lab Results   Component Value Date    ALBUMIN 3.4 (L) 03/14/2024    ALBUMIN 3.4 (L) 03/13/2024    ALBUMIN 3.0 (L) 03/11/2024     No results found for: "PREALBUMIN"    Estimated Creatinine Clearance: 26.6 mL/min (A) (based on SCr of 2.8 mg/dL (H)).    Accu-Checks  Recent Labs     03/13/24 2003 03/14/24  0330 03/14/24  0505 03/14/24  0834 03/14/24  1250   POCTGLUCOSE 211* 63* 83 79 178*        ASSESSMENT and PLAN    Renal/  * Urine leak from transplanted ureter  Managed per primary team  Optimize BG Control        NIHARIKA (acute kidney injury)  Lab Results   Component Value Date    CREATININE 2.8 (H) 03/14/2024     Avoid insulin stacking  Titrate insulin slowly       Endocrine  Insulin pump in place  At time of evaluation, pt meets criteria to continue home insulin pump usage.  - Has " all adequate supplies   - Insulin pump site change on 3/12/24  - Bolus settings reviewed    - No changes to home regimen.   - Nurse to check BG qac/hs/0200 & record in epic   - Patient to input glucose into pump and use bolus wizard for prandial needs   - Will continue to monitor accuchecks and titrate insulin as clinically indicated .     - Discussed above plan with patient, patient verbalized understanding.   - Understands in case of pump malfunction or cognitive decline in which pt can no longer safely use insulin pump, will transition to SC MDI.     PLEASE CONTACT ENDOCRINE IF INSULIN PUMP FAILS           Type 2 diabetes mellitus with kidney complication, with long-term current use of insulin  Endocrinology consulted for BG management.   BG goal 140-180     Patient with hypoglycemia noted overnight. Pump suspended at that time and resumed this morning. Will monitor closely. If continues to have hypoglycemia on home pump will need to adjust settings.     - Continue Home Insulin Pump  - BG checks AC/HS/0200  - Hypoglycemia protocol in place     ** Please notify Endocrine for any change and/or advance in diet**  ** Please call Endocrine for any BG related issues **     Discharge Planning:   TBD. Please notify endocrinology prior to discharge.              Plan discussed with patient at bedside.     Nakia Vasquez, NP  Endocrinology  [facility] - Transplant Stepdown

## 2024-03-14 NOTE — CARE UPDATE
Spoke with nurse who reports that pt confirms insulin pump is running and that he has enough supplies.  Pt seen 3 days prior in hospital on below settings    Tandem insulin pump   ICR 1:5   ISF 1:25  Basal rate 1.2 u/hr + control IQ     Recommend that pt continue on insulin pump.  Full consult note to follow tomorrow

## 2024-03-14 NOTE — ASSESSMENT & PLAN NOTE
At time of evaluation, pt meets criteria to continue home insulin pump usage.  - Has all adequate supplies   - Insulin pump site change on 3/12/24  - Bolus settings reviewed    - No changes to home regimen.   - Nurse to check BG qac/hs/0200 & record in epic   - Patient to input glucose into pump and use bolus wizard for prandial needs   - Will continue to monitor accuchecks and titrate insulin as clinically indicated .     - Discussed above plan with patient, patient verbalized understanding.   - Understands in case of pump malfunction or cognitive decline in which pt can no longer safely use insulin pump, will transition to SC MDI.     PLEASE CONTACT ENDOCRINE IF INSULIN PUMP FAILS

## 2024-03-14 NOTE — ASSESSMENT & PLAN NOTE
- infectious w/u- blood cultures w NGTD. UA unremarkable. Urine cx pending  - drain from OR sent for cx- wbc 92, segs 94  - started on abx on admit  - recently treated with PCN for donor positive RPR

## 2024-03-14 NOTE — ASSESSMENT & PLAN NOTE
- Post-op DGF 2/2 DCD and long cold time  - Last HD 3/8   - BUN elevated, but no AMS, no n/v, 1+ BLE edema but lungs clear not requiring oxygen. Hold HD today.  - Plan for HD today 3/14 for clearance

## 2024-03-14 NOTE — PROGRESS NOTES
[facility] - Transplant Stepdown  Kidney Transplant  Progress Note      Reason for Follow-up: Reassessment of Kidney Transplant - 2/29/2024  (#1) recipient and management of immunosuppression.     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 PD catheter remains in place. 6 day cramer (removed without issue), surgery went well. Post op:  PCN G #1 given 3/1 for +RPR donor cultures; needs weekly x 3 total per ID.   DGF: not clearing/low UOP; MWF chair, last 3/8/24  Drain remained in place at time of DC. Re-admitted 3/10 for increase in JEAN-PIERRE drain, drain Cr 2.8 (same as serum). US 3/10 with 2 fluid collections: SQ collection + deeper collection with drain terminating in the collection.     He presented to outpatient transplant clinic 3/13 with report of significant increase in drain output (~950) and increased leakage from incision. Lab work notable for leukocytosis. JEAN-PIERRE drain creatinine 29 (prev 2.8 on 3/10), significant for urine leak. Plan for CT a/p to assess collection and current drain placement. Abx started. Cramer to be placed.  with Cr 2.8. Plan to admit for infectious work-up and urine leak. Blood/urine cx pending. Assess need for HD daily. D/w Dr. Stokes.    Patient admitted w increased JEAN-PIERRE output w decrease in UOP.    Interval History: no acute events overnight. Cramer placed, draining clear yellow urine. Since placing cramer, patient no longer having leaking from JEAN-PIERRE site. RLQ incision CDI, no signs of erythema, or infection. Fluid sent from drain (placed 2/29/24 in OR), Cr 29.7, 92 WBC, 94 segs. Blood cx w NGTD, UA neg for nitrite, 8 wbc, 100 RBC. Urine cx pending.  Pt started on  Cefepime on admit. Cr up to 2.8. Last HD 3/8/24. Plan for HD today for clearance per right chest wall perm cath. CT A/P reviewed w transplant surgeon, small sub incisional collection (3.3cm x 6.2cm x 9.2cm) and 2 peritoneal drainage catheters w medial catheter in fluid collection (3.5cm x 3.5 cm x 2.9 cm) favors hematoma verses seroma, no plan for intervention. Pt denies increased pain. He is tolerating diet, denies n/v, and having bowel movements. He is otherwise independent in room. VSS. Monitor.    Facility-Administered Medications Prior to Admission   Medication    penicillin G benzathine (BICILLIN LA) injection 2.4 Million Units     PTA Medications   Medication Sig    aspirin (ECOTRIN) 81 MG EC tablet Take 81 mg by mouth once daily.    calcitRIOL (ROCALTROL) 0.5 MCG Cap Take 1 capsule (0.5 mcg total) by mouth once daily.    carvediloL (COREG) 6.25 MG tablet Take 1 tablet (6.25 mg total) by mouth 2 (two) times daily.    HUMALOG U-100 INSULIN 100 unit/mL injection Inject into the skin. FOR INSULIN PUMP (BASAL 1.2 UNITS/HR)    latanoprost 0.005 % ophthalmic solution Place 1 drop into the right eye every other day. At night    levothyroxine (SYNTHROID) 50 MCG tablet Take 1 tablet (50 mcg total) by mouth before breakfast.    multivitamin Tab Take 1 tablet by mouth once daily.    mycophenolate (CELLCEPT) 250 mg Cap Take 4 capsules (1,000 mg total) by mouth 2 (two) times daily.    ONETOUCH DELICA PLUS LANCET 30 gauge Misc     oxyCODONE (ROXICODONE) 10 mg Tab immediate release tablet Take 1 tablet (10 mg total) by mouth every 4 (four) hours as needed for Pain.    pantoprazole (PROTONIX) 40 MG tablet Take 1 tablet (40 mg total) by mouth once daily.    predniSONE (DELTASONE) 5 MG tablet Take by mouth daily: 20mg 3/2-3/8, 15mg 3/9-3/15, 10mg 3/16-3/22/2024, 5mg 3/23/2024-    sodium bicarbonate 650 MG tablet Take 2 tablets (1,300 mg total) by mouth 2 (two) times daily.    subcutaneous insulin pump Misc Inject 1 Units/hr  into the skin continuous. Insulin pump name - Tandum  Basal dose is 2 units/hr    sulfamethoxazole-trimethoprim 400-80mg (BACTRIM,SEPTRA) 400-80 mg per tablet Take 1 tablet by mouth every Mon, Wed, Fri. Stop 8/27/24    tacrolimus (PROGRAF) 1 MG Cap Take 5 capsules (5 mg total) by mouth every 12 (twelve) hours. Z94.0;Kidney txp on 2/29/2024    valGANciclovir (VALCYTE) 450 mg Tab Take 1 tablet (450 mg total) by mouth every Mon, Wed, Fri. Stop 8/27/24       Review of patient's allergies indicates:   Allergen Reactions    Allopurinol analogues Swelling    Latex, natural rubber     Statins-hmg-coa reductase inhibitors Other (See Comments)     Muscle ache    Adhesive Rash       Past Medical History:   Diagnosis Date    Anemia     Cataract     Diabetes mellitus     Diabetes mellitus, type 2     Glaucoma     Gout, unspecified     HLD (hyperlipidemia)     Hypertension     Hypothyroidism, unspecified     Kidney failure     Renal disorder      Past Surgical History:   Procedure Laterality Date    COLONOSCOPY N/A 05/18/2022    Procedure: COLONOSCOPY;  Surgeon: Raul Dyer MD;  Location: [facility];  Service: Endoscopy;  Laterality: N/A;    DENTAL SURGERY      EYE SURGERY Bilateral     Cataract    INSERTION, CATHETER, DIALYSIS, PERITONEAL, LAPAROSCOPIC N/A 06/20/2023    Procedure: INSERTION, CATHETER, DIALYSIS, PERITONEAL, LAPAROSCOPIC;  Surgeon: Carlos Alberto Rodgers MD;  Location: [facility];  Service: General;  Laterality: N/A;    KIDNEY TRANSPLANT N/A 2/29/2024    Procedure: TRANSPLANT, KIDNEY;  Surgeon: Pino Law Jr., MD;  Location: [address];  Service: Transplant;  Laterality: N/A;    KNEE SURGERY Right      Family History       Problem Relation (Age of Onset)    Cancer Sister    Diabetes Father, Brother    Heart disease Father    Hypertension Father    Kidney disease Brother          Tobacco Use    Smoking status: Never    Smokeless tobacco: Never   Substance and Sexual Activity    Alcohol use: Never  "   Drug use: Never    Sexual activity: Not Currently        Review of Systems   Constitutional:  Negative for chills and fever.   HENT:  Negative for facial swelling and trouble swallowing.    Eyes:  Negative for photophobia and redness.   Respiratory:  Negative for cough, shortness of breath, wheezing and stridor.    Cardiovascular:  Positive for leg swelling. Negative for chest pain and palpitations.   Gastrointestinal:  Positive for abdominal pain. Negative for abdominal distention, diarrhea, nausea and vomiting.   Genitourinary:  Positive for decreased urine volume.   Musculoskeletal:  Negative for neck pain and neck stiffness.   Allergic/Immunologic: Positive for immunocompromised state.   Neurological:  Negative for dizziness and light-headedness.   Psychiatric/Behavioral:  Negative for agitation, behavioral problems and confusion.      Objective:     Vital Signs (Most Recent):  Temp: 98.4 °F (36.9 °C) (03/14/24 1134)  Pulse: 84 (03/14/24 1134)  Resp: 18 (03/14/24 1134)  BP: (!) 124/58 (03/14/24 1134)  SpO2: 99 % (03/14/24 1134)  Height: 5' 4" (162.6 cm)  Weight: 89.9 kg (198 lb 3.1 oz)  Body mass index is 34.02 kg/m².     Physical Exam  Vitals and nursing note reviewed.   Constitutional:       General: He is not in acute distress.  HENT:      Head: Normocephalic and atraumatic.   Eyes:      General: No scleral icterus.        Right eye: No discharge.         Left eye: No discharge.   Cardiovascular:      Rate and Rhythm: Normal rate and regular rhythm.      Heart sounds: No murmur heard.  Pulmonary:      Effort: Pulmonary effort is normal. No respiratory distress.      Breath sounds: No wheezing or rales.   Abdominal:      General: Bowel sounds are normal. There is no distension.      Tenderness: There is abdominal tenderness (incisional). There is no guarding.      Comments: PD catheter in place no s/s/I  R JEAN-PIERRE drain ss output    Musculoskeletal:      Right lower leg: Edema present.      Left lower leg: Edema " present.   Skin:     General: Skin is warm and dry.      Capillary Refill: Capillary refill takes less than 2 seconds.      Coloration: Skin is not jaundiced.   Neurological:      General: No focal deficit present.      Mental Status: He is alert and oriented to person, place, and time.   Psychiatric:         Mood and Affect: Mood normal.         Behavior: Behavior normal.         Thought Content: Thought content normal.          Laboratory  CBC:   Recent Labs   Lab 03/11/24  0855 03/13/24  0940 03/14/24  0759   WBC 12.44 16.62* 15.90*   RBC 2.66* 2.71* 2.77*   HGB 8.3* 8.5* 8.8*   HCT 25.3* 26.3* 27.5*    517* 517*   MCV 95 97 99*   MCH 31.2* 31.4* 31.8*   MCHC 32.8 32.3 32.0       CMP:   Recent Labs   Lab 03/08/24  1958 03/10/24  0557 03/11/24  0855 03/13/24  0940 03/14/24  0759   * 117* 131* 111* 59*   CALCIUM 9.5 9.6 10.0 10.5 10.3   ALBUMIN 3.2* 3.3* 3.0* 3.4* 3.4*   PROT 6.2 6.1  --   --   --     133* 138 141 133*   K 4.1 3.7 4.0 4.8 4.8   CO2 17* 19* 22* 22* 19*    100 106 110 104   BUN 31* 57* 80* 112* 111*   CREATININE 2.5* 2.8* 3.1* 2.8* 2.8*   ALKPHOS 58 69  --   --   --    ALT 11 17  --   --   --    AST 32 30  --   --   --          Diagnostic Results:  CT ordered  and reviewed w transplant surgeon      Assessment/Plan:     * Urine leak from transplanted ureter  - He presented to outpatient transplant clinic 3/13 with report of significant increase in drain output (~950) and increased leakage from incision. Lab work notable for leukocytosis.   - JEAN-PIERRE drain creatinine 29 (prev 2.8 on 3/10), significant for urine leak.   - Plan for CT a/p to assess collection and current drain placement. No plan for intervention at this time. Cefepime started on admit.   - Curry placed.   -  with Cr 2.8. HD today for clearance  - Plan to admit for infectious work-up and urine leak.     Acidosis  - Continue oral bicarb.      Leukocytosis  - infectious w/u- blood cultures w NGTD. UA  "unremarkable. Urine cx pending  - drain from OR sent for cx- wbc 92, segs 94  - started on abx on admit  - recently treated with PCN for donor positive RPR      Insulin pump in place  - pt using home insulin pump      Delayed graft function of kidney  - Post-op DGF 2/2 DCD and long cold time  - Last HD 3/8   - BUN elevated, but no AMS, no n/v, 1+ BLE edema but lungs clear not requiring oxygen. Hold HD today.  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 Will monitor for signs of toxic side effects, check daily tacrolimus troughs, and change meds accordingly.   - MMF held for leukocytosis   - Continue steroids.      Status post -donor kidney transplantation  - S/p DBD Ktx on 24 for presumed diabetic nephropathy (donor RPR+, CMV D+, R-, CIT ~16.5hrs).  - Patient being managed for DGF  - A/w increased drainage from incision and ouput from drain (~950cc)  - See "urine leak."    Chronic viral hepatitis B without delta agent and without coma        Essential hypertension  - cont home meds      Type 2 diabetes mellitus with kidney complication, with long-term current use of insulin  - Endocrine consulted and following      NIHARIKA (acute kidney injury)  - see "DGF"          Discharge Planning:  Discussed plan of care.  No plan for discharge today.    Medical decision making for this encounter includes review of pertinent labs and diagnostic studies, assessment and planning, discussions with consulting providers, discussion with patient/family, and participation in multidisciplinary rounds. Time spent caring for patient: 90 minutes    Jenna Rene, NP  Kidney Transplant  [facility] - [facility]    "

## 2024-03-14 NOTE — ASSESSMENT & PLAN NOTE
Lab Results   Component Value Date    CREATININE 2.8 (H) 03/14/2024     Avoid insulin stacking  Titrate insulin slowly

## 2024-03-14 NOTE — SUBJECTIVE & OBJECTIVE
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 PD catheter remains in place. 6 day cramer (removed without issue), surgery went well. Post op:  PCN G #1 given 3/1 for +RPR donor cultures; needs weekly x 3 total per ID.   DGF: not clearing/low UOP; MWF chair, last 3/8/24  Drain remained in place at time of DC. Re-admitted 3/10 for increase in JEAN-PIERRE drain, drain Cr 2.8 (same as serum). US 3/10 with 2 fluid collections: SQ collection + deeper collection with drain terminating in the collection.     He presented to outpatient transplant clinic 3/13 with report of significant increase in drain output (~950) and increased leakage from incision. Lab work notable for leukocytosis. JEAN-PIERRE drain creatinine 29 (prev 2.8 on 3/10), significant for urine leak. Plan for CT a/p to assess collection and current drain placement. Abx started. Cramer to be placed.  with Cr 2.8. Plan to admit for infectious work-up and urine leak. Blood/urine cx pending. Assess need for HD daily. D/w Dr. Stokes.    Patient admitted w increased JEAN-PIERRE output w decrease in UOP.    Interval History: no acute events overnight. Cramer placed, draining clear yellow urine. Since placing cramer, patient no longer having leaking from JEAN-PIERRE site. RLQ incision CDI, no signs of erythema, or infection. Fluid sent from drain (placed 2/29/24 in OR), Cr 29.7, 92 WBC, 94 segs. Blood cx w NGTD, UA neg for nitrite, 8 wbc, 100 RBC. Urine cx pending.  Pt started on Cefepime on admit. Cr up to 2.8. Last HD 3/8/24. Plan for HD today for clearance per right chest wall perm cath. CT A/P reviewed w transplant surgeon, small sub incisional collection (3.3cm x 6.2cm x 9.2cm) and 2 peritoneal drainage catheters w medial catheter in fluid collection (3.5cm x 3.5 cm x 2.9 cm) favors hematoma verses seroma, no  plan for intervention. Pt denies increased pain. He is tolerating diet, denies n/v, and having bowel movements. He is otherwise independent in room. VSS. Monitor.    Facility-Administered Medications Prior to Admission   Medication    penicillin G benzathine (BICILLIN LA) injection 2.4 Million Units     PTA Medications   Medication Sig    aspirin (ECOTRIN) 81 MG EC tablet Take 81 mg by mouth once daily.    calcitRIOL (ROCALTROL) 0.5 MCG Cap Take 1 capsule (0.5 mcg total) by mouth once daily.    carvediloL (COREG) 6.25 MG tablet Take 1 tablet (6.25 mg total) by mouth 2 (two) times daily.    HUMALOG U-100 INSULIN 100 unit/mL injection Inject into the skin. FOR INSULIN PUMP (BASAL 1.2 UNITS/HR)    latanoprost 0.005 % ophthalmic solution Place 1 drop into the right eye every other day. At night    levothyroxine (SYNTHROID) 50 MCG tablet Take 1 tablet (50 mcg total) by mouth before breakfast.    multivitamin Tab Take 1 tablet by mouth once daily.    mycophenolate (CELLCEPT) 250 mg Cap Take 4 capsules (1,000 mg total) by mouth 2 (two) times daily.    ONETOUCH DELICA PLUS LANCET 30 gauge Misc     oxyCODONE (ROXICODONE) 10 mg Tab immediate release tablet Take 1 tablet (10 mg total) by mouth every 4 (four) hours as needed for Pain.    pantoprazole (PROTONIX) 40 MG tablet Take 1 tablet (40 mg total) by mouth once daily.    predniSONE (DELTASONE) 5 MG tablet Take by mouth daily: 20mg 3/2-3/8, 15mg 3/9-3/15, 10mg 3/16-3/22/2024, 5mg 3/23/2024-    sodium bicarbonate 650 MG tablet Take 2 tablets (1,300 mg total) by mouth 2 (two) times daily.    subcutaneous insulin pump Misc Inject 1 Units/hr into the skin continuous. Insulin pump name - Tandum  Basal dose is 2 units/hr    sulfamethoxazole-trimethoprim 400-80mg (BACTRIM,SEPTRA) 400-80 mg per tablet Take 1 tablet by mouth every Mon, Wed, Fri. Stop 8/27/24    tacrolimus (PROGRAF) 1 MG Cap Take 5 capsules (5 mg total) by mouth every 12 (twelve) hours. Z94.0;Kidney txp on 2/29/2024     valGANciclovir (VALCYTE) 450 mg Tab Take 1 tablet (450 mg total) by mouth every Mon, Wed, Fri. Stop 8/27/24       Review of patient's allergies indicates:   Allergen Reactions    Allopurinol analogues Swelling    Latex, natural rubber     Statins-hmg-coa reductase inhibitors Other (See Comments)     Muscle ache    Adhesive Rash       Past Medical History:   Diagnosis Date    Anemia     Cataract     Diabetes mellitus     Diabetes mellitus, type 2     Glaucoma     Gout, unspecified     HLD (hyperlipidemia)     Hypertension     Hypothyroidism, unspecified     Kidney failure     Renal disorder      Past Surgical History:   Procedure Laterality Date    COLONOSCOPY N/A 05/18/2022    Procedure: COLONOSCOPY;  Surgeon: Raul Dyer MD;  Location: [facility] ENDO;  Service: Endoscopy;  Laterality: N/A;    DENTAL SURGERY      EYE SURGERY Bilateral     Cataract    INSERTION, CATHETER, DIALYSIS, PERITONEAL, LAPAROSCOPIC N/A 06/20/2023    Procedure: INSERTION, CATHETER, DIALYSIS, PERITONEAL, LAPAROSCOPIC;  Surgeon: Carlos Alberto Rodgers MD;  Location: [facility] OR;  Service: General;  Laterality: N/A;    KIDNEY TRANSPLANT N/A 2/29/2024    Procedure: TRANSPLANT, KIDNEY;  Surgeon: Pino Law Jr., MD;  Location: [address];  Service: Transplant;  Laterality: N/A;    KNEE SURGERY Right      Family History       Problem Relation (Age of Onset)    Cancer Sister    Diabetes Father, Brother    Heart disease Father    Hypertension Father    Kidney disease Brother          Tobacco Use    Smoking status: Never    Smokeless tobacco: Never   Substance and Sexual Activity    Alcohol use: Never    Drug use: Never    Sexual activity: Not Currently        Review of Systems   Constitutional:  Negative for chills and fever.   HENT:  Negative for facial swelling and trouble swallowing.    Eyes:  Negative for photophobia and redness.   Respiratory:  Negative for cough, shortness of breath, wheezing and stridor.    Cardiovascular:   "Positive for leg swelling. Negative for chest pain and palpitations.   Gastrointestinal:  Positive for abdominal pain. Negative for abdominal distention, diarrhea, nausea and vomiting.   Genitourinary:  Positive for decreased urine volume.   Musculoskeletal:  Negative for neck pain and neck stiffness.   Allergic/Immunologic: Positive for immunocompromised state.   Neurological:  Negative for dizziness and light-headedness.   Psychiatric/Behavioral:  Negative for agitation, behavioral problems and confusion.      Objective:     Vital Signs (Most Recent):  Temp: 98.4 °F (36.9 °C) (03/14/24 1134)  Pulse: 84 (03/14/24 1134)  Resp: 18 (03/14/24 1134)  BP: (!) 124/58 (03/14/24 1134)  SpO2: 99 % (03/14/24 1134)  Height: 5' 4" (162.6 cm)  Weight: 89.9 kg (198 lb 3.1 oz)  Body mass index is 34.02 kg/m².      Physical Exam  Vitals and nursing note reviewed.   Constitutional:       General: He is not in acute distress.  HENT:      Head: Normocephalic and atraumatic.   Eyes:      General: No scleral icterus.        Right eye: No discharge.         Left eye: No discharge.   Cardiovascular:      Rate and Rhythm: Normal rate and regular rhythm.      Heart sounds: No murmur heard.  Pulmonary:      Effort: Pulmonary effort is normal. No respiratory distress.      Breath sounds: No wheezing or rales.   Abdominal:      General: Bowel sounds are normal. There is no distension.      Tenderness: There is abdominal tenderness (incisional). There is no guarding.      Comments: PD catheter in place no s/s/I  R JEAN-PIERRE drain ss output    Musculoskeletal:      Right lower leg: Edema present.      Left lower leg: Edema present.   Skin:     General: Skin is warm and dry.      Capillary Refill: Capillary refill takes less than 2 seconds.      Coloration: Skin is not jaundiced.   Neurological:      General: No focal deficit present.      Mental Status: He is alert and oriented to person, place, and time.   Psychiatric:         Mood and Affect: Mood " normal.         Behavior: Behavior normal.         Thought Content: Thought content normal.          Laboratory  CBC:   Recent Labs   Lab 03/11/24  0855 03/13/24  0940 03/14/24  0759   WBC 12.44 16.62* 15.90*   RBC 2.66* 2.71* 2.77*   HGB 8.3* 8.5* 8.8*   HCT 25.3* 26.3* 27.5*    517* 517*   MCV 95 97 99*   MCH 31.2* 31.4* 31.8*   MCHC 32.8 32.3 32.0       CMP:   Recent Labs   Lab 03/08/24  1958 03/10/24  0557 03/11/24  0855 03/13/24  0940 03/14/24  0759   * 117* 131* 111* 59*   CALCIUM 9.5 9.6 10.0 10.5 10.3   ALBUMIN 3.2* 3.3* 3.0* 3.4* 3.4*   PROT 6.2 6.1  --   --   --     133* 138 141 133*   K 4.1 3.7 4.0 4.8 4.8   CO2 17* 19* 22* 22* 19*    100 106 110 104   BUN 31* 57* 80* 112* 111*   CREATININE 2.5* 2.8* 3.1* 2.8* 2.8*   ALKPHOS 58 69  --   --   --    ALT 11 17  --   --   --    AST 32 30  --   --   --          Diagnostic Results:  CT ordered  and reviewed w transplant surgeon

## 2024-03-14 NOTE — PROGRESS NOTES
Admit Note     Met with patient to assess needs. Patient is a 64 y.o. single male, admitted for:    Elevated white blood cell count, unspecified [D72.829]  Leukocytosis [D72.829]      Patient admitted from emergency department on 3/13/2024 .  At this time, patient presents as alert and oriented x 4, pleasant, well groomed, recall good, concentration/judgement good, average intelligence, calm, communicative, cooperative, and asking and answering questions appropriately.  At this time, patients caregiver presents as  not present at this time .    Household/Family Systems     Patient resides with patient's patient, at [address] [place] [id number] ([facility]).  Support system includes the father Julien and the other residents of the [facility]. Patient also report having a brother Pino Lopez ([phone]) who resides in [place], [place].  Patient does not have dependents that are need of being cared for.     Patients primary caregiver is Father Julien , patients friend, phone number [phone].  Confirmed patients contact information is [phone] (home);   Telephone Information:   Mobile [phone]   .    During admission, patient's caregiver plans to stay at home.  Confirmed patient and patients caregivers do have access to reliable transportation.    Cognitive Status/Learning     Patient reports reading ability as college and states patient does not have difficulty with N/A.  Patient reports patient learns best by written information.   Needed: No.   Highest education level: Post-College Graduate Degree    Vocation/Disability   .  Working for Income: yes  If yes, working activity level: Working Full Time  Patient is employed as a .    Adherence     Patient reports a high level of adherence to patients health care regimen.  Adherence counseling and education provided. Patient verbalizes understanding. Per social work colleague patient and his caregivers will  need transplant re-education.  notified MD's, Pharm-D, and Inpatient RN Coordinator of this during rounds. All state understanding and agreement of this.     Substance Use    Patient reports the following substance usage.    Tobacco: none, patient denies any use.  Alcohol: none, patient denies any use.  Illicit Drugs/Non-prescribed Medications: none, patient denies any use.  Patient states clear understanding of the potential impact of substance use.  Substance abstinence/cessation counseling, education and resources provided and reviewed.     Services Utilizing/ADLS    Infusion Service: Prior to admission, patient utilizing? no  Home Health: Prior to admission, patient utilizing? no  DME: Prior to admission, no  Pulmonary/Cardiac Rehab: Prior to admission, no  Dialysis:  Prior to admission, yes - patient was referred to [facility] post transplant.   Transplant Specialty Pharmacy:  Prior to admission, yes; [facility].    Prior to admission, patient reports patient was independent with ADLS and was not driving.  Patient reports patient is able to care for self at this time..  Patient indicates a willingness to care for self once medically cleared to do so.    Insurance/Medications    Insured by   Payer/Plan Subscr  Sex Relation Sub. Ins. ID Effective Group Num   1. MEDICARE - [place]* KRISTIE MAURER 1960 Male Self [id number]                                    [address]   2. MEDICARE - [place]* KRISTIE MAURER 1960 Male Self [id number] 23                                    [address]      Primary Insurance (for UNOS reporting): Public Insurance - Medicare FFS (Fee For Service)  Secondary Insurance (for UNOS reporting): None    Patient reports patient is able to obtain and afford medications at this time and at time of discharge.    Living Will/Healthcare Power of     Patient states patient has a LW and/or HCPA.   provided education regarding LW and HCPA and the  completion of forms. Patient reports [facility] ([facility] [phone]) is his HCPA.     Coping/Mental Health    Patient is coping adequately with the aid of  family members and friends.  Patient denies mental health difficulties.     Discharge Planning    At time of discharge, patient plans to return to patient's home under the care of Father Julien and other [facility] residents.  Patients friend will transport patient.  Per rounds today, expected discharge date has not been medically determined at this time. Patient and patients caregiver  verbalize understanding and are involved in treatment planning and discharge process.    Additional Concerns     providing ongoing psychosocial support, education, resources and d/c planning as needed.  SW remains available.  remains available. Patient denies additional needs and/or concerns at this time. Patient verbalizes understanding and agreement with information reviewed, social work availability, and how to access available resources as needed.

## 2024-03-14 NOTE — SUBJECTIVE & OBJECTIVE
Interval HPI:   Overnight events:  Remains in TSU. BG below goal ranges with hypoglycemia overnight. Insulin pump suspended at that time. BG trending back up today and insulin pump resumed. Creatinine 2.8. Receiving Prednisone 15 mg.   Eatin%  Nausea: No  Hypoglycemia and intervention: No  Fever: No  TPN and/or TF: No  If yes, type of TF/TPN and rate: n/a    PMH, PSH, FH, SH reviewed     ROS:  Constitutional: Negative for weight changes.  Eyes: Negative for visual disturbance.  Respiratory: Negative for cough.   Cardiovascular: Negative for chest pain.  Gastrointestinal: Negative for nausea.  Endocrine: Negative for polyuria, polydipsia.  Musculoskeletal: Negative for back pain.  Skin: Negative for rash.  Neurological: Negative for syncope.  Psychiatric/Behavioral: Negative for depression.      Review of Systems    Current Medications and/or Treatments Impacting Glycemic Control  Immunotherapy:    Immunosuppressants           Stop Route Frequency     tacrolimus capsule 5 mg         -- Oral 2 times daily          Steroids:   Hormones (From admission, onward)      Start     Stop Route Frequency Ordered    24 0900  predniSONE tablet 15 mg         -- Oral Daily 24 1649    24 1649  melatonin tablet 6 mg         -- Oral Nightly PRN 24 1649          Pressors:    Autonomic Drugs (From admission, onward)      None          Hyperglycemia/Diabetes Medications:   Antihyperglycemics (From admission, onward)      Start     Stop Route Frequency Ordered    24 2115  insulin aspart U-100 insulin pump from home 0-10 Units        Question Answer Comment   Target number 120    Basal Rate #1 1.2    Basal rate #1 time 2362-3210        -- SubQ Continuous 24 211  insulin aspart U-100 insulin pump from home 0-15 Units        Question Answer Comment   Target number 120    Carbohydrate coverage #1 1:5    Carbohydrate coverage #1 time 8090-2193    Sensitivity #1 1:25    Sensitivity #1  time [id number]        -- SubQ As needed (PRN) 03/13/24 2012             PHYSICAL EXAMINATION:  Vitals:    03/14/24 1134   BP: (!) 124/58   Pulse: 84   Resp: 18   Temp: 98.4 °F (36.9 °C)     Body mass index is 34.02 kg/m².     Physical Exam  Constitutional: Well developed, well nourished, obese, NAD.  ENT: External ears no masses with nose patent; normal hearing.  Neck: Supple; trachea midline.  Cardiovascular: Normal heart sounds, no LE edema. DP +2 bilaterally.  Lungs: Normal effort; lungs anterior bilaterally clear to auscultation.  Abdomen: Soft, no masses, no hernias.  MS: No clubbing or cyanosis of nails noted; unable to assess gait.  Skin: No rashes, lesions, or ulcers; no nodules. Injection sites are ok. No lipo hypertropthy or atrophy.  Psychiatric: Good judgement and insight; normal mood and affect.  Neurological: Cranial nerves are grossly intact.   Foot: Nails in good condition, no amputations noted.

## 2024-03-14 NOTE — ASSESSMENT & PLAN NOTE
- He presented to outpatient transplant clinic 3/13 with report of significant increase in drain output (~950) and increased leakage from incision. Lab work notable for leukocytosis.   - JEAN-PIERRE drain creatinine 29 (prev 2.8 on 3/10), significant for urine leak.   - Plan for CT a/p to assess collection and current drain placement. No plan for intervention at this time. Cefepime started on admit.   - Curry placed.   -  with Cr 2.8. HD today for clearance  - Plan to admit for infectious work-up and urine leak.

## 2024-03-14 NOTE — ASSESSMENT & PLAN NOTE
- pt using home insulin pump     Duration Of Freeze Thaw-Cycle (Seconds): 5 Show Aperture Variable?: Yes Detail Level: Simple Render Note In Bullet Format When Appropriate: No Consent: The patient's consent was obtained including but not limited to risks of crusting, scabbing, blistering, scarring, darker or lighter pigmentary change, recurrence, incomplete removal and infection. Post-Care Instructions: I reviewed with the patient in detail post-care instructions. Patient is to wear sunprotection, and avoid picking at any of the treated lesions. Pt may apply Vaseline to crusted or scabbing areas.

## 2024-03-14 NOTE — HOSPITAL COURSE
Patient admitted w increased JEAN-PIERRE output w decrease in UOP.  Fluid sent from drain (placed 2/29/24 in OR), Cr 29.7, 92 WBC, 94 segs. CT A/P 3/13/24 reviewed w transplant surgeon, small sub incisional collection (3.3cm x 6.2cm x 9.2cm) and 2 peritoneal drainage catheters w medial catheter in fluid collection (3.5cm x 3.5 cm x 2.9 cm) favors hematoma verses seroma, no plan for intervention.  Last HD was 3/8/24.  Cramer placed on admit 3/13. Dialyzed inpatient on 3/14 for BUN >100, tolerated well. Pt cont to have high output per JEAN-PIERRE with cramer in place. IR consulted for perc neph tube to divert urine and allow for healing. Perc neph tube placed 3/15. 1 unit pRBC transfused 3/16 with appropriate response. Kidney US 3/16 today notable for persistent elevated Ris and slight increase in size of sub incisional collection. Reviewed by surgeon; no intervention indicated at this time.     Interval History: NAEON. R nephrostomy tube placed 3/15 by IR. Making good urine, not yet clearing well. Most urine draining to neph tube. Cramer remains in place as does JEAN-PIERRE drain. JEAN-PIERRE output increased in the past 24 hours. H/H stable. Cr trended down this morning. No need for HD at this time. Will cont to assess daily. Encourage 3L/ PO intake day. Patient is tolerating a diet, having Bms, and ambulating. WBC trending down. Remains on Cefepime. Afebrile. WCTM.

## 2024-03-15 LAB
ALBUMIN SERPL BCP-MCNC: 3.1 G/DL (ref 3.5–5.2)
ANION GAP SERPL CALC-SCNC: 8 MMOL/L (ref 8–16)
BASOPHILS # BLD AUTO: 0.02 K/UL (ref 0–0.2)
BASOPHILS NFR BLD: 0.2 % (ref 0–1.9)
BUN SERPL-MCNC: 47 MG/DL (ref 8–23)
CALCIUM SERPL-MCNC: 9.2 MG/DL (ref 8.7–10.5)
CHLORIDE SERPL-SCNC: 100 MMOL/L (ref 95–110)
CO2 SERPL-SCNC: 26 MMOL/L (ref 23–29)
CREAT SERPL-MCNC: 1.6 MG/DL (ref 0.5–1.4)
DIFFERENTIAL METHOD BLD: ABNORMAL
EOSINOPHIL # BLD AUTO: 0 K/UL (ref 0–0.5)
EOSINOPHIL NFR BLD: 0.4 % (ref 0–8)
ERYTHROCYTE [DISTWIDTH] IN BLOOD BY AUTOMATED COUNT: 15.9 % (ref 11.5–14.5)
EST. GFR  (NO RACE VARIABLE): 47.5 ML/MIN/1.73 M^2
GLUCOSE SERPL-MCNC: 70 MG/DL (ref 70–110)
HCT VFR BLD AUTO: 26 % (ref 40–54)
HGB BLD-MCNC: 8.1 G/DL (ref 14–18)
IMM GRANULOCYTES # BLD AUTO: 0.09 K/UL (ref 0–0.04)
IMM GRANULOCYTES NFR BLD AUTO: 0.8 % (ref 0–0.5)
LYMPHOCYTES # BLD AUTO: 0.1 K/UL (ref 1–4.8)
LYMPHOCYTES NFR BLD: 0.6 % (ref 18–48)
MAGNESIUM SERPL-MCNC: 2.3 MG/DL (ref 1.6–2.6)
MCH RBC QN AUTO: 30.8 PG (ref 27–31)
MCHC RBC AUTO-ENTMCNC: 31.2 G/DL (ref 32–36)
MCV RBC AUTO: 99 FL (ref 82–98)
MONOCYTES # BLD AUTO: 1.2 K/UL (ref 0.3–1)
MONOCYTES NFR BLD: 11.2 % (ref 4–15)
NEUTROPHILS # BLD AUTO: 9.4 K/UL (ref 1.8–7.7)
NEUTROPHILS NFR BLD: 86.8 % (ref 38–73)
NRBC BLD-RTO: 0 /100 WBC
PHOSPHATE SERPL-MCNC: 2.3 MG/DL (ref 2.7–4.5)
PLATELET # BLD AUTO: 453 K/UL (ref 150–450)
PMV BLD AUTO: 9.3 FL (ref 9.2–12.9)
POCT GLUCOSE: 110 MG/DL (ref 70–110)
POCT GLUCOSE: 111 MG/DL (ref 70–110)
POCT GLUCOSE: 121 MG/DL (ref 70–110)
POCT GLUCOSE: 131 MG/DL (ref 70–110)
POCT GLUCOSE: 190 MG/DL (ref 70–110)
POCT GLUCOSE: 246 MG/DL (ref 70–110)
POTASSIUM SERPL-SCNC: 4.5 MMOL/L (ref 3.5–5.1)
RBC # BLD AUTO: 2.63 M/UL (ref 4.6–6.2)
SODIUM SERPL-SCNC: 134 MMOL/L (ref 136–145)
TACROLIMUS BLD-MCNC: 7.3 NG/ML (ref 5–15)
WBC # BLD AUTO: 10.82 K/UL (ref 3.9–12.7)

## 2024-03-15 PROCEDURE — 25000003 PHARM REV CODE 250: Performed by: PHYSICIAN ASSISTANT

## 2024-03-15 PROCEDURE — 63600175 PHARM REV CODE 636 W HCPCS: Performed by: STUDENT IN AN ORGANIZED HEALTH CARE EDUCATION/TRAINING PROGRAM

## 2024-03-15 PROCEDURE — 83735 ASSAY OF MAGNESIUM: CPT | Performed by: PHYSICIAN ASSISTANT

## 2024-03-15 PROCEDURE — 99233 SBSQ HOSP IP/OBS HIGH 50: CPT | Mod: ,,, | Performed by: NURSE PRACTITIONER

## 2024-03-15 PROCEDURE — 80197 ASSAY OF TACROLIMUS: CPT | Performed by: PHYSICIAN ASSISTANT

## 2024-03-15 PROCEDURE — 0T9030Z DRAINAGE OF RIGHT KIDNEY WITH DRAINAGE DEVICE, PERCUTANEOUS APPROACH: ICD-10-PCS | Performed by: STUDENT IN AN ORGANIZED HEALTH CARE EDUCATION/TRAINING PROGRAM

## 2024-03-15 PROCEDURE — 25500020 PHARM REV CODE 255: Performed by: TRANSPLANT SURGERY

## 2024-03-15 PROCEDURE — 99223 1ST HOSP IP/OBS HIGH 75: CPT | Mod: ,,,

## 2024-03-15 PROCEDURE — 20600001 HC STEP DOWN PRIVATE ROOM

## 2024-03-15 PROCEDURE — 80069 RENAL FUNCTION PANEL: CPT | Performed by: PHYSICIAN ASSISTANT

## 2024-03-15 PROCEDURE — 85025 COMPLETE CBC W/AUTO DIFF WBC: CPT | Performed by: PHYSICIAN ASSISTANT

## 2024-03-15 PROCEDURE — 94761 N-INVAS EAR/PLS OXIMETRY MLT: CPT

## 2024-03-15 PROCEDURE — 99232 SBSQ HOSP IP/OBS MODERATE 35: CPT | Mod: ,,, | Performed by: NURSE PRACTITIONER

## 2024-03-15 PROCEDURE — 63600175 PHARM REV CODE 636 W HCPCS: Performed by: PHYSICIAN ASSISTANT

## 2024-03-15 PROCEDURE — 36415 COLL VENOUS BLD VENIPUNCTURE: CPT | Performed by: PHYSICIAN ASSISTANT

## 2024-03-15 PROCEDURE — 25000003 PHARM REV CODE 250: Performed by: STUDENT IN AN ORGANIZED HEALTH CARE EDUCATION/TRAINING PROGRAM

## 2024-03-15 PROCEDURE — 25000003 PHARM REV CODE 250: Performed by: NURSE PRACTITIONER

## 2024-03-15 RX ORDER — FENTANYL CITRATE 50 UG/ML
INJECTION, SOLUTION INTRAMUSCULAR; INTRAVENOUS
Status: COMPLETED | OUTPATIENT
Start: 2024-03-15 | End: 2024-03-15

## 2024-03-15 RX ORDER — MIDAZOLAM HYDROCHLORIDE 1 MG/ML
INJECTION INTRAMUSCULAR; INTRAVENOUS
Status: COMPLETED | OUTPATIENT
Start: 2024-03-15 | End: 2024-03-15

## 2024-03-15 RX ORDER — PREDNISONE 5 MG/1
5 TABLET ORAL DAILY
Status: DISCONTINUED | OUTPATIENT
Start: 2024-03-23 | End: 2024-03-20 | Stop reason: HOSPADM

## 2024-03-15 RX ORDER — PREDNISONE 10 MG/1
10 TABLET ORAL DAILY
Status: DISCONTINUED | OUTPATIENT
Start: 2024-03-16 | End: 2024-03-20 | Stop reason: HOSPADM

## 2024-03-15 RX ADMIN — DEXTROSE MONOHYDRATE 1 G: 2.5 INJECTION INTRAVENOUS at 05:03

## 2024-03-15 RX ADMIN — PANTOPRAZOLE SODIUM 40 MG: 40 TABLET, DELAYED RELEASE ORAL at 08:03

## 2024-03-15 RX ADMIN — DIBASIC SODIUM PHOSPHATE, MONOBASIC POTASSIUM PHOSPHATE AND MONOBASIC SODIUM PHOSPHATE 2 TABLET: 852; 155; 130 TABLET ORAL at 12:03

## 2024-03-15 RX ADMIN — TACROLIMUS 5 MG: 5 CAPSULE ORAL at 07:03

## 2024-03-15 RX ADMIN — IOHEXOL 30 ML: 300 INJECTION, SOLUTION INTRAVENOUS at 05:03

## 2024-03-15 RX ADMIN — MIDAZOLAM HYDROCHLORIDE 0.5 MG: 2 INJECTION, SOLUTION INTRAMUSCULAR; INTRAVENOUS at 05:03

## 2024-03-15 RX ADMIN — CARVEDILOL 6.25 MG: 6.25 TABLET, FILM COATED ORAL at 09:03

## 2024-03-15 RX ADMIN — VALGANCICLOVIR HYDROCHLORIDE 450 MG: 450 TABLET ORAL at 07:03

## 2024-03-15 RX ADMIN — SODIUM BICARBONATE 650 MG TABLET 1300 MG: at 08:03

## 2024-03-15 RX ADMIN — CARVEDILOL 6.25 MG: 6.25 TABLET, FILM COATED ORAL at 08:03

## 2024-03-15 RX ADMIN — SODIUM BICARBONATE 650 MG TABLET 1300 MG: at 09:03

## 2024-03-15 RX ADMIN — PREDNISONE 15 MG: 5 TABLET ORAL at 08:03

## 2024-03-15 RX ADMIN — CEFEPIME 1 G: 1 INJECTION, POWDER, FOR SOLUTION INTRAMUSCULAR; INTRAVENOUS at 08:03

## 2024-03-15 RX ADMIN — DIBASIC SODIUM PHOSPHATE, MONOBASIC POTASSIUM PHOSPHATE AND MONOBASIC SODIUM PHOSPHATE 2 TABLET: 852; 155; 130 TABLET ORAL at 09:03

## 2024-03-15 RX ADMIN — ASPIRIN 81 MG: 81 TABLET, COATED ORAL at 08:03

## 2024-03-15 RX ADMIN — TACROLIMUS 5 MG: 5 CAPSULE ORAL at 08:03

## 2024-03-15 RX ADMIN — CALCITRIOL 0.5 MCG: 0.5 CAPSULE, LIQUID FILLED ORAL at 08:03

## 2024-03-15 RX ADMIN — LEVOTHYROXINE SODIUM 50 MCG: 50 TABLET ORAL at 05:03

## 2024-03-15 RX ADMIN — FENTANYL CITRATE 50 MCG: 50 INJECTION, SOLUTION INTRAMUSCULAR; INTRAVENOUS at 05:03

## 2024-03-15 RX ADMIN — FENTANYL CITRATE 25 MCG: 50 INJECTION, SOLUTION INTRAMUSCULAR; INTRAVENOUS at 05:03

## 2024-03-15 RX ADMIN — CEFEPIME 1 G: 1 INJECTION, POWDER, FOR SOLUTION INTRAMUSCULAR; INTRAVENOUS at 09:03

## 2024-03-15 NOTE — ASSESSMENT & PLAN NOTE
- He presented to outpatient transplant clinic 3/13 with report of significant increase in drain output (~950) and increased leakage from incision. Lab work notable for leukocytosis.   - JEAN-PIERRE drain creatinine 29 (prev 2.8 on 3/10), significant for urine leak.   - Plan for CT a/p to assess collection and current drain placement. No plan for intervention at this time. Cefepime started on admit.   - Curry placed.   -  with Cr 2.8. HD 3/14/24 for clearance via R CW perm cath and tolerated well  - Infectious work-up unremarkable thus far, urine cx pending. Cont Cefepime for now.

## 2024-03-15 NOTE — SUBJECTIVE & OBJECTIVE
"Interval HPI:   Overnight events: Remains in TSU. BG well controlled on home insulin pump. Creatinine 1.6. Remains on Prednisone 15 mg. NPO for IR for neph tube placement. Diet NPO Except for: Medication, Sips with Medication    Eating:   NPO  Nausea: No  Hypoglycemia and intervention: No  Fever: No  TPN and/or TF: No  If yes, type of TF/TPN and rate: n/a    /60 (BP Location: Left arm, Patient Position: Sitting)   Pulse 84   Temp 98.7 °F (37.1 °C) (Oral)   Resp 20   Ht 5' 4" (1.626 m)   Wt 89.9 kg (198 lb 3.1 oz)   SpO2 99%   BMI 34.02 kg/m²     Labs Reviewed and Include    Recent Labs   Lab 03/15/24  0542   GLU 70   CALCIUM 9.2   ALBUMIN 3.1*   *   K 4.5   CO2 26      BUN 47*   CREATININE 1.6*     Lab Results   Component Value Date    WBC 10.82 03/15/2024    HGB 8.1 (L) 03/15/2024    HCT 26.0 (L) 03/15/2024    MCV 99 (H) 03/15/2024     (H) 03/15/2024     No results for input(s): "TSH", "FREET4" in the last 168 hours.  Lab Results   Component Value Date    HGBA1C 5.8 (H) 03/01/2024       Nutritional status:   Body mass index is 34.02 kg/m².  Lab Results   Component Value Date    ALBUMIN 3.1 (L) 03/15/2024    ALBUMIN 3.4 (L) 03/14/2024    ALBUMIN 3.4 (L) 03/13/2024     No results found for: "PREALBUMIN"    Estimated Creatinine Clearance: 46.5 mL/min (A) (based on SCr of 1.6 mg/dL (H)).    Accu-Checks  Recent Labs     03/13/24 2003 03/14/24  0330 03/14/24  0505 03/14/24  0834 03/14/24  1250 03/14/24  1825 03/14/24  1957 03/15/24  0216 03/15/24  0834 03/15/24  1244   POCTGLUCOSE 211* 63* 83 79 178* 166* 160* 190* 111* 131*       Current Medications and/or Treatments Impacting Glycemic Control  Immunotherapy:    Immunosuppressants           Stop Route Frequency     tacrolimus capsule 5 mg         -- Oral 2 times daily          Steroids:   Hormones (From admission, onward)      Start     Stop Route Frequency Ordered    03/23/24 0900  predniSONE tablet 5 mg        See Hyperspace for full " Linked Orders Report.    -- Oral Daily 03/15/24 0929    03/16/24 0900  predniSONE tablet 10 mg        See Hyperspace for full Linked Orders Report.    03/23/24 0859 Oral Daily 03/15/24 0929    03/13/24 1649  melatonin tablet 6 mg         -- Oral Nightly PRN 03/13/24 1649          Pressors:    Autonomic Drugs (From admission, onward)      None          Hyperglycemia/Diabetes Medications:   Antihyperglycemics (From admission, onward)      Start     Stop Route Frequency Ordered    03/13/24 2115  insulin aspart U-100 insulin pump from home 0-10 Units        Question Answer Comment   Target number 120    Basal Rate #1 1.2    Basal rate #1 time 1155-1157        -- SubQ Continuous 03/13/24 2012 03/13/24 2111  insulin aspart U-100 insulin pump from home 0-15 Units        Question Answer Comment   Target number 120    Carbohydrate coverage #1 1:5    Carbohydrate coverage #1 time 8452-7793    Sensitivity #1 1:25    Sensitivity #1 time 9411-4759        -- SubQ As needed (PRN) 03/13/24 2012

## 2024-03-15 NOTE — ASSESSMENT & PLAN NOTE
- Post-op DGF 2/2 DCD and long cold time. Last HD 3/8   - BUN elevated, w no AMS, denies n/v, 1+ BLE edema but lungs clear not requiring oxygen  - BUN up to 111 on 3/14. Pt tolerated HD 3/14 for clearance  - pt has outpt HD chair in place MWF

## 2024-03-15 NOTE — PLAN OF CARE
Pt tolerate hd cath placement rlq, dressing c/d/I, stopcock open serosanguineous fluid noted, report called to floor nurse, transfer to mpu, report given @ bedside

## 2024-03-15 NOTE — CARE UPDATE
1 hour post procedure monitoring complete at this time, right nephrostomy tube dressing remains dry and intact, scant amount of blood noted under the dressing, nephrostomy tube is unclamped and draining well, updated report given to primary floor nurse (BARBARA Vernon), patient transported back to his room upstairs.

## 2024-03-15 NOTE — CARE UPDATE
patient arrived to MPU 6 via stretcher in [facility], report received from BARBARA Sumner, see chart for vital signs and assessment, will continue to monitor patient until recovery complete.

## 2024-03-15 NOTE — ASSESSMENT & PLAN NOTE
Endocrinology consulted for BG management.   BG goal 140-180       - Continue Home Insulin Pump  - BG checks /HS/0200  - Hypoglycemia protocol in place     ** Please notify Endocrine for any change and/or advance in diet**  ** Please call Endocrine for any BG related issues **     Discharge Planning:   TBD. Please notify endocrinology prior to discharge.

## 2024-03-15 NOTE — ASSESSMENT & PLAN NOTE
Lab Results   Component Value Date    CREATININE 1.6 (H) 03/15/2024     Avoid insulin stacking  Titrate insulin slowly

## 2024-03-15 NOTE — PROGRESS NOTES
[facility] - Transplant Stepdown  Endocrinology  Progress Note    Admit Date: 3/13/2024     Reason for Consult: Management of T2DM, Hyperglycemia      Surgical Procedure and Date: kidney transplant 2/29/2024     Diabetes diagnosis year: 1999 per chart review      Home Diabetes Medications:  Tandem insulin pump   ICR 1:5   ISF 1:25  Basal rate 1.2 u/hr + control IQ      How often checking glucose at home? >4 x day   BG readings on regimen: 100-200s  Hypoglycemia on the regimen?  Yes, once a week overnight, 2-3x during the week during the day   Missed doses on regimen?  n/a     Diabetes Complications include:     Hyperglycemia, Hypoglycemia , Hypoglycemia unawareness, Diabetic nephropathy  , and Diabetic chronic kidney disease          Complicating diabetes co morbidities:   Glucocorticoid use        HPI:   Patient is a 64 y.o. male with a diagnosis of DBD Ktx on 2/29/24 for presumed diabetic nephropathy (donor RPR+, CMV D+, R-, CIT ~16.5hrs). PD catheter remains in place. 6 day cramer (removed without issue), surgery went well. He presented to outpatient transplant clinic 3/13 with report of significant increase in drain output (~950) and increased leakage from incision. Lab work notable for leukocytosis. JEAN-PIERRE drain creatinine 29 (prev 2.8 on 3/10), significant for urine leak. Plan for CT a/p to assess collection and current drain placement. Abx started. Cramer to be placed.  with Cr 2.8. Plan to admit for infectious work-up and urine leak. Endocrinology consulted for management of T2DM.          Interval HPI:   Overnight events: Remains in TSU. BG well controlled on home insulin pump. Creatinine 1.6. Remains on Prednisone 15 mg. NPO for IR for neph tube placement. Diet NPO Except for: Medication, Sips with Medication    Eating:   NPO  Nausea: No  Hypoglycemia and intervention: No  Fever: No  TPN and/or TF: No  If yes, type of TF/TPN and rate: n/a    /60 (BP Location: Left arm, Patient Position: Sitting)    "Pulse 84   Temp 98.7 °F (37.1 °C) (Oral)   Resp 20   Ht 5' 4" (1.626 m)   Wt 89.9 kg (198 lb 3.1 oz)   SpO2 99%   BMI 34.02 kg/m²     Labs Reviewed and Include    Recent Labs   Lab 03/15/24  0542   GLU 70   CALCIUM 9.2   ALBUMIN 3.1*   *   K 4.5   CO2 26      BUN 47*   CREATININE 1.6*     Lab Results   Component Value Date    WBC 10.82 03/15/2024    HGB 8.1 (L) 03/15/2024    HCT 26.0 (L) 03/15/2024    MCV 99 (H) 03/15/2024     (H) 03/15/2024     No results for input(s): "TSH", "FREET4" in the last 168 hours.  Lab Results   Component Value Date    HGBA1C 5.8 (H) 03/01/2024       Nutritional status:   Body mass index is 34.02 kg/m².  Lab Results   Component Value Date    ALBUMIN 3.1 (L) 03/15/2024    ALBUMIN 3.4 (L) 03/14/2024    ALBUMIN 3.4 (L) 03/13/2024     No results found for: "PREALBUMIN"    Estimated Creatinine Clearance: 46.5 mL/min (A) (based on SCr of 1.6 mg/dL (H)).    Accu-Checks  Recent Labs     03/13/24 2003 03/14/24  0330 03/14/24  0505 03/14/24  0834 03/14/24  1250 03/14/24  1825 03/14/24  1957 03/15/24  0216 03/15/24  0834 03/15/24  1244   POCTGLUCOSE 211* 63* 83 79 178* 166* 160* 190* 111* 131*       Current Medications and/or Treatments Impacting Glycemic Control  Immunotherapy:    Immunosuppressants           Stop Route Frequency     tacrolimus capsule 5 mg         -- Oral 2 times daily          Steroids:   Hormones (From admission, onward)      Start     Stop Route Frequency Ordered    03/23/24 0900  predniSONE tablet 5 mg        See Hyperspace for full Linked Orders Report.    -- Oral Daily 03/15/24 0929    03/16/24 0900  predniSONE tablet 10 mg        See Hyperspace for full Linked Orders Report.    03/23/24 0859 Oral Daily 03/15/24 0929    03/13/24 1649  melatonin tablet 6 mg         -- Oral Nightly PRN 03/13/24 1649          Pressors:    Autonomic Drugs (From admission, onward)      None          Hyperglycemia/Diabetes Medications:   Antihyperglycemics (From " admission, onward)      Start     Stop Route Frequency Ordered    03/13/24 2115  insulin aspart U-100 insulin pump from home 0-10 Units        Question Answer Comment   Target number 120    Basal Rate #1 1.2    Basal rate #1 time 9388-4226        -- SubQ Continuous 03/13/24 2012 03/13/24 2111  insulin aspart U-100 insulin pump from home 0-15 Units        Question Answer Comment   Target number 120    Carbohydrate coverage #1 1:5    Carbohydrate coverage #1 time 4819-5118    Sensitivity #1 1:25    Sensitivity #1 time [id number]        -- SubQ As needed (PRN) 03/13/24 2012            ASSESSMENT and PLAN    Renal/  * Urine leak from transplanted ureter  Managed per primary team  Optimize BG Control        NIHARIKA (acute kidney injury)  Lab Results   Component Value Date    CREATININE 1.6 (H) 03/15/2024     Avoid insulin stacking  Titrate insulin slowly       Endocrine  Insulin pump in place  At time of evaluation, pt meets criteria to continue home insulin pump usage.  - Has all adequate supplies   - Insulin pump site change on 3/12/24  - Bolus settings reviewed    - No changes to home regimen.   - Nurse to check BG qac/hs/0200 & record in epic   - Patient to input glucose into pump and use bolus wizard for prandial needs   - Will continue to monitor accuchecks and titrate insulin as clinically indicated .     - Discussed above plan with patient, patient verbalized understanding.   - Understands in case of pump malfunction or cognitive decline in which pt can no longer safely use insulin pump, will transition to SC MDI.     PLEASE CONTACT ENDOCRINE IF INSULIN PUMP FAILS           Type 2 diabetes mellitus with kidney complication, with long-term current use of insulin  Endocrinology consulted for BG management.   BG goal 140-180       - Continue Home Insulin Pump  - BG checks AC/HS/0200  - Hypoglycemia protocol in place     ** Please notify Endocrine for any change and/or advance in diet**  ** Please call Endocrine for  any BG related issues **     Discharge Planning:   TBD. Please notify endocrinology prior to discharge.              Nakia Vasquez NP  Endocrinology  [facility] - Transplant Stepdown

## 2024-03-15 NOTE — ASSESSMENT & PLAN NOTE
- infectious w/u- blood cultures w NGTD. UA unremarkable. Urine cx pending  - drain from OR sent for cx- wbc 92, segs 94  - started on Cefepime on admit- will cont given plan for perc neph tube today  - recently treated with PCN for donor positive RPR

## 2024-03-15 NOTE — H&P
Please see IR consult note dated 3/15/2024    Chanelle Rosales PA-C  Interventional Radiology  Spectra [phone]  3/15/2024

## 2024-03-15 NOTE — NURSING
Pt  AAOx4 independent admitted 3/13 from clinic dx w/ urine leak from transplanted ureter s/p kidney tx 2/29/24. Pt is on RA, afebrile, SBP in 110s-120s. Pt denies any pain.  Pt denies any N/V LBM: 3/15 Pt ate 50% of meal has insulin pump in place follow home insulin pump protocol per endocrine BG monitored ACHS + 0200 bedtime: 160, 0200: 190. Cramer in place and draining clear yellow urine-cramer care completed. Safety maintained, monitoring ongoing.      -HD completed this shift via R upper perm cath-No net fluid removal      -R JEAN-PIERRE-dressing changed this shift output ss dressing currently C/D/I      -IV ABX -Cefepime continued this shift

## 2024-03-15 NOTE — PROGRESS NOTES
[facility] - Transplant Stepdown  Kidney Transplant  Progress Note      Reason for Follow-up: Reassessment of Kidney Transplant - 2/29/2024  (#1) recipient and management of immunosuppression.     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 PD catheter remains in place. 6 day cramer (removed without issue), surgery went well. Post op:  PCN G #1 given 3/1 for +RPR donor cultures; needs weekly x 3 total per ID.   DGF: not clearing/low UOP; MWF chair, last 3/8/24  Drain remained in place at time of DC. Re-admitted 3/10 for increase in JEAN-PIERRE drain, drain Cr 2.8 (same as serum). US 3/10 with 2 fluid collections: SQ collection + deeper collection with drain terminating in the collection.     He presented to outpatient transplant clinic 3/13 with report of significant increase in drain output (~950) and increased leakage from incision. Lab work notable for leukocytosis. JEAN-PIERRE drain creatinine 29 (prev 2.8 on 3/10), significant for urine leak. Plan for CT a/p to assess collection and current drain placement. Abx started. Cramer to be placed.  with Cr 2.8. Plan to admit for infectious work-up and urine leak. Blood/urine cx pending. Assess need for HD daily. D/w Dr. Stokes.    Patient admitted w increased JEAN-PIERRE output w decrease in UOP.  Fluid sent from drain (placed 2/29/24 in OR), Cr 29.7, 92 WBC, 94 segs. CT A/P 3/13/24 reviewed w transplant surgeon, small sub incisional collection (3.3cm x 6.2cm x 9.2cm) and 2 peritoneal drainage catheters w medial catheter in fluid collection (3.5cm x 3.5 cm x 2.9 cm) favors hematoma verses seroma, no plan for intervention.  Last HD was 3/8/24.    Interval  History: no acute events overnight. Pt cont to have high output per JEAN-PIERRE with cramer in place. IR consulted for perc neph tube to divert urine and allow for healing. Pt is on ASA and ok to proceed w Neph tube per transplant surgeon. Cramer drained 2.3L clear yellow urine and JEAN-PIERRE w 850ml of serosang/clear drainage. RLQ incision and drain site CDI. Pt started on Cefepime on admit. Infectious w/u unremarkable thus far- blood cx w NGTD, UA neg for nitrite, 8 wbc, 100 RBC. Urine cx pending.  Cr up to 2.8, tolerated HD for clearance and improved. Right chest wall tunneled cath CDI. Pt denies increased pain. He is tolerating diet, denies n/v, and having bowel movements. He is otherwise independent in room. VSS. Monitor.      Facility-Administered Medications Prior to Admission   Medication    penicillin G benzathine (BICILLIN LA) injection 2.4 Million Units     PTA Medications   Medication Sig    aspirin (ECOTRIN) 81 MG EC tablet Take 81 mg by mouth once daily.    calcitRIOL (ROCALTROL) 0.5 MCG Cap Take 1 capsule (0.5 mcg total) by mouth once daily.    carvediloL (COREG) 6.25 MG tablet Take 1 tablet (6.25 mg total) by mouth 2 (two) times daily.    HUMALOG U-100 INSULIN 100 unit/mL injection Inject into the skin. FOR INSULIN PUMP (BASAL 1.2 UNITS/HR)    latanoprost 0.005 % ophthalmic solution Place 1 drop into the right eye every other day. At night    levothyroxine (SYNTHROID) 50 MCG tablet Take 1 tablet (50 mcg total) by mouth before breakfast.    multivitamin Tab Take 1 tablet by mouth once daily.    mycophenolate (CELLCEPT) 250 mg Cap Take 4 capsules (1,000 mg total) by mouth 2 (two) times daily.    ONETOUCH DELICA PLUS LANCET 30 gauge Misc     oxyCODONE (ROXICODONE) 10 mg Tab immediate release tablet Take 1 tablet (10 mg total) by mouth every 4 (four) hours as needed for Pain.    pantoprazole (PROTONIX) 40 MG tablet Take 1 tablet (40 mg total) by mouth once daily.    predniSONE (DELTASONE) 5 MG tablet Take by mouth daily:  20mg 3/2-3/8, 15mg 3/9-3/15, 10mg 3/16-3/22/2024, 5mg 3/23/2024-    sodium bicarbonate 650 MG tablet Take 2 tablets (1,300 mg total) by mouth 2 (two) times daily.    subcutaneous insulin pump Misc Inject 1 Units/hr into the skin continuous. Insulin pump name - Tandum  Basal dose is 2 units/hr    sulfamethoxazole-trimethoprim 400-80mg (BACTRIM,SEPTRA) 400-80 mg per tablet Take 1 tablet by mouth every Mon, Wed, Fri. Stop 8/27/24    tacrolimus (PROGRAF) 1 MG Cap Take 5 capsules (5 mg total) by mouth every 12 (twelve) hours. Z94.0;Kidney txp on 2/29/2024    valGANciclovir (VALCYTE) 450 mg Tab Take 1 tablet (450 mg total) by mouth every Mon, Wed, Fri. Stop 8/27/24       Review of patient's allergies indicates:   Allergen Reactions    Allopurinol analogues Swelling    Latex, natural rubber     Statins-hmg-coa reductase inhibitors Other (See Comments)     Muscle ache    Adhesive Rash       Past Medical History:   Diagnosis Date    Anemia     Cataract     Diabetes mellitus     Diabetes mellitus, type 2     Glaucoma     Gout, unspecified     HLD (hyperlipidemia)     Hypertension     Hypothyroidism, unspecified     Kidney failure     Renal disorder      Past Surgical History:   Procedure Laterality Date    COLONOSCOPY N/A 05/18/2022    Procedure: COLONOSCOPY;  Surgeon: Raul Dyer MD;  Location: [facility];  Service: Endoscopy;  Laterality: N/A;    DENTAL SURGERY      EYE SURGERY Bilateral     Cataract    INSERTION, CATHETER, DIALYSIS, PERITONEAL, LAPAROSCOPIC N/A 06/20/2023    Procedure: INSERTION, CATHETER, DIALYSIS, PERITONEAL, LAPAROSCOPIC;  Surgeon: Carlos Alberto Rodgers MD;  Location: [facility] OR;  Service: General;  Laterality: N/A;    KIDNEY TRANSPLANT N/A 2/29/2024    Procedure: TRANSPLANT, KIDNEY;  Surgeon: Pino Law Jr., MD;  Location: [address];  Service: Transplant;  Laterality: N/A;    KNEE SURGERY Right      Family History       Problem Relation (Age of Onset)    Cancer Sister    Diabetes  "Father, Brother    Heart disease Father    Hypertension Father    Kidney disease Brother          Tobacco Use    Smoking status: Never    Smokeless tobacco: Never   Substance and Sexual Activity    Alcohol use: Never    Drug use: Never    Sexual activity: Not Currently        Review of Systems   Constitutional:  Negative for chills and fever.   HENT:  Negative for facial swelling and trouble swallowing.    Eyes:  Negative for photophobia and redness.   Respiratory:  Negative for cough, shortness of breath, wheezing and stridor.    Cardiovascular:  Positive for leg swelling. Negative for chest pain and palpitations.   Gastrointestinal:  Negative for abdominal distention, abdominal pain, diarrhea, nausea and vomiting.   Genitourinary:  Negative for decreased urine volume.   Musculoskeletal:  Negative for neck pain and neck stiffness.   Allergic/Immunologic: Positive for immunocompromised state.   Neurological:  Negative for dizziness and light-headedness.   Psychiatric/Behavioral:  Negative for agitation, behavioral problems and confusion.      Objective:     Vital Signs (Most Recent):  Temp: 98.7 °F (37.1 °C) (03/15/24 0732)  Pulse: 89 (03/15/24 0732)  Resp: 18 (03/15/24 0732)  BP: 129/60 (03/15/24 0732)  SpO2: 100 % (03/15/24 0732)  Height: 5' 4" (162.6 cm)  Weight: 89.9 kg (198 lb 3.1 oz)  Body mass index is 34.02 kg/m².     Physical Exam  Vitals and nursing note reviewed.   Constitutional:       General: He is not in acute distress.  HENT:      Head: Normocephalic and atraumatic.   Eyes:      General: No scleral icterus.        Right eye: No discharge.         Left eye: No discharge.   Cardiovascular:      Rate and Rhythm: Normal rate and regular rhythm.      Heart sounds: No murmur heard.  Pulmonary:      Effort: Pulmonary effort is normal. No respiratory distress.      Breath sounds: No wheezing or rales.   Abdominal:      General: Bowel sounds are normal. There is no distension.      Palpations: Abdomen is " soft.      Tenderness: There is abdominal tenderness (incisional). There is no guarding.      Comments: PD catheter in place no s/s/I  R JEAN-PIERRE drain ss output    Musculoskeletal:      Right lower leg: Edema (trace) present.      Left lower leg: Edema (trace) present.   Skin:     General: Skin is warm and dry.      Capillary Refill: Capillary refill takes less than 2 seconds.      Coloration: Skin is not jaundiced.   Neurological:      General: No focal deficit present.      Mental Status: He is alert and oriented to person, place, and time.   Psychiatric:         Mood and Affect: Mood normal.         Behavior: Behavior normal.         Thought Content: Thought content normal.         Judgment: Judgment normal.          Laboratory  CBC:   Recent Labs   Lab 03/13/24  0940 03/14/24  0759 03/15/24  0542   WBC 16.62* 15.90* 10.82   RBC 2.71* 2.77* 2.63*   HGB 8.5* 8.8* 8.1*   HCT 26.3* 27.5* 26.0*   * 517* 453*   MCV 97 99* 99*   MCH 31.4* 31.8* 30.8   MCHC 32.3 32.0 31.2*       CMP:   Recent Labs   Lab 03/08/24  1958 03/10/24  0557 03/11/24  0855 03/13/24  0940 03/14/24 0759 03/15/24  0542   * 117*   < > 111* 59* 70   CALCIUM 9.5 9.6   < > 10.5 10.3 9.2   ALBUMIN 3.2* 3.3*   < > 3.4* 3.4* 3.1*   PROT 6.2 6.1  --   --   --   --     133*   < > 141 133* 134*   K 4.1 3.7   < > 4.8 4.8 4.5   CO2 17* 19*   < > 22* 19* 26    100   < > 110 104 100   BUN 31* 57*   < > 112* 111* 47*   CREATININE 2.5* 2.8*   < > 2.8* 2.8* 1.6*   ALKPHOS 58 69  --   --   --   --    ALT 11 17  --   --   --   --    AST 32 30  --   --   --   --     < > = values in this interval not displayed.         Diagnostic Results:  CT ordered  and reviewed w transplant surgeon      Assessment/Plan:     * Urine leak from transplanted ureter  - He presented to outpatient transplant clinic 3/13 with report of significant increase in drain output (~950) and increased leakage from incision. Lab work notable for leukocytosis.   - JEAN-PIERRE drain  "creatinine 29 (prev 2.8 on 3/10), significant for urine leak.   - Plan for CT a/p to assess collection and current drain placement. No plan for intervention at this time. Cefepime started on admit.   - Cramer placed.   -  with Cr 2.8. HD 3/14/24 for clearance via R CW perm cath and tolerated well  - Infectious work-up unremarkable thus far, urine cx pending. Cont Cefepime for now.      Acidosis  - Continue oral bicarb.      Leukocytosis  - infectious w/u- blood cultures w NGTD. UA unremarkable. Urine cx pending  - drain from OR sent for cx- wbc 92, segs 94  - started on Cefepime on admit- will cont given plan for perc neph tube today  - recently treated with PCN for donor positive RPR      Insulin pump in place  - pt using home insulin pump, Endocrinology following      Delayed graft function of kidney  - Post-op DGF 2/2 DCD and long cold time. Last HD 3/8   - BUN elevated, w no AMS, denies n/v, 1+ BLE edema but lungs clear not requiring oxygen  - BUN up to 111 on 3/14. 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 Will monitor for signs of toxic side effects, check daily tacrolimus troughs, and change meds accordingly.   - MMF held for leukocytosis   - Continue steroids.      Status post -donor kidney transplantation  - S/p DBD Ktx on 24 for presumed diabetic nephropathy (donor RPR+, CMV D+, R-, CIT ~16.5hrs).  - Patient being managed for DGF  - A/w increased drainage from incision and ouput from drain (~950cc)  - Initially, documentation suggest JEAN-PIERRE output decreased when cramer placed but pt emptying drain and RN not aware. JEAN-PIERRE drain output 850 past 24 hours. " "Decision made to proceed w perc neph tube today.  - See "urine leak."    Chronic viral hepatitis B without delta agent and without coma        Essential hypertension  - cont home meds      Type 2 diabetes mellitus with kidney complication, with long-term current use of insulin  - Endocrine consulted and following      NIHARIKA (acute kidney injury)  - see "DGF"          Discharge Planning:  Discussed plan of care.  No plan for discharge today.    Medical decision making for this encounter includes review of pertinent labs and diagnostic studies, assessment and planning, discussions with consulting providers, discussion with patient/family, and participation in multidisciplinary rounds. Time spent caring for patient: 90 minutes    Jenna Rene NP  Kidney Transplant  [facility] - [facility]    "

## 2024-03-15 NOTE — ASSESSMENT & PLAN NOTE
"- S/p DBD Ktx on 2/29/24 for presumed diabetic nephropathy (donor RPR+, CMV D+, R-, CIT ~16.5hrs).  - Patient being managed for DGF  - A/w increased drainage from incision and ouput from drain (~950cc)  - Initially, documentation suggest JEAN-PIERRE output decreased when cramer placed but pt emptying drain and RN not aware. JEAN-PIERRE drain output 850 past 24 hours. Decision made to proceed w perc neph tube today.  - See "urine leak."  "

## 2024-03-15 NOTE — SUBJECTIVE & OBJECTIVE
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 PD catheter remains in place. 6 day cramer (removed without issue), surgery went well. Post op:  PCN G #1 given 3/1 for +RPR donor cultures; needs weekly x 3 total per ID.   DGF: not clearing/low UOP; MWF chair, last 3/8/24  Drain remained in place at time of DC. Re-admitted 3/10 for increase in JEAN-PIERRE drain, drain Cr 2.8 (same as serum). US 3/10 with 2 fluid collections: SQ collection + deeper collection with drain terminating in the collection.     He presented to outpatient transplant clinic 3/13 with report of significant increase in drain output (~950) and increased leakage from incision. Lab work notable for leukocytosis. JEAN-PIERRE drain creatinine 29 (prev 2.8 on 3/10), significant for urine leak. Plan for CT a/p to assess collection and current drain placement. Abx started. Cramer to be placed.  with Cr 2.8. Plan to admit for infectious work-up and urine leak. Blood/urine cx pending. Assess need for HD daily. D/w Dr. Stokes.    Patient admitted w increased JEAN-PIERRE output w decrease in UOP.    Interval History: no acute events overnight. Cramer placed, draining clear yellow urine. Since placing cramer, patient no longer having leaking from JEAN-PIERRE site. RLQ incision CDI, no signs of erythema, or infection. Fluid sent from drain (placed 2/29/24 in OR), Cr 29.7, 92 WBC, 94 segs. Blood cx w NGTD, UA neg for nitrite, 8 wbc, 100 RBC. Urine cx pending.  Pt started on Cefepime on admit. Cr up to 2.8. Last HD 3/8/24. Plan for HD today for clearance per right chest wall perm cath. CT A/P reviewed w transplant surgeon, small sub incisional collection (3.3cm x 6.2cm x 9.2cm) and 2 peritoneal drainage catheters w medial catheter in fluid collection (3.5cm x 3.5 cm x 2.9 cm) favors hematoma verses seroma, no  plan for intervention. Pt denies increased pain. He is tolerating diet, denies n/v, and having bowel movements. He is otherwise independent in room. VSS. Monitor.    Facility-Administered Medications Prior to Admission   Medication    penicillin G benzathine (BICILLIN LA) injection 2.4 Million Units     PTA Medications   Medication Sig    aspirin (ECOTRIN) 81 MG EC tablet Take 81 mg by mouth once daily.    calcitRIOL (ROCALTROL) 0.5 MCG Cap Take 1 capsule (0.5 mcg total) by mouth once daily.    carvediloL (COREG) 6.25 MG tablet Take 1 tablet (6.25 mg total) by mouth 2 (two) times daily.    HUMALOG U-100 INSULIN 100 unit/mL injection Inject into the skin. FOR INSULIN PUMP (BASAL 1.2 UNITS/HR)    latanoprost 0.005 % ophthalmic solution Place 1 drop into the right eye every other day. At night    levothyroxine (SYNTHROID) 50 MCG tablet Take 1 tablet (50 mcg total) by mouth before breakfast.    multivitamin Tab Take 1 tablet by mouth once daily.    mycophenolate (CELLCEPT) 250 mg Cap Take 4 capsules (1,000 mg total) by mouth 2 (two) times daily.    ONETOUCH DELICA PLUS LANCET 30 gauge Misc     oxyCODONE (ROXICODONE) 10 mg Tab immediate release tablet Take 1 tablet (10 mg total) by mouth every 4 (four) hours as needed for Pain.    pantoprazole (PROTONIX) 40 MG tablet Take 1 tablet (40 mg total) by mouth once daily.    predniSONE (DELTASONE) 5 MG tablet Take by mouth daily: 20mg 3/2-3/8, 15mg 3/9-3/15, 10mg 3/16-3/22/2024, 5mg 3/23/2024-    sodium bicarbonate 650 MG tablet Take 2 tablets (1,300 mg total) by mouth 2 (two) times daily.    subcutaneous insulin pump Misc Inject 1 Units/hr into the skin continuous. Insulin pump name - Tandum  Basal dose is 2 units/hr    sulfamethoxazole-trimethoprim 400-80mg (BACTRIM,SEPTRA) 400-80 mg per tablet Take 1 tablet by mouth every Mon, Wed, Fri. Stop 8/27/24    tacrolimus (PROGRAF) 1 MG Cap Take 5 capsules (5 mg total) by mouth every 12 (twelve) hours. Z94.0;Kidney txp on 2/29/2024     valGANciclovir (VALCYTE) 450 mg Tab Take 1 tablet (450 mg total) by mouth every Mon, Wed, Fri. Stop 8/27/24       Review of patient's allergies indicates:   Allergen Reactions    Allopurinol analogues Swelling    Latex, natural rubber     Statins-hmg-coa reductase inhibitors Other (See Comments)     Muscle ache    Adhesive Rash       Past Medical History:   Diagnosis Date    Anemia     Cataract     Diabetes mellitus     Diabetes mellitus, type 2     Glaucoma     Gout, unspecified     HLD (hyperlipidemia)     Hypertension     Hypothyroidism, unspecified     Kidney failure     Renal disorder      Past Surgical History:   Procedure Laterality Date    COLONOSCOPY N/A 05/18/2022    Procedure: COLONOSCOPY;  Surgeon: Raul Dyer MD;  Location: [facility] ENDO;  Service: Endoscopy;  Laterality: N/A;    DENTAL SURGERY      EYE SURGERY Bilateral     Cataract    INSERTION, CATHETER, DIALYSIS, PERITONEAL, LAPAROSCOPIC N/A 06/20/2023    Procedure: INSERTION, CATHETER, DIALYSIS, PERITONEAL, LAPAROSCOPIC;  Surgeon: Carlos Alberto Rodgers MD;  Location: [facility] OR;  Service: General;  Laterality: N/A;    KIDNEY TRANSPLANT N/A 2/29/2024    Procedure: TRANSPLANT, KIDNEY;  Surgeon: Pino Law Jr., MD;  Location: [address];  Service: Transplant;  Laterality: N/A;    KNEE SURGERY Right      Family History       Problem Relation (Age of Onset)    Cancer Sister    Diabetes Father, Brother    Heart disease Father    Hypertension Father    Kidney disease Brother          Tobacco Use    Smoking status: Never    Smokeless tobacco: Never   Substance and Sexual Activity    Alcohol use: Never    Drug use: Never    Sexual activity: Not Currently        Review of Systems   Constitutional:  Negative for chills and fever.   HENT:  Negative for facial swelling and trouble swallowing.    Eyes:  Negative for photophobia and redness.   Respiratory:  Negative for cough, shortness of breath, wheezing and stridor.    Cardiovascular:   "Positive for leg swelling. Negative for chest pain and palpitations.   Gastrointestinal:  Negative for abdominal distention, abdominal pain, diarrhea, nausea and vomiting.   Genitourinary:  Negative for decreased urine volume.   Musculoskeletal:  Negative for neck pain and neck stiffness.   Allergic/Immunologic: Positive for immunocompromised state.   Neurological:  Negative for dizziness and light-headedness.   Psychiatric/Behavioral:  Negative for agitation, behavioral problems and confusion.      Objective:     Vital Signs (Most Recent):  Temp: 98.7 °F (37.1 °C) (03/15/24 0732)  Pulse: 89 (03/15/24 0732)  Resp: 18 (03/15/24 0732)  BP: 129/60 (03/15/24 0732)  SpO2: 100 % (03/15/24 0732)  Height: 5' 4" (162.6 cm)  Weight: 89.9 kg (198 lb 3.1 oz)  Body mass index is 34.02 kg/m².      Physical Exam  Vitals and nursing note reviewed.   Constitutional:       General: He is not in acute distress.  HENT:      Head: Normocephalic and atraumatic.   Eyes:      General: No scleral icterus.        Right eye: No discharge.         Left eye: No discharge.   Cardiovascular:      Rate and Rhythm: Normal rate and regular rhythm.      Heart sounds: No murmur heard.  Pulmonary:      Effort: Pulmonary effort is normal. No respiratory distress.      Breath sounds: No wheezing or rales.   Abdominal:      General: Bowel sounds are normal. There is no distension.      Palpations: Abdomen is soft.      Tenderness: There is abdominal tenderness (incisional). There is no guarding.      Comments: PD catheter in place no s/s/I  R JEAN-PIERRE drain ss output    Musculoskeletal:      Right lower leg: Edema (trace) present.      Left lower leg: Edema (trace) present.   Skin:     General: Skin is warm and dry.      Capillary Refill: Capillary refill takes less than 2 seconds.      Coloration: Skin is not jaundiced.   Neurological:      General: No focal deficit present.      Mental Status: He is alert and oriented to person, place, and time. "   Psychiatric:         Mood and Affect: Mood normal.         Behavior: Behavior normal.         Thought Content: Thought content normal.         Judgment: Judgment normal.          Laboratory  CBC:   Recent Labs   Lab 03/13/24  0940 03/14/24  0759 03/15/24  0542   WBC 16.62* 15.90* 10.82   RBC 2.71* 2.77* 2.63*   HGB 8.5* 8.8* 8.1*   HCT 26.3* 27.5* 26.0*   * 517* 453*   MCV 97 99* 99*   MCH 31.4* 31.8* 30.8   MCHC 32.3 32.0 31.2*       CMP:   Recent Labs   Lab 03/08/24  1958 03/10/24  0557 03/11/24  0855 03/13/24 0940 03/14/24  0759 03/15/24  0542   * 117*   < > 111* 59* 70   CALCIUM 9.5 9.6   < > 10.5 10.3 9.2   ALBUMIN 3.2* 3.3*   < > 3.4* 3.4* 3.1*   PROT 6.2 6.1  --   --   --   --     133*   < > 141 133* 134*   K 4.1 3.7   < > 4.8 4.8 4.5   CO2 17* 19*   < > 22* 19* 26    100   < > 110 104 100   BUN 31* 57*   < > 112* 111* 47*   CREATININE 2.5* 2.8*   < > 2.8* 2.8* 1.6*   ALKPHOS 58 69  --   --   --   --    ALT 11 17  --   --   --   --    AST 32 30  --   --   --   --     < > = values in this interval not displayed.         Diagnostic Results:  CT ordered  and reviewed w transplant surgeon

## 2024-03-15 NOTE — PROGRESS NOTES
met with patient to discuss request for home walker. Per [facility] patient received a home walker in 2021 and would need to pay out of pocket. Patient declined wanting to purchase a walker at this time, stating that he has one he can use at home.  reviewed with RN Coordinator, PharmD, and KALLI Darryl that patient is will need transplant education while inpatient.  asked KALLI to notify on call  if patient is ready to discharge this weekend.  notified on call  Joe CHATMAN-BACS, MPH of the above. Patients caregiver will be Father Julien ([phone]).       Katt Dunlap LMSW

## 2024-03-15 NOTE — PLAN OF CARE
Pt arrived to 189 for neph tube placement. Pt oriented to unit and staff, Pt safely transferred from stretcher to procedural table. Fall risk reviewed and comfort measures utilized with interventions. Safety strap applied, position pillows to minimize pressure points. Blankets applied. Pt prepped and draped utilizing standard sterile technique. Patient placed on continuous monitoring, as required by sedation policy. Timeouts implemented utilizing standard universal time-out per department and facility policy. RN to remain at bedside with continuous monitoring. Pt resting comfortably. Denies pain/discomfort. Will continue to monitor. See flow sheets for monitoring, medication administration, and updates. patient verbalizes understanding.

## 2024-03-15 NOTE — PROGRESS NOTES
03/14/24 2300   Vital Signs   Temp 98.6 °F (37 °C)   Temp Source Oral   Pulse 86   Heart Rate Source Monitor   Resp 18   SpO2 99 %   Device (Oxygen Therapy) room air   /60   MAP (mmHg) 84   BP Location Left arm   BP Method Automatic   Patient Position Lying     HD tx completed and pt tolerated well.  No net UF per order.  Pt is alert and stable. VSS

## 2024-03-15 NOTE — PROCEDURES
"  Pre Op Diagnosis: Ureteral leak  Post Op Diagnosis: Same    Procedure: Neph tube placement    Procedure performed by: Anne Marie    Written Informed Consent Obtained: Yes  Specimen Removed: NO  Estimated Blood Loss: Minimal    Findings:   Successful placement of 10 Fr Neph tube in RLQ renal allograft.    Patient tolerated procedure well.    Len Kenney MD ([facility])  Interventional Radiology  [phone]      "

## 2024-03-15 NOTE — PROGRESS NOTES
03/14/24 2030   Vital Signs   Temp 97.8 °F (36.6 °C)   Temp Source Oral   Pulse 81   Heart Rate Source Monitor   Resp 18   SpO2 100 %   Pulse Oximetry Type Continuous   Device (Oxygen Therapy) room air   /62   MAP (mmHg) 90   BP Location Left arm   BP Method Automatic   Patient Position Lying     Bedside HD1:1 tx initiated aseptically via RIJ TDC without issue.  Pt is alert and stable for tx.

## 2024-03-15 NOTE — DISCHARGE SUMMARY
[facility] - Transplant Stepdown  Kidney Transplant  Discharge Summary    Patient Name: Mark Lopez  MRN: [id number]  Admission Date: 3/10/2024  Hospital Length of Stay: 1 days  Discharge Date and Time:  3/10/2024 6:56 AM  Attending Physician: No att. providers found   Discharging Provider: DESIRAE Aceves  Primary Care Provider: Loren Mensah MD    HPI:   Mark Lopez is a 65 y/o male s/p DBD Ktx on 2/29/24 for presumed diabetic nephropathy (donor RPR+, CMV D+, R-, CIT ~16.5hrs). CIT ~16.5 hrs. PCN G #1 given 3/1 for +RPR donor cultures - needs weekly x 3 total per ID. Pt initially with DGF. Underwent HD on 3/2, with permcath placement 3/4/24. Increased UOP (2-4L/day). Cr slow to clear. Cr stable at 6.2. Cramer removed on POD 5 per voiding spontaneously. Patient was discharged with JEAN-PIERRE.  Patient seen in clinic with 250 cc per 24 ours out in drain. Patient being managed for DGF.  Patient reports on 3/9/24 increased JEAN-PIERRE out-put 260 ml's  & 1475 UOP  with 3000 Ml's oral intake over the past 24 hours.  Patient discussed with Dr Alexander will be direct admit for cramer placement, send JEAN-PIERRE for creatinine, Kidney U/S for possible urine leak.      * No surgery found *     Hospital Course:    Patient is voiding without difficulty and JEAN-PIERRE drain with 80 ml's serosanguinous drainage - JEAN-PIERRE drain sent for Creatinine 2.8 and serum Cr 2.8 - negative for urine leak. Kidney U/S Reviewed.  Noted tachycardia and elevation in Blood pressure - EKG NSR, patient started on coreg 6.25 BID.    Discharge instructions reviewed by pharmacist, nursing and transplant coordinator.  Patient verbalized understanding and are in agreement with discharge from hospital today.  Patient is medically stable for discharge. Patient will f/u with labs per transplant coordinator.           OF NOTE: Prograf level elevated decreased prograf .. But continue Keto for now - will adjust Prograf level out-pt and hopefully d/c keto and later date.         Goals of Care  Treatment Preferences:  Code Status: Full Code      Final Active Diagnoses:    Diagnosis Date Noted POA    PRINCIPAL PROBLEM:  Acute urinary retention [R33.8] 2024 Yes    Insulin pump in place [Z96.41] 03/10/2024 Not Applicable    Positive RPR test in cadaveric donor [Z00.5, A53.9] 2024 Not Applicable    Acute on chronic anemia [D64.9] 2024 Yes    Status post -donor kidney transplantation [Z94.0] 2024 Not Applicable    Prophylactic immunotherapy [Z29.89] 2024 Not Applicable    Long-term use of immunosuppressant medication [Z79.60] 2024 Not Applicable    At risk for opportunistic infections [Z91.89] 2024 Yes    Chronic viral hepatitis B without delta agent and without coma [B18.1] 10/30/2023 Yes     Chronic    Essential hypertension [I10] 2021 Yes    Type 2 diabetes mellitus with kidney complication, with long-term current use of insulin [E11.29, Z79.4] 2021 Not Applicable      Problems Resolved During this Admission:         Consults (From admission, onward)          Status Ordering Provider     Inpatient consult to Midline team  Once        Provider:  (Not yet assigned)    Completed GERRY MURPHY     Inpatient consult to Endocrinology  Once        Provider:  (Not yet assigned)    Completed CHELITA CASTILLO            Pending Diagnostic Studies:       None          Significant Diagnostic Studies: Labs: BMP:   Recent Labs   Lab 24  0940 24  0759   * 59*    133*   K 4.8 4.8    104   CO2 22* 19*   * 111*   CREATININE 2.8* 2.8*   CALCIUM 10.5 10.3   MG  --  2.6   , CMP   Recent Labs   Lab 24  0940 24  0759    133*   K 4.8 4.8    104   CO2 22* 19*   * 59*   * 111*   CREATININE 2.8* 2.8*   CALCIUM 10.5 10.3   ALBUMIN 3.4* 3.4*   ANIONGAP 9 10   , CBC   Recent Labs   Lab 24  0940 24  0759   WBC 16.62* 15.90*   HGB 8.5* 8.8*   HCT 26.3* 27.5*   * 517*   , and All  labs within the past 24 hours have been reviewed    Discharged Condition: stable    Disposition: Home or Self Care    Follow Up:    Patient Instructions:      Diet diabetic     No driving until:     Other restrictions (specify):     Lifting restrictions     Notify your health care provider if you experience any of the following:     Notify your health care provider if you experience any of the following:  increased confusion or weakness     Notify your health care provider if you experience any of the following:  persistent dizziness, light-headedness, or visual disturbances     Notify your health care provider if you experience any of the following:  severe persistent headache     Notify your health care provider if you experience any of the following:  worsening rash     Notify your health care provider if you experience any of the following:  difficulty breathing or increased cough     Notify your health care provider if you experience any of the following:  redness, tenderness, or signs of infection (pain, swelling, redness, odor or green/yellow discharge around incision site)     Notify your health care provider if you experience any of the following:  severe uncontrolled pain     Notify your health care provider if you experience any of the following:  persistent nausea and vomiting or diarrhea     Notify your health care provider if you experience any of the following:  temperature >100.4     Leave dressing on - Keep it clean, dry, and intact until clinic visit     Shower on day dressing removed (No bath)     Medications:  Reconciled Home Medications:      Medication List        START taking these medications      carvediloL 6.25 MG tablet  Commonly known as: COREG  Take 1 tablet (6.25 mg total) by mouth 2 (two) times daily.     sodium bicarbonate 650 MG tablet  Take 2 tablets (1,300 mg total) by mouth 2 (two) times daily.            CONTINUE taking these medications      aspirin 81 MG EC tablet  Commonly  known as: ECOTRIN  Take 81 mg by mouth once daily.     calcitRIOL 0.5 MCG Cap  Commonly known as: ROCALTROL  Take 1 capsule (0.5 mcg total) by mouth once daily.     HumaLOG U-100 Insulin 100 unit/mL injection  Generic drug: insulin lispro  Inject into the skin. FOR INSULIN PUMP (BASAL 1.2 UNITS/HR)     latanoprost 0.005 % ophthalmic solution  Place 1 drop into the right eye every other day. At night     levothyroxine 50 MCG tablet  Commonly known as: SYNTHROID  Take 1 tablet (50 mcg total) by mouth before breakfast.     multivitamin Tab  Take 1 tablet by mouth once daily.     mycophenolate 250 mg Cap  Commonly known as: CELLCEPT  Take 4 capsules (1,000 mg total) by mouth 2 (two) times daily.     ONETOUCH DELICA PLUS LANCET 30 gauge Misc  Generic drug: lancets     oxyCODONE 10 mg Tab immediate release tablet  Commonly known as: ROXICODONE  Take 1 tablet (10 mg total) by mouth every 4 (four) hours as needed for Pain.     pantoprazole 40 MG tablet  Commonly known as: PROTONIX  Take 1 tablet (40 mg total) by mouth once daily.     predniSONE 5 MG tablet  Commonly known as: DELTASONE  Take by mouth daily: 20mg 3/2-3/8, 15mg 3/9-3/15, 10mg 3/16-3/22/2024, 5mg 3/23/2024-     subcutaneous insulin pump Misc  Inject 1 Units/hr into the skin continuous. Insulin pump name - Tandum  Basal dose is 2 units/hr     sulfamethoxazole-trimethoprim 400-80mg 400-80 mg per tablet  Commonly known as: BACTRIM,SEPTRA  Take 1 tablet by mouth every Mon, Wed, Fri. Stop 8/27/24     valGANciclovir 450 mg Tab  Commonly known as: VALCYTE  Take 1 tablet (450 mg total) by mouth every Mon, Wed, Fri. Stop 8/27/24            STOP taking these medications      furosemide 40 MG tablet  Commonly known as: LASIX     sevelamer carbonate 800 mg Tab  Commonly known as: RENVELA     tacrolimus 1 MG Cap  Commonly known as: PROGRAF            Time spent caring for patient (Greater than 1/2 spent in direct face-to-face contact): < 30 minutes    Jenna Contreras  FNP  Kidney Transplant  [facility] - Transplant Stepdown

## 2024-03-15 NOTE — CONSULTS
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 male with a history of   Ktx on 2/29/24 for presumed diabetic nephropathy who was admitted on 3/13/24 for urine leak from transplanted kidney.   He had a CT AP done yesterday 3/14/2024 which revealed RLQ renal transplant with transplant ureteral stent and peritransplant fluid collections. JEAN-PIERRE drain creatinine 2.9 (previously 2.8 on 3/10), significant for urine leak. IR consulted for perc nephrostomy tube into transplanted kidney for maximal diversion. He is currently on ASA 81 mg po.     ROS:   Review of Systems   Constitutional: Negative.    HENT: Negative.     Respiratory: Negative.     Cardiovascular: Negative.    Gastrointestinal: Negative.    Musculoskeletal: Negative.    Skin: Negative.    Neurological: Negative.    Psychiatric/Behavioral: Negative.            Past Medical History:  Past Medical History:   Diagnosis Date    Anemia     Cataract     Diabetes mellitus     Diabetes mellitus, type 2     Glaucoma     Gout, unspecified     HLD (hyperlipidemia)     Hypertension     Hypothyroidism, unspecified     Kidney failure     Renal disorder        Past Surgical History:  Past Surgical History:   Procedure Laterality Date    COLONOSCOPY N/A 05/18/2022    Procedure: COLONOSCOPY;  Surgeon: Raul Dyer MD;  Location: [facility];  Service: Endoscopy;  Laterality: N/A;    DENTAL SURGERY      EYE SURGERY Bilateral     Cataract    INSERTION, CATHETER, DIALYSIS, PERITONEAL, LAPAROSCOPIC N/A 06/20/2023    Procedure: INSERTION, CATHETER, DIALYSIS, PERITONEAL, LAPAROSCOPIC;  Surgeon: Carlos Alberto VÁSQUEZ  MD Ana Maria;  Location: [facility];  Service: General;  Laterality: N/A;    KIDNEY TRANSPLANT N/A 2/29/2024    Procedure: TRANSPLANT, KIDNEY;  Surgeon: Pino Law Jr., MD;  Location: [facility] OR [address] [place] [address];  Service: Transplant;  Laterality: N/A;    KNEE SURGERY Right         Sedation History:    No known adverse reactions.     Social History:  Social History     Tobacco Use    Smoking status: Never    Smokeless tobacco: Never   Substance Use Topics    Alcohol use: Never    Drug use: Never        Home Medications:   Prior to Admission medications    Medication Sig Start Date End Date Taking? Authorizing Provider   aspirin (ECOTRIN) 81 MG EC tablet Take 81 mg by mouth once daily.    Provider, Historical   calcitRIOL (ROCALTROL) 0.5 MCG Cap Take 1 capsule (0.5 mcg total) by mouth once daily. 3/5/24   Pino Law Jr., MD   carvediloL (COREG) 6.25 MG tablet Take 1 tablet (6.25 mg total) by mouth 2 (two) times daily. 3/10/24   Hope Alexander DO   HUMALOG U-100 INSULIN 100 unit/mL injection Inject into the skin. FOR INSULIN PUMP (BASAL 1.2 UNITS/HR) 8/7/23   Provider, Historical   latanoprost 0.005 % ophthalmic solution Place 1 drop into the right eye every other day. At night 2/29/24   Pino Law Jr., MD   levothyroxine (SYNTHROID) 50 MCG tablet Take 1 tablet (50 mcg total) by mouth before breakfast. 2/29/24   Pino Law Jr., MD   multivitamin Tab Take 1 tablet by mouth once daily. 3/1/24   Pino Law Jr., MD   mycophenolate (CELLCEPT) 250 mg Cap Take 4 capsules (1,000 mg total) by mouth 2 (two) times daily. 2/29/24 2/28/25  Pino Law Jr., MD   ONETOUCH DELICA PLUS LANCET 30 gauge Misc  4/11/22   Provider, Historical   oxyCODONE (ROXICODONE) 10 mg Tab immediate release tablet Take 1 tablet (10 mg total) by mouth every 4 (four) hours as needed for Pain. 3/5/24   Urmila Leiva PA-C   pantoprazole (PROTONIX) 40 MG tablet Take 1 tablet (40 mg total) by mouth once daily. 3/9/24  4/8/24  Andree Velasquez DO   predniSONE (DELTASONE) 5 MG tablet Take by mouth daily: 20mg 3/2-3/8, 15mg 3/9-3/15, 10mg 3/16-3/22/2024, 5mg 3/23/2024- 3/2/24   Pino Law Jr., MD   sodium bicarbonate 650 MG tablet Take 2 tablets (1,300 mg total) by mouth 2 (two) times daily. 3/10/24   Hope Alexander DO   subcutaneous insulin pump Misc Inject 1 Units/hr into the skin continuous. Insulin pump name - Tandum  Basal dose is 2 units/hr 5/1/21 3/8/24  Dariel Wei MD   sulfamethoxazole-trimethoprim 400-80mg (BACTRIM,SEPTRA) 400-80 mg per tablet Take 1 tablet by mouth every Mon, Wed, Fri. Stop 8/27/24 3/6/24 8/27/24  Urmila Leiva PA-C   tacrolimus (PROGRAF) 1 MG Cap Take 5 capsules (5 mg total) by mouth every 12 (twelve) hours. Z94.0;Kidney txp on 2/29/2024 3/12/24 3/12/25  Hope Alexander DO   valGANciclovir (VALCYTE) 450 mg Tab Take 1 tablet (450 mg total) by mouth every Mon, Wed, Fri. Stop 8/27/24 3/6/24 8/27/24  Urmila Leiva PA-C       Inpatient Medications:    Current Facility-Administered Medications:     acetaminophen tablet 650 mg, 650 mg, Oral, Q8H PRN, Lilly La PA-C    aspirin EC tablet 81 mg, 81 mg, Oral, Daily, Lilly La PA-C, 81 mg at 03/15/24 0831    bisacodyL suppository 10 mg, 10 mg, Rectal, Daily PRN, Nj Solorio NP    calcitRIOL capsule 0.5 mcg, 0.5 mcg, Oral, Daily, Lilly La PA-C, 0.5 mcg at 03/15/24 0831    carvediloL tablet 6.25 mg, 6.25 mg, Oral, BID, Lilly La PA-C, 6.25 mg at 03/15/24 0831    ceFEPIme (MAXIPIME) 1 g in dextrose 5 % in water (D5W) 100 mL IVPB (MB+), 1 g, Intravenous, Q12H, Lilly La PA-C, Stopped at 03/15/24 0902    dextrose 10% bolus 125 mL 125 mL, 12.5 g, Intravenous, PRN, Demarco Leiva PA-C    dextrose 10% bolus 250 mL 250 mL, 25 g, Intravenous, PRN, Demarco Leiva PA-C    glucagon (human recombinant) injection 1 mg, 1 mg, Intramuscular, PRN, Abbie,  Demarco BAILEY PA-C    glucose chewable tablet 16 g, 16 g, Oral, PRN, Demarco Leiva PA-C, 16 g at 03/14/24 0347    glucose chewable tablet 24 g, 24 g, Oral, PRN, Demarco Leiva PA-C    insulin aspart U-100 insulin pump from home 0-10 Units, 0-10 Units, Subcutaneous, Continuous, Demarco Leiva PA-C, 10 Units at 03/13/24 2115    insulin aspart U-100 insulin pump from home 0-15 Units, 0-15 Units, Subcutaneous, PRN, Demarco Leiva PA-C    levothyroxine tablet 50 mcg, 50 mcg, Oral, Before breakfast, Lilly La PA-C, 50 mcg at 03/15/24 0500    LIDOcaine HCl 2% urojet, , Topical (Top), PRN, Lakeisha Burr PA-RICKY, 10 mL at 03/13/24 2133    melatonin tablet 6 mg, 6 mg, Oral, Nightly PRN, Lilly La PA-C    ondansetron disintegrating tablet 8 mg, 8 mg, Oral, Q8H PRN, Lilly La PA-C    oxyCODONE immediate release tablet Tab 10 mg, 10 mg, Oral, Q4H PRN, Lilly La PA-RICKY, 10 mg at 03/13/24 2016    pantoprazole EC tablet 40 mg, 40 mg, Oral, Daily, Lilly La PA-C, 40 mg at 03/15/24 0831    predniSONE tablet 15 mg, 15 mg, Oral, Daily, Lilly La PA-RICKY, 15 mg at 03/15/24 0831    sodium bicarbonate tablet 1,300 mg, 1,300 mg, Oral, BID, Lilly La PA-RICKY, 1,300 mg at 03/15/24 0831    sodium chloride 0.9% flush 10 mL, 10 mL, Intravenous, PRN, Lilly La PA-C    tacrolimus capsule 5 mg, 5 mg, Oral, BID, Lilly La PA-RICKY, 5 mg at 03/15/24 0831    valGANciclovir tablet 450 mg, 450 mg, Oral, Every Mon, Wed, Fri, Lilly La PA-C     Anticoagulants/Antiplatelets:   Asa 81 mg po     Allergies:   Review of patient's allergies indicates:   Allergen Reactions    Allopurinol analogues Swelling    Latex, natural rubber     Statins-hmg-coa reductase inhibitors Other (See Comments)     Muscle ache    Adhesive Rash       Vital Signs:  Temp: 98.7 °F (37.1 °C) (03/15/24 0732)  Pulse: 89 (03/15/24  0732)  Resp: 18 (03/15/24 0732)  BP: 129/60 (03/15/24 0732)  SpO2: 100 % (03/15/24 0732)    Temp:  [97.7 °F (36.5 °C)-98.7 °F (37.1 °C)]   Pulse:  []   Resp:  [18]   BP: (105-133)/(55-64)   SpO2:  [97 %-100 %]      Physical Exam:   Physical Exam  Constitutional:       General: He is not in acute distress.  HENT:      Head: Normocephalic.   Cardiovascular:      Rate and Rhythm: Normal rate and regular rhythm.   Pulmonary:      Effort: Pulmonary effort is normal.   Abdominal:      General: Abdomen is flat.   Neurological:      Mental Status: He is alert and oriented to person, place, and time. Mental status is at baseline.   Psychiatric:         Mood and Affect: Mood normal.         Behavior: Behavior normal.           Sedation Exam:  ASA: III - Patient appears to have severe systemic disease not posing a constant threat to life  Mallampati score: II (hard and soft palate, upper portion of tonsils anduvula visible)    Laboratory:  Lab Results   Component Value Date    INR 1.0 02/28/2024       Lab Results   Component Value Date    WBC 10.82 03/15/2024    HGB 8.1 (L) 03/15/2024    HCT 26.0 (L) 03/15/2024    MCV 99 (H) 03/15/2024     (H) 03/15/2024      Lab Results   Component Value Date    GLU 70 03/15/2024     (L) 03/15/2024    K 4.5 03/15/2024     03/15/2024    CO2 26 03/15/2024    BUN 47 (H) 03/15/2024    CREATININE 1.6 (H) 03/15/2024    CALCIUM 9.2 03/15/2024    MG 2.3 03/15/2024    ALT 17 03/10/2024    AST 30 03/10/2024    ALBUMIN 3.1 (L) 03/15/2024    BILITOT 0.4 03/10/2024    BILIDIR 0.1 09/13/2023           ASSESSMENT/PLAN/RECOMMENDATIONS:   Mark Lopez is a 64 y.o. male with a history of Ktx on 2/29/24 who has been referred to IR for neph tube placement into transplanted kidney. He is currently on ASA 81 mg po w/ last dose this morning. SIR guidelines recommend holding ASA for several days prior to new nephrostomy tube placement due to higher risk of bleeding while on AC. This was  discussed with the transplant team who wish to proceed with neph tube placement today while pt is on ASA as benefits outweigh risks. The increased risk of bleeding was also d/w the patient at bedside.     Plan:  Sedation Plan: up to moderate  Patient will undergo: percutaneous nephrostomy tube placement into RLQ transplanted kidney 3/15/2024  Patient is at higher risk of bleeding from the procedure due to ASA 81 mg po use. This was discussed with both the patient and transplant team. Transplant team would like to proceed with neph tube placement today as benefits outweigh risks.     Chanelle Rosales PA-C  Interventional Radiology  Spectra: [phone]  3/15/2024

## 2024-03-16 PROBLEM — R33.8 ACUTE URINARY RETENTION: Status: RESOLVED | Noted: 2024-03-09 | Resolved: 2024-03-16

## 2024-03-16 PROBLEM — S02.31XA CLOSED FRACTURE OF RIGHT ORBITAL FLOOR: Status: RESOLVED | Noted: 2021-04-26 | Resolved: 2024-03-16

## 2024-03-16 PROBLEM — A53.9 SYPHILIS: Status: ACTIVE | Noted: 2024-03-16

## 2024-03-16 LAB
ABO + RH BLD: NORMAL
ALBUMIN SERPL BCP-MCNC: 2.5 G/DL (ref 3.5–5.2)
ANION GAP SERPL CALC-SCNC: 8 MMOL/L (ref 8–16)
BASOPHILS # BLD AUTO: 0.01 K/UL (ref 0–0.2)
BASOPHILS NFR BLD: 0.2 % (ref 0–1.9)
BLD GP AB SCN CELLS X3 SERPL QL: NORMAL
BLD PROD TYP BPU: NORMAL
BLOOD UNIT EXPIRATION DATE: NORMAL
BLOOD UNIT TYPE CODE: 6200
BLOOD UNIT TYPE: NORMAL
BUN SERPL-MCNC: 53 MG/DL (ref 8–23)
CALCIUM SERPL-MCNC: 8.9 MG/DL (ref 8.7–10.5)
CHLORIDE SERPL-SCNC: 102 MMOL/L (ref 95–110)
CO2 SERPL-SCNC: 26 MMOL/L (ref 23–29)
CODING SYSTEM: NORMAL
CREAT SERPL-MCNC: 1.7 MG/DL (ref 0.5–1.4)
CROSSMATCH INTERPRETATION: NORMAL
DIFFERENTIAL METHOD BLD: ABNORMAL
DISPENSE STATUS: NORMAL
EOSINOPHIL # BLD AUTO: 0.1 K/UL (ref 0–0.5)
EOSINOPHIL NFR BLD: 0.8 % (ref 0–8)
ERYTHROCYTE [DISTWIDTH] IN BLOOD BY AUTOMATED COUNT: 15.7 % (ref 11.5–14.5)
EST. GFR  (NO RACE VARIABLE): 44.2 ML/MIN/1.73 M^2
GLUCOSE SERPL-MCNC: 147 MG/DL (ref 70–110)
HCT VFR BLD AUTO: 21.3 % (ref 40–54)
HGB BLD-MCNC: 6.8 G/DL (ref 14–18)
IMM GRANULOCYTES # BLD AUTO: 0.05 K/UL (ref 0–0.04)
IMM GRANULOCYTES NFR BLD AUTO: 0.8 % (ref 0–0.5)
LYMPHOCYTES # BLD AUTO: 0.1 K/UL (ref 1–4.8)
LYMPHOCYTES NFR BLD: 0.8 % (ref 18–48)
MAGNESIUM SERPL-MCNC: 2.2 MG/DL (ref 1.6–2.6)
MCH RBC QN AUTO: 31.1 PG (ref 27–31)
MCHC RBC AUTO-ENTMCNC: 31.9 G/DL (ref 32–36)
MCV RBC AUTO: 97 FL (ref 82–98)
MONOCYTES # BLD AUTO: 0.8 K/UL (ref 0.3–1)
MONOCYTES NFR BLD: 12.9 % (ref 4–15)
NEUTROPHILS # BLD AUTO: 5.2 K/UL (ref 1.8–7.7)
NEUTROPHILS NFR BLD: 84.5 % (ref 38–73)
NRBC BLD-RTO: 0 /100 WBC
NUM UNITS TRANS PACKED RBC: NORMAL
PHOSPHATE SERPL-MCNC: 3.4 MG/DL (ref 2.7–4.5)
PLATELET # BLD AUTO: 342 K/UL (ref 150–450)
PMV BLD AUTO: 9.5 FL (ref 9.2–12.9)
POCT GLUCOSE: 123 MG/DL (ref 70–110)
POCT GLUCOSE: 139 MG/DL (ref 70–110)
POCT GLUCOSE: 187 MG/DL (ref 70–110)
POCT GLUCOSE: 211 MG/DL (ref 70–110)
POCT GLUCOSE: 262 MG/DL (ref 70–110)
POCT GLUCOSE: 278 MG/DL (ref 70–110)
POTASSIUM SERPL-SCNC: 3.9 MMOL/L (ref 3.5–5.1)
RBC # BLD AUTO: 2.19 M/UL (ref 4.6–6.2)
SODIUM SERPL-SCNC: 136 MMOL/L (ref 136–145)
SPECIMEN OUTDATE: NORMAL
TACROLIMUS BLD-MCNC: 5.5 NG/ML (ref 5–15)
WBC # BLD AUTO: 6.18 K/UL (ref 3.9–12.7)

## 2024-03-16 PROCEDURE — 36415 COLL VENOUS BLD VENIPUNCTURE: CPT | Mod: XB | Performed by: PHYSICIAN ASSISTANT

## 2024-03-16 PROCEDURE — 36415 COLL VENOUS BLD VENIPUNCTURE: CPT | Performed by: PHYSICIAN ASSISTANT

## 2024-03-16 PROCEDURE — 25000003 PHARM REV CODE 250: Performed by: INTERNAL MEDICINE

## 2024-03-16 PROCEDURE — 99233 SBSQ HOSP IP/OBS HIGH 50: CPT | Mod: 24,,, | Performed by: PHYSICIAN ASSISTANT

## 2024-03-16 PROCEDURE — 85025 COMPLETE CBC W/AUTO DIFF WBC: CPT | Performed by: PHYSICIAN ASSISTANT

## 2024-03-16 PROCEDURE — 25000003 PHARM REV CODE 250: Performed by: PHYSICIAN ASSISTANT

## 2024-03-16 PROCEDURE — 36430 TRANSFUSION BLD/BLD COMPNT: CPT

## 2024-03-16 PROCEDURE — 86920 COMPATIBILITY TEST SPIN: CPT | Performed by: PHYSICIAN ASSISTANT

## 2024-03-16 PROCEDURE — 86901 BLOOD TYPING SEROLOGIC RH(D): CPT | Performed by: PHYSICIAN ASSISTANT

## 2024-03-16 PROCEDURE — 63600175 PHARM REV CODE 636 W HCPCS: Performed by: NURSE PRACTITIONER

## 2024-03-16 PROCEDURE — 25000003 PHARM REV CODE 250: Performed by: TRANSPLANT SURGERY

## 2024-03-16 PROCEDURE — 94761 N-INVAS EAR/PLS OXIMETRY MLT: CPT

## 2024-03-16 PROCEDURE — 80197 ASSAY OF TACROLIMUS: CPT | Performed by: PHYSICIAN ASSISTANT

## 2024-03-16 PROCEDURE — 99232 SBSQ HOSP IP/OBS MODERATE 35: CPT | Mod: ,,, | Performed by: NURSE PRACTITIONER

## 2024-03-16 PROCEDURE — P9016 RBC LEUKOCYTES REDUCED: HCPCS | Performed by: PHYSICIAN ASSISTANT

## 2024-03-16 PROCEDURE — 83735 ASSAY OF MAGNESIUM: CPT | Performed by: PHYSICIAN ASSISTANT

## 2024-03-16 PROCEDURE — 30233N1 TRANSFUSION OF NONAUTOLOGOUS RED BLOOD CELLS INTO PERIPHERAL VEIN, PERCUTANEOUS APPROACH: ICD-10-PCS | Performed by: SURGERY

## 2024-03-16 PROCEDURE — 80069 RENAL FUNCTION PANEL: CPT | Performed by: PHYSICIAN ASSISTANT

## 2024-03-16 PROCEDURE — 20600001 HC STEP DOWN PRIVATE ROOM

## 2024-03-16 PROCEDURE — 63600175 PHARM REV CODE 636 W HCPCS: Performed by: PHYSICIAN ASSISTANT

## 2024-03-16 PROCEDURE — 63600175 PHARM REV CODE 636 W HCPCS: Performed by: INTERNAL MEDICINE

## 2024-03-16 RX ORDER — MUPIROCIN 20 MG/G
OINTMENT TOPICAL 2 TIMES DAILY
Status: DISCONTINUED | OUTPATIENT
Start: 2024-03-16 | End: 2024-03-20 | Stop reason: HOSPADM

## 2024-03-16 RX ORDER — OXYCODONE HYDROCHLORIDE 5 MG/1
5 TABLET ORAL EVERY 4 HOURS PRN
Status: DISCONTINUED | OUTPATIENT
Start: 2024-03-16 | End: 2024-03-20 | Stop reason: HOSPADM

## 2024-03-16 RX ORDER — TACROLIMUS 1 MG/1
1 CAPSULE ORAL ONCE
Status: COMPLETED | OUTPATIENT
Start: 2024-03-16 | End: 2024-03-16

## 2024-03-16 RX ORDER — SULFAMETHOXAZOLE AND TRIMETHOPRIM 400; 80 MG/1; MG/1
1 TABLET ORAL DAILY
Status: DISCONTINUED | OUTPATIENT
Start: 2024-03-16 | End: 2024-03-20 | Stop reason: HOSPADM

## 2024-03-16 RX ORDER — SODIUM BICARBONATE 650 MG/1
650 TABLET ORAL 2 TIMES DAILY
Status: DISCONTINUED | OUTPATIENT
Start: 2024-03-16 | End: 2024-03-20 | Stop reason: HOSPADM

## 2024-03-16 RX ORDER — VALGANCICLOVIR 450 MG/1
450 TABLET, FILM COATED ORAL DAILY
Status: DISCONTINUED | OUTPATIENT
Start: 2024-03-17 | End: 2024-03-17

## 2024-03-16 RX ORDER — HYDROCODONE BITARTRATE AND ACETAMINOPHEN 500; 5 MG/1; MG/1
TABLET ORAL
Status: DISCONTINUED | OUTPATIENT
Start: 2024-03-16 | End: 2024-03-18

## 2024-03-16 RX ADMIN — CEFEPIME 1 G: 1 INJECTION, POWDER, FOR SOLUTION INTRAMUSCULAR; INTRAVENOUS at 08:03

## 2024-03-16 RX ADMIN — SODIUM BICARBONATE 650 MG TABLET 1300 MG: at 08:03

## 2024-03-16 RX ADMIN — OXYCODONE HYDROCHLORIDE 10 MG: 10 TABLET ORAL at 01:03

## 2024-03-16 RX ADMIN — PREDNISONE 10 MG: 10 TABLET ORAL at 08:03

## 2024-03-16 RX ADMIN — CARVEDILOL 6.25 MG: 6.25 TABLET, FILM COATED ORAL at 08:03

## 2024-03-16 RX ADMIN — PANTOPRAZOLE SODIUM 40 MG: 40 TABLET, DELAYED RELEASE ORAL at 08:03

## 2024-03-16 RX ADMIN — CALCITRIOL 0.5 MCG: 0.5 CAPSULE, LIQUID FILLED ORAL at 08:03

## 2024-03-16 RX ADMIN — OXYCODONE 5 MG: 5 TABLET ORAL at 06:03

## 2024-03-16 RX ADMIN — SODIUM BICARBONATE 650 MG TABLET 650 MG: at 08:03

## 2024-03-16 RX ADMIN — TACROLIMUS 5 MG: 5 CAPSULE ORAL at 08:03

## 2024-03-16 RX ADMIN — LEVOTHYROXINE SODIUM 50 MCG: 50 TABLET ORAL at 06:03

## 2024-03-16 RX ADMIN — MUPIROCIN: 20 OINTMENT TOPICAL at 08:03

## 2024-03-16 RX ADMIN — SULFAMETHOXAZOLE AND TRIMETHOPRIM 1 TABLET: 400; 80 TABLET ORAL at 01:03

## 2024-03-16 RX ADMIN — ASPIRIN 81 MG: 81 TABLET, COATED ORAL at 08:03

## 2024-03-16 RX ADMIN — TACROLIMUS 6 MG: 5 CAPSULE ORAL at 05:03

## 2024-03-16 RX ADMIN — TACROLIMUS 1 MG: 1 CAPSULE ORAL at 01:03

## 2024-03-16 NOTE — PLAN OF CARE
Patient AAOx4. VSS on RA. Afebrile. Accucheck orders maintained, coverage given via pt own pump. Pt c/o pain, controlled by PRN medications. RLQ kd inc renny w/ staples- free from signs of infection. R neph tube in place- punch colored output noted. RLQ JEAN-PIERRE drain in place- serous output. Curry in place- punch colored (team aware)- CAUTI precautions maintained. No BM reported this shift- see I&O for details. Up independently. Bed locked and lowered, call bell in reach, nonskid socks on when OOB. Reviewed plan of care and fall precautions w/ pt- pt verbalized understanding. Reminded to call for assistance. Standard precautions maintained.

## 2024-03-16 NOTE — PLAN OF CARE
Patient is AAOx4. OOB independently. VSS. Kidney US completed. H/H 6.8/21.3, 1 unit RBC administered. Incision RLQ CDI. JEAN-PIERRE drain healing, site dressed with gauze, dressing CDI. Neph tube intact, healing, CDI. PD cath site, CDI. Curry inplace, CDI. Bed lowest setting, locked, 2x rails up, call light within reach.

## 2024-03-16 NOTE — PROGRESS NOTES
[facility] - Transplant Stepdown  Kidney Transplant  Progress Note      Reason for Follow-up: Reassessment of Kidney Transplant - 2/29/2024  (#1) recipient and management of immunosuppression.    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 PD catheter remains in place. 6 day cramer (removed without issue), surgery went well. Post op:  PCN G #1 given 3/1 for +RPR donor cultures; needs weekly x 3 total per ID.   DGF: not clearing/low UOP; MWF chair, last 3/8/24  Drain remained in place at time of DC. Re-admitted 3/10 for increase in JEAN-PIERRE drain, drain Cr 2.8 (same as serum). US 3/10 with 2 fluid collections: SQ collection + deeper collection with drain terminating in the collection.     He presented to outpatient transplant clinic 3/13 with report of significant increase in drain output (~950) and increased leakage from incision. Lab work notable for leukocytosis. JEAN-PIERRE drain creatinine 29 (prev 2.8 on 3/10), significant for urine leak. Plan for CT a/p to assess collection and current drain placement. Abx started. Cramer to be placed.  with Cr 2.8. Plan to admit for infectious work-up and urine leak. Blood/urine cx pending. Assess need for HD daily. D/w Dr. Stokes.    Mr. Lopez is a 64 y.o. year old male who is status post Kidney Transplant - 2/29/2024  (#1).  His maintenance immunosuppression consists of:   Immunosuppressants (From admission, onward)      Start     Stop Route Frequency Ordered    03/16/24 1800  tacrolimus capsule 6 mg         -- Oral 2 times daily 03/16/24 1247            Hospital Course:  Patient admitted w increased JEAN-PIERRE output w decrease in UOP.  Fluid sent from drain (placed 2/29/24 in  OR), Cr 29.7, 92 WBC, 94 segs. CT A/P 3/13/24 reviewed w transplant surgeon, small sub incisional collection (3.3cm x 6.2cm x 9.2cm) and 2 peritoneal drainage catheters w medial catheter in fluid collection (3.5cm x 3.5 cm x 2.9 cm) favors hematoma verses seroma, no plan for intervention.  Last HD was 3/8/24.  Cramer placed on admit 3/13. Dialyzed inpatient on 3/14 for BUN >100, tolerated well. Pt cont to have high output per JEAN-PIERRE with cramer in place. IR consulted for perc neph tube to divert urine and allow for healing.     Interval History: NAEON. R nephrostomy tube placed yesterday 3/15 by IR. Most urine draining to neph tube. Cramer remains in place as does JEAN-PIERRE drain. JEAN-PIERRE with 785 ml past 24 hours (including prior to neph tube). Hgb 6.8 this AM from 8.1 yesterday. Kidney US 3/16 today notable for persistent elevated Ris and slight increase in size of sub incisional collection. Reviewed by surgeon. Vitals remain stable. Not concerned for acute bleed at this time. Transfuse 1 unit pRBC. Recheck CBC tomorrow. CR 1.7 from 1.6. Encourage oral hydration (2-3L/day). Patient is tolerating a diet, having Bms, denies n/v, and ambulating. WBC trending down. Remains on Cefepime. Afebrile. WCTM.          Past Medical, Surgical, Family, and Social History:   Unchanged from H&P.    Scheduled Meds:   aspirin  81 mg Oral Daily    calcitRIOL  0.5 mcg Oral Daily    carvediloL  6.25 mg Oral BID    ceFEPime IV (PEDS and ADULTS)  1 g Intravenous Q12H    levothyroxine  50 mcg Oral Before breakfast    mupirocin   Nasal BID    pantoprazole  40 mg Oral Daily    predniSONE  10 mg Oral Daily    Followed by    [START ON 3/23/2024] predniSONE  5 mg Oral Daily    sodium bicarbonate  650 mg Oral BID    sulfamethoxazole-trimethoprim 400-80mg  1 tablet Oral Daily    tacrolimus  6 mg Oral BID    [START ON 3/17/2024] valGANciclovir  450 mg Oral Daily     Continuous Infusions:   insulin aspart U-100       PRN Meds:0.9%  NaCl infusion (for blood  administration), acetaminophen, bisacodyL, dextrose 10%, dextrose 10%, glucagon (human recombinant), glucose, glucose, insulin aspart U-100, LIDOcaine HCl 2%, melatonin, ondansetron, oxyCODONE, sodium chloride 0.9%    Intake/Output - Last 3 Shifts         03/14 0700  03/15 0659 03/15 0700  03/16 0659 03/16 0700  03/17 0659    P.O. 2922 460 400    Blood   350    Other 600.1      IV Piggyback 200      Total Intake(mL/kg) 3722.1 (41.4) 460 (5.1) 750 (8.3)    Urine (mL/kg/hr) 2350 (1.1) 2675 (1.2) 325 (0.4)    Emesis/NG output  0     Drains 850 785 50    Other 600      Stool 0 0 0    Total Output 3800 3460 375    Net -77.9 -3000 +375           Urine Occurrence  0 x     Stool Occurrence 1 x 0 x 1 x    Emesis Occurrence  0 x              Review of Systems   Constitutional:  Negative for chills and fever.   HENT:  Negative for facial swelling and trouble swallowing.    Eyes:  Negative for photophobia and redness.   Respiratory:  Negative for cough, shortness of breath, wheezing and stridor.    Cardiovascular:  Positive for leg swelling. Negative for chest pain and palpitations.   Gastrointestinal:  Negative for abdominal distention, abdominal pain, diarrhea, nausea and vomiting.   Genitourinary:  Negative for decreased urine volume.   Musculoskeletal:  Negative for neck pain and neck stiffness.   Skin:  Positive for wound.   Allergic/Immunologic: Positive for immunocompromised state.   Neurological:  Negative for dizziness and light-headedness.   Psychiatric/Behavioral:  Negative for agitation, behavioral problems and confusion.       Objective:     Vital Signs (Most Recent):  Temp: 98.5 °F (36.9 °C) (03/16/24 1603)  Pulse: 86 (03/16/24 1350)  Resp: 17 (03/16/24 1350)  BP: 132/64 (03/16/24 1350)  SpO2: 97 % (03/16/24 1350) Vital Signs (24h Range):  Temp:  [96.8 °F (36 °C)-99.2 °F (37.3 °C)] 98.5 °F (36.9 °C)  Pulse:  [] 86  Resp:  [9-96] 17  SpO2:  [96 %-100 %] 97 %  BP: (119-145)/(55-75) 132/64     Weight: 89.9 kg  "(198 lb 3.1 oz)  Height: 5' 4" (162.6 cm)  Body mass index is 34.02 kg/m².     Physical Exam  Vitals and nursing note reviewed.   Constitutional:       General: He is not in acute distress.  HENT:      Head: Normocephalic and atraumatic.   Eyes:      General: No scleral icterus.        Right eye: No discharge.         Left eye: No discharge.   Cardiovascular:      Rate and Rhythm: Normal rate and regular rhythm.      Heart sounds: No murmur heard.  Pulmonary:      Effort: Pulmonary effort is normal. No respiratory distress.      Breath sounds: No wheezing or rales.   Abdominal:      General: Bowel sounds are normal. There is no distension.      Palpations: Abdomen is soft.      Tenderness: There is abdominal tenderness (incisional). There is no guarding.      Comments: PD catheter in place no s/s/I  R JEAN-PIERRE drain ss output   R neph tube with yellow urine   Genitourinary:     Comments: Curry in place draining yellow urine  Musculoskeletal:      Right lower leg: Edema (trace) present.      Left lower leg: Edema (trace) present.   Skin:     General: Skin is warm and dry.      Capillary Refill: Capillary refill takes less than 2 seconds.      Coloration: Skin is not jaundiced.   Neurological:      General: No focal deficit present.      Mental Status: He is alert and oriented to person, place, and time.   Psychiatric:         Mood and Affect: Mood normal.         Behavior: Behavior normal.         Thought Content: Thought content normal.         Judgment: Judgment normal.          Laboratory:  CBC:   Recent Labs   Lab 03/14/24  0759 03/15/24  0542 03/16/24  0638   WBC 15.90* 10.82 6.18   RBC 2.77* 2.63* 2.19*   HGB 8.8* 8.1* 6.8*   HCT 27.5* 26.0* 21.3*   * 453* 342   MCV 99* 99* 97   MCH 31.8* 30.8 31.1*   MCHC 32.0 31.2* 31.9*     CMP:   Recent Labs   Lab 03/10/24  0557 03/11/24  0855 03/14/24  0759 03/15/24  0542 03/16/24  0638   *   < > 59* 70 147*   CALCIUM 9.6   < > 10.3 9.2 8.9   ALBUMIN 3.3*   < > " "3.4* 3.1* 2.5*   PROT 6.1  --   --   --   --    *   < > 133* 134* 136   K 3.7   < > 4.8 4.5 3.9   CO2 19*   < > 19* 26 26      < > 104 100 102   BUN 57*   < > 111* 47* 53*   CREATININE 2.8*   < > 2.8* 1.6* 1.7*   ALKPHOS 69  --   --   --   --    ALT 17  --   --   --   --    AST 30  --   --   --   --     < > = values in this interval not displayed.     Coagulation: No results for input(s): "PT", "APTT" in the last 168 hours.    Diagnostic Results:  US - Kidney: Results for orders placed during the hospital encounter of 03/13/24    US Transplant Kidney With Doppler    Narrative  EXAMINATION:  US TRANSPLANT KIDNEY WITH DOPPLER    CLINICAL HISTORY:  decreased Hgb post neph tube;    TECHNIQUE:  Real time gray scale and doppler ultrasound was performed over the patient's renal allograft.    COMPARISON:  Transplant kidney ultrasound 03/10/2024, 02/29/2024, CT abdomen pelvis 03/14/2024    FINDINGS:  Renal allograft in the right lower quadrant.  The allograft measures 11.5 cm. Normal perfusion. Nephrostomy tube and ureteral stent in place.  No hydronephrosis.    Subincisional fluid collection measuring 15.9 x 4.5 x 4.0 cm, previously 8.9 x 1.9 x 3.4 cm on prior ultrasound.    Vasculature:    Resistive indexes:    *Interlobar: 0.77, previously 0.74    *Segmental upper: 0.80, previously 0.78    *Segmental middle: 0.85, previously 0.79    *Segmental lower: 0.84, previously 0.72    Main renal artery peak systolic velocity: 229cm/sec with normal waveform.  (Previously 210 cm/sec)    Renal artery/iliac ratio: 0.80.    The main renal vein is patent.    Impression  Persistent elevated arterial resistive indices, with mild interval increase from prior study.    Subincisional fluid collection (hematoma versus seroma) measuring up to 15.9 cm enlarged from prior ultrasound 03/10/2024.    Electronically signed by resident: Doreen Scanlon  Date:    03/16/2024  Time:    11:31    Electronically signed by: Segundo Fernández, " MD  Date:    03/16/2024  Time:    11:47  Assessment/Plan:     * Urine leak from transplanted ureter  - He presented to outpatient transplant clinic 3/13 with report of significant increase in drain output (~950) and increased leakage from incision. Lab work notable for leukocytosis.   - JEAN-PIERRE drain creatinine 29 (prev 2.8 on 3/10), significant for urine leak.   - Plan for CT a/p to assess collection and current drain placement. No plan for intervention at this time. Cefepime started on admit.   - Cramer placed 3/13.  -  with Cr 2.8. HD 3/14/24 for clearance via R CW perm cath and tolerated well  - Initially, documentation suggest JEAN-PIERRE output decreased when cramer placed but pt emptying drain and RN not aware. JEAN-PIERRE drain output 850 in 24 hours. Decision made to proceed w perc neph tube 3/15.  - Most urine draining via perc neph tube; remains with cramer and JEAN-PIERRE drain.   - Kidney US 3/16 with elevated Ris and slight increase in subincisional collection. VSS. Reviewed by surgeon. 1 unit pRBC 3/16.   - Infectious work-up unremarkable thus far, urine cx pending. Cont Cefepime for now.      Acidosis  - Continue oral bicarb.      Leukocytosis  - Infectious w/u- blood cultures w NGTD. UA unremarkable. Urine cx pending  - Drain from OR sent for cx- wbc 92, segs 94  - Started on Cefepime on admit; will cont given perc neph tube  - Wbc trending down; afebrile.  - WCTM.    Insulin pump in place  - Pt using home insulin pump, Endocrinology following      Delayed graft function of kidney  - Post-op DGF 2/2 DCD and long cold time. Last HD 3/8   - BUN elevated, w no AMS, denies n/v, 1+ BLE edema but lungs clear not requiring oxygen  - BUN up to 111 on 3/14. Pt tolerated HD 3/14 for clearance  - Pt has outpt HD chair in place MWF    Positive RPR test in cadaveric donor  - Weekly PCN G x 3; Due for 3rd dose. D/w pharmacy 3/17      Acute on chronic anemia  - Hgb 6.8 3/16 from 8.1 day prior  - Vitals stable. Kidney Transplant US 3/16  "reviewed by surgeon. Not concerned for acute bleed.  - 1 unit pRBC transfused; check cbc in AM.  - WCTM.    At risk for opportunistic infections  - Continue Valcyte for CMV prophylaxis  - Continue Bactrim for PCP prophylaxis       Long-term use of immunosuppressant medication  - see "prophylactic immuno"      Prophylactic immunotherapy  - Continue Prograf. Will monitor for signs of toxic side effects, check daily tacrolimus troughs, and change meds accordingly.   - MMF held for leukocytosis   - Continue steroids.      Status post -donor kidney transplantation  - S/p DBD Ktx on 24 for presumed diabetic nephropathy (donor RPR+, CMV D+, R-, CIT ~16.5hrs).  - Patient being managed for DGF; see "DGF"  - Cr downtrended to morales 1.6 3/15; 1.7 3/16  - Strict I/O. Daily weights.  - See "urine leak."    Chronic viral hepatitis B without delta agent and without coma        Essential hypertension  - Continue Coreg.      Type 2 diabetes mellitus with kidney complication, with long-term current use of insulin  - Endocrine consulted and following      NIHARIKA (acute kidney injury)  - see "DGF"          Discharge Planning:  Not today.    Medical decision making for this encounter includes review of pertinent labs and diagnostic studies, assessment and planning, discussions with consulting providers, discussion with patient/family, and participation in multidisciplinary rounds. Time spent caring for patient: 60 minutes    Lakesiha Burr PA-C  Kidney Transplant  [facility] - Transplant Step[facility]  "

## 2024-03-16 NOTE — NURSING
Patient returned from neph tube placement.  RLQ JEAN-PIERRE with ss output (leaking noted at insertion site.  Dressed with gauze and medipore tape), cramer noted to have red urine (provider notified), right neph tube with pink tinged output.  VSS    Diet order obtained  Pt. Did not want pain medication at time of return.  Bed in lowest locked position.  Call light within reach.  Instructed to call for assistance.  Pt. Expressed understanding

## 2024-03-16 NOTE — ASSESSMENT & PLAN NOTE
"- S/p DBD Ktx on 2/29/24 for presumed diabetic nephropathy (donor RPR+, CMV D+, R-, CIT ~16.5hrs).  - Patient being managed for DGF; see "DGF"  - Cr downtrended to morales 1.6 3/15; 1.7 3/16  - Strict I/O. Daily weights.  - See "urine leak."  "

## 2024-03-16 NOTE — ASSESSMENT & PLAN NOTE
At time of evaluation, pt meets criteria to continue home insulin pump usage.  - Has all adequate supplies   - Insulin pump site change on 3/16/24  - Bolus settings reviewed    - No changes to home regimen.   - Nurse to check BG qac/hs/0200 & record in epic   - Patient to input glucose into pump and use bolus wizard for prandial needs   - Will continue to monitor accuchecks and titrate insulin as clinically indicated .     - Discussed above plan with patient, patient verbalized understanding.   - Understands in case of pump malfunction or cognitive decline in which pt can no longer safely use insulin pump, will transition to [facility] MDI.     PLEASE CONTACT ENDOCRINE IF INSULIN PUMP FAILS

## 2024-03-16 NOTE — ASSESSMENT & PLAN NOTE
- Infectious w/u- blood cultures w NGTD. UA unremarkable. Urine cx pending  - Drain from OR sent for cx- wbc 92, segs 94  - Started on Cefepime on admit; will cont given perc neph tube  - Wbc trending down; afebrile.  - WCTM.

## 2024-03-16 NOTE — SUBJECTIVE & OBJECTIVE
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 PD catheter remains in place. 6 day cramer (removed without issue), surgery went well. Post op:  PCN G #1 given 3/1 for +RPR donor cultures; needs weekly x 3 total per ID.   DGF: not clearing/low UOP; MWF chair, last 3/8/24  Drain remained in place at time of DC. Re-admitted 3/10 for increase in JEANP-IERRE drain, drain Cr 2.8 (same as serum). US 3/10 with 2 fluid collections: SQ collection + deeper collection with drain terminating in the collection.     He presented to outpatient transplant clinic 3/13 with report of significant increase in drain output (~950) and increased leakage from incision. Lab work notable for leukocytosis. JEAN-PIERRE drain creatinine 29 (prev 2.8 on 3/10), significant for urine leak. Plan for CT a/p to assess collection and current drain placement. Abx started. Cramre to be placed.  with Cr 2.8. Plan to admit for infectious work-up and urine leak. Blood/urine cx pending. Assess need for HD daily. D/w Dr. Stokes.    Mr. Lopez is a 64 y.o. year old male who is status post Kidney Transplant - 2/29/2024  (#1).  His maintenance immunosuppression consists of:   Immunosuppressants (From admission, onward)      Start     Stop Route Frequency Ordered    03/16/24 1800  tacrolimus capsule 6 mg         -- Oral 2 times daily 03/16/24 1247            Hospital Course:  Patient admitted w increased JEAN-PIERRE output w decrease in UOP.  Fluid sent from drain (placed 2/29/24 in OR), Cr 29.7, 92 WBC, 94 segs. CT A/P 3/13/24 reviewed w transplant surgeon, small sub incisional collection (3.3cm x 6.2cm x 9.2cm) and 2 peritoneal drainage catheters w medial catheter in fluid collection (3.5cm x 3.5 cm x 2.9 cm) favors hematoma verses seroma, no plan for intervention.  Last HD was 3/8/24.  Cramer placed on admit 3/13. Dialyzed inpatient on 3/14 for BUN  >100, tolerated well. Pt cont to have high output per JEAN-PIERRE with cramer in place. IR consulted for perc neph tube to divert urine and allow for healing.     Interval History: NAEON. R nephrostomy tube placed yesterday 3/15 by IR. Most urine draining to neph tube. Cramer remains in place as does JEAN-PIERRE drain. JEAN-PIERRE with 785 ml past 24 hours (including prior to neph tube). Hgb 6.8 this AM from 8.1 yesterday. Kidney US 3/16 today notable for persistent elevated Ris and slight increase in size of sub incisional collection. Reviewed by surgeon. Vitals remain stable. Not concerned for acute bleed at this time. Transfuse 1 unit pRBC. Recheck CBC tomorrow. CR 1.7 from 1.6. Encourage oral hydration (2-3L/day). Patient is tolerating a diet, having Bms, denies n/v, and ambulating. WBC trending down. Remains on Cefepime. Afebrile. WCTM.          Past Medical, Surgical, Family, and Social History:   Unchanged from H&P.    Scheduled Meds:   aspirin  81 mg Oral Daily    calcitRIOL  0.5 mcg Oral Daily    carvediloL  6.25 mg Oral BID    ceFEPime IV (PEDS and ADULTS)  1 g Intravenous Q12H    levothyroxine  50 mcg Oral Before breakfast    mupirocin   Nasal BID    pantoprazole  40 mg Oral Daily    predniSONE  10 mg Oral Daily    Followed by    [START ON 3/23/2024] predniSONE  5 mg Oral Daily    sodium bicarbonate  650 mg Oral BID    sulfamethoxazole-trimethoprim 400-80mg  1 tablet Oral Daily    tacrolimus  6 mg Oral BID    [START ON 3/17/2024] valGANciclovir  450 mg Oral Daily     Continuous Infusions:   insulin aspart U-100       PRN Meds:0.9%  NaCl infusion (for blood administration), acetaminophen, bisacodyL, dextrose 10%, dextrose 10%, glucagon (human recombinant), glucose, glucose, insulin aspart U-100, LIDOcaine HCl 2%, melatonin, ondansetron, oxyCODONE, sodium chloride 0.9%    Intake/Output - Last 3 Shifts         03/14 0700  03/15 0659 03/15 0700  03/16 0659 03/16 0700  03/17 0659    P.O. 2922 460 400    Blood   350    Other 600.1      IV  "Piggyback 200      Total Intake(mL/kg) 3722.1 (41.4) 460 (5.1) 750 (8.3)    Urine (mL/kg/hr) 2350 (1.1) 2675 (1.2) 325 (0.4)    Emesis/NG output  0     Drains 850 785 50    Other 600      Stool 0 0 0    Total Output 3800 3460 375    Net -77.9 -3000 +375           Urine Occurrence  0 x     Stool Occurrence 1 x 0 x 1 x    Emesis Occurrence  0 x              Review of Systems   Constitutional:  Negative for chills and fever.   HENT:  Negative for facial swelling and trouble swallowing.    Eyes:  Negative for photophobia and redness.   Respiratory:  Negative for cough, shortness of breath, wheezing and stridor.    Cardiovascular:  Positive for leg swelling. Negative for chest pain and palpitations.   Gastrointestinal:  Negative for abdominal distention, abdominal pain, diarrhea, nausea and vomiting.   Genitourinary:  Negative for decreased urine volume.   Musculoskeletal:  Negative for neck pain and neck stiffness.   Skin:  Positive for wound.   Allergic/Immunologic: Positive for immunocompromised state.   Neurological:  Negative for dizziness and light-headedness.   Psychiatric/Behavioral:  Negative for agitation, behavioral problems and confusion.       Objective:     Vital Signs (Most Recent):  Temp: 98.5 °F (36.9 °C) (03/16/24 1603)  Pulse: 86 (03/16/24 1350)  Resp: 17 (03/16/24 1350)  BP: 132/64 (03/16/24 1350)  SpO2: 97 % (03/16/24 1350) Vital Signs (24h Range):  Temp:  [96.8 °F (36 °C)-99.2 °F (37.3 °C)] 98.5 °F (36.9 °C)  Pulse:  [] 86  Resp:  [9-96] 17  SpO2:  [96 %-100 %] 97 %  BP: (119-145)/(55-75) 132/64     Weight: 89.9 kg (198 lb 3.1 oz)  Height: 5' 4" (162.6 cm)  Body mass index is 34.02 kg/m².     Physical Exam  Vitals and nursing note reviewed.   Constitutional:       General: He is not in acute distress.  HENT:      Head: Normocephalic and atraumatic.   Eyes:      General: No scleral icterus.        Right eye: No discharge.         Left eye: No discharge.   Cardiovascular:      Rate and Rhythm: " Normal rate and regular rhythm.      Heart sounds: No murmur heard.  Pulmonary:      Effort: Pulmonary effort is normal. No respiratory distress.      Breath sounds: No wheezing or rales.   Abdominal:      General: Bowel sounds are normal. There is no distension.      Palpations: Abdomen is soft.      Tenderness: There is abdominal tenderness (incisional). There is no guarding.      Comments: PD catheter in place no s/s/I  R JEAN-PIERRE drain ss output   R neph tube with yellow urine   Genitourinary:     Comments: Curry in place draining yellow urine  Musculoskeletal:      Right lower leg: Edema (trace) present.      Left lower leg: Edema (trace) present.   Skin:     General: Skin is warm and dry.      Capillary Refill: Capillary refill takes less than 2 seconds.      Coloration: Skin is not jaundiced.   Neurological:      General: No focal deficit present.      Mental Status: He is alert and oriented to person, place, and time.   Psychiatric:         Mood and Affect: Mood normal.         Behavior: Behavior normal.         Thought Content: Thought content normal.         Judgment: Judgment normal.          Laboratory:  CBC:   Recent Labs   Lab 03/14/24  0759 03/15/24  0542 03/16/24  0638   WBC 15.90* 10.82 6.18   RBC 2.77* 2.63* 2.19*   HGB 8.8* 8.1* 6.8*   HCT 27.5* 26.0* 21.3*   * 453* 342   MCV 99* 99* 97   MCH 31.8* 30.8 31.1*   MCHC 32.0 31.2* 31.9*     CMP:   Recent Labs   Lab 03/10/24  0557 03/11/24  0855 03/14/24  0759 03/15/24  0542 03/16/24  0638   *   < > 59* 70 147*   CALCIUM 9.6   < > 10.3 9.2 8.9   ALBUMIN 3.3*   < > 3.4* 3.1* 2.5*   PROT 6.1  --   --   --   --    *   < > 133* 134* 136   K 3.7   < > 4.8 4.5 3.9   CO2 19*   < > 19* 26 26      < > 104 100 102   BUN 57*   < > 111* 47* 53*   CREATININE 2.8*   < > 2.8* 1.6* 1.7*   ALKPHOS 69  --   --   --   --    ALT 17  --   --   --   --    AST 30  --   --   --   --     < > = values in this interval not displayed.     Coagulation: No  "results for input(s): "PT", "APTT" in the last 168 hours.    Diagnostic Results:  US - Kidney: Results for orders placed during the hospital encounter of 03/13/24    US Transplant Kidney With Doppler    Narrative  EXAMINATION:  US TRANSPLANT KIDNEY WITH DOPPLER    CLINICAL HISTORY:  decreased Hgb post neph tube;    TECHNIQUE:  Real time gray scale and doppler ultrasound was performed over the patient's renal allograft.    COMPARISON:  Transplant kidney ultrasound 03/10/2024, 02/29/2024, CT abdomen pelvis 03/14/2024    FINDINGS:  Renal allograft in the right lower quadrant.  The allograft measures 11.5 cm. Normal perfusion. Nephrostomy tube and ureteral stent in place.  No hydronephrosis.    Subincisional fluid collection measuring 15.9 x 4.5 x 4.0 cm, previously 8.9 x 1.9 x 3.4 cm on prior ultrasound.    Vasculature:    Resistive indexes:    *Interlobar: 0.77, previously 0.74    *Segmental upper: 0.80, previously 0.78    *Segmental middle: 0.85, previously 0.79    *Segmental lower: 0.84, previously 0.72    Main renal artery peak systolic velocity: 229cm/sec with normal waveform.  (Previously 210 cm/sec)    Renal artery/iliac ratio: 0.80.    The main renal vein is patent.    Impression  Persistent elevated arterial resistive indices, with mild interval increase from prior study.    Subincisional fluid collection (hematoma versus seroma) measuring up to 15.9 cm enlarged from prior ultrasound 03/10/2024.    Electronically signed by resident: Doreen Scanlon  Date:    03/16/2024  Time:    11:31    Electronically signed by: Segundo Fernández MD  Date:    03/16/2024  Time:    11:47  "

## 2024-03-16 NOTE — ASSESSMENT & PLAN NOTE
- Hgb 6.8 3/16 from 8.1 day prior  - Vitals stable. Kidney Transplant  3/16 reviewed by surgeon. Not concerned for acute bleed.  - 1 unit pRBC transfused; check cbc in AM.  - WCFREDY.

## 2024-03-16 NOTE — PROGRESS NOTES
"[facility] - Transplant Stepdown  Endocrinology  Progress Note    Admit Date: 3/13/2024     Reason for Consult: Management of T2DM, Hyperglycemia      Surgical Procedure and Date: kidney transplant 2024     Diabetes diagnosis year:  per chart review      Home Diabetes Medications:  Tandem insulin pump   ICR 1:5   ISF 1:25  Basal rate 1.2 u/hr + control IQ      How often checking glucose at home? >4 x day   BG readings on regimen: 100-200s  Hypoglycemia on the regimen?  Yes, once a week overnight, 2-3x during the week during the day   Missed doses on regimen?  n/a     Diabetes Complications include:     Hyperglycemia, Hypoglycemia , Hypoglycemia unawareness, Diabetic nephropathy  , and Diabetic chronic kidney disease          Complicating diabetes co morbidities:   Glucocorticoid use        HPI:   Patient is a 64 y.o. 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 PD catheter remains in place. 6 day cramer (removed without issue), surgery went well. He presented to outpatient transplant clinic 3/13 with report of significant increase in drain output (~950) and increased leakage from incision. Lab work notable for leukocytosis. JEAN-PIERRE drain creatinine 29 (prev 2.8 on 3/10), significant for urine leak. Plan for CT a/p to assess collection and current drain placement. Abx started. Cramer to be placed.  with Cr 2.8. Plan to admit for infectious work-up and urine leak. Endocrinology consulted for management of T2DM.          Interval HPI:   Overnight events: Remains in TSU. BG reasonably controlled on home insulin pump. Creatinine 1.7. Remains on Prednisone 10 mg daily. Diet diabetic 2000 Calorie; Standard Tray      Eatin%  Nausea: No  Hypoglycemia and intervention: No  Fever: No  TPN and/or TF: No  If yes, type of TF/TPN and rate: n/a    /71   Pulse 85   Temp 99.2 °F (37.3 °C) (Oral)   Resp 18   Ht 5' 4" (1.626 m)   Wt 89.9 " "kg (198 lb 3.1 oz)   SpO2 99%   BMI 34.02 kg/m²     Labs Reviewed and Include    Recent Labs   Lab 03/16/24  0638   *   CALCIUM 8.9   ALBUMIN 2.5*      K 3.9   CO2 26      BUN 53*   CREATININE 1.7*     Lab Results   Component Value Date    WBC 6.18 03/16/2024    HGB 6.8 (L) 03/16/2024    HCT 21.3 (L) 03/16/2024    MCV 97 03/16/2024     03/16/2024     No results for input(s): "TSH", "FREET4" in the last 168 hours.  Lab Results   Component Value Date    HGBA1C 5.8 (H) 03/01/2024       Nutritional status:   Body mass index is 34.02 kg/m².  Lab Results   Component Value Date    ALBUMIN 2.5 (L) 03/16/2024    ALBUMIN 3.1 (L) 03/15/2024    ALBUMIN 3.4 (L) 03/14/2024     No results found for: "PREALBUMIN"    Estimated Creatinine Clearance: 43.8 mL/min (A) (based on SCr of 1.7 mg/dL (H)).    Accu-Checks  Recent Labs     03/14/24  1957 03/15/24  0216 03/15/24  0834 03/15/24  1244 03/15/24  1748 03/15/24  1917 03/15/24  2205 03/16/24  0201 03/16/24  0207 03/16/24  0904   POCTGLUCOSE 160* 190* 111* 131* 121* 110 246* 123* 139* 187*       Current Medications and/or Treatments Impacting Glycemic Control  Immunotherapy:    Immunosuppressants           Stop Route Frequency     tacrolimus capsule 5 mg         -- Oral 2 times daily          Steroids:   Hormones (From admission, onward)      Start     Stop Route Frequency Ordered    03/23/24 0900  predniSONE tablet 5 mg        See Hyperspace for full Linked Orders Report.    -- Oral Daily 03/15/24 0929    03/16/24 0900  predniSONE tablet 10 mg        See Hyperspace for full Linked Orders Report.    03/23/24 0859 Oral Daily 03/15/24 0929    03/13/24 1649  melatonin tablet 6 mg         -- Oral Nightly PRN 03/13/24 1649          Pressors:    Autonomic Drugs (From admission, onward)      None          Hyperglycemia/Diabetes Medications:   Antihyperglycemics (From admission, onward)      Start     Stop Route Frequency Ordered    03/13/24 4323  insulin aspart " U-100 insulin pump from home 0-10 Units        Question Answer Comment   Target number 120    Basal Rate #1 1.2    Basal rate #1 time 4983-9439        -- SubQ Continuous 03/13/24 2012 03/13/24 2111  insulin aspart U-100 insulin pump from home 0-15 Units        Question Answer Comment   Target number 120    Carbohydrate coverage #1 1:5    Carbohydrate coverage #1 time 9758-4371    Sensitivity #1 1:25    Sensitivity #1 time [id number]        -- SubQ As needed (PRN) 03/13/24 2012            ASSESSMENT and PLAN    Renal/  * Urine leak from transplanted ureter  Managed per primary team  Optimize BG Control        NIHARIKA (acute kidney injury)  Lab Results   Component Value Date    CREATININE 1.7 (H) 03/16/2024     Avoid insulin stacking  Titrate insulin slowly       Endocrine  Insulin pump in place  At time of evaluation, pt meets criteria to continue home insulin pump usage.  - Has all adequate supplies   - Insulin pump site change on 3/16/24  - Bolus settings reviewed    - No changes to home regimen.   - Nurse to check BG qac/hs/0200 & record in epic   - Patient to input glucose into pump and use bolus wizard for prandial needs   - Will continue to monitor accuchecks and titrate insulin as clinically indicated .     - Discussed above plan with patient, patient verbalized understanding.   - Understands in case of pump malfunction or cognitive decline in which pt can no longer safely use insulin pump, will transition to SC MDI.     PLEASE CONTACT ENDOCRINE IF INSULIN PUMP FAILS           Type 2 diabetes mellitus with kidney complication, with long-term current use of insulin  Endocrinology consulted for BG management.   BG goal 140-180       - Continue Home Insulin Pump  - BG checks AC/HS/0200  - Hypoglycemia protocol in place     ** Please notify Endocrine for any change and/or advance in diet**  ** Please call Endocrine for any BG related issues **     Discharge Planning:   TBD. Please notify endocrinology prior to  discharge.              Nakia Vasquez NP  Endocrinology  [facility] - Transplant Stepdown

## 2024-03-16 NOTE — SUBJECTIVE & OBJECTIVE
"Interval HPI:   Overnight events: Remains in TSU. BG reasonably controlled on home insulin pump. Creatinine 1.7. Remains on Prednisone 10 mg daily. Diet diabetic 2000 Calorie; Standard Tray      Eatin%  Nausea: No  Hypoglycemia and intervention: No  Fever: No  TPN and/or TF: No  If yes, type of TF/TPN and rate: n/a    /71   Pulse 85   Temp 99.2 °F (37.3 °C) (Oral)   Resp 18   Ht 5' 4" (1.626 m)   Wt 89.9 kg (198 lb 3.1 oz)   SpO2 99%   BMI 34.02 kg/m²     Labs Reviewed and Include    Recent Labs   Lab 24  0638   *   CALCIUM 8.9   ALBUMIN 2.5*      K 3.9   CO2 26      BUN 53*   CREATININE 1.7*     Lab Results   Component Value Date    WBC 6.18 2024    HGB 6.8 (L) 2024    HCT 21.3 (L) 2024    MCV 97 2024     2024     No results for input(s): "TSH", "FREET4" in the last 168 hours.  Lab Results   Component Value Date    HGBA1C 5.8 (H) 2024       Nutritional status:   Body mass index is 34.02 kg/m².  Lab Results   Component Value Date    ALBUMIN 2.5 (L) 2024    ALBUMIN 3.1 (L) 03/15/2024    ALBUMIN 3.4 (L) 2024     No results found for: "PREALBUMIN"    Estimated Creatinine Clearance: 43.8 mL/min (A) (based on SCr of 1.7 mg/dL (H)).    Accu-Checks  Recent Labs     24  1957 03/15/24  0216 03/15/24  0834 03/15/24  1244 03/15/24  1748 03/15/24  1917 03/15/24  2205 24  0201 24  0207 24  0904   POCTGLUCOSE 160* 190* 111* 131* 121* 110 246* 123* 139* 187*       Current Medications and/or Treatments Impacting Glycemic Control  Immunotherapy:    Immunosuppressants           Stop Route Frequency     tacrolimus capsule 5 mg         -- Oral 2 times daily          Steroids:   Hormones (From admission, onward)      Start     Stop Route Frequency Ordered    24 0900  predniSONE tablet 5 mg        See Hyperspace for full Linked Orders Report.    -- Oral Daily 03/15/24 0929    03/16/24 0900  predniSONE tablet " 10 mg        See Hyperspace for full Linked Orders Report.    03/23/24 0859 Oral Daily 03/15/24 0929    03/13/24 1649  melatonin tablet 6 mg         -- Oral Nightly PRN 03/13/24 1649          Pressors:    Autonomic Drugs (From admission, onward)      None          Hyperglycemia/Diabetes Medications:   Antihyperglycemics (From admission, onward)      Start     Stop Route Frequency Ordered    03/13/24 2115  insulin aspart U-100 insulin pump from home 0-10 Units        Question Answer Comment   Target number 120    Basal Rate #1 1.2    Basal rate #1 time 8805-0311        -- SubQ Continuous 03/13/24 2012 03/13/24 2111  insulin aspart U-100 insulin pump from home 0-15 Units        Question Answer Comment   Target number 120    Carbohydrate coverage #1 1:5    Carbohydrate coverage #1 time 7198-5024    Sensitivity #1 1:25    Sensitivity #1 time [id number]        -- SubQ As needed (PRN) 03/13/24 2012

## 2024-03-16 NOTE — ASSESSMENT & PLAN NOTE
Lab Results   Component Value Date    CREATININE 1.7 (H) 03/16/2024     Avoid insulin stacking  Titrate insulin slowly

## 2024-03-16 NOTE — ASSESSMENT & PLAN NOTE
- He presented to outpatient transplant clinic 3/13 with report of significant increase in drain output (~950) and increased leakage from incision. Lab work notable for leukocytosis.   - JEAN-PIERRE drain creatinine 29 (prev 2.8 on 3/10), significant for urine leak.   - Plan for CT a/p to assess collection and current drain placement. No plan for intervention at this time. Cefepime started on admit.   - Cramer placed 3/13.  -  with Cr 2.8. HD 3/14/24 for clearance via R CW perm cath and tolerated well  - Initially, documentation suggest JEAN-PIERRE output decreased when cramer placed but pt emptying drain and RN not aware. JEAN-PIERRE drain output 850 in 24 hours. Decision made to proceed w perc neph tube 3/15.  - Most urine draining via perc neph tube; remains with cramer and JEAN-PIERRE drain.   - Kidney US 3/16 with elevated Ris and slight increase in subincisional collection. VSS. Reviewed by surgeon. 1 unit pRBC 3/16.   - Infectious work-up unremarkable thus far, urine cx pending. Cont Cefepime for now.

## 2024-03-17 LAB
ALBUMIN SERPL BCP-MCNC: 3 G/DL (ref 3.5–5.2)
ANION GAP SERPL CALC-SCNC: 7 MMOL/L (ref 8–16)
BASOPHILS # BLD AUTO: 0.03 K/UL (ref 0–0.2)
BASOPHILS NFR BLD: 0.4 % (ref 0–1.9)
BUN SERPL-MCNC: 51 MG/DL (ref 8–23)
CALCIUM SERPL-MCNC: 9.3 MG/DL (ref 8.7–10.5)
CHLORIDE SERPL-SCNC: 97 MMOL/L (ref 95–110)
CO2 SERPL-SCNC: 25 MMOL/L (ref 23–29)
CREAT SERPL-MCNC: 2.3 MG/DL (ref 0.5–1.4)
DIFFERENTIAL METHOD BLD: ABNORMAL
EOSINOPHIL # BLD AUTO: 0.1 K/UL (ref 0–0.5)
EOSINOPHIL NFR BLD: 1.4 % (ref 0–8)
ERYTHROCYTE [DISTWIDTH] IN BLOOD BY AUTOMATED COUNT: 16.1 % (ref 11.5–14.5)
EST. GFR  (NO RACE VARIABLE): 30.7 ML/MIN/1.73 M^2
GLUCOSE SERPL-MCNC: 137 MG/DL (ref 70–110)
HCT VFR BLD AUTO: 28.9 % (ref 40–54)
HGB BLD-MCNC: 9.3 G/DL (ref 14–18)
IMM GRANULOCYTES # BLD AUTO: 0.13 K/UL (ref 0–0.04)
IMM GRANULOCYTES NFR BLD AUTO: 1.7 % (ref 0–0.5)
LYMPHOCYTES # BLD AUTO: 0.1 K/UL (ref 1–4.8)
LYMPHOCYTES NFR BLD: 0.8 % (ref 18–48)
MAGNESIUM SERPL-MCNC: 2.2 MG/DL (ref 1.6–2.6)
MCH RBC QN AUTO: 31 PG (ref 27–31)
MCHC RBC AUTO-ENTMCNC: 32.2 G/DL (ref 32–36)
MCV RBC AUTO: 96 FL (ref 82–98)
MONOCYTES # BLD AUTO: 0.9 K/UL (ref 0.3–1)
MONOCYTES NFR BLD: 11.4 % (ref 4–15)
NEUTROPHILS # BLD AUTO: 6.4 K/UL (ref 1.8–7.7)
NEUTROPHILS NFR BLD: 84.3 % (ref 38–73)
NRBC BLD-RTO: 0 /100 WBC
PHOSPHATE SERPL-MCNC: 2.4 MG/DL (ref 2.7–4.5)
PLATELET # BLD AUTO: 313 K/UL (ref 150–450)
PMV BLD AUTO: 9.2 FL (ref 9.2–12.9)
POCT GLUCOSE: 164 MG/DL (ref 70–110)
POCT GLUCOSE: 246 MG/DL (ref 70–110)
POCT GLUCOSE: 263 MG/DL (ref 70–110)
POTASSIUM SERPL-SCNC: 3.9 MMOL/L (ref 3.5–5.1)
RBC # BLD AUTO: 3 M/UL (ref 4.6–6.2)
SODIUM SERPL-SCNC: 129 MMOL/L (ref 136–145)
TACROLIMUS BLD-MCNC: 5.7 NG/ML (ref 5–15)
WBC # BLD AUTO: 7.61 K/UL (ref 3.9–12.7)

## 2024-03-17 PROCEDURE — 25000003 PHARM REV CODE 250: Performed by: CLINICAL NURSE SPECIALIST

## 2024-03-17 PROCEDURE — 80069 RENAL FUNCTION PANEL: CPT | Performed by: PHYSICIAN ASSISTANT

## 2024-03-17 PROCEDURE — 83735 ASSAY OF MAGNESIUM: CPT | Performed by: PHYSICIAN ASSISTANT

## 2024-03-17 PROCEDURE — 99233 SBSQ HOSP IP/OBS HIGH 50: CPT | Mod: 24,,, | Performed by: PHYSICIAN ASSISTANT

## 2024-03-17 PROCEDURE — 20600001 HC STEP DOWN PRIVATE ROOM

## 2024-03-17 PROCEDURE — 25000003 PHARM REV CODE 250: Performed by: PHYSICIAN ASSISTANT

## 2024-03-17 PROCEDURE — 63600175 PHARM REV CODE 636 W HCPCS: Performed by: INTERNAL MEDICINE

## 2024-03-17 PROCEDURE — 25000003 PHARM REV CODE 250: Performed by: TRANSPLANT SURGERY

## 2024-03-17 PROCEDURE — 36415 COLL VENOUS BLD VENIPUNCTURE: CPT | Performed by: PHYSICIAN ASSISTANT

## 2024-03-17 PROCEDURE — 63600175 PHARM REV CODE 636 W HCPCS: Mod: JZ,JG | Performed by: PHYSICIAN ASSISTANT

## 2024-03-17 PROCEDURE — 25000003 PHARM REV CODE 250: Performed by: INTERNAL MEDICINE

## 2024-03-17 PROCEDURE — 80197 ASSAY OF TACROLIMUS: CPT | Performed by: PHYSICIAN ASSISTANT

## 2024-03-17 PROCEDURE — 63600175 PHARM REV CODE 636 W HCPCS: Performed by: PHYSICIAN ASSISTANT

## 2024-03-17 PROCEDURE — 63600175 PHARM REV CODE 636 W HCPCS: Performed by: NURSE PRACTITIONER

## 2024-03-17 PROCEDURE — 85025 COMPLETE CBC W/AUTO DIFF WBC: CPT | Performed by: PHYSICIAN ASSISTANT

## 2024-03-17 PROCEDURE — 99232 SBSQ HOSP IP/OBS MODERATE 35: CPT | Mod: ,,, | Performed by: NURSE PRACTITIONER

## 2024-03-17 RX ORDER — VALGANCICLOVIR 450 MG/1
450 TABLET, FILM COATED ORAL
Status: DISCONTINUED | OUTPATIENT
Start: 2024-03-18 | End: 2024-03-20 | Stop reason: HOSPADM

## 2024-03-17 RX ADMIN — PENICILLIN G BENZATHINE 2.4 MILLION UNITS: 2400000 INJECTION, SUSPENSION INTRAMUSCULAR at 09:03

## 2024-03-17 RX ADMIN — TACROLIMUS 6 MG: 5 CAPSULE ORAL at 05:03

## 2024-03-17 RX ADMIN — PANTOPRAZOLE SODIUM 40 MG: 40 TABLET, DELAYED RELEASE ORAL at 08:03

## 2024-03-17 RX ADMIN — CARVEDILOL 6.25 MG: 6.25 TABLET, FILM COATED ORAL at 08:03

## 2024-03-17 RX ADMIN — LEVOTHYROXINE SODIUM 50 MCG: 50 TABLET ORAL at 06:03

## 2024-03-17 RX ADMIN — ASPIRIN 81 MG: 81 TABLET, COATED ORAL at 08:03

## 2024-03-17 RX ADMIN — CEFEPIME 1 G: 1 INJECTION, POWDER, FOR SOLUTION INTRAMUSCULAR; INTRAVENOUS at 11:03

## 2024-03-17 RX ADMIN — SODIUM BICARBONATE 650 MG TABLET 650 MG: at 08:03

## 2024-03-17 RX ADMIN — MUPIROCIN: 20 OINTMENT TOPICAL at 08:03

## 2024-03-17 RX ADMIN — CEFEPIME 1 G: 1 INJECTION, POWDER, FOR SOLUTION INTRAMUSCULAR; INTRAVENOUS at 08:03

## 2024-03-17 RX ADMIN — PREDNISONE 10 MG: 10 TABLET ORAL at 08:03

## 2024-03-17 RX ADMIN — SULFAMETHOXAZOLE AND TRIMETHOPRIM 1 TABLET: 400; 80 TABLET ORAL at 08:03

## 2024-03-17 RX ADMIN — BISACODYL 10 MG: 10 SUPPOSITORY RECTAL at 09:03

## 2024-03-17 RX ADMIN — VALGANCICLOVIR HYDROCHLORIDE 450 MG: 450 TABLET ORAL at 08:03

## 2024-03-17 RX ADMIN — CALCITRIOL 0.5 MCG: 0.5 CAPSULE, LIQUID FILLED ORAL at 08:03

## 2024-03-17 RX ADMIN — TACROLIMUS 6 MG: 5 CAPSULE ORAL at 08:03

## 2024-03-17 RX ADMIN — OXYCODONE HYDROCHLORIDE 10 MG: 10 TABLET ORAL at 12:03

## 2024-03-17 NOTE — PLAN OF CARE
Patient AAOx4. VSS on RA. Afebrile. Accucheck orders maintained, coverage given via pt own pump. Pt c/o pain, controlled by PRN medications. RLQ kd inc renny w/ staples- free from signs of infection. R neph tube in place. RLQ JEAN-PIERRE drain in place. Curry in place- CAUTI precautions maintained. No BM reported this shift- see I&O for details. Up independently w/ walker. Bed locked and lowered, call bell in reach, nonskid socks on when OOB. Reviewed plan of care and fall precautions w/ pt- pt verbalized understanding. Reminded to call for assistance. Standard precautions maintained.

## 2024-03-17 NOTE — SUBJECTIVE & OBJECTIVE
"Interval HPI:   Overnight events: Remains in TSU. BG stable on home insulin pump. Remains on Prednisone 10 mg daily. Diet diabetic 2000 Calorie; Standard Tray    Eatin%  Nausea: No  Hypoglycemia and intervention: No  Fever: No  TPN and/or TF: No  If yes, type of TF/TPN and rate: n/a    /70 (Patient Position: Sitting)   Pulse 85   Temp 97.8 °F (36.6 °C) (Oral)   Resp 18   Ht 5' 4" (1.626 m)   Wt 89.9 kg (198 lb 3.1 oz)   SpO2 96%   BMI 34.02 kg/m²     Labs Reviewed and Include    Recent Labs   Lab 24  0806   *   CALCIUM 9.3   ALBUMIN 3.0*   *   K 3.9   CO2 25   CL 97   BUN 51*   CREATININE 2.3*     Lab Results   Component Value Date    WBC 7.61 2024    HGB 9.3 (L) 2024    HCT 28.9 (L) 2024    MCV 96 2024     2024     No results for input(s): "TSH", "FREET4" in the last 168 hours.  Lab Results   Component Value Date    HGBA1C 5.8 (H) 2024       Nutritional status:   Body mass index is 34.02 kg/m².  Lab Results   Component Value Date    ALBUMIN 3.0 (L) 2024    ALBUMIN 2.5 (L) 2024    ALBUMIN 3.1 (L) 03/15/2024     No results found for: "PREALBUMIN"    Estimated Creatinine Clearance: 32.4 mL/min (A) (based on SCr of 2.3 mg/dL (H)).    Accu-Checks  Recent Labs     03/15/24  1748 03/15/24  1917 03/15/24  2205 24  0201 24  0207 24  0904 24  1146 24  1801 24  0816   POCTGLUCOSE 121* 110 246* 123* 139* 187* 262* 211* 278* 164*       Current Medications and/or Treatments Impacting Glycemic Control  Immunotherapy:    Immunosuppressants           Stop Route Frequency     tacrolimus capsule 6 mg         -- Oral 2 times daily          Steroids:   Hormones (From admission, onward)      Start     Stop Route Frequency Ordered    24 0900  predniSONE tablet 5 mg        See Hyperspace for full Linked Orders Report.    -- Oral Daily 03/15/24 0929    03/16/24 0900  predniSONE tablet 10 mg "        See Hyperspace for full Linked Orders Report.    03/23/24 0859 Oral Daily 03/15/24 0929    03/13/24 1649  melatonin tablet 6 mg         -- Oral Nightly PRN 03/13/24 1649          Pressors:    Autonomic Drugs (From admission, onward)      None          Hyperglycemia/Diabetes Medications:   Antihyperglycemics (From admission, onward)      Start     Stop Route Frequency Ordered    03/13/24 2115  insulin aspart U-100 insulin pump from home 0-10 Units        Question Answer Comment   Target number 120    Basal Rate #1 1.2    Basal rate #1 time 8975-8096        -- SubQ Continuous 03/13/24 2012 03/13/24 2111  insulin aspart U-100 insulin pump from home 0-15 Units        Question Answer Comment   Target number 120    Carbohydrate coverage #1 1:5    Carbohydrate coverage #1 time 2559-3164    Sensitivity #1 1:25    Sensitivity #1 time 3519-2775        -- SubQ As needed (PRN) 03/13/24 2012

## 2024-03-17 NOTE — PROGRESS NOTES
[facility]  Kidney Transplant  Progress Note      Reason for Follow-up: Reassessment of Kidney Transplant - 2/29/2024  (#1) recipient and management of immunosuppression.    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 PD catheter remains in place. 6 day cramer (removed without issue), surgery went well. Post op:  PCN G #1 given 3/1 for +RPR donor cultures; needs weekly x 3 total per ID.   DGF: not clearing/low UOP; MWF chair, last 3/8/24  Drain remained in place at time of DC. Re-admitted 3/10 for increase in JEAN-PIERRE drain, drain Cr 2.8 (same as serum). US 3/10 with 2 fluid collections: SQ collection + deeper collection with drain terminating in the collection.     He presented to outpatient transplant clinic 3/13 with report of significant increase in drain output (~950) and increased leakage from incision. Lab work notable for leukocytosis. JEAN-PIERRE drain creatinine 29 (prev 2.8 on 3/10), significant for urine leak. Plan for CT a/p to assess collection and current drain placement. Abx started. Cramer to be placed.  with Cr 2.8. Plan to admit for infectious work-up and urine leak. Blood/urine cx pending. Assess need for HD daily. D/w Dr. Stokes.    Mr. Lopez is a 64 y.o. year old male who is status post Kidney Transplant - 2/29/2024  (#1).  His maintenance immunosuppression consists of:   Immunosuppressants (From admission, onward)      Start     Stop Route Frequency Ordered    03/16/24 1800  tacrolimus capsule 6 mg         -- Oral 2 times daily 03/16/24 1247            Hospital Course:  Patient admitted w increased JEAN-PIERRE output w decrease in UOP.  Fluid sent from drain (placed 2/29/24 in  OR), Cr 29.7, 92 WBC, 94 segs. CT A/P 3/13/24 reviewed w transplant surgeon, small sub incisional collection (3.3cm x 6.2cm x 9.2cm) and 2 peritoneal drainage catheters w medial catheter in fluid collection (3.5cm x 3.5 cm x 2.9 cm) favors hematoma verses seroma, no plan for intervention.  Last HD was 3/8/24.  Cramer placed on admit 3/13. Dialyzed inpatient on 3/14 for BUN >100, tolerated well. Pt cont to have high output per JEAN-PIERRE with cramer in place. IR consulted for perc neph tube to divert urine and allow for healing. Perc neph tube placed 3/15. 1 unit pRBC transfused 3/16 with appropriate response. Kidney US 3/16 today notable for persistent elevated Ris and slight increase in size of sub incisional collection. Reviewed by surgeon; no intervention indicated at this time.     Interval History: NAEON. R nephrostomy tube placed 3/15 by IR. Most urine draining to neph tube. Cramer remains in place as does JEAN-PIERRE drain. JEAN-PIERRE output decreasing / 230 ml past 24 hours. H/H appropriate post blood transfusion yesterday. Cr bumped from 1.7 to 2.3. 1.4L oral intake yesterday. Encourage 3L/day. Patient is tolerating a diet, having Bms, and ambulating. WBC trending down. Remains on Cefepime. Afebrile. WCTM.          Past Medical, Surgical, Family, and Social History:   Unchanged from H&P.    Scheduled Meds:   aspirin  81 mg Oral Daily    calcitRIOL  0.5 mcg Oral Daily    carvediloL  6.25 mg Oral BID    ceFEPime IV (PEDS and ADULTS)  1 g Intravenous Q12H    levothyroxine  50 mcg Oral Before breakfast    mupirocin   Nasal BID    pantoprazole  40 mg Oral Daily    predniSONE  10 mg Oral Daily    Followed by    [START ON 3/23/2024] predniSONE  5 mg Oral Daily    sodium bicarbonate  650 mg Oral BID    sulfamethoxazole-trimethoprim 400-80mg  1 tablet Oral Daily    tacrolimus  6 mg Oral BID    valGANciclovir  450 mg Oral Daily     Continuous Infusions:   insulin aspart U-100       PRN Meds:0.9%  NaCl infusion (for blood administration),  acetaminophen, bisacodyL, dextrose 10%, dextrose 10%, glucagon (human recombinant), glucose, glucose, insulin aspart U-100, LIDOcaine HCl 2%, melatonin, ondansetron, oxyCODONE, oxyCODONE, sodium chloride 0.9%    Intake/Output - Last 3 Shifts         03/15 0700  03/16 0659 03/16 0700  03/17 0659 03/17 0700  03/18 0659    P.O. 460 1420 2280    Blood  772.9     Other       IV Piggyback  254.9     Total Intake(mL/kg) 460 (5.1) 2447.8 (27.2) 2280 (25.4)    Urine (mL/kg/hr) 2675 (1.2) 1750 (0.8) 1250 (1.9)    Emesis/NG output 0 0     Drains 785 230 250    Other       Stool 0 0     Total Output 3460 1980 1500    Net -3000 +467.8 +780           Urine Occurrence 0 x 0 x     Stool Occurrence 0 x 1 x     Emesis Occurrence 0 x 0 x              Review of Systems   Constitutional:  Negative for chills and fever.   HENT:  Negative for facial swelling and trouble swallowing.    Eyes:  Negative for photophobia and redness.   Respiratory:  Negative for cough, shortness of breath, wheezing and stridor.    Cardiovascular:  Positive for leg swelling. Negative for chest pain and palpitations.   Gastrointestinal:  Negative for abdominal distention, abdominal pain, diarrhea, nausea and vomiting.   Genitourinary:  Negative for decreased urine volume.   Musculoskeletal:  Negative for neck pain and neck stiffness.   Skin:  Positive for wound.   Allergic/Immunologic: Positive for immunocompromised state.   Neurological:  Negative for dizziness and light-headedness.   Psychiatric/Behavioral:  Negative for agitation, behavioral problems and confusion.       Objective:     Vital Signs (Most Recent):  Temp: 98.3 °F (36.8 °C) (03/17/24 1112)  Pulse: 85 (03/17/24 1112)  Resp: 18 (03/17/24 1112)  BP: (!) 121/58 (03/17/24 1112)  SpO2: 96 % (03/17/24 1112) Vital Signs (24h Range):  Temp:  [97.8 °F (36.6 °C)-98.6 °F (37 °C)] 98.3 °F (36.8 °C)  Pulse:  [82-95] 85  Resp:  [17-18] 18  SpO2:  [94 %-98 %] 96 %  BP: (121-158)/(58-84) 121/58     Weight: 89.9  "kg (198 lb 3.1 oz)  Height: 5' 4" (162.6 cm)  Body mass index is 34.02 kg/m².     Physical Exam  Vitals and nursing note reviewed.   Constitutional:       General: He is not in acute distress.  HENT:      Head: Normocephalic and atraumatic.   Eyes:      General: No scleral icterus.        Right eye: No discharge.         Left eye: No discharge.   Cardiovascular:      Rate and Rhythm: Normal rate and regular rhythm.      Heart sounds: No murmur heard.  Pulmonary:      Effort: Pulmonary effort is normal. No respiratory distress.      Breath sounds: No wheezing or rales.   Abdominal:      General: Bowel sounds are normal. There is no distension.      Palpations: Abdomen is soft.      Tenderness: There is abdominal tenderness (incisional). There is no guarding.      Comments: PD catheter in place no s/s/I  R JEAN-PIERRE drain ss output   R neph tube with pink tinged urine   Genitourinary:     Comments: Curry in place draining yellow urine  Musculoskeletal:      Right lower leg: Edema (trace) present.      Left lower leg: Edema (trace) present.   Skin:     General: Skin is warm and dry.      Capillary Refill: Capillary refill takes less than 2 seconds.      Coloration: Skin is not jaundiced.   Neurological:      General: No focal deficit present.      Mental Status: He is alert and oriented to person, place, and time.   Psychiatric:         Mood and Affect: Mood normal.         Behavior: Behavior normal.         Thought Content: Thought content normal.         Judgment: Judgment normal.          Laboratory:  CBC:   Recent Labs   Lab 03/15/24  0542 03/16/24  0638 03/17/24  0807   WBC 10.82 6.18 7.61   RBC 2.63* 2.19* 3.00*   HGB 8.1* 6.8* 9.3*   HCT 26.0* 21.3* 28.9*   * 342 313   MCV 99* 97 96   MCH 30.8 31.1* 31.0   MCHC 31.2* 31.9* 32.2     CMP:   Recent Labs   Lab 03/15/24  0542 03/16/24  0638 03/17/24  0806   GLU 70 147* 137*   CALCIUM 9.2 8.9 9.3   ALBUMIN 3.1* 2.5* 3.0*   * 136 129*   K 4.5 3.9 3.9   CO2 26 26 " 25    102 97   BUN 47* 53* 51*   CREATININE 1.6* 1.7* 2.3*     Diagnostic Results:  US - Kidney: Results for orders placed during the hospital encounter of 03/13/24    US Transplant Kidney With Doppler    Narrative  EXAMINATION:  US TRANSPLANT KIDNEY WITH DOPPLER    CLINICAL HISTORY:  decreased Hgb post neph tube;    TECHNIQUE:  Real time gray scale and doppler ultrasound was performed over the patient's renal allograft.    COMPARISON:  Transplant kidney ultrasound 03/10/2024, 02/29/2024, CT abdomen pelvis 03/14/2024    FINDINGS:  Renal allograft in the right lower quadrant.  The allograft measures 11.5 cm. Normal perfusion. Nephrostomy tube and ureteral stent in place.  No hydronephrosis.    Subincisional fluid collection measuring 15.9 x 4.5 x 4.0 cm, previously 8.9 x 1.9 x 3.4 cm on prior ultrasound.    Vasculature:    Resistive indexes:    *Interlobar: 0.77, previously 0.74    *Segmental upper: 0.80, previously 0.78    *Segmental middle: 0.85, previously 0.79    *Segmental lower: 0.84, previously 0.72    Main renal artery peak systolic velocity: 229cm/sec with normal waveform.  (Previously 210 cm/sec)    Renal artery/iliac ratio: 0.80.    The main renal vein is patent.    Impression  Persistent elevated arterial resistive indices, with mild interval increase from prior study.    Subincisional fluid collection (hematoma versus seroma) measuring up to 15.9 cm enlarged from prior ultrasound 03/10/2024.    Electronically signed by resident: Doreen Scanlon  Date:    03/16/2024  Time:    11:31    Electronically signed by: Segundo Fernández MD  Date:    03/16/2024  Time:    11:47  Assessment/Plan:     * Urine leak from transplanted ureter  - He presented to outpatient transplant clinic 3/13 with report of significant increase in drain output (~950) and increased leakage from incision. Lab work notable for leukocytosis.   - JEAN-PIERRE drain creatinine 29 (prev 2.8 on 3/10), significant for urine leak.   - CT a/p to assess  "collection and current drain placement. No plan for intervention at this time. Cefepime started on admit.   - Cramer placed 3/13.  -  with Cr 2.8. HD 3/14/24 for clearance via R CW perm cath and tolerated well  - Initially, documentation suggest JEAN-PIERRE output decreased when cramer placed but pt emptying drain and RN not aware. JEAN-PIERRE drain output 850 in 24 hours. Decision made to proceed w perc neph tube 3/15.  - Most urine draining via perc neph tube; remains with cramer and JEAN-PIERRE drain.   - Kidney US 3/16 with elevated Ris and slight increase in subincisional collection. VSS. Reviewed by surgeon. 1 unit pRBC 3/16.   - Infectious work-up unremarkable thus far, urine cx pending. Cont Cefepime for now.    - Cr bumped 2.3 from 1.7 3/17. Encourage oral hydration.    Acidosis  - Continue oral bicarb.      Leukocytosis  - Infectious w/u- blood cultures w NGTD. UA unremarkable. Urine cx pending  - Drain from OR sent for cx- wbc 92, segs 94  - Started on Cefepime on admit; will cont given perc neph tube  - Wbc trending down; afebrile.  - WCTM.    Insulin pump in place  - Pt using home insulin pump, Endocrinology following      Delayed graft function of kidney  - Post-op DGF 2/2 DCD and long cold time. Last HD 3/8   - BUN elevated, w no AMS, denies n/v, 1+ BLE edema but lungs clear not requiring oxygen  - BUN up to 111 on 3/14. Pt tolerated HD 3/14 for clearance  - Pt has outpt HD chair in place MWF    Positive RPR test in cadaveric donor  - Weekly PCN G x 3; last dose 3/17    Acute on chronic anemia  - Hgb 6.8 3/16 from 8.1 day prior  - Vitals stable. Kidney Transplant US 3/16 reviewed by surgeon. Not concerned for acute bleed.  - 1 unit pRBC transfused 3/16 with appropriate response.  - Daily cbc.    At risk for opportunistic infections  - Continue Valcyte for CMV prophylaxis  - Continue Bactrim for PCP prophylaxis       Long-term use of immunosuppressant medication  - see "prophylactic immuno"      Prophylactic immunotherapy  - " "Continue Prograf. Will monitor for signs of toxic side effects, check daily tacrolimus troughs, and change meds accordingly.   - MMF held for leukocytosis   - Continue steroids.      Status post -donor kidney transplantation  - S/p DBD Ktx on 24 for presumed diabetic nephropathy (donor RPR+, CMV D+, R-, CIT ~16.5hrs).  - Patient being managed for DGF; see "DGF"  - Cr downtrended to morales 1.6 3/15; 1.7 3/16  - Strict I/O. Daily weights.  - See "urine leak."    Chronic viral hepatitis B without delta agent and without coma        Essential hypertension  - Continue Coreg.      Type 2 diabetes mellitus with kidney complication, with long-term current use of insulin  - Endocrine consulted and following      NIHARIKA (acute kidney injury)  - see "DGF"          Discharge Planning:  Not today.    Medical decision making for this encounter includes review of pertinent labs and diagnostic studies, assessment and planning, discussions with consulting providers, discussion with patient/family, and participation in multidisciplinary rounds. Time spent caring for patient: 60 minutes    Lakeisha Burr PA-C  Kidney Transplant  [facility]  "

## 2024-03-17 NOTE — ASSESSMENT & PLAN NOTE
- He presented to outpatient transplant clinic 3/13 with report of significant increase in drain output (~950) and increased leakage from incision. Lab work notable for leukocytosis.   - JEAN-PIERRE drain creatinine 29 (prev 2.8 on 3/10), significant for urine leak.   - CT a/p to assess collection and current drain placement. No plan for intervention at this time. Cefepime started on admit.   - Cramer placed 3/13.  -  with Cr 2.8. HD 3/14/24 for clearance via R CW perm cath and tolerated well  - Initially, documentation suggest JEAN-PIERRE output decreased when cramer placed but pt emptying drain and RN not aware. JEAN-PIERRE drain output 850 in 24 hours. Decision made to proceed w perc neph tube 3/15.  - Most urine draining via perc neph tube; remains with cramer and JEAN-PIERRE drain.   - Kidney US 3/16 with elevated Ris and slight increase in subincisional collection. VSS. Reviewed by surgeon. 1 unit pRBC 3/16.   - Infectious work-up unremarkable thus far, urine cx pending. Cont Cefepime for now.    - Cr bumped 2.3 from 1.7 3/17. Encourage oral hydration.

## 2024-03-17 NOTE — ASSESSMENT & PLAN NOTE
Lab Results   Component Value Date    CREATININE 2.3 (H) 03/17/2024     Avoid insulin stacking  Titrate insulin slowly

## 2024-03-17 NOTE — PROGRESS NOTES
"[facility] - [facility]plant Step[facility]  Endocrinology  Progress Note    Admit Date: 3/13/2024     Reason for Consult: Management of T2DM, Hyperglycemia      Surgical Procedure and Date: kidney transplant 2024     Diabetes diagnosis year:  per chart review      Home Diabetes Medications:  Tandem insulin pump   ICR 1:5   ISF 1:25  Basal rate 1.2 u/hr + control IQ      How often checking glucose at home? >4 x day   BG readings on regimen: 100-200s  Hypoglycemia on the regimen?  Yes, once a week overnight, 2-3x during the week during the day   Missed doses on regimen?  n/a     Diabetes Complications include:     Hyperglycemia, Hypoglycemia , Hypoglycemia unawareness, Diabetic nephropathy  , and Diabetic chronic kidney disease          Complicating diabetes co morbidities:   Glucocorticoid use        HPI:   Patient is a 64 y.o. 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 PD catheter remains in place. 6 day cramer (removed without issue), surgery went well. He presented to outpatient transplant clinic 3/13 with report of significant increase in drain output (~950) and increased leakage from incision. Lab work notable for leukocytosis. JEAN-PIERRE drain creatinine 29 (prev 2.8 on 3/10), significant for urine leak. Plan for CT a/p to assess collection and current drain placement. Abx started. Cramer to be placed.  with Cr 2.8. Plan to admit for infectious work-up and urine leak. Endocrinology consulted for management of T2DM.          Interval HPI:   Overnight events: Remains in TSU. BG stable on home insulin pump. Remains on Prednisone 10 mg daily. Diet diabetic 2000 Calorie; Standard Tray    Eatin%  Nausea: No  Hypoglycemia and intervention: No  Fever: No  TPN and/or TF: No  If yes, type of TF/TPN and rate: n/a    /70 (Patient Position: Sitting)   Pulse 85   Temp 97.8 °F (36.6 °C) (Oral)   Resp 18   Ht 5' 4" (1.626 m)   Wt 89.9 kg " "(198 lb 3.1 oz)   SpO2 96%   BMI 34.02 kg/m²     Labs Reviewed and Include    Recent Labs   Lab 03/17/24  0806   *   CALCIUM 9.3   ALBUMIN 3.0*   *   K 3.9   CO2 25   CL 97   BUN 51*   CREATININE 2.3*     Lab Results   Component Value Date    WBC 7.61 03/17/2024    HGB 9.3 (L) 03/17/2024    HCT 28.9 (L) 03/17/2024    MCV 96 03/17/2024     03/17/2024     No results for input(s): "TSH", "FREET4" in the last 168 hours.  Lab Results   Component Value Date    HGBA1C 5.8 (H) 03/01/2024       Nutritional status:   Body mass index is 34.02 kg/m².  Lab Results   Component Value Date    ALBUMIN 3.0 (L) 03/17/2024    ALBUMIN 2.5 (L) 03/16/2024    ALBUMIN 3.1 (L) 03/15/2024     No results found for: "PREALBUMIN"    Estimated Creatinine Clearance: 32.4 mL/min (A) (based on SCr of 2.3 mg/dL (H)).    Accu-Checks  Recent Labs     03/15/24  1748 03/15/24  1917 03/15/24  2205 03/16/24  0201 03/16/24  0207 03/16/24  0904 03/16/24  1146 03/16/24  1801 03/16/24  2014 03/17/24  0816   POCTGLUCOSE 121* 110 246* 123* 139* 187* 262* 211* 278* 164*       Current Medications and/or Treatments Impacting Glycemic Control  Immunotherapy:    Immunosuppressants           Stop Route Frequency     tacrolimus capsule 6 mg         -- Oral 2 times daily          Steroids:   Hormones (From admission, onward)      Start     Stop Route Frequency Ordered    03/23/24 0900  predniSONE tablet 5 mg        See Hyperspace for full Linked Orders Report.    -- Oral Daily 03/15/24 0929    03/16/24 0900  predniSONE tablet 10 mg        See Hyperspace for full Linked Orders Report.    03/23/24 0859 Oral Daily 03/15/24 0929    03/13/24 1649  melatonin tablet 6 mg         -- Oral Nightly PRN 03/13/24 1649          Pressors:    Autonomic Drugs (From admission, onward)      None          Hyperglycemia/Diabetes Medications:   Antihyperglycemics (From admission, onward)      Start     Stop Route Frequency Ordered    03/13/24 8620  insulin aspart U-100 " insulin pump from home 0-10 Units        Question Answer Comment   Target number 120    Basal Rate #1 1.2    Basal rate #1 time 8964-1057        -- SubQ Continuous 03/13/24 2012 03/13/24 2111  insulin aspart U-100 insulin pump from home 0-15 Units        Question Answer Comment   Target number 120    Carbohydrate coverage #1 1:5    Carbohydrate coverage #1 time 2577-6522    Sensitivity #1 1:25    Sensitivity #1 time 3115-5117        -- SubQ As needed (PRN) 03/13/24 2012            ASSESSMENT and PLAN    Renal/  * Urine leak from transplanted ureter  Managed per primary team  Optimize BG Control        NIHARIKA (acute kidney injury)  Lab Results   Component Value Date    CREATININE 2.3 (H) 03/17/2024     Avoid insulin stacking  Titrate insulin slowly       Endocrine  Insulin pump in place  At time of evaluation, pt meets criteria to continue home insulin pump usage.  - Has all adequate supplies   - Insulin pump site change on 3/16/24  - Bolus settings reviewed    - No changes to home regimen.   - Nurse to check BG qac/hs/0200 & record in epic   - Patient to input glucose into pump and use bolus wizard for prandial needs   - Will continue to monitor accuchecks and titrate insulin as clinically indicated .     - Discussed above plan with patient, patient verbalized understanding.   - Understands in case of pump malfunction or cognitive decline in which pt can no longer safely use insulin pump, will transition to SC MDI.     PLEASE CONTACT ENDOCRINE IF INSULIN PUMP FAILS           Type 2 diabetes mellitus with kidney complication, with long-term current use of insulin  Endocrinology consulted for BG management.   BG goal 140-180       - Continue Home Insulin Pump  - BG checks AC/HS/0200  - Hypoglycemia protocol in place     ** Please notify Endocrine for any change and/or advance in diet**  ** Please call Endocrine for any BG related issues **     Discharge Planning:   TBD. Please notify endocrinology prior to  discharge.              Nakia Vasquez NP  Endocrinology  [facility]

## 2024-03-17 NOTE — PLAN OF CARE
Patient AAOx4. OOB independently. VSS. 3L oral fluid intake encouraged. Dressing change to JEAN-PIERRE site, island boarder CDI. PD catheter site CDI. Dressing changed to Neph tube site, island boarder CDI. Curry care completed. Central line dressing CDI. Incision RLQ CDI, staples intact. Cr trended up to 2.3. Bed lowest setting, locked, 2x rails up, call light within reach.

## 2024-03-17 NOTE — ASSESSMENT & PLAN NOTE
- Hgb 6.8 3/16 from 8.1 day prior  - Vitals stable. Kidney Transplant US 3/16 reviewed by surgeon. Not concerned for acute bleed.  - 1 unit pRBC transfused 3/16 with appropriate response.  - Daily cbc.

## 2024-03-17 NOTE — SUBJECTIVE & OBJECTIVE
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 PD catheter remains in place. 6 day cramer (removed without issue), surgery went well. Post op:  PCN G #1 given 3/1 for +RPR donor cultures; needs weekly x 3 total per ID.   DGF: not clearing/low UOP; MWF chair, last 3/8/24  Drain remained in place at time of DC. Re-admitted 3/10 for increase in JEAN-PIERRE drain, drain Cr 2.8 (same as serum). US 3/10 with 2 fluid collections: SQ collection + deeper collection with drain terminating in the collection.     He presented to outpatient transplant clinic 3/13 with report of significant increase in drain output (~950) and increased leakage from incision. Lab work notable for leukocytosis. JEAN-PIERRE drain creatinine 29 (prev 2.8 on 3/10), significant for urine leak. Plan for CT a/p to assess collection and current drain placement. Abx started. Cramer to be placed.  with Cr 2.8. Plan to admit for infectious work-up and urine leak. Blood/urine cx pending. Assess need for HD daily. D/w Dr. Stokes.    Mr. Lopez is a 64 y.o. year old male who is status post Kidney Transplant - 2/29/2024  (#1).  His maintenance immunosuppression consists of:   Immunosuppressants (From admission, onward)      Start     Stop Route Frequency Ordered    03/16/24 1800  tacrolimus capsule 6 mg         -- Oral 2 times daily 03/16/24 1247            Hospital Course:  Patient admitted w increased JEAN-PIERRE output w decrease in UOP.  Fluid sent from drain (placed 2/29/24 in OR), Cr 29.7, 92 WBC, 94 segs. CT A/P 3/13/24 reviewed w transplant surgeon, small sub incisional collection (3.3cm x 6.2cm x 9.2cm) and 2 peritoneal drainage catheters w medial catheter in fluid collection (3.5cm x 3.5 cm x 2.9 cm) favors hematoma verses seroma, no plan for intervention.  Last HD was 3/8/24.  Cramer placed on admit 3/13. Dialyzed inpatient on 3/14 for BUN  >100, tolerated well. Pt cont to have high output per JEAN-PIERRE with cramer in place. IR consulted for perc neph tube to divert urine and allow for healing. Perc neph tube placed 3/15. 1 unit pRBC transfused 3/16 with appropriate response. Kidney US 3/16 today notable for persistent elevated Ris and slight increase in size of sub incisional collection. Reviewed by surgeon; no intervention indicated at this time.     Interval History: NAEON. R nephrostomy tube placed 3/15 by IR. Most urine draining to neph tube. Cramer remains in place as does JEAN-PIERRE drain. JEAN-PIERRE output decreasing / 230 ml past 24 hours. H/H appropriate post blood transfusion yesterday. Cr bumped from 1.7 to 2.3. 1.4L oral intake yesterday. Encourage 3L/day. Patient is tolerating a diet, having Bms, and ambulating. WBC trending down. Remains on Cefepime. Afebrile. WCTM.          Past Medical, Surgical, Family, and Social History:   Unchanged from H&P.    Scheduled Meds:   aspirin  81 mg Oral Daily    calcitRIOL  0.5 mcg Oral Daily    carvediloL  6.25 mg Oral BID    ceFEPime IV (PEDS and ADULTS)  1 g Intravenous Q12H    levothyroxine  50 mcg Oral Before breakfast    mupirocin   Nasal BID    pantoprazole  40 mg Oral Daily    predniSONE  10 mg Oral Daily    Followed by    [START ON 3/23/2024] predniSONE  5 mg Oral Daily    sodium bicarbonate  650 mg Oral BID    sulfamethoxazole-trimethoprim 400-80mg  1 tablet Oral Daily    tacrolimus  6 mg Oral BID    valGANciclovir  450 mg Oral Daily     Continuous Infusions:   insulin aspart U-100       PRN Meds:0.9%  NaCl infusion (for blood administration), acetaminophen, bisacodyL, dextrose 10%, dextrose 10%, glucagon (human recombinant), glucose, glucose, insulin aspart U-100, LIDOcaine HCl 2%, melatonin, ondansetron, oxyCODONE, oxyCODONE, sodium chloride 0.9%    Intake/Output - Last 3 Shifts         03/15 0700  03/16 0659 03/16 0700  03/17 0659 03/17 0700  03/18 0659    P.O. 460 1420 2280    Blood  772.9     Other       IV  "Piggyback  254.9     Total Intake(mL/kg) 460 (5.1) 2447.8 (27.2) 2280 (25.4)    Urine (mL/kg/hr) 2675 (1.2) 1750 (0.8) 1250 (1.9)    Emesis/NG output 0 0     Drains 785 230 250    Other       Stool 0 0     Total Output 3460 1980 1500    Net -3000 +467.8 +780           Urine Occurrence 0 x 0 x     Stool Occurrence 0 x 1 x     Emesis Occurrence 0 x 0 x              Review of Systems   Constitutional:  Negative for chills and fever.   HENT:  Negative for facial swelling and trouble swallowing.    Eyes:  Negative for photophobia and redness.   Respiratory:  Negative for cough, shortness of breath, wheezing and stridor.    Cardiovascular:  Positive for leg swelling. Negative for chest pain and palpitations.   Gastrointestinal:  Negative for abdominal distention, abdominal pain, diarrhea, nausea and vomiting.   Genitourinary:  Negative for decreased urine volume.   Musculoskeletal:  Negative for neck pain and neck stiffness.   Skin:  Positive for wound.   Allergic/Immunologic: Positive for immunocompromised state.   Neurological:  Negative for dizziness and light-headedness.   Psychiatric/Behavioral:  Negative for agitation, behavioral problems and confusion.       Objective:     Vital Signs (Most Recent):  Temp: 98.3 °F (36.8 °C) (03/17/24 1112)  Pulse: 85 (03/17/24 1112)  Resp: 18 (03/17/24 1112)  BP: (!) 121/58 (03/17/24 1112)  SpO2: 96 % (03/17/24 1112) Vital Signs (24h Range):  Temp:  [97.8 °F (36.6 °C)-98.6 °F (37 °C)] 98.3 °F (36.8 °C)  Pulse:  [82-95] 85  Resp:  [17-18] 18  SpO2:  [94 %-98 %] 96 %  BP: (121-158)/(58-84) 121/58     Weight: 89.9 kg (198 lb 3.1 oz)  Height: 5' 4" (162.6 cm)  Body mass index is 34.02 kg/m².     Physical Exam  Vitals and nursing note reviewed.   Constitutional:       General: He is not in acute distress.  HENT:      Head: Normocephalic and atraumatic.   Eyes:      General: No scleral icterus.        Right eye: No discharge.         Left eye: No discharge.   Cardiovascular:      Rate " and Rhythm: Normal rate and regular rhythm.      Heart sounds: No murmur heard.  Pulmonary:      Effort: Pulmonary effort is normal. No respiratory distress.      Breath sounds: No wheezing or rales.   Abdominal:      General: Bowel sounds are normal. There is no distension.      Palpations: Abdomen is soft.      Tenderness: There is abdominal tenderness (incisional). There is no guarding.      Comments: PD catheter in place no s/s/I  R JEAN-PIERRE drain ss output   R neph tube with pink tinged urine   Genitourinary:     Comments: Curry in place draining yellow urine  Musculoskeletal:      Right lower leg: Edema (trace) present.      Left lower leg: Edema (trace) present.   Skin:     General: Skin is warm and dry.      Capillary Refill: Capillary refill takes less than 2 seconds.      Coloration: Skin is not jaundiced.   Neurological:      General: No focal deficit present.      Mental Status: He is alert and oriented to person, place, and time.   Psychiatric:         Mood and Affect: Mood normal.         Behavior: Behavior normal.         Thought Content: Thought content normal.         Judgment: Judgment normal.          Laboratory:  CBC:   Recent Labs   Lab 03/15/24  0542 03/16/24  0638 03/17/24  0807   WBC 10.82 6.18 7.61   RBC 2.63* 2.19* 3.00*   HGB 8.1* 6.8* 9.3*   HCT 26.0* 21.3* 28.9*   * 342 313   MCV 99* 97 96   MCH 30.8 31.1* 31.0   MCHC 31.2* 31.9* 32.2     CMP:   Recent Labs   Lab 03/15/24  0542 03/16/24  0638 03/17/24  0806   GLU 70 147* 137*   CALCIUM 9.2 8.9 9.3   ALBUMIN 3.1* 2.5* 3.0*   * 136 129*   K 4.5 3.9 3.9   CO2 26 26 25    102 97   BUN 47* 53* 51*   CREATININE 1.6* 1.7* 2.3*     Diagnostic Results:  US - Kidney: Results for orders placed during the hospital encounter of 03/13/24    US Transplant Kidney With Doppler    Narrative  EXAMINATION:  US TRANSPLANT KIDNEY WITH DOPPLER    CLINICAL HISTORY:  decreased Hgb post neph tube;    TECHNIQUE:  Real time gray scale and doppler  ultrasound was performed over the patient's renal allograft.    COMPARISON:  Transplant kidney ultrasound 03/10/2024, 02/29/2024, CT abdomen pelvis 03/14/2024    FINDINGS:  Renal allograft in the right lower quadrant.  The allograft measures 11.5 cm. Normal perfusion. Nephrostomy tube and ureteral stent in place.  No hydronephrosis.    Subincisional fluid collection measuring 15.9 x 4.5 x 4.0 cm, previously 8.9 x 1.9 x 3.4 cm on prior ultrasound.    Vasculature:    Resistive indexes:    *Interlobar: 0.77, previously 0.74    *Segmental upper: 0.80, previously 0.78    *Segmental middle: 0.85, previously 0.79    *Segmental lower: 0.84, previously 0.72    Main renal artery peak systolic velocity: 229cm/sec with normal waveform.  (Previously 210 cm/sec)    Renal artery/iliac ratio: 0.80.    The main renal vein is patent.    Impression  Persistent elevated arterial resistive indices, with mild interval increase from prior study.    Subincisional fluid collection (hematoma versus seroma) measuring up to 15.9 cm enlarged from prior ultrasound 03/10/2024.    Electronically signed by resident: Doreen Scanlon  Date:    03/16/2024  Time:    11:31    Electronically signed by: Segundo Fernández MD  Date:    03/16/2024  Time:    11:47

## 2024-03-18 LAB
ALBUMIN SERPL BCP-MCNC: 3.1 G/DL (ref 3.5–5.2)
ANION GAP SERPL CALC-SCNC: 11 MMOL/L (ref 8–16)
APPEARANCE FLD: CLEAR
BASOPHILS # BLD AUTO: 0.05 K/UL (ref 0–0.2)
BASOPHILS NFR BLD: 0.7 % (ref 0–1.9)
BODY FLD TYPE: NORMAL
BODY FLUID SOURCE, CREATININE: NORMAL
BUN SERPL-MCNC: 49 MG/DL (ref 8–23)
CALCIUM SERPL-MCNC: 9.8 MG/DL (ref 8.7–10.5)
CHLORIDE SERPL-SCNC: 104 MMOL/L (ref 95–110)
CO2 SERPL-SCNC: 16 MMOL/L (ref 23–29)
COLOR FLD: NORMAL
CREAT FLD-MCNC: 2 MG/DL
CREAT SERPL-MCNC: 2.2 MG/DL (ref 0.5–1.4)
DIFFERENTIAL METHOD BLD: ABNORMAL
EOSINOPHIL # BLD AUTO: 0.1 K/UL (ref 0–0.5)
EOSINOPHIL NFR BLD: 1.2 % (ref 0–8)
EOSINOPHIL NFR FLD MANUAL: 1 %
ERYTHROCYTE [DISTWIDTH] IN BLOOD BY AUTOMATED COUNT: 15.9 % (ref 11.5–14.5)
EST. GFR  (NO RACE VARIABLE): 32.4 ML/MIN/1.73 M^2
GLUCOSE SERPL-MCNC: 153 MG/DL (ref 70–110)
HCT VFR BLD AUTO: 32.4 % (ref 40–54)
HGB BLD-MCNC: 10 G/DL (ref 14–18)
IMM GRANULOCYTES # BLD AUTO: 0.07 K/UL (ref 0–0.04)
IMM GRANULOCYTES NFR BLD AUTO: 1 % (ref 0–0.5)
LYMPHOCYTES # BLD AUTO: 0.1 K/UL (ref 1–4.8)
LYMPHOCYTES NFR BLD: 1.1 % (ref 18–48)
LYMPHOCYTES NFR FLD MANUAL: 52 %
MAGNESIUM SERPL-MCNC: 2.3 MG/DL (ref 1.6–2.6)
MCH RBC QN AUTO: 30.9 PG (ref 27–31)
MCHC RBC AUTO-ENTMCNC: 30.9 G/DL (ref 32–36)
MCV RBC AUTO: 100 FL (ref 82–98)
MONOCYTES # BLD AUTO: 0.6 K/UL (ref 0.3–1)
MONOCYTES NFR BLD: 7.7 % (ref 4–15)
MONOS+MACROS NFR FLD MANUAL: 25 %
NEUTROPHILS # BLD AUTO: 6.4 K/UL (ref 1.8–7.7)
NEUTROPHILS NFR BLD: 88.3 % (ref 38–73)
NEUTROPHILS NFR FLD MANUAL: 22 %
NRBC BLD-RTO: 0 /100 WBC
PHOSPHATE SERPL-MCNC: 2.7 MG/DL (ref 2.7–4.5)
PLATELET # BLD AUTO: 317 K/UL (ref 150–450)
PMV BLD AUTO: 9.4 FL (ref 9.2–12.9)
POCT GLUCOSE: 157 MG/DL (ref 70–110)
POCT GLUCOSE: 170 MG/DL (ref 70–110)
POCT GLUCOSE: 95 MG/DL (ref 70–110)
POTASSIUM SERPL-SCNC: 4.2 MMOL/L (ref 3.5–5.1)
RBC # BLD AUTO: 3.24 M/UL (ref 4.6–6.2)
SODIUM SERPL-SCNC: 131 MMOL/L (ref 136–145)
TACROLIMUS BLD-MCNC: 6.7 NG/ML (ref 5–15)
WBC # BLD AUTO: 7.24 K/UL (ref 3.9–12.7)
WBC # FLD: 9 /CU MM

## 2024-03-18 PROCEDURE — 99223 1ST HOSP IP/OBS HIGH 75: CPT | Mod: 24,,, | Performed by: PHYSICIAN ASSISTANT

## 2024-03-18 PROCEDURE — 36415 COLL VENOUS BLD VENIPUNCTURE: CPT | Performed by: PHYSICIAN ASSISTANT

## 2024-03-18 PROCEDURE — 83735 ASSAY OF MAGNESIUM: CPT | Performed by: PHYSICIAN ASSISTANT

## 2024-03-18 PROCEDURE — 80197 ASSAY OF TACROLIMUS: CPT | Performed by: PHYSICIAN ASSISTANT

## 2024-03-18 PROCEDURE — 85025 COMPLETE CBC W/AUTO DIFF WBC: CPT | Performed by: PHYSICIAN ASSISTANT

## 2024-03-18 PROCEDURE — 80069 RENAL FUNCTION PANEL: CPT | Performed by: PHYSICIAN ASSISTANT

## 2024-03-18 PROCEDURE — 63600175 PHARM REV CODE 636 W HCPCS: Performed by: PHYSICIAN ASSISTANT

## 2024-03-18 PROCEDURE — 20600001 HC STEP DOWN PRIVATE ROOM

## 2024-03-18 PROCEDURE — 82570 ASSAY OF URINE CREATININE: CPT | Performed by: PHYSICIAN ASSISTANT

## 2024-03-18 PROCEDURE — 63600175 PHARM REV CODE 636 W HCPCS: Performed by: NURSE PRACTITIONER

## 2024-03-18 PROCEDURE — 25000003 PHARM REV CODE 250: Performed by: PHYSICIAN ASSISTANT

## 2024-03-18 PROCEDURE — 25000003 PHARM REV CODE 250: Performed by: INTERNAL MEDICINE

## 2024-03-18 PROCEDURE — 99233 SBSQ HOSP IP/OBS HIGH 50: CPT | Mod: ,,, | Performed by: NURSE PRACTITIONER

## 2024-03-18 PROCEDURE — 89051 BODY FLUID CELL COUNT: CPT | Performed by: PHYSICIAN ASSISTANT

## 2024-03-18 PROCEDURE — 63600175 PHARM REV CODE 636 W HCPCS: Performed by: INTERNAL MEDICINE

## 2024-03-18 PROCEDURE — 94761 N-INVAS EAR/PLS OXIMETRY MLT: CPT

## 2024-03-18 RX ADMIN — CALCITRIOL 0.5 MCG: 0.5 CAPSULE, LIQUID FILLED ORAL at 09:03

## 2024-03-18 RX ADMIN — TACROLIMUS 6 MG: 5 CAPSULE ORAL at 09:03

## 2024-03-18 RX ADMIN — SULFAMETHOXAZOLE AND TRIMETHOPRIM 1 TABLET: 400; 80 TABLET ORAL at 09:03

## 2024-03-18 RX ADMIN — PREDNISONE 10 MG: 10 TABLET ORAL at 09:03

## 2024-03-18 RX ADMIN — ASPIRIN 81 MG: 81 TABLET, COATED ORAL at 09:03

## 2024-03-18 RX ADMIN — TACROLIMUS 6 MG: 5 CAPSULE ORAL at 05:03

## 2024-03-18 RX ADMIN — LEVOTHYROXINE SODIUM 50 MCG: 50 TABLET ORAL at 06:03

## 2024-03-18 RX ADMIN — VALGANCICLOVIR HYDROCHLORIDE 450 MG: 450 TABLET ORAL at 05:03

## 2024-03-18 RX ADMIN — INSULIN ASPART 11 UNITS: 100 INJECTION, SOLUTION INTRAVENOUS; SUBCUTANEOUS at 01:03

## 2024-03-18 RX ADMIN — MUPIROCIN: 20 OINTMENT TOPICAL at 10:03

## 2024-03-18 RX ADMIN — CEFEPIME 1 G: 1 INJECTION, POWDER, FOR SOLUTION INTRAMUSCULAR; INTRAVENOUS at 07:03

## 2024-03-18 RX ADMIN — PANTOPRAZOLE SODIUM 40 MG: 40 TABLET, DELAYED RELEASE ORAL at 09:03

## 2024-03-18 RX ADMIN — CARVEDILOL 6.25 MG: 6.25 TABLET, FILM COATED ORAL at 09:03

## 2024-03-18 RX ADMIN — INSULIN ASPART 22 UNITS: 100 INJECTION, SOLUTION INTRAVENOUS; SUBCUTANEOUS at 06:03

## 2024-03-18 RX ADMIN — SODIUM BICARBONATE 650 MG TABLET 650 MG: at 07:03

## 2024-03-18 RX ADMIN — INSULIN ASPART 14 UNITS: 100 INJECTION, SOLUTION INTRAVENOUS; SUBCUTANEOUS at 10:03

## 2024-03-18 RX ADMIN — CEFEPIME 1 G: 1 INJECTION, POWDER, FOR SOLUTION INTRAMUSCULAR; INTRAVENOUS at 10:03

## 2024-03-18 RX ADMIN — SODIUM BICARBONATE 650 MG TABLET 650 MG: at 09:03

## 2024-03-18 RX ADMIN — CARVEDILOL 6.25 MG: 6.25 TABLET, FILM COATED ORAL at 07:03

## 2024-03-18 NOTE — ASSESSMENT & PLAN NOTE
At time of evaluation, pt meets criteria to continue home insulin pump usage.  - Has all adequate supplies   - Insulin pump site change on 3/16/24  - Bolus settings reviewed    - ICR changed to 1:4 and basal control IQ turned on   - Nurse to check BG qac/hs/0200 & record in epic   - Patient to input glucose into pump and use bolus wizard for prandial needs   - Will continue to monitor accuchecks and titrate insulin as clinically indicated .     - Discussed above plan with patient, patient verbalized understanding.   - Understands in case of pump malfunction or cognitive decline in which pt can no longer safely use insulin pump, will transition to [place] MDI.     PLEASE CONTACT ENDOCRINE IF INSULIN PUMP FAILS

## 2024-03-18 NOTE — NURSING
Patient resting in bedside chair requesting Neph dressing change. Split guaze removed and replaced.Previous dressing fully saturated with clear yellow output, no odor noted. JEAN-PIERRE dressing CDI. Patient tolerated well. Patient AAOX4. VSS. PD cath CDI, Central line CDI. ABD incision and staples painted with betadine .Curry care performed, patient tolerated well. Patient denies pain but requesting suppository for constipation. Bed in lowest position, call light and personal items in reach .

## 2024-03-18 NOTE — ASSESSMENT & PLAN NOTE
- He presented to outpatient transplant clinic 3/13 with report of significant increase in drain output (~950) and increased leakage from incision. Lab work notable for leukocytosis.   - JEAN-PIERRE drain creatinine 29 (prev 2.8 on 3/10), significant for urine leak.   - CT a/p to assess collection and current drain placement. No plan for intervention at this time. Cefepime started on admit.   - Cramer placed 3/13.  -  with Cr 2.8. HD 3/14/24 for clearance via R CW perm cath and tolerated well  - Initially, documentation suggest JEAN-PIERRE output decreased when cramer placed but pt emptying drain and RN not aware. JEAN-PIERRE drain output 850 in 24 hours. Decision made to proceed w perc neph tube 3/15.  - Most urine draining via perc neph tube; remains with cramer and JEAN-PIERRE drain.   - Kidney US 3/16 with elevated Ris and slight increase in subincisional collection. VSS. Reviewed by surgeon. 1 unit pRBC 3/16.   - Infectious work-up unremarkable thus far, urine cx negative. Cont Cefepime for now.

## 2024-03-18 NOTE — NURSING
Patient nephrostomy and anastacio drain dressing changed. site clean and pat dry. split guaze and tape applied x 2 . Patient tolearted well,

## 2024-03-18 NOTE — PROGRESS NOTES
[facility] - Transplant Stepdown  Kidney Transplant  Progress Note      Reason for Follow-up: Reassessment of Kidney Transplant - 2/29/2024  (#1) recipient and management of immunosuppression.    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 PD catheter remains in place. 6 day cramer (removed without issue), surgery went well. Post op:  PCN G #1 given 3/1 for +RPR donor cultures; needs weekly x 3 total per ID.   DGF: not clearing/low UOP; MWF chair, last 3/8/24  Drain remained in place at time of DC. Re-admitted 3/10 for increase in JEAN-PIERRE drain, drain Cr 2.8 (same as serum). US 3/10 with 2 fluid collections: SQ collection + deeper collection with drain terminating in the collection.     He presented to outpatient transplant clinic 3/13 with report of significant increase in drain output (~950) and increased leakage from incision. Lab work notable for leukocytosis. JEAN-PIERRE drain creatinine 29 (prev 2.8 on 3/10), significant for urine leak. Plan for CT a/p to assess collection and current drain placement. Abx started. Cramer to be placed.  with Cr 2.8. Plan to admit for infectious work-up and urine leak. Blood/urine cx pending. Assess need for HD daily. D/w Dr. Stokes.    Mr. Lopez is a 64 y.o. year old male who is status post Kidney Transplant - 2/29/2024  (#1).  His maintenance immunosuppression consists of:   Immunosuppressants (From admission, onward)      Start     Stop Route Frequency Ordered    03/16/24 1800  tacrolimus capsule 6 mg         -- Oral 2 times daily 03/16/24 1247            Hospital Course:  Patient admitted w increased JEAN-PIERRE output w decrease in UOP.  Fluid sent from drain (placed 2/29/24 in  OR), Cr 29.7, 92 WBC, 94 segs. CT A/P 3/13/24 reviewed w transplant surgeon, small sub incisional collection (3.3cm x 6.2cm x 9.2cm) and 2 peritoneal drainage catheters w medial catheter in fluid collection (3.5cm x 3.5 cm x 2.9 cm) favors hematoma verses seroma, no plan for intervention.  Last HD was 3/8/24.  Carmer placed on admit 3/13. Dialyzed inpatient on 3/14 for BUN >100, tolerated well. Pt cont to have high output per JEAN-PIERRE with cramer in place. IR consulted for perc neph tube to divert urine and allow for healing. Perc neph tube placed 3/15. 1 unit pRBC transfused 3/16 with appropriate response. Kidney US 3/16 today notable for persistent elevated Ris and slight increase in size of sub incisional collection. Reviewed by surgeon; no intervention indicated at this time.     Interval History: NAEON. R nephrostomy tube placed 3/15 by IR. Making good urine, not yet clearing well. Most urine draining to neph tube. Cramer remains in place as does JEAN-PIERRE drain. JEAN-PIERRE output increased in the past 24 hours. H/H stable. Cr trended down this morning. No need for HD at this time. Will cont to assess daily. Encourage 3L/ PO intake day. Patient is tolerating a diet, having Bms, and ambulating. WBC trending down. Remains on Cefepime. Afebrile. WCTM.        Past Medical, Surgical, Family, and Social History:   Unchanged from H&P.    Scheduled Meds:   aspirin  81 mg Oral Daily    calcitRIOL  0.5 mcg Oral Daily    carvediloL  6.25 mg Oral BID    ceFEPime IV (PEDS and ADULTS)  1 g Intravenous Q12H    levothyroxine  50 mcg Oral Before breakfast    mupirocin   Nasal BID    pantoprazole  40 mg Oral Daily    predniSONE  10 mg Oral Daily    Followed by    [START ON 3/23/2024] predniSONE  5 mg Oral Daily    sodium bicarbonate  650 mg Oral BID    sulfamethoxazole-trimethoprim 400-80mg  1 tablet Oral Daily    tacrolimus  6 mg Oral BID    valGANciclovir  450 mg Oral Every Mon, Wed, Fri     Continuous Infusions:   insulin aspart U-100       PRN  "Meds:acetaminophen, bisacodyL, dextrose 10%, dextrose 10%, glucagon (human recombinant), glucose, glucose, insulin aspart U-100, LIDOcaine HCl 2%, melatonin, ondansetron, oxyCODONE, oxyCODONE, sodium chloride 0.9%    Intake/Output - Last 3 Shifts         03/16 0700  03/17 0659 03/17 0700  03/18 0659 03/18 0700  03/19 0659    P.O. 1420 3080 980    Blood 772.9      IV Piggyback 254.9  100    Total Intake(mL/kg) 2447.8 (27.2) 3080 (34.3) 1080 (12)    Urine (mL/kg/hr) 1750 (0.8) 4225 (2) 500 (0.8)    Emesis/NG output 0      Drains 230 830 195    Stool 0      Total Output 1980 5055 695    Net +467.8 -1975 +385           Urine Occurrence 0 x      Stool Occurrence 1 x      Emesis Occurrence 0 x               Review of Systems   Objective:     Vital Signs (Most Recent):  Temp: 98.3 °F (36.8 °C) (03/18/24 1200)  Pulse: 85 (03/18/24 1200)  Resp: 14 (03/18/24 1200)  BP: (!) 147/67 (03/18/24 1200)  SpO2: 99 % (03/18/24 1200) Vital Signs (24h Range):  Temp:  [97.8 °F (36.6 °C)-98.5 °F (36.9 °C)] 98.3 °F (36.8 °C)  Pulse:  [73-85] 85  Resp:  [14-18] 14  SpO2:  [93 %-100 %] 99 %  BP: (124-147)/(58-67) 147/67     Weight: 89.9 kg (198 lb 3.1 oz)  Height: 5' 4" (162.6 cm)  Body mass index is 34.02 kg/m².     Physical Exam  Vitals and nursing note reviewed.   Constitutional:       General: He is not in acute distress.  HENT:      Head: Normocephalic and atraumatic.   Eyes:      General: No scleral icterus.        Right eye: No discharge.         Left eye: No discharge.   Cardiovascular:      Rate and Rhythm: Normal rate and regular rhythm.      Heart sounds: No murmur heard.  Pulmonary:      Effort: Pulmonary effort is normal. No respiratory distress.      Breath sounds: No wheezing or rales.   Abdominal:      General: Bowel sounds are normal. There is no distension.      Palpations: Abdomen is soft.      Tenderness: There is abdominal tenderness (incisional). There is no guarding.      Comments: PD catheter in place no s/s/I  R JEAN-PIERRE " drain ss output   R neph tube with pink tinged urine   Genitourinary:     Comments: Cramer in place draining yellow urine  Musculoskeletal:      Right lower leg: Edema (trace) present.      Left lower leg: Edema (trace) present.   Skin:     General: Skin is warm and dry.      Capillary Refill: Capillary refill takes less than 2 seconds.      Coloration: Skin is not jaundiced.   Neurological:      General: No focal deficit present.      Mental Status: He is alert and oriented to person, place, and time.   Psychiatric:         Mood and Affect: Mood normal.         Behavior: Behavior normal.         Thought Content: Thought content normal.         Judgment: Judgment normal.          Laboratory:  CBC:   Recent Labs   Lab 03/16/24  0638 03/17/24  0807 03/18/24  0708   WBC 6.18 7.61 7.24   RBC 2.19* 3.00* 3.24*   HGB 6.8* 9.3* 10.0*   HCT 21.3* 28.9* 32.4*    313 317   MCV 97 96 100*   MCH 31.1* 31.0 30.9   MCHC 31.9* 32.2 30.9*     CMP:   Recent Labs   Lab 03/16/24  0638 03/17/24  0806 03/18/24  0708   * 137* 153*   CALCIUM 8.9 9.3 9.8   ALBUMIN 2.5* 3.0* 3.1*    129* 131*   K 3.9 3.9 4.2   CO2 26 25 16*    97 104   BUN 53* 51* 49*   CREATININE 1.7* 2.3* 2.2*     Labs within the past 24 hours have been reviewed.    Diagnostic Results:  None  Assessment/Plan:     * Urine leak from transplanted ureter  - He presented to outpatient transplant clinic 3/13 with report of significant increase in drain output (~950) and increased leakage from incision. Lab work notable for leukocytosis.   - JEAN-PIERRE drain creatinine 29 (prev 2.8 on 3/10), significant for urine leak.   - CT a/p to assess collection and current drain placement. No plan for intervention at this time. Cefepime started on admit.   - Cramer placed 3/13.  -  with Cr 2.8. HD 3/14/24 for clearance via R CW perm cath and tolerated well  - Initially, documentation suggest JEAN-PIERRE output decreased when cramer placed but pt emptying drain and RN not  "aware. JEAN-PIERRE drain output 850 in 24 hours. Decision made to proceed w perc neph tube 3/15.  - Most urine draining via perc neph tube; remains with cramer and JEAN-PIERRE drain.   - Kidney US 3/16 with elevated Ris and slight increase in subincisional collection. VSS. Reviewed by surgeon. 1 unit pRBC 3/16.   - Infectious work-up unremarkable thus far, urine cx negative. Cont Cefepime for now.      Acidosis  - Continue oral bicarb.      Leukocytosis  - Infectious w/u- blood cultures w NGTD. UA unremarkable. Urine cx pending  - Drain from OR sent for cx- wbc 92, segs 94  - Started on Cefepime on admit; will cont given perc neph tube  - Wbc trending down; afebrile.  - WCTM.    Insulin pump in place  - Pt using home insulin pump, Endocrinology following      Delayed graft function of kidney  - Post-op DGF 2/2 DCD and long cold time. Last HD 3/8   - BUN elevated, w no AMS, denies n/v, 1+ BLE edema but lungs clear not requiring oxygen  - BUN up to 111 on 3/14. Pt tolerated HD 3/14 for clearance  - no HD needs today. Cr improved this am  - Pt has outpt HD chair in place MWF    Positive RPR test in cadaveric donor  - Weekly PCN G x 3; last dose 3/17    Acute on chronic anemia  - Hgb 6.8 3/16 from 8.1 day prior  - Vitals stable. Kidney Transplant US 3/16 reviewed by surgeon. Not concerned for acute bleed.  - 1 unit pRBC transfused 3/16 with appropriate response.  - Daily cbc.    At risk for opportunistic infections  - Continue Valcyte for CMV prophylaxis  - Continue Bactrim for PCP prophylaxis       Long-term use of immunosuppressant medication  - see "prophylactic immuno"      Prophylactic immunotherapy  - Continue Prograf. Will monitor for signs of toxic side effects, check daily tacrolimus troughs, and change meds accordingly.   - MMF held for leukocytosis   - Continue steroids.      Status post -donor kidney transplantation  - S/p DBD Ktx on 24 for presumed diabetic nephropathy (donor RPR+, CMV D+, R-, CIT ~16.5hrs).  - " "Patient being managed for DGF; see "DGF"  - Cr downtrended to morales 1.6 3/15; 1.7 3/16  - Strict I/O. Daily weights.  - See "urine leak."    Chronic viral hepatitis B without delta agent and without coma        Essential hypertension  - Continue Coreg.      Type 2 diabetes mellitus with kidney complication, with long-term current use of insulin  - Endocrine consulted and following      NIHARIKA (acute kidney injury)  - see "DGF"          Discharge Planning: not current candidate   Medical decision making for this encounter includes review of pertinent labs and diagnostic studies, assessment and planning, discussions with consulting providers, discussion with patient/family, and participation in multidisciplinary rounds. Time spent caring for patient: 60 minutes    Lilly La PA-C  Kidney Transplant  [facility] - Transplant Stepdown  "

## 2024-03-18 NOTE — PLAN OF CARE
Plan of care reviewed on am rounds, afrebrile, vss tele SR wbc 7  h/h stable Cr 2.2, Cramer and NT patient with punch colored urine ( NT o/p > cramer ) JEAN-PIERRE intact with serous fluid.( Sent for Cr and WBC count ), rlq incision healing with staples intact,, no requests for prn pain meds this shift, Tolerating diet without n/v, blood glucoses stable with patient managing ins pump ( Endo following ) IV antibiotic continued, up ambulatory in room with rolling walker, emotional support provided throughout shift

## 2024-03-18 NOTE — ASSESSMENT & PLAN NOTE
Endocrinology consulted for BG management.   BG goal 140-180    BG trending above goal ranges throughout the day. BG excursions likely related to steroid therapy. Will adjust pump settings today.     - Continue Home Insulin Pump (see insulin pump below for adjustments)   - BG checks AC/HS/0200  - Hypoglycemia protocol in place     ** Please notify Endocrine for any change and/or advance in diet**  ** Please call Endocrine for any BG related issues **     Discharge Planning:   TBD. Please notify endocrinology prior to discharge.

## 2024-03-18 NOTE — ASSESSMENT & PLAN NOTE
Lab Results   Component Value Date    CREATININE 2.2 (H) 03/18/2024     Avoid insulin stacking  Titrate insulin slowly

## 2024-03-18 NOTE — PROGRESS NOTES
"[facility] - [facility] Step[facility]  Endocrinology  Progress Note    Admit Date: 3/13/2024     Reason for Consult: Management of T2DM, Hyperglycemia      Surgical Procedure and Date: kidney transplant 2024     Diabetes diagnosis year:  per chart review      Home Diabetes Medications:  Tandem insulin pump   ICR 1:5   ISF 1:25  Basal rate 1.2 u/hr + control IQ      How often checking glucose at home? >4 x day   BG readings on regimen: 100-200s  Hypoglycemia on the regimen?  Yes, once a week overnight, 2-3x during the week during the day   Missed doses on regimen?  n/a     Diabetes Complications include:     Hyperglycemia, Hypoglycemia , Hypoglycemia unawareness, Diabetic nephropathy  , and Diabetic chronic kidney disease          Complicating diabetes co morbidities:   Glucocorticoid use        HPI:   Patient is a 64 y.o. 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 PD catheter remains in place. 6 day cramer (removed without issue), surgery went well. He presented to outpatient transplant clinic 3/13 with report of significant increase in drain output (~950) and increased leakage from incision. Lab work notable for leukocytosis. JEAN-PIERRE drain creatinine 29 (prev 2.8 on 3/10), significant for urine leak. Plan for CT a/p to assess collection and current drain placement. Abx started. Cramer to be placed.  with Cr 2.8. Plan to admit for infectious work-up and urine leak. Endocrinology consulted for management of T2DM.          Interval HPI:   Overnight events: Remains in TSU. BG at and above goal ranges on home insulin pump. Remains on Prednisone 10 mg daily. Diet diabetic 2000 Calorie; Standard Tray    Eatin%  Nausea: No  Hypoglycemia and intervention: No  Fever: No  TPN and/or TF: No  If yes, type of TF/TPN and rate: n/a    BP (!) 147/67 (Patient Position: Sitting)   Pulse 85   Temp 98.3 °F (36.8 °C) (Oral)   Resp 14   Ht 5' 4" " "(1.626 m)   Wt 89.9 kg (198 lb 3.1 oz)   SpO2 99%   BMI 34.02 kg/m²     Labs Reviewed and Include    Recent Labs   Lab 03/18/24  0708   *   CALCIUM 9.8   ALBUMIN 3.1*   *   K 4.2   CO2 16*      BUN 49*   CREATININE 2.2*     Lab Results   Component Value Date    WBC 7.24 03/18/2024    HGB 10.0 (L) 03/18/2024    HCT 32.4 (L) 03/18/2024     (H) 03/18/2024     03/18/2024     No results for input(s): "TSH", "FREET4" in the last 168 hours.  Lab Results   Component Value Date    HGBA1C 5.8 (H) 03/01/2024       Nutritional status:   Body mass index is 34.02 kg/m².  Lab Results   Component Value Date    ALBUMIN 3.1 (L) 03/18/2024    ALBUMIN 3.0 (L) 03/17/2024    ALBUMIN 2.5 (L) 03/16/2024     No results found for: "PREALBUMIN"    Estimated Creatinine Clearance: 33.9 mL/min (A) (based on SCr of 2.2 mg/dL (H)).    Accu-Checks  Recent Labs     03/16/24  0207 03/16/24  0904 03/16/24  1146 03/16/24  1801 03/16/24 2014 03/17/24  0816 03/17/24  1243 03/17/24  1729 03/18/24  0833 03/18/24  1320   POCTGLUCOSE 139* 187* 262* 211* 278* 164* 263* 246* 170* 157*       Current Medications and/or Treatments Impacting Glycemic Control  Immunotherapy:    Immunosuppressants           Stop Route Frequency     tacrolimus capsule 6 mg         -- Oral 2 times daily          Steroids:   Hormones (From admission, onward)      Start     Stop Route Frequency Ordered    03/23/24 0900  predniSONE tablet 5 mg        See Hyperspace for full Linked Orders Report.    -- Oral Daily 03/15/24 0929    03/16/24 0900  predniSONE tablet 10 mg        See Hyperspace for full Linked Orders Report.    03/23/24 0859 Oral Daily 03/15/24 0929    03/13/24 1649  melatonin tablet 6 mg         -- Oral Nightly PRN 03/13/24 8174          Pressors:    Autonomic Drugs (From admission, onward)      None          Hyperglycemia/Diabetes Medications:   Antihyperglycemics (From admission, onward)      Start     Stop Route Frequency Ordered    " 03/13/24 2115  insulin aspart U-100 insulin pump from home 0-10 Units        Question Answer Comment   Target number 120    Basal Rate #1 1.2    Basal rate #1 time 9632-0736        -- SubQ Continuous 03/13/24 2012 03/13/24 2111  insulin aspart U-100 insulin pump from home 0-15 Units        Question Answer Comment   Target number 120    Carbohydrate coverage #1 1:5    Carbohydrate coverage #1 time 0435-4500    Sensitivity #1 1:25    Sensitivity #1 time [id number]        -- SubQ As needed (PRN) 03/13/24 2012            ASSESSMENT and PLAN    Renal/  * Urine leak from transplanted ureter  Managed per primary team  Optimize BG Control        NIHARIKA (acute kidney injury)  Lab Results   Component Value Date    CREATININE 2.2 (H) 03/18/2024     Avoid insulin stacking  Titrate insulin slowly       Endocrine  Insulin pump in place  At time of evaluation, pt meets criteria to continue home insulin pump usage.  - Has all adequate supplies   - Insulin pump site change on 3/16/24  - Bolus settings reviewed    - ICR changed to 1:4 and basal control IQ turned on   - Nurse to check BG qac/hs/0200 & record in epic   - Patient to input glucose into pump and use bolus wizard for prandial needs   - Will continue to monitor accuchecks and titrate insulin as clinically indicated .     - Discussed above plan with patient, patient verbalized understanding.   - Understands in case of pump malfunction or cognitive decline in which pt can no longer safely use insulin pump, will transition to SC MDI.     PLEASE CONTACT ENDOCRINE IF INSULIN PUMP FAILS           Type 2 diabetes mellitus with kidney complication, with long-term current use of insulin  Endocrinology consulted for BG management.   BG goal 140-180    BG trending above goal ranges throughout the day. BG excursions likely related to steroid therapy. Will adjust pump settings today.     - Continue Home Insulin Pump (see insulin pump below for adjustments)   - BG checks AC/HS/0200  -  Hypoglycemia protocol in place     ** Please notify Endocrine for any change and/or advance in diet**  ** Please call Endocrine for any BG related issues **     Discharge Planning:   TBD. Please notify endocrinology prior to discharge.              Nakia Vasquez NP  Endocrinology  [facility]

## 2024-03-18 NOTE — SUBJECTIVE & OBJECTIVE
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 PD catheter remains in place. 6 day cramer (removed without issue), surgery went well. Post op:  PCN G #1 given 3/1 for +RPR donor cultures; needs weekly x 3 total per ID.   DGF: not clearing/low UOP; MWF chair, last 3/8/24  Drain remained in place at time of DC. Re-admitted 3/10 for increase in JEAN-PIERRE drain, drain Cr 2.8 (same as serum). US 3/10 with 2 fluid collections: SQ collection + deeper collection with drain terminating in the collection.     He presented to outpatient transplant clinic 3/13 with report of significant increase in drain output (~950) and increased leakage from incision. Lab work notable for leukocytosis. JEAN-PIERRE drain creatinine 29 (prev 2.8 on 3/10), significant for urine leak. Plan for CT a/p to assess collection and current drain placement. Abx started. Cramer to be placed.  with Cr 2.8. Plan to admit for infectious work-up and urine leak. Blood/urine cx pending. Assess need for HD daily. D/w Dr. Stokes.    Mr. Lopez is a 64 y.o. year old male who is status post Kidney Transplant - 2/29/2024  (#1).  His maintenance immunosuppression consists of:   Immunosuppressants (From admission, onward)      Start     Stop Route Frequency Ordered    03/16/24 1800  tacrolimus capsule 6 mg         -- Oral 2 times daily 03/16/24 1247            Hospital Course:  Patient admitted w increased JEAN-PIERRE output w decrease in UOP.  Fluid sent from drain (placed 2/29/24 in OR), Cr 29.7, 92 WBC, 94 segs. CT A/P 3/13/24 reviewed w transplant surgeon, small sub incisional collection (3.3cm x 6.2cm x 9.2cm) and 2 peritoneal drainage catheters w medial catheter in fluid collection (3.5cm x 3.5 cm x 2.9 cm) favors hematoma verses seroma, no plan for intervention.  Last HD was 3/8/24.  Cramer placed on admit 3/13. Dialyzed inpatient on 3/14 for BUN  >100, tolerated well. Pt cont to have high output per JEAN-PIERRE with cramer in place. IR consulted for perc neph tube to divert urine and allow for healing. Perc neph tube placed 3/15. 1 unit pRBC transfused 3/16 with appropriate response. Kidney US 3/16 today notable for persistent elevated Ris and slight increase in size of sub incisional collection. Reviewed by surgeon; no intervention indicated at this time.     Interval History: NAEON. R nephrostomy tube placed 3/15 by IR. Making good urine, not yet clearing well. Most urine draining to neph tube. Cramer remains in place as does JEAN-PIERRE drain. JEAN-PIERRE output increased in the past 24 hours. H/H stable. Cr trended down this morning. No need for HD at this time. Will cont to assess daily. Encourage 3L/ PO intake day. Patient is tolerating a diet, having Bms, and ambulating. WBC trending down. Remains on Cefepime. Afebrile. WCTM.        Past Medical, Surgical, Family, and Social History:   Unchanged from H&P.    Scheduled Meds:   aspirin  81 mg Oral Daily    calcitRIOL  0.5 mcg Oral Daily    carvediloL  6.25 mg Oral BID    ceFEPime IV (PEDS and ADULTS)  1 g Intravenous Q12H    levothyroxine  50 mcg Oral Before breakfast    mupirocin   Nasal BID    pantoprazole  40 mg Oral Daily    predniSONE  10 mg Oral Daily    Followed by    [START ON 3/23/2024] predniSONE  5 mg Oral Daily    sodium bicarbonate  650 mg Oral BID    sulfamethoxazole-trimethoprim 400-80mg  1 tablet Oral Daily    tacrolimus  6 mg Oral BID    valGANciclovir  450 mg Oral Every Mon, Wed, Fri     Continuous Infusions:   insulin aspart U-100       PRN Meds:acetaminophen, bisacodyL, dextrose 10%, dextrose 10%, glucagon (human recombinant), glucose, glucose, insulin aspart U-100, LIDOcaine HCl 2%, melatonin, ondansetron, oxyCODONE, oxyCODONE, sodium chloride 0.9%    Intake/Output - Last 3 Shifts         03/16 0700  03/17 0659 03/17 0700 03/18 0659 03/18 0700 03/19 0659    P.O. 1420 3080 980    Blood 772.9      IV Piggyback  "254.9  100    Total Intake(mL/kg) 2447.8 (27.2) 3080 (34.3) 1080 (12)    Urine (mL/kg/hr) 1750 (0.8) 4225 (2) 500 (0.8)    Emesis/NG output 0      Drains 230 830 195    Stool 0      Total Output 1980 5055 695    Net +467.8 -1975 +385           Urine Occurrence 0 x      Stool Occurrence 1 x      Emesis Occurrence 0 x               Review of Systems   Objective:     Vital Signs (Most Recent):  Temp: 98.3 °F (36.8 °C) (03/18/24 1200)  Pulse: 85 (03/18/24 1200)  Resp: 14 (03/18/24 1200)  BP: (!) 147/67 (03/18/24 1200)  SpO2: 99 % (03/18/24 1200) Vital Signs (24h Range):  Temp:  [97.8 °F (36.6 °C)-98.5 °F (36.9 °C)] 98.3 °F (36.8 °C)  Pulse:  [73-85] 85  Resp:  [14-18] 14  SpO2:  [93 %-100 %] 99 %  BP: (124-147)/(58-67) 147/67     Weight: 89.9 kg (198 lb 3.1 oz)  Height: 5' 4" (162.6 cm)  Body mass index is 34.02 kg/m².     Physical Exam  Vitals and nursing note reviewed.   Constitutional:       General: He is not in acute distress.  HENT:      Head: Normocephalic and atraumatic.   Eyes:      General: No scleral icterus.        Right eye: No discharge.         Left eye: No discharge.   Cardiovascular:      Rate and Rhythm: Normal rate and regular rhythm.      Heart sounds: No murmur heard.  Pulmonary:      Effort: Pulmonary effort is normal. No respiratory distress.      Breath sounds: No wheezing or rales.   Abdominal:      General: Bowel sounds are normal. There is no distension.      Palpations: Abdomen is soft.      Tenderness: There is abdominal tenderness (incisional). There is no guarding.      Comments: PD catheter in place no s/s/I  R JEAN-PIERRE drain ss output   R neph tube with pink tinged urine   Genitourinary:     Comments: Curry in place draining yellow urine  Musculoskeletal:      Right lower leg: Edema (trace) present.      Left lower leg: Edema (trace) present.   Skin:     General: Skin is warm and dry.      Capillary Refill: Capillary refill takes less than 2 seconds.      Coloration: Skin is not jaundiced. "   Neurological:      General: No focal deficit present.      Mental Status: He is alert and oriented to person, place, and time.   Psychiatric:         Mood and Affect: Mood normal.         Behavior: Behavior normal.         Thought Content: Thought content normal.         Judgment: Judgment normal.          Laboratory:  CBC:   Recent Labs   Lab 03/16/24  0638 03/17/24  0807 03/18/24  0708   WBC 6.18 7.61 7.24   RBC 2.19* 3.00* 3.24*   HGB 6.8* 9.3* 10.0*   HCT 21.3* 28.9* 32.4*    313 317   MCV 97 96 100*   MCH 31.1* 31.0 30.9   MCHC 31.9* 32.2 30.9*     CMP:   Recent Labs   Lab 03/16/24  0638 03/17/24  0806 03/18/24  0708   * 137* 153*   CALCIUM 8.9 9.3 9.8   ALBUMIN 2.5* 3.0* 3.1*    129* 131*   K 3.9 3.9 4.2   CO2 26 25 16*    97 104   BUN 53* 51* 49*   CREATININE 1.7* 2.3* 2.2*     Labs within the past 24 hours have been reviewed.    Diagnostic Results:  None

## 2024-03-18 NOTE — ASSESSMENT & PLAN NOTE
- Post-op DGF 2/2 DCD and long cold time. Last HD 3/8   - BUN elevated, w no AMS, denies n/v, 1+ BLE edema but lungs clear not requiring oxygen  - BUN up to 111 on 3/14. Pt tolerated HD 3/14 for clearance  - no HD needs today. Cr improved this am  - Pt has outpt HD chair in place MWF

## 2024-03-18 NOTE — PLAN OF CARE
Problem: Adult Inpatient Plan of Care  Goal: Plan of Care Review  Outcome: Ongoing, Progressing  Goal: Absence of Hospital-Acquired Illness or Injury  Outcome: Ongoing, Progressing  Goal: Optimal Comfort and Wellbeing  Outcome: Ongoing, Progressing  Goal: Readiness for Transition of Care  Outcome: Ongoing, Progressing     Problem: Diabetes Comorbidity  Goal: Blood Glucose Level Within Targeted Range  Outcome: Ongoing, Progressing     Problem: Fluid and Electrolyte Imbalance (Acute Kidney Injury/Impairment)  Goal: Fluid and Electrolyte Balance  Outcome: Ongoing, Progressing     Problem: Oral Intake Inadequate (Acute Kidney Injury/Impairment)  Goal: Optimal Nutrition Intake  Outcome: Ongoing, Progressing     Problem: Renal Function Impairment (Acute Kidney Injury/Impairment)  Goal: Effective Renal Function  Outcome: Ongoing, Progressing     Problem: Donor Organ Function (Kidney Transplant)  Goal: Effective Renal Function  Outcome: Ongoing, Progressing     Problem: Fluid and Electrolyte Imbalance (Kidney Transplant)  Goal: Fluid and Electrolyte Balance  Outcome: Ongoing, Progressing

## 2024-03-18 NOTE — SUBJECTIVE & OBJECTIVE
"Interval HPI:   Overnight events: Remains in TSU. BG at and above goal ranges on home insulin pump. Remains on Prednisone 10 mg daily. Diet diabetic 2000 Calorie; Standard Tray    Eatin%  Nausea: No  Hypoglycemia and intervention: No  Fever: No  TPN and/or TF: No  If yes, type of TF/TPN and rate: n/a    BP (!) 147/67 (Patient Position: Sitting)   Pulse 85   Temp 98.3 °F (36.8 °C) (Oral)   Resp 14   Ht 5' 4" (1.626 m)   Wt 89.9 kg (198 lb 3.1 oz)   SpO2 99%   BMI 34.02 kg/m²     Labs Reviewed and Include    Recent Labs   Lab 24  0708   *   CALCIUM 9.8   ALBUMIN 3.1*   *   K 4.2   CO2 16*      BUN 49*   CREATININE 2.2*     Lab Results   Component Value Date    WBC 7.24 2024    HGB 10.0 (L) 2024    HCT 32.4 (L) 2024     (H) 2024     2024     No results for input(s): "TSH", "FREET4" in the last 168 hours.  Lab Results   Component Value Date    HGBA1C 5.8 (H) 2024       Nutritional status:   Body mass index is 34.02 kg/m².  Lab Results   Component Value Date    ALBUMIN 3.1 (L) 2024    ALBUMIN 3.0 (L) 2024    ALBUMIN 2.5 (L) 2024     No results found for: "PREALBUMIN"    Estimated Creatinine Clearance: 33.9 mL/min (A) (based on SCr of 2.2 mg/dL (H)).    Accu-Checks  Recent Labs     24  0207 24  0904 24  1146 24  1801 24  2014 24  0816 24  1243 24  1729 24  0833 24  1320   POCTGLUCOSE 139* 187* 262* 211* 278* 164* 263* 246* 170* 157*       Current Medications and/or Treatments Impacting Glycemic Control  Immunotherapy:    Immunosuppressants           Stop Route Frequency     tacrolimus capsule 6 mg         -- Oral 2 times daily          Steroids:   Hormones (From admission, onward)      Start     Stop Route Frequency Ordered    24 0900  predniSONE tablet 5 mg        See Hyperspace for full Linked Orders Report.    -- Oral Daily 03/15/24 0929    " 03/16/24 0900  predniSONE tablet 10 mg        See Hyperspace for full Linked Orders Report.    03/23/24 0859 Oral Daily 03/15/24 0929    03/13/24 1649  melatonin tablet 6 mg         -- Oral Nightly PRN 03/13/24 1649          Pressors:    Autonomic Drugs (From admission, onward)      None          Hyperglycemia/Diabetes Medications:   Antihyperglycemics (From admission, onward)      Start     Stop Route Frequency Ordered    03/13/24 2115  insulin aspart U-100 insulin pump from home 0-10 Units        Question Answer Comment   Target number 120    Basal Rate #1 1.2    Basal rate #1 time 9821-8190        -- SubQ Continuous 03/13/24 2012 03/13/24 2111  insulin aspart U-100 insulin pump from home 0-15 Units        Question Answer Comment   Target number 120    Carbohydrate coverage #1 1:5    Carbohydrate coverage #1 time 2714-7246    Sensitivity #1 1:25    Sensitivity #1 time 5587-7800        -- SubQ As needed (PRN) 03/13/24 2012

## 2024-03-19 ENCOUNTER — PATIENT MESSAGE (OUTPATIENT)
Dept: TRANSPLANT | Facility: CLINIC | Age: 64
End: 2024-03-19
Payer: MEDICARE

## 2024-03-19 VITALS
HEART RATE: 70 BPM | SYSTOLIC BLOOD PRESSURE: 154 MMHG | BODY MASS INDEX: 33.84 KG/M2 | RESPIRATION RATE: 14 BRPM | OXYGEN SATURATION: 97 % | WEIGHT: 198.19 LBS | HEIGHT: 64 IN | DIASTOLIC BLOOD PRESSURE: 77 MMHG | TEMPERATURE: 98 F

## 2024-03-19 PROBLEM — D72.829 LEUKOCYTOSIS: Status: RESOLVED | Noted: 2024-03-13 | Resolved: 2024-03-19

## 2024-03-19 LAB
ALBUMIN SERPL BCP-MCNC: 3.1 G/DL (ref 3.5–5.2)
ANION GAP SERPL CALC-SCNC: 10 MMOL/L (ref 8–16)
BASOPHILS # BLD AUTO: 0.02 K/UL (ref 0–0.2)
BASOPHILS NFR BLD: 0.2 % (ref 0–1.9)
BUN SERPL-MCNC: 49 MG/DL (ref 8–23)
CALCIUM SERPL-MCNC: 10.4 MG/DL (ref 8.7–10.5)
CHLORIDE SERPL-SCNC: 105 MMOL/L (ref 95–110)
CO2 SERPL-SCNC: 21 MMOL/L (ref 23–29)
CREAT SERPL-MCNC: 2 MG/DL (ref 0.5–1.4)
DIFFERENTIAL METHOD BLD: ABNORMAL
EOSINOPHIL # BLD AUTO: 0.1 K/UL (ref 0–0.5)
EOSINOPHIL NFR BLD: 1.2 % (ref 0–8)
ERYTHROCYTE [DISTWIDTH] IN BLOOD BY AUTOMATED COUNT: 15.5 % (ref 11.5–14.5)
EST. GFR  (NO RACE VARIABLE): 36.4 ML/MIN/1.73 M^2
GLUCOSE SERPL-MCNC: 136 MG/DL (ref 70–110)
HCT VFR BLD AUTO: 30.3 % (ref 40–54)
HGB BLD-MCNC: 10 G/DL (ref 14–18)
IMM GRANULOCYTES # BLD AUTO: 0.09 K/UL (ref 0–0.04)
IMM GRANULOCYTES NFR BLD AUTO: 1 % (ref 0–0.5)
LYMPHOCYTES # BLD AUTO: 0.1 K/UL (ref 1–4.8)
LYMPHOCYTES NFR BLD: 1 % (ref 18–48)
MAGNESIUM SERPL-MCNC: 2.4 MG/DL (ref 1.6–2.6)
MCH RBC QN AUTO: 31.6 PG (ref 27–31)
MCHC RBC AUTO-ENTMCNC: 33 G/DL (ref 32–36)
MCV RBC AUTO: 96 FL (ref 82–98)
MONOCYTES # BLD AUTO: 0.5 K/UL (ref 0.3–1)
MONOCYTES NFR BLD: 6 % (ref 4–15)
NEUTROPHILS # BLD AUTO: 8 K/UL (ref 1.8–7.7)
NEUTROPHILS NFR BLD: 90.6 % (ref 38–73)
NRBC BLD-RTO: 0 /100 WBC
PHOSPHATE SERPL-MCNC: 2.3 MG/DL (ref 2.7–4.5)
PLATELET # BLD AUTO: 300 K/UL (ref 150–450)
PMV BLD AUTO: 9.2 FL (ref 9.2–12.9)
POCT GLUCOSE: 130 MG/DL (ref 70–110)
POCT GLUCOSE: 157 MG/DL (ref 70–110)
POCT GLUCOSE: 251 MG/DL (ref 70–110)
POTASSIUM SERPL-SCNC: 4.5 MMOL/L (ref 3.5–5.1)
RBC # BLD AUTO: 3.16 M/UL (ref 4.6–6.2)
SODIUM SERPL-SCNC: 136 MMOL/L (ref 136–145)
TACROLIMUS BLD-MCNC: 7.2 NG/ML (ref 5–15)
WBC # BLD AUTO: 8.87 K/UL (ref 3.9–12.7)

## 2024-03-19 PROCEDURE — 83735 ASSAY OF MAGNESIUM: CPT | Performed by: PHYSICIAN ASSISTANT

## 2024-03-19 PROCEDURE — 85025 COMPLETE CBC W/AUTO DIFF WBC: CPT | Performed by: PHYSICIAN ASSISTANT

## 2024-03-19 PROCEDURE — 80197 ASSAY OF TACROLIMUS: CPT | Performed by: PHYSICIAN ASSISTANT

## 2024-03-19 PROCEDURE — 25000003 PHARM REV CODE 250: Performed by: PHYSICIAN ASSISTANT

## 2024-03-19 PROCEDURE — 99233 SBSQ HOSP IP/OBS HIGH 50: CPT | Mod: ,,, | Performed by: NURSE PRACTITIONER

## 2024-03-19 PROCEDURE — 25000003 PHARM REV CODE 250: Performed by: INTERNAL MEDICINE

## 2024-03-19 PROCEDURE — 99239 HOSP IP/OBS DSCHRG MGMT >30: CPT | Mod: 24,,, | Performed by: PHYSICIAN ASSISTANT

## 2024-03-19 PROCEDURE — 63600175 PHARM REV CODE 636 W HCPCS: Performed by: INTERNAL MEDICINE

## 2024-03-19 PROCEDURE — 63600175 PHARM REV CODE 636 W HCPCS: Performed by: PHYSICIAN ASSISTANT

## 2024-03-19 PROCEDURE — 20600001 HC STEP DOWN PRIVATE ROOM

## 2024-03-19 PROCEDURE — 36415 COLL VENOUS BLD VENIPUNCTURE: CPT | Performed by: PHYSICIAN ASSISTANT

## 2024-03-19 PROCEDURE — 63600175 PHARM REV CODE 636 W HCPCS: Performed by: NURSE PRACTITIONER

## 2024-03-19 PROCEDURE — 80069 RENAL FUNCTION PANEL: CPT | Performed by: PHYSICIAN ASSISTANT

## 2024-03-19 RX ORDER — HEPARIN SODIUM 1000 [USP'U]/ML
1000 INJECTION, SOLUTION INTRAVENOUS; SUBCUTANEOUS ONCE
Status: DISCONTINUED | OUTPATIENT
Start: 2024-03-19 | End: 2024-03-20 | Stop reason: HOSPADM

## 2024-03-19 RX ORDER — SULFAMETHOXAZOLE AND TRIMETHOPRIM 400; 80 MG/1; MG/1
1 TABLET ORAL DAILY
Qty: 30 TABLET | Refills: 4 | Status: SHIPPED | OUTPATIENT
Start: 2024-03-19 | End: 2024-08-27

## 2024-03-19 RX ORDER — TACROLIMUS 1 MG/1
6 CAPSULE ORAL EVERY 12 HOURS
Qty: 360 CAPSULE | Refills: 11 | Status: SHIPPED | OUTPATIENT
Start: 2024-03-19 | End: 2024-03-28 | Stop reason: DRUGHIGH

## 2024-03-19 RX ORDER — POLYETHYLENE GLYCOL 3350 17 G/17G
17 POWDER, FOR SOLUTION ORAL DAILY
Status: DISCONTINUED | OUTPATIENT
Start: 2024-03-19 | End: 2024-03-20 | Stop reason: HOSPADM

## 2024-03-19 RX ADMIN — INSULIN ASPART 3 UNITS: 100 INJECTION, SOLUTION INTRAVENOUS; SUBCUTANEOUS at 12:03

## 2024-03-19 RX ADMIN — CALCITRIOL 0.5 MCG: 0.5 CAPSULE, LIQUID FILLED ORAL at 09:03

## 2024-03-19 RX ADMIN — TACROLIMUS 6 MG: 5 CAPSULE ORAL at 09:03

## 2024-03-19 RX ADMIN — CARVEDILOL 6.25 MG: 6.25 TABLET, FILM COATED ORAL at 09:03

## 2024-03-19 RX ADMIN — SODIUM BICARBONATE 650 MG TABLET 650 MG: at 09:03

## 2024-03-19 RX ADMIN — CEFEPIME 1 G: 1 INJECTION, POWDER, FOR SOLUTION INTRAMUSCULAR; INTRAVENOUS at 08:03

## 2024-03-19 RX ADMIN — LEVOTHYROXINE SODIUM 50 MCG: 50 TABLET ORAL at 05:03

## 2024-03-19 RX ADMIN — INSULIN ASPART 15 UNITS: 100 INJECTION, SOLUTION INTRAVENOUS; SUBCUTANEOUS at 10:03

## 2024-03-19 RX ADMIN — ASPIRIN 81 MG: 81 TABLET, COATED ORAL at 09:03

## 2024-03-19 RX ADMIN — PREDNISONE 10 MG: 10 TABLET ORAL at 09:03

## 2024-03-19 RX ADMIN — SULFAMETHOXAZOLE AND TRIMETHOPRIM 1 TABLET: 400; 80 TABLET ORAL at 09:03

## 2024-03-19 RX ADMIN — POLYETHYLENE GLYCOL 3350 17 G: 17 POWDER, FOR SOLUTION ORAL at 09:03

## 2024-03-19 RX ADMIN — PANTOPRAZOLE SODIUM 40 MG: 40 TABLET, DELAYED RELEASE ORAL at 09:03

## 2024-03-19 RX ADMIN — MUPIROCIN: 20 OINTMENT TOPICAL at 09:03

## 2024-03-19 NOTE — NURSING
Patient will be ready for dc home once caregiver arrives  to transport home/ review dc instructions. Printed AVS reviewed along with followup appts and bedside Rx have been delivered.Permcath drg changed/ ports heparinized prior to dc.Patient instructed throughout shift on how to empty drain, neph tube and cramer catheter and to record output from each ( provided with log sheet to record amounts) Patient instructed to clamp neph tube on Monday and continue with cramer to gravity drainage. Needed supplies given ( Betadine, gauze, tape, cups/ measure cylinder. Gloves) Patient to continue blood glucose management via home insulin infusion pump. PD cath intact/ remains clamped.patient verbalizes understanding of dc instructions.

## 2024-03-19 NOTE — SUBJECTIVE & OBJECTIVE
"Interval HPI:   Overnight events: Remains in TSU. BG stable on home insulin pump. Remains on Prednisone 10 mg daily. Diet diabetic 2000 Calorie; Standard Tray    Eatin%  Nausea: No  Hypoglycemia and intervention: No  Fever: No  TPN and/or TF: No  If yes, type of TF/TPN and rate: n/a    BP (!) 155/72 (BP Location: Left arm, Patient Position: Sitting)   Pulse 67   Temp 97.7 °F (36.5 °C) (Oral)   Resp 14   Ht 5' 4" (1.626 m)   Wt 89.9 kg (198 lb 3.1 oz)   SpO2 100%   BMI 34.02 kg/m²     Labs Reviewed and Include    Recent Labs   Lab 24  0556   *   CALCIUM 10.4   ALBUMIN 3.1*      K 4.5   CO2 21*      BUN 49*   CREATININE 2.0*     Lab Results   Component Value Date    WBC 8.87 2024    HGB 10.0 (L) 2024    HCT 30.3 (L) 2024    MCV 96 2024     2024     No results for input(s): "TSH", "FREET4" in the last 168 hours.  Lab Results   Component Value Date    HGBA1C 5.8 (H) 2024       Nutritional status:   Body mass index is 34.02 kg/m².  Lab Results   Component Value Date    ALBUMIN 3.1 (L) 2024    ALBUMIN 3.1 (L) 2024    ALBUMIN 3.0 (L) 2024     No results found for: "PREALBUMIN"    Estimated Creatinine Clearance: 37.2 mL/min (A) (based on SCr of 2 mg/dL (H)).    Accu-Checks  Recent Labs     24  1801 24  2014 24  0816 24  1243 24  1729 24  0833 24  1320 24  1732 24  0209 24  0834   POCTGLUCOSE 211* 278* 164* 263* 246* 170* 157* 95 130* 157*       Current Medications and/or Treatments Impacting Glycemic Control  Immunotherapy:    Immunosuppressants           Stop Route Frequency     tacrolimus capsule 6 mg         -- Oral 2 times daily          Steroids:   Hormones (From admission, onward)      Start     Stop Route Frequency Ordered    24 0900  predniSONE tablet 5 mg        See Hyperspace for full Linked Orders Report.    -- Oral Daily 03/15/24 0924 " 0900  predniSONE tablet 10 mg        See Hyperspace for full Linked Orders Report.    03/23/24 0859 Oral Daily 03/15/24 0929    03/13/24 1649  melatonin tablet 6 mg         -- Oral Nightly PRN 03/13/24 1649          Pressors:    Autonomic Drugs (From admission, onward)      None          Hyperglycemia/Diabetes Medications:   Antihyperglycemics (From admission, onward)      Start     Stop Route Frequency Ordered    03/13/24 2115  insulin aspart U-100 insulin pump from home 0-10 Units        Question Answer Comment   Target number 120    Basal Rate #1 1.2    Basal rate #1 time 1297-7560        -- SubQ Continuous 03/13/24 2012 03/13/24 2111  insulin aspart U-100 insulin pump from home 0-15 Units        Question Answer Comment   Target number 120    Carbohydrate coverage #1 1:5    Carbohydrate coverage #1 time 5560-9060    Sensitivity #1 1:25    Sensitivity #1 time 6523-8828        -- SubQ As needed (PRN) 03/13/24 2012

## 2024-03-19 NOTE — CHAPLAIN
03/19/24 1350   Clinical Encounter Type   Visit Type Initial Visit   Visit Category General Rounding   Visited With Patient   Length of Visit 15 Minutes   Jainism Encounters   Spiritual Resources Requested Prayer   Patient Spiritual Encounters   Care Provided Reflective listening;Prayer support   Patient Coping Accepting;Open/discussion;Active bib;Using bib/ community resources;Family/ friends resources   Comments - Patient The pt is getting ready for the discharge after the kidney transplant. Pt is grateful and said I am blessed! Usually, patients get kidney only after five years of waiting but I got after five weeks, and everything going well with me!        03/19/24 1350   Clinical Encounter Type   Visit Type Initial Visit   Visit Category General Rounding   Visited With Patient   Length of Visit 15 Minutes   Jainism Encounters   Spiritual Resources Requested Prayer   Patient Spiritual Encounters   Care Provided Reflective listening;Prayer support   Patient Coping Accepting;Open/discussion;Active bib;Using bib/ community resources;Family/ friends resources   Comments - Patient The pt is getting ready for the discharge after the kidney transplant. Pt is grateful and said I am blessed! Usually, patients get kidney only after five years of waiting but I got after five weeks, and everything going well with me!

## 2024-03-19 NOTE — PROGRESS NOTES
"[facility] - [facility]  Endocrinology  Progress Note    Admit Date: 3/13/2024     Reason for Consult: Management of T2DM, Hyperglycemia      Surgical Procedure and Date: kidney transplant 2024     Diabetes diagnosis year:  per chart review      Home Diabetes Medications:  Tandem insulin pump   ICR 1:5   ISF 1:25  Basal rate 1.2 u/hr + control IQ      How often checking glucose at home? >4 x day   BG readings on regimen: 100-200s  Hypoglycemia on the regimen?  Yes, once a week overnight, 2-3x during the week during the day   Missed doses on regimen?  n/a     Diabetes Complications include:     Hyperglycemia, Hypoglycemia , Hypoglycemia unawareness, Diabetic nephropathy  , and Diabetic chronic kidney disease          Complicating diabetes co morbidities:   Glucocorticoid use        HPI:   Patient is a 64 y.o. 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 PD catheter remains in place. 6 day cramer (removed without issue), surgery went well. He presented to outpatient transplant clinic 3/13 with report of significant increase in drain output (~950) and increased leakage from incision. Lab work notable for leukocytosis. JEAN-PIERRE drain creatinine 29 (prev 2.8 on 3/10), significant for urine leak. Plan for CT a/p to assess collection and current drain placement. Abx started. Cramer to be placed.  with Cr 2.8. Plan to admit for infectious work-up and urine leak. Endocrinology consulted for management of T2DM.          Interval HPI:   Overnight events: Remains in TSU. BG stable on home insulin pump. Remains on Prednisone 10 mg daily. Diet diabetic 2000 Calorie; Standard Tray    Eatin%  Nausea: No  Hypoglycemia and intervention: No  Fever: No  TPN and/or TF: No  If yes, type of TF/TPN and rate: n/a    BP (!) 155/72 (BP Location: Left arm, Patient Position: Sitting)   Pulse 67   Temp 97.7 °F (36.5 °C) (Oral)   Resp 14   Ht 5' 4" " "(1.626 m)   Wt 89.9 kg (198 lb 3.1 oz)   SpO2 100%   BMI 34.02 kg/m²     Labs Reviewed and Include    Recent Labs   Lab 03/19/24  0556   *   CALCIUM 10.4   ALBUMIN 3.1*      K 4.5   CO2 21*      BUN 49*   CREATININE 2.0*     Lab Results   Component Value Date    WBC 8.87 03/19/2024    HGB 10.0 (L) 03/19/2024    HCT 30.3 (L) 03/19/2024    MCV 96 03/19/2024     03/19/2024     No results for input(s): "TSH", "FREET4" in the last 168 hours.  Lab Results   Component Value Date    HGBA1C 5.8 (H) 03/01/2024       Nutritional status:   Body mass index is 34.02 kg/m².  Lab Results   Component Value Date    ALBUMIN 3.1 (L) 03/19/2024    ALBUMIN 3.1 (L) 03/18/2024    ALBUMIN 3.0 (L) 03/17/2024     No results found for: "PREALBUMIN"    Estimated Creatinine Clearance: 37.2 mL/min (A) (based on SCr of 2 mg/dL (H)).    Accu-Checks  Recent Labs     03/16/24  1801 03/16/24  2014 03/17/24  0816 03/17/24  1243 03/17/24  1729 03/18/24  0833 03/18/24  1320 03/18/24  1732 03/19/24  0209 03/19/24  0834   POCTGLUCOSE 211* 278* 164* 263* 246* 170* 157* 95 130* 157*       Current Medications and/or Treatments Impacting Glycemic Control  Immunotherapy:    Immunosuppressants           Stop Route Frequency     tacrolimus capsule 6 mg         -- Oral 2 times daily          Steroids:   Hormones (From admission, onward)      Start     Stop Route Frequency Ordered    03/23/24 0900  predniSONE tablet 5 mg        See Hyperspace for full Linked Orders Report.    -- Oral Daily 03/15/24 0929    03/16/24 0900  predniSONE tablet 10 mg        See Hyperspace for full Linked Orders Report.    03/23/24 0859 Oral Daily 03/15/24 0929    03/13/24 1649  melatonin tablet 6 mg         -- Oral Nightly PRN 03/13/24 1649          Pressors:    Autonomic Drugs (From admission, onward)      None          Hyperglycemia/Diabetes Medications:   Antihyperglycemics (From admission, onward)      Start     Stop Route Frequency Ordered    03/13/24 " 2115  insulin aspart U-100 insulin pump from home 0-10 Units        Question Answer Comment   Target number 120    Basal Rate #1 1.2    Basal rate #1 time [id number]        -- SubQ Continuous 03/13/24 2012 03/13/24 2111  insulin aspart U-100 insulin pump from home 0-15 Units        Question Answer Comment   Target number 120    Carbohydrate coverage #1 1:5    Carbohydrate coverage #1 time [id number]    Sensitivity #1 1:25    Sensitivity #1 time [id number]        -- SubQ As needed (PRN) 03/13/24 2012            ASSESSMENT and PLAN    Renal/  * Urine leak from transplanted ureter  Managed per primary team  Optimize BG Control        NIHARIKA (acute kidney injury)  Lab Results   Component Value Date    CREATININE 2.0 (H) 03/19/2024     Avoid insulin stacking  Titrate insulin slowly       Endocrine  Insulin pump in place  At time of evaluation, pt meets criteria to continue home insulin pump usage.  - Has all adequate supplies   - Insulin pump site change on 3/16/24  - Bolus settings reviewed    - ICR changed to 1:4 and basal control IQ turned on  (adjustments made on 3/18)   - Nurse to check BG qac/hs/0200 & record in epic   - Patient to input glucose into pump and use bolus wizard for prandial needs   - Will continue to monitor accuchecks and titrate insulin as clinically indicated .     - Discussed above plan with patient, patient verbalized understanding.   - Understands in case of pump malfunction or cognitive decline in which pt can no longer safely use insulin pump, will transition to SC MDI.     PLEASE CONTACT ENDOCRINE IF INSULIN PUMP FAILS           Type 2 diabetes mellitus with kidney complication, with long-term current use of insulin  Endocrinology consulted for BG management.   BG goal 140-180      - Continue Home Insulin Pump (see insulin pump below for adjustments)   - BG checks AC/HS/0200  - Hypoglycemia protocol in place     ** Please notify Endocrine for any change and/or advance in diet**  ** Please  call Endocrine for any BG related issues **     Discharge Planning:   TBD. Please notify endocrinology prior to discharge.              Nakia Vasquez, NP  Endocrinology  [facility]

## 2024-03-19 NOTE — ASSESSMENT & PLAN NOTE
At time of evaluation, pt meets criteria to continue home insulin pump usage.  - Has all adequate supplies   - Insulin pump site change on 3/16/24  - Bolus settings reviewed    - ICR changed to 1:4 and basal control IQ turned on  (adjustments made on 3/18)   - Nurse to check BG qac/hs/0200 & record in epic   - Patient to input glucose into pump and use bolus wizard for prandial needs   - Will continue to monitor accuchecks and titrate insulin as clinically indicated .     - Discussed above plan with patient, patient verbalized understanding.   - Understands in case of pump malfunction or cognitive decline in which pt can no longer safely use insulin pump, will transition to SC MDI.     PLEASE CONTACT ENDOCRINE IF INSULIN PUMP FAILS

## 2024-03-19 NOTE — DISCHARGE SUMMARY
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 PD catheter remains in place. 6 day cramer (removed without issue), surgery went well. Post op:  PCN G #1 given 3/1 for +RPR donor cultures; needs weekly x 3 total per ID.   DGF: not clearing/low UOP; MWF chair, last 3/8/24  Drain remained in place at time of DC. Re-admitted 3/10 for increase in JEAN-PIERRE drain, drain Cr 2.8 (same as serum). US 3/10 with 2 fluid collections: SQ collection + deeper collection with drain terminating in the collection.     He presented to outpatient transplant clinic 3/13 with report of significant increase in drain output (~950) and increased leakage from incision. Lab work notable for leukocytosis. JEAN-PIERRE drain creatinine 29 (prev 2.8 on 3/10), significant for urine leak. Plan for CT a/p to assess collection and current drain placement. Abx started. Cramer to be placed.  with Cr 2.8. Plan to admit for infectious work-up and urine leak. Blood/urine cx pending. Assess need for HD daily. D/w Dr. Stokes.    * No surgery found *     Hospital Course:    Patient admitted w increased JEAN-PIERRE output w decrease in UOP.  Fluid sent from drain (placed 2/29/24 in OR), Cr 29.7, 92 WBC, 94 segs- significant for urine leak.  Cramer placed on admit 3/13. Pt cont to have high output per JEAN-PIERRE with cramer in place.  IR consulted for perc neph tube to divert urine and allow for healing. Perc neph tube placed 3/15/24.  Dialyzed inpatient on 3/14 for BUN >100, tolerated well. Kidney function now starting to clear. Cr slowly  trending down. Perm cath remains in place with outpatient HD chair. Last HD on 3/14. Making plenty of urine. JEAN-PIERRE drain output remains high. Repeat drain Cr 2.0, showing improvement; fluid negative for infection.     Patient to discharge with nephrostomy tube OPEN, JEAN-PIERRE drain, and cramer to gravity.   Patient instructed to CLOSE neph tube on Monday 3/25 am, with plans to follow up in transplant clinic on Wednesday 3/27 to further assess. Patient received education on drain and cramer management as well as neph tube and directions on closing the tube. Home health ordered to assist.   Stent to stay in for now until otherwise instructed per Dr. Law.     Perm cath to remain in place at this time. Remains with DGF chair. S/w Dr. Alexander, plan to review Friday labs outpatient to determine future needs. If kidney continues to recover, will plan to remove perm cath in clinic next week.     Encourage 3L/ PO intake day. Patient is tolerating a diet, having Bms, and ambulating. Leukocytosis improving. Infectious work-up negative. Completed small course of Cefpeime inpatient. No need for additional antibiotics at this time.     Pt is stable and ready for discharge. Encouraged to continue fluid intake. He has no complaints. He has met with PharmD and received education. Patient to follow-up with labs on 3/22 and in clinic 3/27.  Pt expressed understanding of discharge instructions and importance of follow-up.      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] 2024 Yes    Status post -donor kidney transplantation [Z94.0] 2024 Not Applicable     Prophylactic immunotherapy [Z29.89] 02/29/2024 Not Applicable    Long-term use of immunosuppressant medication [Z79.60] 02/29/2024 Not Applicable    At risk for opportunistic infections [Z91.89] 02/29/2024 Yes    Acute on chronic anemia [D64.9] 02/29/2024 Yes    NIHARIKA (acute kidney injury) [N17.9] 04/26/2021 Yes    Type 2 diabetes mellitus with kidney complication, with long-term current use of insulin [E11.29, Z79.4] 04/26/2021 Not Applicable    Essential hypertension [I10] 04/26/2021 Yes      Problems Resolved During this Admission:    Diagnosis Date Noted Date Resolved POA    Leukocytosis [D72.829] 03/13/2024 03/19/2024 Yes       Treatments: as above    Consults (From admission, onward)          Status Ordering Provider     Inpatient consult to Midline team  Once        Provider:  (Not yet assigned)    Completed KRISTIE CAMPBELL     Inpatient consult to Interventional Radiology  Once        Provider:  (Not yet assigned)    Completed ASA ARENAS     Inpatient consult to Endocrinology  Once        Provider:  (Not yet assigned)    Completed KRISTIE CAMPBELL     Inpatient consult to Midline team  Once        Provider:  (Not yet assigned)    Completed KRISTIE CAMPBELL            Pending Diagnostic Studies:       Procedure Component Value Units Date/Time    IR Nephrostomy Tube Placement [[id number]] Resulted: 03/15/24 1732    Order Status: Sent Lab Status: In process Updated: 03/15/24 1734          Significant Diagnostic Studies: Labs: CMP   Recent Labs   Lab 03/18/24  0708 03/19/24  0556   * 136   K 4.2 4.5    105   CO2 16* 21*   * 136*   BUN 49* 49*   CREATININE 2.2* 2.0*   CALCIUM 9.8 10.4   ALBUMIN 3.1* 3.1*   ANIONGAP 11 10   , CBC   Recent Labs   Lab 03/18/24  0708 03/19/24  0556   WBC 7.24 8.87   HGB 10.0* 10.0*   HCT 32.4* 30.3*    300   , and All labs within the past 24 hours have been reviewed    Discharged Condition: good    Disposition: Home or Self Care    Follow Up:    Patient  "Instructions:      WALKER FOR HOME USE     Order Specific Question Answer Comments   Type of Walker: Rollator with brakes and/or seat    With wheels? Yes    Height: 5' 4" (1.626 m)    Weight: 89.9 kg (198 lb 3.1 oz)    Length of need (1-99 months): 99    Please check all that apply: Patient's condition impairs ambulation.    Please check all that apply: Patient is unable to safely ambulate without equipment.      Ambulatory referral/consult to Home Health   Standing Status: Future   Referral Priority: Routine Referral Type: Home Health Care   Referral Reason: Specialty Services Required   Requested Specialty: Home Health Services   Number of Visits Requested: 1     Diet Adult Regular     Notify your health care provider if you experience any of the following:  temperature >100.4     Notify your health care provider if you experience any of the following:  persistent nausea and vomiting or diarrhea     Notify your health care provider if you experience any of the following:  severe uncontrolled pain     Notify your health care provider if you experience any of the following:  redness, tenderness, or signs of infection (pain, swelling, redness, odor or green/yellow discharge around incision site)     Notify your health care provider if you experience any of the following:  difficulty breathing or increased cough     Notify your health care provider if you experience any of the following:  severe persistent headache     Notify your health care provider if you experience any of the following:  worsening rash     Notify your health care provider if you experience any of the following:  persistent dizziness, light-headedness, or visual disturbances     Notify your health care provider if you experience any of the following:  increased confusion or weakness     Activity as tolerated     Medications:  Reconciled Home Medications:      Medication List        CHANGE how you take these medications      tacrolimus 1 MG " Cap  Commonly known as: PROGRAF  Take 6 capsules (6 mg total) by mouth every 12 (twelve) hours. Z94.0;Kidney txp on 2/29/2024  What changed: how much to take            CONTINUE taking these medications      aspirin 81 MG EC tablet  Commonly known as: ECOTRIN  Take 81 mg by mouth once daily.     calcitRIOL 0.5 MCG Cap  Commonly known as: ROCALTROL  Take 1 capsule (0.5 mcg total) by mouth once daily.     carvediloL 6.25 MG tablet  Commonly known as: COREG  Take 1 tablet (6.25 mg total) by mouth 2 (two) times daily.     HumaLOG U-100 Insulin 100 unit/mL injection  Generic drug: insulin lispro  Inject into the skin. FOR INSULIN PUMP (BASAL 1.2 UNITS/HR)     latanoprost 0.005 % ophthalmic solution  Place 1 drop into the right eye every other day. At night     levothyroxine 50 MCG tablet  Commonly known as: SYNTHROID  Take 1 tablet (50 mcg total) by mouth before breakfast.     multivitamin Tab  Take 1 tablet by mouth once daily.     mycophenolate 250 mg Cap  Commonly known as: CELLCEPT  Take 4 capsules (1,000 mg total) by mouth 2 (two) times daily.     ONETOUCH DELICA PLUS LANCET 30 gauge Misc  Generic drug: lancets     oxyCODONE 10 mg Tab immediate release tablet  Commonly known as: ROXICODONE  Take 1 tablet (10 mg total) by mouth every 4 (four) hours as needed for Pain.     pantoprazole 40 MG tablet  Commonly known as: PROTONIX  Take 1 tablet (40 mg total) by mouth once daily.     predniSONE 5 MG tablet  Commonly known as: DELTASONE  Take by mouth daily: 20mg 3/2-3/8, 15mg 3/9-3/15, 10mg 3/16-3/22/2024, 5mg 3/23/2024-     sodium bicarbonate 650 MG tablet  Take 2 tablets (1,300 mg total) by mouth 2 (two) times daily.     subcutaneous insulin pump Misc  Inject 1 Units/hr into the skin continuous. Insulin pump name - Tandum  Basal dose is 2 units/hr     sulfamethoxazole-trimethoprim 400-80mg 400-80 mg per tablet  Commonly known as: BACTRIM,SEPTRA  Take 1 tablet by mouth every Mon, Wed, Fri. Stop 8/27/24     valGANciclovir  450 mg Tab  Commonly known as: VALCYTE  Take 1 tablet (450 mg total) by mouth every Mon, Wed, Fri. Stop 8/27/24            Time spent caring for patient (Greater than 1/2 spent in direct face-to-face contact): > 30 minutes    Lilly La PA-C  Kidney Transplant  [facility] - Transplant Stepdown

## 2024-03-19 NOTE — ASSESSMENT & PLAN NOTE
Endocrinology consulted for BG management.   BG goal 140-180      - Continue Home Insulin Pump (see insulin pump below for adjustments)   - BG checks AC/HS/0200  - Hypoglycemia protocol in place     ** Please notify Endocrine for any change and/or advance in diet**  ** Please call Endocrine for any BG related issues **     Discharge Planning:   TBD. Please notify endocrinology prior to discharge.

## 2024-03-19 NOTE — PHYSICIAN QUERY
PT Name: Mark Lopez  MR #: [id number]    DOCUMENTATION CLARIFICATION     CDS/: Riddhi Hubbard RN              Contact information:  [email or web address]     This form is a permanent document in the medical record.     Query Date: March 19, 2024    Dear Provider,  By submitting this query, we are merely seeking further clarification of documentation. Please utilize your independent clinical judgment when addressing the question(s) below.    The medical record contains the following:  Supporting Clinical Findings Location in Medical Record    Urine leak from transplanted ureter  - He presented to outpatient transplant clinic 3/13 with report of significant increase in drain output (~950) and increased leakage from incision. Lab work notable for leukocytosis.  - JEAN-PIERRE drain creatinine 29 (prev 2.8 on 3/10), significant for urine leak.  - Plan for CT a/p to assess collection and current drain placement. Abx started.  - Curry to be placed.  -  with Cr 2.8.  - Plan to admit for infectious work-up and urine leak.          Ktx on 2/29/24 for presumed diabetic nephropathy who was admitted on 3/13/24 for urine leak from transplanted kidney. H&P 3/13       Kidney Transplant                    Consult 3/15       IR       Please clarify if ____  Urine leak  ___________ (as it relates to _ Kidney transplant procedure __) is:      [ x ] Complication of the procedure     [  ] Present, but not a complication of the procedure     [  ] Other (please specify): __________________     [  ] Clinically Undetermined         Please document in your progress notes daily for the duration of treatment until resolved and include in your discharge summary.

## 2024-03-19 NOTE — PROGRESS NOTES
Transplant Social Work Discharge Note:    Pt will discharge to patient's home under the care of self and Father Julien, patient's friend, phone number [phone].     Patient reported readiness to discharge at this time. Patient reports he now is feeling more comfortable with transplant knowledge. Patient reports his caregiver will come to bedside this afternoon for teaching on how to care for nephrostomy tube. Patient has been set up with [facility] and will be seen by them on Thursday 3/21/24. KALLI made aware that Home Health does not do perm cath care, KALLI reports she will work with Dr. Alexander to make a plan for Perm Cath. [facility] notified that patient will discharge today. MD, PharmD, inpatient RN Coordinator, and KALLI all were made aware through this hospital stay that patient would benefit from additional education.  communicated with patient's outpatient RN Coordinator the above and she reports she will follow up with patient and caregiver to confirm understanding. Dr. Jackson instructed patient on how to use [facility] to communicate with transplant team. Per rounds this morning patient does not require additional referrals from this  at time of discharge. Patient agrees to contact transplant team with needs, questions, or concerns as they arise.      Pt aware of, involved in, and coping well with this discharge plan. Pt did not have any concerns with the discharge plan at this time. JUDY remains available at [phone].          Katt Dunlap LMSW

## 2024-03-19 NOTE — PROGRESS NOTES
Discharge Medication Note:    Hospital Course:  63 y/o M s/p DBD Kidney Transplant 2/29/24 secondary for DMII (CMV +/-) admitted for urine leak.  JEAN-PIERRE drain creatinine up to 29 (previously 2.8 on 3/10).  Pt continued to have high output per JEAN-PIERRE with cramer in place, IR consulted for perc neph tube to divert urine and allow for healing.  Perc neph tube placed on 3/15. Patient with BUN >100 on 3/14 therefore received one round of HD.  Patient slowly starting to clear after perc neph tube placement, making good urine,  no need for HD at this time.  Will be seen outpatient for management.  Patient was empirically on cefepime, cx NGTD, therefore stopped abx on 3/19.     Patient completed PCN G for RPR + donor.      Met with Mark Lopez at discharge to review discharge medications and to update the blue medication card.           Medication List        CHANGE how you take these medications      sulfamethoxazole-trimethoprim 400-80mg 400-80 mg per tablet  Commonly known as: BACTRIM,SEPTRA  Take 1 tablet by mouth once daily. Stop 8/27/24  What changed: when to take this     tacrolimus 1 MG Cap  Commonly known as: PROGRAF  Take 6 capsules (6 mg total) by mouth every 12 (twelve) hours. Z94.0;Kidney txp on 2/29/2024  What changed: how much to take            CONTINUE taking these medications      aspirin 81 MG EC tablet  Commonly known as: ECOTRIN     calcitRIOL 0.5 MCG Cap  Commonly known as: ROCALTROL  Take 1 capsule (0.5 mcg total) by mouth once daily.     carvediloL 6.25 MG tablet  Commonly known as: COREG  Take 1 tablet (6.25 mg total) by mouth 2 (two) times daily.     HumaLOG U-100 Insulin 100 unit/mL injection  Generic drug: insulin lispro     latanoprost 0.005 % ophthalmic solution  Place 1 drop into the right eye every other day. At night     levothyroxine 50 MCG tablet  Commonly known as: SYNTHROID  Take 1 tablet (50 mcg total) by mouth before breakfast.     multivitamin Tab  Take 1 tablet by mouth once daily.      mycophenolate 250 mg Cap  Commonly known as: CELLCEPT  Take 4 capsules (1,000 mg total) by mouth 2 (two) times daily.     ONETOUCH DELICA PLUS LANCET 30 gauge Misc  Generic drug: lancets     oxyCODONE 10 mg Tab immediate release tablet  Commonly known as: ROXICODONE  Take 1 tablet (10 mg total) by mouth every 4 (four) hours as needed for Pain.     pantoprazole 40 MG tablet  Commonly known as: PROTONIX  Take 1 tablet (40 mg total) by mouth once daily.     predniSONE 5 MG tablet  Commonly known as: DELTASONE  Take by mouth daily: 20mg 3/2-3/8, 15mg 3/9-3/15, 10mg 3/16-3/22/2024, 5mg 3/23/2024-     sodium bicarbonate 650 MG tablet  Take 2 tablets (1,300 mg total) by mouth 2 (two) times daily.     subcutaneous insulin pump Misc  Inject 1 Units/hr into the skin continuous. Insulin pump name - Tandum  Basal dose is 2 units/hr     valGANciclovir 450 mg Tab  Commonly known as: VALCYTE  Take 1 tablet (450 mg total) by mouth every Mon, Wed, Fri. Stop 8/27/24               Where to Get Your Medications        These medications were sent to [facility] [address], [place] [address]      Hours: Mon-Fri 7a-7p, Sat-Sun 10a-4p Phone: [phone]   sulfamethoxazole-trimethoprim 400-80mg 400-80 mg per tablet  tacrolimus 1 MG Cap            The following medications have been placed on HOLD and should be restarted in the outpatient setting (when appropriate): n/a    Mark Lopez verbalized understanding and had the opportunity to ask questions.

## 2024-03-19 NOTE — PLAN OF CARE
Pt aao x 4. VSS. Bed in low locked pos, call light within reach. Curry and neph tube w/ punch colored output. Jean-Pierre w/ serous output, JEAN-PIERRE crt 2.0. RLQ incision renny w/ staples, painted with betadine. Pt w/ home insulin pump in place. No c/o pain. Iv abx cont as ordered. AM labs ordered.

## 2024-03-19 NOTE — ASSESSMENT & PLAN NOTE
Lab Results   Component Value Date    CREATININE 2.0 (H) 03/19/2024     Avoid insulin stacking  Titrate insulin slowly

## 2024-03-19 NOTE — NURSING
Drain instructions given to caregiver along with order to clamp neph tube on Monday.understanding verbalized. Discharged from TSU accompanied by caregiver.

## 2024-03-20 ENCOUNTER — PATIENT MESSAGE (OUTPATIENT)
Dept: TRANSPLANT | Facility: CLINIC | Age: 64
End: 2024-03-20
Payer: MEDICARE

## 2024-03-20 DIAGNOSIS — T86.898 URINE LEAK FROM TRANSPLANTED URETER: Primary | ICD-10-CM

## 2024-03-20 DIAGNOSIS — T86.10 COMPLICATION OF TRANSPLANTED KIDNEY, UNSPECIFIED COMPLICATION: ICD-10-CM

## 2024-03-21 ENCOUNTER — TELEPHONE (OUTPATIENT)
Dept: TRANSPLANT | Facility: CLINIC | Age: 64
End: 2024-03-21
Payer: MEDICARE

## 2024-03-21 ENCOUNTER — PATIENT MESSAGE (OUTPATIENT)
Dept: TRANSPLANT | Facility: CLINIC | Age: 64
End: 2024-03-21
Payer: MEDICARE

## 2024-03-21 DIAGNOSIS — T86.898 URINE LEAK FROM TRANSPLANTED URETER: ICD-10-CM

## 2024-03-21 DIAGNOSIS — T86.10 COMPLICATION OF TRANSPLANTED KIDNEY, UNSPECIFIED COMPLICATION: Primary | ICD-10-CM

## 2024-03-21 NOTE — TELEPHONE ENCOUNTER
Ureteral leak after transplant  Stent and perc neph and cramer and JEAN-PIERRE in place.    JEAN-PIERRE still had high output >200 ml, depsite cramer and neph tube to gravity. Drain cr was low, however.   Nephrostogram next week to determine future course.  Resend drain cr next week as well.

## 2024-03-22 ENCOUNTER — LAB VISIT (OUTPATIENT)
Dept: LAB | Facility: HOSPITAL | Age: 64
End: 2024-03-22
Payer: MEDICARE

## 2024-03-22 ENCOUNTER — TELEPHONE (OUTPATIENT)
Dept: DERMATOLOGY | Facility: CLINIC | Age: 64
End: 2024-03-22
Payer: MEDICARE

## 2024-03-22 ENCOUNTER — TELEPHONE (OUTPATIENT)
Dept: TRANSPLANT | Facility: CLINIC | Age: 64
End: 2024-03-22

## 2024-03-22 ENCOUNTER — CLINICAL SUPPORT (OUTPATIENT)
Dept: TRANSPLANT | Facility: CLINIC | Age: 64
End: 2024-03-22
Payer: MEDICARE

## 2024-03-22 DIAGNOSIS — T86.10 COMPLICATION OF TRANSPLANTED KIDNEY, UNSPECIFIED COMPLICATION: Primary | ICD-10-CM

## 2024-03-22 DIAGNOSIS — Z94.0 KIDNEY REPLACED BY TRANSPLANT: Primary | ICD-10-CM

## 2024-03-22 DIAGNOSIS — Z94.0 KIDNEY REPLACED BY TRANSPLANT: ICD-10-CM

## 2024-03-22 DIAGNOSIS — T86.898 URINE LEAK FROM TRANSPLANTED URETER: ICD-10-CM

## 2024-03-22 DIAGNOSIS — D22.9 MULTIPLE ATYPICAL SKIN MOLES: Primary | ICD-10-CM

## 2024-03-22 DIAGNOSIS — Z94.0 IMMUNOSUPPRESSIVE MANAGEMENT ENCOUNTER FOLLOWING KIDNEY TRANSPLANT: ICD-10-CM

## 2024-03-22 DIAGNOSIS — Z79.899 IMMUNOSUPPRESSIVE MANAGEMENT ENCOUNTER FOLLOWING KIDNEY TRANSPLANT: ICD-10-CM

## 2024-03-22 DIAGNOSIS — Z12.83 SKIN CANCER SCREENING: ICD-10-CM

## 2024-03-22 LAB
ALBUMIN SERPL BCP-MCNC: 3.4 G/DL (ref 3.5–5.2)
ANION GAP SERPL CALC-SCNC: 9 MMOL/L (ref 8–16)
BASOPHILS # BLD AUTO: 0.03 K/UL (ref 0–0.2)
BASOPHILS NFR BLD: 0.4 % (ref 0–1.9)
BODY FLUID SOURCE, CREATININE: NORMAL
BUN SERPL-MCNC: 58 MG/DL (ref 8–23)
CALCIUM SERPL-MCNC: 10 MG/DL (ref 8.7–10.5)
CHLORIDE SERPL-SCNC: 107 MMOL/L (ref 95–110)
CO2 SERPL-SCNC: 22 MMOL/L (ref 23–29)
CREAT FLD-MCNC: 2.1 MG/DL
CREAT SERPL-MCNC: 2.2 MG/DL (ref 0.5–1.4)
DIFFERENTIAL METHOD BLD: ABNORMAL
EOSINOPHIL # BLD AUTO: 0 K/UL (ref 0–0.5)
EOSINOPHIL NFR BLD: 0.4 % (ref 0–8)
ERYTHROCYTE [DISTWIDTH] IN BLOOD BY AUTOMATED COUNT: 15 % (ref 11.5–14.5)
EST. GFR  (NO RACE VARIABLE): 32.4 ML/MIN/1.73 M^2
GLUCOSE SERPL-MCNC: 132 MG/DL (ref 70–110)
HCT VFR BLD AUTO: 30.8 % (ref 40–54)
HGB BLD-MCNC: 10 G/DL (ref 14–18)
IMM GRANULOCYTES # BLD AUTO: 0.07 K/UL (ref 0–0.04)
IMM GRANULOCYTES NFR BLD AUTO: 0.8 % (ref 0–0.5)
LYMPHOCYTES # BLD AUTO: 0.1 K/UL (ref 1–4.8)
LYMPHOCYTES NFR BLD: 1.1 % (ref 18–48)
MAGNESIUM SERPL-MCNC: 2.5 MG/DL (ref 1.6–2.6)
MCH RBC QN AUTO: 31.1 PG (ref 27–31)
MCHC RBC AUTO-ENTMCNC: 32.5 G/DL (ref 32–36)
MCV RBC AUTO: 96 FL (ref 82–98)
MONOCYTES # BLD AUTO: 0.4 K/UL (ref 0.3–1)
MONOCYTES NFR BLD: 5 % (ref 4–15)
NEUTROPHILS # BLD AUTO: 7.7 K/UL (ref 1.8–7.7)
NEUTROPHILS NFR BLD: 92.3 % (ref 38–73)
NRBC BLD-RTO: 0 /100 WBC
PHOSPHATE SERPL-MCNC: 3.1 MG/DL (ref 2.7–4.5)
PLATELET # BLD AUTO: 243 K/UL (ref 150–450)
PMV BLD AUTO: 10.3 FL (ref 9.2–12.9)
POTASSIUM SERPL-SCNC: 5.1 MMOL/L (ref 3.5–5.1)
RBC # BLD AUTO: 3.22 M/UL (ref 4.6–6.2)
SODIUM SERPL-SCNC: 138 MMOL/L (ref 136–145)
TACROLIMUS BLD-MCNC: 8.3 NG/ML (ref 5–15)
WBC # BLD AUTO: 8.33 K/UL (ref 3.9–12.7)

## 2024-03-22 PROCEDURE — 82570 ASSAY OF URINE CREATININE: CPT | Performed by: TRANSPLANT SURGERY

## 2024-03-22 PROCEDURE — 36415 COLL VENOUS BLD VENIPUNCTURE: CPT | Performed by: INTERNAL MEDICINE

## 2024-03-22 PROCEDURE — 80197 ASSAY OF TACROLIMUS: CPT | Performed by: INTERNAL MEDICINE

## 2024-03-22 PROCEDURE — 83735 ASSAY OF MAGNESIUM: CPT | Performed by: INTERNAL MEDICINE

## 2024-03-22 PROCEDURE — 80069 RENAL FUNCTION PANEL: CPT | Performed by: INTERNAL MEDICINE

## 2024-03-22 PROCEDURE — 85025 COMPLETE CBC W/AUTO DIFF WBC: CPT | Performed by: INTERNAL MEDICINE

## 2024-03-22 NOTE — TELEPHONE ENCOUNTER
----- Message from Hope Alexander DO sent at 3/22/2024 12:58 PM CDT -----  Stable graft function. No HD today. Will assess on Monday whether his chair can be given up and TDC removed    Acceptable labs and FK level

## 2024-03-25 ENCOUNTER — PATIENT MESSAGE (OUTPATIENT)
Dept: TRANSPLANT | Facility: CLINIC | Age: 64
End: 2024-03-25
Payer: MEDICARE

## 2024-03-25 ENCOUNTER — TELEPHONE (OUTPATIENT)
Dept: DERMATOLOGY | Facility: CLINIC | Age: 64
End: 2024-03-25
Payer: MEDICARE

## 2024-03-25 ENCOUNTER — PATIENT MESSAGE (OUTPATIENT)
Dept: INTERVENTIONAL RADIOLOGY/VASCULAR | Facility: HOSPITAL | Age: 64
End: 2024-03-25
Payer: MEDICARE

## 2024-03-25 ENCOUNTER — PATIENT MESSAGE (OUTPATIENT)
Dept: INTERVENTIONAL RADIOLOGY/VASCULAR | Facility: CLINIC | Age: 64
End: 2024-03-25
Payer: MEDICARE

## 2024-03-25 ENCOUNTER — LAB VISIT (OUTPATIENT)
Dept: LAB | Facility: HOSPITAL | Age: 64
End: 2024-03-25
Attending: INTERNAL MEDICINE
Payer: MEDICARE

## 2024-03-25 ENCOUNTER — TELEPHONE (OUTPATIENT)
Dept: ADMINISTRATIVE | Facility: HOSPITAL | Age: 64
End: 2024-03-25
Payer: MEDICARE

## 2024-03-25 DIAGNOSIS — T86.898 URINE LEAK FROM TRANSPLANTED URETER: Primary | ICD-10-CM

## 2024-03-25 DIAGNOSIS — Z79.899 IMMUNOSUPPRESSIVE MANAGEMENT ENCOUNTER FOLLOWING KIDNEY TRANSPLANT: ICD-10-CM

## 2024-03-25 DIAGNOSIS — T86.10 COMPLICATION OF TRANSPLANTED KIDNEY, UNSPECIFIED COMPLICATION: ICD-10-CM

## 2024-03-25 DIAGNOSIS — Z94.0 KIDNEY REPLACED BY TRANSPLANT: ICD-10-CM

## 2024-03-25 DIAGNOSIS — Z94.0 IMMUNOSUPPRESSIVE MANAGEMENT ENCOUNTER FOLLOWING KIDNEY TRANSPLANT: ICD-10-CM

## 2024-03-25 DIAGNOSIS — Z45.2 ENCOUNTER FOR REMOVAL OF TUNNELED CENTRAL VENOUS CATHETER (CVC) WITH PORT: Primary | ICD-10-CM

## 2024-03-25 LAB
ALBUMIN SERPL BCP-MCNC: 3.4 G/DL (ref 3.5–5.2)
ANION GAP SERPL CALC-SCNC: 6 MMOL/L (ref 8–16)
BASOPHILS # BLD AUTO: 0.04 K/UL (ref 0–0.2)
BASOPHILS NFR BLD: 0.5 % (ref 0–1.9)
BUN SERPL-MCNC: 54 MG/DL (ref 8–23)
CALCIUM SERPL-MCNC: 10.2 MG/DL (ref 8.7–10.5)
CHLORIDE SERPL-SCNC: 106 MMOL/L (ref 95–110)
CO2 SERPL-SCNC: 23 MMOL/L (ref 23–29)
CREAT SERPL-MCNC: 2.3 MG/DL (ref 0.5–1.4)
DIFFERENTIAL METHOD BLD: ABNORMAL
EOSINOPHIL # BLD AUTO: 0.2 K/UL (ref 0–0.5)
EOSINOPHIL NFR BLD: 2.5 % (ref 0–8)
ERYTHROCYTE [DISTWIDTH] IN BLOOD BY AUTOMATED COUNT: 15 % (ref 11.5–14.5)
EST. GFR  (NO RACE VARIABLE): 30.7 ML/MIN/1.73 M^2
GLUCOSE SERPL-MCNC: 109 MG/DL (ref 70–110)
HCT VFR BLD AUTO: 30.4 % (ref 40–54)
HGB BLD-MCNC: 9.6 G/DL (ref 14–18)
IMM GRANULOCYTES # BLD AUTO: 0.04 K/UL (ref 0–0.04)
IMM GRANULOCYTES NFR BLD AUTO: 0.5 % (ref 0–0.5)
LYMPHOCYTES # BLD AUTO: 0.1 K/UL (ref 1–4.8)
LYMPHOCYTES NFR BLD: 1.4 % (ref 18–48)
MAGNESIUM SERPL-MCNC: 2.4 MG/DL (ref 1.6–2.6)
MCH RBC QN AUTO: 30.8 PG (ref 27–31)
MCHC RBC AUTO-ENTMCNC: 31.6 G/DL (ref 32–36)
MCV RBC AUTO: 97 FL (ref 82–98)
MONOCYTES # BLD AUTO: 0.6 K/UL (ref 0.3–1)
MONOCYTES NFR BLD: 7.5 % (ref 4–15)
NEUTROPHILS # BLD AUTO: 6.4 K/UL (ref 1.8–7.7)
NEUTROPHILS NFR BLD: 87.6 % (ref 38–73)
NRBC BLD-RTO: 0 /100 WBC
PHOSPHATE SERPL-MCNC: 2.5 MG/DL (ref 2.7–4.5)
PLATELET # BLD AUTO: 352 K/UL (ref 150–450)
PMV BLD AUTO: 9.8 FL (ref 9.2–12.9)
POTASSIUM SERPL-SCNC: 5 MMOL/L (ref 3.5–5.1)
RBC # BLD AUTO: 3.12 M/UL (ref 4.6–6.2)
SODIUM SERPL-SCNC: 135 MMOL/L (ref 136–145)
TACROLIMUS BLD-MCNC: 7.7 NG/ML (ref 5–15)
WBC # BLD AUTO: 7.33 K/UL (ref 3.9–12.7)

## 2024-03-25 PROCEDURE — 83735 ASSAY OF MAGNESIUM: CPT | Performed by: INTERNAL MEDICINE

## 2024-03-25 PROCEDURE — 80069 RENAL FUNCTION PANEL: CPT | Performed by: INTERNAL MEDICINE

## 2024-03-25 PROCEDURE — 85025 COMPLETE CBC W/AUTO DIFF WBC: CPT | Performed by: INTERNAL MEDICINE

## 2024-03-25 PROCEDURE — 80197 ASSAY OF TACROLIMUS: CPT | Performed by: INTERNAL MEDICINE

## 2024-03-25 NOTE — TELEPHONE ENCOUNTER
Spoke to pt on phone,  Pt is scheduled on 3/26/2024 and 4/4/2024 with arrival time of 9am and 8am  for IR procedure at [facility] location.  Preop instructions given (NPO after midnight, MUST have a ride home, Nurse will call 1-2  days before to go over instructions and medications),  pt verbally understood. Pt aware and confirmed, Thanks

## 2024-03-25 NOTE — TELEPHONE ENCOUNTER
Left message for pt to return my call. Need to schedule IR procedure.  Please forward call to [phone]. Thanks

## 2024-03-25 NOTE — NURSING
Pre-procedure call complete.  Arrival time 9am.  Patient instructed not to eat or drink anything after midnight the night before procedure.  Patient aware will need someone to provide transport home and monitor pt 8 hours post procedure.  No driving for at least 24 hours after procedure.   Patient advised to take blood pressure and anti-rejections medications with a sip of water morning of procedure.  Patient verbalized aware of which medications to take.  Do not take sleep medication (including OTC) the night before procedure.  Arrival time and location given.  Expected length of stay reviewed.  Covid screening completed.  Patient verbalized understanding of all pre-procedure instructions.  Written instructions and directions sent to patient in [employer]/portal.

## 2024-03-26 ENCOUNTER — HOSPITAL ENCOUNTER (OUTPATIENT)
Dept: INTERVENTIONAL RADIOLOGY/VASCULAR | Facility: HOSPITAL | Age: 64
Discharge: HOME | End: 2024-03-26
Attending: TRANSPLANT SURGERY | Admitting: STUDENT IN AN ORGANIZED HEALTH CARE EDUCATION/TRAINING PROGRAM
Payer: MEDICARE

## 2024-03-26 ENCOUNTER — TELEPHONE (OUTPATIENT)
Dept: TRANSPLANT | Facility: CLINIC | Age: 64
End: 2024-03-26
Payer: MEDICARE

## 2024-03-26 ENCOUNTER — PATIENT MESSAGE (OUTPATIENT)
Dept: TRANSPLANT | Facility: CLINIC | Age: 64
End: 2024-03-26
Payer: MEDICARE

## 2024-03-26 VITALS
SYSTOLIC BLOOD PRESSURE: 116 MMHG | TEMPERATURE: 98 F | HEIGHT: 64 IN | WEIGHT: 198 LBS | BODY MASS INDEX: 33.8 KG/M2 | HEART RATE: 79 BPM | DIASTOLIC BLOOD PRESSURE: 78 MMHG | RESPIRATION RATE: 16 BRPM | OXYGEN SATURATION: 100 %

## 2024-03-26 DIAGNOSIS — T86.898 URINE LEAK FROM TRANSPLANTED URETER: ICD-10-CM

## 2024-03-26 DIAGNOSIS — T86.10 COMPLICATION OF TRANSPLANTED KIDNEY, UNSPECIFIED COMPLICATION: ICD-10-CM

## 2024-03-26 LAB
POCT GLUCOSE: 122 MG/DL (ref 70–110)
POCT GLUCOSE: 141 MG/DL (ref 70–110)

## 2024-03-26 PROCEDURE — 94760 N-INVAS EAR/PLS OXIMETRY 1: CPT

## 2024-03-26 PROCEDURE — 50431 NJX PX NFROSGRM &/URTRGRM: CPT | Performed by: STUDENT IN AN ORGANIZED HEALTH CARE EDUCATION/TRAINING PROGRAM

## 2024-03-26 PROCEDURE — 99900035 HC TECH TIME PER 15 MIN (STAT)

## 2024-03-26 PROCEDURE — 82962 GLUCOSE BLOOD TEST: CPT | Mod: 91

## 2024-03-26 RX ORDER — LIDOCAINE HYDROCHLORIDE 10 MG/ML
1 INJECTION, SOLUTION EPIDURAL; INFILTRATION; INTRACAUDAL; PERINEURAL ONCE
Status: DISCONTINUED | OUTPATIENT
Start: 2024-03-26 | End: 2024-03-27 | Stop reason: HOSPADM

## 2024-03-26 RX ORDER — SODIUM CHLORIDE 9 MG/ML
INJECTION, SOLUTION INTRAVENOUS CONTINUOUS
Status: DISCONTINUED | OUTPATIENT
Start: 2024-03-26 | End: 2024-03-27 | Stop reason: HOSPADM

## 2024-03-26 NOTE — PLAN OF CARE
Discharge instructions given and explained to patient with verbalization of understanding all instructions. Patient is AAOx3,v/s stable, denies n/v and  rates pain level tolerable. Patient discharged to home. Patient transported off unit with personal walker.

## 2024-03-26 NOTE — H&P
H&P Note  Interventional Radiology    Reason for Consult: nephrostogram    SUBJECTIVE:     Chief Complaint: urinary leak    History of Present Illness: 64M PMHx T2DM and ESRD s/p renal transplant 2/29/2024 complicated by urine leak (elevated Cr on drain fluid analysis) with nephrostomy tube placement 3/15 for urinary diversion. Patient presents for nephrostomy tube check and possible exchange/capping. Per patient, he has had consistent output from JEAN-PIERRE drain as well as leakage surrounding the drain so capping trial has been deferred at this time. Denies issues with nephrostomy tube, which is still open to gravity bag.     Past Medical History:   Diagnosis Date    Anemia     Cataract     Diabetes mellitus     Diabetes mellitus, type 2     Glaucoma     Gout, unspecified     HLD (hyperlipidemia)     Hypertension     Hypothyroidism, unspecified     Kidney failure     Renal disorder      Past Surgical History:   Procedure Laterality Date    COLONOSCOPY N/A 05/18/2022    Procedure: COLONOSCOPY;  Surgeon: Raul Dyer MD;  Location: [facility];  Service: Endoscopy;  Laterality: N/A;    DENTAL SURGERY      EYE SURGERY Bilateral     Cataract    INSERTION, CATHETER, DIALYSIS, PERITONEAL, LAPAROSCOPIC N/A 06/20/2023    Procedure: INSERTION, CATHETER, DIALYSIS, PERITONEAL, LAPAROSCOPIC;  Surgeon: Carlos Alberto Rodgers MD;  [facility];  Service: General;  Laterality: N/A;    KIDNEY TRANSPLANT N/A 2/29/2024    Procedure: TRANSPLANT, KIDNEY;  Surgeon: Pino Law Jr., MD;  Location: [address];  Service: Transplant;  Laterality: N/A;    KNEE SURGERY Right      Family History   Problem Relation Age of Onset    Hypertension Father     Diabetes Father     Heart disease Father     Cancer Sister         MM    Diabetes Brother     Kidney disease Brother      Social History     Tobacco Use    Smoking status: Never    Smokeless tobacco: Never   Substance Use Topics    Alcohol use: Never    Drug use: Never  "      Review of Systems:  Constitutional/General:No fever, chills, change in appetite or weight loss.  Hematological/Immuno: no known coagulopathies  Respiratory: no shortness of breath  Cardiovascular: no chest pain  Gastrointestinal: no abdominal pain  Genito-Urinary: no dysuria  Musculoskeletal: negative  Skin: Negative for rash, itching, pigmentation changes, nail or hair changes.  Neurological: no TIA or stroke symptoms  Psychiatric: normal mood/affect, good insight/judgement      OBJECTIVE:     Vital Signs Range (Last 24H):       Physical Exam:  General- Patient AAO x3 in NAD  ENT- EOMI  Neck- No masses  CV- Normal rate  Resp-  No increased WOB  GI- Non tender, non-distended  - RLQ peritoneal dialysis catheter, RLQ JEAN-PIERRE drain, RLQ nephrostomy tube, RLQ incision with staples; no erythema/induration/TTP  Extrem- No cyanosis, clubbing, edema  Derm- No rashes, masses, or lesions noted  Neuro-  No focal deficits noted    Physical Exam  There is no height or weight on file to calculate BMI.    Scheduled Meds:    LIDOcaine (PF) 10 mg/ml (1%)  1 mL Other Once     Continuous Infusions:    sodium chloride 0.9%       PRN Meds:    Allergies:   Review of patient's allergies indicates:   Allergen Reactions    Allopurinol analogues Swelling    Latex, natural rubber     Statins-hmg-coa reductase inhibitors Other (See Comments)     Muscle ache    Adhesive Rash       Labs:  No results for input(s): "INR", "PT", "PTT" in the last 168 hours.    Recent Labs   Lab 03/25/24  0846   WBC 7.33   HGB 9.6*   HCT 30.4*   MCV 97         Recent Labs   Lab 03/25/24  0846      *   K 5.0      CO2 23   BUN 54*   CREATININE 2.3*   CALCIUM 10.2   MG 2.4   ALBUMIN 3.4*       Vitals (Most Recent):       ASA: 3  Mallampati: 3    Consent obtained.     ASSESSMENT/PLAN:     64M PMHx T2DM and ESRD s/p renal transplant 2/29/2024 complicated by urine leak (elevated Cr on drain fluid analysis) with nephrostomy tube placement 3/15 " for urinary diversion. Patient presents for nephrostomy tube check and possible exchange with local anesthesia.     There are no hospital problems to display for this patient.          Olive Gutiérrez MD

## 2024-03-26 NOTE — DISCHARGE SUMMARY
Radiology Discharge Summary      Hospital Course: No complications    Admit Date: 3/26/2024  Discharge Date: 03/26/2024     Instructions Given to Patient: Yes  Diet: Resume prior diet  Activity: activity as tolerated    Description of Condition on Discharge: Stable  Vital Signs (Most Recent): Temp: 98.1 °F (36.7 °C) (03/26/24 1000)  Pulse: 78 (03/26/24 1110)  Resp: 16 (03/26/24 1110)  BP: 122/70 (03/26/24 1110)  SpO2: 100 % (03/26/24 1110)    Discharge Disposition: Home    Discharge Diagnosis: urine leak     Follow-up: No urine leak identified. However, given ongoing significant output from JEAN-PIERRE drain, will defer timing of capping trial to primary team. Otherwise, routine nephrostomy tube exchange in 4 weeks.     Olive Gutiérrez MD

## 2024-03-26 NOTE — PLAN OF CARE
MD at bedside discussing procedure; per MD, no IV access needed for procedure. No other new orders at this time.

## 2024-03-26 NOTE — PROCEDURES
Interventional Radiology Post-Procedure Note    Pre Op Diagnosis:  urine leak    Post Op Diagnosis:  same    Procedure: RLQ transplant  nephrostogram    Procedure performed by: Olive Gutiérrez MD    Written Informed Consent Obtained:  Yes    Specimen Removed: No     Estimated Blood Loss:  Minimal     Findings:     Antegrade nephrostogram was performed in different obliquities via existing access. No urine leak identified.     There were no complications and the patient tolerated the procedure well.  Please see Imaging report for further details.    Plan:  No urine leak identified. However, given ongoing significant output from JEAN-PIERRE drain, will defer timing of capping trial to primary team. Otherwise, routine nephrostomy tube exchange in 4 weeks.     Olive Gutiérrez MD  Interventional Radiology

## 2024-03-26 NOTE — TELEPHONE ENCOUNTER
Attempted to reach patient via telephone with instructions for nephrostomy trial but was unsuccessful. Message left on patient's voicemail.    My [facility] message sent to patient:    Millie Father John,     That was me trying to call you to give you instructions for capping your nephrostomy.     Since you had the nephrostogram today, please go ahead and cap the nephrostomy. See how you make out over night. If at any point you are unable to urinate & the urine is not flowing through the cramer bag then uncap the nephrostomy.     We will see you in clinic tomorrow with the outcome of the trial.     Thanks,      Veronica

## 2024-03-27 ENCOUNTER — OFFICE VISIT (OUTPATIENT)
Dept: TRANSPLANT | Facility: CLINIC | Age: 64
End: 2024-03-27
Payer: MEDICARE

## 2024-03-27 ENCOUNTER — OFFICE VISIT (OUTPATIENT)
Dept: URGENT CARE | Facility: CLINIC | Age: 64
End: 2024-03-27
Payer: MEDICARE

## 2024-03-27 VITALS
DIASTOLIC BLOOD PRESSURE: 64 MMHG | WEIGHT: 190.5 LBS | HEART RATE: 92 BPM | OXYGEN SATURATION: 98 % | RESPIRATION RATE: 18 BRPM | SYSTOLIC BLOOD PRESSURE: 144 MMHG | BODY MASS INDEX: 32.52 KG/M2 | DIASTOLIC BLOOD PRESSURE: 64 MMHG | HEIGHT: 64 IN | SYSTOLIC BLOOD PRESSURE: 144 MMHG | HEART RATE: 92 BPM | OXYGEN SATURATION: 98 % | HEIGHT: 64 IN | WEIGHT: 190.5 LBS | BODY MASS INDEX: 32.52 KG/M2 | RESPIRATION RATE: 18 BRPM

## 2024-03-27 VITALS
WEIGHT: 198 LBS | DIASTOLIC BLOOD PRESSURE: 82 MMHG | BODY MASS INDEX: 33.8 KG/M2 | OXYGEN SATURATION: 99 % | RESPIRATION RATE: 20 BRPM | HEART RATE: 78 BPM | TEMPERATURE: 99 F | SYSTOLIC BLOOD PRESSURE: 141 MMHG | HEIGHT: 64 IN

## 2024-03-27 DIAGNOSIS — I10 BENIGN ESSENTIAL HTN: ICD-10-CM

## 2024-03-27 DIAGNOSIS — L91.8 SKIN TAG: Primary | ICD-10-CM

## 2024-03-27 DIAGNOSIS — Z94.0 S/P KIDNEY TRANSPLANT: Primary | ICD-10-CM

## 2024-03-27 DIAGNOSIS — T86.898 URINE LEAK FROM TRANSPLANTED URETER: ICD-10-CM

## 2024-03-27 DIAGNOSIS — Z79.60 LONG-TERM USE OF IMMUNOSUPPRESSANT MEDICATION: Primary | ICD-10-CM

## 2024-03-27 DIAGNOSIS — R80.9 PROTEINURIA, UNSPECIFIED TYPE: ICD-10-CM

## 2024-03-27 DIAGNOSIS — Z94.0 S/P KIDNEY TRANSPLANT: ICD-10-CM

## 2024-03-27 DIAGNOSIS — T86.10 COMPLICATION OF TRANSPLANTED KIDNEY, UNSPECIFIED COMPLICATION: ICD-10-CM

## 2024-03-27 DIAGNOSIS — Z94.0 STATUS POST DECEASED-DONOR KIDNEY TRANSPLANTATION: ICD-10-CM

## 2024-03-27 LAB
BODY FLUID SOURCE, CREATININE: NORMAL
CREAT FLD-MCNC: 2.3 MG/DL

## 2024-03-27 PROCEDURE — 99213 OFFICE O/P EST LOW 20 MIN: CPT | Mod: PBBFAC,27 | Performed by: INTERNAL MEDICINE

## 2024-03-27 PROCEDURE — 99214 OFFICE O/P EST MOD 30 MIN: CPT | Mod: PBBFAC | Performed by: TRANSPLANT SURGERY

## 2024-03-27 PROCEDURE — 99999 PR PBB SHADOW E&M-EST. PATIENT-LVL III: CPT | Mod: PBBFAC,,, | Performed by: INTERNAL MEDICINE

## 2024-03-27 PROCEDURE — 82570 ASSAY OF URINE CREATININE: CPT | Performed by: TRANSPLANT SURGERY

## 2024-03-27 PROCEDURE — 99024 POSTOP FOLLOW-UP VISIT: CPT | Mod: POP,,, | Performed by: TRANSPLANT SURGERY

## 2024-03-27 PROCEDURE — 99214 OFFICE O/P EST MOD 30 MIN: CPT | Mod: S$PBB,,, | Performed by: INTERNAL MEDICINE

## 2024-03-27 PROCEDURE — 99999 PR PBB SHADOW E&M-EST. PATIENT-LVL IV: CPT | Mod: PBBFAC,,, | Performed by: TRANSPLANT SURGERY

## 2024-03-27 PROCEDURE — 99213 OFFICE O/P EST LOW 20 MIN: CPT | Mod: S$GLB,,, | Performed by: NURSE PRACTITIONER

## 2024-03-27 RX ORDER — MYCOPHENOLATE MOFETIL 250 MG/1
750 CAPSULE ORAL 2 TIMES DAILY
Qty: 180 CAPSULE | Refills: 11 | Status: SHIPPED | OUTPATIENT
Start: 2024-03-27 | End: 2024-05-27 | Stop reason: DRUGHIGH

## 2024-03-27 RX ORDER — INSULIN PUMP CARTRIDGE
CARTRIDGE (EA) SUBCUTANEOUS
COMMUNITY
Start: 2023-11-18

## 2024-03-27 RX ORDER — GENTAMICIN SULFATE 1 MG/G
CREAM TOPICAL
COMMUNITY
Start: 2024-01-16 | End: 2024-05-13 | Stop reason: ALTCHOICE

## 2024-03-27 RX ORDER — CALCIUM ACETATE 667 MG/1
1334 CAPSULE ORAL 3 TIMES DAILY
COMMUNITY
Start: 2023-11-18 | End: 2024-03-27 | Stop reason: ALTCHOICE

## 2024-03-27 RX ORDER — MUPIROCIN 20 MG/G
OINTMENT TOPICAL 3 TIMES DAILY
Qty: 22 G | Refills: 0 | Status: SHIPPED | OUTPATIENT
Start: 2024-03-27 | End: 2024-05-13 | Stop reason: ALTCHOICE

## 2024-03-27 RX ORDER — VALGANCICLOVIR 450 MG/1
450 TABLET, FILM COATED ORAL DAILY
Qty: 30 TABLET | Refills: 5 | Status: SHIPPED | OUTPATIENT
Start: 2024-03-27 | End: 2024-09-17

## 2024-03-27 RX ORDER — FUROSEMIDE 80 MG/1
80 TABLET ORAL 2 TIMES DAILY
COMMUNITY
Start: 2023-12-31 | End: 2024-03-27 | Stop reason: ALTCHOICE

## 2024-03-27 RX ORDER — METOLAZONE 2.5 MG/1
TABLET ORAL
COMMUNITY
Start: 2023-12-31 | End: 2024-03-27 | Stop reason: ALTCHOICE

## 2024-03-27 RX ORDER — SPIRONOLACTONE 25 MG/1
25 TABLET ORAL
COMMUNITY
Start: 2023-11-18 | End: 2024-03-27 | Stop reason: ALTCHOICE

## 2024-03-27 NOTE — LETTER
March 27, 2024        Fredi Lyon  [address]  [facility] [address]  Phone: [phone]  Fax: [phone]             [facility] 1st Fl  [address]  [facility] [address]  Phone: [phone]   Patient: Mark Lopez   MR Number: [id number]   YOB: 1960   Date of Visit: 3/27/2024       Dear Dr. Fredi Lyon    Thank you for referring Mark Lopez to me for evaluation. Attached you will find relevant portions of my assessment and plan of care.    If you have questions, please do not hesitate to call me. I look forward to following Mark Lopez along with you.    Sincerely,    Mark Bennett MD    Enclosure    If you would like to receive this communication electronically, please contact [email or web address] or [phone] to request [employer] Link access.    [employer] Link is a tool which provides read-only access to select patient information with whom you have a relationship. Its easy to use and provides real time access to review your patients record including encounter summaries, notes, results, and demographic information.    If you feel you have received this communication in error or would no longer like to receive these types of communications, please e-mail [email or web address]

## 2024-03-27 NOTE — PATIENT INSTRUCTIONS
- You must understand that you have received an Urgent Care treatment only and that you may be released before all of your medical problems are known or treated.   - You, the patient, will arrange for follow up care as instructed.   - If your condition worsens or fails to improve we recommend that you receive another evaluation at the ER immediately or contact your PCP to discuss your concerns.   - You can call [phone] or [phone] to help schedule an appointment with the appropriate provider.    Drink plenty of fluids   Get lots of rest  Tylenol or ibuprofen for pain/fever  Follow up with Dermatology as directed  Return to clinic if you notice signs of infection which include increased pain, redness, swelling, drainage.

## 2024-03-27 NOTE — PROGRESS NOTES
"Subjective:      Patient ID: Mark Lopez is a 64 y.o. male.    Vitals:  height is 5' 4" (1.626 m) and weight is 89.8 kg (198 lb). His oral temperature is 98.5 °F (36.9 °C). His blood pressure is 141/82 (abnormal) and his pulse is 78. His respiration is 20 and oxygen saturation is 99%.     Chief Complaint: Mole    Evangelista is a 65 yo male w/ c/o skin tag that appeared a week ago. Pt states it has become painful and bleeds often. Pt pain level is a 3. Pt did not try anything for his symptoms. Pt has an appointment scheduled April 9th with dermatology.       Mole  This is a new problem. The current episode started in the past 7 days. The problem occurs constantly. The problem has been rapidly worsening. Associated symptoms comments: Left arm pain   . Exacerbated by: moving. He has tried nothing for the symptoms. The treatment provided no relief.     ROS   Objective:     Physical Exam   Constitutional: He is oriented to person, place, and time.   HENT:   Head: Normocephalic.   Ears:   Right Ear: External ear normal.   Left Ear: External ear normal.   Nose: Nose normal.   Mouth/Throat: Mucous membranes are moist.   Eyes: Conjunctivae are normal.   Cardiovascular: Normal rate.   Pulmonary/Chest: Effort normal.   Musculoskeletal: Normal range of motion.         General: Normal range of motion.   Neurological: He is alert and oriented to person, place, and time.   Skin: Skin is dry.        Psychiatric: His behavior is normal.       Assessment:     1. Skin tag        Plan:       Skin tag  -     Ambulatory referral/consult to Dermatology  -     mupirocin (BACTROBAN) 2 % ointment; Apply topically 3 (three) times daily.  Dispense: 22 g; Refill: 0      Patient Instructions   - You must understand that you have received an Urgent Care treatment only and that you may be released before all of your medical problems are known or treated.   - You, the patient, will arrange for follow up care as instructed.   - If your condition " worsens or fails to improve we recommend that you receive another evaluation at the ER immediately or contact your PCP to discuss your concerns.   - You can call [phone] or [phone] to help schedule an appointment with the appropriate provider.    Drink plenty of fluids   Get lots of rest  Tylenol or ibuprofen for pain/fever  Follow up with Dermatology as directed  Return to clinic if you notice signs of infection which include increased pain, redness, swelling, drainage.

## 2024-03-27 NOTE — PROGRESS NOTES
[facility] Staff    Patient is 27 days s/p kidney transplant with subsequent urine leak, confirmed by elevated creatinine in surgical drain (29 mg/dl), treated with cramer catheter and subsequent percutaneous nephrostomy. Follow up nephrostogram yesterday showed no ongoing leak. Patient was instructed to cap neph tube but keep cramer to gravity.  Alex creatinine was 1.6, most recently 2.3.    Plan:  Remove staples  Check JEAN-PIERRE drain creatinine  Cramer to remain in until next week.    Mark Bennett MD

## 2024-03-27 NOTE — LETTER
March 27, 2024        Fredi Lyon  [address]  [facility] [address]  Phone: [phone]  Fax: [phone]             [facility] 1st Fl  [address]  [facility] [address]  Phone: [phone]   Patient: Mark Lopez   MR Number: [id number]   YOB: 1960   Date of Visit: 3/27/2024       Dear Dr. Fredi Lyon    Thank you for referring Mark Lopez to me for evaluation. Attached you will find relevant portions of my assessment and plan of care.    If you have questions, please do not hesitate to call me. I look forward to following Mark Lopez along with you.    Sincerely,    Hope Alexander, DO    Enclosure    If you would like to receive this communication electronically, please contact [email or web address] or [phone] to request [employer] Link access.    [employer] Link is a tool which provides read-only access to select patient information with whom you have a relationship. Its easy to use and provides real time access to review your patients record including encounter summaries, notes, results, and demographic information.    If you feel you have received this communication in error or would no longer like to receive these types of communications, please e-mail [email or web address]

## 2024-03-27 NOTE — PROGRESS NOTES
Kidney Post-Transplant Assessment    Referring Physician: Fredi Lyon  Current Nephrologist: Fredi Lyon    ORGAN: RIGHT KIDNEY  Donor Type: donation after brain death  PHS Increased Risk: yes  Cold Ischemia: 995 mins  Induction Medications: Thymo     Subjective:     CC:  Reassessment of renal allograft function and management of chronic immunosuppression.    HPI:  Mr. Lopez is a 64 y.o. year old  male who received a donation after brain death kidney transplant on 2/29/24. Since the last visit, patient was admitted to the hospital with a urine leak. Patient had the Curry catheter placed and underwent a PCN placement. CT abdomen and pelvis with a SQ fluid collection. Fluid collections negative for any infection. Last HD session on 3/14/24 with patient still with a TDC. At the time of discharge patient with a PCN opened which he was supposed to close on 3/25/24 along with a JEAN-PIERRE drain and Curry to gravity.    Patient seen today in clinic alone. States that he had the nephrostogram yesterday (no evidence of urine leak noted) and afterwards he capped the PCN. Patient with UOP through [facility] with JEAN-PIERRE drain of 400-500. Patient otherwise denies any fever, HA, runny nose, watery eyes. Denies any SOB, CP, nausea, vomiting, abdominal pain. Denies any diarrhea or swelling of his lower extremity     Of note, patient states that he has noticed an abdominal hernia and would like to get the fixed.    SBP at home less than 140  Sugars: on insulin pump with sugars elevated in the morning. Has not followed up with his endocrinologist     Review of Systems   Constitutional:  Negative for activity change, appetite change, chills and fever.   HENT:  Negative for congestion, sneezing and sore throat.    Eyes:  Negative for pain and itching.   Respiratory:  Negative for apnea, cough, shortness of breath and wheezing.    Cardiovascular:  Negative for chest pain.   Gastrointestinal:  Negative for abdominal pain, constipation,  "diarrhea, nausea and vomiting.   Genitourinary:  Negative for difficulty urinating, dysuria and frequency.   Musculoskeletal:  Negative for back pain, joint swelling and neck pain.   Skin:  Negative for color change.   Neurological:  Negative for dizziness, light-headedness and headaches.   Psychiatric/Behavioral:  Negative for behavioral problems and confusion. The patient is not nervous/anxious.        Objective:   Blood pressure (!) 144/64, pulse 92, resp. rate 18, height 5' 4" (1.626 m), weight 86.4 kg (190 lb 7.6 oz), SpO2 98 %.body mass index is 32.7 kg/m².    Physical Exam  Vitals reviewed.   Constitutional:       General: He is not in acute distress.     Appearance: Normal appearance. He is not ill-appearing or toxic-appearing.      Comments: Appears stated age   HENT:      Head: Normocephalic and atraumatic.      Right Ear: External ear normal.      Left Ear: External ear normal.      Nose: Nose normal.   Eyes:      General: No scleral icterus.     Conjunctiva/sclera: Conjunctivae normal.   Cardiovascular:      Rate and Rhythm: Normal rate and regular rhythm.      Pulses: Normal pulses.      Heart sounds: Normal heart sounds. No murmur heard.     No friction rub.   Pulmonary:      Effort: Pulmonary effort is normal. No respiratory distress.      Breath sounds: Normal breath sounds. No wheezing or rhonchi.   Abdominal:      General: Bowel sounds are normal. There is no distension.      Palpations: Abdomen is soft.      Tenderness: There is no abdominal tenderness. There is no guarding.      Comments: Parveen drain with light pink serosangious fluid collection   Staples present - wound noted to be healing well.   PD catheter still present  PCN without any UOP noted.   Musculoskeletal:         General: No swelling or deformity. Normal range of motion.      Cervical back: Normal range of motion and neck supple. No tenderness.      Right lower leg: No edema.      Left lower leg: No edema.   Skin:     General: Skin is " "warm and dry.      Coloration: Skin is not jaundiced.      Findings: No erythema or rash.   Neurological:      General: No focal deficit present.      Mental Status: He is alert and oriented to person, place, and time.      Motor: No weakness.   Psychiatric:         Mood and Affect: Mood normal.         Behavior: Behavior normal.         Labs:  Lab Results   Component Value Date    WBC 7.33 2024    HGB 9.6 (L) 2024    HCT 30.4 (L) 2024     (L) 2024    K 5.0 2024     2024    CO2 23 2024    BUN 54 (H) 2024    CREATININE 2.3 (H) 2024    EGFRNORACEVR 30.7 (A) 2024    CALCIUM 10.2 2024    PHOS 2.5 (L) 2024    MG 2.4 2024    ALBUMIN 3.4 (L) 2024    AST 30 03/10/2024    ALT 17 03/10/2024    UTPCR 1.56 (H) 2024    .6 (H) 2024    TACROLIMUS 7.7 2024       No results found for: "EXTANC", "EXTWBC", "EXTSEGS", "EXTPLATELETS", "EXTHEMOGLOBI", "EXTHEMATOCRI", "EXTCREATININ", "EXTSODIUM", "EXTPOTASSIUM", "EXTBUN", "EXTCO2", "EXTCALCIUM", "EXTPHOSPHORU", "EXTGLUCOSE", "EXTALBUMIN", "EXTAST", "EXTALT", "EXTBILITOTAL", "EXTLIPASE", "EXTAMYLASE"    No results found for: "EXTCYCLOSLVL", "EXTSIROLIMUS", "EXTTACROLVL", "EXTPROTCRE", "EXTPTHINTACT", "EXTPROTEINUA", "EXTWBCUA", "EXTRBCUA"    Labs were reviewed with the patient    Assessment and Plan:    64 yr old  male with ESRD secondary to presumed DM (no prior kidney biopsy) who received a  donor kidney transplant on 24. 52% PRA. Crossmatch negative. Thymo induction    Post transplant course notable for persistent PD catheter (will need to be removed at a later date as he was not consented at the time of transplant), placement of JEAN-PIERRE drain with significant output with studies revealing a urine leak. Patient admitted with Curry placed as well as PCN. Nephrostogram performed yesterday without any sign of leak. PCN capped on 3/26/24.     1) s/p  " donor kidney transplant:   - graft function near baseline of 1.7-2.3. Cont to monitor given urine leak.   - last HD on 3/14/24. Set up for TDC removal next week.    - will need to have PD catheter removal set up.    - send fluid from JEAN-PIERRE drain for creatinine.    - acceptable FK level. Cont on FK 6 mg BID    - decrease MMF to 750 mg BID    - cont on prednisone 5 mg daily   - change Valcyte to daily   - cont on Bactrim daily   2) Urine leak:   - nephrostogram from yesterday without any signs of leak   - Curry currently in. PCN closed currently with JEAN-PIERRE drain putting out 400-500 cc/day   - management per transplant surgery  3) HTN:   - cont on Coreg 6.25 mg BID   4) Type II DM:   - on insulin pump with acceptable levels   - will have patient follow up with his endocrinologist  5) Metabolic acidosis   - cont on sodium bicarb 1300 BID    6) Hypothyroidism:   - cont on Synthroid   7) Secondary hyperparathyroidism:   - cont on calcitriol     Labs and RTC per protocol     Hope Alexander DO   Transplant Nephrology

## 2024-03-28 ENCOUNTER — LAB VISIT (OUTPATIENT)
Dept: LAB | Facility: HOSPITAL | Age: 64
End: 2024-03-28
Attending: INTERNAL MEDICINE
Payer: MEDICARE

## 2024-03-28 ENCOUNTER — PATIENT MESSAGE (OUTPATIENT)
Dept: TRANSPLANT | Facility: CLINIC | Age: 64
End: 2024-03-28
Payer: MEDICARE

## 2024-03-28 DIAGNOSIS — Z94.0 IMMUNOSUPPRESSIVE MANAGEMENT ENCOUNTER FOLLOWING KIDNEY TRANSPLANT: ICD-10-CM

## 2024-03-28 DIAGNOSIS — Z94.0 KIDNEY REPLACED BY TRANSPLANT: Primary | ICD-10-CM

## 2024-03-28 DIAGNOSIS — Z94.0 KIDNEY REPLACED BY TRANSPLANT: ICD-10-CM

## 2024-03-28 DIAGNOSIS — Z79.899 IMMUNOSUPPRESSIVE MANAGEMENT ENCOUNTER FOLLOWING KIDNEY TRANSPLANT: ICD-10-CM

## 2024-03-28 LAB
ALBUMIN SERPL BCP-MCNC: 3.4 G/DL (ref 3.5–5.2)
ANION GAP SERPL CALC-SCNC: 7 MMOL/L (ref 8–16)
BASOPHILS # BLD AUTO: 0.07 K/UL (ref 0–0.2)
BASOPHILS NFR BLD: 0.8 % (ref 0–1.9)
BUN SERPL-MCNC: 70 MG/DL (ref 8–23)
CALCIUM SERPL-MCNC: 10.1 MG/DL (ref 8.7–10.5)
CHLORIDE SERPL-SCNC: 108 MMOL/L (ref 95–110)
CO2 SERPL-SCNC: 22 MMOL/L (ref 23–29)
CREAT SERPL-MCNC: 2.8 MG/DL (ref 0.5–1.4)
DIFFERENTIAL METHOD BLD: ABNORMAL
EOSINOPHIL # BLD AUTO: 0.1 K/UL (ref 0–0.5)
EOSINOPHIL NFR BLD: 1.5 % (ref 0–8)
ERYTHROCYTE [DISTWIDTH] IN BLOOD BY AUTOMATED COUNT: 15.1 % (ref 11.5–14.5)
EST. GFR  (NO RACE VARIABLE): 24.3 ML/MIN/1.73 M^2
GLUCOSE SERPL-MCNC: 150 MG/DL (ref 70–110)
HCT VFR BLD AUTO: 30.8 % (ref 40–54)
HGB BLD-MCNC: 9.6 G/DL (ref 14–18)
IMM GRANULOCYTES # BLD AUTO: 0.07 K/UL (ref 0–0.04)
IMM GRANULOCYTES NFR BLD AUTO: 0.8 % (ref 0–0.5)
LYMPHOCYTES # BLD AUTO: 0.1 K/UL (ref 1–4.8)
LYMPHOCYTES NFR BLD: 1.7 % (ref 18–48)
MAGNESIUM SERPL-MCNC: 2.5 MG/DL (ref 1.6–2.6)
MCH RBC QN AUTO: 30.2 PG (ref 27–31)
MCHC RBC AUTO-ENTMCNC: 31.2 G/DL (ref 32–36)
MCV RBC AUTO: 97 FL (ref 82–98)
MONOCYTES # BLD AUTO: 0.5 K/UL (ref 0.3–1)
MONOCYTES NFR BLD: 6.5 % (ref 4–15)
NEUTROPHILS # BLD AUTO: 7.3 K/UL (ref 1.8–7.7)
NEUTROPHILS NFR BLD: 88.7 % (ref 38–73)
NRBC BLD-RTO: 0 /100 WBC
PHOSPHATE SERPL-MCNC: 2.8 MG/DL (ref 2.7–4.5)
PLATELET # BLD AUTO: 388 K/UL (ref 150–450)
PMV BLD AUTO: 9.7 FL (ref 9.2–12.9)
POTASSIUM SERPL-SCNC: 5.2 MMOL/L (ref 3.5–5.1)
RBC # BLD AUTO: 3.18 M/UL (ref 4.6–6.2)
SODIUM SERPL-SCNC: 137 MMOL/L (ref 136–145)
TACROLIMUS BLD-MCNC: 12.8 NG/ML (ref 5–15)
WBC # BLD AUTO: 8.25 K/UL (ref 3.9–12.7)

## 2024-03-28 PROCEDURE — 85025 COMPLETE CBC W/AUTO DIFF WBC: CPT | Performed by: INTERNAL MEDICINE

## 2024-03-28 PROCEDURE — 83735 ASSAY OF MAGNESIUM: CPT | Performed by: INTERNAL MEDICINE

## 2024-03-28 PROCEDURE — 80069 RENAL FUNCTION PANEL: CPT | Performed by: INTERNAL MEDICINE

## 2024-03-28 PROCEDURE — 80197 ASSAY OF TACROLIMUS: CPT | Performed by: INTERNAL MEDICINE

## 2024-03-30 ENCOUNTER — LAB VISIT (OUTPATIENT)
Dept: LAB | Facility: HOSPITAL | Age: 64
End: 2024-03-30
Attending: INTERNAL MEDICINE
Payer: MEDICARE

## 2024-03-30 DIAGNOSIS — Z94.0 IMMUNOSUPPRESSIVE MANAGEMENT ENCOUNTER FOLLOWING KIDNEY TRANSPLANT: ICD-10-CM

## 2024-03-30 DIAGNOSIS — Z79.899 IMMUNOSUPPRESSIVE MANAGEMENT ENCOUNTER FOLLOWING KIDNEY TRANSPLANT: ICD-10-CM

## 2024-03-30 DIAGNOSIS — Z94.0 KIDNEY REPLACED BY TRANSPLANT: ICD-10-CM

## 2024-03-30 LAB
ALBUMIN SERPL BCP-MCNC: 3.6 G/DL (ref 3.5–5.2)
ALBUMIN SERPL BCP-MCNC: 3.6 G/DL (ref 3.5–5.2)
ANION GAP SERPL CALC-SCNC: 8 MMOL/L (ref 8–16)
ANION GAP SERPL CALC-SCNC: 8 MMOL/L (ref 8–16)
BUN SERPL-MCNC: 62 MG/DL (ref 8–23)
BUN SERPL-MCNC: 62 MG/DL (ref 8–23)
CALCIUM SERPL-MCNC: 10.2 MG/DL (ref 8.7–10.5)
CALCIUM SERPL-MCNC: 10.2 MG/DL (ref 8.7–10.5)
CHLORIDE SERPL-SCNC: 108 MMOL/L (ref 95–110)
CHLORIDE SERPL-SCNC: 108 MMOL/L (ref 95–110)
CO2 SERPL-SCNC: 21 MMOL/L (ref 23–29)
CO2 SERPL-SCNC: 21 MMOL/L (ref 23–29)
CREAT SERPL-MCNC: 2 MG/DL (ref 0.5–1.4)
CREAT SERPL-MCNC: 2 MG/DL (ref 0.5–1.4)
EST. GFR  (NO RACE VARIABLE): 36.4 ML/MIN/1.73 M^2
EST. GFR  (NO RACE VARIABLE): 36.4 ML/MIN/1.73 M^2
GLUCOSE SERPL-MCNC: 109 MG/DL (ref 70–110)
GLUCOSE SERPL-MCNC: 109 MG/DL (ref 70–110)
PHOSPHATE SERPL-MCNC: 2.5 MG/DL (ref 2.7–4.5)
PHOSPHATE SERPL-MCNC: 2.5 MG/DL (ref 2.7–4.5)
POTASSIUM SERPL-SCNC: 4.7 MMOL/L (ref 3.5–5.1)
POTASSIUM SERPL-SCNC: 4.7 MMOL/L (ref 3.5–5.1)
SODIUM SERPL-SCNC: 137 MMOL/L (ref 136–145)
SODIUM SERPL-SCNC: 137 MMOL/L (ref 136–145)
TACROLIMUS BLD-MCNC: 6.3 NG/ML (ref 5–15)

## 2024-03-30 PROCEDURE — 36415 COLL VENOUS BLD VENIPUNCTURE: CPT | Performed by: INTERNAL MEDICINE

## 2024-03-30 PROCEDURE — 80069 RENAL FUNCTION PANEL: CPT | Performed by: INTERNAL MEDICINE

## 2024-03-30 PROCEDURE — 80197 ASSAY OF TACROLIMUS: CPT | Performed by: INTERNAL MEDICINE

## 2024-04-01 ENCOUNTER — LAB VISIT (OUTPATIENT)
Dept: LAB | Facility: HOSPITAL | Age: 64
End: 2024-04-01
Attending: INTERNAL MEDICINE
Payer: MEDICARE

## 2024-04-01 DIAGNOSIS — Z94.0 KIDNEY REPLACED BY TRANSPLANT: ICD-10-CM

## 2024-04-01 DIAGNOSIS — Z91.89 INCREASED INFECTION RISK: ICD-10-CM

## 2024-04-01 DIAGNOSIS — T86.10 COMPLICATION OF TRANSPLANTED KIDNEY, UNSPECIFIED COMPLICATION: ICD-10-CM

## 2024-04-01 DIAGNOSIS — Z79.899 IMMUNOSUPPRESSIVE MANAGEMENT ENCOUNTER FOLLOWING KIDNEY TRANSPLANT: ICD-10-CM

## 2024-04-01 DIAGNOSIS — Z94.0 IMMUNOSUPPRESSIVE MANAGEMENT ENCOUNTER FOLLOWING KIDNEY TRANSPLANT: ICD-10-CM

## 2024-04-01 LAB
ALBUMIN SERPL BCP-MCNC: 3.5 G/DL (ref 3.5–5.2)
ALBUMIN SERPL BCP-MCNC: 3.5 G/DL (ref 3.5–5.2)
ALP SERPL-CCNC: 84 U/L (ref 55–135)
ALT SERPL W/O P-5'-P-CCNC: 10 U/L (ref 10–44)
ANION GAP SERPL CALC-SCNC: 8 MMOL/L (ref 8–16)
AST SERPL-CCNC: 17 U/L (ref 10–40)
BASOPHILS # BLD AUTO: 0.1 K/UL (ref 0–0.2)
BASOPHILS NFR BLD: 1.1 % (ref 0–1.9)
BILIRUB DIRECT SERPL-MCNC: 0.1 MG/DL (ref 0.1–0.3)
BILIRUB SERPL-MCNC: 0.2 MG/DL (ref 0.1–1)
BUN SERPL-MCNC: 65 MG/DL (ref 8–23)
CALCIUM SERPL-MCNC: 10.2 MG/DL (ref 8.7–10.5)
CHLORIDE SERPL-SCNC: 107 MMOL/L (ref 95–110)
CO2 SERPL-SCNC: 20 MMOL/L (ref 23–29)
CREAT SERPL-MCNC: 2.4 MG/DL (ref 0.5–1.4)
DIFFERENTIAL METHOD BLD: ABNORMAL
EOSINOPHIL # BLD AUTO: 0.1 K/UL (ref 0–0.5)
EOSINOPHIL NFR BLD: 1.5 % (ref 0–8)
ERYTHROCYTE [DISTWIDTH] IN BLOOD BY AUTOMATED COUNT: 14.9 % (ref 11.5–14.5)
EST. GFR  (NO RACE VARIABLE): 29.2 ML/MIN/1.73 M^2
GLUCOSE SERPL-MCNC: 131 MG/DL (ref 70–110)
HCT VFR BLD AUTO: 31.1 % (ref 40–54)
HCV RNA SERPL QL NAA+PROBE: NOT DETECTED
HCV RNA SPEC NAA+PROBE-ACNC: NOT DETECTED IU/ML
HEPATITIS B VIRUS DNA: NORMAL
HEPATITIS B VIRUS PCR, QUANT: NOT DETECTED IU/ML
HGB BLD-MCNC: 10.2 G/DL (ref 14–18)
HIV1 RNA # SERPL NAA+PROBE: NOT DETECTED COPIES/ML
HIV1 RNA SERPL QL NAA+PROBE: NOT DETECTED
IMM GRANULOCYTES # BLD AUTO: 0.14 K/UL (ref 0–0.04)
IMM GRANULOCYTES NFR BLD AUTO: 1.5 % (ref 0–0.5)
LYMPHOCYTES # BLD AUTO: 0.3 K/UL (ref 1–4.8)
LYMPHOCYTES NFR BLD: 2.8 % (ref 18–48)
MAGNESIUM SERPL-MCNC: 2.1 MG/DL (ref 1.6–2.6)
MCH RBC QN AUTO: 31.2 PG (ref 27–31)
MCHC RBC AUTO-ENTMCNC: 32.8 G/DL (ref 32–36)
MCV RBC AUTO: 95 FL (ref 82–98)
MONOCYTES # BLD AUTO: 0.5 K/UL (ref 0.3–1)
MONOCYTES NFR BLD: 5.5 % (ref 4–15)
NEUTROPHILS # BLD AUTO: 8.1 K/UL (ref 1.8–7.7)
NEUTROPHILS NFR BLD: 87.6 % (ref 38–73)
NRBC BLD-RTO: 0 /100 WBC
PHOSPHATE SERPL-MCNC: 3.2 MG/DL (ref 2.7–4.5)
PLATELET # BLD AUTO: 346 K/UL (ref 150–450)
PMV BLD AUTO: 10.3 FL (ref 9.2–12.9)
POTASSIUM SERPL-SCNC: 4.6 MMOL/L (ref 3.5–5.1)
PROT SERPL-MCNC: 5.6 G/DL (ref 6–8.4)
RBC # BLD AUTO: 3.27 M/UL (ref 4.6–6.2)
SODIUM SERPL-SCNC: 135 MMOL/L (ref 136–145)
TACROLIMUS BLD-MCNC: 8 NG/ML (ref 5–15)
WBC # BLD AUTO: 9.21 K/UL (ref 3.9–12.7)

## 2024-04-01 PROCEDURE — 84075 ASSAY ALKALINE PHOSPHATASE: CPT | Performed by: INTERNAL MEDICINE

## 2024-04-01 PROCEDURE — 80069 RENAL FUNCTION PANEL: CPT | Performed by: INTERNAL MEDICINE

## 2024-04-01 PROCEDURE — 87522 HEPATITIS C REVRS TRNSCRPJ: CPT | Performed by: INTERNAL MEDICINE

## 2024-04-01 PROCEDURE — 80197 ASSAY OF TACROLIMUS: CPT | Performed by: INTERNAL MEDICINE

## 2024-04-01 PROCEDURE — 85025 COMPLETE CBC W/AUTO DIFF WBC: CPT | Performed by: INTERNAL MEDICINE

## 2024-04-01 PROCEDURE — 82247 BILIRUBIN TOTAL: CPT | Performed by: INTERNAL MEDICINE

## 2024-04-01 PROCEDURE — 36415 COLL VENOUS BLD VENIPUNCTURE: CPT | Performed by: INTERNAL MEDICINE

## 2024-04-01 PROCEDURE — 87799 DETECT AGENT NOS DNA QUANT: CPT | Performed by: INTERNAL MEDICINE

## 2024-04-01 PROCEDURE — 83735 ASSAY OF MAGNESIUM: CPT | Performed by: INTERNAL MEDICINE

## 2024-04-01 PROCEDURE — 87536 HIV-1 QUANT&REVRSE TRNSCRPJ: CPT | Performed by: INTERNAL MEDICINE

## 2024-04-01 PROCEDURE — 87517 HEPATITIS B DNA QUANT: CPT | Performed by: INTERNAL MEDICINE

## 2024-04-01 RX ORDER — TACROLIMUS 1 MG/1
CAPSULE ORAL
Qty: 270 CAPSULE | Refills: 11 | Status: SHIPPED | OUTPATIENT
Start: 2024-04-01 | End: 2024-04-05 | Stop reason: DRUGHIGH

## 2024-04-02 ENCOUNTER — TELEPHONE (OUTPATIENT)
Dept: TRANSPLANT | Facility: CLINIC | Age: 64
End: 2024-04-02
Payer: MEDICARE

## 2024-04-02 DIAGNOSIS — T86.10 COMPLICATION OF TRANSPLANTED KIDNEY, UNSPECIFIED COMPLICATION: Primary | ICD-10-CM

## 2024-04-02 NOTE — TELEPHONE ENCOUNTER
Contacted patient to obtain amounts from intake, outputs from JEAN-PIERRE & cramer catheter.     Patient reports his nephrostomy capped is presently capped since Tuesday 3/26/24.    Reports from Saturday 3/30/24  Fluid intake - 2,990mL  Urine output - 2,030mL  JEAN-PIERRE drain - 640mL    Sun 3/31/24:  Fluid intake - 2,800mL  Urine output - 2,680mL  JEAN-PIERRE drain - 660mL    Monday 4/1/24:  Fluid intake - 3,430mL  Urine output - 3,125mL  JEAN-PIERRE drain - 700mL    Patient denies fever or any signs of infection around both the JEAN-PIERRE drain & nephrostomy tube. States he cleans and changes the dressings daily.

## 2024-04-02 NOTE — TELEPHONE ENCOUNTER
Informed patient of fasting lab appointment scheduled thurs 4/4. Patient verbalized understanding.         ----- Message from Hope Alexander DO sent at 4/2/2024  3:50 PM CDT -----  Worsening creatinine with PCN capped, Curry in place with increase in JEAN-PIERRE drain output.    Patient to be discussed on Thursday by transplant surgery.    Recheck labs on Thursday with DSA. Obtain US kidney transplant later this week.     Acceptable FK level

## 2024-04-03 LAB
BKV DNA SERPL NAA+PROBE-ACNC: <125 COPIES/ML
BKV DNA SERPL NAA+PROBE-LOG#: <2.1 LOG (10) COPIES/ML
BKV DNA SERPL QL NAA+PROBE: NOT DETECTED

## 2024-04-04 ENCOUNTER — DOCUMENTATION ONLY (OUTPATIENT)
Dept: TRANSPLANT | Facility: CLINIC | Age: 64
End: 2024-04-04
Payer: MEDICARE

## 2024-04-04 ENCOUNTER — TELEPHONE (OUTPATIENT)
Dept: TRANSPLANT | Facility: CLINIC | Age: 64
End: 2024-04-04
Payer: MEDICARE

## 2024-04-04 ENCOUNTER — HOSPITAL ENCOUNTER (OUTPATIENT)
Dept: INTERVENTIONAL RADIOLOGY/VASCULAR | Facility: HOSPITAL | Age: 64
Discharge: HOME | End: 2024-04-04
Attending: INTERNAL MEDICINE
Payer: MEDICARE

## 2024-04-04 ENCOUNTER — PATIENT MESSAGE (OUTPATIENT)
Dept: TRANSPLANT | Facility: CLINIC | Age: 64
End: 2024-04-04
Payer: MEDICARE

## 2024-04-04 VITALS
SYSTOLIC BLOOD PRESSURE: 167 MMHG | OXYGEN SATURATION: 100 % | RESPIRATION RATE: 22 BRPM | TEMPERATURE: 98 F | DIASTOLIC BLOOD PRESSURE: 77 MMHG | HEART RATE: 76 BPM

## 2024-04-04 DIAGNOSIS — T86.10 COMPLICATION OF TRANSPLANTED KIDNEY, UNSPECIFIED COMPLICATION: Primary | ICD-10-CM

## 2024-04-04 DIAGNOSIS — Z94.0 KIDNEY REPLACED BY TRANSPLANT: ICD-10-CM

## 2024-04-04 DIAGNOSIS — Z94.0 RENAL TRANSPLANT RECIPIENT: ICD-10-CM

## 2024-04-04 PROCEDURE — 94760 N-INVAS EAR/PLS OXIMETRY 1: CPT

## 2024-04-04 PROCEDURE — 99900035 HC TECH TIME PER 15 MIN (STAT)

## 2024-04-04 PROCEDURE — 36590 REMOVAL TUNNELED CV CATH: CPT | Performed by: STUDENT IN AN ORGANIZED HEALTH CARE EDUCATION/TRAINING PROGRAM

## 2024-04-04 PROCEDURE — 82962 GLUCOSE BLOOD TEST: CPT

## 2024-04-04 RX ORDER — SODIUM CHLORIDE 9 MG/ML
INJECTION, SOLUTION INTRAVENOUS CONTINUOUS
Status: DISCONTINUED | OUTPATIENT
Start: 2024-04-04 | End: 2024-04-05 | Stop reason: HOSPADM

## 2024-04-04 RX ORDER — LIDOCAINE HYDROCHLORIDE 10 MG/ML
1 INJECTION, SOLUTION EPIDURAL; INFILTRATION; INTRACAUDAL; PERINEURAL ONCE
Status: DISCONTINUED | OUTPATIENT
Start: 2024-04-04 | End: 2024-04-05 | Stop reason: HOSPADM

## 2024-04-04 NOTE — PLAN OF CARE
Pt in preop bay 33, VSS. Pt denies any open wounds on body or the use of any weight loss injections. Pt  ready to roll.

## 2024-04-04 NOTE — H&P
Interventional Radiology Pre-Procedure History & Physical      Chief Complaint/Reason for Referral: renal failure    History of Present Illness:  Mark Lopez is a 64 y.o. male who presents for line removal    Past Medical History:   Diagnosis Date    Anemia     Cataract     Diabetes mellitus     Diabetes mellitus, type 2     Glaucoma     Gout, unspecified     HLD (hyperlipidemia)     Hypertension     Hypothyroidism, unspecified     Kidney failure     Renal disorder      Past Surgical History:   Procedure Laterality Date    COLONOSCOPY N/A 05/18/2022    Procedure: COLONOSCOPY;  Surgeon: Raul Dyer MD;  Location: [facility] ENDO;  Service: Endoscopy;  Laterality: N/A;    DENTAL SURGERY      EYE SURGERY Bilateral     Cataract    INSERTION, CATHETER, DIALYSIS, PERITONEAL, LAPAROSCOPIC N/A 06/20/2023    Procedure: INSERTION, CATHETER, DIALYSIS, PERITONEAL, LAPAROSCOPIC;  Surgeon: Carlos Alberto Rodgers MD;  Location: [facility] OR;  Service: General;  Laterality: N/A;    KIDNEY TRANSPLANT N/A 2/29/2024    Procedure: TRANSPLANT, KIDNEY;  Surgeon: Pino Law Jr., MD;  Location: [address];  Service: Transplant;  Laterality: N/A;    KNEE SURGERY Right        Allergies:   Review of patient's allergies indicates:   Allergen Reactions    Allopurinol analogues Swelling    Allopurinol     Furosemide     Latex, natural rubber     Statins-hmg-coa reductase inhibitors Other (See Comments)     Muscle ache    Adhesive Rash        Home Meds:   Prior to Admission medications    Medication Sig Start Date End Date Taking? Authorizing Provider   aspirin (ECOTRIN) 81 MG EC tablet Take 81 mg by mouth once daily.    Provider, Historical   calcitRIOL (ROCALTROL) 0.5 MCG Cap Take 1 capsule (0.5 mcg total) by mouth once daily. 3/5/24   Pino aLw Jr., MD   carvediloL (COREG) 6.25 MG tablet Take 1 tablet (6.25 mg total) by mouth 2 (two) times daily. 3/10/24   Hope Alexander,    gentamicin (GARAMYCIN) 0.1 % cream Apply  topically. 1/16/24   Provider, Historical   HUMALOG U-100 INSULIN 100 unit/mL injection Inject into the skin. FOR INSULIN PUMP (BASAL 1.2 UNITS/HR) 8/7/23   Provider, Historical   latanoprost 0.005 % ophthalmic solution Place 1 drop into the right eye every other day. At night 2/29/24   Pino Law Jr., MD   levothyroxine (SYNTHROID) 50 MCG tablet Take 1 tablet (50 mcg total) by mouth before breakfast. 2/29/24   Pino Law Jr., MD   multivitamin Tab Take 1 tablet by mouth once daily. 3/1/24   Pino Law Jr., MD   mupirocin (BACTROBAN) 2 % ointment Apply topically 3 (three) times daily. 3/27/24   Lu Gutierrez NP   mycophenolate (CELLCEPT) 250 mg Cap Take 3 capsules (750 mg total) by mouth 2 (two) times daily. 3/27/24 3/27/25  Hope Alexander DO   ONETOUCH DELICA PLUS LANCET 30 gauge Misc  4/11/22   Provider, Historical   oxyCODONE (ROXICODONE) 10 mg Tab immediate release tablet Take 1 tablet (10 mg total) by mouth every 4 (four) hours as needed for Pain. 3/5/24   Urmila Leiva PA-C   pantoprazole (PROTONIX) 40 MG tablet Take 1 tablet (40 mg total) by mouth once daily. 3/9/24 4/8/24  Andree Velasquez,    predniSONE (DELTASONE) 5 MG tablet Take by mouth daily: 20mg 3/2-3/8, 15mg 3/9-3/15, 10mg 3/16-3/22/2024, 5mg 3/23/2024- 3/2/24   Pino Law Jr., MD   sodium bicarbonate 650 MG tablet Take 2 tablets (1,300 mg total) by mouth 2 (two) times daily. 3/10/24   Hope Alexander DO   subcutaneous insulin pump Misc Inject 1 Units/hr into the skin continuous. Insulin pump name - Tandum  Basal dose is 2 units/hr 5/1/21 3/8/24  Dariel Wei MD   sulfamethoxazole-trimethoprim 400-80mg (BACTRIM,SEPTRA) 400-80 mg per tablet Take 1 tablet by mouth once daily. Stop 8/27/24 3/19/24 8/27/24  Mark Bennett MD   T:SLIM X2 Crtg Inject into the skin. 11/18/23   Provider, Historical   tacrolimus (PROGRAF) 1 MG Cap Take 5 capsules (5 mg total) by mouth every morning AND 4 capsules (4 mg  total) every evening. Z94.0;Kidney txp on 2/29/2024. 4/1/24 4/1/25  Hope Alexander,    valGANciclovir (VALCYTE) 450 mg Tab Take 1 tablet (450 mg total) by mouth once daily. Stop 8/27/24 3/27/24 9/17/24  Hope Alexander,        Anticoagulation/Antiplatelet Meds:  as above    Review of Systems:   Hematological: no known coagulopathies  Respiratory: no shortness of breath  Cardiovascular: no chest pain  Gastrointestinal: no abdominal pain  Genitourinary: no dysuria  Musculoskeletal: negative  Neurological: no TIA or stroke symptoms     Physical Exam:       General: WNWD, NAD  HEENT: Normocephalic, sclera anicteric,   Neck: Supple, no palpable lymphadenopathy  Heart: RRR  Chest: right tunneled line  Lungs:  breathing unlabored  Abd: NTND, soft  Neuro: Gross nonfocal    Laboratory:  Lab Results   Component Value Date    INR 1.0 02/28/2024       Lab Results   Component Value Date    WBC 9.21 04/01/2024    HGB 10.2 (L) 04/01/2024    HCT 31.1 (L) 04/01/2024    MCV 95 04/01/2024     04/01/2024      Lab Results   Component Value Date     (H) 04/01/2024     (L) 04/01/2024    K 4.6 04/01/2024     04/01/2024    CO2 20 (L) 04/01/2024    BUN 65 (H) 04/01/2024    CREATININE 2.4 (H) 04/01/2024    CALCIUM 10.2 04/01/2024    MG 2.1 04/01/2024    ALT 10 04/01/2024    AST 17 04/01/2024    ALBUMIN 3.5 04/01/2024    ALBUMIN 3.5 04/01/2024    BILITOT 0.2 04/01/2024    BILIDIR 0.1 04/01/2024       Imaging:  Line placement was reviewed.    Assessment/Plan:  64 y.o. male with indwelling HD line. Will undergo  lien removal today.    Sedation:  local    Risks (including, but not limited to, pain, bleeding, infection, damage to nearby structures, treatment failure/recurrence, and the need for additional procedures), potential benefits, and alternatives were discussed with the patient. All questions were answered to the best of my abilities. The patient wishes to proceed. Written informed consent was  obtained.      Meli Barnett MD

## 2024-04-04 NOTE — BRIEF OP NOTE
Radiology Post-Procedure Note    Pre Op Diagnosis: renal failure    Post Op Diagnosis: same    Procedure: tunneled catheter removal     Procedure performed by: Meli Barnett MD    Written Informed Consent Obtained: Yes    Specimen Removed: YES     Estimated Blood Loss: Minimal    Findings:   Right IJ tunneled catheter removal    Patient tolerated procedure well.    Meli Barnett MD  Interventional Radiology

## 2024-04-04 NOTE — DISCHARGE SUMMARY
Radiology Discharge Summary      Hospital Course: No complications    Admit Date: 4/4/2024  Discharge Date: 04/04/2024     Instructions Given to Patient: Yes  Diet: Resume prior diet  Activity: activity as tolerated    Description of Condition on Discharge: Stable  Vital Signs (Most Recent): Temp: 97.7 °F (36.5 °C) (04/04/24 1019)  Pulse: 75 (04/04/24 1030)  Resp: 15 (04/04/24 1030)  BP: (!) 161/73 (04/04/24 1030)  SpO2: 100 % (04/04/24 1030)    Discharge Disposition: Home    Discharge Diagnosis: h/o of renal failure    Follow up: As scheduled    Meli Barnett MD  Interventional Radiology

## 2024-04-04 NOTE — TELEPHONE ENCOUNTER
Notified patient of 4/5/24 scheduled appointments for kidney ultrasound and cramer catheter removal.   Surgery appointment next Wed 4/10/24 for possible JEAN-PIERRE removal.     Patient reports there was an issue with getting his labs drawn this morning. So his labs were not drawn as scheduled. Patient's fasting labs are rescheduled for tomorrow at [facility] lab. Patient aware of all scheduled appointments.     Patient reports his tunneled catheter was removed today as scheduled.

## 2024-04-04 NOTE — DISCHARGE INSTRUCTIONS
Port Removal:    Port Removal Instructions:         Remove tegaderm dressing and gauze on next day after procedure.         Do not remove steri strips that is below dressing, resembles paper tape, allow these to fall off on own.         Try to keep dressing dry and intact.         No submerging under water in pool or bathtub until incision is completely healed.         No heavy lifting, greater than 25 pounds, especially overhead for approximately 1 week.

## 2024-04-04 NOTE — PROGRESS NOTES
Has capped nephrostomy, stent, cramer and JEAN-PIERRE.     Clinic tomorrow to have cramer removed in light of negative nephrostogram from last week.     Continue JEAN-PIERRE, likely has lymphocele as well;

## 2024-04-04 NOTE — Clinical Note
Right: Chest.   Scrubbed with Betadine.   Painted with Betadine.  Hair: N/A.  Skin prep dry before draping.  Prepped by: Shireen Sanders RT 4/4/2024 10:04 AM.

## 2024-04-04 NOTE — PLAN OF CARE
Discharge instructions reviewed with patient and family member.  Plan of care reviewed along with discharge instructions.  Pt verbalizes understanding. Questions and concerns addressed.  PIV removed with catheter intact.  Patient denies any acute pain or discomfort.   All dressings are dry and intact. Escorted to vehicle via wheelchair.

## 2024-04-05 ENCOUNTER — PATIENT MESSAGE (OUTPATIENT)
Dept: TRANSPLANT | Facility: CLINIC | Age: 64
End: 2024-04-05

## 2024-04-05 ENCOUNTER — HOSPITAL ENCOUNTER (OUTPATIENT)
Dept: RADIOLOGY | Facility: HOSPITAL | Age: 64
Discharge: HOME | End: 2024-04-05
Attending: INTERNAL MEDICINE
Payer: MEDICARE

## 2024-04-05 ENCOUNTER — CLINICAL SUPPORT (OUTPATIENT)
Dept: TRANSPLANT | Facility: CLINIC | Age: 64
End: 2024-04-05
Payer: MEDICARE

## 2024-04-05 VITALS
TEMPERATURE: 97 F | RESPIRATION RATE: 18 BRPM | WEIGHT: 189.13 LBS | HEIGHT: 64 IN | SYSTOLIC BLOOD PRESSURE: 168 MMHG | BODY MASS INDEX: 32.29 KG/M2 | DIASTOLIC BLOOD PRESSURE: 77 MMHG | OXYGEN SATURATION: 100 % | HEART RATE: 77 BPM

## 2024-04-05 DIAGNOSIS — Z46.6 ENCOUNTER FOR FOLEY CATHETER REMOVAL: Primary | ICD-10-CM

## 2024-04-05 DIAGNOSIS — T86.10 COMPLICATION OF TRANSPLANTED KIDNEY, UNSPECIFIED COMPLICATION: ICD-10-CM

## 2024-04-05 DIAGNOSIS — Z94.0 KIDNEY REPLACED BY TRANSPLANT: Primary | ICD-10-CM

## 2024-04-05 PROCEDURE — 76776 US EXAM K TRANSPL W/DOPPLER: CPT | Mod: TC

## 2024-04-05 PROCEDURE — 99214 OFFICE O/P EST MOD 30 MIN: CPT | Mod: PBBFAC,25

## 2024-04-05 PROCEDURE — 76776 US EXAM K TRANSPL W/DOPPLER: CPT | Mod: 26,,, | Performed by: RADIOLOGY

## 2024-04-05 PROCEDURE — 99999 PR PBB SHADOW E&M-EST. PATIENT-LVL IV: CPT | Mod: PBBFAC,,,

## 2024-04-05 NOTE — PROGRESS NOTES
Follow-up call placed to patient for post void amount. Patient reports he voided 275mL without difficulty.

## 2024-04-05 NOTE — TELEPHONE ENCOUNTER
Notified patient of Dr. Sinha's review of 4/5/24 lab results. Patient reports prograf level is a true 12 hour level.  Instructed patient to increase prograf dose to 5mg twice daily and next labs will be Mon 4/8/24   ----- Message from Jocelyne Sinha MD sent at 4/5/2024 11:12 AM CDT -----  Tac to 5 mg BID if it is a true level. Check tac level next week

## 2024-04-05 NOTE — NURSING
Curry catheter removed without any complication   Emptying was 200ml Clear yellow.  Pt advised to contact clinic/ coordinator Veronica should he have any pain or discomfort,and does not urinate within 8 hours. The pt VU.

## 2024-04-07 RX ORDER — TACROLIMUS 1 MG/1
5 CAPSULE ORAL EVERY 12 HOURS
Qty: 300 CAPSULE | Refills: 11 | Status: SHIPPED | OUTPATIENT
Start: 2024-04-07 | End: 2024-04-16

## 2024-04-08 ENCOUNTER — HOSPITAL ENCOUNTER (OUTPATIENT)
Dept: RADIOLOGY | Facility: HOSPITAL | Age: 64
Discharge: HOME | End: 2024-04-08
Attending: INTERNAL MEDICINE
Payer: MEDICARE

## 2024-04-08 ENCOUNTER — OFFICE VISIT (OUTPATIENT)
Dept: TRANSPLANT | Facility: CLINIC | Age: 64
End: 2024-04-08
Payer: MEDICARE

## 2024-04-08 VITALS
HEART RATE: 69 BPM | OXYGEN SATURATION: 98 % | TEMPERATURE: 98 F | WEIGHT: 191.81 LBS | RESPIRATION RATE: 16 BRPM | SYSTOLIC BLOOD PRESSURE: 161 MMHG | DIASTOLIC BLOOD PRESSURE: 74 MMHG | HEIGHT: 64 IN | BODY MASS INDEX: 32.74 KG/M2

## 2024-04-08 DIAGNOSIS — N18.30 TYPE 2 DIABETES MELLITUS WITH STAGE 3 CHRONIC KIDNEY DISEASE, WITH LONG-TERM CURRENT USE OF INSULIN, UNSPECIFIED WHETHER STAGE 3A OR 3B CKD: ICD-10-CM

## 2024-04-08 DIAGNOSIS — B18.1 CHRONIC VIRAL HEPATITIS B WITHOUT DELTA AGENT AND WITHOUT COMA: Chronic | ICD-10-CM

## 2024-04-08 DIAGNOSIS — Z79.4 TYPE 2 DIABETES MELLITUS WITH STAGE 3 CHRONIC KIDNEY DISEASE, WITH LONG-TERM CURRENT USE OF INSULIN, UNSPECIFIED WHETHER STAGE 3A OR 3B CKD: ICD-10-CM

## 2024-04-08 DIAGNOSIS — Z79.60 LONG-TERM USE OF IMMUNOSUPPRESSANT MEDICATION: ICD-10-CM

## 2024-04-08 DIAGNOSIS — Z94.0 STATUS POST DECEASED-DONOR KIDNEY TRANSPLANTATION: Primary | ICD-10-CM

## 2024-04-08 DIAGNOSIS — N28.89 PERINEPHRIC FLUID COLLECTION OF KIDNEY TRANSPLANT: ICD-10-CM

## 2024-04-08 DIAGNOSIS — T86.19 PERINEPHRIC FLUID COLLECTION OF KIDNEY TRANSPLANT: ICD-10-CM

## 2024-04-08 DIAGNOSIS — T86.898 URINE LEAK FROM TRANSPLANTED URETER: Primary | ICD-10-CM

## 2024-04-08 DIAGNOSIS — I10 ESSENTIAL HYPERTENSION: ICD-10-CM

## 2024-04-08 DIAGNOSIS — E11.22 TYPE 2 DIABETES MELLITUS WITH STAGE 3 CHRONIC KIDNEY DISEASE, WITH LONG-TERM CURRENT USE OF INSULIN, UNSPECIFIED WHETHER STAGE 3A OR 3B CKD: ICD-10-CM

## 2024-04-08 PROCEDURE — 76700 US EXAM ABDOM COMPLETE: CPT | Mod: TC

## 2024-04-08 PROCEDURE — 99215 OFFICE O/P EST HI 40 MIN: CPT | Mod: S$PBB,,, | Performed by: NURSE PRACTITIONER

## 2024-04-08 PROCEDURE — 99999 PR PBB SHADOW E&M-EST. PATIENT-LVL IV: CPT | Mod: PBBFAC,,, | Performed by: NURSE PRACTITIONER

## 2024-04-08 PROCEDURE — 99214 OFFICE O/P EST MOD 30 MIN: CPT | Mod: PBBFAC,25 | Performed by: NURSE PRACTITIONER

## 2024-04-08 PROCEDURE — 76700 US EXAM ABDOM COMPLETE: CPT | Mod: 26,,, | Performed by: STUDENT IN AN ORGANIZED HEALTH CARE EDUCATION/TRAINING PROGRAM

## 2024-04-08 RX ORDER — PREDNISONE 10 MG/1
10 TABLET ORAL DAILY
Qty: 5 TABLET | Refills: 0 | Status: SHIPPED | OUTPATIENT
Start: 2024-04-08 | End: 2024-04-17 | Stop reason: ALTCHOICE

## 2024-04-08 NOTE — LETTER
April 8, 2024        Fredi Lyon  [address]  [facility] [address]  Phone: [phone]  Fax: [phone]             [facility] 1st Fl  [address]  [facility] [address]  Phone: [phone]   Patient: Mark Lopez   MR Number: [id number]   YOB: 1960   Date of Visit: 4/8/2024       Dear Dr. Fredi Lyon    Thank you for referring Mark Lopez to me for evaluation. Attached you will find relevant portions of my assessment and plan of care.    If you have questions, please do not hesitate to call me. I look forward to following Mark Lopez along with you.    Sincerely,    Beatrice Zee, NP    Enclosure    If you would like to receive this communication electronically, please contact [email or web address] or [phone] to request [employer] access.    [employer] is a tool which provides read-only access to select patient information with whom you have a relationship. Its easy to use and provides real time access to review your patients record including encounter summaries, notes, results, and demographic information.    If you feel you have received this communication in error or would no longer like to receive these types of communications, please e-mail [email or web address]

## 2024-04-08 NOTE — PROGRESS NOTES
Kidney Post-Transplant Assessment    Referring Physician: Fredi Lyon  Current Nephrologist: Fredi Lyon    ORGAN: RIGHT KIDNEY  Donor Type: donation after brain death  PHS Increased Risk: yes  Cold Ischemia: 995 mins  Induction Medications: Thymo     Subjective:     CC:  Reassessment of renal allograft function and management of chronic immunosuppression.    HPI:  Mr. Lopez is a 64 y.o. year old  male who received a donation after brain death kidney transplant on 2/29/24. Since the last visit, patient was admitted to the hospital with a urine leak. Patient had the Curry catheter placed and underwent a PCN placement. CT abdomen and pelvis with a SQ fluid collection. Fluid collections negative for any infection. Last HD session on 3/14/24 with patient still with a TDC. At the time of discharge patient with a PCN opened which he was supposed to close on 3/25/24 along with a JEAN-PIERRE drain and Curry to gravity.    Patient seen today in clinic alone. States that he had the PCN he capped. Plan for surgery visit on 4/10/24. Has JEAN-PIERRE drain with output about 130-140 a day which is less than previous. Reports his urination amount was increased.  Patient otherwise denies any fever, HA, runny nose, watery eyes. Denies any SOB, CP, nausea, vomiting, abdominal pain. Denies any diarrhea or swelling of his lower extremity     Of note, patient states that he has noticed an abdominal hernia and would like to get the fixed.    SBP at home less than 140  Sugars: Has followed up with his endocrinologist     Review of Systems   Constitutional:  Negative for activity change, appetite change, chills and fever.   HENT:  Negative for congestion, sneezing and sore throat.    Eyes:  Negative for pain and itching.   Respiratory:  Negative for apnea, cough, shortness of breath and wheezing.    Cardiovascular:  Negative for chest pain.   Gastrointestinal:  Negative for abdominal pain, constipation, diarrhea, nausea and vomiting.  "  Genitourinary:  Negative for difficulty urinating, dysuria and frequency.   Musculoskeletal:  Negative for back pain, joint swelling and neck pain.   Skin:  Negative for color change.   Neurological:  Negative for dizziness, light-headedness and headaches.   Psychiatric/Behavioral:  Negative for behavioral problems and confusion. The patient is not nervous/anxious.        Objective:   Blood pressure (!) 161/74, pulse 69, temperature 97.7 °F (36.5 °C), temperature source Temporal, resp. rate 16, height 5' 4" (1.626 m), weight 87 kg (191 lb 12.8 oz), SpO2 98 %.body mass index is 32.92 kg/m².    Physical Exam  Vitals reviewed.   Constitutional:       General: He is not in acute distress.     Appearance: Normal appearance. He is not ill-appearing or toxic-appearing.      Comments: Appears stated age   HENT:      Head: Normocephalic and atraumatic.      Right Ear: External ear normal.      Left Ear: External ear normal.      Nose: Nose normal.   Eyes:      General: No scleral icterus.     Conjunctiva/sclera: Conjunctivae normal.   Cardiovascular:      Rate and Rhythm: Normal rate and regular rhythm.      Pulses: Normal pulses.      Heart sounds: Normal heart sounds. No murmur heard.     No friction rub.   Pulmonary:      Effort: Pulmonary effort is normal. No respiratory distress.      Breath sounds: Normal breath sounds. No wheezing or rhonchi.   Abdominal:      General: Bowel sounds are normal. There is no distension.      Palpations: Abdomen is soft.      Tenderness: There is no abdominal tenderness. There is no guarding.      Comments: Parveen drain with light pink serosangious fluid collection   Steri stripes present - wound noted to be healing well.   PD catheter still present  PCN without any UOP noted.   Musculoskeletal:         General: No swelling or deformity. Normal range of motion.      Cervical back: Normal range of motion and neck supple. No tenderness.      Right lower leg: No edema.      Left lower leg: No " "edema.   Skin:     General: Skin is warm and dry.      Coloration: Skin is not jaundiced.      Findings: No erythema or rash.   Neurological:      General: No focal deficit present.      Mental Status: He is alert and oriented to person, place, and time.      Motor: No weakness.   Psychiatric:         Mood and Affect: Mood normal.         Behavior: Behavior normal.         Labs:  Lab Results   Component Value Date    WBC 7.66 2024    HGB 9.5 (L) 2024    HCT 30.4 (L) 2024     2024    K 4.1 2024     (H) 2024    CO2 22 (L) 2024    BUN 52 (H) 2024    CREATININE 1.7 (H) 2024    EGFRNORACEVR 44.2 (A) 2024    CALCIUM 10.6 (H) 2024    PHOS 2.4 (L) 2024    MG 1.7 2024    ALBUMIN 3.4 (L) 2024    AST 17 2024    ALT 10 2024    UTPCR Unable to calculate 2024    .6 (H) 2024    TACROLIMUS 6.0 2024       No results found for: "EXTANC", "EXTWBC", "EXTSEGS", "EXTPLATELETS", "EXTHEMOGLOBI", "EXTHEMATOCRI", "EXTCREATININ", "EXTSODIUM", "EXTPOTASSIUM", "EXTBUN", "EXTCO2", "EXTCALCIUM", "EXTPHOSPHORU", "EXTGLUCOSE", "EXTALBUMIN", "EXTAST", "EXTALT", "EXTBILITOTAL", "EXTLIPASE", "EXTAMYLASE"    No results found for: "EXTCYCLOSLVL", "EXTSIROLIMUS", "EXTTACROLVL", "EXTPROTCRE", "EXTPTHINTACT", "EXTPROTEINUA", "EXTWBCUA", "EXTRBCUA"    Labs were reviewed with the patient    Assessment and Plan:    64 yr old  male with ESRD secondary to presumed DM (no prior kidney biopsy) who received a  donor kidney transplant on 24. 52% PRA. Crossmatch negative. Thymo induction    Post transplant course notable for persistent PD catheter (will need to be removed at a later date as he was not consented at the time of transplant), JEAN-PIERRE drain with significant output with studies revealing a urine leak. Had cramer and PCN.  PCN capped on 3/26/24.     1) s/p  donor kidney transplant:   - graft function near " baseline of 1.7-2.3. Cont to monitor given urine leak.   - last HD on 3/14/24.    - will need to have PD catheter removal set up.    - send fluid from JEAN-PIERRE drain for creatinine.    - acceptable FK level. Cont on FK 5 mg BID    - MMF to 750 mg BID    - cont on prednisone 5 mg daily   - change Valcyte to daily   - cont on Bactrim daily   2) Urine leak:   - nephrostogram without any signs of leak   - No cramer urinating normally  PCN closed currently with JEAN-PIERRE drain putting out 130-140 cc/day   - management per transplant surgery  3) HTN:   - cont on Coreg 6.25 mg BID   4) Type II DM:   - on insulin pump with acceptable levels   - will have patient follow up with his endocrinologist  5) Metabolic acidosis   - cont on sodium bicarb 1300 BID    6) Hypothyroidism:   - cont on Synthroid   7) Secondary hyperparathyroidism:   - cont on calcitriol   8) Gout   - c/o gout in his left foot big toe   -increase prednisone to 10mg daily x5 days then resumed 5mg there afterwards.  Labs and RTC per protocol     Beatrice Zee NP   Transplant Nephrology

## 2024-04-09 ENCOUNTER — OFFICE VISIT (OUTPATIENT)
Dept: DERMATOLOGY | Facility: CLINIC | Age: 64
End: 2024-04-09
Payer: MEDICARE

## 2024-04-09 ENCOUNTER — HOSPITAL ENCOUNTER (OUTPATIENT)
Dept: RADIOLOGY | Facility: HOSPITAL | Age: 64
Discharge: HOME | End: 2024-04-09
Attending: TRANSPLANT SURGERY
Payer: MEDICARE

## 2024-04-09 DIAGNOSIS — T86.898 URINE LEAK FROM TRANSPLANTED URETER: ICD-10-CM

## 2024-04-09 DIAGNOSIS — D48.9 NEOPLASM OF UNCERTAIN BEHAVIOR: Primary | ICD-10-CM

## 2024-04-09 DIAGNOSIS — T86.19 PERINEPHRIC FLUID COLLECTION OF KIDNEY TRANSPLANT: ICD-10-CM

## 2024-04-09 DIAGNOSIS — N28.89 PERINEPHRIC FLUID COLLECTION OF KIDNEY TRANSPLANT: ICD-10-CM

## 2024-04-09 DIAGNOSIS — Z12.83 SKIN CANCER SCREENING: ICD-10-CM

## 2024-04-09 PROCEDURE — 74176 CT ABD & PELVIS W/O CONTRAST: CPT | Mod: 26,,, | Performed by: RADIOLOGY

## 2024-04-09 PROCEDURE — 99203 OFFICE O/P NEW LOW 30 MIN: CPT | Mod: 25,S$PBB,GC, | Performed by: DERMATOLOGY

## 2024-04-09 PROCEDURE — 11102 TANGNTL BX SKIN SINGLE LES: CPT | Mod: PBBFAC,GC | Performed by: DERMATOLOGY

## 2024-04-09 PROCEDURE — 74176 CT ABD & PELVIS W/O CONTRAST: CPT | Mod: TC

## 2024-04-09 PROCEDURE — 11102 TANGNTL BX SKIN SINGLE LES: CPT | Mod: S$PBB,GC,, | Performed by: DERMATOLOGY

## 2024-04-09 PROCEDURE — 99999 PR PBB SHADOW E&M-EST. PATIENT-LVL II: CPT | Mod: PBBFAC,,, | Performed by: DERMATOLOGY

## 2024-04-09 PROCEDURE — 88305 TISSUE EXAM BY PATHOLOGIST: CPT | Performed by: PATHOLOGY

## 2024-04-09 PROCEDURE — 99212 OFFICE O/P EST SF 10 MIN: CPT | Mod: PBBFAC,25 | Performed by: DERMATOLOGY

## 2024-04-09 PROCEDURE — 88305 TISSUE EXAM BY PATHOLOGIST: CPT | Mod: 26,,, | Performed by: PATHOLOGY

## 2024-04-09 NOTE — PROGRESS NOTES
I have seen the patient, reviewed the Resident's progress note. I have personally interviewed and examined the patient at bedside and agree with the findings.     Karime Beltran MD  [facility]

## 2024-04-09 NOTE — PROGRESS NOTES
"  Subjective:      Patient ID:  Mark Lopez is a 64 y.o. male who presents for   Chief Complaint   Patient presents with    Lesion     64 y.o. M w/ hx of ESRD likely 2/2 DM s/p recent kidney transplant in 2/2024, here for evaluation of a "mole" on his left forearm. It popped up about 1.5 months ago. It has been getting caught on his sleeves and clothing and has once bled. It is not itchy or painful. He has been applying hydrocortisone to it and covering it with a bandage. It has gone down in size in the past couple of weeks. Patient has no other dermatological concerns or complaints. Denies a family or personal history of skin cancer.    Lesion      Review of Systems   Constitutional: Negative.    Skin:  Negative for itching and rash.   All other systems reviewed and are negative.      Objective:   Physical Exam   Constitutional: He appears well-developed and well-nourished. No distress.   Neurological: He is alert and oriented to person, place, and time. He is not disoriented.   Psychiatric: He has a normal mood and affect.   Skin:   Areas Examined (abnormalities noted in diagram):   Head / Face Inspection Performed  RUE Inspected  LUE Inspection Performed           Diagram Legend     Erythematous scaling macule/papule c/w actinic keratosis       Vascular papule c/w angioma      Pigmented verrucoid papule/plaque c/w seborrheic keratosis      Yellow umbilicated papule c/w sebaceous hyperplasia      Irregularly shaped tan macule c/w lentigo     1-2 mm smooth white papules consistent with Milia      Movable subcutaneous cyst with punctum c/w epidermal inclusion cyst      Subcutaneous movable cyst c/w pilar cyst      Firm pink to brown papule c/w dermatofibroma      Pedunculated fleshy papule(s) c/w skin tag(s)      Evenly pigmented macule c/w junctional nevus     Mildly variegated pigmented, slightly irregular-bordered macule c/w mildly atypical nevus      Flesh colored to evenly pigmented papule c/w intradermal " nevus       Pink pearly papule/plaque c/w basal cell carcinoma      Erythematous hyperkeratotic cursted plaque c/w SCC      Surgical scar with no sign of skin cancer recurrence      Open and closed comedones      Inflammatory papules and pustules      Verrucoid papule consistent consistent with wart     Erythematous eczematous patches and plaques     Dystrophic onycholytic nail with subungual debris c/w onychomycosis     Umbilicated papule    Erythematous-base heme-crusted tan verrucoid plaque consistent with inflamed seborrheic keratosis     Erythematous Silvery Scaling Plaque c/w Psoriasis     See annotation              Assessment / Plan:      Pathology Orders:       Normal Orders This Visit    Specimen to Pathology, Dermatology     Questions:    Procedure Type: Dermatology and skin neoplasms    Number of Specimens: 1    ------------------------: -------------------------    Spec 1 Procedure: Biopsy    Spec 1 Clinical Impression: Ddx filiform wart vs SK vs r/o atypical nevus    Spec 1 Source: Left forearm    Clinical Information: 64 y.o. M s/p recent kidney transplant 2/2024 w/ new growth on left forearm.    Release to patient: Immediate          Neoplasm of uncertain behavior  Brown filiform papule with finger-like projections on left forearm. Present for 1.5 months. Presented after kidney transplant in 2/2024. Ddx filiform wart vs SK vs r/o atypical nevus.  Plan:  - shave biopsy performed today; see procedure note below  - will call patient with results and discussed further treatment, if required  -     Specimen to Pathology, Dermatology    Shave biopsy procedure note:    Shave biopsy performed after verbal consent including risk of infection, scar, recurrence, need for additional treatment of site. Area prepped with alcohol, anesthetized with approximately 1.0cc of 1% lidocaine with epinephrine. Lesional tissue shaved with razor blade. Hemostasis achieved with application of aluminum chloride followed by  hyfrecation. No complications. Dressing applied. Wound care explained    Skin cancer screening  Patient s/p recent kidney transplant 2/2024 puts patient at increased risk of skin cancer.  - patient scheduled to see Dr. Beltran for FBSE in roughly 3 months  - patient instructed in importance in daily sun protection of at least spf 30. Sun avoidance and topical protection discussed.   - patient encouraged to wear hat for all outdoor exposure.   - also discussed sun protective clothing.  -     Ambulatory referral/consult to Dermatology       .    No follow-ups on file.

## 2024-04-10 ENCOUNTER — TELEPHONE (OUTPATIENT)
Dept: TRANSPLANT | Facility: CLINIC | Age: 64
End: 2024-04-10
Payer: MEDICARE

## 2024-04-10 NOTE — TELEPHONE ENCOUNTER
Coordinator returned patient's call. Patient request to reschedule today's surgery clinic appointment & lab to another day due to severe weather issues. Patient's clinic appointment rescheduled to Friday. Patient aware of appointments.       Coordinator received readings from patient via [facility].     1) Data update:     FRIDAY   Input. 2805 cc  Output. 2710  JEAN-PIERRE. 170     SATURDAY   Input.  2930  Output.  2790  JEAN-PIERRE.  140     SUNDAY   Input. 2870  Output. 2500  JEAN-PIERRE.  135     MONDAY  Input. 2910  Output. 2525  JEAN-PIERRE.  205     TUESDAY   Input. 3050  Output. 2275  JEAN-PIERRE. 110     (2).   I need advice per the travel alert by the National Weather Service in effect until 2:00 pm urging [facility] residents not to travel (except) to escape a flooded area.   ----- Message from Amalia Page sent at 4/10/2024 11:22 AM CDT -----  Regarding: speak to Nurse (asap pt req)  Contact: PT  [phone]  The patient called requesting to speak to Nurse Veronica - Needs to discuss surgeon decision. PT has sent portal message - req reply at your earliest opportunity prior to appts today scheduled at 1:30   No further information provided    Patient can be contacted @#  [phone]

## 2024-04-11 ENCOUNTER — DOCUMENT SCAN (OUTPATIENT)
Dept: HOME HEALTH SERVICES | Facility: HOSPITAL | Age: 64
End: 2024-04-11
Payer: MEDICARE

## 2024-04-12 ENCOUNTER — OFFICE VISIT (OUTPATIENT)
Dept: TRANSPLANT | Facility: CLINIC | Age: 64
End: 2024-04-12
Payer: MEDICARE

## 2024-04-12 VITALS
DIASTOLIC BLOOD PRESSURE: 67 MMHG | HEIGHT: 64 IN | SYSTOLIC BLOOD PRESSURE: 138 MMHG | OXYGEN SATURATION: 99 % | TEMPERATURE: 98 F | BODY MASS INDEX: 33.2 KG/M2 | WEIGHT: 194.44 LBS | HEART RATE: 70 BPM

## 2024-04-12 DIAGNOSIS — Z94.0 KIDNEY REPLACED BY TRANSPLANT: Primary | ICD-10-CM

## 2024-04-12 LAB
FINAL PATHOLOGIC DIAGNOSIS: NORMAL
GROSS: NORMAL
Lab: NORMAL
MICROSCOPIC EXAM: NORMAL

## 2024-04-12 PROCEDURE — 99999 PR PBB SHADOW E&M-EST. PATIENT-LVL III: CPT | Mod: PBBFAC,,,

## 2024-04-12 PROCEDURE — 99024 POSTOP FOLLOW-UP VISIT: CPT | Mod: POP,,, | Performed by: TRANSPLANT SURGERY

## 2024-04-12 PROCEDURE — 99213 OFFICE O/P EST LOW 20 MIN: CPT | Mod: PBBFAC

## 2024-04-12 NOTE — PROGRESS NOTES
64 M s/p kidney transplant complicated by urine leak and perinephric fluid collections requiring drains. He currently has a nephrostomy tube, clamped with normal urine production. The output of the surgical drain has significantly decreased and no residual collections on recent imaging. Surgical drain removed  in clinic.    Jan Alvarez MD

## 2024-04-15 ENCOUNTER — LAB VISIT (OUTPATIENT)
Dept: LAB | Facility: HOSPITAL | Age: 64
End: 2024-04-15
Attending: INTERNAL MEDICINE
Payer: MEDICARE

## 2024-04-15 DIAGNOSIS — Z94.0 IMMUNOSUPPRESSIVE MANAGEMENT ENCOUNTER FOLLOWING KIDNEY TRANSPLANT: ICD-10-CM

## 2024-04-15 DIAGNOSIS — Z79.899 IMMUNOSUPPRESSIVE MANAGEMENT ENCOUNTER FOLLOWING KIDNEY TRANSPLANT: ICD-10-CM

## 2024-04-15 DIAGNOSIS — Z94.0 KIDNEY REPLACED BY TRANSPLANT: ICD-10-CM

## 2024-04-15 LAB
ALBUMIN SERPL BCP-MCNC: 3.3 G/DL (ref 3.5–5.2)
ANION GAP SERPL CALC-SCNC: 7 MMOL/L (ref 8–16)
BASOPHILS # BLD AUTO: 0.03 K/UL (ref 0–0.2)
BASOPHILS NFR BLD: 0.5 % (ref 0–1.9)
BUN SERPL-MCNC: 47 MG/DL (ref 8–23)
CALCIUM SERPL-MCNC: 9.9 MG/DL (ref 8.7–10.5)
CHLORIDE SERPL-SCNC: 114 MMOL/L (ref 95–110)
CO2 SERPL-SCNC: 20 MMOL/L (ref 23–29)
CREAT SERPL-MCNC: 1.3 MG/DL (ref 0.5–1.4)
DIFFERENTIAL METHOD BLD: ABNORMAL
EOSINOPHIL # BLD AUTO: 0.1 K/UL (ref 0–0.5)
EOSINOPHIL NFR BLD: 1.7 % (ref 0–8)
ERYTHROCYTE [DISTWIDTH] IN BLOOD BY AUTOMATED COUNT: 14.8 % (ref 11.5–14.5)
EST. GFR  (NO RACE VARIABLE): >60 ML/MIN/1.73 M^2
GLUCOSE SERPL-MCNC: 81 MG/DL (ref 70–110)
HCT VFR BLD AUTO: 32.3 % (ref 40–54)
HGB BLD-MCNC: 10.1 G/DL (ref 14–18)
IMM GRANULOCYTES # BLD AUTO: 0.05 K/UL (ref 0–0.04)
IMM GRANULOCYTES NFR BLD AUTO: 0.8 % (ref 0–0.5)
LYMPHOCYTES # BLD AUTO: 0.2 K/UL (ref 1–4.8)
LYMPHOCYTES NFR BLD: 2.3 % (ref 18–48)
MAGNESIUM SERPL-MCNC: 1.8 MG/DL (ref 1.6–2.6)
MCH RBC QN AUTO: 30.9 PG (ref 27–31)
MCHC RBC AUTO-ENTMCNC: 31.3 G/DL (ref 32–36)
MCV RBC AUTO: 99 FL (ref 82–98)
MONOCYTES # BLD AUTO: 0.4 K/UL (ref 0.3–1)
MONOCYTES NFR BLD: 6.3 % (ref 4–15)
NEUTROPHILS # BLD AUTO: 5.7 K/UL (ref 1.8–7.7)
NEUTROPHILS NFR BLD: 88.4 % (ref 38–73)
NRBC BLD-RTO: 0 /100 WBC
PHOSPHATE SERPL-MCNC: 2 MG/DL (ref 2.7–4.5)
PLATELET # BLD AUTO: 265 K/UL (ref 150–450)
PMV BLD AUTO: 10.1 FL (ref 9.2–12.9)
POTASSIUM SERPL-SCNC: 4.3 MMOL/L (ref 3.5–5.1)
RBC # BLD AUTO: 3.27 M/UL (ref 4.6–6.2)
SODIUM SERPL-SCNC: 141 MMOL/L (ref 136–145)
TACROLIMUS BLD-MCNC: 6.1 NG/ML (ref 5–15)
WBC # BLD AUTO: 6.46 K/UL (ref 3.9–12.7)

## 2024-04-15 PROCEDURE — 85025 COMPLETE CBC W/AUTO DIFF WBC: CPT | Performed by: INTERNAL MEDICINE

## 2024-04-15 PROCEDURE — 36415 COLL VENOUS BLD VENIPUNCTURE: CPT | Performed by: INTERNAL MEDICINE

## 2024-04-15 PROCEDURE — 83735 ASSAY OF MAGNESIUM: CPT | Performed by: INTERNAL MEDICINE

## 2024-04-15 PROCEDURE — 80069 RENAL FUNCTION PANEL: CPT | Performed by: INTERNAL MEDICINE

## 2024-04-15 PROCEDURE — 80197 ASSAY OF TACROLIMUS: CPT | Performed by: INTERNAL MEDICINE

## 2024-04-16 ENCOUNTER — PATIENT MESSAGE (OUTPATIENT)
Dept: TRANSPLANT | Facility: CLINIC | Age: 64
End: 2024-04-16
Payer: MEDICARE

## 2024-04-16 DIAGNOSIS — Z94.0 KIDNEY REPLACED BY TRANSPLANT: Primary | ICD-10-CM

## 2024-04-16 RX ORDER — TACROLIMUS 1 MG/1
CAPSULE ORAL
Qty: 330 CAPSULE | Refills: 11 | Status: SHIPPED | OUTPATIENT
Start: 2024-04-16 | End: 2024-04-29 | Stop reason: DRUGHIGH

## 2024-04-16 NOTE — TELEPHONE ENCOUNTER
Notified patient of Dr. Alexander's reviewnof 4/15/24 lab results via [facility].     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 Alexander reviewed your 4/15/24 lab results. Your CO2 level is low, meaning your blood is a little more acidic than it should be. Are your taking your sodium bicarbonate tablets - 2 tablets twice daily?    Your prograf level 6.1 is slightly lower than it should be. She wants you to please increase your prograf dose to 6mg in the AM & 5mg in the PM. We'll recheck your labs as scheduled next Monday.     Thanks,    Veronica   ----- Message from Veronica Simmons RN sent at 4/15/2024  1:05 PM CDT -----    ----- Message -----  From: Hope Alexander DO  Sent: 4/15/2024  12:12 PM CDT  To: [facility] Post-Kidney Transplant Clinical    Improved creatinine. Recheck labs next week  Ensure patient is taking his sodium bicarb supplements. Increase phos in diet   Increase phos to 6/5 from 5 BID

## 2024-04-17 ENCOUNTER — HOSPITAL ENCOUNTER (OUTPATIENT)
Dept: RADIOLOGY | Facility: HOSPITAL | Age: 64
Discharge: HOME | End: 2024-04-17
Attending: INTERNAL MEDICINE
Payer: MEDICARE

## 2024-04-17 ENCOUNTER — OFFICE VISIT (OUTPATIENT)
Dept: TRANSPLANT | Facility: CLINIC | Age: 64
End: 2024-04-17
Payer: MEDICARE

## 2024-04-17 VITALS
DIASTOLIC BLOOD PRESSURE: 77 MMHG | OXYGEN SATURATION: 100 % | RESPIRATION RATE: 18 BRPM | TEMPERATURE: 97 F | BODY MASS INDEX: 33.05 KG/M2 | HEART RATE: 67 BPM | HEIGHT: 64 IN | SYSTOLIC BLOOD PRESSURE: 176 MMHG | WEIGHT: 193.56 LBS

## 2024-04-17 DIAGNOSIS — I10 BENIGN ESSENTIAL HTN: ICD-10-CM

## 2024-04-17 DIAGNOSIS — T86.898 URINE LEAK FROM TRANSPLANTED URETER: ICD-10-CM

## 2024-04-17 DIAGNOSIS — B18.1 CHRONIC VIRAL HEPATITIS B WITHOUT DELTA AGENT AND WITHOUT COMA: Chronic | ICD-10-CM

## 2024-04-17 DIAGNOSIS — E87.20 METABOLIC ACIDOSIS: ICD-10-CM

## 2024-04-17 DIAGNOSIS — Z79.60 LONG-TERM USE OF IMMUNOSUPPRESSANT MEDICATION: Primary | ICD-10-CM

## 2024-04-17 DIAGNOSIS — Z94.0 KIDNEY REPLACED BY TRANSPLANT: ICD-10-CM

## 2024-04-17 PROCEDURE — 99214 OFFICE O/P EST MOD 30 MIN: CPT | Mod: S$PBB,,, | Performed by: INTERNAL MEDICINE

## 2024-04-17 PROCEDURE — 99999 PR PBB SHADOW E&M-EST. PATIENT-LVL III: CPT | Mod: PBBFAC,,, | Performed by: INTERNAL MEDICINE

## 2024-04-17 PROCEDURE — 99213 OFFICE O/P EST LOW 20 MIN: CPT | Mod: PBBFAC | Performed by: INTERNAL MEDICINE

## 2024-04-17 RX ORDER — AMLODIPINE BESYLATE 5 MG/1
5 TABLET ORAL DAILY
Qty: 90 TABLET | Refills: 3 | Status: SHIPPED | OUTPATIENT
Start: 2024-04-17 | End: 2025-04-17

## 2024-04-17 RX ORDER — SODIUM BICARBONATE 650 MG/1
1300 TABLET ORAL 3 TIMES DAILY
Qty: 120 TABLET | Refills: 11 | Status: SHIPPED | OUTPATIENT
Start: 2024-04-17 | End: 2024-05-29

## 2024-04-17 NOTE — LETTER
April 24, 2024        Fredi Lyon  [address]  [facility] [address]  Phone: [phone]  Fax: [phone]             [facility]st [facility]  [address]  [facility] [address]  Phone: [phone]   Patient: Mark Lopez   MR Number: [id number]   YOB: 1960   Date of Visit: 4/17/2024       Dear Dr. Fredi Lyon    Thank you for referring Mark Lopez to me for evaluation. Attached you will find relevant portions of my assessment and plan of care.    If you have questions, please do not hesitate to call me. I look forward to following Mark Lopez along with you.    Sincerely,    Hope Alexander, DO    Enclosure    If you would like to receive this communication electronically, please contact [email or web address] or [phone] to request Information Gateway Link access.    Information Gateway Link is a tool which provides read-only access to select patient information with whom you have a relationship. Its easy to use and provides real time access to review your patients record including encounter summaries, notes, results, and demographic information.    If you feel you have received this communication in error or would no longer like to receive these types of communications, please e-mail [email or web address]

## 2024-04-18 ENCOUNTER — TELEPHONE (OUTPATIENT)
Dept: TRANSPLANT | Facility: CLINIC | Age: 64
End: 2024-04-18
Payer: MEDICARE

## 2024-04-18 DIAGNOSIS — N28.89 PERINEPHRIC FLUID COLLECTION OF KIDNEY TRANSPLANT: ICD-10-CM

## 2024-04-18 DIAGNOSIS — T86.898 URINE LEAK FROM TRANSPLANTED URETER: Primary | ICD-10-CM

## 2024-04-18 DIAGNOSIS — T86.19 PERINEPHRIC FLUID COLLECTION OF KIDNEY TRANSPLANT: ICD-10-CM

## 2024-04-18 NOTE — TELEPHONE ENCOUNTER
4/17/24 Plan given by Dr. Law:  Lets get a repeat CT non con abd/pelvis. today or tomorrow. to check for residual fluid and then we can schedule the nephrostogram and nephrostomy removal for 1-2 weeks after the stent is out. Nephrostomy will be the last thing to be removed.     Notified patient of above plan. Patient verbalized understanding.

## 2024-04-19 ENCOUNTER — HOSPITAL ENCOUNTER (OUTPATIENT)
Dept: RADIOLOGY | Facility: HOSPITAL | Age: 64
Discharge: HOME | End: 2024-04-19
Attending: TRANSPLANT SURGERY
Payer: MEDICARE

## 2024-04-19 DIAGNOSIS — T86.19 PERINEPHRIC FLUID COLLECTION OF KIDNEY TRANSPLANT: ICD-10-CM

## 2024-04-19 DIAGNOSIS — N28.89 PERINEPHRIC FLUID COLLECTION OF KIDNEY TRANSPLANT: ICD-10-CM

## 2024-04-19 DIAGNOSIS — T86.898 URINE LEAK FROM TRANSPLANTED URETER: ICD-10-CM

## 2024-04-19 PROCEDURE — 74176 CT ABD & PELVIS W/O CONTRAST: CPT | Mod: TC

## 2024-04-19 PROCEDURE — 74176 CT ABD & PELVIS W/O CONTRAST: CPT | Mod: 26,,, | Performed by: RADIOLOGY

## 2024-04-22 ENCOUNTER — LAB VISIT (OUTPATIENT)
Dept: LAB | Facility: HOSPITAL | Age: 64
End: 2024-04-22
Attending: INTERNAL MEDICINE
Payer: MEDICARE

## 2024-04-22 ENCOUNTER — TELEPHONE (OUTPATIENT)
Dept: TRANSPLANT | Facility: CLINIC | Age: 64
End: 2024-04-22
Payer: MEDICARE

## 2024-04-22 DIAGNOSIS — Z94.0 IMMUNOSUPPRESSIVE MANAGEMENT ENCOUNTER FOLLOWING KIDNEY TRANSPLANT: ICD-10-CM

## 2024-04-22 DIAGNOSIS — Z94.0 KIDNEY REPLACED BY TRANSPLANT: Primary | ICD-10-CM

## 2024-04-22 DIAGNOSIS — Z94.0 KIDNEY REPLACED BY TRANSPLANT: ICD-10-CM

## 2024-04-22 DIAGNOSIS — Z79.899 IMMUNOSUPPRESSIVE MANAGEMENT ENCOUNTER FOLLOWING KIDNEY TRANSPLANT: ICD-10-CM

## 2024-04-22 LAB
ALBUMIN SERPL BCP-MCNC: 3.4 G/DL (ref 3.5–5.2)
ANION GAP SERPL CALC-SCNC: 9 MMOL/L (ref 8–16)
BASOPHILS # BLD AUTO: 0.05 K/UL (ref 0–0.2)
BASOPHILS NFR BLD: 0.7 % (ref 0–1.9)
BUN SERPL-MCNC: 42 MG/DL (ref 8–23)
CALCIUM SERPL-MCNC: 10.2 MG/DL (ref 8.7–10.5)
CHLORIDE SERPL-SCNC: 112 MMOL/L (ref 95–110)
CO2 SERPL-SCNC: 22 MMOL/L (ref 23–29)
CREAT SERPL-MCNC: 1.6 MG/DL (ref 0.5–1.4)
DIFFERENTIAL METHOD BLD: ABNORMAL
EOSINOPHIL # BLD AUTO: 0.1 K/UL (ref 0–0.5)
EOSINOPHIL NFR BLD: 0.9 % (ref 0–8)
ERYTHROCYTE [DISTWIDTH] IN BLOOD BY AUTOMATED COUNT: 14.7 % (ref 11.5–14.5)
EST. GFR  (NO RACE VARIABLE): 47.5 ML/MIN/1.73 M^2
GLUCOSE SERPL-MCNC: 148 MG/DL (ref 70–110)
HCT VFR BLD AUTO: 31.6 % (ref 40–54)
HGB BLD-MCNC: 10.2 G/DL (ref 14–18)
IMM GRANULOCYTES # BLD AUTO: 0.05 K/UL (ref 0–0.04)
IMM GRANULOCYTES NFR BLD AUTO: 0.7 % (ref 0–0.5)
LYMPHOCYTES # BLD AUTO: 0.2 K/UL (ref 1–4.8)
LYMPHOCYTES NFR BLD: 2.1 % (ref 18–48)
MAGNESIUM SERPL-MCNC: 1.6 MG/DL (ref 1.6–2.6)
MCH RBC QN AUTO: 31.5 PG (ref 27–31)
MCHC RBC AUTO-ENTMCNC: 32.3 G/DL (ref 32–36)
MCV RBC AUTO: 98 FL (ref 82–98)
MONOCYTES # BLD AUTO: 0.6 K/UL (ref 0.3–1)
MONOCYTES NFR BLD: 8.1 % (ref 4–15)
NEUTROPHILS # BLD AUTO: 6.2 K/UL (ref 1.8–7.7)
NEUTROPHILS NFR BLD: 87.5 % (ref 38–73)
NRBC BLD-RTO: 0 /100 WBC
PHOSPHATE SERPL-MCNC: 2.2 MG/DL (ref 2.7–4.5)
PLATELET # BLD AUTO: 315 K/UL (ref 150–450)
PMV BLD AUTO: 10.1 FL (ref 9.2–12.9)
POTASSIUM SERPL-SCNC: 4 MMOL/L (ref 3.5–5.1)
RBC # BLD AUTO: 3.24 M/UL (ref 4.6–6.2)
SODIUM SERPL-SCNC: 143 MMOL/L (ref 136–145)
TACROLIMUS BLD-MCNC: 6.8 NG/ML (ref 5–15)
WBC # BLD AUTO: 7.05 K/UL (ref 3.9–12.7)

## 2024-04-22 PROCEDURE — 80069 RENAL FUNCTION PANEL: CPT | Performed by: INTERNAL MEDICINE

## 2024-04-22 PROCEDURE — 85025 COMPLETE CBC W/AUTO DIFF WBC: CPT | Performed by: INTERNAL MEDICINE

## 2024-04-22 PROCEDURE — 36415 COLL VENOUS BLD VENIPUNCTURE: CPT | Performed by: INTERNAL MEDICINE

## 2024-04-22 PROCEDURE — 83735 ASSAY OF MAGNESIUM: CPT | Performed by: INTERNAL MEDICINE

## 2024-04-22 PROCEDURE — 80197 ASSAY OF TACROLIMUS: CPT | Performed by: INTERNAL MEDICINE

## 2024-04-22 NOTE — TELEPHONE ENCOUNTER
D/w :     Stent removal week of 4/29  Nephrostogram week of 5/6    Needs followup surgery after nephrostogram.

## 2024-04-23 ENCOUNTER — TELEPHONE (OUTPATIENT)
Dept: INTERVENTIONAL RADIOLOGY/VASCULAR | Facility: CLINIC | Age: 64
End: 2024-04-23
Payer: MEDICARE

## 2024-04-23 NOTE — TELEPHONE ENCOUNTER
Spoke to pt on phone, Pt is scheduled on 5/8/2024 with arrival time of 9am for IR procedure at  location.  Preop instructions given (NPO after midnight, MUST have a ride home, Nurse will call 1-2  days before to go over instructions and medications), , pt verbally understood. Pt aware and confirmed, Thanks

## 2024-04-24 NOTE — PROGRESS NOTES
Kidney Post-Transplant Assessment    Referring Physician: Fredi Lyon  Current Nephrologist: Fredi Lyon    ORGAN: RIGHT KIDNEY  Donor Type: donation after brain death  PHS Increased Risk: yes  Cold Ischemia: 995 mins  Induction Medications: Thymo     Subjective:     CC:  Reassessment of renal allograft function and management of chronic immunosuppression.    HPI:  Mr. Lopez is a 64 y.o. year old  male who received a donation after brain death kidney transplant on 2/29/24.     Patient seen in clinic alone. Patient states that he is feeling well. States that PCN was capped on 3/26/24, cramer catheter removed on 4/5/24 and the JEAN-PIERRE drain removed on 4/12/24. Currently with PCN as well as PD catheter.    Patient otherwise denies any fever, HA, runny nose, watery eyes. Denies any SOB, CP, abdominal pain. Does admit to some constipation. Of note, patient states that he has noticed an abdominal hernia and would like to get the fixed. Denies any dysuria or frequency.     SBP at home of 140-150  Sugars: on insulin pump with sugars elevated in the morning.    Review of Systems   Constitutional:  Negative for activity change, appetite change, chills and fever.   HENT:  Negative for congestion, sneezing and sore throat.    Eyes:  Negative for pain and itching.   Respiratory:  Negative for apnea, cough, shortness of breath and wheezing.    Cardiovascular:  Negative for chest pain.   Gastrointestinal:  Negative for abdominal pain, constipation, diarrhea, nausea and vomiting.   Genitourinary:  Negative for difficulty urinating, dysuria and frequency.   Musculoskeletal:  Negative for back pain, joint swelling and neck pain.   Skin:  Negative for color change.   Neurological:  Negative for dizziness, light-headedness and headaches.   Psychiatric/Behavioral:  Negative for behavioral problems and confusion. The patient is not nervous/anxious.        Objective:   Blood pressure (!) 176/77, pulse 67, temperature 97.3 °F  "(36.3 °C), temperature source Temporal, resp. rate 18, height 5' 4.02" (1.626 m), weight 87.8 kg (193 lb 9 oz), SpO2 100%.body mass index is 33.21 kg/m².    Physical Exam  Vitals reviewed.   Constitutional:       General: He is not in acute distress.     Appearance: Normal appearance. He is not ill-appearing or toxic-appearing.      Comments: Appears stated age   HENT:      Head: Normocephalic and atraumatic.      Right Ear: External ear normal.      Left Ear: External ear normal.      Nose: Nose normal.   Eyes:      General: No scleral icterus.     Conjunctiva/sclera: Conjunctivae normal.   Cardiovascular:      Rate and Rhythm: Normal rate and regular rhythm.      Pulses: Normal pulses.      Heart sounds: Normal heart sounds. No murmur heard.     No friction rub.   Pulmonary:      Effort: Pulmonary effort is normal. No respiratory distress.      Breath sounds: Normal breath sounds. No wheezing or rhonchi.   Abdominal:      General: Bowel sounds are normal. There is no distension.      Palpations: Abdomen is soft.      Tenderness: There is no abdominal tenderness. There is no guarding.      Comments: PD catheter still present  PCN capped   Musculoskeletal:         General: No swelling or deformity. Normal range of motion.      Cervical back: Normal range of motion and neck supple. No tenderness.      Right lower leg: No edema.      Left lower leg: No edema.   Skin:     General: Skin is warm and dry.      Coloration: Skin is not jaundiced.      Findings: No erythema or rash.   Neurological:      General: No focal deficit present.      Mental Status: He is alert and oriented to person, place, and time.      Motor: No weakness.   Psychiatric:         Mood and Affect: Mood normal.         Behavior: Behavior normal.       Labs:  Lab Results   Component Value Date    WBC 7.05 04/22/2024    HGB 10.2 (L) 04/22/2024    HCT 31.6 (L) 04/22/2024     04/22/2024    K 4.0 04/22/2024     (H) 04/22/2024    CO2 22 (L) " "2024    BUN 42 (H) 2024    CREATININE 1.6 (H) 2024    EGFRNORACEVR 47.5 (A) 2024    CALCIUM 10.2 2024    PHOS 2.2 (L) 2024    MG 1.6 2024    ALBUMIN 3.4 (L) 2024    AST 17 2024    ALT 10 2024    UTPCR Unable to calculate 2024    .6 (H) 2024    TACROLIMUS 6.8 2024       No results found for: "EXTANC", "EXTWBC", "EXTSEGS", "EXTPLATELETS", "EXTHEMOGLOBI", "EXTHEMATOCRI", "EXTCREATININ", "EXTSODIUM", "EXTPOTASSIUM", "EXTBUN", "EXTCO2", "EXTCALCIUM", "EXTPHOSPHORU", "EXTGLUCOSE", "EXTALBUMIN", "EXTAST", "EXTALT", "EXTBILITOTAL", "EXTLIPASE", "EXTAMYLASE"    No results found for: "EXTCYCLOSLVL", "EXTSIROLIMUS", "EXTTACROLVL", "EXTPROTCRE", "EXTPTHINTACT", "EXTPROTEINUA", "EXTWBCUA", "EXTRBCUA"    Labs were reviewed with the patient    Assessment and Plan:    64 yr old  male with ESRD secondary to presumed DM (no prior kidney biopsy) who received a  donor kidney transplant on 24. 52% PRA. Crossmatch negative. Thymo induction    Post transplant course notable for persistent PD catheter (will need to be removed at a later date as he was not consented at the time of transplant) as well as urine leak with perinephric fluid collection managed medically. Patient currently with negative nephrostogram with PCN capped on 3/26, Curry removed on  and JEAN-PIERRE drain removed on     1) s/p  donor kidney transplant:   - graft function improved to 1.3. Monitor to see if it is his baseline.    - discussed with transplant surgery and CT abdomen and pelvis to be performed. If negative, can get ureteral stent removed followed by repeat nephrostogram and eventual PCN removal.    - needs to be scheduled for the PD catheter removal and would like abdominal hernia to be repaired   - acceptable FK level. Cont on FK 6/5   - cont on MMF to 750 mg BID    - cont on prednisone 5 mg daily   - cont on Bactrim and Valcyte   2) Urine leak:   - " nephrostogram without any signs of leak   - discussed with transplant surgery and CT abdomen and pelvis to be performed. If negative, can get ureteral stent removed followed by repeat nephrostogram and eventual PCN removal.    - management per transplant surgery  3) HTN:   - cont on Coreg 6.25 mg BID    - start on Norvasc 5 mg daily   4) Type II DM:   - on insulin pump with acceptable levels   - will have patient follow up with his endocrinologist  5) Metabolic acidosis   - cont on sodium bicarb 1300 BID    6) Hypothyroidism:   - cont on Synthroid   7) Secondary hyperparathyroidism:   - cont on calcitriol     Labs and RTC per protocol     Hope Alexander DO   Transplant Nephrology

## 2024-04-27 ENCOUNTER — NURSE TRIAGE (OUTPATIENT)
Dept: ADMINISTRATIVE | Facility: CLINIC | Age: 64
End: 2024-04-27
Payer: MEDICARE

## 2024-04-27 NOTE — TELEPHONE ENCOUNTER
Spoke with pt who  is a kidney transplant pt(2/29/2024)states that he does not have Valcyte medication for the weekend. Called to see if he would be able to receive prescription. Advised will reach out to on call provider.    Spoke with Dr. Alexander who states ok if pt goes a day or two without medication, and can  prescription on Monday from pharmacy    Pt informed, and verbalized understanding.    Reason for Disposition   [1] Prescription refill request for ESSENTIAL medicine (i.e., likelihood of harm to patient if not taken) AND [2] triager unable to refill per department policy    Protocols used: Medication Refill and Renewal Call-A-

## 2024-04-29 ENCOUNTER — PATIENT MESSAGE (OUTPATIENT)
Dept: TRANSPLANT | Facility: CLINIC | Age: 64
End: 2024-04-29
Payer: MEDICARE

## 2024-04-29 ENCOUNTER — LAB VISIT (OUTPATIENT)
Dept: LAB | Facility: HOSPITAL | Age: 64
End: 2024-04-29
Attending: INTERNAL MEDICINE
Payer: MEDICARE

## 2024-04-29 DIAGNOSIS — Z94.0 KIDNEY REPLACED BY TRANSPLANT: ICD-10-CM

## 2024-04-29 DIAGNOSIS — T86.10 COMPLICATION OF TRANSPLANTED KIDNEY, UNSPECIFIED COMPLICATION: ICD-10-CM

## 2024-04-29 DIAGNOSIS — Z79.899 IMMUNOSUPPRESSIVE MANAGEMENT ENCOUNTER FOLLOWING KIDNEY TRANSPLANT: ICD-10-CM

## 2024-04-29 DIAGNOSIS — Z94.0 IMMUNOSUPPRESSIVE MANAGEMENT ENCOUNTER FOLLOWING KIDNEY TRANSPLANT: ICD-10-CM

## 2024-04-29 LAB
ALBUMIN SERPL BCP-MCNC: 3.6 G/DL (ref 3.5–5.2)
ALBUMIN SERPL BCP-MCNC: 3.6 G/DL (ref 3.5–5.2)
ALP SERPL-CCNC: 63 U/L (ref 55–135)
ALT SERPL W/O P-5'-P-CCNC: 10 U/L (ref 10–44)
ANION GAP SERPL CALC-SCNC: 6 MMOL/L (ref 8–16)
AST SERPL-CCNC: 12 U/L (ref 10–40)
BASOPHILS # BLD AUTO: 0.06 K/UL (ref 0–0.2)
BASOPHILS NFR BLD: 0.8 % (ref 0–1.9)
BILIRUB DIRECT SERPL-MCNC: 0.1 MG/DL (ref 0.1–0.3)
BILIRUB SERPL-MCNC: 0.3 MG/DL (ref 0.1–1)
BUN SERPL-MCNC: 46 MG/DL (ref 8–23)
CALCIUM SERPL-MCNC: 10.4 MG/DL (ref 8.7–10.5)
CHLORIDE SERPL-SCNC: 113 MMOL/L (ref 95–110)
CO2 SERPL-SCNC: 23 MMOL/L (ref 23–29)
CREAT SERPL-MCNC: 1.4 MG/DL (ref 0.5–1.4)
DIFFERENTIAL METHOD BLD: ABNORMAL
EOSINOPHIL # BLD AUTO: 0.1 K/UL (ref 0–0.5)
EOSINOPHIL NFR BLD: 1.9 % (ref 0–8)
ERYTHROCYTE [DISTWIDTH] IN BLOOD BY AUTOMATED COUNT: 14.2 % (ref 11.5–14.5)
EST. GFR  (NO RACE VARIABLE): 55.8 ML/MIN/1.73 M^2
GLUCOSE SERPL-MCNC: 101 MG/DL (ref 70–110)
HCT VFR BLD AUTO: 34.8 % (ref 40–54)
HGB BLD-MCNC: 10.6 G/DL (ref 14–18)
IMM GRANULOCYTES # BLD AUTO: 0.07 K/UL (ref 0–0.04)
IMM GRANULOCYTES NFR BLD AUTO: 1 % (ref 0–0.5)
LYMPHOCYTES # BLD AUTO: 0.2 K/UL (ref 1–4.8)
LYMPHOCYTES NFR BLD: 2.2 % (ref 18–48)
MAGNESIUM SERPL-MCNC: 1.7 MG/DL (ref 1.6–2.6)
MCH RBC QN AUTO: 30.2 PG (ref 27–31)
MCHC RBC AUTO-ENTMCNC: 30.5 G/DL (ref 32–36)
MCV RBC AUTO: 99 FL (ref 82–98)
MONOCYTES # BLD AUTO: 0.6 K/UL (ref 0.3–1)
MONOCYTES NFR BLD: 7.7 % (ref 4–15)
NEUTROPHILS # BLD AUTO: 6.3 K/UL (ref 1.8–7.7)
NEUTROPHILS NFR BLD: 86.4 % (ref 38–73)
NRBC BLD-RTO: 0 /100 WBC
PHOSPHATE SERPL-MCNC: 2.3 MG/DL (ref 2.7–4.5)
PLATELET # BLD AUTO: 283 K/UL (ref 150–450)
PMV BLD AUTO: 10.8 FL (ref 9.2–12.9)
POTASSIUM SERPL-SCNC: 4 MMOL/L (ref 3.5–5.1)
PROT SERPL-MCNC: 6 G/DL (ref 6–8.4)
RBC # BLD AUTO: 3.51 M/UL (ref 4.6–6.2)
SODIUM SERPL-SCNC: 142 MMOL/L (ref 136–145)
TACROLIMUS BLD-MCNC: 5.5 NG/ML (ref 5–15)
WBC # BLD AUTO: 7.32 K/UL (ref 3.9–12.7)

## 2024-04-29 PROCEDURE — 87799 DETECT AGENT NOS DNA QUANT: CPT | Performed by: INTERNAL MEDICINE

## 2024-04-29 PROCEDURE — 83735 ASSAY OF MAGNESIUM: CPT | Performed by: INTERNAL MEDICINE

## 2024-04-29 PROCEDURE — 85025 COMPLETE CBC W/AUTO DIFF WBC: CPT | Performed by: INTERNAL MEDICINE

## 2024-04-29 PROCEDURE — 80197 ASSAY OF TACROLIMUS: CPT | Performed by: INTERNAL MEDICINE

## 2024-04-29 PROCEDURE — 36415 COLL VENOUS BLD VENIPUNCTURE: CPT | Performed by: INTERNAL MEDICINE

## 2024-04-29 PROCEDURE — 80069 RENAL FUNCTION PANEL: CPT | Performed by: INTERNAL MEDICINE

## 2024-04-29 PROCEDURE — 84155 ASSAY OF PROTEIN SERUM: CPT | Performed by: INTERNAL MEDICINE

## 2024-04-29 NOTE — TELEPHONE ENCOUNTER
Pt notified to increase Prograf to 6 mg twice a day.    ----- Message from Hope Alexander DO sent at 4/29/2024  4:43 PM CDT -----  Near baseline graft function. Acceptable electrolytes  Low FK. Increase FK to 6 mg BID from 6/5

## 2024-04-30 ENCOUNTER — PROCEDURE VISIT (OUTPATIENT)
Dept: UROLOGY | Facility: CLINIC | Age: 64
End: 2024-04-30
Payer: MEDICARE

## 2024-04-30 ENCOUNTER — OFFICE VISIT (OUTPATIENT)
Dept: HEPATOLOGY | Facility: CLINIC | Age: 64
End: 2024-04-30
Attending: INTERNAL MEDICINE
Payer: MEDICARE

## 2024-04-30 VITALS
OXYGEN SATURATION: 96 % | SYSTOLIC BLOOD PRESSURE: 149 MMHG | WEIGHT: 200.38 LBS | BODY MASS INDEX: 34.21 KG/M2 | HEIGHT: 64 IN | HEART RATE: 75 BPM | DIASTOLIC BLOOD PRESSURE: 67 MMHG

## 2024-04-30 VITALS
WEIGHT: 195 LBS | TEMPERATURE: 98 F | RESPIRATION RATE: 20 BRPM | SYSTOLIC BLOOD PRESSURE: 132 MMHG | BODY MASS INDEX: 33.45 KG/M2 | DIASTOLIC BLOOD PRESSURE: 63 MMHG | HEART RATE: 72 BPM

## 2024-04-30 DIAGNOSIS — Z94.0 KIDNEY REPLACED BY TRANSPLANT: ICD-10-CM

## 2024-04-30 DIAGNOSIS — T86.898 URINE LEAK FROM TRANSPLANTED URETER: Primary | ICD-10-CM

## 2024-04-30 DIAGNOSIS — T86.19 DELAYED GRAFT FUNCTION OF KIDNEY: ICD-10-CM

## 2024-04-30 DIAGNOSIS — Z92.29 HISTORY OF HEPATITIS B VACCINATION: Primary | Chronic | ICD-10-CM

## 2024-04-30 PROBLEM — B18.1 CHRONIC VIRAL HEPATITIS B WITHOUT DELTA AGENT AND WITHOUT COMA: Chronic | Status: RESOLVED | Noted: 2023-10-30 | Resolved: 2024-04-30

## 2024-04-30 PROCEDURE — 52310 CYSTOSCOPY AND TREATMENT: CPT | Mod: S$PBB,,, | Performed by: UROLOGY

## 2024-04-30 PROCEDURE — 52310 CYSTOSCOPY AND TREATMENT: CPT | Mod: PBBFAC | Performed by: UROLOGY

## 2024-04-30 PROCEDURE — 99213 OFFICE O/P EST LOW 20 MIN: CPT | Mod: S$PBB,,, | Performed by: INTERNAL MEDICINE

## 2024-04-30 PROCEDURE — 99999 PR PBB SHADOW E&M-EST. PATIENT-LVL III: CPT | Mod: PBBFAC,,, | Performed by: INTERNAL MEDICINE

## 2024-04-30 PROCEDURE — 99213 OFFICE O/P EST LOW 20 MIN: CPT | Mod: PBBFAC | Performed by: INTERNAL MEDICINE

## 2024-04-30 RX ORDER — TACROLIMUS 1 MG/1
CAPSULE ORAL
Qty: 360 CAPSULE | Refills: 11 | Status: SHIPPED | OUTPATIENT
Start: 2024-04-30 | End: 2025-04-30

## 2024-04-30 RX ORDER — LIDOCAINE HYDROCHLORIDE 20 MG/ML
JELLY TOPICAL
Status: COMPLETED | OUTPATIENT
Start: 2024-04-30 | End: 2024-04-30

## 2024-04-30 RX ADMIN — LIDOCAINE HYDROCHLORIDE: 20 JELLY TOPICAL at 10:04

## 2024-04-30 NOTE — PROCEDURES
Cystoscopy w/ stent removal    Date/Time: 4/30/2024 10:57 AM    Performed by: Grabiel Torres MD  Authorized by: Pino Law Jr., MD  Preparation: Patient was prepped and draped in the usual sterile fashion.  Local anesthesia used: no    Anesthesia:  Local anesthesia used: no    Sedation:  Patient sedated: no    Patient tolerance: patient tolerated the procedure well with no immediate complications  Comments: Procedures   Procedure: Flexible cysto-uretheroscopy and stent removal   Pre Procedure Diagnosis:s/p kidney transplant   Post Procedure Diagnosis:same   Surgeon: Grabiel Torres MD   Anesthesia: 2% uro-jet lidocaine jelly for local analgesia   Time out performed.  Flexible cysto-urethroscopy was performed after consent was obtained. The risks and benefits were explained.   2% lidocaine urojet was used for local analgesia.   The genitalia was prepped and draped in the sterile fashion with betadine.   The flexible scope was advanced into the urethra and into the bladder. The stent was removed without difficulty.   The patient tolerated the procedure well without complication.   They will follow up with transplant.

## 2024-04-30 NOTE — PATIENT INSTRUCTIONS
What to Expect After a Cystoscopy with Stent Removal  For the next 24-48 hours, you may feel a mild burning when you urinate. This burning is normal and expected. Drink plenty of water to dilute the urine to help relieve the burning sensation. You may also see a small amount of blood in your urine after the procedure.    Unless you are already taking antibiotics, you may be given an antibiotic after the test to prevent infection.    Signs and Symptoms to Report  Call the [facility] at [phone] if you develop any of the following:  Fever of 101 degrees or higher  Chills or persistent bleeding  Inability to urinate    After hours or on weekends, you may reach a urology resident on call at this number: [phone].

## 2024-04-30 NOTE — PROGRESS NOTES
HEPATOLOGY FOLLOW UP    Referring Physician:   Current Corresponding Physician: Dr. Loren Flores Fred Lopez is here for follow up of Advice Only (Hepatitis B serology)      HPI  This 64-year-old gentleman is status post  donor kidney transplant.  He had been referred pretransplant for possible chronic hepatitis-B.  It turns out that the hepatitis-B surface antigen was a false positive.  Subsequent testing showed no evidence of surface antigen.  He did not have hepatitis-B core antibody suggesting no prior exposure to the virus.  He is surface antibody positive suggesting successful viral immunity.  He has no symptoms of chronic liver disease.  Outpatient Encounter Medications as of 2024   Medication Sig Dispense Refill    amLODIPine (NORVASC) 5 MG tablet Take 1 tablet (5 mg total) by mouth once daily. 90 tablet 3    aspirin (ECOTRIN) 81 MG EC tablet Take 81 mg by mouth once daily.      calcitRIOL (ROCALTROL) 0.5 MCG Cap Take 1 capsule (0.5 mcg total) by mouth once daily. 30 capsule 5    carvediloL (COREG) 6.25 MG tablet Take 1 tablet (6.25 mg total) by mouth 2 (two) times daily. 60 tablet 11    gentamicin (GARAMYCIN) 0.1 % cream Apply topically.      HUMALOG U-100 INSULIN 100 unit/mL injection Inject into the skin. FOR INSULIN PUMP (BASAL 1.2 UNITS/HR)      latanoprost 0.005 % ophthalmic solution Place 1 drop into the right eye every other day. At night 2.5 mL 5    levothyroxine (SYNTHROID) 50 MCG tablet Take 1 tablet (50 mcg total) by mouth before breakfast. 30 tablet 5    multivitamin Tab Take 1 tablet by mouth once daily. 30 tablet 5    mupirocin (BACTROBAN) 2 % ointment Apply topically 3 (three) times daily. 22 g 0    mycophenolate (CELLCEPT) 250 mg Cap Take 3 capsules (750 mg total) by mouth 2 (two) times daily. 180 capsule 11    ONETOUCH DELICA PLUS LANCET 30 gauge Misc       oxyCODONE (ROXICODONE) 10 mg Tab immediate release tablet Take 1 tablet (10 mg total) by mouth every 4 (four) hours  as needed for Pain. 30 tablet 0    predniSONE (DELTASONE) 5 MG tablet Take by mouth daily: 20mg 3/2-3/8, 15mg 3/9-3/15, 10mg 3/16-3/22/2024, 5mg 3/23/2024- 75 tablet 5    sodium bicarbonate 650 MG tablet Take 2 tablets (1,300 mg total) by mouth 3 (three) times daily. 120 tablet 11    sulfamethoxazole-trimethoprim 400-80mg (BACTRIM,SEPTRA) 400-80 mg per tablet Take 1 tablet by mouth once daily. Stop 8/27/24 30 tablet 4    T:SLIM X2 Crtg Inject into the skin.      tacrolimus (PROGRAF) 1 MG Cap Take 6 capsules (6 mg total) by mouth every morning AND 6 capsules (6 mg total) every evening. Z94.0;Kidney txpn on 2/29/2024. 360 capsule 11    valGANciclovir (VALCYTE) 450 mg Tab Take 1 tablet (450 mg total) by mouth once daily. Stop 8/27/24 30 tablet 5    pantoprazole (PROTONIX) 40 MG tablet Take 1 tablet (40 mg total) by mouth once daily. 30 tablet 0    subcutaneous insulin pump Misc Inject 1 Units/hr into the skin continuous. Insulin pump name - Tandum  Basal dose is 2 units/hr      [DISCONTINUED] predniSONE (DELTASONE) 10 MG tablet Take 1 tablet (10 mg total) by mouth once daily. Take 1 tab starting 4/9/24. Hold Predinsone 5mg tab until you complete this dose 5 tablet 0    [DISCONTINUED] sodium bicarbonate 650 MG tablet Take 2 tablets (1,300 mg total) by mouth 2 (two) times daily. 120 tablet 11    [DISCONTINUED] tacrolimus (PROGRAF) 1 MG Cap Take 5 capsules (5 mg total) by mouth every morning AND 4 capsules (4 mg total) every evening. Z94.0;Kidney txp on 2/29/2024. 270 capsule 11    [DISCONTINUED] tacrolimus (PROGRAF) 1 MG Cap Take 5 capsules (5 mg total) by mouth every 12 (twelve) hours. Z94.0;Kidney txp on 2/29/2024 300 capsule 11    [DISCONTINUED] tacrolimus (PROGRAF) 1 MG Cap Take 6 capsules (6 mg total) by mouth every morning AND 5 capsules (5 mg total) every evening. Z94.0;Kidney txpn on 2/29/2024. 330 capsule 11     Facility-Administered Encounter Medications as of 4/30/2024   Medication Dose Route Frequency  Provider Last Rate Last Admin    [COMPLETED] LIDOcaine HCl 2% urojet   Urethral 1 time in Clinic/HOD Grabiel Torres MD   Given at 04/30/24 1052    [DISCONTINUED] 0.9%  NaCl infusion   Intravenous Continuous Skylar Huerta NP        [DISCONTINUED] LIDOcaine (PF) 10 mg/ml (1%) injection 10 mg  1 mL Other Once Skylar Huerta NP         Review of patient's allergies indicates:   Allergen Reactions    Allopurinol analogues Swelling    Allopurinol     Latex, natural rubber     Statins-hmg-coa reductase inhibitors Other (See Comments)     Muscle ache    Adhesive Rash     Past Medical History:   Diagnosis Date    Anemia     Cataract     Diabetes mellitus     Diabetes mellitus, type 2     Glaucoma     Gout, unspecified     HLD (hyperlipidemia)     Hypertension     Hypothyroidism, unspecified     Kidney failure     Renal disorder        Review of Systems   Constitutional:  Negative for activity change, appetite change, chills, fatigue and unexpected weight change.   HENT:  Negative for congestion, facial swelling and tinnitus.    Eyes:  Negative for visual disturbance.   Respiratory:  Negative for cough, shortness of breath and wheezing.    Cardiovascular:  Negative for chest pain and palpitations.   Gastrointestinal:  Negative for abdominal distention.   Genitourinary:  Negative for dysuria.   Musculoskeletal:  Negative for arthralgias, joint swelling and myalgias.   Neurological:  Negative for syncope and headaches.   Hematological:  Does not bruise/bleed easily.   Psychiatric/Behavioral:  Negative for confusion.      Vitals:    04/30/24 1425   BP: (!) 149/67   Pulse: 75       Physical Exam  Constitutional:       Appearance: He is well-developed.   Eyes:      General: No scleral icterus.  Cardiovascular:      Rate and Rhythm: Normal rate and regular rhythm.      Heart sounds: Normal heart sounds.   Pulmonary:      Effort: Pulmonary effort is normal. No respiratory distress.      Breath sounds: Normal breath  sounds. No wheezing.   Abdominal:      General: Bowel sounds are normal. There is no distension.      Palpations: Abdomen is soft. There is no mass.      Tenderness: There is no abdominal tenderness. There is no rebound.   Musculoskeletal:         General: Normal range of motion.   Lymphadenopathy:      Cervical: No cervical adenopathy.   Skin:     General: Skin is warm and dry.   Neurological:      Mental Status: He is alert and oriented to person, place, and time.         MELD 3.0: 22 at 3/1/2024  3:37 AM  MELD-Na: 23 at 3/1/2024  3:37 AM  Calculated from:  Serum Creatinine: On dialysis. Using the maximum value.  Serum Sodium: 132 mmol/L at 3/1/2024  3:37 AM  Total Bilirubin: 0.3 mg/dL (Using min of 1 mg/dL) at 2/28/2024  8:27 PM  Serum Albumin: 2.1 g/dL at 2/29/2024  8:52 PM  INR(ratio): 1.0 at 2/28/2024  8:27 PM  Age at listing (hypothetical): 64 years  Sex: Male at 3/1/2024  3:37 AM      Lab Results   Component Value Date     04/29/2024    BUN 46 (H) 04/29/2024    CREATININE 1.4 04/29/2024    CALCIUM 10.4 04/29/2024     04/29/2024    K 4.0 04/29/2024     (H) 04/29/2024    PROT 6.0 04/29/2024    CO2 23 04/29/2024    ANIONGAP 6 (L) 04/29/2024    WBC 7.32 04/29/2024    RBC 3.51 (L) 04/29/2024    HGB 10.6 (L) 04/29/2024    HCT 34.8 (L) 04/29/2024    HCT 27 (L) 02/29/2024    MCV 99 (H) 04/29/2024    MCH 30.2 04/29/2024    MCHC 30.5 (L) 04/29/2024     Lab Results   Component Value Date    RDW 14.2 04/29/2024     04/29/2024    MPV 10.8 04/29/2024    GRAN 6.3 04/29/2024    GRAN 86.4 (H) 04/29/2024    LYMPH 0.2 (L) 04/29/2024    LYMPH 2.2 (L) 04/29/2024    MONO 0.6 04/29/2024    MONO 7.7 04/29/2024    EOSINOPHIL 1.9 04/29/2024    BASOPHIL 0.8 04/29/2024    EOS 0.1 04/29/2024    EOS 1 03/18/2024    BASO 0.06 04/29/2024    BASO 1 01/28/2024    APTT 28.0 02/28/2024    GROUPTRH AB POS 03/16/2024     (H) 03/08/2024    CHOL 171 09/13/2023    TRIG 174 (H) 09/13/2023    HDL 41 09/13/2023     CHOLHDL 24.0 2023    TOTALCHOLEST 4.2 2023    ALBUMIN 3.6 2024    ALBUMIN 3.6 2024    BILIDIR 0.1 2024    AST 12 2024    ALT 10 2024    ALKPHOS 63 2024    MG 1.7 2024    LABPROT 10.3 2024    INR 1.0 2024    PSA 0.77 2023       Assessment and Plan:  Patient Active Problem List   Diagnosis    NIHARIKA (acute kidney injury)    Pulmonary nodule    Type 2 diabetes mellitus with kidney complication, with long-term current use of insulin    Essential hypertension    Chronic kidney disease, unspecified    Chronic kidney disease, stage V    Status post -donor kidney transplantation    Prophylactic immunotherapy    Long-term use of immunosuppressant medication    At risk for opportunistic infections    Acute on chronic anemia    Positive RPR test in cadaveric donor    Delayed graft function of kidney    Insulin pump in place    Urine leak from transplanted ureter    Acidosis    History of hepatitis B vaccination     Mark Pedro Luissilva Lopez is a 64 y.o. male withAdvice Only (Hepatitis B serology)    Impression:  Hepatitis-B serological profile that suggests vaccine induced immunity to hepatitis-B    Plan:  I have not arranged hepatology follow-up but would be happy to be reinvolved should the need arise

## 2024-05-02 ENCOUNTER — DOCUMENTATION ONLY (OUTPATIENT)
Dept: TRANSPLANT | Facility: HOSPITAL | Age: 64
End: 2024-05-02
Payer: MEDICARE

## 2024-05-02 NOTE — PROGRESS NOTES
Transplant Ureter Management Note:    S/p kidney transplant 2/29/2024 with post op urine leak and urinoma.  Managed with PCN, double J stent, and percutaneous drain.  Last nephrostogram 3/26/2024 with resolution of leak and no stricture.  Perc drain removed 4/12/2024.  Double J stent removed 4/30/2024.    Plan:  Nephrostogram scheduled 5/8/2024 for possible PCN removal.

## 2024-05-06 ENCOUNTER — LAB VISIT (OUTPATIENT)
Dept: LAB | Facility: HOSPITAL | Age: 64
End: 2024-05-06
Attending: INTERNAL MEDICINE
Payer: MEDICARE

## 2024-05-06 DIAGNOSIS — Z94.0 KIDNEY REPLACED BY TRANSPLANT: ICD-10-CM

## 2024-05-06 DIAGNOSIS — Z94.0 IMMUNOSUPPRESSIVE MANAGEMENT ENCOUNTER FOLLOWING KIDNEY TRANSPLANT: ICD-10-CM

## 2024-05-06 DIAGNOSIS — Z79.899 IMMUNOSUPPRESSIVE MANAGEMENT ENCOUNTER FOLLOWING KIDNEY TRANSPLANT: ICD-10-CM

## 2024-05-06 LAB
ALBUMIN SERPL BCP-MCNC: 3.5 G/DL (ref 3.5–5.2)
ANION GAP SERPL CALC-SCNC: 9 MMOL/L (ref 8–16)
BASOPHILS # BLD AUTO: 0.05 K/UL (ref 0–0.2)
BASOPHILS NFR BLD: 0.6 % (ref 0–1.9)
BUN SERPL-MCNC: 46 MG/DL (ref 8–23)
CALCIUM SERPL-MCNC: 10.4 MG/DL (ref 8.7–10.5)
CHLORIDE SERPL-SCNC: 109 MMOL/L (ref 95–110)
CO2 SERPL-SCNC: 22 MMOL/L (ref 23–29)
CREAT SERPL-MCNC: 1.6 MG/DL (ref 0.5–1.4)
DIFFERENTIAL METHOD BLD: ABNORMAL
EOSINOPHIL # BLD AUTO: 0.2 K/UL (ref 0–0.5)
EOSINOPHIL NFR BLD: 1.9 % (ref 0–8)
ERYTHROCYTE [DISTWIDTH] IN BLOOD BY AUTOMATED COUNT: 13.5 % (ref 11.5–14.5)
EST. GFR  (NO RACE VARIABLE): 47.5 ML/MIN/1.73 M^2
GLUCOSE SERPL-MCNC: 151 MG/DL (ref 70–110)
HCT VFR BLD AUTO: 34.6 % (ref 40–54)
HGB BLD-MCNC: 10.8 G/DL (ref 14–18)
IMM GRANULOCYTES # BLD AUTO: 0.08 K/UL (ref 0–0.04)
IMM GRANULOCYTES NFR BLD AUTO: 0.9 % (ref 0–0.5)
LYMPHOCYTES # BLD AUTO: 0.2 K/UL (ref 1–4.8)
LYMPHOCYTES NFR BLD: 2.1 % (ref 18–48)
MAGNESIUM SERPL-MCNC: 1.8 MG/DL (ref 1.6–2.6)
MCH RBC QN AUTO: 30.2 PG (ref 27–31)
MCHC RBC AUTO-ENTMCNC: 31.2 G/DL (ref 32–36)
MCV RBC AUTO: 97 FL (ref 82–98)
MONOCYTES # BLD AUTO: 1 K/UL (ref 0.3–1)
MONOCYTES NFR BLD: 10.9 % (ref 4–15)
NEUTROPHILS # BLD AUTO: 7.3 K/UL (ref 1.8–7.7)
NEUTROPHILS NFR BLD: 83.6 % (ref 38–73)
NRBC BLD-RTO: 0 /100 WBC
PHOSPHATE SERPL-MCNC: 2.4 MG/DL (ref 2.7–4.5)
PLATELET # BLD AUTO: 296 K/UL (ref 150–450)
PMV BLD AUTO: 10.9 FL (ref 9.2–12.9)
POTASSIUM SERPL-SCNC: 4.3 MMOL/L (ref 3.5–5.1)
RBC # BLD AUTO: 3.58 M/UL (ref 4.6–6.2)
SODIUM SERPL-SCNC: 140 MMOL/L (ref 136–145)
TACROLIMUS BLD-MCNC: 7.3 NG/ML (ref 5–15)
WBC # BLD AUTO: 8.74 K/UL (ref 3.9–12.7)

## 2024-05-06 PROCEDURE — 85025 COMPLETE CBC W/AUTO DIFF WBC: CPT | Performed by: INTERNAL MEDICINE

## 2024-05-06 PROCEDURE — 36415 COLL VENOUS BLD VENIPUNCTURE: CPT | Performed by: INTERNAL MEDICINE

## 2024-05-06 PROCEDURE — 80197 ASSAY OF TACROLIMUS: CPT | Performed by: INTERNAL MEDICINE

## 2024-05-06 PROCEDURE — 83735 ASSAY OF MAGNESIUM: CPT | Performed by: INTERNAL MEDICINE

## 2024-05-06 PROCEDURE — 80069 RENAL FUNCTION PANEL: CPT | Performed by: INTERNAL MEDICINE

## 2024-05-07 ENCOUNTER — PATIENT MESSAGE (OUTPATIENT)
Dept: INTERVENTIONAL RADIOLOGY/VASCULAR | Facility: HOSPITAL | Age: 64
End: 2024-05-07
Payer: MEDICARE

## 2024-05-07 NOTE — NURSING
Pre-procedure call complete.  Pt instructed not to eat or drink anything after midnight the night before procedure.  Pt aware will need someone to provide transport home and monitor pt 8 hours post procedure.  No driving for at least 24 hours after procedure.   Patient advised to take blood pressure and anti-rejections medications with a sip of water morning of procedure.  Patient verbalized aware of which medications to take.  Do not take sleep medication (including OTC) the night before procedure.  Arrival time and location given.  Expected length of stay reviewed.  Covid screening completed.  Pt verbalized understanding of all pre-procedure instructions.  Written instructions and directions sent to patient in Mychart/portal.

## 2024-05-08 ENCOUNTER — HOSPITAL ENCOUNTER (OUTPATIENT)
Dept: INTERVENTIONAL RADIOLOGY/VASCULAR | Facility: HOSPITAL | Age: 64
Discharge: HOME | End: 2024-05-08
Attending: TRANSPLANT SURGERY
Payer: MEDICARE

## 2024-05-08 VITALS
HEART RATE: 68 BPM | RESPIRATION RATE: 17 BRPM | TEMPERATURE: 98 F | BODY MASS INDEX: 32.78 KG/M2 | HEIGHT: 64 IN | OXYGEN SATURATION: 99 % | DIASTOLIC BLOOD PRESSURE: 72 MMHG | SYSTOLIC BLOOD PRESSURE: 149 MMHG | WEIGHT: 192 LBS

## 2024-05-08 DIAGNOSIS — Z94.0 KIDNEY REPLACED BY TRANSPLANT: ICD-10-CM

## 2024-05-08 LAB — POCT GLUCOSE: 103 MG/DL (ref 70–110)

## 2024-05-08 PROCEDURE — 50389 REMOVE RENAL TUBE W/FLUORO: CPT | Mod: RT | Performed by: STUDENT IN AN ORGANIZED HEALTH CARE EDUCATION/TRAINING PROGRAM

## 2024-05-08 PROCEDURE — 50431 NJX PX NFROSGRM &/URTRGRM: CPT | Mod: 59,RT | Performed by: STUDENT IN AN ORGANIZED HEALTH CARE EDUCATION/TRAINING PROGRAM

## 2024-05-08 PROCEDURE — 50389 REMOVE RENAL TUBE W/FLUORO: CPT | Mod: RT,,, | Performed by: STUDENT IN AN ORGANIZED HEALTH CARE EDUCATION/TRAINING PROGRAM

## 2024-05-08 PROCEDURE — 25500020 PHARM REV CODE 255: Performed by: TRANSPLANT SURGERY

## 2024-05-08 RX ORDER — LIDOCAINE HYDROCHLORIDE 10 MG/ML
1 INJECTION, SOLUTION EPIDURAL; INFILTRATION; INTRACAUDAL; PERINEURAL ONCE
Status: DISCONTINUED | OUTPATIENT
Start: 2024-05-08 | End: 2024-05-09 | Stop reason: HOSPADM

## 2024-05-08 RX ORDER — SODIUM CHLORIDE 9 MG/ML
INJECTION, SOLUTION INTRAVENOUS CONTINUOUS
Status: DISCONTINUED | OUTPATIENT
Start: 2024-05-08 | End: 2024-05-09 | Stop reason: HOSPADM

## 2024-05-08 RX ADMIN — IOHEXOL 10 ML: 300 INJECTION, SOLUTION INTRAVENOUS at 12:05

## 2024-05-08 NOTE — H&P
Vascular and Interventional Radiology History & Physical    Date:  5/8/2024    Chief Complaint:   Urine leak    History of Present Illness:  Mark Lopez is a 64 y.o. male who presents for nephrostogram and exchange vs possible removal. Pt has had PCN capped since most recent nephrostogram on 3/26/24 without issue. He reports minor skin irritation but no leak. No fevers. Urinating well.    Past Medical History:  Past Medical History:   Diagnosis Date    Anemia     Cataract     Diabetes mellitus     Diabetes mellitus, type 2     Glaucoma     Gout, unspecified     HLD (hyperlipidemia)     Hypertension     Hypothyroidism, unspecified     Kidney failure     Renal disorder        Past Surgical History:  Past Surgical History:   Procedure Laterality Date    COLONOSCOPY N/A 05/18/2022    Procedure: COLONOSCOPY;  Surgeon: Raul Dyer MD;  Location: [facility] ENDO;  Service: Endoscopy;  Laterality: N/A;    DENTAL SURGERY      EYE SURGERY Bilateral     Cataract    INSERTION, CATHETER, DIALYSIS, PERITONEAL, LAPAROSCOPIC N/A 06/20/2023    Procedure: INSERTION, CATHETER, DIALYSIS, PERITONEAL, LAPAROSCOPIC;  Surgeon: Carlos Alberto Rodgers MD;  Location: [facility] OR;  Service: General;  Laterality: N/A;    KIDNEY TRANSPLANT N/A 2/29/2024    Procedure: TRANSPLANT, KIDNEY;  Surgeon: Pino Law Jr., MD;  Location: [address];  Service: Transplant;  Laterality: N/A;    KNEE SURGERY Right         Sedation History:    Denies any adverse reactions.  Denies problems laying flat.    Social History:  Social History     Tobacco Use    Smoking status: Never    Smokeless tobacco: Never   Substance Use Topics    Alcohol use: Never    Drug use: Never        Home Medications:   Prior to Admission medications    Medication Sig Start Date End Date Taking? Authorizing Provider   amLODIPine (NORVASC) 5 MG tablet Take 1 tablet (5 mg total) by mouth once daily. 4/17/24 4/17/25  Hope Alexander, DO   aspirin (ECOTRIN) 81 MG EC tablet  Take 81 mg by mouth once daily.    Provider, Historical   calcitRIOL (ROCALTROL) 0.5 MCG Cap Take 1 capsule (0.5 mcg total) by mouth once daily. 3/5/24   Pino Law Jr., MD   carvediloL (COREG) 6.25 MG tablet Take 1 tablet (6.25 mg total) by mouth 2 (two) times daily. 3/10/24   Hope Alexander DO   gentamicin (GARAMYCIN) 0.1 % cream Apply topically. 1/16/24   Provider, Historical   HUMALOG U-100 INSULIN 100 unit/mL injection Inject into the skin. FOR INSULIN PUMP (BASAL 1.2 UNITS/HR) 8/7/23   Provider, Historical   latanoprost 0.005 % ophthalmic solution Place 1 drop into the right eye every other day. At night 2/29/24   Pino Law Jr., MD   levothyroxine (SYNTHROID) 50 MCG tablet Take 1 tablet (50 mcg total) by mouth before breakfast. 2/29/24   Pino Law Jr., MD   multivitamin Tab Take 1 tablet by mouth once daily. 3/1/24   Pino Law Jr., MD   mupirocin (BACTROBAN) 2 % ointment Apply topically 3 (three) times daily. 3/27/24   Lu Gutierrez, NP   mycophenolate (CELLCEPT) 250 mg Cap Take 3 capsules (750 mg total) by mouth 2 (two) times daily. 3/27/24 3/27/25  Hope Alexander DO   ONETOUCH DELICA PLUS LANCET 30 gauge Misc  4/11/22   Provider, Historical   oxyCODONE (ROXICODONE) 10 mg Tab immediate release tablet Take 1 tablet (10 mg total) by mouth every 4 (four) hours as needed for Pain. 3/5/24   Urmila Leiva PA-C   pantoprazole (PROTONIX) 40 MG tablet Take 1 tablet (40 mg total) by mouth once daily. 3/9/24 4/8/24  Andree Velasquez,    predniSONE (DELTASONE) 5 MG tablet Take by mouth daily: 20mg 3/2-3/8, 15mg 3/9-3/15, 10mg 3/16-3/22/2024, 5mg 3/23/2024- 3/2/24   Pino Law Jr., MD   sodium bicarbonate 650 MG tablet Take 2 tablets (1,300 mg total) by mouth 3 (three) times daily. 4/17/24   Hope Alexander,    subcutaneous insulin pump Misc Inject 1 Units/hr into the skin continuous. Insulin pump name - Tandum  Basal dose is 2 units/hr 5/1/21 4/5/24  Sanjuana  MD Dariel   sulfamethoxazole-trimethoprim 400-80mg (BACTRIM,SEPTRA) 400-80 mg per tablet Take 1 tablet by mouth once daily. Stop 8/27/24 3/19/24 8/27/24  Mark Bennett MD   T:SLIM X2 Crtg Inject into the skin. 11/18/23   Provider, Historical   tacrolimus (PROGRAF) 1 MG Cap Take 6 capsules (6 mg total) by mouth every morning AND 6 capsules (6 mg total) every evening. Z94.0;Kidney txpn on 2/29/2024. 4/30/24 4/30/25  Hope Alexander,    valGANciclovir (VALCYTE) 450 mg Tab Take 1 tablet (450 mg total) by mouth once daily. Stop 8/27/24 3/27/24 9/17/24  Hope Alexander,        Inpatient Medications:    Current Outpatient Medications:     amLODIPine (NORVASC) 5 MG tablet, Take 1 tablet (5 mg total) by mouth once daily., Disp: 90 tablet, Rfl: 3    aspirin (ECOTRIN) 81 MG EC tablet, Take 81 mg by mouth once daily., Disp: , Rfl:     calcitRIOL (ROCALTROL) 0.5 MCG Cap, Take 1 capsule (0.5 mcg total) by mouth once daily., Disp: 30 capsule, Rfl: 5    carvediloL (COREG) 6.25 MG tablet, Take 1 tablet (6.25 mg total) by mouth 2 (two) times daily., Disp: 60 tablet, Rfl: 11    gentamicin (GARAMYCIN) 0.1 % cream, Apply topically., Disp: , Rfl:     HUMALOG U-100 INSULIN 100 unit/mL injection, Inject into the skin. FOR INSULIN PUMP (BASAL 1.2 UNITS/HR), Disp: , Rfl:     latanoprost 0.005 % ophthalmic solution, Place 1 drop into the right eye every other day. At night, Disp: 2.5 mL, Rfl: 5    levothyroxine (SYNTHROID) 50 MCG tablet, Take 1 tablet (50 mcg total) by mouth before breakfast., Disp: 30 tablet, Rfl: 5    multivitamin Tab, Take 1 tablet by mouth once daily., Disp: 30 tablet, Rfl: 5    mupirocin (BACTROBAN) 2 % ointment, Apply topically 3 (three) times daily., Disp: 22 g, Rfl: 0    mycophenolate (CELLCEPT) 250 mg Cap, Take 3 capsules (750 mg total) by mouth 2 (two) times daily., Disp: 180 capsule, Rfl: 11    ONETOUCH DELICA PLUS LANCET 30 gauge Misc, , Disp: , Rfl:     oxyCODONE (ROXICODONE) 10 mg Tab immediate release  tablet, Take 1 tablet (10 mg total) by mouth every 4 (four) hours as needed for Pain., Disp: 30 tablet, Rfl: 0    pantoprazole (PROTONIX) 40 MG tablet, Take 1 tablet (40 mg total) by mouth once daily., Disp: 30 tablet, Rfl: 0    predniSONE (DELTASONE) 5 MG tablet, Take by mouth daily: 20mg 3/2-3/8, 15mg 3/9-3/15, 10mg 3/16-3/22/2024, 5mg 3/23/2024-, Disp: 75 tablet, Rfl: 5    sodium bicarbonate 650 MG tablet, Take 2 tablets (1,300 mg total) by mouth 3 (three) times daily., Disp: 120 tablet, Rfl: 11    subcutaneous insulin pump Misc, Inject 1 Units/hr into the skin continuous. Insulin pump name - Tandum Basal dose is 2 units/hr, Disp: , Rfl:     sulfamethoxazole-trimethoprim 400-80mg (BACTRIM,SEPTRA) 400-80 mg per tablet, Take 1 tablet by mouth once daily. Stop 8/27/24, Disp: 30 tablet, Rfl: 4    T:SLIM X2 Crtg, Inject into the skin., Disp: , Rfl:     tacrolimus (PROGRAF) 1 MG Cap, Take 6 capsules (6 mg total) by mouth every morning AND 6 capsules (6 mg total) every evening. Z94.0;Kidney txpn on 2/29/2024., Disp: 360 capsule, Rfl: 11    valGANciclovir (VALCYTE) 450 mg Tab, Take 1 tablet (450 mg total) by mouth once daily. Stop 8/27/24, Disp: 30 tablet, Rfl: 5    Current Facility-Administered Medications:     0.9%  NaCl infusion, , Intravenous, Continuous, Veronica Tabor NP    LIDOcaine (PF) 10 mg/ml (1%) injection 10 mg, 1 mL, Other, Once, Veronica Tabor NP     Anticoagulants/Antiplatelets:   no anticoagulation    Allergies:   Review of patient's allergies indicates:   Allergen Reactions    Allopurinol analogues Swelling    Allopurinol     Latex, natural rubber     Statins-hmg-coa reductase inhibitors Other (See Comments)     Muscle ache    Adhesive Rash       Review of Systems:   As documented in primary provider H&P.    Vital Signs (Most Recent):       Physical Exam:  No acute distress, laying comfortably in bed, pleasant and cooperative  Regular rate and rhythm  Breathing unlabored  Abdomen  benign  Extremities warm and well perfused  RLQ PCN site c/d/i    Sedation Exam:  ASA: II - Patient appears to have mild systemic disease, adequately controlled   Mallampati: II (hard and soft palate, upper portion of tonsils anduvula visible)     Laboratory:  Lab Results   Component Value Date    INR 1.0 02/28/2024       Lab Results   Component Value Date    WBC 8.74 05/06/2024    HGB 10.8 (L) 05/06/2024    HCT 34.6 (L) 05/06/2024    MCV 97 05/06/2024     05/06/2024      Lab Results   Component Value Date     (H) 05/06/2024     05/06/2024    K 4.3 05/06/2024     05/06/2024    CO2 22 (L) 05/06/2024    BUN 46 (H) 05/06/2024    CREATININE 1.6 (H) 05/06/2024    CALCIUM 10.4 05/06/2024    MG 1.8 05/06/2024    ALT 10 04/29/2024    AST 12 04/29/2024    ALBUMIN 3.5 05/06/2024    BILITOT 0.3 04/29/2024    BILIDIR 0.1 04/29/2024       Imaging:  Reviewed.      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     Risks and benefits of the procedure were discussed with the patient. Informed consent was obtained.                     Sedation Plan: moderate sedation      Alejo Santana MD  [facility]

## 2024-05-08 NOTE — PROCEDURES
VIR Post-Procedure Note    Pre Op Diagnosis:   Transplant ureteral stricture/leak    Post Op Diagnosis:  no stenosis or leak at this time.     Procedure: nephrostogram and tube removal    Procedure performed by: Desire Ferguson MD     Written Informed Consent Obtained:  Yes    Specimen Removed: No     Estimated Blood Loss:  Minimal     Findings:      Nephrostogram showed patent transplant ureter with no leak. Nephrostomy was removed.            Desire Ferguson MD  VIR Fellow

## 2024-05-08 NOTE — PROGRESS NOTES
Pt arrived from IR  to MPU #6 and ready for D/C  home per IR nurse  / IV removed and friend  called for ride, to  at [facility]  / pt AAO w/ NAD and up to restroom / clothing obtained from [facility] and pt  to get dressed / dressing to lower R abdomen dry and intact

## 2024-05-08 NOTE — PLAN OF CARE
Procedure completed. Patient tolerated well; nephrostomy tube removed. Site CDI. Patient to be discharged home per MD.

## 2024-05-08 NOTE — PLAN OF CARE
Pt arrived to IR Room 189 for nephrostogram/possible tube removal. Pt oriented to unit and staff. Plan of care reviewed with patient, patient verbalizes understanding. Comfort measures utilized. Pt safely transferred from stretcher to procedural table. Fall risk reviewed with patient, fall risk interventions maintained. Safety strap applied, positioner pillows utilized to minimize pressure points. Blankets applied. Pt prepped and draped utilizing standard sterile technique. Patient placed on continuous monitoring, as required by sedation policy. Timeouts completed utilizing standard universal time-out, per department and facility policy. RN to remain at bedside, continuous monitoring maintained. Pt resting comfortably. Denies pain/discomfort. Will continue to monitor. See flow sheets for monitoring, medication administration, and updates.

## 2024-05-10 ENCOUNTER — EXTERNAL HOME HEALTH (OUTPATIENT)
Dept: HOME HEALTH SERVICES | Facility: HOSPITAL | Age: 64
End: 2024-05-10
Payer: MEDICARE

## 2024-05-13 ENCOUNTER — OFFICE VISIT (OUTPATIENT)
Dept: TRANSPLANT | Facility: CLINIC | Age: 64
End: 2024-05-13
Payer: MEDICARE

## 2024-05-13 VITALS
HEIGHT: 64 IN | DIASTOLIC BLOOD PRESSURE: 60 MMHG | BODY MASS INDEX: 33.38 KG/M2 | WEIGHT: 195.56 LBS | HEART RATE: 73 BPM | OXYGEN SATURATION: 99 % | SYSTOLIC BLOOD PRESSURE: 133 MMHG | RESPIRATION RATE: 18 BRPM | TEMPERATURE: 97 F

## 2024-05-13 DIAGNOSIS — Z94.0 KIDNEY REPLACED BY TRANSPLANT: ICD-10-CM

## 2024-05-13 DIAGNOSIS — I10 BENIGN ESSENTIAL HTN: ICD-10-CM

## 2024-05-13 DIAGNOSIS — E87.20 METABOLIC ACIDOSIS: ICD-10-CM

## 2024-05-13 DIAGNOSIS — Z79.60 LONG-TERM USE OF IMMUNOSUPPRESSANT MEDICATION: Primary | ICD-10-CM

## 2024-05-13 PROCEDURE — 99999 PR PBB SHADOW E&M-EST. PATIENT-LVL III: CPT | Mod: PBBFAC,,, | Performed by: INTERNAL MEDICINE

## 2024-05-13 PROCEDURE — 99213 OFFICE O/P EST LOW 20 MIN: CPT | Mod: PBBFAC | Performed by: INTERNAL MEDICINE

## 2024-05-13 PROCEDURE — 99214 OFFICE O/P EST MOD 30 MIN: CPT | Mod: S$PBB,,, | Performed by: INTERNAL MEDICINE

## 2024-05-13 RX ORDER — CARVEDILOL 12.5 MG/1
12.5 TABLET ORAL 2 TIMES DAILY
Qty: 60 TABLET | Refills: 3 | Status: SHIPPED | OUTPATIENT
Start: 2024-05-13

## 2024-05-13 NOTE — PROGRESS NOTES
Kidney Post-Transplant Assessment    Referring Physician: Fredi Lyon  Current Nephrologist: Fredi Lyon    ORGAN: RIGHT KIDNEY  Donor Type: donation after brain death  PHS Increased Risk: yes  Cold Ischemia: 995 mins  Induction Medications: Thymo     Subjective:     CC:  Reassessment of renal allograft function and management of chronic immunosuppression.    HPI:  Mr. Lopez is a 64 y.o. year old  male who received a donation after brain death kidney transplant on 2/29/24.     Patient seen in clinic alone. Since the last visit, patient had the PCN removed on 5/8/24. States that he has been doing well. Still has the PD catheter in place and would like surgery to fix the abdominal hernia.    Patient otherwise denies any fever or HA. Denies any runny nose, watery eyes. Denies any heart burn, SOB,CP or abdominal pain. Does have some constipation for which he takes two Colace as needed. Patient otherwise denies any dysuria or frequency.     SBP at home of 150-160's  Sugars: on insulin pump with sugars normally less than 150. Does have some peak sugars of 200's but states that this is rare    Review of Systems   Constitutional:  Negative for activity change, appetite change, chills and fever.   HENT:  Negative for congestion, sneezing and sore throat.    Eyes:  Negative for pain and itching.   Respiratory:  Negative for apnea, cough, shortness of breath and wheezing.    Cardiovascular:  Negative for chest pain.   Gastrointestinal:  Negative for abdominal pain, constipation, diarrhea, nausea and vomiting.   Genitourinary:  Negative for difficulty urinating, dysuria and frequency.   Musculoskeletal:  Negative for back pain, joint swelling and neck pain.   Skin:  Negative for color change.   Neurological:  Negative for dizziness, light-headedness and headaches.   Psychiatric/Behavioral:  Negative for behavioral problems and confusion. The patient is not nervous/anxious.        Objective:   Blood pressure  "133/60, pulse 73, temperature 97.3 °F (36.3 °C), temperature source Temporal, resp. rate 18, height 5' 4" (1.626 m), weight 88.7 kg (195 lb 8.8 oz), SpO2 99%.body mass index is 33.57 kg/m².    Physical Exam  Vitals reviewed.   Constitutional:       General: He is not in acute distress.     Appearance: Normal appearance. He is not ill-appearing or toxic-appearing.      Comments: Appears stated age   HENT:      Head: Normocephalic and atraumatic.      Right Ear: External ear normal.      Left Ear: External ear normal.      Nose: Nose normal.   Eyes:      General: No scleral icterus.     Conjunctiva/sclera: Conjunctivae normal.   Cardiovascular:      Rate and Rhythm: Normal rate and regular rhythm.      Pulses: Normal pulses.      Heart sounds: Normal heart sounds. No murmur heard.     No friction rub.   Pulmonary:      Effort: Pulmonary effort is normal. No respiratory distress.      Breath sounds: Normal breath sounds. No wheezing or rhonchi.   Abdominal:      General: Bowel sounds are normal. There is no distension.      Palpations: Abdomen is soft.      Tenderness: There is no abdominal tenderness. There is no guarding.      Comments: PD catheter still present     Musculoskeletal:         General: No swelling or deformity. Normal range of motion.      Cervical back: Normal range of motion and neck supple. No tenderness.      Right lower leg: No edema.      Left lower leg: No edema.   Skin:     General: Skin is warm and dry.      Coloration: Skin is not jaundiced.      Findings: No erythema or rash.   Neurological:      General: No focal deficit present.      Mental Status: He is alert and oriented to person, place, and time.      Motor: No weakness.   Psychiatric:         Mood and Affect: Mood normal.         Behavior: Behavior normal.         Labs:  Lab Results   Component Value Date    WBC 6.95 05/13/2024    HGB 11.2 (L) 05/13/2024    HCT 35.6 (L) 05/13/2024     05/13/2024    K 4.5 05/13/2024     (H) " "2024    CO2 20 (L) 2024    BUN 45 (H) 2024    CREATININE 1.4 2024    EGFRNORACEVR 55.8 (A) 2024    CALCIUM 10.7 (H) 2024    PHOS 2.2 (L) 2024    MG 2.0 2024    ALBUMIN 3.7 2024    AST 12 2024    ALT 10 2024    UTPCR 0.35 (H) 2024    .6 (H) 2024    TACROLIMUS 7.3 2024       No results found for: "EXTANC", "EXTWBC", "EXTSEGS", "EXTPLATELETS", "EXTHEMOGLOBI", "EXTHEMATOCRI", "EXTCREATININ", "EXTSODIUM", "EXTPOTASSIUM", "EXTBUN", "EXTCO2", "EXTCALCIUM", "EXTPHOSPHORU", "EXTGLUCOSE", "EXTALBUMIN", "EXTAST", "EXTALT", "EXTBILITOTAL", "EXTLIPASE", "EXTAMYLASE"    No results found for: "EXTCYCLOSLVL", "EXTSIROLIMUS", "EXTTACROLVL", "EXTPROTCRE", "EXTPTHINTACT", "EXTPROTEINUA", "EXTWBCUA", "EXTRBCUA"    Labs were reviewed with the patient    Assessment and Plan:    64 yr old  male with ESRD secondary to presumed DM (no prior kidney biopsy) who received a  donor kidney transplant on 24. 52% PRA. Crossmatch negative. Thymo induction    Post transplant course notable for persistent PD catheter (will need to be removed at a later date as he was not consented at the time of transplant) as well as urine leak with perinephric fluid collection managed medically. Patient currently with negative nephrostogram with PCN capped on 3/26, Curry removed on  and JEAN-PIERRE drain removed on     1) s/p  donor kidney transplant:   - graft function near his baseline of 1.3-1.4. Cont to monitor   - will be seen by transplant surgery next week for PD catheter removal. Advised to discuss regarding hernia repair then    - f/u on FK level. On FK 6 mg BID    - cont on MMF to 750 mg BID    - cont on prednisone 5 mg daily   - cont on Bactrim and Valcyte   2) Urine leak:   - resolved with nephrostogram not showing any signs of leak. PCN removed on 24   - management per transplant surgery  3) HTN:   - increase Coreg to 12.5 mg BID   - " may need to decrease Norvasc 5 mg daily if hypotension noted   4) Type II DM:   - on insulin pump with acceptable levels   - will have patient follow up with his endocrinologist  5) Metabolic acidosis   - cont on sodium bicarb 1300 BID    6) Hypothyroidism:   - cont on Synthroid   7) Secondary hyperparathyroidism:   - stop MVI and calcitriol.    - check PTH on next set of labs    Labs and RTC per protocol     Hope Alexander,    Transplant Nephrology

## 2024-05-13 NOTE — LETTER
May 13, 2024        Fredi Lyon  [address]  [facility] [address]  Phone: [phone]  Fax: [phone]             [facility] 1st Fl  [address]  [facility] [address]  Phone: [phone]   Patient: Mark Lopez   MR Number: [id number]   YOB: 1960   Date of Visit: 5/13/2024       Dear Dr. Fredi Lyon    Thank you for referring Mark Lopez to me for evaluation. Attached you will find relevant portions of my assessment and plan of care.    If you have questions, please do not hesitate to call me. I look forward to following Mark Lopez along with you.    Sincerely,    Hope Alexander, DO    Enclosure    If you would like to receive this communication electronically, please contact [email or web address] or [phone] to request [employer] access.    [employer] is a tool which provides read-only access to select patient information with whom you have a relationship. Its easy to use and provides real time access to review your patients record including encounter summaries, notes, results, and demographic information.    If you feel you have received this communication in error or would no longer like to receive these types of communications, please e-mail [email or web address]

## 2024-05-20 ENCOUNTER — LAB VISIT (OUTPATIENT)
Dept: LAB | Facility: HOSPITAL | Age: 64
End: 2024-05-20
Attending: INTERNAL MEDICINE
Payer: MEDICARE

## 2024-05-20 ENCOUNTER — PATIENT MESSAGE (OUTPATIENT)
Dept: TRANSPLANT | Facility: CLINIC | Age: 64
End: 2024-05-20
Payer: MEDICARE

## 2024-05-20 DIAGNOSIS — Z94.0 KIDNEY REPLACED BY TRANSPLANT: ICD-10-CM

## 2024-05-20 DIAGNOSIS — E83.39 HYPOPHOSPHATEMIA: Primary | ICD-10-CM

## 2024-05-20 DIAGNOSIS — Z79.899 IMMUNOSUPPRESSIVE MANAGEMENT ENCOUNTER FOLLOWING KIDNEY TRANSPLANT: ICD-10-CM

## 2024-05-20 DIAGNOSIS — Z94.0 IMMUNOSUPPRESSIVE MANAGEMENT ENCOUNTER FOLLOWING KIDNEY TRANSPLANT: ICD-10-CM

## 2024-05-20 LAB
ALBUMIN SERPL BCP-MCNC: 3.7 G/DL (ref 3.5–5.2)
ANION GAP SERPL CALC-SCNC: 6 MMOL/L (ref 8–16)
BASOPHILS # BLD AUTO: 0.06 K/UL (ref 0–0.2)
BASOPHILS NFR BLD: 1.2 % (ref 0–1.9)
BUN SERPL-MCNC: 35 MG/DL (ref 8–23)
CALCIUM SERPL-MCNC: 10.6 MG/DL (ref 8.7–10.5)
CHLORIDE SERPL-SCNC: 111 MMOL/L (ref 95–110)
CO2 SERPL-SCNC: 23 MMOL/L (ref 23–29)
CREAT SERPL-MCNC: 1.3 MG/DL (ref 0.5–1.4)
DIFFERENTIAL METHOD BLD: ABNORMAL
EOSINOPHIL # BLD AUTO: 0.2 K/UL (ref 0–0.5)
EOSINOPHIL NFR BLD: 3.7 % (ref 0–8)
ERYTHROCYTE [DISTWIDTH] IN BLOOD BY AUTOMATED COUNT: 13.4 % (ref 11.5–14.5)
EST. GFR  (NO RACE VARIABLE): >60 ML/MIN/1.73 M^2
GLUCOSE SERPL-MCNC: 97 MG/DL (ref 70–110)
HCT VFR BLD AUTO: 38.2 % (ref 40–54)
HGB BLD-MCNC: 11.7 G/DL (ref 14–18)
IMM GRANULOCYTES # BLD AUTO: 0.1 K/UL (ref 0–0.04)
IMM GRANULOCYTES NFR BLD AUTO: 2 % (ref 0–0.5)
LYMPHOCYTES # BLD AUTO: 0.2 K/UL (ref 1–4.8)
LYMPHOCYTES NFR BLD: 3.7 % (ref 18–48)
MAGNESIUM SERPL-MCNC: 1.7 MG/DL (ref 1.6–2.6)
MCH RBC QN AUTO: 29.7 PG (ref 27–31)
MCHC RBC AUTO-ENTMCNC: 30.6 G/DL (ref 32–36)
MCV RBC AUTO: 97 FL (ref 82–98)
MONOCYTES # BLD AUTO: 0.3 K/UL (ref 0.3–1)
MONOCYTES NFR BLD: 6.7 % (ref 4–15)
NEUTROPHILS # BLD AUTO: 4.2 K/UL (ref 1.8–7.7)
NEUTROPHILS NFR BLD: 82.7 % (ref 38–73)
NRBC BLD-RTO: 0 /100 WBC
PHOSPHATE SERPL-MCNC: 2 MG/DL (ref 2.7–4.5)
PLATELET # BLD AUTO: 284 K/UL (ref 150–450)
PMV BLD AUTO: 10.1 FL (ref 9.2–12.9)
POTASSIUM SERPL-SCNC: 4.7 MMOL/L (ref 3.5–5.1)
RBC # BLD AUTO: 3.94 M/UL (ref 4.6–6.2)
SODIUM SERPL-SCNC: 140 MMOL/L (ref 136–145)
TACROLIMUS BLD-MCNC: 7.6 NG/ML (ref 5–15)
WBC # BLD AUTO: 5.11 K/UL (ref 3.9–12.7)

## 2024-05-20 PROCEDURE — 36415 COLL VENOUS BLD VENIPUNCTURE: CPT | Performed by: INTERNAL MEDICINE

## 2024-05-20 PROCEDURE — 85025 COMPLETE CBC W/AUTO DIFF WBC: CPT | Performed by: INTERNAL MEDICINE

## 2024-05-20 PROCEDURE — 80197 ASSAY OF TACROLIMUS: CPT | Performed by: INTERNAL MEDICINE

## 2024-05-20 PROCEDURE — 80069 RENAL FUNCTION PANEL: CPT | Performed by: INTERNAL MEDICINE

## 2024-05-20 PROCEDURE — 83735 ASSAY OF MAGNESIUM: CPT | Performed by: INTERNAL MEDICINE

## 2024-05-20 NOTE — TELEPHONE ENCOUNTER
Notified patient of Dr. Alexander's review of 5/20/24 lab results via [facility].     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 Alexander reviewed your 5/20/24 lab results. Your phosphorus level 2.0 is lower than it should be. She wants you to please start taking a phosphorus supplement called K Phosphorus Neutral 250mg tablet twice daily. Continue to eat high phosphorus foods. The prescription will be sent to [facility].     Thanks,     Veronica    ----- Message from Hope Alexander DO sent at 5/20/2024 12:14 PM CDT -----  Near baseline graft function  Start on kphos 250 BID  Acceptable FK level

## 2024-05-22 ENCOUNTER — OFFICE VISIT (OUTPATIENT)
Dept: TRANSPLANT | Facility: CLINIC | Age: 64
End: 2024-05-22
Payer: MEDICARE

## 2024-05-22 VITALS
TEMPERATURE: 97 F | WEIGHT: 200.38 LBS | HEIGHT: 64 IN | RESPIRATION RATE: 18 BRPM | HEART RATE: 68 BPM | BODY MASS INDEX: 34.21 KG/M2 | SYSTOLIC BLOOD PRESSURE: 139 MMHG | DIASTOLIC BLOOD PRESSURE: 63 MMHG | OXYGEN SATURATION: 98 %

## 2024-05-22 DIAGNOSIS — Z94.0 S/P KIDNEY TRANSPLANT: ICD-10-CM

## 2024-05-22 DIAGNOSIS — Z91.89 AT RISK FOR OPPORTUNISTIC INFECTIONS: ICD-10-CM

## 2024-05-22 DIAGNOSIS — Z94.0 KIDNEY REPLACED BY TRANSPLANT: Primary | ICD-10-CM

## 2024-05-22 DIAGNOSIS — Z01.812 ENCOUNTER FOR PRE-OPERATIVE LABORATORY TESTING: ICD-10-CM

## 2024-05-22 DIAGNOSIS — Z51.81 ENCOUNTER FOR THERAPEUTIC DRUG MONITORING: ICD-10-CM

## 2024-05-22 DIAGNOSIS — Z79.60 LONG-TERM USE OF IMMUNOSUPPRESSANT MEDICATION: ICD-10-CM

## 2024-05-22 DIAGNOSIS — Z94.0 STATUS POST DECEASED-DONOR KIDNEY TRANSPLANTATION: ICD-10-CM

## 2024-05-22 DIAGNOSIS — Z29.89 PROPHYLACTIC IMMUNOTHERAPY: ICD-10-CM

## 2024-05-22 DIAGNOSIS — Z79.899 ENCOUNTER FOR LONG-TERM (CURRENT) USE OF OTHER MEDICATIONS: ICD-10-CM

## 2024-05-22 PROCEDURE — 99215 OFFICE O/P EST HI 40 MIN: CPT | Mod: 24,S$PBB,, | Performed by: TRANSPLANT SURGERY

## 2024-05-22 PROCEDURE — 99999 PR PBB SHADOW E&M-EST. PATIENT-LVL IV: CPT | Mod: PBBFAC,,,

## 2024-05-22 PROCEDURE — 99214 OFFICE O/P EST MOD 30 MIN: CPT | Mod: PBBFAC

## 2024-05-22 NOTE — PROGRESS NOTES
Transplant Surgery  Kidney Transplant Recipient Follow-up    Referring Physician: Fredi Lyon  Current Nephrologist: Fredi Lyon    Subjective:     Chief Complaint: Mark Lopez is a 64 y.o. year old  male who is status post Kidney transplant performed on 2/29/2024. Course notable for urine leak now resolved.    ORGAN:   RIGHT KIDNEY  Disease Etiology: Diabetes Mellitus - Type II  Donor Type:   Donation after Brain Death  Donor CMV Status:   Positive  Donor HBcAB:   Negative  Donor HCV Status:   Negative    History of Present Illness: He reports no concerns.  From a transplant perspective, he reports normal urination.  Mark is here for management of his immunosuppression medication.  Mark states that his immunosuppression is being well tolerated.  Hypertension is not present.    External provider notes reviewed: Yes    Review of Systems   Constitutional:  Negative for activity change, appetite change, chills, diaphoresis, fatigue, fever and unexpected weight change.   HENT:  Negative for congestion, dental problem, ear pain, facial swelling, mouth sores, nosebleeds, sore throat, tinnitus, trouble swallowing and voice change.    Eyes:  Negative for photophobia, pain and visual disturbance.   Respiratory:  Negative for apnea, cough, choking, chest tightness and shortness of breath.    Cardiovascular:  Negative for chest pain, palpitations and leg swelling.   Gastrointestinal:  Negative for abdominal distention, abdominal pain, blood in stool, constipation, diarrhea, nausea and vomiting.   Endocrine: Negative for cold intolerance and heat intolerance.   Genitourinary:  Negative for difficulty urinating, dysuria, flank pain, hematuria and urgency.   Musculoskeletal:  Negative for arthralgias and gait problem.   Skin:  Negative for color change, pallor and rash.   Neurological:  Negative for dizziness, tremors, seizures, syncope and light-headedness.   Hematological:  Negative for adenopathy. Does not  bruise/bleed easily.   Psychiatric/Behavioral:  Negative for agitation and confusion.        Objective:     Physical Exam  Constitutional:       General: He is not in acute distress.     Appearance: He is well-developed.   HENT:      Head: Normocephalic and atraumatic.      Mouth/Throat:      Pharynx: No oropharyngeal exudate.   Eyes:      General: No scleral icterus.     Conjunctiva/sclera: Conjunctivae normal.      Pupils: Pupils are equal, round, and reactive to light.   Cardiovascular:      Rate and Rhythm: Normal rate and regular rhythm.      Heart sounds: Normal heart sounds.   Pulmonary:      Effort: Pulmonary effort is normal. No respiratory distress.      Breath sounds: Normal breath sounds.   Abdominal:      General: Bowel sounds are normal. There is no distension.      Palpations: Abdomen is soft.      Tenderness: There is no abdominal tenderness. There is no guarding or rebound.   Musculoskeletal:         General: No swelling. Normal range of motion.      Cervical back: Normal range of motion and neck supple.   Lymphadenopathy:      Cervical: No cervical adenopathy.   Skin:     General: Skin is warm and dry.      Findings: No erythema or rash.   Neurological:      Mental Status: He is alert and oriented to person, place, and time.   Psychiatric:         Mood and Affect: Mood normal.         Behavior: Behavior normal.         Thought Content: Thought content normal.       Lab Results   Component Value Date    CREATININE 1.3 05/20/2024    BUN 35 (H) 05/20/2024     Lab Results   Component Value Date    WBC 5.11 05/20/2024    HGB 11.7 (L) 05/20/2024    HCT 38.2 (L) 05/20/2024    HCT 27 (L) 02/29/2024     05/20/2024     Lab Results   Component Value Date    TACROLIMUS 7.6 05/20/2024       Diagnostics:  The following labs have been reviewed: CBC  BMP  TACROLIMUS LEVEL  The following radiology images have been independently reviewed and interpreted: Renal US  nephrostogram    Assessment and Plan:         S/P Kidney transplant.  Chronic immunosuppressive medications for rejection prophylaxis at target.  Plan: no adjustment needed.  Continue monitoring symptoms, labs and drug levels for drug-related toxicity and side effects.  Renal hypertension at target.  PD catheter to be scheduled    Additional testing to be completed according to the Kidney: Written Order Guideline for Kidney Transplant Follow-Up (KI-09)    Interpretation of tests and discussion of patient management involves all members of the multidisciplinary transplant team.  Patient advised that it is recommended that all transplant candidates, and their close contacts and household members receive Covid vaccination.  Follow-up: Patient reminded to call with any health changes, since these can be early signs of significant complications.  Also, I advised the patient to be sure any new medications or changes of old medications are discussed with either a pharmacist, or physician knowledgeable with transplant to avoid rejection/drug toxicity related to significant drug interactions.    Cynthia Leary MD       [facility] Patient Status  Functional Status: 70% - Cares for self: unable to carry on normal activity or active work  Physical Capacity: No Limitations     Doxepin Pregnancy And Lactation Text: This medication is Pregnancy Category C and it isn't known if it is safe during pregnancy. It is also excreted in breast milk and breast feeding isn't recommended.

## 2024-05-22 NOTE — H&P (VIEW-ONLY)
Transplant Surgery  Kidney Transplant Recipient Follow-up    Referring Physician: Fredi Lyon  Current Nephrologist: Fredi Lyon    Subjective:     Chief Complaint: Mark Lopez is a 64 y.o. year old  male who is status post Kidney transplant performed on 2/29/2024. Course notable for urine leak now resolved.    ORGAN:   RIGHT KIDNEY  Disease Etiology: Diabetes Mellitus - Type II  Donor Type:   Donation after Brain Death  Donor CMV Status:   Positive  Donor HBcAB:   Negative  Donor HCV Status:   Negative    History of Present Illness: He reports no concerns.  From a transplant perspective, he reports normal urination.  Mark is here for management of his immunosuppression medication.  Mark states that his immunosuppression is being well tolerated.  Hypertension is not present.    External provider notes reviewed: Yes    Review of Systems   Constitutional:  Negative for activity change, appetite change, chills, diaphoresis, fatigue, fever and unexpected weight change.   HENT:  Negative for congestion, dental problem, ear pain, facial swelling, mouth sores, nosebleeds, sore throat, tinnitus, trouble swallowing and voice change.    Eyes:  Negative for photophobia, pain and visual disturbance.   Respiratory:  Negative for apnea, cough, choking, chest tightness and shortness of breath.    Cardiovascular:  Negative for chest pain, palpitations and leg swelling.   Gastrointestinal:  Negative for abdominal distention, abdominal pain, blood in stool, constipation, diarrhea, nausea and vomiting.   Endocrine: Negative for cold intolerance and heat intolerance.   Genitourinary:  Negative for difficulty urinating, dysuria, flank pain, hematuria and urgency.   Musculoskeletal:  Negative for arthralgias and gait problem.   Skin:  Negative for color change, pallor and rash.   Neurological:  Negative for dizziness, tremors, seizures, syncope and light-headedness.   Hematological:  Negative for adenopathy. Does not  bruise/bleed easily.   Psychiatric/Behavioral:  Negative for agitation and confusion.        Objective:     Physical Exam  Constitutional:       General: He is not in acute distress.     Appearance: He is well-developed.   HENT:      Head: Normocephalic and atraumatic.      Mouth/Throat:      Pharynx: No oropharyngeal exudate.   Eyes:      General: No scleral icterus.     Conjunctiva/sclera: Conjunctivae normal.      Pupils: Pupils are equal, round, and reactive to light.   Cardiovascular:      Rate and Rhythm: Normal rate and regular rhythm.      Heart sounds: Normal heart sounds.   Pulmonary:      Effort: Pulmonary effort is normal. No respiratory distress.      Breath sounds: Normal breath sounds.   Abdominal:      General: Bowel sounds are normal. There is no distension.      Palpations: Abdomen is soft.      Tenderness: There is no abdominal tenderness. There is no guarding or rebound.   Musculoskeletal:         General: No swelling. Normal range of motion.      Cervical back: Normal range of motion and neck supple.   Lymphadenopathy:      Cervical: No cervical adenopathy.   Skin:     General: Skin is warm and dry.      Findings: No erythema or rash.   Neurological:      Mental Status: He is alert and oriented to person, place, and time.   Psychiatric:         Mood and Affect: Mood normal.         Behavior: Behavior normal.         Thought Content: Thought content normal.       Lab Results   Component Value Date    CREATININE 1.3 05/20/2024    BUN 35 (H) 05/20/2024     Lab Results   Component Value Date    WBC 5.11 05/20/2024    HGB 11.7 (L) 05/20/2024    HCT 38.2 (L) 05/20/2024    HCT 27 (L) 02/29/2024     05/20/2024     Lab Results   Component Value Date    TACROLIMUS 7.6 05/20/2024       Diagnostics:  The following labs have been reviewed: CBC  BMP  TACROLIMUS LEVEL  The following radiology images have been independently reviewed and interpreted: Renal US  nephrostogram    Assessment and Plan:         S/P Kidney transplant.  Chronic immunosuppressive medications for rejection prophylaxis at target.  Plan: no adjustment needed.  Continue monitoring symptoms, labs and drug levels for drug-related toxicity and side effects.  Renal hypertension at target.  PD catheter to be scheduled    Additional testing to be completed according to the Kidney: Written Order Guideline for Kidney Transplant Follow-Up (KI-09)    Interpretation of tests and discussion of patient management involves all members of the multidisciplinary transplant team.  Patient advised that it is recommended that all transplant candidates, and their close contacts and household members receive Covid vaccination.  Follow-up: Patient reminded to call with any health changes, since these can be early signs of significant complications.  Also, I advised the patient to be sure any new medications or changes of old medications are discussed with either a pharmacist, or physician knowledgeable with transplant to avoid rejection/drug toxicity related to significant drug interactions.    Cynthia Leary MD       [facility] Patient Status  Functional Status: 70% - Cares for self: unable to carry on normal activity or active work  Physical Capacity: No Limitations

## 2024-05-24 ENCOUNTER — TELEPHONE (OUTPATIENT)
Dept: DERMATOLOGY | Facility: CLINIC | Age: 64
End: 2024-05-24
Payer: MEDICARE

## 2024-05-24 NOTE — TELEPHONE ENCOUNTER
----- Message from Soco Bernard LPN sent at 5/23/2024  3:46 PM CDT -----  Regarding: FW: Appt r/s  Contact: [phone]    ----- Message -----  From: Lori Rose  Sent: 5/23/2024   3:36 PM CDT  To: [facility] Staff  Subject: Appt r/s                                         KRISTIE MAURER calling regarding Appointment Access  (message) for # pt is calling to r/s his appt to sometime in June after June 13

## 2024-05-27 ENCOUNTER — PATIENT MESSAGE (OUTPATIENT)
Dept: TRANSPLANT | Facility: CLINIC | Age: 64
End: 2024-05-27
Payer: MEDICARE

## 2024-05-27 ENCOUNTER — LAB VISIT (OUTPATIENT)
Dept: LAB | Facility: HOSPITAL | Age: 64
End: 2024-05-27
Attending: INTERNAL MEDICINE
Payer: MEDICARE

## 2024-05-27 DIAGNOSIS — Z94.0 KIDNEY REPLACED BY TRANSPLANT: ICD-10-CM

## 2024-05-27 DIAGNOSIS — Z79.899 IMMUNOSUPPRESSIVE MANAGEMENT ENCOUNTER FOLLOWING KIDNEY TRANSPLANT: ICD-10-CM

## 2024-05-27 DIAGNOSIS — Z94.0 IMMUNOSUPPRESSIVE MANAGEMENT ENCOUNTER FOLLOWING KIDNEY TRANSPLANT: ICD-10-CM

## 2024-05-27 DIAGNOSIS — Z94.0 KIDNEY REPLACED BY TRANSPLANT: Primary | ICD-10-CM

## 2024-05-27 DIAGNOSIS — B18.1 CHRONIC VIRAL HEPATITIS B WITHOUT DELTA AGENT AND WITHOUT COMA: Chronic | ICD-10-CM

## 2024-05-27 LAB
ALBUMIN SERPL BCP-MCNC: 3.7 G/DL (ref 3.5–5.2)
ANION GAP SERPL CALC-SCNC: 7 MMOL/L (ref 8–16)
BASOPHILS NFR BLD: 1 % (ref 0–1.9)
BUN SERPL-MCNC: 38 MG/DL (ref 8–23)
CALCIUM SERPL-MCNC: 10.5 MG/DL (ref 8.7–10.5)
CHLORIDE SERPL-SCNC: 112 MMOL/L (ref 95–110)
CO2 SERPL-SCNC: 24 MMOL/L (ref 23–29)
CREAT SERPL-MCNC: 1.4 MG/DL (ref 0.5–1.4)
DIFFERENTIAL METHOD BLD: ABNORMAL
EOSINOPHIL NFR BLD: 11 % (ref 0–8)
ERYTHROCYTE [DISTWIDTH] IN BLOOD BY AUTOMATED COUNT: 13.6 % (ref 11.5–14.5)
EST. GFR  (NO RACE VARIABLE): 55.8 ML/MIN/1.73 M^2
GLUCOSE SERPL-MCNC: 86 MG/DL (ref 70–110)
HCT VFR BLD AUTO: 37.9 % (ref 40–54)
HGB BLD-MCNC: 11.9 G/DL (ref 14–18)
IMM GRANULOCYTES # BLD AUTO: ABNORMAL K/UL (ref 0–0.04)
IMM GRANULOCYTES NFR BLD AUTO: ABNORMAL % (ref 0–0.5)
INR PPP: 0.9 (ref 0.8–1.2)
LYMPHOCYTES NFR BLD: 6 % (ref 18–48)
MAGNESIUM SERPL-MCNC: 1.6 MG/DL (ref 1.6–2.6)
MCH RBC QN AUTO: 29.8 PG (ref 27–31)
MCHC RBC AUTO-ENTMCNC: 31.4 G/DL (ref 32–36)
MCV RBC AUTO: 95 FL (ref 82–98)
MONOCYTES NFR BLD: 6 % (ref 4–15)
NEUTROPHILS NFR BLD: 70 % (ref 38–73)
NEUTS BAND NFR BLD MANUAL: 6 %
NRBC BLD-RTO: 0 /100 WBC
PHOSPHATE SERPL-MCNC: 2.9 MG/DL (ref 2.7–4.5)
PLATELET # BLD AUTO: 265 K/UL (ref 150–450)
PLATELET BLD QL SMEAR: ABNORMAL
PMV BLD AUTO: 10.7 FL (ref 9.2–12.9)
POTASSIUM SERPL-SCNC: 4.4 MMOL/L (ref 3.5–5.1)
PROTHROMBIN TIME: 10.3 SEC (ref 9–12.5)
RBC # BLD AUTO: 4 M/UL (ref 4.6–6.2)
SODIUM SERPL-SCNC: 143 MMOL/L (ref 136–145)
TACROLIMUS BLD-MCNC: 9.3 NG/ML (ref 5–15)
WBC # BLD AUTO: 2.47 K/UL (ref 3.9–12.7)

## 2024-05-27 PROCEDURE — 36415 COLL VENOUS BLD VENIPUNCTURE: CPT | Performed by: INTERNAL MEDICINE

## 2024-05-27 PROCEDURE — 83735 ASSAY OF MAGNESIUM: CPT | Performed by: INTERNAL MEDICINE

## 2024-05-27 PROCEDURE — 85007 BL SMEAR W/DIFF WBC COUNT: CPT | Performed by: INTERNAL MEDICINE

## 2024-05-27 PROCEDURE — 85610 PROTHROMBIN TIME: CPT | Performed by: INTERNAL MEDICINE

## 2024-05-27 PROCEDURE — 80197 ASSAY OF TACROLIMUS: CPT | Performed by: INTERNAL MEDICINE

## 2024-05-27 PROCEDURE — 80069 RENAL FUNCTION PANEL: CPT | Performed by: INTERNAL MEDICINE

## 2024-05-27 PROCEDURE — 85027 COMPLETE CBC AUTOMATED: CPT | Performed by: INTERNAL MEDICINE

## 2024-05-27 NOTE — TELEPHONE ENCOUNTER
Notified patient of Dr. Alexander's review of 5/27/24 lab results via [facility].     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 Alexander reviewed your 5/27/24 lab results. Your white blood cell count dropped below 3.0. She wants you to decrease your Cellcept to 500mg twice daily starting with tonight's dose.  Continue taking Valcyte 450mg daily.   We need to check a special lab test called CMV, PCR this week. Let me know if you want me to schedule it tomorrow prior to your outpatient surgery for PD catheter removal or wait to schedule it when you come on Wed to see Dr. Alexander.     Thanks,     Veronica       ----- Message from Hope Alexander DO sent at 5/27/2024  1:47 PM CDT -----  Acceptable ANC. Decrease Cellcept to 500 mg BID   Check CMV on next set of labs   High risk CMV (D+/R-). Cont Valcyte for now

## 2024-05-28 ENCOUNTER — ANESTHESIA EVENT (OUTPATIENT)
Dept: SURGERY | Facility: HOSPITAL | Age: 64
End: 2024-05-28
Payer: MEDICARE

## 2024-05-28 ENCOUNTER — ANESTHESIA (OUTPATIENT)
Dept: SURGERY | Facility: HOSPITAL | Age: 64
End: 2024-05-28
Payer: MEDICARE

## 2024-05-28 ENCOUNTER — HOSPITAL ENCOUNTER (OUTPATIENT)
Facility: HOSPITAL | Age: 64
Discharge: HOME | End: 2024-05-28
Attending: TRANSPLANT SURGERY | Admitting: TRANSPLANT SURGERY
Payer: MEDICARE

## 2024-05-28 VITALS
HEIGHT: 64 IN | SYSTOLIC BLOOD PRESSURE: 154 MMHG | TEMPERATURE: 98 F | DIASTOLIC BLOOD PRESSURE: 70 MMHG | OXYGEN SATURATION: 99 % | WEIGHT: 200.38 LBS | BODY MASS INDEX: 34.21 KG/M2 | RESPIRATION RATE: 20 BRPM | HEART RATE: 60 BPM

## 2024-05-28 DIAGNOSIS — Z99.2 PERITONEAL DIALYSIS CATHETER IN PLACE: Primary | ICD-10-CM

## 2024-05-28 LAB
POCT GLUCOSE: 86 MG/DL (ref 70–110)
POCT GLUCOSE: 97 MG/DL (ref 70–110)

## 2024-05-28 PROCEDURE — 37000009 HC ANESTHESIA EA ADD 15 MINS: Performed by: TRANSPLANT SURGERY

## 2024-05-28 PROCEDURE — 88300 SURGICAL PATH GROSS: CPT | Mod: 26,,, | Performed by: PATHOLOGY

## 2024-05-28 PROCEDURE — 36000704 HC OR TIME LEV I 1ST 15 MIN: Performed by: TRANSPLANT SURGERY

## 2024-05-28 PROCEDURE — 25000003 PHARM REV CODE 250: Performed by: NURSE ANESTHETIST, CERTIFIED REGISTERED

## 2024-05-28 PROCEDURE — 37000008 HC ANESTHESIA 1ST 15 MINUTES: Performed by: TRANSPLANT SURGERY

## 2024-05-28 PROCEDURE — 71000044 HC DOSC ROUTINE RECOVERY FIRST HOUR: Performed by: TRANSPLANT SURGERY

## 2024-05-28 PROCEDURE — 63600175 PHARM REV CODE 636 W HCPCS: Performed by: NURSE ANESTHETIST, CERTIFIED REGISTERED

## 2024-05-28 PROCEDURE — 71000015 HC POSTOP RECOV 1ST HR: Performed by: TRANSPLANT SURGERY

## 2024-05-28 PROCEDURE — D9220A PRA ANESTHESIA: Mod: ANES,,, | Performed by: SURGERY

## 2024-05-28 PROCEDURE — 82962 GLUCOSE BLOOD TEST: CPT | Performed by: TRANSPLANT SURGERY

## 2024-05-28 PROCEDURE — 49422 REMOVE TUNNELED IP CATH: CPT | Mod: 79,,, | Performed by: TRANSPLANT SURGERY

## 2024-05-28 PROCEDURE — 88300 SURGICAL PATH GROSS: CPT | Performed by: PATHOLOGY

## 2024-05-28 PROCEDURE — 36000705 HC OR TIME LEV I EA ADD 15 MIN: Performed by: TRANSPLANT SURGERY

## 2024-05-28 PROCEDURE — 63600175 PHARM REV CODE 636 W HCPCS: Mod: JZ,JG | Performed by: TRANSPLANT SURGERY

## 2024-05-28 PROCEDURE — D9220A PRA ANESTHESIA: Mod: CRNA,,, | Performed by: NURSE ANESTHETIST, CERTIFIED REGISTERED

## 2024-05-28 RX ORDER — SODIUM CHLORIDE 9 MG/ML
INJECTION, SOLUTION INTRAVENOUS CONTINUOUS
Status: DISCONTINUED | OUTPATIENT
Start: 2024-05-28 | End: 2024-05-28 | Stop reason: HOSPADM

## 2024-05-28 RX ORDER — DEXTROMETHORPHAN HYDROBROMIDE, GUAIFENESIN 5; 100 MG/5ML; MG/5ML
650 LIQUID ORAL EVERY 8 HOURS PRN
COMMUNITY
Start: 2024-05-28

## 2024-05-28 RX ORDER — MYCOPHENOLATE MOFETIL 250 MG/1
500 CAPSULE ORAL 2 TIMES DAILY
Qty: 120 CAPSULE | Refills: 11 | Status: SHIPPED | OUTPATIENT
Start: 2024-05-28 | End: 2025-05-28

## 2024-05-28 RX ORDER — ONDANSETRON HYDROCHLORIDE 2 MG/ML
INJECTION, SOLUTION INTRAVENOUS
Status: DISCONTINUED | OUTPATIENT
Start: 2024-05-28 | End: 2024-05-28

## 2024-05-28 RX ORDER — PROPOFOL 10 MG/ML
VIAL (ML) INTRAVENOUS
Status: DISCONTINUED | OUTPATIENT
Start: 2024-05-28 | End: 2024-05-28

## 2024-05-28 RX ORDER — ONDANSETRON HYDROCHLORIDE 2 MG/ML
4 INJECTION, SOLUTION INTRAVENOUS DAILY PRN
Status: DISCONTINUED | OUTPATIENT
Start: 2024-05-28 | End: 2024-05-28 | Stop reason: HOSPADM

## 2024-05-28 RX ORDER — OXYCODONE HYDROCHLORIDE 10 MG/1
10 TABLET ORAL EVERY 6 HOURS PRN
Status: DISCONTINUED | OUTPATIENT
Start: 2024-05-28 | End: 2024-05-28 | Stop reason: HOSPADM

## 2024-05-28 RX ORDER — LIDOCAINE HYDROCHLORIDE 10 MG/ML
1 INJECTION, SOLUTION EPIDURAL; INFILTRATION; INTRACAUDAL; PERINEURAL ONCE
Status: DISCONTINUED | OUTPATIENT
Start: 2024-05-28 | End: 2024-05-28 | Stop reason: HOSPADM

## 2024-05-28 RX ORDER — HYDROMORPHONE HYDROCHLORIDE 1 MG/ML
0.2 INJECTION, SOLUTION INTRAMUSCULAR; INTRAVENOUS; SUBCUTANEOUS EVERY 5 MIN PRN
Status: DISCONTINUED | OUTPATIENT
Start: 2024-05-28 | End: 2024-05-28 | Stop reason: HOSPADM

## 2024-05-28 RX ORDER — KETAMINE HCL IN 0.9 % NACL 50 MG/5 ML
SYRINGE (ML) INTRAVENOUS
Status: DISCONTINUED | OUTPATIENT
Start: 2024-05-28 | End: 2024-05-28

## 2024-05-28 RX ORDER — OXYCODONE HYDROCHLORIDE 10 MG/1
10 TABLET ORAL EVERY 4 HOURS PRN
Status: DISCONTINUED | OUTPATIENT
Start: 2024-05-28 | End: 2024-05-28 | Stop reason: HOSPADM

## 2024-05-28 RX ORDER — MIDAZOLAM HYDROCHLORIDE 1 MG/ML
INJECTION INTRAMUSCULAR; INTRAVENOUS
Status: DISCONTINUED | OUTPATIENT
Start: 2024-05-28 | End: 2024-05-28

## 2024-05-28 RX ORDER — SODIUM CHLORIDE 0.9 % (FLUSH) 0.9 %
10 SYRINGE (ML) INJECTION
Status: DISCONTINUED | OUTPATIENT
Start: 2024-05-28 | End: 2024-05-28 | Stop reason: HOSPADM

## 2024-05-28 RX ORDER — LIDOCAINE HYDROCHLORIDE 20 MG/ML
INJECTION INTRAVENOUS
Status: DISCONTINUED | OUTPATIENT
Start: 2024-05-28 | End: 2024-05-28

## 2024-05-28 RX ORDER — BUPIVACAINE HYDROCHLORIDE 2.5 MG/ML
INJECTION, SOLUTION EPIDURAL; INFILTRATION; INTRACAUDAL
Status: DISCONTINUED | OUTPATIENT
Start: 2024-05-28 | End: 2024-05-28 | Stop reason: HOSPADM

## 2024-05-28 RX ORDER — CEFAZOLIN 2 G/1
INJECTION, POWDER, FOR SOLUTION INTRAMUSCULAR; INTRAVENOUS
Status: DISCONTINUED | OUTPATIENT
Start: 2024-05-28 | End: 2024-05-28

## 2024-05-28 RX ORDER — FENTANYL CITRATE 50 UG/ML
INJECTION, SOLUTION INTRAMUSCULAR; INTRAVENOUS
Status: DISCONTINUED | OUTPATIENT
Start: 2024-05-28 | End: 2024-05-28

## 2024-05-28 RX ADMIN — Medication 120 MG: at 07:05

## 2024-05-28 RX ADMIN — CEFAZOLIN 2 G: 330 INJECTION, POWDER, FOR SOLUTION INTRAMUSCULAR; INTRAVENOUS at 07:05

## 2024-05-28 RX ADMIN — Medication 20 MG: at 07:05

## 2024-05-28 RX ADMIN — FENTANYL CITRATE 50 MCG: 50 INJECTION, SOLUTION INTRAMUSCULAR; INTRAVENOUS at 07:05

## 2024-05-28 RX ADMIN — ONDANSETRON 4 MG: 2 INJECTION INTRAMUSCULAR; INTRAVENOUS at 07:05

## 2024-05-28 RX ADMIN — LIDOCAINE HYDROCHLORIDE 100 MG: 20 INJECTION INTRAVENOUS at 07:05

## 2024-05-28 RX ADMIN — Medication 15 MG: at 07:05

## 2024-05-28 RX ADMIN — SODIUM CHLORIDE: 0.9 INJECTION, SOLUTION INTRAVENOUS at 07:05

## 2024-05-28 RX ADMIN — MIDAZOLAM HYDROCHLORIDE 2 MG: 2 INJECTION, SOLUTION INTRAMUSCULAR; INTRAVENOUS at 07:05

## 2024-05-28 NOTE — TRANSFER OF CARE
"Anesthesia Transfer of Care Note    Patient: Mark Lopez    Procedure(s) Performed: Procedure(s) (LRB):  REMOVAL, CATHETER, DIALYSIS, PERITONEAL (N/A)    Patient location: [facility]    Anesthesia Type: general    Transport from OR: Transported from OR on 6-10 L/min O2 by face mask with adequate spontaneous ventilation    Post pain: adequate analgesia    Post assessment: no apparent anesthetic complications and tolerated procedure well    Post vital signs: stable    Level of consciousness: awake, alert and oriented    Nausea/Vomiting: no nausea/vomiting    Complications: none    Transfer of care protocol was followed    Last vitals: Visit Vitals  BP (!) 150/72   Pulse 60   Temp 36.8 °C (98.2 °F) (Temporal)   Resp 18   Ht 5' 4" (1.626 m)   Wt 90.9 kg (200 lb 6.4 oz)   SpO2 98%   BMI 34.40 kg/m²     "

## 2024-05-28 NOTE — ANESTHESIA POSTPROCEDURE EVALUATION
Anesthesia Post Evaluation    Patient: Mark Lopez    Procedure(s) Performed: Procedure(s) (LRB):  REMOVAL, CATHETER, DIALYSIS, PERITONEAL (N/A)    Final Anesthesia Type: general      Patient location during evaluation: PACU  Patient participation: Yes- Able to Participate  Level of consciousness: awake and alert  Post-procedure vital signs: reviewed and stable  Pain management: adequate  Airway patency: patent  JENNIFER mitigation strategies: Multimodal analgesia, Preoperative use of mandibular advancement devices or oral appliances, Intraoperative administration of CPAP, nasopharyngeal airway, or oral appliance during sedation and Extubation while patient is awake  PONV status at discharge: No PONV  Anesthetic complications: no      Cardiovascular status: blood pressure returned to baseline, hemodynamically stable and stable  Respiratory status: unassisted and spontaneous ventilation  Hydration status: euvolemic  Follow-up not needed.              Vitals Value Taken Time   /70 05/28/24 0930   Temp 36.7 °C (98 °F) 05/28/24 0930   Pulse 60 05/28/24 0930   Resp 20 05/28/24 0930   SpO2 99 % 05/28/24 0930         No case tracking events are documented in the log.      Pain/Guanakito Score: [place] Score: 10 (5/28/2024  9:30 AM)

## 2024-05-28 NOTE — OP NOTE
[facility] - Surgery ([facility])  General Surgery  Operative Note    SUMMARY     Date of Procedure: 2024     Procedure: Procedure(s) (LRB):  REMOVAL, CATHETER, DIALYSIS, PERITONEAL (N/A)       Surgeons and Role:     * Cynthia Leary MD - Primary     * Ish Lee MD - Resident - Assisting        Pre-Operative Diagnosis: Status post -donor kidney transplantation [Z94.0]  At risk for opportunistic infections [Z91.89]  Kidney replaced by transplant [Z94.0]    Post-Operative Diagnosis: Post-Op Diagnosis Codes:     * Status post -donor kidney transplantation [Z94.0]     * At risk for opportunistic infections [Z91.89]     * Kidney replaced by transplant [Z94.0]    Anesthesia: Choice    Operative Findings (including complications, if any): Peritoneal dialysis catheter removed in tact    Description of Technical Procedures:     Preamble  Indications and Patient Counseling: The procedure was thoroughly explained to the patient and/or family members as appropriate, including potential risks, complications, and alternatives.  The risks associated with not undergoing the proposed procedure were also discussed.  All questions were answered, and the patient and/or family members voiced understanding and agreed to proceed with the operation.    Time-Out: A complete time out was carried out prior to incision, with confirmation of patient identity, correct procedure, correct operative site, appropriate antibiotic prophylaxis, review of any known allergies, and presence of all needed equipment.    Procedure in Detail  Following the induction of anesthesia, appropriate venous lines were placed by the anesthesia team.  Care was taken to pad all pressure points and avoid any potential traction injuries from positioning.   Sequential compression boots and Davi Huggers were used. The abdomen was sterilely prepped and draped.    An incision was made over the deep cuff of the catheter.  The cuff was dissected free  from the surrounding tissue with cautery an the pigtail portion of the catheter was removed from the peritoneal cavity and externalized.  The skin was then excised at the catheter exit site and the superficial cuff was dissected free from the surrounding tissue.  The catheter was then completely removed and passed off the field for gross pathology.  Hemostasis was obtained.  The skin was closed with staples and sterile dressings applied.    Significant Surgical Tasks Conducted by the Assistant(s), if Applicable: Assisting the staff surgeon with the procedure    Estimated Blood Loss (EBL): * No values recorded between 2024  7:45 AM and 2024  8:08 AM *           Implants: * No implants in log *    Specimens:   Specimen (24h ago, onward)       Start     Ordered    24  Specimen to Pathology, Surgery General Surgery  Once        Comments: Pre-op Diagnosis: Status post -donor kidney transplantation [Z94.0]At risk for opportunistic infections [Z91.89]Kidney replaced by transplant [Z94.0]Procedure(s):REMOVAL, CATHETER, DIALYSIS, PERITONEAL Number of specimens: 1Name of specimens: 1. Peritoneal dialysis catheter-perm Gross     References:    Click here for ordering Quick Tip   Question Answer Comment   Procedure Type: General Surgery    Release to patient Immediate        24                            Condition: Good    Disposition: PACU - hemodynamically stable.    Attestation: I was present and scrubbed for the entire procedure.

## 2024-05-28 NOTE — ANESTHESIA PREPROCEDURE EVALUATION
[facility]  Anesthesia Pre-Operative Evaluation         Patient Name: Mark Lopez  YOB: 1960  MRN: [id number]    SUBJECTIVE:     Pre-operative evaluation for Procedure(s) (LRB):  REMOVAL, CATHETER, DIALYSIS, PERITONEAL (N/A)       2024    Mark Lopez is a 64 y.o. male w/ a significant PMHx of HTN, DM2, and ESRD sp Kidney transplant here for above procedure        2D ECHO:  TTE:  Results for orders placed during the hospital encounter of 23    Echo    Interpretation Summary    Left Ventricle: The left ventricle is mildly dilated. Normal wall thickness. There is eccentric hypertrophy. Normal wall motion. There is normal systolic function with a visually estimated ejection fraction of 55 - 60%. Grade I diastolic dysfunction.    Right Ventricle: Normal right ventricular cavity size. Wall thickness is normal. Right ventricle wall motion  is normal. Systolic function is normal.    Left Atrium: Left atrium is severely dilated.    Right Atrium: Right atrium is dilated.    Pulmonary Artery: The estimated pulmonary artery systolic pressure is 25 mmHg.    IVC/SVC: Normal venous pressure at 3 mmHg.    Pericardium: Ascites present.      JEZ:  No results found for this or any previous visit.    Drips:    sodium chloride 0.9%   Intravenous Continuous           Patient Active Problem List   Diagnosis    NIHARIKA (acute kidney injury)    Pulmonary nodule    Type 2 diabetes mellitus with kidney complication, with long-term current use of insulin    Essential hypertension    Chronic kidney disease, unspecified    Chronic kidney disease, stage V    Status post -donor kidney transplantation    Prophylactic immunotherapy    Long-term use of immunosuppressant medication    At risk for opportunistic infections    Acute on chronic anemia    Positive RPR test in cadaveric donor    Delayed graft function of kidney    Insulin pump in place    Urine leak from transplanted ureter     Acidosis    History of hepatitis B vaccination       Review of patient's allergies indicates:   Allergen Reactions    Allopurinol analogues Swelling    Allopurinol     Latex, natural rubber     Statins-hmg-coa reductase inhibitors Other (See Comments)     Muscle ache    Adhesive Rash       Current Inpatient Medications:    LIDOcaine (PF) 10 mg/ml (1%)  1 mL Intradermal Once       Current Outpatient Medications   Medication Instructions    amLODIPine (NORVASC) 5 mg, Oral, Daily    aspirin (ECOTRIN) 81 mg, Daily    calcitRIOL (ROCALTROL) 0.5 mcg, Oral, Daily    carvediloL (COREG) 12.5 mg, Oral, 2 times daily    HUMALOG U-100 INSULIN 100 unit/mL injection Subcutaneous, FOR INSULIN PUMP (BASAL 1.2 UNITS/HR)    k phos di & mono-sod phos mono (K-PHOS-NEUTRAL) 250 mg Tab 1 tablet, Oral, Every 12 hours    latanoprost 0.005 % ophthalmic solution 1 drop, Right Eye, Every other day, At night     levothyroxine (SYNTHROID) 50 mcg, Oral, Before breakfast    ONETOUCH DELICA PLUS LANCET 30 gauge Misc No dose, route, or frequency recorded.    predniSONE (DELTASONE) 5 MG tablet Take by mouth daily: 20mg 3/2-3/8, 15mg 3/9-3/15, 10mg 3/16-3/22/2024, 5mg 3/23/2024-    sodium bicarbonate 1,300 mg, Oral, 3 times daily    subcutaneous insulin pump 1 Units/hr, Subcutaneous, Continuous, Insulin pump name - Tandum<BR>Basal dose is 2 units/hr     sulfamethoxazole-trimethoprim 400-80mg (BACTRIM,SEPTRA) 400-80 mg per tablet 1 tablet, Oral, Daily, Stop 8/27/24    T:SLIM X2 Crtg Subcutaneous    tacrolimus (PROGRAF) 1 MG Cap Take 6 capsules (6 mg total) by mouth every morning AND 6 capsules (6 mg total) every evening. Z94.0;Kidney txpn on 2/29/2024.    valGANciclovir (VALCYTE) 450 mg, Oral, Daily, Stop 8/27/24       Surgical History  Past Surgical History:   Procedure Laterality Date    COLONOSCOPY N/A 05/18/2022    Procedure: COLONOSCOPY;  Surgeon: Raul Dyer MD;  Location: [facility];  Service: Endoscopy;  Laterality: N/A;    DENTAL  SURGERY      EYE SURGERY Bilateral     Cataract    INSERTION, CATHETER, DIALYSIS, PERITONEAL, LAPAROSCOPIC N/A 06/20/2023    Procedure: INSERTION, CATHETER, DIALYSIS, PERITONEAL, LAPAROSCOPIC;  Surgeon: Carlos Alberto Rodgers MD;  Location: [facility] OR;  Service: General;  Laterality: N/A;    KIDNEY TRANSPLANT N/A 2/29/2024    Procedure: TRANSPLANT, KIDNEY;  Surgeon: Pino Law Jr., MD;  Location: [facility] OR [address] [place] [address];  Service: Transplant;  Laterality: N/A;    KNEE SURGERY Right        Social History:  Tobacco Use: Low Risk  (5/28/2024)    Patient History     Smoking Tobacco Use: Never     Smokeless Tobacco Use: Never     Passive Exposure: Not on file     Alcohol Use: Not on file         OBJECTIVE:     Vital Signs Range (Last 24H):  Temp:  [36.8 °C (98.2 °F)]   Pulse:  [60]   Resp:  [18]   BP: (150)/(72)   SpO2:  [98 %]       Significant Labs:  Lab Results   Component Value Date    WBC 2.47 (L) 05/27/2024    HGB 11.9 (L) 05/27/2024    HCT 37.9 (L) 05/27/2024     05/27/2024    INR 0.9 05/27/2024       Lab Results   Component Value Date     05/27/2024    K 4.4 05/27/2024     (H) 05/27/2024    CREATININE 1.4 05/27/2024    BUN 38 (H) 05/27/2024    CO2 24 05/27/2024       Lab Results   Component Value Date    HGBA1C 5.8 (H) 03/01/2024       EKG:   Results for orders placed or performed during the hospital encounter of 03/10/24   EKG 12-lead    Collection Time: 03/10/24  8:41 AM   Result Value Ref Range    QRS Duration 110 ms    [facility] QTC Calculation 508 ms    Narrative    Test Reason : R00.0,    Vent. Rate : 100 BPM     Atrial Rate : 100 BPM     P-R Int : 150 ms          QRS Dur : 110 ms      QT Int : 394 ms       P-R-T Axes : 052 -11 008 degrees     QTc Int : 508 ms    Normal sinus rhythm  Possible Left atrial enlargement  Incomplete left bundle branch block  Prolonged QT  Abnormal ECG  When compared with ECG of 08-MAR-2024 19:47,  Nonspecific T wave abnormality no longer evident in Anterior-lateral  leads  Confirmed by Jordan CHIU MD ([id number]) on 3/10/2024 9:25:48 AM    Referred By: GERRY MURPHY           Confirmed By:Jordan CHIU MD       ASSESSMENT/PLAN:       Pre-op Assessment    I have reviewed the Patient Summary Reports.     I have reviewed the Nursing Notes. I have reviewed the NPO Status.   I have reviewed the Medications.     Review of Systems  Anesthesia Hx:  No problems with previous Anesthesia                Hematology/Oncology:  Hematology Normal   Oncology Normal                                   EENT/Dental:  EENT/Dental Normal           Cardiovascular:     Hypertension                                        Pulmonary:  Pulmonary Normal                       Hepatic/GI:  Hepatic/GI Normal                 Neurological:  Neurology Normal                                      Endocrine:  Diabetes, type 2 Hypothyroidism          Dermatological:  Skin Normal    Psych:  Psychiatric Normal                    Physical Exam  General: Well nourished and Cooperative    Airway:  Mallampati: II / II  Mouth Opening: Normal  TM Distance: 4 - 6 cm  Tongue: Normal  Neck ROM: Normal ROM    Dental:  Intact    Chest/Lungs:  Clear to auscultation, Normal Respiratory Rate    Heart:  Rate: Normal  Rhythm: Regular Rhythm  Sounds: Normal        Anesthesia Plan  Type of Anesthesia, risks & benefits discussed:    Anesthesia Type: Gen Supraglottic Airway  Intra-op Monitoring Plan: Standard ASA Monitors  Post Op Pain Control Plan: multimodal analgesia and IV/PO Opioids PRN  Induction:  IV  Airway Plan: Direct  Informed Consent: Informed consent signed with the Patient and all parties understand the risks and agree with anesthesia plan.  All questions answered.   ASA Score: 3  Day of Surgery Review of History & Physical: H&P Update referred to the surgeon/provider.    Ready For Surgery From Anesthesia Perspective.     .

## 2024-05-28 NOTE — BRIEF OP NOTE
[facility] - Surgery ([facility])  Brief Operative Note    Surgery Date: 2024     Surgeons and Role:     * Cynthia Leary MD - Primary     * Ish Lee MD - Resident - Assisting        Pre-op Diagnosis:  Status post -donor kidney transplantation [Z94.0]  At risk for opportunistic infections [Z91.89]  Kidney replaced by transplant [Z94.0]    Post-op Diagnosis:  Post-Op Diagnosis Codes:     * Status post -donor kidney transplantation [Z94.0]     * At risk for opportunistic infections [Z91.89]     * Kidney replaced by transplant [Z94.0]    Procedure(s) (LRB):  REMOVAL, CATHETER, DIALYSIS, PERITONEAL (N/A)    Anesthesia: Choice    Operative Findings: peritoneal dialysis catheter removed without immediate complication. The catheter was sent for gross.     Estimated Blood Loss: < 5 cc          Specimens:   Specimen (24h ago, onward)       Start     Ordered    24 08  Specimen to Pathology, Surgery General Surgery  Once        Comments: Pre-op Diagnosis: Status post -donor kidney transplantation [Z94.0]At risk for opportunistic infections [Z91.89]Kidney replaced by transplant [Z94.0]Procedure(s):REMOVAL, CATHETER, DIALYSIS, PERITONEAL Number of specimens: 1Name of specimens: 1. Peritoneal dialysis catheter-perm Gross     References:    Click here for ordering Quick Tip   Question Answer Comment   Procedure Type: General Surgery    Release to patient Immediate        24 0802                      Discharge Note    OUTCOME: Patient tolerated treatment/procedure well without complication and is now ready for discharge.    DISPOSITION: Home or Self Care    FINAL DIAGNOSIS:  PD catheter removed    FOLLOWUP: In clinic    DISCHARGE INSTRUCTIONS:    Discharge Procedure Orders   Diet Adult Regular     No driving until:   Order Comments: No longer taking narcotic pain medication and can turn around safely without pain.     Notify your health care provider if you experience any of the  following:  temperature >100.4     Notify your health care provider if you experience any of the following:  persistent nausea and vomiting or diarrhea     Notify your health care provider if you experience any of the following:  severe uncontrolled pain     Notify your health care provider if you experience any of the following:  redness, tenderness, or signs of infection (pain, swelling, redness, odor or green/yellow discharge around incision site)     Remove dressing in 24 hours   Order Comments: You may remove wound dressings 24 hours after surgery. You may replace dressings with gauze/tape or bandaids as needed for drainage, but no dressing is required.     Activity as tolerated   Order Comments: Okay to shower the day after surgery. Please do not submerge wounds underwater (no bath, no pool, no lake, etc.) for at least 2 weeks.

## 2024-05-28 NOTE — PLAN OF CARE
Discharge instructions given and explained to patient and family with verbalization of understanding all instructions. Patients v/s stable, denies n/v and tolerating po, rates pain level tolerable, IV removed, and patient ready for discharge home.

## 2024-05-28 NOTE — ANESTHESIA PROCEDURE NOTES
Intubation    Date/Time: 5/28/2024 7:32 AM    Performed by: Ember Pleitez CRNA  Authorized by: Gio Chen MD    Intubation:     Induction:  Intravenous    Intubated:  Postinduction    Mask Ventilation:  Easy mask    Attempts:  1    Attempted By:  CRNA    Difficult Airway Encountered?: No      Complications:  None    Airway Device:  Supraglottic airway/LMA    Airway Device Size:  5.0 (5.0 iGel)    Secured at:  The lips    Placement Verified By:  Capnometry    Complicating Factors:  None    Findings Post-Intubation:  BS equal bilateral and atraumatic/condition of teeth unchanged

## 2024-05-29 ENCOUNTER — OFFICE VISIT (OUTPATIENT)
Dept: TRANSPLANT | Facility: CLINIC | Age: 64
End: 2024-05-29
Payer: MEDICARE

## 2024-05-29 VITALS
TEMPERATURE: 98 F | BODY MASS INDEX: 34.67 KG/M2 | OXYGEN SATURATION: 99 % | RESPIRATION RATE: 18 BRPM | HEART RATE: 62 BPM | HEIGHT: 64 IN | DIASTOLIC BLOOD PRESSURE: 62 MMHG | SYSTOLIC BLOOD PRESSURE: 131 MMHG | WEIGHT: 203.06 LBS

## 2024-05-29 DIAGNOSIS — Z79.60 LONG-TERM USE OF IMMUNOSUPPRESSANT MEDICATION: Primary | ICD-10-CM

## 2024-05-29 DIAGNOSIS — I10 BENIGN ESSENTIAL HTN: ICD-10-CM

## 2024-05-29 DIAGNOSIS — E87.20 METABOLIC ACIDOSIS: ICD-10-CM

## 2024-05-29 DIAGNOSIS — E83.39 HYPOPHOSPHATEMIA: ICD-10-CM

## 2024-05-29 DIAGNOSIS — Z94.0 KIDNEY REPLACED BY TRANSPLANT: ICD-10-CM

## 2024-05-29 LAB
FINAL PATHOLOGIC DIAGNOSIS: NORMAL
GROSS: NORMAL
Lab: NORMAL

## 2024-05-29 PROCEDURE — 99214 OFFICE O/P EST MOD 30 MIN: CPT | Mod: S$PBB,,, | Performed by: INTERNAL MEDICINE

## 2024-05-29 PROCEDURE — 99999 PR PBB SHADOW E&M-EST. PATIENT-LVL IV: CPT | Mod: PBBFAC,,, | Performed by: INTERNAL MEDICINE

## 2024-05-29 PROCEDURE — 99214 OFFICE O/P EST MOD 30 MIN: CPT | Mod: PBBFAC | Performed by: INTERNAL MEDICINE

## 2024-05-29 RX ORDER — SODIUM BICARBONATE 650 MG/1
1300 TABLET ORAL 2 TIMES DAILY
Qty: 120 TABLET | Refills: 11 | Status: SHIPPED | OUTPATIENT
Start: 2024-05-29

## 2024-05-29 RX ORDER — ASPIRIN 81 MG/1
81 TABLET ORAL DAILY
Start: 2024-05-29

## 2024-05-29 RX ORDER — BLOOD-GLUCOSE SENSOR
EACH MISCELLANEOUS
COMMUNITY

## 2024-05-29 NOTE — PROGRESS NOTES
Kidney Post-Transplant Assessment    Referring Physician: Fredi Lyon  Current Nephrologist: Fredi Lyon    ORGAN: RIGHT KIDNEY  Donor Type: donation after brain death  PHS Increased Risk: yes  Cold Ischemia: 995 mins  Induction Medications: Thymo     Subjective:     CC:  Reassessment of renal allograft function and management of chronic immunosuppression.    HPI:  Mr. Lopez is a 64 y.o. year old  male who received a donation after brain death kidney transplant on 2/29/24.     Patient seen in clinic alone.Patient underwent the PD catheter removal yesterday. Patient otherwise states that he has been doing well. States that he is napping only once and generally for a few hours. Has upcoming [employer] trips coming up related to the [employer]. Patient otherwise denies any fever, HA, runny nose, watery eyes. Denies any heart burn, CP or SOB. Denies any abdominal pain, diarrhea, dysuria or frequency    SBP at home generally higher 140-150's but when he has it checked by HH nurse (even using his BP machine) it is always below 140.    Review of Systems   Constitutional:  Negative for activity change, appetite change, chills and fever.   HENT:  Negative for congestion, sneezing and sore throat.    Eyes:  Negative for pain and itching.   Respiratory:  Negative for apnea, cough, shortness of breath and wheezing.    Cardiovascular:  Negative for chest pain.   Gastrointestinal:  Negative for abdominal pain, constipation, diarrhea, nausea and vomiting.   Genitourinary:  Negative for difficulty urinating, dysuria and frequency.   Musculoskeletal:  Negative for back pain, joint swelling and neck pain.   Skin:  Negative for color change.   Neurological:  Negative for dizziness, light-headedness and headaches.   Psychiatric/Behavioral:  Negative for behavioral problems and confusion. The patient is not nervous/anxious.        Objective:   Blood pressure 131/62, pulse 62, temperature 97.7 °F (36.5 °C),  "temperature source Tympanic, resp. rate 18, height 5' 4" (1.626 m), weight 92.1 kg (203 lb 0.7 oz), SpO2 99%.body mass index is 34.85 kg/m².    Physical Exam  Vitals reviewed.   Constitutional:       General: He is not in acute distress.     Appearance: Normal appearance. He is not ill-appearing or toxic-appearing.      Comments: Appears stated age   HENT:      Head: Normocephalic and atraumatic.      Right Ear: External ear normal.      Left Ear: External ear normal.      Nose: Nose normal.   Eyes:      General: No scleral icterus.     Conjunctiva/sclera: Conjunctivae normal.   Cardiovascular:      Rate and Rhythm: Normal rate and regular rhythm.      Pulses: Normal pulses.      Heart sounds: Normal heart sounds. No murmur heard.     No friction rub.   Pulmonary:      Effort: Pulmonary effort is normal. No respiratory distress.      Breath sounds: Normal breath sounds. No wheezing or rhonchi.   Abdominal:      General: Bowel sounds are normal. There is no distension.      Palpations: Abdomen is soft.      Tenderness: There is no abdominal tenderness. There is no guarding.      Comments:      Musculoskeletal:         General: No swelling or deformity. Normal range of motion.      Cervical back: Normal range of motion and neck supple. No tenderness.      Right lower leg: No edema.      Left lower leg: No edema.   Skin:     General: Skin is warm and dry.      Coloration: Skin is not jaundiced.      Findings: No erythema or rash.   Neurological:      General: No focal deficit present.      Mental Status: He is alert and oriented to person, place, and time.      Motor: No weakness.   Psychiatric:         Mood and Affect: Mood normal.         Behavior: Behavior normal.       Labs:  Lab Results   Component Value Date    WBC 2.47 (L) 05/27/2024    HGB 11.9 (L) 05/27/2024    HCT 37.9 (L) 05/27/2024     05/27/2024    K 4.4 05/27/2024     (H) 05/27/2024    CO2 24 05/27/2024    BUN 38 (H) 05/27/2024    CREATININE " "1.4 2024    EGFRNORACEVR 55.8 (A) 2024    CALCIUM 10.5 2024    PHOS 2.9 2024    MG 1.6 2024    ALBUMIN 3.7 2024    AST 12 2024    ALT 10 2024    UTPCR 0.13 2024    .6 (H) 2024    TACROLIMUS 9.3 2024       No results found for: "EXTANC", "EXTWBC", "EXTSEGS", "EXTPLATELETS", "EXTHEMOGLOBI", "EXTHEMATOCRI", "EXTCREATININ", "EXTSODIUM", "EXTPOTASSIUM", "EXTBUN", "EXTCO2", "EXTCALCIUM", "EXTPHOSPHORU", "EXTGLUCOSE", "EXTALBUMIN", "EXTAST", "EXTALT", "EXTBILITOTAL", "EXTLIPASE", "EXTAMYLASE"    No results found for: "EXTCYCLOSLVL", "EXTSIROLIMUS", "EXTTACROLVL", "EXTPROTCRE", "EXTPTHINTACT", "EXTPROTEINUA", "EXTWBCUA", "EXTRBCUA"    Labs were reviewed with the patient    Assessment and Plan:    64 yr old  male with ESRD secondary to presumed DM (no prior kidney biopsy) who received a  donor kidney transplant on 24. 52% PRA. Crossmatch negative. Thymo induction    Post transplant course notable for persistent PD catheter (will need to be removed at a later date as he was not consented at the time of transplant) as well as urine leak with perinephric fluid collection managed medically. Patient currently with negative nephrostogram with PCN capped on 3/26, Curry removed on  and JEAN-PIERRE drain removed on . PD catheter removed on 24.     1) s/p  donor kidney transplant:   - graft function near his baseline of 1.3-1.4. Cont to monitor   - will be seen by transplant surgery next week for PD catheter removal. Advised to discuss regarding hernia repair then  - Acceptable FK level. On FK 6 mg BID    - cont on MMF to 500 mg BID    - cont on prednisone 5 mg daily   - cont on Bactrim and Valcyte   2) Leukopenia:   - decreased MMF to 500 mg BID   - CMV PCR pending   3) Urine leak:   - resolved with nephrostogram not showing any signs of leak. PCN removed on 24  4) HTN:   -cont on Coreg 12.5 mg BID and Norvasc 5 mg daily   5) " Type II DM:   - on insulin pump with acceptable levels   - will have patient follow up with his endocrinologist  6) Metabolic acidosis   - decrease sodium bicarb to 1300 BID    7) Hypothyroidism:   - cont on Synthroid   8) Secondary hyperparathyroidism:   - stop calcitriol  9) Hypophosphatemia:   - acceptable level. Will monitor following phos and if acceptable, will stop phos supplements     Labs and RTC per protocol     Hope Alexander DO   Transplant Nephrology

## 2024-05-29 NOTE — LETTER
May 29, 2024        Fredi Lyon  [address] [facility] [address]  Phone: [phone]  Fax: [phone]             [facility] Trans[facility] 1st Fl  [address]  [facility] [address]  Phone: [phone]   Patient: Mark Lopez   MR Number: [id number]   YOB: 1960   Date of Visit: 5/29/2024       Dear Dr. Fredi Lyon    Thank you for referring Mark Lopez to me for evaluation. Attached you will find relevant portions of my assessment and plan of care.    If you have questions, please do not hesitate to call me. I look forward to following Mark Lopez along with you.    Sincerely,    Hope Alexander, DO    Enclosure    If you would like to receive this communication electronically, please contact [email or web address] or [phone] to request [employer] access.    [employer] is a tool which provides read-only access to select patient information with whom you have a relationship. Its easy to use and provides real time access to review your patients record including encounter summaries, notes, results, and demographic information.    If you feel you have received this communication in error or would no longer like to receive these types of communications, please e-mail [email or web address]

## 2024-06-07 ENCOUNTER — EXTERNAL HOME HEALTH (OUTPATIENT)
Dept: HOME HEALTH SERVICES | Facility: HOSPITAL | Age: 64
End: 2024-06-07
Payer: MEDICARE

## 2024-06-12 ENCOUNTER — OFFICE VISIT (OUTPATIENT)
Dept: TRANSPLANT | Facility: CLINIC | Age: 64
End: 2024-06-12
Payer: MEDICARE

## 2024-06-12 VITALS
TEMPERATURE: 97 F | WEIGHT: 202.81 LBS | HEIGHT: 64 IN | SYSTOLIC BLOOD PRESSURE: 146 MMHG | HEART RATE: 70 BPM | RESPIRATION RATE: 18 BRPM | OXYGEN SATURATION: 97 % | BODY MASS INDEX: 34.62 KG/M2 | DIASTOLIC BLOOD PRESSURE: 67 MMHG

## 2024-06-12 DIAGNOSIS — Z51.81 ENCOUNTER FOR THERAPEUTIC DRUG MONITORING: Primary | ICD-10-CM

## 2024-06-12 DIAGNOSIS — Z94.0 KIDNEY REPLACED BY TRANSPLANT: ICD-10-CM

## 2024-06-12 PROCEDURE — 99213 OFFICE O/P EST LOW 20 MIN: CPT | Mod: 24,S$PBB,, | Performed by: SURGERY

## 2024-06-12 PROCEDURE — 99999 PR PBB SHADOW E&M-EST. PATIENT-LVL IV: CPT | Mod: PBBFAC,,, | Performed by: SURGERY

## 2024-06-12 PROCEDURE — 99214 OFFICE O/P EST MOD 30 MIN: CPT | Mod: PBBFAC | Performed by: SURGERY

## 2024-06-12 NOTE — LETTER
June 12, 2024        Fredi Lyon  [address] [facility] [address]  Phone: [phone]  Fax: [phone]             [facility] 1st Fl  [address]  [facility] [address]  Phone: [phone]   Patient: Mark Lopez   MR Number: [id number]   YOB: 1960   Date of Visit: 6/12/2024       Dear Dr. Fredi Lyon    Thank you for referring Mark Lopez to me for evaluation. Attached you will find relevant portions of my assessment and plan of care.    If you have questions, please do not hesitate to call me. I look forward to following Mark Lopez along with you.    Sincerely,    Reji Espinosa MD    Enclosure    If you would like to receive this communication electronically, please contact [email or web address] or [phone] to request [employer] Link access.    [employer] Link is a tool which provides read-only access to select patient information with whom you have a relationship. Its easy to use and provides real time access to review your patients record including encounter summaries, notes, results, and demographic information.    If you feel you have received this communication in error or would no longer like to receive these types of communications, please e-mail [email or web address]

## 2024-06-12 NOTE — PROGRESS NOTES
Transplant Surgery  Kidney Transplant Recipient Follow-up    Referring Physician: Fredi Lyon  Current Nephrologist: Fredi Lyon    Subjective:     Chief Complaint: Mark Lopez is a 64 y.o. year old  male who is status post Kidney transplant performed on 2/29/2024.    Recent pd cannula removal. Well healed.    ORGAN:   RIGHT KIDNEY  Disease Etiology: Diabetes Mellitus - Type II  Donor Type:   Donation after Brain Death  Donor CMV Status:   Positive  Donor HBcAB:   Negative  Donor HCV Status:   Negative    History of Present Illness: He reports no concerns.  From a transplant perspective, he reports normal urination.  Mark is here for management of his immunosuppression medication.  Mark states that his immunosuppression is being well tolerated.  Hypertension is not present.    External provider notes reviewed: No    Review of Systems   Constitutional:  Negative for fatigue.   HENT:  Negative for drooling, postnasal drip and sore throat.    Eyes:  Negative for discharge and itching.   Respiratory:  Negative for choking and stridor.    Gastrointestinal:  Negative for rectal pain.   Endocrine: Negative for polydipsia.   Genitourinary:  Negative for enuresis and genital sores.   Musculoskeletal:  Negative for back pain, neck pain and neck stiffness.   Allergic/Immunologic: Positive for immunocompromised state.   Neurological:  Negative for facial asymmetry and numbness.   Hematological:  Negative for adenopathy.   Psychiatric/Behavioral:  Negative for behavioral problems, self-injury and suicidal ideas.      Objective:     Physical Exam  Abdominal:          Comments: Staples removed,strips applied   Lab Results   Component Value Date    CREATININE 1.4 05/27/2024    BUN 38 (H) 05/27/2024     Lab Results   Component Value Date    WBC 2.47 (L) 05/27/2024    HGB 11.9 (L) 05/27/2024    HCT 37.9 (L) 05/27/2024    HCT 27 (L) 02/29/2024     05/27/2024     Lab Results   Component Value Date    TACROLIMUS  9.3 05/27/2024       Diagnostics:  The following labs have been reviewed: CBC  CMP    Assessment and Plan:        S/P Kidney transplant.  Chronic immunosuppressive medications for rejection prophylaxis at target.  Plan: no adjustment needed.  Continue monitoring symptoms, labs and drug levels for drug-related toxicity and side effects.  Renal hypertension at target.  Staples removed    Additional testing to be completed according to the Kidney: Written Order Guideline for Kidney Transplant Follow-Up (KI-09)    Interpretation of tests and discussion of patient management involves all members of the multidisciplinary transplant team.  Patient advised that it is recommended that all transplant candidates, and their close contacts and household members receive Covid vaccination.  Follow-up: Patient reminded to call with any health changes, since these can be early signs of significant complications.  Also, I advised the patient to be sure any new medications or changes of old medications are discussed with either a pharmacist, or physician knowledgeable with transplant to avoid rejection/drug toxicity related to significant drug interactions.    MD GREGORIA Koch Patient Status  Functional Status: 90% - Able to carry on normal activity: minor symptoms of disease  Physical Capacity: No Limitations

## 2024-06-17 PROBLEM — N17.9 AKI (ACUTE KIDNEY INJURY): Status: RESOLVED | Noted: 2021-04-26 | Resolved: 2024-06-17

## 2024-06-24 ENCOUNTER — LAB VISIT (OUTPATIENT)
Dept: LAB | Facility: HOSPITAL | Age: 64
End: 2024-06-24
Attending: INTERNAL MEDICINE
Payer: MEDICARE

## 2024-06-24 ENCOUNTER — PATIENT MESSAGE (OUTPATIENT)
Dept: URGENT CARE | Facility: CLINIC | Age: 64
End: 2024-06-24
Payer: MEDICARE

## 2024-06-24 DIAGNOSIS — Z94.0 IMMUNOSUPPRESSIVE MANAGEMENT ENCOUNTER FOLLOWING KIDNEY TRANSPLANT: ICD-10-CM

## 2024-06-24 DIAGNOSIS — Z94.0 KIDNEY REPLACED BY TRANSPLANT: ICD-10-CM

## 2024-06-24 DIAGNOSIS — Z79.899 IMMUNOSUPPRESSIVE MANAGEMENT ENCOUNTER FOLLOWING KIDNEY TRANSPLANT: ICD-10-CM

## 2024-06-24 LAB
ALBUMIN SERPL BCP-MCNC: 3.6 G/DL (ref 3.5–5.2)
ANION GAP SERPL CALC-SCNC: 8 MMOL/L (ref 8–16)
BASOPHILS NFR BLD: 0 % (ref 0–1.9)
BUN SERPL-MCNC: 36 MG/DL (ref 8–23)
CALCIUM SERPL-MCNC: 10.9 MG/DL (ref 8.7–10.5)
CHLORIDE SERPL-SCNC: 109 MMOL/L (ref 95–110)
CO2 SERPL-SCNC: 23 MMOL/L (ref 23–29)
CREAT SERPL-MCNC: 1.4 MG/DL (ref 0.5–1.4)
DIFFERENTIAL METHOD BLD: ABNORMAL
EOSINOPHIL NFR BLD: 2 % (ref 0–8)
ERYTHROCYTE [DISTWIDTH] IN BLOOD BY AUTOMATED COUNT: 12.9 % (ref 11.5–14.5)
EST. GFR  (NO RACE VARIABLE): 55.8 ML/MIN/1.73 M^2
GLUCOSE SERPL-MCNC: 105 MG/DL (ref 70–110)
HCT VFR BLD AUTO: 41.2 % (ref 40–54)
HGB BLD-MCNC: 12.8 G/DL (ref 14–18)
IMM GRANULOCYTES # BLD AUTO: ABNORMAL K/UL (ref 0–0.04)
IMM GRANULOCYTES NFR BLD AUTO: ABNORMAL % (ref 0–0.5)
LYMPHOCYTES NFR BLD: 20 % (ref 18–48)
MAGNESIUM SERPL-MCNC: 1.9 MG/DL (ref 1.6–2.6)
MCH RBC QN AUTO: 28.8 PG (ref 27–31)
MCHC RBC AUTO-ENTMCNC: 31.1 G/DL (ref 32–36)
MCV RBC AUTO: 93 FL (ref 82–98)
METAMYELOCYTES NFR BLD MANUAL: 2 %
MONOCYTES NFR BLD: 16 % (ref 4–15)
MYELOCYTES NFR BLD MANUAL: 1 %
NEUTROPHILS NFR BLD: 55 % (ref 38–73)
NEUTS BAND NFR BLD MANUAL: 4 %
NRBC BLD-RTO: 0 /100 WBC
PHOSPHATE SERPL-MCNC: 2.6 MG/DL (ref 2.7–4.5)
PLATELET # BLD AUTO: 331 K/UL (ref 150–450)
PMV BLD AUTO: 10.3 FL (ref 9.2–12.9)
POTASSIUM SERPL-SCNC: 4.5 MMOL/L (ref 3.5–5.1)
RBC # BLD AUTO: 4.44 M/UL (ref 4.6–6.2)
SODIUM SERPL-SCNC: 140 MMOL/L (ref 136–145)
TACROLIMUS BLD-MCNC: 8.8 NG/ML (ref 5–15)
WBC # BLD AUTO: 4.37 K/UL (ref 3.9–12.7)

## 2024-06-24 PROCEDURE — 36415 COLL VENOUS BLD VENIPUNCTURE: CPT | Performed by: INTERNAL MEDICINE

## 2024-06-24 PROCEDURE — 80197 ASSAY OF TACROLIMUS: CPT | Performed by: INTERNAL MEDICINE

## 2024-06-24 PROCEDURE — 83735 ASSAY OF MAGNESIUM: CPT | Performed by: INTERNAL MEDICINE

## 2024-06-24 PROCEDURE — 85027 COMPLETE CBC AUTOMATED: CPT | Performed by: INTERNAL MEDICINE

## 2024-06-24 PROCEDURE — 85007 BL SMEAR W/DIFF WBC COUNT: CPT | Performed by: INTERNAL MEDICINE

## 2024-06-24 PROCEDURE — 80069 RENAL FUNCTION PANEL: CPT | Performed by: INTERNAL MEDICINE

## 2024-06-25 ENCOUNTER — PATIENT MESSAGE (OUTPATIENT)
Dept: TRANSPLANT | Facility: CLINIC | Age: 64
End: 2024-06-25
Payer: MEDICARE

## 2024-06-25 ENCOUNTER — OFFICE VISIT (OUTPATIENT)
Dept: URGENT CARE | Facility: CLINIC | Age: 64
End: 2024-06-25
Payer: MEDICARE

## 2024-06-25 VITALS
WEIGHT: 206.69 LBS | SYSTOLIC BLOOD PRESSURE: 133 MMHG | TEMPERATURE: 99 F | HEART RATE: 72 BPM | BODY MASS INDEX: 35.29 KG/M2 | DIASTOLIC BLOOD PRESSURE: 61 MMHG | OXYGEN SATURATION: 97 % | RESPIRATION RATE: 24 BRPM | HEIGHT: 64 IN

## 2024-06-25 DIAGNOSIS — B96.89 BACTERIAL UPPER RESPIRATORY INFECTION: Primary | ICD-10-CM

## 2024-06-25 DIAGNOSIS — R05.9 COUGH, UNSPECIFIED TYPE: ICD-10-CM

## 2024-06-25 DIAGNOSIS — E83.52 HYPERCALCEMIA: Primary | ICD-10-CM

## 2024-06-25 DIAGNOSIS — H10.9 BACTERIAL CONJUNCTIVITIS: ICD-10-CM

## 2024-06-25 DIAGNOSIS — J06.9 BACTERIAL UPPER RESPIRATORY INFECTION: Primary | ICD-10-CM

## 2024-06-25 PROCEDURE — 71046 X-RAY EXAM CHEST 2 VIEWS: CPT | Mod: S$GLB,,, | Performed by: RADIOLOGY

## 2024-06-25 PROCEDURE — 99214 OFFICE O/P EST MOD 30 MIN: CPT | Mod: S$GLB,,, | Performed by: NURSE PRACTITIONER

## 2024-06-25 RX ORDER — POLYMYXIN B SULFATE AND TRIMETHOPRIM 1; 10000 MG/ML; [USP'U]/ML
1 SOLUTION OPHTHALMIC EVERY 4 HOURS
Qty: 10 ML | Refills: 0 | Status: SHIPPED | OUTPATIENT
Start: 2024-06-25 | End: 2024-07-02

## 2024-06-25 RX ORDER — CINACALCET 30 MG/1
30 TABLET, FILM COATED ORAL
Qty: 30 TABLET | Refills: 11 | Status: SHIPPED | OUTPATIENT
Start: 2024-06-25 | End: 2025-06-25

## 2024-06-25 RX ORDER — BENZONATATE 200 MG/1
200 CAPSULE ORAL 3 TIMES DAILY PRN
Qty: 30 CAPSULE | Refills: 0 | Status: SHIPPED | OUTPATIENT
Start: 2024-06-25 | End: 2024-07-05

## 2024-06-25 RX ORDER — AMOXICILLIN AND CLAVULANATE POTASSIUM 875; 125 MG/1; MG/1
1 TABLET, FILM COATED ORAL 2 TIMES DAILY
Qty: 20 TABLET | Refills: 0 | Status: SHIPPED | OUTPATIENT
Start: 2024-06-25

## 2024-06-25 NOTE — PROGRESS NOTES
"Subjective:      Patient ID: Mark Lopez is a 64 y.o. male.    Vitals:  height is 5' 4" (1.626 m) and weight is 93.8 kg (206 lb 10.9 oz). His oral temperature is 99.3 °F (37.4 °C). His blood pressure is 133/61 and his pulse is 72. His respiration is 24 (abnormal) and oxygen saturation is 97%.     Chief Complaint: Cough    Pt is a 65 yo male w/ PMH of kidney transplant on immunosuppressants who states he has a cough, runny nose, and sore throat for 12 days. Pt states he also has redness, swelling, and eye discharge that started three days ago. Pt reports right side chest pain that he contributes to coughing and diarrhea for 24 hours. He has been taking lozenges for his symptoms with mild relief.     Cough  This is a new problem. The current episode started 1 to 4 weeks ago. The problem has been gradually worsening. The cough is Productive of sputum. Associated symptoms include chest pain, nasal congestion, a sore throat and wheezing. Nothing aggravates the symptoms. He has tried OTC cough suppressant for the symptoms. The treatment provided moderate relief. His past medical history is significant for bronchitis.       HENT:  Positive for sore throat.    Cardiovascular:  Positive for chest pain.   Respiratory:  Positive for cough and wheezing.       Objective:     Physical Exam   Constitutional: He is oriented to person, place, and time. He appears well-developed. He is cooperative.  Non-toxic appearance. He does not appear ill. No distress.   HENT:   Head: Normocephalic and atraumatic.   Ears:   Right Ear: Hearing, tympanic membrane, external ear and ear canal normal.   Left Ear: Hearing, tympanic membrane, external ear and ear canal normal.   Nose: Mucosal edema and congestion present. No rhinorrhea or nasal deformity. No epistaxis. Right sinus exhibits no maxillary sinus tenderness and no frontal sinus tenderness. Left sinus exhibits no maxillary sinus tenderness and no frontal sinus tenderness. "   Mouth/Throat: Uvula is midline and mucous membranes are normal. No trismus in the jaw. Normal dentition. No uvula swelling. Posterior oropharyngeal erythema present. No oropharyngeal exudate or posterior oropharyngeal edema.   Eyes: Conjunctivae and lids are normal. No scleral icterus.   Neck: Trachea normal and phonation normal. Neck supple. No edema present. No erythema present. No neck rigidity present.   Cardiovascular: Normal rate, regular rhythm, normal heart sounds and normal pulses.   Pulmonary/Chest: Effort normal and breath sounds normal. No tachypnea. No respiratory distress. He has no decreased breath sounds. He has no wheezes. He has no rhonchi.   Abdominal: Normal appearance.   Musculoskeletal: Normal range of motion.         General: No deformity. Normal range of motion.   Neurological: He is alert and oriented to person, place, and time. He exhibits normal muscle tone. Coordination normal.   Skin: Skin is warm, dry, intact, not diaphoretic and not pale.   Psychiatric: His speech is normal and behavior is normal. Judgment and thought content normal.   Nursing note and vitals reviewed.    XR CHEST PA AND LATERAL    Result Date: 6/25/2024  EXAMINATION: XR CHEST PA AND LATERAL CLINICAL HISTORY: Cough, unspecified TECHNIQUE: PA and lateral views of the chest were performed. COMPARISON: 02/29/2024. FINDINGS: Cardiac silhouette is normal in size.  Lungs are symmetrically expanded.  No evidence of focal consolidative process, pneumothorax, or significant pleural effusion.  No acute osseous abnormality identified.     No acute cardiopulmonary process identified. Electronically signed by: Moreno Wall MD Date:    06/25/2024 Time:    19:17     Assessment:     1. Bacterial upper respiratory infection    2. Cough, unspecified type    3. Bacterial conjunctivitis        Plan:       Bacterial upper respiratory infection  -     amoxicillin-clavulanate 875-125mg (AUGMENTIN) 875-125 mg per tablet; Take 1 tablet by  mouth 2 (two) times daily.  Dispense: 20 tablet; Refill: 0    Cough, unspecified type  -     XR CHEST PA AND LATERAL; Future; Expected date: 06/25/2024  -     benzonatate (TESSALON) 200 MG capsule; Take 1 capsule (200 mg total) by mouth 3 (three) times daily as needed for Cough.  Dispense: 30 capsule; Refill: 0    Bacterial conjunctivitis  -     polymyxin B sulf-trimethoprim (POLYTRIM) 10,000 unit- 1 mg/mL Drop; Place 1 drop into both eyes every 4 (four) hours. for 7 days  Dispense: 10 mL; Refill: 0      Patient Instructions   - You must understand that you have received an Urgent Care treatment only and that you may be released before all of your medical problems are known or treated.   - You, the patient, will arrange for follow up care as instructed.   - If your condition worsens or fails to improve we recommend that you receive another evaluation at the ER immediately or contact your PCP to discuss your concerns.   - You can call [phone] or [phone] to help schedule an appointment with the appropriate provider.    Drink plenty of fluids   Get lots of rest  Tylenol or ibuprofen for pain/fever  Mucinex DM for daytime cough  Prescription cough syrup for night time cough. This medication will make you drowsy. Do not drink alcohol or  Operate machinery while taking this medicine.   Saline nasal rinses to irrigate sinus cavities  Warm salt water gargles for sore throat

## 2024-06-25 NOTE — PATIENT INSTRUCTIONS
- You must understand that you have received an Urgent Care treatment only and that you may be released before all of your medical problems are known or treated.   - You, the patient, will arrange for follow up care as instructed.   - If your condition worsens or fails to improve we recommend that you receive another evaluation at the ER immediately or contact your PCP to discuss your concerns.   - You can call [phone] or [phone] to help schedule an appointment with the appropriate provider.    Drink plenty of fluids   Get lots of rest  Tylenol or ibuprofen for pain/fever  Mucinex DM for daytime cough  Prescription cough syrup for night time cough. This medication will make you drowsy. Do not drink alcohol or  Operate machinery while taking this medicine.   Saline nasal rinses to irrigate sinus cavities  Warm salt water gargles for sore throat

## 2024-06-25 NOTE — TELEPHONE ENCOUNTER
Notified patient of 's review of 6/24/24 lab results via [facility] message.     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 Alexander reviewed your 6/24/24 lab results. Your calcium level 10.9 is elevated. She recommends you start taking Sensipar 30mg to help reduce your calcium level.     The prescription for Sensipar will be sent to [facility].     Thanks,     Veronica        ----- Message from Veronica Simmons RN sent at 6/24/2024  7:06 PM CDT -----    ----- Message -----  From: Hope Alexander DO  Sent: 6/24/2024   3:59 PM CDT  To: [facility] Post-Kidney Transplant Clinical    Near baseline graft function  Acceptable FK level   Check PTH on next set of labs. Start on Sensipar 30 mg daily

## 2024-07-08 ENCOUNTER — LAB VISIT (OUTPATIENT)
Dept: LAB | Facility: HOSPITAL | Age: 64
End: 2024-07-08
Payer: MEDICARE

## 2024-07-08 DIAGNOSIS — Z94.0 KIDNEY REPLACED BY TRANSPLANT: ICD-10-CM

## 2024-07-08 DIAGNOSIS — Z94.0 IMMUNOSUPPRESSIVE MANAGEMENT ENCOUNTER FOLLOWING KIDNEY TRANSPLANT: ICD-10-CM

## 2024-07-08 DIAGNOSIS — N25.81 SECONDARY HYPERPARATHYROIDISM OF RENAL ORIGIN: ICD-10-CM

## 2024-07-08 DIAGNOSIS — Z79.899 IMMUNOSUPPRESSIVE MANAGEMENT ENCOUNTER FOLLOWING KIDNEY TRANSPLANT: ICD-10-CM

## 2024-07-08 LAB
ALBUMIN SERPL BCP-MCNC: 3.6 G/DL (ref 3.5–5.2)
ANION GAP SERPL CALC-SCNC: 7 MMOL/L (ref 8–16)
BASOPHILS NFR BLD: 2 % (ref 0–1.9)
BUN SERPL-MCNC: 37 MG/DL (ref 8–23)
CALCIUM SERPL-MCNC: 9.9 MG/DL (ref 8.7–10.5)
CHLORIDE SERPL-SCNC: 113 MMOL/L (ref 95–110)
CO2 SERPL-SCNC: 22 MMOL/L (ref 23–29)
CREAT SERPL-MCNC: 1.3 MG/DL (ref 0.5–1.4)
DIFFERENTIAL METHOD BLD: ABNORMAL
EOSINOPHIL NFR BLD: 6 % (ref 0–8)
ERYTHROCYTE [DISTWIDTH] IN BLOOD BY AUTOMATED COUNT: 13.8 % (ref 11.5–14.5)
EST. GFR  (NO RACE VARIABLE): >60 ML/MIN/1.73 M^2
GLUCOSE SERPL-MCNC: 92 MG/DL (ref 70–110)
HCT VFR BLD AUTO: 39.7 % (ref 40–54)
HGB BLD-MCNC: 12.3 G/DL (ref 14–18)
IMM GRANULOCYTES # BLD AUTO: ABNORMAL K/UL (ref 0–0.04)
IMM GRANULOCYTES NFR BLD AUTO: ABNORMAL % (ref 0–0.5)
LYMPHOCYTES NFR BLD: 8 % (ref 18–48)
MAGNESIUM SERPL-MCNC: 1.7 MG/DL (ref 1.6–2.6)
MCH RBC QN AUTO: 28.7 PG (ref 27–31)
MCHC RBC AUTO-ENTMCNC: 31 G/DL (ref 32–36)
MCV RBC AUTO: 93 FL (ref 82–98)
MONOCYTES NFR BLD: 7 % (ref 4–15)
NEUTROPHILS NFR BLD: 65 % (ref 38–73)
NEUTS BAND NFR BLD MANUAL: 12 %
NRBC BLD-RTO: 0 /100 WBC
PHOSPHATE SERPL-MCNC: 2.6 MG/DL (ref 2.7–4.5)
PLATELET # BLD AUTO: 295 K/UL (ref 150–450)
PMV BLD AUTO: 10.1 FL (ref 9.2–12.9)
POTASSIUM SERPL-SCNC: 4.4 MMOL/L (ref 3.5–5.1)
PTH-INTACT SERPL-MCNC: 137.1 PG/ML (ref 9–77)
RBC # BLD AUTO: 4.29 M/UL (ref 4.6–6.2)
SODIUM SERPL-SCNC: 142 MMOL/L (ref 136–145)
TACROLIMUS BLD-MCNC: 14.9 NG/ML (ref 5–15)
WBC # BLD AUTO: 4.38 K/UL (ref 3.9–12.7)

## 2024-07-08 PROCEDURE — 36415 COLL VENOUS BLD VENIPUNCTURE: CPT | Performed by: INTERNAL MEDICINE

## 2024-07-08 PROCEDURE — 83970 ASSAY OF PARATHORMONE: CPT | Performed by: INTERNAL MEDICINE

## 2024-07-08 PROCEDURE — 83735 ASSAY OF MAGNESIUM: CPT | Performed by: INTERNAL MEDICINE

## 2024-07-08 PROCEDURE — 80069 RENAL FUNCTION PANEL: CPT | Performed by: INTERNAL MEDICINE

## 2024-07-08 PROCEDURE — 85007 BL SMEAR W/DIFF WBC COUNT: CPT | Performed by: INTERNAL MEDICINE

## 2024-07-08 PROCEDURE — 80197 ASSAY OF TACROLIMUS: CPT | Performed by: INTERNAL MEDICINE

## 2024-07-08 PROCEDURE — 85027 COMPLETE CBC AUTOMATED: CPT | Performed by: INTERNAL MEDICINE

## 2024-07-08 RX ORDER — TACROLIMUS 1 MG/1
4 CAPSULE ORAL EVERY 12 HOURS
Qty: 240 CAPSULE | Refills: 11 | Status: SHIPPED | OUTPATIENT
Start: 2024-07-09 | End: 2025-07-09

## 2024-07-08 NOTE — TELEPHONE ENCOUNTER
Pt notified to hold prograf tonight. Restart Prograf tomorrow morning at 4 mg twice a day. Repeat level on 7/15    ----- Message from Keanu Stokes MD sent at 7/8/2024 11:49 AM CDT -----  Hold prograf tonight lower prograf to 4 mg PO bid and repeat a level next week

## 2024-07-09 ENCOUNTER — OFFICE VISIT (OUTPATIENT)
Dept: DERMATOLOGY | Facility: CLINIC | Age: 64
End: 2024-07-09
Payer: MEDICARE

## 2024-07-09 DIAGNOSIS — Z12.83 SKIN CANCER SCREENING: Primary | ICD-10-CM

## 2024-07-09 DIAGNOSIS — L85.3 XEROSIS CUTIS: ICD-10-CM

## 2024-07-09 PROCEDURE — 99211 OFF/OP EST MAY X REQ PHY/QHP: CPT | Mod: PBBFAC | Performed by: DERMATOLOGY

## 2024-07-09 PROCEDURE — 99999 PR PBB SHADOW E&M-EST. PATIENT-LVL I: CPT | Mod: PBBFAC,,, | Performed by: DERMATOLOGY

## 2024-07-09 PROCEDURE — 99213 OFFICE O/P EST LOW 20 MIN: CPT | Mod: S$PBB,,, | Performed by: DERMATOLOGY

## 2024-07-09 PROCEDURE — G2211 COMPLEX E/M VISIT ADD ON: HCPCS | Mod: S$PBB,,, | Performed by: DERMATOLOGY

## 2024-07-09 NOTE — PROGRESS NOTES
Subjective:      Patient ID:  Mark Lopez is a 64 y.o. male who presents for   Chief Complaint   Patient presents with    Skin Check     HPI  Patient is here today for a skin check.    Last seen on 4- for a lesion to the left forearm that was removed; biopsy showed a verruca.  Has healed well.  Hx of renal transplant -2024. Remains fatigued from this. Per pt his kidney is doing very well.  The patient denies any moles or growths of the skin that are rapidly growing, hurting, itching, bleeding, or changing colors.  Has not had skin check previously.  Indoors often.      Review of Systems   Skin:  Positive for daily sunscreen use and wears hat. Negative for activity-related sunscreen use and recent sunburn.   Hematologic/Lymphatic: Does not bruise/bleed easily.       Objective:   Physical Exam   Constitutional: He appears well-developed and well-nourished. No distress.   Neurological: He is alert and oriented to person, place, and time. He is not disoriented.   Psychiatric: He has a normal mood and affect.   Skin:   Areas Examined (abnormalities noted in diagram):   Scalp / Hair Palpated and Inspected  Head / Face Inspection Performed  Neck Inspection Performed  Chest / Axilla Inspection Performed  Abdomen Inspection Performed  Genitals / Buttocks / Groin Inspection Performed  Back Inspection Performed  RUE Inspected  LUE Inspection Performed  RLE Inspected  LLE Inspection Performed  Nails and Digits Inspection Performed            Diagram Legend     Erythematous scaling macule/papule c/w actinic keratosis       Vascular papule c/w angioma      Pigmented verrucoid papule/plaque c/w seborrheic keratosis      Yellow umbilicated papule c/w sebaceous hyperplasia      Irregularly shaped tan macule c/w lentigo     1-2 mm smooth white papules consistent with Milia      Movable subcutaneous cyst with punctum c/w epidermal inclusion cyst      Subcutaneous movable cyst c/w pilar cyst      Firm pink to brown papule  c/w dermatofibroma      Pedunculated fleshy papule(s) c/w skin tag(s)      Evenly pigmented macule c/w junctional nevus     Mildly variegated pigmented, slightly irregular-bordered macule c/w mildly atypical nevus      Flesh colored to evenly pigmented papule c/w intradermal nevus       Pink pearly papule/plaque c/w basal cell carcinoma      Erythematous hyperkeratotic cursted plaque c/w SCC      Surgical scar with no sign of skin cancer recurrence      Open and closed comedones      Inflammatory papules and pustules      Verrucoid papule consistent consistent with wart     Erythematous eczematous patches and plaques     Dystrophic onycholytic nail with subungual debris c/w onychomycosis     Umbilicated papule    Erythematous-base heme-crusted tan verrucoid plaque consistent with inflamed seborrheic keratosis     Erythematous Silvery Scaling Plaque c/w Psoriasis     See annotation      Assessment / Plan:        Skin cancer screening  Total body skin examination performed today including at least 12 points as noted in physical examination. No lesions suspicious for malignancy noted.  Recommend daily sun protection/avoidance, use of at least SPF 30, broad spectrum sunscreen (OTC drug), skin self examinations, and routine physician surveillance to optimize early detection  Patients that have undergone transplants have a significantly higher rate of skin cancer development over time due to immunosuppression.  Total body skin cancer screenings should be performed at least once a year.  We reviewed the importance of proper sun protection, including wearing a hat, sunblock of at least SPF 30, and sun protective clothing.  It is especially important to reapply sunblock for any prolonged sun exposure.    Xerosis cutis  Good skin care regimen discussed including limiting to one bath or shower/day, using lukewarm water with mild soap and moisturizing cream to skin 1 - 2x/day.            No follow-ups on file.

## 2024-07-15 ENCOUNTER — LAB VISIT (OUTPATIENT)
Dept: LAB | Facility: HOSPITAL | Age: 64
End: 2024-07-15
Attending: INTERNAL MEDICINE
Payer: MEDICARE

## 2024-07-15 DIAGNOSIS — Z94.0 IMMUNOSUPPRESSIVE MANAGEMENT ENCOUNTER FOLLOWING KIDNEY TRANSPLANT: ICD-10-CM

## 2024-07-15 DIAGNOSIS — Z79.899 IMMUNOSUPPRESSIVE MANAGEMENT ENCOUNTER FOLLOWING KIDNEY TRANSPLANT: ICD-10-CM

## 2024-07-15 LAB — TACROLIMUS BLD-MCNC: 6.6 NG/ML (ref 5–15)

## 2024-07-15 PROCEDURE — 36415 COLL VENOUS BLD VENIPUNCTURE: CPT | Performed by: INTERNAL MEDICINE

## 2024-07-15 PROCEDURE — 80197 ASSAY OF TACROLIMUS: CPT | Performed by: INTERNAL MEDICINE

## 2024-07-17 ENCOUNTER — TELEPHONE (OUTPATIENT)
Dept: TRANSPLANT | Facility: CLINIC | Age: 64
End: 2024-07-17
Payer: MEDICARE

## 2024-07-17 DIAGNOSIS — J06.9 UPPER RESPIRATORY TRACT INFECTION, UNSPECIFIED TYPE: Primary | ICD-10-CM

## 2024-07-17 NOTE — TELEPHONE ENCOUNTER
"Coordinator received a message from patient regarding symptoms of possible recurrence of URI.     Reviewed with Dr. Alexander.Recommendations given for patient: He needs to get tested for COVID and flu first? In the meantime, if he is able to he can get a CXR with us tomorrow     Faiza [facility] message:    Millie Lopez,     Since you are experiencing worsening symptoms, you need to go to Urgent care so you at least get tested for COVID & flu first. In the meantime, we can order a chest x-ray for you to have done tomorrow & go from there. The order is entered so you can go to the image center on [facility] to have it done.     If you are experiencing shortness of breath then you'll need to report directly to the ER.     Veronica        ===View-only below this line===      ----- Message -----       From:Mark Cloudsilva Lopez "Father Mark"       Sent:7/17/2024  8:34 AM CDT         To:User Message Message List    Subject:Lab results, medication and appointment    Since mid-June Addy had a cough (generally non-productive) and a sore throat. After 12 days I went to Urgent care and was prescribed a 10-day course of Amoxicillin-clavulanate, 875-125mg & Benzonatate, 200mg. Symptoms were greatly mitigated during that 10-day period. The robust coughing resumed about 3 days later and the sore throat came back this Monday. This morning, there is some pain in my left ear canal, especially when I swallow. My home health nurse yesterday and the one who visited last week listened with stethoscopes and found no fluid in the lungs to suggest bronchitis or asthma. Please advise.       "

## 2024-07-18 ENCOUNTER — OFFICE VISIT (OUTPATIENT)
Dept: URGENT CARE | Facility: CLINIC | Age: 64
End: 2024-07-18
Payer: MEDICARE

## 2024-07-18 VITALS
BODY MASS INDEX: 35.17 KG/M2 | OXYGEN SATURATION: 98 % | RESPIRATION RATE: 18 BRPM | HEIGHT: 64 IN | HEART RATE: 63 BPM | SYSTOLIC BLOOD PRESSURE: 120 MMHG | WEIGHT: 206 LBS | TEMPERATURE: 99 F | DIASTOLIC BLOOD PRESSURE: 71 MMHG

## 2024-07-18 DIAGNOSIS — R05.9 COUGH, UNSPECIFIED TYPE: ICD-10-CM

## 2024-07-18 DIAGNOSIS — J06.9 VIRAL URI WITH COUGH: Primary | ICD-10-CM

## 2024-07-18 LAB
CTP QC/QA: YES
SARS-COV-2 AG RESP QL IA.RAPID: NEGATIVE

## 2024-07-18 PROCEDURE — 99213 OFFICE O/P EST LOW 20 MIN: CPT | Mod: S$GLB,,,

## 2024-07-18 PROCEDURE — 87811 SARS-COV-2 COVID19 W/OPTIC: CPT | Mod: QW,S$GLB,,

## 2024-07-18 RX ORDER — CETIRIZINE HYDROCHLORIDE 10 MG/1
10 TABLET ORAL DAILY
Qty: 30 TABLET | Refills: 0 | Status: SHIPPED | OUTPATIENT
Start: 2024-07-18 | End: 2024-08-17

## 2024-07-18 RX ORDER — BENZONATATE 200 MG/1
200 CAPSULE ORAL 3 TIMES DAILY PRN
Qty: 30 CAPSULE | Refills: 0 | Status: SHIPPED | OUTPATIENT
Start: 2024-07-18 | End: 2024-07-28

## 2024-07-18 RX ORDER — FLUTICASONE PROPIONATE 50 MCG
1 SPRAY, SUSPENSION (ML) NASAL DAILY
Qty: 9.9 ML | Refills: 0 | Status: SHIPPED | OUTPATIENT
Start: 2024-07-18 | End: 2024-08-17

## 2024-07-18 NOTE — PROGRESS NOTES
"Subjective:      Patient ID: Mark Lopez is a 64 y.o. male.    Vitals:  height is 5' 4" (1.626 m) and weight is 93.4 kg (206 lb). His oral temperature is 98.6 °F (37 °C). His blood pressure is 120/71 and his pulse is 63. His respiration is 18 and oxygen saturation is 98%.     Chief Complaint: Cough    63 yo male c/o of cough, sore throat, and left ear pain. Pt states he has been having a cough for over a month. Pt states he was seen 2 weeks ago an was prescribe some amoxicillin, he states he took the 10 day dose which helped but symptoms returned 3 days later. He denies fever, body aches or chills. Denies SOB. Denies post nasal drip or sinus congestion.     Cough  This is a new problem. The current episode started 1 to 4 weeks ago. The problem has been unchanged. The problem occurs constantly. The cough is Non-productive. Associated symptoms include ear pain and a sore throat. Pertinent negatives include no chest pain, chills, ear congestion, fever, headaches, heartburn, hemoptysis, myalgias, nasal congestion, postnasal drip, rash, rhinorrhea, shortness of breath, sweats, weight loss or wheezing. Nothing aggravates the symptoms. He has tried OTC cough suppressant and prescription cough suppressant for the symptoms. The treatment provided mild relief. His past medical history is significant for asthma, bronchitis and environmental allergies. There is no history of bronchiectasis, COPD, emphysema or pneumonia.       Constitution: Negative for chills and fever.   HENT:  Positive for ear pain and sore throat. Negative for congestion, postnasal drip, sinus pain and sinus pressure.    Cardiovascular:  Negative for chest pain.   Respiratory:  Positive for cough. Negative for chest tightness, sputum production, bloody sputum, shortness of breath and wheezing.    Gastrointestinal:  Negative for heartburn.   Musculoskeletal:  Negative for muscle ache.   Skin:  Negative for rash.   Allergic/Immunologic: Positive for " environmental allergies.   Neurological:  Negative for headaches.      Objective:     Physical Exam   Constitutional: He is oriented to person, place, and time. He appears well-developed. He is cooperative.  Non-toxic appearance. He does not appear ill. No distress.      Comments:Patient sits comfortably in exam chair. Answers questions in complete sentences. Does not show any signs of distress or discoloration.        HENT:   Head: Normocephalic and atraumatic.   Ears:   Right Ear: Hearing, tympanic membrane, external ear and ear canal normal. no impacted cerumen  Left Ear: Hearing, external ear and ear canal normal. A middle ear effusion is present. no impacted cerumen  Nose: Nose normal. No mucosal edema, rhinorrhea, nasal deformity or congestion. No epistaxis. Right sinus exhibits no maxillary sinus tenderness and no frontal sinus tenderness. Left sinus exhibits no maxillary sinus tenderness and no frontal sinus tenderness.   Mouth/Throat: Uvula is midline, oropharynx is clear and moist and mucous membranes are normal. No trismus in the jaw. Normal dentition. No uvula swelling. No oropharyngeal exudate, posterior oropharyngeal edema or posterior oropharyngeal erythema.   Eyes: Conjunctivae and lids are normal. No scleral icterus.   Neck: Trachea normal and phonation normal. Neck supple. No edema present. No erythema present. No neck rigidity present.   Cardiovascular: Normal rate, regular rhythm, normal heart sounds and normal pulses.   Pulmonary/Chest: Effort normal and breath sounds normal. No stridor. No respiratory distress. He has no decreased breath sounds. He has no wheezes. He has no rhonchi. He has no rales.   Abdominal: Normal appearance.   Musculoskeletal: Normal range of motion.         General: No deformity. Normal range of motion.   Lymphadenopathy:     He has no cervical adenopathy.        Right cervical: No superficial cervical, no deep cervical and no posterior cervical adenopathy present.        Left cervical: No superficial cervical, no deep cervical and no posterior cervical adenopathy present.   Neurological: He is alert and oriented to person, place, and time. He exhibits normal muscle tone. Coordination normal.   Skin: Skin is warm, dry, intact, not diaphoretic and not pale.   Psychiatric: His speech is normal and behavior is normal. Judgment and thought content normal.   Nursing note and vitals reviewed.      Assessment:     1. Viral URI with cough    2. Cough, unspecified type        Plan:       Viral URI with cough  -     benzonatate (TESSALON) 200 MG capsule; Take 1 capsule (200 mg total) by mouth 3 (three) times daily as needed for Cough.  Dispense: 30 capsule; Refill: 0  -     cetirizine (ZYRTEC) 10 MG tablet; Take 1 tablet (10 mg total) by mouth once daily.  Dispense: 30 tablet; Refill: 0  -     fluticasone propionate (FLONASE) 50 mcg/actuation nasal spray; 1 spray (50 mcg total) by Each Nostril route once daily.  Dispense: 9.9 mL; Refill: 0    Cough, unspecified type  -     SARS Coronavirus 2 Antigen, POCT Manual Read             Patient Instructions   - Rest.    - Drink plenty of fluids. Increasing your fluid intake will help loosen up mucous.     - You can take over-the-counter claritin, zyrtec, allegra, OR xyzal as directed. These are antihistamines that can help with runny nose, nasal congestion, sneezing, and helps to dry up post-nasal drip, which usually causes sore throat and cough.    - If you do NOT have high blood pressure, you may use a decongestant form (D)  of this medication (ie. Claritin- D, zyrtec-D, allegra-D) or if you do not take the D form, you can take sudafed (pseudoephedrine) over the counter, which is a decongestant. Do NOT take two decongestant (D) medications at the same time (such as mucinex-D and claritin-D or plain sudafed and claritin D). Dextromethorphan (DM) is a cough suppressant over the counter (ie. mucinex DM, robitussin, delsym; dayquil/nyquil has DM as  well.)     - You can use Flonase (fluticasone) nasal spray as directed for sinus congestion and postnasal drip. This is a steroid nasal spray that works locally over time to decrease the inflammation in your nose/sinuses and help with allergic symptoms. This is not an quick- relief spray like afrin, but it works well if used daily.  Discontinue if you develop nose bleed  - Use nasal saline prior to Flonase.  - Use Ocean Spray Nasal Saline 1-3 puffs each nostril every 2-3 hours then blow out onto tissue. This is to irrigate the nasal passage way to clear the sinus openings. Use until sinus problem resolved.    - A Neti Pot with sterile saline can help break up nasal congestion and give relief.      - Chloraseptic throat spray can help numb the throat.     - Warm salt water gargles can help with sore throat.  - Warm tea with honey can help with sore throat and cough. Honey is a natural cough suppressant.     - Rest.    - Drink plenty of fluids.    - Acetaminophen (tylenol) or Ibuprofen (advil,motrin) as directed as needed for fever/pain. Avoid tylenol if you have a history of liver disease. Do not take ibuprofen if you have a history of GI bleeding, kidney disease, or if you take blood thinners.     - You must understand that you have received an Urgent Care treatment only and that you may be released before all of your medical problems are known or treated.   - You, the patient, will arrange for follow up care as instructed.   - If your condition worsens or fails to improve we recommend that you receive another evaluation at the ER immediately or contact your PCP to discuss your concerns or return here.   - Follow up with your PCP or specialty clinic as directed in the next 1-2 weeks if not improved or as needed.  You can call [phone] to schedule an appointment with the appropriate provider.    If your symptoms do not improve or worsen, go to the emergency room immediately.

## 2024-07-18 NOTE — PATIENT INSTRUCTIONS
- Rest.    - Drink plenty of fluids. Increasing your fluid intake will help loosen up mucous.     - You can take over-the-counter claritin, zyrtec, allegra, OR xyzal as directed. These are antihistamines that can help with runny nose, nasal congestion, sneezing, and helps to dry up post-nasal drip, which usually causes sore throat and cough.    - If you do NOT have high blood pressure, you may use a decongestant form (D)  of this medication (ie. Claritin- D, zyrtec-D, allegra-D) or if you do not take the D form, you can take sudafed (pseudoephedrine) over the counter, which is a decongestant. Do NOT take two decongestant (D) medications at the same time (such as mucinex-D and claritin-D or plain sudafed and claritin D). Dextromethorphan (DM) is a cough suppressant over the counter (ie. mucinex DM, robitussin, delsym; dayquil/nyquil has DM as well.)     - You can use Flonase (fluticasone) nasal spray as directed for sinus congestion and postnasal drip. This is a steroid nasal spray that works locally over time to decrease the inflammation in your nose/sinuses and help with allergic symptoms. This is not an quick- relief spray like afrin, but it works well if used daily.  Discontinue if you develop nose bleed  - Use nasal saline prior to Flonase.  - Use Ocean Spray Nasal Saline 1-3 puffs each nostril every 2-3 hours then blow out onto tissue. This is to irrigate the nasal passage way to clear the sinus openings. Use until sinus problem resolved.    - A Neti Pot with sterile saline can help break up nasal congestion and give relief.      - Chloraseptic throat spray can help numb the throat.     - Warm salt water gargles can help with sore throat.  - Warm tea with honey can help with sore throat and cough. Honey is a natural cough suppressant.     - Rest.    - Drink plenty of fluids.    - Acetaminophen (tylenol) or Ibuprofen (advil,motrin) as directed as needed for fever/pain. Avoid tylenol if you have a history of  liver disease. Do not take ibuprofen if you have a history of GI bleeding, kidney disease, or if you take blood thinners.     - You must understand that you have received an Urgent Care treatment only and that you may be released before all of your medical problems are known or treated.   - You, the patient, will arrange for follow up care as instructed.   - If your condition worsens or fails to improve we recommend that you receive another evaluation at the ER immediately or contact your PCP to discuss your concerns or return here.   - Follow up with your PCP or specialty clinic as directed in the next 1-2 weeks if not improved or as needed.  You can call [phone] to schedule an appointment with the appropriate provider.    If your symptoms do not improve or worsen, go to the emergency room immediately.

## 2024-07-22 ENCOUNTER — LAB VISIT (OUTPATIENT)
Dept: LAB | Facility: HOSPITAL | Age: 64
End: 2024-07-22
Attending: INTERNAL MEDICINE
Payer: MEDICARE

## 2024-07-22 DIAGNOSIS — Z79.899 IMMUNOSUPPRESSIVE MANAGEMENT ENCOUNTER FOLLOWING KIDNEY TRANSPLANT: ICD-10-CM

## 2024-07-22 DIAGNOSIS — Z94.0 IMMUNOSUPPRESSIVE MANAGEMENT ENCOUNTER FOLLOWING KIDNEY TRANSPLANT: ICD-10-CM

## 2024-07-22 DIAGNOSIS — Z94.0 KIDNEY REPLACED BY TRANSPLANT: Primary | ICD-10-CM

## 2024-07-22 DIAGNOSIS — Z94.0 KIDNEY REPLACED BY TRANSPLANT: ICD-10-CM

## 2024-07-22 LAB
ALBUMIN SERPL BCP-MCNC: 3.7 G/DL (ref 3.5–5.2)
ANION GAP SERPL CALC-SCNC: 9 MMOL/L (ref 8–16)
ANISOCYTOSIS BLD QL SMEAR: SLIGHT
BASOPHILS # BLD AUTO: ABNORMAL K/UL (ref 0–0.2)
BASOPHILS NFR BLD: 0 % (ref 0–1.9)
BUN SERPL-MCNC: 35 MG/DL (ref 8–23)
CALCIUM SERPL-MCNC: 9.7 MG/DL (ref 8.7–10.5)
CHLORIDE SERPL-SCNC: 109 MMOL/L (ref 95–110)
CO2 SERPL-SCNC: 25 MMOL/L (ref 23–29)
CREAT SERPL-MCNC: 1.4 MG/DL (ref 0.5–1.4)
DIFFERENTIAL METHOD BLD: ABNORMAL
EOSINOPHIL # BLD AUTO: ABNORMAL K/UL (ref 0–0.5)
EOSINOPHIL NFR BLD: 0 % (ref 0–8)
ERYTHROCYTE [DISTWIDTH] IN BLOOD BY AUTOMATED COUNT: 14.1 % (ref 11.5–14.5)
EST. GFR  (NO RACE VARIABLE): 55.8 ML/MIN/1.73 M^2
GLUCOSE SERPL-MCNC: 89 MG/DL (ref 70–110)
HCT VFR BLD AUTO: 40.6 % (ref 40–54)
HGB BLD-MCNC: 12.9 G/DL (ref 14–18)
IMM GRANULOCYTES # BLD AUTO: ABNORMAL K/UL (ref 0–0.04)
IMM GRANULOCYTES NFR BLD AUTO: ABNORMAL % (ref 0–0.5)
LYMPHOCYTES # BLD AUTO: ABNORMAL K/UL (ref 1–4.8)
LYMPHOCYTES NFR BLD: 3 % (ref 18–48)
MAGNESIUM SERPL-MCNC: 1.7 MG/DL (ref 1.6–2.6)
MCH RBC QN AUTO: 29.2 PG (ref 27–31)
MCHC RBC AUTO-ENTMCNC: 31.8 G/DL (ref 32–36)
MCV RBC AUTO: 92 FL (ref 82–98)
MONOCYTES # BLD AUTO: ABNORMAL K/UL (ref 0.3–1)
MONOCYTES NFR BLD: 4 % (ref 4–15)
NEUTROPHILS NFR BLD: 92 % (ref 38–73)
NEUTS BAND NFR BLD MANUAL: 1 %
NRBC BLD-RTO: 0 /100 WBC
PHOSPHATE SERPL-MCNC: 2.3 MG/DL (ref 2.7–4.5)
PLATELET # BLD AUTO: 250 K/UL (ref 150–450)
PLATELET BLD QL SMEAR: ABNORMAL
PMV BLD AUTO: 10.3 FL (ref 9.2–12.9)
POTASSIUM SERPL-SCNC: 4.2 MMOL/L (ref 3.5–5.1)
RBC # BLD AUTO: 4.42 M/UL (ref 4.6–6.2)
SODIUM SERPL-SCNC: 143 MMOL/L (ref 136–145)
TACROLIMUS BLD-MCNC: 5.6 NG/ML (ref 5–15)
WBC # BLD AUTO: 7.84 K/UL (ref 3.9–12.7)

## 2024-07-22 PROCEDURE — 85007 BL SMEAR W/DIFF WBC COUNT: CPT | Performed by: INTERNAL MEDICINE

## 2024-07-22 PROCEDURE — 36415 COLL VENOUS BLD VENIPUNCTURE: CPT | Performed by: INTERNAL MEDICINE

## 2024-07-22 PROCEDURE — 80069 RENAL FUNCTION PANEL: CPT | Performed by: INTERNAL MEDICINE

## 2024-07-22 PROCEDURE — 83735 ASSAY OF MAGNESIUM: CPT | Performed by: INTERNAL MEDICINE

## 2024-07-22 PROCEDURE — 85027 COMPLETE CBC AUTOMATED: CPT | Performed by: INTERNAL MEDICINE

## 2024-07-22 PROCEDURE — 80197 ASSAY OF TACROLIMUS: CPT | Performed by: INTERNAL MEDICINE

## 2024-07-22 NOTE — TELEPHONE ENCOUNTER
Notified patient of Dr. Alexander's review of 7/22/24 lab results via [facility].     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 Alexander reviewed your 7/22/24 lab results. Your CO2 level 25 is normal. She wants you to please decrease your sodium bicarbonate to 650mg (1 tablet) twice daily. Your prograf level is lower than it should be. She wants you to increase your prograf dose to 5mg in the AM & 4mg in the PM.     Thanks,     Veronica       ----- Message from Veronica Simmons RN sent at 7/22/2024  3:37 PM CDT -----    ----- Message -----  From: Hope Alexander DO  Sent: 7/22/2024  12:53 PM CDT  To: [facility] Post-Kidney Transplant Clinical    Baseline graft function.   Can decrease sodium bicarb to 650 mg BID from 1300 mg BID   Low FK. Increase FK to 5/4. Confirm that it is a true FK trough though first

## 2024-07-23 ENCOUNTER — TELEPHONE (OUTPATIENT)
Dept: TRANSPLANT | Facility: CLINIC | Age: 64
End: 2024-07-23
Payer: MEDICARE

## 2024-07-23 ENCOUNTER — PATIENT MESSAGE (OUTPATIENT)
Dept: TRANSPLANT | Facility: CLINIC | Age: 64
End: 2024-07-23
Payer: MEDICARE

## 2024-07-23 RX ORDER — TACROLIMUS 1 MG/1
CAPSULE ORAL
Qty: 270 CAPSULE | Refills: 11 | Status: ON HOLD | OUTPATIENT
Start: 2024-07-23 | End: 2025-07-23

## 2024-07-23 RX ORDER — SODIUM BICARBONATE 650 MG/1
650 TABLET ORAL 2 TIMES DAILY
Qty: 60 TABLET | Refills: 11 | Status: ON HOLD | OUTPATIENT
Start: 2024-07-23 | End: 2025-07-23

## 2024-07-23 NOTE — TELEPHONE ENCOUNTER
Coordinator contacted patient for additional information with regards to [facility] message received patient. Patient reports multiple readings of high temps. Reports last temp as @ 9:20AM of 101. Also reports he still has a cough but it's not as bad as it was last week. Coordinator strongly advised patient to report to [facility] ER for further evaluation. Patient verbalized understanding stating he will report to ER.    Rash of foot

## 2024-07-24 ENCOUNTER — HOSPITAL ENCOUNTER (INPATIENT)
Facility: HOSPITAL | Age: 64
LOS: 4 days | Discharge: HOME | DRG: 872 | End: 2024-07-28
Attending: STUDENT IN AN ORGANIZED HEALTH CARE EDUCATION/TRAINING PROGRAM | Admitting: INTERNAL MEDICINE
Payer: MEDICARE

## 2024-07-24 DIAGNOSIS — R00.0 TACHYCARDIA: ICD-10-CM

## 2024-07-24 DIAGNOSIS — Z79.60 LONG-TERM USE OF IMMUNOSUPPRESSANT MEDICATION: ICD-10-CM

## 2024-07-24 DIAGNOSIS — Z94.0 KIDNEY REPLACED BY TRANSPLANT: ICD-10-CM

## 2024-07-24 DIAGNOSIS — E87.20 ACIDOSIS: ICD-10-CM

## 2024-07-24 DIAGNOSIS — A41.9 SEPSIS, DUE TO UNSPECIFIED ORGANISM, UNSPECIFIED WHETHER ACUTE ORGAN DYSFUNCTION PRESENT: Primary | ICD-10-CM

## 2024-07-24 DIAGNOSIS — I10 ESSENTIAL HYPERTENSION: ICD-10-CM

## 2024-07-24 DIAGNOSIS — A41.9 SEPSIS: ICD-10-CM

## 2024-07-24 DIAGNOSIS — Z96.41 INSULIN PUMP IN PLACE: ICD-10-CM

## 2024-07-24 DIAGNOSIS — R50.9 FEVER: ICD-10-CM

## 2024-07-24 DIAGNOSIS — Z91.89 AT RISK FOR OPPORTUNISTIC INFECTIONS: ICD-10-CM

## 2024-07-24 DIAGNOSIS — E11.22 TYPE 2 DIABETES MELLITUS WITH STAGE 3 CHRONIC KIDNEY DISEASE, WITH LONG-TERM CURRENT USE OF INSULIN, UNSPECIFIED WHETHER STAGE 3A OR 3B CKD: ICD-10-CM

## 2024-07-24 DIAGNOSIS — D64.9 ACUTE ON CHRONIC ANEMIA: ICD-10-CM

## 2024-07-24 DIAGNOSIS — Z79.4 TYPE 2 DIABETES MELLITUS WITH STAGE 3 CHRONIC KIDNEY DISEASE, WITH LONG-TERM CURRENT USE OF INSULIN, UNSPECIFIED WHETHER STAGE 3A OR 3B CKD: ICD-10-CM

## 2024-07-24 DIAGNOSIS — Z94.0 STATUS POST DECEASED-DONOR KIDNEY TRANSPLANTATION: ICD-10-CM

## 2024-07-24 DIAGNOSIS — Z29.89 PROPHYLACTIC IMMUNOTHERAPY: ICD-10-CM

## 2024-07-24 DIAGNOSIS — R19.7 DIARRHEA, UNSPECIFIED TYPE: ICD-10-CM

## 2024-07-24 DIAGNOSIS — N18.30 TYPE 2 DIABETES MELLITUS WITH STAGE 3 CHRONIC KIDNEY DISEASE, WITH LONG-TERM CURRENT USE OF INSULIN, UNSPECIFIED WHETHER STAGE 3A OR 3B CKD: ICD-10-CM

## 2024-07-24 LAB
ADENOVIRUS: NOT DETECTED
ALBUMIN SERPL BCP-MCNC: 3.5 G/DL (ref 3.5–5.2)
ALLENS TEST: NORMAL
ALP SERPL-CCNC: 83 U/L (ref 55–135)
ALT SERPL W/O P-5'-P-CCNC: 15 U/L (ref 10–44)
ANION GAP SERPL CALC-SCNC: 12 MMOL/L (ref 8–16)
ANISOCYTOSIS BLD QL SMEAR: SLIGHT
AST SERPL-CCNC: 27 U/L (ref 10–40)
BACTERIA #/AREA URNS AUTO: ABNORMAL /HPF
BASOPHILS NFR BLD: 0 % (ref 0–1.9)
BILIRUB SERPL-MCNC: 0.6 MG/DL (ref 0.1–1)
BILIRUB UR QL STRIP: NEGATIVE
BORDETELLA PARAPERTUSSIS (IS1001): NOT DETECTED
BORDETELLA PERTUSSIS (PTXP): NOT DETECTED
BUN SERPL-MCNC: 35 MG/DL (ref 8–23)
CALCIUM SERPL-MCNC: 10 MG/DL (ref 8.7–10.5)
CHLAMYDIA PNEUMONIAE: NOT DETECTED
CHLORIDE SERPL-SCNC: 105 MMOL/L (ref 95–110)
CLARITY UR REFRACT.AUTO: CLEAR
CO2 SERPL-SCNC: 18 MMOL/L (ref 23–29)
COLOR UR AUTO: COLORLESS
CORONAVIRUS 229E, COMMON COLD VIRUS: NOT DETECTED
CORONAVIRUS HKU1, COMMON COLD VIRUS: NOT DETECTED
CORONAVIRUS NL63, COMMON COLD VIRUS: NOT DETECTED
CORONAVIRUS OC43, COMMON COLD VIRUS: NOT DETECTED
CREAT SERPL-MCNC: 1.7 MG/DL (ref 0.5–1.4)
DIFFERENTIAL METHOD BLD: ABNORMAL
EOSINOPHIL NFR BLD: 0 % (ref 0–8)
ERYTHROCYTE [DISTWIDTH] IN BLOOD BY AUTOMATED COUNT: 13.8 % (ref 11.5–14.5)
EST. GFR  (NO RACE VARIABLE): 44.2 ML/MIN/1.73 M^2
ESTIMATED AVG GLUCOSE: 108 MG/DL (ref 68–131)
FLUBV RNA NPH QL NAA+NON-PROBE: NOT DETECTED
GLUCOSE SERPL-MCNC: 94 MG/DL (ref 70–110)
GLUCOSE UR QL STRIP: NEGATIVE
GROUP A STREP, MOLECULAR: NEGATIVE
HBA1C MFR BLD: 5.4 % (ref 4–5.6)
HCT VFR BLD AUTO: 36.6 % (ref 40–54)
HGB BLD-MCNC: 11.7 G/DL (ref 14–18)
HGB UR QL STRIP: NEGATIVE
HPIV1 RNA NPH QL NAA+NON-PROBE: NOT DETECTED
HPIV2 RNA NPH QL NAA+NON-PROBE: NOT DETECTED
HPIV3 RNA NPH QL NAA+NON-PROBE: NOT DETECTED
HPIV4 RNA NPH QL NAA+NON-PROBE: NOT DETECTED
HUMAN METAPNEUMOVIRUS: NOT DETECTED
IMM GRANULOCYTES # BLD AUTO: ABNORMAL K/UL (ref 0–0.04)
IMM GRANULOCYTES NFR BLD AUTO: ABNORMAL % (ref 0–0.5)
INFLUENZA A (SUBTYPES H1,H1-2009,H3): NOT DETECTED
INFLUENZA A, MOLECULAR: NEGATIVE
INFLUENZA B, MOLECULAR: NEGATIVE
KETONES UR QL STRIP: NEGATIVE
LDH SERPL L TO P-CCNC: 0.65 MMOL/L (ref 0.5–2.2)
LEUKOCYTE ESTERASE UR QL STRIP: ABNORMAL
LYMPHOCYTES NFR BLD: 3 % (ref 18–48)
MCH RBC QN AUTO: 28.6 PG (ref 27–31)
MCHC RBC AUTO-ENTMCNC: 32 G/DL (ref 32–36)
MCV RBC AUTO: 90 FL (ref 82–98)
MICROSCOPIC COMMENT: ABNORMAL
MONOCYTES NFR BLD: 6 % (ref 4–15)
MYCOPLASMA PNEUMONIAE: NOT DETECTED
NEUTROPHILS NFR BLD: 83 % (ref 38–73)
NEUTS BAND NFR BLD MANUAL: 8 %
NITRITE UR QL STRIP: NEGATIVE
NRBC BLD-RTO: 0 /100 WBC
OHS QRS DURATION: 106 MS
OHS QTC CALCULATION: 451 MS
OVALOCYTES BLD QL SMEAR: ABNORMAL
PH UR STRIP: 6 [PH] (ref 5–8)
PLATELET # BLD AUTO: 203 K/UL (ref 150–450)
PLATELET BLD QL SMEAR: ABNORMAL
PMV BLD AUTO: 10.7 FL (ref 9.2–12.9)
POCT GLUCOSE: 196 MG/DL (ref 70–110)
POIKILOCYTOSIS BLD QL SMEAR: SLIGHT
POTASSIUM SERPL-SCNC: 4.2 MMOL/L (ref 3.5–5.1)
PROT SERPL-MCNC: 6.8 G/DL (ref 6–8.4)
PROT UR QL STRIP: NEGATIVE
RBC # BLD AUTO: 4.09 M/UL (ref 4.6–6.2)
RBC #/AREA URNS AUTO: 1 /HPF (ref 0–4)
RESPIRATORY INFECTION PANEL SOURCE: NORMAL
RSV RNA NPH QL NAA+NON-PROBE: NOT DETECTED
RV+EV RNA NPH QL NAA+NON-PROBE: NOT DETECTED
SAMPLE: NORMAL
SARS-COV-2 RDRP RESP QL NAA+PROBE: NEGATIVE
SARS-COV-2 RNA RESP QL NAA+PROBE: NOT DETECTED
SITE: NORMAL
SODIUM SERPL-SCNC: 135 MMOL/L (ref 136–145)
SP GR UR STRIP: 1.01 (ref 1–1.03)
SPECIMEN SOURCE: NORMAL
URN SPEC COLLECT METH UR: ABNORMAL
WBC # BLD AUTO: 6.95 K/UL (ref 3.9–12.7)
WBC #/AREA URNS AUTO: 13 /HPF (ref 0–5)

## 2024-07-24 PROCEDURE — 27000207 HC ISOLATION

## 2024-07-24 PROCEDURE — U0002 COVID-19 LAB TEST NON-CDC: HCPCS | Performed by: STUDENT IN AN ORGANIZED HEALTH CARE EDUCATION/TRAINING PROGRAM

## 2024-07-24 PROCEDURE — 99900035 HC TECH TIME PER 15 MIN (STAT)

## 2024-07-24 PROCEDURE — 63600175 PHARM REV CODE 636 W HCPCS

## 2024-07-24 PROCEDURE — 25000003 PHARM REV CODE 250: Performed by: STUDENT IN AN ORGANIZED HEALTH CARE EDUCATION/TRAINING PROGRAM

## 2024-07-24 PROCEDURE — 87086 URINE CULTURE/COLONY COUNT: CPT | Performed by: STUDENT IN AN ORGANIZED HEALTH CARE EDUCATION/TRAINING PROGRAM

## 2024-07-24 PROCEDURE — 87798 DETECT AGENT NOS DNA AMP: CPT | Mod: 59 | Performed by: STUDENT IN AN ORGANIZED HEALTH CARE EDUCATION/TRAINING PROGRAM

## 2024-07-24 PROCEDURE — 99285 EMERGENCY DEPT VISIT HI MDM: CPT | Mod: 25

## 2024-07-24 PROCEDURE — 96367 TX/PROPH/DG ADDL SEQ IV INF: CPT

## 2024-07-24 PROCEDURE — 87486 CHLMYD PNEUM DNA AMP PROBE: CPT | Performed by: STUDENT IN AN ORGANIZED HEALTH CARE EDUCATION/TRAINING PROGRAM

## 2024-07-24 PROCEDURE — 63600175 PHARM REV CODE 636 W HCPCS: Performed by: STUDENT IN AN ORGANIZED HEALTH CARE EDUCATION/TRAINING PROGRAM

## 2024-07-24 PROCEDURE — 25000003 PHARM REV CODE 250: Performed by: PHYSICIAN ASSISTANT

## 2024-07-24 PROCEDURE — 96365 THER/PROPH/DIAG IV INF INIT: CPT

## 2024-07-24 PROCEDURE — 20600001 HC STEP DOWN PRIVATE ROOM

## 2024-07-24 PROCEDURE — 87651 STREP A DNA AMP PROBE: CPT | Performed by: STUDENT IN AN ORGANIZED HEALTH CARE EDUCATION/TRAINING PROGRAM

## 2024-07-24 PROCEDURE — 80053 COMPREHEN METABOLIC PANEL: CPT | Performed by: STUDENT IN AN ORGANIZED HEALTH CARE EDUCATION/TRAINING PROGRAM

## 2024-07-24 PROCEDURE — 96366 THER/PROPH/DIAG IV INF ADDON: CPT

## 2024-07-24 PROCEDURE — 87502 INFLUENZA DNA AMP PROBE: CPT | Performed by: STUDENT IN AN ORGANIZED HEALTH CARE EDUCATION/TRAINING PROGRAM

## 2024-07-24 PROCEDURE — 85027 COMPLETE CBC AUTOMATED: CPT | Performed by: STUDENT IN AN ORGANIZED HEALTH CARE EDUCATION/TRAINING PROGRAM

## 2024-07-24 PROCEDURE — 99222 1ST HOSP IP/OBS MODERATE 55: CPT | Mod: ,,, | Performed by: NURSE PRACTITIONER

## 2024-07-24 PROCEDURE — 81001 URINALYSIS AUTO W/SCOPE: CPT | Performed by: STUDENT IN AN ORGANIZED HEALTH CARE EDUCATION/TRAINING PROGRAM

## 2024-07-24 PROCEDURE — 87633 RESP VIRUS 12-25 TARGETS: CPT | Performed by: STUDENT IN AN ORGANIZED HEALTH CARE EDUCATION/TRAINING PROGRAM

## 2024-07-24 PROCEDURE — 87040 BLOOD CULTURE FOR BACTERIA: CPT | Performed by: STUDENT IN AN ORGANIZED HEALTH CARE EDUCATION/TRAINING PROGRAM

## 2024-07-24 PROCEDURE — 93005 ELECTROCARDIOGRAM TRACING: CPT

## 2024-07-24 PROCEDURE — 93010 ELECTROCARDIOGRAM REPORT: CPT | Mod: ,,, | Performed by: INTERNAL MEDICINE

## 2024-07-24 PROCEDURE — 85007 BL SMEAR W/DIFF WBC COUNT: CPT | Performed by: STUDENT IN AN ORGANIZED HEALTH CARE EDUCATION/TRAINING PROGRAM

## 2024-07-24 PROCEDURE — 63600175 PHARM REV CODE 636 W HCPCS: Performed by: PHYSICIAN ASSISTANT

## 2024-07-24 PROCEDURE — 83605 ASSAY OF LACTIC ACID: CPT

## 2024-07-24 PROCEDURE — 86480 TB TEST CELL IMMUN MEASURE: CPT

## 2024-07-24 PROCEDURE — 83036 HEMOGLOBIN GLYCOSYLATED A1C: CPT | Performed by: STUDENT IN AN ORGANIZED HEALTH CARE EDUCATION/TRAINING PROGRAM

## 2024-07-24 RX ORDER — SULFAMETHOXAZOLE AND TRIMETHOPRIM 400; 80 MG/1; MG/1
1 TABLET ORAL DAILY
Status: DISCONTINUED | OUTPATIENT
Start: 2024-07-25 | End: 2024-07-27

## 2024-07-24 RX ORDER — TALC
6 POWDER (GRAM) TOPICAL NIGHTLY PRN
Status: DISCONTINUED | OUTPATIENT
Start: 2024-07-24 | End: 2024-07-28 | Stop reason: HOSPADM

## 2024-07-24 RX ORDER — INSULIN ASPART 100 [IU]/ML
0-5 INJECTION, SOLUTION INTRAVENOUS; SUBCUTANEOUS
Status: DISCONTINUED | OUTPATIENT
Start: 2024-07-24 | End: 2024-07-24

## 2024-07-24 RX ORDER — VALGANCICLOVIR 450 MG/1
450 TABLET, FILM COATED ORAL DAILY
Status: DISCONTINUED | OUTPATIENT
Start: 2024-07-25 | End: 2024-07-28 | Stop reason: HOSPADM

## 2024-07-24 RX ORDER — TACROLIMUS 1 MG/1
4 CAPSULE ORAL 2 TIMES DAILY
Status: DISCONTINUED | OUTPATIENT
Start: 2024-07-24 | End: 2024-07-27

## 2024-07-24 RX ORDER — INSULIN ASPART 100 [IU]/ML
1.2 INJECTION, SOLUTION INTRAVENOUS; SUBCUTANEOUS CONTINUOUS
Status: DISCONTINUED | OUTPATIENT
Start: 2024-07-24 | End: 2024-07-27

## 2024-07-24 RX ORDER — ASPIRIN 81 MG/1
81 TABLET ORAL DAILY
Status: DISCONTINUED | OUTPATIENT
Start: 2024-07-25 | End: 2024-07-28 | Stop reason: HOSPADM

## 2024-07-24 RX ORDER — IBUPROFEN 200 MG
24 TABLET ORAL
Status: DISCONTINUED | OUTPATIENT
Start: 2024-07-24 | End: 2024-07-27

## 2024-07-24 RX ORDER — SODIUM BICARBONATE 650 MG/1
650 TABLET ORAL 2 TIMES DAILY
Status: DISCONTINUED | OUTPATIENT
Start: 2024-07-24 | End: 2024-07-26

## 2024-07-24 RX ORDER — CARVEDILOL 12.5 MG/1
12.5 TABLET ORAL 2 TIMES DAILY
Status: DISCONTINUED | OUTPATIENT
Start: 2024-07-24 | End: 2024-07-26

## 2024-07-24 RX ORDER — CETIRIZINE HYDROCHLORIDE 10 MG/1
10 TABLET ORAL DAILY
Status: DISCONTINUED | OUTPATIENT
Start: 2024-07-25 | End: 2024-07-28 | Stop reason: HOSPADM

## 2024-07-24 RX ORDER — GLUCAGON 1 MG
1 KIT INJECTION
Status: DISCONTINUED | OUTPATIENT
Start: 2024-07-24 | End: 2024-07-27

## 2024-07-24 RX ORDER — CINACALCET 30 MG/1
30 TABLET, FILM COATED ORAL
Status: DISCONTINUED | OUTPATIENT
Start: 2024-07-25 | End: 2024-07-26

## 2024-07-24 RX ORDER — IBUPROFEN 200 MG
16 TABLET ORAL
Status: DISCONTINUED | OUTPATIENT
Start: 2024-07-24 | End: 2024-07-27

## 2024-07-24 RX ORDER — ONDANSETRON 8 MG/1
8 TABLET, ORALLY DISINTEGRATING ORAL EVERY 6 HOURS PRN
Status: DISCONTINUED | OUTPATIENT
Start: 2024-07-24 | End: 2024-07-28 | Stop reason: HOSPADM

## 2024-07-24 RX ORDER — AMLODIPINE BESYLATE 5 MG/1
5 TABLET ORAL DAILY
Status: DISCONTINUED | OUTPATIENT
Start: 2024-07-25 | End: 2024-07-27

## 2024-07-24 RX ORDER — SODIUM CHLORIDE 9 MG/ML
INJECTION, SOLUTION INTRAVENOUS CONTINUOUS
Status: DISCONTINUED | OUTPATIENT
Start: 2024-07-24 | End: 2024-07-26

## 2024-07-24 RX ORDER — IBUPROFEN 200 MG
24 TABLET ORAL
Status: DISCONTINUED | OUTPATIENT
Start: 2024-07-24 | End: 2024-07-24

## 2024-07-24 RX ORDER — INSULIN ASPART 100 [IU]/ML
0-10 INJECTION, SOLUTION INTRAVENOUS; SUBCUTANEOUS
Status: DISCONTINUED | OUTPATIENT
Start: 2024-07-24 | End: 2024-07-27

## 2024-07-24 RX ORDER — SODIUM CHLORIDE 0.9 % (FLUSH) 0.9 %
3 SYRINGE (ML) INJECTION
Status: DISCONTINUED | OUTPATIENT
Start: 2024-07-24 | End: 2024-07-28 | Stop reason: HOSPADM

## 2024-07-24 RX ORDER — GLUCAGON 1 MG
1 KIT INJECTION
Status: DISCONTINUED | OUTPATIENT
Start: 2024-07-24 | End: 2024-07-24

## 2024-07-24 RX ORDER — ACETAMINOPHEN 500 MG
1000 TABLET ORAL
Status: COMPLETED | OUTPATIENT
Start: 2024-07-24 | End: 2024-07-24

## 2024-07-24 RX ORDER — BENZONATATE 100 MG/1
200 CAPSULE ORAL 3 TIMES DAILY PRN
Status: DISCONTINUED | OUTPATIENT
Start: 2024-07-24 | End: 2024-07-28 | Stop reason: HOSPADM

## 2024-07-24 RX ORDER — IBUPROFEN 200 MG
16 TABLET ORAL
Status: DISCONTINUED | OUTPATIENT
Start: 2024-07-24 | End: 2024-07-24

## 2024-07-24 RX ORDER — LEVOTHYROXINE SODIUM 50 UG/1
50 TABLET ORAL
Status: DISCONTINUED | OUTPATIENT
Start: 2024-07-25 | End: 2024-07-28 | Stop reason: HOSPADM

## 2024-07-24 RX ORDER — HEPARIN SODIUM 5000 [USP'U]/ML
5000 INJECTION, SOLUTION INTRAVENOUS; SUBCUTANEOUS EVERY 8 HOURS
Status: DISCONTINUED | OUTPATIENT
Start: 2024-07-24 | End: 2024-07-28 | Stop reason: HOSPADM

## 2024-07-24 RX ORDER — ACETAMINOPHEN 325 MG/1
650 TABLET ORAL EVERY 6 HOURS PRN
Status: DISCONTINUED | OUTPATIENT
Start: 2024-07-24 | End: 2024-07-28 | Stop reason: HOSPADM

## 2024-07-24 RX ADMIN — CARVEDILOL 12.5 MG: 12.5 TABLET, FILM COATED ORAL at 08:07

## 2024-07-24 RX ADMIN — SODIUM BICARBONATE 650 MG TABLET 650 MG: at 08:07

## 2024-07-24 RX ADMIN — Medication 6 MG: at 08:07

## 2024-07-24 RX ADMIN — SODIUM CHLORIDE: 9 INJECTION, SOLUTION INTRAVENOUS at 04:07

## 2024-07-24 RX ADMIN — VANCOMYCIN HYDROCHLORIDE 1750 MG: 500 INJECTION, POWDER, LYOPHILIZED, FOR SOLUTION INTRAVENOUS at 10:07

## 2024-07-24 RX ADMIN — BENZONATATE 200 MG: 100 CAPSULE ORAL at 08:07

## 2024-07-24 RX ADMIN — HEPARIN SODIUM 5000 UNITS: 5000 INJECTION INTRAVENOUS; SUBCUTANEOUS at 08:07

## 2024-07-24 RX ADMIN — ACETAMINOPHEN 1000 MG: 500 TABLET ORAL at 08:07

## 2024-07-24 RX ADMIN — SODIUM CHLORIDE, POTASSIUM CHLORIDE, SODIUM LACTATE AND CALCIUM CHLORIDE 1000 ML: 600; 310; 30; 20 INJECTION, SOLUTION INTRAVENOUS at 10:07

## 2024-07-24 RX ADMIN — TACROLIMUS 4 MG: 1 CAPSULE ORAL at 05:07

## 2024-07-24 RX ADMIN — CEFEPIME 1 G: 1 INJECTION, POWDER, FOR SOLUTION INTRAMUSCULAR; INTRAVENOUS at 04:07

## 2024-07-24 RX ADMIN — PIPERACILLIN SODIUM AND TAZOBACTAM SODIUM 4.5 G: 4; .5 INJECTION, POWDER, FOR SOLUTION INTRAVENOUS at 10:07

## 2024-07-25 PROBLEM — R00.0 TACHYCARDIA: Status: ACTIVE | Noted: 2024-07-25

## 2024-07-25 PROBLEM — R19.7 DIARRHEA: Status: ACTIVE | Noted: 2024-07-25

## 2024-07-25 LAB
ALBUMIN SERPL BCP-MCNC: 3.1 G/DL (ref 3.5–5.2)
ANION GAP SERPL CALC-SCNC: 9 MMOL/L (ref 8–16)
ANISOCYTOSIS BLD QL SMEAR: SLIGHT
ASCENDING AORTA: 2.79 CM
ASTROVIRUS: NOT DETECTED
AV INDEX (PROSTH): 0.78
AV MEAN GRADIENT: 5 MMHG
AV PEAK GRADIENT: 9 MMHG
AV VALVE AREA BY VELOCITY RATIO: 2.8 CM²
AV VALVE AREA: 3.32 CM²
AV VELOCITY RATIO: 0.66
BACTERIA UR CULT: NORMAL
BASOPHILS NFR BLD: 0 % (ref 0–1.9)
BSA FOR ECHO PROCEDURE: 2.07 M2
BUN SERPL-MCNC: 30 MG/DL (ref 8–23)
C COLI+JEJ+UPSA DNA STL QL NAA+NON-PROBE: NOT DETECTED
C DIFF GDH STL QL: NEGATIVE
C DIFF TOX A+B STL QL IA: NEGATIVE
CALCIUM SERPL-MCNC: 9.3 MG/DL (ref 8.7–10.5)
CHLORIDE SERPL-SCNC: 106 MMOL/L (ref 95–110)
CMV DNA SPEC QL NAA+PROBE: NORMAL
CO2 SERPL-SCNC: 20 MMOL/L (ref 23–29)
CREAT SERPL-MCNC: 1.7 MG/DL (ref 0.5–1.4)
CV ECHO LV RWT: 0.38 CM
CYCLOSPORA CAYETANENSIS: NOT DETECTED
CYTOMEGALOVIRUS PCR, QUANT: NOT DETECTED IU/ML
DIFFERENTIAL METHOD BLD: ABNORMAL
DOP CALC AO PEAK VEL: 1.48 M/S
DOP CALC AO VTI: 30.92 CM
DOP CALC LVOT AREA: 4.2 CM2
DOP CALC LVOT DIAMETER: 2.32 CM
DOP CALC LVOT PEAK VEL: 0.98 M/S
DOP CALC LVOT STROKE VOLUME: 102.55 CM3
DOP CALCLVOT PEAK VEL VTI: 24.27 CM
E WAVE DECELERATION TIME: 228.54 MSEC
E/A RATIO: 0.73
E/E' RATIO: 21 M/S
ECHO LV POSTERIOR WALL: 1.02 CM (ref 0.6–1.1)
ENTEROAGGREGATIVE E COLI: NOT DETECTED
ENTEROPATHOGENIC E COLI: NOT DETECTED
EOSINOPHIL NFR BLD: 3 % (ref 0–8)
ERYTHROCYTE [DISTWIDTH] IN BLOOD BY AUTOMATED COUNT: 13.8 % (ref 11.5–14.5)
EST. GFR  (NO RACE VARIABLE): 44.2 ML/MIN/1.73 M^2
FRACTIONAL SHORTENING: 37 % (ref 28–44)
GAMMA INTERFERON BACKGROUND BLD IA-ACNC: 0.29 IU/ML
GLUCOSE SERPL-MCNC: 103 MG/DL (ref 70–110)
GPP - ADENOVIRUS 40/41: NOT DETECTED
GPP - CRYPTOSPORIDIUM: NOT DETECTED
GPP - ENTAMOEBA HISTOLYTICA: NOT DETECTED
GPP - ENTEROTOXIGENIC E COLI (ETEC): NOT DETECTED
GPP - GIARDIA LAMBLIA: NOT DETECTED
GPP - NOROVIRUS GI/GII: NOT DETECTED
GPP - ROTAVIRUS A: NOT DETECTED
GPP - SALMONELLA: NOT DETECTED
GPP - VIBRIO CHOLERA: NOT DETECTED
GPP - YERSINIA ENTEROCOLITICA: NOT DETECTED
HCT VFR BLD AUTO: 30.3 % (ref 40–54)
HGB BLD-MCNC: 9.6 G/DL (ref 14–18)
IMM GRANULOCYTES # BLD AUTO: ABNORMAL K/UL (ref 0–0.04)
IMM GRANULOCYTES NFR BLD AUTO: ABNORMAL % (ref 0–0.5)
INTERVENTRICULAR SEPTUM: 0.95 CM (ref 0.6–1.1)
LA MAJOR: 5.42 CM
LA MINOR: 6.44 CM
LA WIDTH: 4.59 CM
LACTATE PLASV-SCNC: NOT DETECTED MMOL/L
LEFT ATRIUM SIZE: 4.35 CM
LEFT ATRIUM VOLUME INDEX MOD: 39.4 ML/M2
LEFT ATRIUM VOLUME INDEX: 50.2 ML/M2
LEFT ATRIUM VOLUME MOD: 78.48 CM3
LEFT ATRIUM VOLUME: 99.9 CM3
LEFT INTERNAL DIMENSION IN SYSTOLE: 3.41 CM (ref 2.1–4)
LEFT VENTRICLE DIASTOLIC VOLUME INDEX: 71.12 ML/M2
LEFT VENTRICLE DIASTOLIC VOLUME: 141.53 ML
LEFT VENTRICLE MASS INDEX: 102 G/M2
LEFT VENTRICLE SYSTOLIC VOLUME INDEX: 24 ML/M2
LEFT VENTRICLE SYSTOLIC VOLUME: 47.82 ML
LEFT VENTRICULAR INTERNAL DIMENSION IN DIASTOLE: 5.4 CM (ref 3.5–6)
LEFT VENTRICULAR MASS: 202.65 G
LV LATERAL E/E' RATIO: 21 M/S
LV SEPTAL E/E' RATIO: 21 M/S
LYMPHOCYTES NFR BLD: 3 % (ref 18–48)
M TB IFN-G CD4+ BCKGRND COR BLD-ACNC: 0.02 IU/ML
M TB IFN-G CD4+ BCKGRND COR BLD-ACNC: 0.03 IU/ML
MAGNESIUM SERPL-MCNC: 1.6 MG/DL (ref 1.6–2.6)
MCH RBC QN AUTO: 29.4 PG (ref 27–31)
MCHC RBC AUTO-ENTMCNC: 31.7 G/DL (ref 32–36)
MCV RBC AUTO: 93 FL (ref 82–98)
MITOGEN IGNF BCKGRD COR BLD-ACNC: 0.21 IU/ML
MONOCYTES NFR BLD: 8 % (ref 4–15)
MV A" WAVE DURATION": 11.04 MSEC
MV PEAK A VEL: 1.43 M/S
MV PEAK E VEL: 1.05 M/S
MV STENOSIS PRESSURE HALF TIME: 66.28 MS
MV VALVE AREA P 1/2 METHOD: 3.32 CM2
NEUTROPHILS NFR BLD: 81 % (ref 38–73)
NEUTS BAND NFR BLD MANUAL: 5 %
NRBC BLD-RTO: 0 /100 WBC
OVALOCYTES BLD QL SMEAR: ABNORMAL
PHOSPHATE SERPL-MCNC: 1.7 MG/DL (ref 2.7–4.5)
PLATELET # BLD AUTO: 171 K/UL (ref 150–450)
PLESIOMONAS SHIGELLOIDES: NOT DETECTED
PMV BLD AUTO: 10.9 FL (ref 9.2–12.9)
POTASSIUM SERPL-SCNC: 4.1 MMOL/L (ref 3.5–5.1)
PULM VEIN S/D RATIO: 1.8
PV PEAK D VEL: 0.44 M/S
PV PEAK S VEL: 0.79 M/S
RA MAJOR: 5.57 CM
RA PRESSURE ESTIMATED: 3 MMHG
RA WIDTH: 3.59 CM
RBC # BLD AUTO: 3.27 M/UL (ref 4.6–6.2)
RIGHT VENTRICLE DIASTOLIC BASEL DIMENSION: 3.2 CM
SAPOVIRUS: NOT DETECTED
SHIGELLA SP+EIEC IPAH STL QL NAA+PROBE: NOT DETECTED
SINUS: 3.32 CM
SODIUM SERPL-SCNC: 135 MMOL/L (ref 136–145)
STJ: 2.1 CM
TACROLIMUS BLD-MCNC: 6 NG/ML (ref 5–15)
TB GOLD PLUS: ABNORMAL
TDI LATERAL: 0.05 M/S
TDI SEPTAL: 0.05 M/S
TDI: 0.05 M/S
TRICUSPID ANNULAR PLANE SYSTOLIC EXCURSION: 2.35 CM
VANCOMYCIN SERPL-MCNC: 7 UG/ML
VIBRIO: NOT DETECTED
WBC # BLD AUTO: 4.41 K/UL (ref 3.9–12.7)
Z-SCORE OF LEFT VENTRICULAR DIMENSION IN END DIASTOLE: -0.67
Z-SCORE OF LEFT VENTRICULAR DIMENSION IN END SYSTOLE: -0.31

## 2024-07-25 PROCEDURE — 99233 SBSQ HOSP IP/OBS HIGH 50: CPT | Mod: ,,, | Performed by: PHYSICIAN ASSISTANT

## 2024-07-25 PROCEDURE — 25000003 PHARM REV CODE 250: Performed by: PHYSICIAN ASSISTANT

## 2024-07-25 PROCEDURE — 85007 BL SMEAR W/DIFF WBC COUNT: CPT | Performed by: PHYSICIAN ASSISTANT

## 2024-07-25 PROCEDURE — 87799 DETECT AGENT NOS DNA QUANT: CPT | Performed by: PHYSICIAN ASSISTANT

## 2024-07-25 PROCEDURE — 25000242 PHARM REV CODE 250 ALT 637 W/ HCPCS: Performed by: PHYSICIAN ASSISTANT

## 2024-07-25 PROCEDURE — 20600001 HC STEP DOWN PRIVATE ROOM

## 2024-07-25 PROCEDURE — 99900035 HC TECH TIME PER 15 MIN (STAT)

## 2024-07-25 PROCEDURE — 87507 IADNA-DNA/RNA PROBE TQ 12-25: CPT | Performed by: PHYSICIAN ASSISTANT

## 2024-07-25 PROCEDURE — 63600175 PHARM REV CODE 636 W HCPCS: Performed by: PHYSICIAN ASSISTANT

## 2024-07-25 PROCEDURE — 80069 RENAL FUNCTION PANEL: CPT | Performed by: PHYSICIAN ASSISTANT

## 2024-07-25 PROCEDURE — 80202 ASSAY OF VANCOMYCIN: CPT | Performed by: STUDENT IN AN ORGANIZED HEALTH CARE EDUCATION/TRAINING PROGRAM

## 2024-07-25 PROCEDURE — 80197 ASSAY OF TACROLIMUS: CPT | Performed by: PHYSICIAN ASSISTANT

## 2024-07-25 PROCEDURE — 85027 COMPLETE CBC AUTOMATED: CPT | Performed by: PHYSICIAN ASSISTANT

## 2024-07-25 PROCEDURE — 87324 CLOSTRIDIUM AG IA: CPT | Performed by: PHYSICIAN ASSISTANT

## 2024-07-25 PROCEDURE — 83735 ASSAY OF MAGNESIUM: CPT | Performed by: PHYSICIAN ASSISTANT

## 2024-07-25 PROCEDURE — 94640 AIRWAY INHALATION TREATMENT: CPT

## 2024-07-25 PROCEDURE — 87529 HSV DNA AMP PROBE: CPT | Performed by: PHYSICIAN ASSISTANT

## 2024-07-25 PROCEDURE — 36415 COLL VENOUS BLD VENIPUNCTURE: CPT | Performed by: PHYSICIAN ASSISTANT

## 2024-07-25 PROCEDURE — 99222 1ST HOSP IP/OBS MODERATE 55: CPT | Mod: GC,,, | Performed by: INTERNAL MEDICINE

## 2024-07-25 PROCEDURE — 94761 N-INVAS EAR/PLS OXIMETRY MLT: CPT

## 2024-07-25 PROCEDURE — 27000207 HC ISOLATION

## 2024-07-25 PROCEDURE — 87449 NOS EACH ORGANISM AG IA: CPT | Performed by: PHYSICIAN ASSISTANT

## 2024-07-25 RX ORDER — IPRATROPIUM BROMIDE AND ALBUTEROL SULFATE 2.5; .5 MG/3ML; MG/3ML
3 SOLUTION RESPIRATORY (INHALATION) EVERY 6 HOURS PRN
Status: DISCONTINUED | OUTPATIENT
Start: 2024-07-25 | End: 2024-07-28 | Stop reason: HOSPADM

## 2024-07-25 RX ORDER — LATANOPROST 50 UG/ML
1 SOLUTION/ DROPS OPHTHALMIC NIGHTLY
Status: DISCONTINUED | OUTPATIENT
Start: 2024-07-25 | End: 2024-07-28 | Stop reason: HOSPADM

## 2024-07-25 RX ADMIN — SODIUM BICARBONATE 650 MG TABLET 650 MG: at 08:07

## 2024-07-25 RX ADMIN — SULFAMETHOXAZOLE AND TRIMETHOPRIM 1 TABLET: 400; 80 TABLET ORAL at 08:07

## 2024-07-25 RX ADMIN — HEPARIN SODIUM 5000 UNITS: 5000 INJECTION INTRAVENOUS; SUBCUTANEOUS at 01:07

## 2024-07-25 RX ADMIN — TACROLIMUS 4 MG: 1 CAPSULE ORAL at 08:07

## 2024-07-25 RX ADMIN — TACROLIMUS 4 MG: 1 CAPSULE ORAL at 05:07

## 2024-07-25 RX ADMIN — VALGANCICLOVIR HYDROCHLORIDE 450 MG: 450 TABLET ORAL at 08:07

## 2024-07-25 RX ADMIN — AMLODIPINE BESYLATE 5 MG: 5 TABLET ORAL at 08:07

## 2024-07-25 RX ADMIN — LEVOTHYROXINE SODIUM 50 MCG: 50 TABLET ORAL at 05:07

## 2024-07-25 RX ADMIN — CINACALCET 30 MG: 30 TABLET, FILM COATED ORAL at 08:07

## 2024-07-25 RX ADMIN — CARVEDILOL 12.5 MG: 12.5 TABLET, FILM COATED ORAL at 08:07

## 2024-07-25 RX ADMIN — ASPIRIN 81 MG: 81 TABLET, COATED ORAL at 08:07

## 2024-07-25 RX ADMIN — IPRATROPIUM BROMIDE AND ALBUTEROL SULFATE 3 ML: 2.5; .5 SOLUTION RESPIRATORY (INHALATION) at 10:07

## 2024-07-25 RX ADMIN — CEFEPIME 1 G: 1 INJECTION, POWDER, FOR SOLUTION INTRAMUSCULAR; INTRAVENOUS at 03:07

## 2024-07-25 RX ADMIN — LATANOPROST 1 DROP: 50 SOLUTION OPHTHALMIC at 08:07

## 2024-07-25 RX ADMIN — HEPARIN SODIUM 5000 UNITS: 5000 INJECTION INTRAVENOUS; SUBCUTANEOUS at 08:07

## 2024-07-25 RX ADMIN — HEPARIN SODIUM 5000 UNITS: 5000 INJECTION INTRAVENOUS; SUBCUTANEOUS at 05:07

## 2024-07-25 RX ADMIN — Medication 6 MG: at 08:07

## 2024-07-25 RX ADMIN — CETIRIZINE HYDROCHLORIDE 10 MG: 10 TABLET, FILM COATED ORAL at 08:07

## 2024-07-25 RX ADMIN — BENZONATATE 200 MG: 100 CAPSULE ORAL at 08:07

## 2024-07-26 LAB
ALBUMIN SERPL BCP-MCNC: 2.3 G/DL (ref 3.5–5.2)
ANION GAP SERPL CALC-SCNC: 5 MMOL/L (ref 8–16)
BASOPHILS NFR BLD: 0 % (ref 0–1.9)
BUN SERPL-MCNC: 21 MG/DL (ref 8–23)
CALCIUM SERPL-MCNC: 7.1 MG/DL (ref 8.7–10.5)
CHLORIDE SERPL-SCNC: 117 MMOL/L (ref 95–110)
CO2 SERPL-SCNC: 16 MMOL/L (ref 23–29)
CREAT SERPL-MCNC: 1 MG/DL (ref 0.5–1.4)
DIFFERENTIAL METHOD BLD: ABNORMAL
EOSINOPHIL NFR BLD: 2 % (ref 0–8)
ERYTHROCYTE [DISTWIDTH] IN BLOOD BY AUTOMATED COUNT: 13.7 % (ref 11.5–14.5)
EST. GFR  (NO RACE VARIABLE): >60 ML/MIN/1.73 M^2
GLUCOSE SERPL-MCNC: 72 MG/DL (ref 70–110)
HCT VFR BLD AUTO: 33.4 % (ref 40–54)
HGB BLD-MCNC: 10.1 G/DL (ref 14–18)
IMM GRANULOCYTES # BLD AUTO: ABNORMAL K/UL (ref 0–0.04)
IMM GRANULOCYTES NFR BLD AUTO: ABNORMAL % (ref 0–0.5)
LYMPHOCYTES NFR BLD: 5 % (ref 18–48)
MAGNESIUM SERPL-MCNC: 1.2 MG/DL (ref 1.6–2.6)
MCH RBC QN AUTO: 27.7 PG (ref 27–31)
MCHC RBC AUTO-ENTMCNC: 30.2 G/DL (ref 32–36)
MCV RBC AUTO: 92 FL (ref 82–98)
MONOCYTES NFR BLD: 12 % (ref 4–15)
NEUTROPHILS NFR BLD: 73 % (ref 38–73)
NEUTS BAND NFR BLD MANUAL: 8 %
NRBC BLD-RTO: 0 /100 WBC
PHOSPHATE SERPL-MCNC: 1.5 MG/DL (ref 2.7–4.5)
PLATELET # BLD AUTO: 199 K/UL (ref 150–450)
PMV BLD AUTO: 10.8 FL (ref 9.2–12.9)
POTASSIUM SERPL-SCNC: 3.3 MMOL/L (ref 3.5–5.1)
RBC # BLD AUTO: 3.64 M/UL (ref 4.6–6.2)
SODIUM SERPL-SCNC: 138 MMOL/L (ref 136–145)
TACROLIMUS BLD-MCNC: 7.4 NG/ML (ref 5–15)
WBC # BLD AUTO: 3.17 K/UL (ref 3.9–12.7)

## 2024-07-26 PROCEDURE — 36415 COLL VENOUS BLD VENIPUNCTURE: CPT | Performed by: PHYSICIAN ASSISTANT

## 2024-07-26 PROCEDURE — 99900035 HC TECH TIME PER 15 MIN (STAT)

## 2024-07-26 PROCEDURE — 63600175 PHARM REV CODE 636 W HCPCS: Performed by: PHYSICIAN ASSISTANT

## 2024-07-26 PROCEDURE — 80197 ASSAY OF TACROLIMUS: CPT | Performed by: PHYSICIAN ASSISTANT

## 2024-07-26 PROCEDURE — 99232 SBSQ HOSP IP/OBS MODERATE 35: CPT | Mod: ,,, | Performed by: INTERNAL MEDICINE

## 2024-07-26 PROCEDURE — 27000190 HC CPAP FULL FACE MASK W/VALVE

## 2024-07-26 PROCEDURE — 25000003 PHARM REV CODE 250: Performed by: PHYSICIAN ASSISTANT

## 2024-07-26 PROCEDURE — 80069 RENAL FUNCTION PANEL: CPT | Performed by: PHYSICIAN ASSISTANT

## 2024-07-26 PROCEDURE — 94660 CPAP INITIATION&MGMT: CPT

## 2024-07-26 PROCEDURE — 83735 ASSAY OF MAGNESIUM: CPT | Performed by: PHYSICIAN ASSISTANT

## 2024-07-26 PROCEDURE — 99233 SBSQ HOSP IP/OBS HIGH 50: CPT | Mod: ,,, | Performed by: PHYSICIAN ASSISTANT

## 2024-07-26 PROCEDURE — 85027 COMPLETE CBC AUTOMATED: CPT | Performed by: PHYSICIAN ASSISTANT

## 2024-07-26 PROCEDURE — 25500020 PHARM REV CODE 255

## 2024-07-26 PROCEDURE — 27100171 HC OXYGEN HIGH FLOW UP TO 24 HOURS

## 2024-07-26 PROCEDURE — 85007 BL SMEAR W/DIFF WBC COUNT: CPT | Performed by: PHYSICIAN ASSISTANT

## 2024-07-26 PROCEDURE — 94761 N-INVAS EAR/PLS OXIMETRY MLT: CPT

## 2024-07-26 PROCEDURE — 25000003 PHARM REV CODE 250: Performed by: INTERNAL MEDICINE

## 2024-07-26 PROCEDURE — 20600001 HC STEP DOWN PRIVATE ROOM

## 2024-07-26 RX ORDER — LOPERAMIDE HYDROCHLORIDE 2 MG/1
2 CAPSULE ORAL 2 TIMES DAILY PRN
Status: DISCONTINUED | OUTPATIENT
Start: 2024-07-26 | End: 2024-07-28 | Stop reason: HOSPADM

## 2024-07-26 RX ORDER — MAGNESIUM SULFATE HEPTAHYDRATE 40 MG/ML
2 INJECTION, SOLUTION INTRAVENOUS ONCE
Status: COMPLETED | OUTPATIENT
Start: 2024-07-26 | End: 2024-07-26

## 2024-07-26 RX ORDER — CARVEDILOL 12.5 MG/1
25 TABLET ORAL 2 TIMES DAILY
Status: DISCONTINUED | OUTPATIENT
Start: 2024-07-26 | End: 2024-07-28 | Stop reason: HOSPADM

## 2024-07-26 RX ORDER — CARVEDILOL 12.5 MG/1
12.5 TABLET ORAL ONCE
Status: COMPLETED | OUTPATIENT
Start: 2024-07-26 | End: 2024-07-26

## 2024-07-26 RX ORDER — SODIUM BICARBONATE 650 MG/1
1300 TABLET ORAL 2 TIMES DAILY
Status: DISCONTINUED | OUTPATIENT
Start: 2024-07-26 | End: 2024-07-28 | Stop reason: HOSPADM

## 2024-07-26 RX ORDER — MAGNESIUM SULFATE HEPTAHYDRATE 40 MG/ML
INJECTION, SOLUTION INTRAVENOUS
Status: DISPENSED
Start: 2024-07-26 | End: 2024-07-26

## 2024-07-26 RX ADMIN — BENZONATATE 200 MG: 100 CAPSULE ORAL at 09:07

## 2024-07-26 RX ADMIN — MAGNESIUM SULFATE HEPTAHYDRATE 2 G: 40 INJECTION, SOLUTION INTRAVENOUS at 09:07

## 2024-07-26 RX ADMIN — LEVOTHYROXINE SODIUM 50 MCG: 50 TABLET ORAL at 06:07

## 2024-07-26 RX ADMIN — CEFEPIME 1 G: 1 INJECTION, POWDER, FOR SOLUTION INTRAMUSCULAR; INTRAVENOUS at 04:07

## 2024-07-26 RX ADMIN — CARVEDILOL 25 MG: 12.5 TABLET, FILM COATED ORAL at 09:07

## 2024-07-26 RX ADMIN — ASPIRIN 81 MG: 81 TABLET, COATED ORAL at 08:07

## 2024-07-26 RX ADMIN — HEPARIN SODIUM 5000 UNITS: 5000 INJECTION INTRAVENOUS; SUBCUTANEOUS at 06:07

## 2024-07-26 RX ADMIN — HEPARIN SODIUM 5000 UNITS: 5000 INJECTION INTRAVENOUS; SUBCUTANEOUS at 01:07

## 2024-07-26 RX ADMIN — SULFAMETHOXAZOLE AND TRIMETHOPRIM 1 TABLET: 400; 80 TABLET ORAL at 08:07

## 2024-07-26 RX ADMIN — POTASSIUM PHOSPHATE, MONOBASIC AND POTASSIUM PHOSPHATE, DIBASIC 30 MMOL: 224; 236 INJECTION, SOLUTION, CONCENTRATE INTRAVENOUS at 11:07

## 2024-07-26 RX ADMIN — GUAIFENESIN AND DEXTROMETHORPHAN HYDROBROMIDE 1 TABLET: 600; 30 TABLET, EXTENDED RELEASE ORAL at 01:07

## 2024-07-26 RX ADMIN — LATANOPROST 1 DROP: 50 SOLUTION OPHTHALMIC at 09:07

## 2024-07-26 RX ADMIN — TACROLIMUS 4 MG: 1 CAPSULE ORAL at 08:07

## 2024-07-26 RX ADMIN — AMLODIPINE BESYLATE 5 MG: 5 TABLET ORAL at 08:07

## 2024-07-26 RX ADMIN — SODIUM BICARBONATE 1300 MG: 650 TABLET ORAL at 09:07

## 2024-07-26 RX ADMIN — TACROLIMUS 4 MG: 1 CAPSULE ORAL at 05:07

## 2024-07-26 RX ADMIN — VALGANCICLOVIR HYDROCHLORIDE 450 MG: 450 TABLET ORAL at 08:07

## 2024-07-26 RX ADMIN — LOPERAMIDE HYDROCHLORIDE 2 MG: 2 CAPSULE ORAL at 05:07

## 2024-07-26 RX ADMIN — IOHEXOL 15 ML: 350 INJECTION, SOLUTION INTRAVENOUS at 01:07

## 2024-07-26 RX ADMIN — HEPARIN SODIUM 5000 UNITS: 5000 INJECTION INTRAVENOUS; SUBCUTANEOUS at 09:07

## 2024-07-26 RX ADMIN — CINACALCET 30 MG: 30 TABLET, FILM COATED ORAL at 08:07

## 2024-07-26 RX ADMIN — IOHEXOL 15 ML: 350 INJECTION, SOLUTION INTRAVENOUS at 03:07

## 2024-07-26 RX ADMIN — CARVEDILOL 12.5 MG: 12.5 TABLET, FILM COATED ORAL at 08:07

## 2024-07-26 RX ADMIN — BENZONATATE 200 MG: 100 CAPSULE ORAL at 02:07

## 2024-07-26 RX ADMIN — SODIUM BICARBONATE 1300 MG: 650 TABLET ORAL at 08:07

## 2024-07-26 RX ADMIN — CARVEDILOL 12.5 MG: 12.5 TABLET, FILM COATED ORAL at 10:07

## 2024-07-26 RX ADMIN — CETIRIZINE HYDROCHLORIDE 10 MG: 10 TABLET, FILM COATED ORAL at 08:07

## 2024-07-27 LAB
ALBUMIN SERPL BCP-MCNC: 3 G/DL (ref 3.5–5.2)
ANION GAP SERPL CALC-SCNC: 5 MMOL/L (ref 8–16)
ANISOCYTOSIS BLD QL SMEAR: SLIGHT
APPEARANCE FLD: CLEAR
BASOPHILS NFR BLD: 1 % (ref 0–1.9)
BODY FLD TYPE: NORMAL
BODY FLUID SOURCE, CREATININE: NORMAL
BUN SERPL-MCNC: 26 MG/DL (ref 8–23)
CALCIUM SERPL-MCNC: 9.4 MG/DL (ref 8.7–10.5)
CHLORIDE SERPL-SCNC: 108 MMOL/L (ref 95–110)
CO2 SERPL-SCNC: 22 MMOL/L (ref 23–29)
COLOR FLD: YELLOW
CREAT FLD-MCNC: 1.1 MG/DL
CREAT SERPL-MCNC: 1.3 MG/DL (ref 0.5–1.4)
DIFFERENTIAL METHOD BLD: ABNORMAL
DOHLE BOD BLD QL SMEAR: PRESENT
EOSINOPHIL NFR BLD: 2 % (ref 0–8)
ERYTHROCYTE [DISTWIDTH] IN BLOOD BY AUTOMATED COUNT: 13.7 % (ref 11.5–14.5)
EST. GFR  (NO RACE VARIABLE): >60 ML/MIN/1.73 M^2
FERRITIN SERPL-MCNC: 2868 NG/ML (ref 20–300)
GLUCOSE SERPL-MCNC: 97 MG/DL (ref 70–110)
HCT VFR BLD AUTO: 34.6 % (ref 40–54)
HGB BLD-MCNC: 10.8 G/DL (ref 14–18)
HSV-1 DNA BY PCR: NEGATIVE
HSV-2 DNA BY PCR: NEGATIVE
IMM GRANULOCYTES # BLD AUTO: ABNORMAL K/UL (ref 0–0.04)
IMM GRANULOCYTES NFR BLD AUTO: ABNORMAL % (ref 0–0.5)
LYMPHOCYTES NFR BLD: 11 % (ref 18–48)
LYMPHOCYTES NFR FLD MANUAL: 79 %
MAGNESIUM SERPL-MCNC: 1.6 MG/DL (ref 1.6–2.6)
MCH RBC QN AUTO: 28.1 PG (ref 27–31)
MCHC RBC AUTO-ENTMCNC: 31.2 G/DL (ref 32–36)
MCV RBC AUTO: 90 FL (ref 82–98)
METAMYELOCYTES NFR BLD MANUAL: 1 %
MONOCYTES NFR BLD: 8 % (ref 4–15)
MONOS+MACROS NFR FLD MANUAL: 18 %
MYELOCYTES NFR BLD MANUAL: 1 %
NEUTROPHILS NFR BLD: 68 % (ref 38–73)
NEUTROPHILS NFR FLD MANUAL: 3 %
NEUTS BAND NFR BLD MANUAL: 8 %
NRBC BLD-RTO: 0 /100 WBC
OVALOCYTES BLD QL SMEAR: ABNORMAL
PHOSPHATE SERPL-MCNC: 2.2 MG/DL (ref 2.7–4.5)
PLATELET # BLD AUTO: 203 K/UL (ref 150–450)
PMV BLD AUTO: 11 FL (ref 9.2–12.9)
POCT GLUCOSE: 91 MG/DL (ref 70–110)
POCT GLUCOSE: 94 MG/DL (ref 70–110)
POIKILOCYTOSIS BLD QL SMEAR: SLIGHT
POLYCHROMASIA BLD QL SMEAR: ABNORMAL
POTASSIUM SERPL-SCNC: 4.8 MMOL/L (ref 3.5–5.1)
RBC # BLD AUTO: 3.85 M/UL (ref 4.6–6.2)
SODIUM SERPL-SCNC: 135 MMOL/L (ref 136–145)
TACROLIMUS BLD-MCNC: 10.4 NG/ML (ref 5–15)
WBC # BLD AUTO: 2.22 K/UL (ref 3.9–12.7)
WBC # FLD: 48 /CU MM

## 2024-07-27 PROCEDURE — 80069 RENAL FUNCTION PANEL: CPT | Performed by: PHYSICIAN ASSISTANT

## 2024-07-27 PROCEDURE — 99233 SBSQ HOSP IP/OBS HIGH 50: CPT | Mod: ,,, | Performed by: INTERNAL MEDICINE

## 2024-07-27 PROCEDURE — 94660 CPAP INITIATION&MGMT: CPT

## 2024-07-27 PROCEDURE — 36415 COLL VENOUS BLD VENIPUNCTURE: CPT | Performed by: PHYSICIAN ASSISTANT

## 2024-07-27 PROCEDURE — 63600175 PHARM REV CODE 636 W HCPCS: Performed by: PHYSICIAN ASSISTANT

## 2024-07-27 PROCEDURE — 25000003 PHARM REV CODE 250: Performed by: PHYSICIAN ASSISTANT

## 2024-07-27 PROCEDURE — 87070 CULTURE OTHR SPECIMN AEROBIC: CPT | Performed by: NURSE PRACTITIONER

## 2024-07-27 PROCEDURE — 83735 ASSAY OF MAGNESIUM: CPT | Performed by: PHYSICIAN ASSISTANT

## 2024-07-27 PROCEDURE — 99222 1ST HOSP IP/OBS MODERATE 55: CPT | Mod: ,,, | Performed by: STUDENT IN AN ORGANIZED HEALTH CARE EDUCATION/TRAINING PROGRAM

## 2024-07-27 PROCEDURE — 87070 CULTURE OTHR SPECIMN AEROBIC: CPT | Mod: 59 | Performed by: NURSE PRACTITIONER

## 2024-07-27 PROCEDURE — 0W9G3ZZ DRAINAGE OF PERITONEAL CAVITY, PERCUTANEOUS APPROACH: ICD-10-PCS | Performed by: STUDENT IN AN ORGANIZED HEALTH CARE EDUCATION/TRAINING PROGRAM

## 2024-07-27 PROCEDURE — 89051 BODY FLUID CELL COUNT: CPT | Performed by: NURSE PRACTITIONER

## 2024-07-27 PROCEDURE — 82728 ASSAY OF FERRITIN: CPT | Performed by: PHYSICIAN ASSISTANT

## 2024-07-27 PROCEDURE — 82570 ASSAY OF URINE CREATININE: CPT | Performed by: NURSE PRACTITIONER

## 2024-07-27 PROCEDURE — 87799 DETECT AGENT NOS DNA QUANT: CPT | Performed by: PHYSICIAN ASSISTANT

## 2024-07-27 PROCEDURE — 25000003 PHARM REV CODE 250: Performed by: INTERNAL MEDICINE

## 2024-07-27 PROCEDURE — 63600175 PHARM REV CODE 636 W HCPCS: Performed by: INTERNAL MEDICINE

## 2024-07-27 PROCEDURE — 94761 N-INVAS EAR/PLS OXIMETRY MLT: CPT

## 2024-07-27 PROCEDURE — 85007 BL SMEAR W/DIFF WBC COUNT: CPT | Performed by: PHYSICIAN ASSISTANT

## 2024-07-27 PROCEDURE — 25000003 PHARM REV CODE 250: Performed by: NURSE PRACTITIONER

## 2024-07-27 PROCEDURE — 87075 CULTR BACTERIA EXCEPT BLOOD: CPT | Performed by: NURSE PRACTITIONER

## 2024-07-27 PROCEDURE — 80197 ASSAY OF TACROLIMUS: CPT | Performed by: PHYSICIAN ASSISTANT

## 2024-07-27 PROCEDURE — 99900035 HC TECH TIME PER 15 MIN (STAT)

## 2024-07-27 PROCEDURE — 87102 FUNGUS ISOLATION CULTURE: CPT | Performed by: NURSE PRACTITIONER

## 2024-07-27 PROCEDURE — 85027 COMPLETE CBC AUTOMATED: CPT | Performed by: PHYSICIAN ASSISTANT

## 2024-07-27 PROCEDURE — 20600001 HC STEP DOWN PRIVATE ROOM

## 2024-07-27 RX ORDER — IBUPROFEN 200 MG
16 TABLET ORAL
Status: DISCONTINUED | OUTPATIENT
Start: 2024-07-27 | End: 2024-07-28

## 2024-07-27 RX ORDER — PREDNISONE 5 MG/1
5 TABLET ORAL DAILY
Status: DISCONTINUED | OUTPATIENT
Start: 2024-07-27 | End: 2024-07-28 | Stop reason: HOSPADM

## 2024-07-27 RX ORDER — INSULIN ASPART 100 [IU]/ML
0-10 INJECTION, SOLUTION INTRAVENOUS; SUBCUTANEOUS
Status: DISCONTINUED | OUTPATIENT
Start: 2024-07-28 | End: 2024-07-27

## 2024-07-27 RX ORDER — IBUPROFEN 200 MG
24 TABLET ORAL
Status: DISCONTINUED | OUTPATIENT
Start: 2024-07-27 | End: 2024-07-28

## 2024-07-27 RX ORDER — TACROLIMUS 1 MG/1
3 CAPSULE ORAL 2 TIMES DAILY
Status: DISCONTINUED | OUTPATIENT
Start: 2024-07-27 | End: 2024-07-28 | Stop reason: HOSPADM

## 2024-07-27 RX ORDER — INSULIN ASPART 100 [IU]/ML
0-10 INJECTION, SOLUTION INTRAVENOUS; SUBCUTANEOUS
Status: DISCONTINUED | OUTPATIENT
Start: 2024-07-27 | End: 2024-07-27

## 2024-07-27 RX ORDER — GLUCAGON 1 MG
1 KIT INJECTION
Status: DISCONTINUED | OUTPATIENT
Start: 2024-07-27 | End: 2024-07-28

## 2024-07-27 RX ORDER — AMLODIPINE BESYLATE 10 MG/1
10 TABLET ORAL DAILY
Status: DISCONTINUED | OUTPATIENT
Start: 2024-07-28 | End: 2024-07-28 | Stop reason: HOSPADM

## 2024-07-27 RX ORDER — ATOVAQUONE 750 MG/5ML
1500 SUSPENSION ORAL DAILY
Status: DISCONTINUED | OUTPATIENT
Start: 2024-07-27 | End: 2024-07-28 | Stop reason: HOSPADM

## 2024-07-27 RX ADMIN — HEPARIN SODIUM 5000 UNITS: 5000 INJECTION INTRAVENOUS; SUBCUTANEOUS at 05:07

## 2024-07-27 RX ADMIN — VALGANCICLOVIR HYDROCHLORIDE 450 MG: 450 TABLET ORAL at 08:07

## 2024-07-27 RX ADMIN — HEPARIN SODIUM 5000 UNITS: 5000 INJECTION INTRAVENOUS; SUBCUTANEOUS at 08:07

## 2024-07-27 RX ADMIN — CARVEDILOL 25 MG: 12.5 TABLET, FILM COATED ORAL at 08:07

## 2024-07-27 RX ADMIN — INSULIN HUMAN 1.2 UNITS/HR: 1 INJECTION, SOLUTION INTRAVENOUS at 06:07

## 2024-07-27 RX ADMIN — LEVOTHYROXINE SODIUM 50 MCG: 50 TABLET ORAL at 05:07

## 2024-07-27 RX ADMIN — ATOVAQUONE 1500 MG: 750 SUSPENSION ORAL at 12:07

## 2024-07-27 RX ADMIN — CEFEPIME 1 G: 1 INJECTION, POWDER, FOR SOLUTION INTRAMUSCULAR; INTRAVENOUS at 07:07

## 2024-07-27 RX ADMIN — CETIRIZINE HYDROCHLORIDE 10 MG: 10 TABLET, FILM COATED ORAL at 08:07

## 2024-07-27 RX ADMIN — SULFAMETHOXAZOLE AND TRIMETHOPRIM 1 TABLET: 400; 80 TABLET ORAL at 08:07

## 2024-07-27 RX ADMIN — ASPIRIN 81 MG: 81 TABLET, COATED ORAL at 08:07

## 2024-07-27 RX ADMIN — LATANOPROST 1 DROP: 50 SOLUTION OPHTHALMIC at 08:07

## 2024-07-27 RX ADMIN — AMLODIPINE BESYLATE 5 MG: 5 TABLET ORAL at 08:07

## 2024-07-27 RX ADMIN — TACROLIMUS 3 MG: 1 CAPSULE ORAL at 06:07

## 2024-07-27 RX ADMIN — PREDNISONE 5 MG: 5 TABLET ORAL at 12:07

## 2024-07-27 RX ADMIN — CEFEPIME 1 G: 1 INJECTION, POWDER, FOR SOLUTION INTRAMUSCULAR; INTRAVENOUS at 04:07

## 2024-07-27 RX ADMIN — GUAIFENESIN AND DEXTROMETHORPHAN HYDROBROMIDE 1 TABLET: 600; 30 TABLET, EXTENDED RELEASE ORAL at 12:07

## 2024-07-27 RX ADMIN — SODIUM BICARBONATE 1300 MG: 650 TABLET ORAL at 08:07

## 2024-07-27 RX ADMIN — GUAIFENESIN AND DEXTROMETHORPHAN HYDROBROMIDE 1 TABLET: 600; 30 TABLET, EXTENDED RELEASE ORAL at 08:07

## 2024-07-27 RX ADMIN — TACROLIMUS 4 MG: 1 CAPSULE ORAL at 08:07

## 2024-07-28 VITALS
DIASTOLIC BLOOD PRESSURE: 63 MMHG | RESPIRATION RATE: 14 BRPM | BODY MASS INDEX: 35.68 KG/M2 | OXYGEN SATURATION: 96 % | TEMPERATURE: 98 F | HEART RATE: 61 BPM | SYSTOLIC BLOOD PRESSURE: 124 MMHG | WEIGHT: 209 LBS | HEIGHT: 64 IN

## 2024-07-28 PROBLEM — A41.9 SEPSIS: Status: RESOLVED | Noted: 2024-07-24 | Resolved: 2024-07-28

## 2024-07-28 LAB
ALBUMIN SERPL BCP-MCNC: 3.3 G/DL (ref 3.5–5.2)
ANION GAP SERPL CALC-SCNC: 11 MMOL/L (ref 8–16)
ANISOCYTOSIS BLD QL SMEAR: SLIGHT
BASOPHILS NFR BLD: 1 % (ref 0–1.9)
BUN SERPL-MCNC: 34 MG/DL (ref 8–23)
CALCIUM SERPL-MCNC: 10.7 MG/DL (ref 8.7–10.5)
CHLORIDE SERPL-SCNC: 109 MMOL/L (ref 95–110)
CO2 SERPL-SCNC: 18 MMOL/L (ref 23–29)
CREAT SERPL-MCNC: 1.2 MG/DL (ref 0.5–1.4)
DIFFERENTIAL METHOD BLD: ABNORMAL
EOSINOPHIL NFR BLD: 1 % (ref 0–8)
ERYTHROCYTE [DISTWIDTH] IN BLOOD BY AUTOMATED COUNT: 13.7 % (ref 11.5–14.5)
EST. GFR  (NO RACE VARIABLE): >60 ML/MIN/1.73 M^2
GLUCOSE SERPL-MCNC: 83 MG/DL (ref 70–110)
HCT VFR BLD AUTO: 39.4 % (ref 40–54)
HGB BLD-MCNC: 12.3 G/DL (ref 14–18)
IMM GRANULOCYTES # BLD AUTO: ABNORMAL K/UL (ref 0–0.04)
IMM GRANULOCYTES NFR BLD AUTO: ABNORMAL % (ref 0–0.5)
LYMPHOCYTES NFR BLD: 17 % (ref 18–48)
MAGNESIUM SERPL-MCNC: 1.8 MG/DL (ref 1.6–2.6)
MCH RBC QN AUTO: 28.4 PG (ref 27–31)
MCHC RBC AUTO-ENTMCNC: 31.2 G/DL (ref 32–36)
MCV RBC AUTO: 91 FL (ref 82–98)
MONOCYTES NFR BLD: 9 % (ref 4–15)
NEUTROPHILS NFR BLD: 62 % (ref 38–73)
NEUTS BAND NFR BLD MANUAL: 10 %
NRBC BLD-RTO: 0 /100 WBC
PHOSPHATE SERPL-MCNC: 2.6 MG/DL (ref 2.7–4.5)
PLATELET # BLD AUTO: 239 K/UL (ref 150–450)
PMV BLD AUTO: 10.6 FL (ref 9.2–12.9)
POLYCHROMASIA BLD QL SMEAR: ABNORMAL
POTASSIUM SERPL-SCNC: 5 MMOL/L (ref 3.5–5.1)
RBC # BLD AUTO: 4.33 M/UL (ref 4.6–6.2)
SODIUM SERPL-SCNC: 138 MMOL/L (ref 136–145)
TACROLIMUS BLD-MCNC: 9.4 NG/ML (ref 5–15)
WBC # BLD AUTO: 2.44 K/UL (ref 3.9–12.7)

## 2024-07-28 PROCEDURE — 94761 N-INVAS EAR/PLS OXIMETRY MLT: CPT

## 2024-07-28 PROCEDURE — 99239 HOSP IP/OBS DSCHRG MGMT >30: CPT | Mod: ,,, | Performed by: NURSE PRACTITIONER

## 2024-07-28 PROCEDURE — 85007 BL SMEAR W/DIFF WBC COUNT: CPT | Performed by: PHYSICIAN ASSISTANT

## 2024-07-28 PROCEDURE — 25000003 PHARM REV CODE 250: Performed by: PHYSICIAN ASSISTANT

## 2024-07-28 PROCEDURE — 83735 ASSAY OF MAGNESIUM: CPT | Performed by: PHYSICIAN ASSISTANT

## 2024-07-28 PROCEDURE — 36415 COLL VENOUS BLD VENIPUNCTURE: CPT | Performed by: PHYSICIAN ASSISTANT

## 2024-07-28 PROCEDURE — 80069 RENAL FUNCTION PANEL: CPT | Performed by: PHYSICIAN ASSISTANT

## 2024-07-28 PROCEDURE — 85027 COMPLETE CBC AUTOMATED: CPT | Performed by: PHYSICIAN ASSISTANT

## 2024-07-28 PROCEDURE — 99900035 HC TECH TIME PER 15 MIN (STAT)

## 2024-07-28 PROCEDURE — 63600175 PHARM REV CODE 636 W HCPCS: Performed by: INTERNAL MEDICINE

## 2024-07-28 PROCEDURE — 63600175 PHARM REV CODE 636 W HCPCS: Performed by: PHYSICIAN ASSISTANT

## 2024-07-28 PROCEDURE — 63600175 PHARM REV CODE 636 W HCPCS: Performed by: NURSE PRACTITIONER

## 2024-07-28 PROCEDURE — 80197 ASSAY OF TACROLIMUS: CPT | Performed by: PHYSICIAN ASSISTANT

## 2024-07-28 PROCEDURE — 99232 SBSQ HOSP IP/OBS MODERATE 35: CPT | Mod: ,,, | Performed by: NURSE PRACTITIONER

## 2024-07-28 PROCEDURE — 25000003 PHARM REV CODE 250: Performed by: INTERNAL MEDICINE

## 2024-07-28 RX ORDER — CINACALCET 30 MG/1
30 TABLET, FILM COATED ORAL
Status: DISCONTINUED | OUTPATIENT
Start: 2024-07-28 | End: 2024-07-28

## 2024-07-28 RX ORDER — TACROLIMUS 1 MG/1
CAPSULE ORAL
Qty: 210 CAPSULE | Refills: 11 | Status: SHIPPED | OUTPATIENT
Start: 2024-07-28 | End: 2024-08-01 | Stop reason: DRUGHIGH

## 2024-07-28 RX ORDER — PREDNISONE 5 MG/1
5 TABLET ORAL DAILY
Qty: 75 TABLET | Refills: 5 | Status: SHIPPED | OUTPATIENT
Start: 2024-07-28

## 2024-07-28 RX ORDER — ATOVAQUONE 750 MG/5ML
1500 SUSPENSION ORAL DAILY
Qty: 300 ML | Refills: 0 | Status: SHIPPED | OUTPATIENT
Start: 2024-07-29

## 2024-07-28 RX ORDER — IBUPROFEN 200 MG
24 TABLET ORAL
Status: DISCONTINUED | OUTPATIENT
Start: 2024-07-28 | End: 2024-07-28 | Stop reason: HOSPADM

## 2024-07-28 RX ORDER — IBUPROFEN 200 MG
16 TABLET ORAL
Status: DISCONTINUED | OUTPATIENT
Start: 2024-07-28 | End: 2024-07-28 | Stop reason: HOSPADM

## 2024-07-28 RX ORDER — INSULIN ASPART 100 [IU]/ML
1.2 INJECTION, SOLUTION INTRAVENOUS; SUBCUTANEOUS CONTINUOUS
Status: DISCONTINUED | OUTPATIENT
Start: 2024-07-28 | End: 2024-07-28 | Stop reason: HOSPADM

## 2024-07-28 RX ORDER — AMLODIPINE BESYLATE 5 MG/1
10 TABLET ORAL DAILY
Qty: 180 TABLET | Refills: 3 | Status: SHIPPED | OUTPATIENT
Start: 2024-07-28 | End: 2025-07-28

## 2024-07-28 RX ORDER — INSULIN ASPART 100 [IU]/ML
0-10 INJECTION, SOLUTION INTRAVENOUS; SUBCUTANEOUS
Status: DISCONTINUED | OUTPATIENT
Start: 2024-07-28 | End: 2024-07-28 | Stop reason: HOSPADM

## 2024-07-28 RX ORDER — CARVEDILOL 12.5 MG/1
25 TABLET ORAL 2 TIMES DAILY
Qty: 60 TABLET | Refills: 3 | Status: SHIPPED | OUTPATIENT
Start: 2024-07-28

## 2024-07-28 RX ORDER — MYCOPHENOLATE MOFETIL 250 MG/1
500 CAPSULE ORAL 2 TIMES DAILY
Qty: 120 CAPSULE | Refills: 11 | Status: SHIPPED | OUTPATIENT
Start: 2024-07-28 | End: 2024-08-05 | Stop reason: DRUGHIGH

## 2024-07-28 RX ORDER — GLUCAGON 1 MG
1 KIT INJECTION
Status: DISCONTINUED | OUTPATIENT
Start: 2024-07-28 | End: 2024-07-28 | Stop reason: HOSPADM

## 2024-07-28 RX ORDER — LEVOFLOXACIN 750 MG/1
750 TABLET ORAL DAILY
Qty: 5 TABLET | Refills: 0 | Status: SHIPPED | OUTPATIENT
Start: 2024-07-28 | End: 2024-08-02

## 2024-07-28 RX ORDER — LEVOFLOXACIN 250 MG/1
750 TABLET ORAL DAILY
Status: CANCELLED | OUTPATIENT
Start: 2024-07-28 | End: 2024-08-02

## 2024-07-28 RX ADMIN — TACROLIMUS 3 MG: 1 CAPSULE ORAL at 08:07

## 2024-07-28 RX ADMIN — ASPIRIN 81 MG: 81 TABLET, COATED ORAL at 08:07

## 2024-07-28 RX ADMIN — INSULIN ASPART 10 UNITS: 100 INJECTION, SOLUTION INTRAVENOUS; SUBCUTANEOUS at 09:07

## 2024-07-28 RX ADMIN — CARVEDILOL 25 MG: 12.5 TABLET, FILM COATED ORAL at 08:07

## 2024-07-28 RX ADMIN — SODIUM BICARBONATE 1300 MG: 650 TABLET ORAL at 08:07

## 2024-07-28 RX ADMIN — PREDNISONE 5 MG: 5 TABLET ORAL at 08:07

## 2024-07-28 RX ADMIN — VALGANCICLOVIR HYDROCHLORIDE 450 MG: 450 TABLET ORAL at 08:07

## 2024-07-28 RX ADMIN — ATOVAQUONE 1500 MG: 750 SUSPENSION ORAL at 08:07

## 2024-07-28 RX ADMIN — CETIRIZINE HYDROCHLORIDE 10 MG: 10 TABLET, FILM COATED ORAL at 08:07

## 2024-07-28 RX ADMIN — HEPARIN SODIUM 5000 UNITS: 5000 INJECTION INTRAVENOUS; SUBCUTANEOUS at 06:07

## 2024-07-28 RX ADMIN — CEFEPIME 1 G: 1 INJECTION, POWDER, FOR SOLUTION INTRAMUSCULAR; INTRAVENOUS at 04:07

## 2024-07-28 RX ADMIN — AMLODIPINE BESYLATE 10 MG: 10 TABLET ORAL at 08:07

## 2024-07-28 RX ADMIN — GUAIFENESIN AND DEXTROMETHORPHAN HYDROBROMIDE 1 TABLET: 600; 30 TABLET, EXTENDED RELEASE ORAL at 12:07

## 2024-07-28 RX ADMIN — LEVOTHYROXINE SODIUM 50 MCG: 50 TABLET ORAL at 06:07

## 2024-07-29 ENCOUNTER — TELEPHONE (OUTPATIENT)
Dept: TRANSPLANT | Facility: CLINIC | Age: 64
End: 2024-07-29
Payer: MEDICARE

## 2024-07-29 LAB
BACTERIA BLD CULT: NORMAL
BACTERIA BLD CULT: NORMAL
BKV DNA SERPL NAA+PROBE-ACNC: <125 COPIES/ML
BKV DNA SERPL NAA+PROBE-LOG#: <2.1 LOG (10) COPIES/ML
BKV DNA SERPL QL NAA+PROBE: NOT DETECTED
EPSTEIN-BARR VIRUS DNA: ABNORMAL
EPSTEIN-BARR VIRUS PCR, QUANT: ABNORMAL IU/ML

## 2024-07-29 NOTE — TELEPHONE ENCOUNTER
----- Message from Yuli Pantoja sent at 7/29/2024  2:48 PM CDT -----  Regarding: Scheduling Request  Contact: Mark Lopez     Consult/Advisory     Name Of Caller:Mark Lopez         Contact Preference:[phone]      Nature of call:Patient is calling stating that he's just been diagnose with Claude/bar virus. Patient wants to know if a different  lab should be done for tomorrows appt. Requesting a call back

## 2024-07-30 LAB
BACTERIA SPEC AEROBE CULT: NO GROWTH
FUNGUS SPEC CULT: NORMAL

## 2024-07-31 ENCOUNTER — LAB VISIT (OUTPATIENT)
Dept: LAB | Facility: HOSPITAL | Age: 64
End: 2024-07-31
Attending: INTERNAL MEDICINE
Payer: MEDICARE

## 2024-07-31 DIAGNOSIS — Z79.899 IMMUNOSUPPRESSIVE MANAGEMENT ENCOUNTER FOLLOWING KIDNEY TRANSPLANT: ICD-10-CM

## 2024-07-31 DIAGNOSIS — Z94.0 IMMUNOSUPPRESSIVE MANAGEMENT ENCOUNTER FOLLOWING KIDNEY TRANSPLANT: ICD-10-CM

## 2024-07-31 DIAGNOSIS — Z94.0 KIDNEY REPLACED BY TRANSPLANT: ICD-10-CM

## 2024-07-31 LAB
ALBUMIN SERPL BCP-MCNC: 3.3 G/DL (ref 3.5–5.2)
ANION GAP SERPL CALC-SCNC: 8 MMOL/L (ref 8–16)
ANISOCYTOSIS BLD QL SMEAR: SLIGHT
BASOPHILS # BLD AUTO: ABNORMAL K/UL (ref 0–0.2)
BASOPHILS NFR BLD: 0 % (ref 0–1.9)
BUN SERPL-MCNC: 41 MG/DL (ref 8–23)
CALCIUM SERPL-MCNC: 10.1 MG/DL (ref 8.7–10.5)
CHLORIDE SERPL-SCNC: 110 MMOL/L (ref 95–110)
CO2 SERPL-SCNC: 24 MMOL/L (ref 23–29)
CREAT SERPL-MCNC: 1.5 MG/DL (ref 0.5–1.4)
DIFFERENTIAL METHOD BLD: ABNORMAL
EOSINOPHIL # BLD AUTO: ABNORMAL K/UL (ref 0–0.5)
EOSINOPHIL NFR BLD: 4 % (ref 0–8)
ERYTHROCYTE [DISTWIDTH] IN BLOOD BY AUTOMATED COUNT: 14 % (ref 11.5–14.5)
EST. GFR  (NO RACE VARIABLE): 51.3 ML/MIN/1.73 M^2
GLUCOSE SERPL-MCNC: 101 MG/DL (ref 70–110)
HCT VFR BLD AUTO: 37.7 % (ref 40–54)
HGB BLD-MCNC: 11.8 G/DL (ref 14–18)
HYPOCHROMIA BLD QL SMEAR: ABNORMAL
IMM GRANULOCYTES # BLD AUTO: ABNORMAL K/UL (ref 0–0.04)
IMM GRANULOCYTES NFR BLD AUTO: ABNORMAL % (ref 0–0.5)
LYMPHOCYTES # BLD AUTO: ABNORMAL K/UL (ref 1–4.8)
LYMPHOCYTES NFR BLD: 7 % (ref 18–48)
MAGNESIUM SERPL-MCNC: 1.9 MG/DL (ref 1.6–2.6)
MCH RBC QN AUTO: 28 PG (ref 27–31)
MCHC RBC AUTO-ENTMCNC: 31.3 G/DL (ref 32–36)
MCV RBC AUTO: 90 FL (ref 82–98)
MONOCYTES # BLD AUTO: ABNORMAL K/UL (ref 0.3–1)
MONOCYTES NFR BLD: 6 % (ref 4–15)
NEUTROPHILS NFR BLD: 69 % (ref 38–73)
NEUTS BAND NFR BLD MANUAL: 14 %
NRBC BLD-RTO: 0 /100 WBC
OVALOCYTES BLD QL SMEAR: ABNORMAL
PHOSPHATE SERPL-MCNC: 2.5 MG/DL (ref 2.7–4.5)
PLATELET # BLD AUTO: 440 K/UL (ref 150–450)
PMV BLD AUTO: 9.8 FL (ref 9.2–12.9)
POIKILOCYTOSIS BLD QL SMEAR: SLIGHT
POLYCHROMASIA BLD QL SMEAR: ABNORMAL
POTASSIUM SERPL-SCNC: 4.5 MMOL/L (ref 3.5–5.1)
RBC # BLD AUTO: 4.21 M/UL (ref 4.6–6.2)
SODIUM SERPL-SCNC: 142 MMOL/L (ref 136–145)
SPHEROCYTES BLD QL SMEAR: ABNORMAL
TACROLIMUS BLD-MCNC: 10.3 NG/ML (ref 5–15)
WBC # BLD AUTO: 2.67 K/UL (ref 3.9–12.7)

## 2024-07-31 PROCEDURE — 36415 COLL VENOUS BLD VENIPUNCTURE: CPT | Performed by: INTERNAL MEDICINE

## 2024-07-31 PROCEDURE — 80197 ASSAY OF TACROLIMUS: CPT | Performed by: INTERNAL MEDICINE

## 2024-07-31 PROCEDURE — 85027 COMPLETE CBC AUTOMATED: CPT | Performed by: INTERNAL MEDICINE

## 2024-07-31 PROCEDURE — 80069 RENAL FUNCTION PANEL: CPT | Performed by: INTERNAL MEDICINE

## 2024-07-31 PROCEDURE — 83735 ASSAY OF MAGNESIUM: CPT | Performed by: INTERNAL MEDICINE

## 2024-07-31 PROCEDURE — 85007 BL SMEAR W/DIFF WBC COUNT: CPT | Performed by: INTERNAL MEDICINE

## 2024-08-01 ENCOUNTER — PATIENT MESSAGE (OUTPATIENT)
Dept: TRANSPLANT | Facility: CLINIC | Age: 64
End: 2024-08-01
Payer: MEDICARE

## 2024-08-01 DIAGNOSIS — Z94.0 KIDNEY REPLACED BY TRANSPLANT: Primary | ICD-10-CM

## 2024-08-01 LAB — BACTERIA FLD CULT: NORMAL

## 2024-08-01 RX ORDER — TACROLIMUS 1 MG/1
3 CAPSULE ORAL EVERY 12 HOURS
Qty: 180 CAPSULE | Refills: 11 | Status: SHIPPED | OUTPATIENT
Start: 2024-08-01 | End: 2025-08-01

## 2024-08-01 NOTE — TELEPHONE ENCOUNTER
Notified patient of Dr. Alexander's review of 7/31/24 lab results via [facility].     [facility] message sent to patient:    Gagan Lopez,     Dr. Alexander reviewed your 7/31/24 lab results. Your prograf level 10.3 is higher than it should be. She wants you to please decrease your prograf dose to 3mg twice daily.     Thanks,     Veronica       ----- Message from Hope Alexander DO sent at 7/31/2024  3:33 PM CDT -----  High FK. Decrease FK to 3 mg BID from FK 4/3

## 2024-08-05 ENCOUNTER — LAB VISIT (OUTPATIENT)
Dept: LAB | Facility: HOSPITAL | Age: 64
End: 2024-08-05
Payer: MEDICARE

## 2024-08-05 ENCOUNTER — PATIENT MESSAGE (OUTPATIENT)
Dept: TRANSPLANT | Facility: CLINIC | Age: 64
End: 2024-08-05
Payer: MEDICARE

## 2024-08-05 DIAGNOSIS — Z94.0 KIDNEY REPLACED BY TRANSPLANT: ICD-10-CM

## 2024-08-05 DIAGNOSIS — Z94.0 IMMUNOSUPPRESSIVE MANAGEMENT ENCOUNTER FOLLOWING KIDNEY TRANSPLANT: ICD-10-CM

## 2024-08-05 DIAGNOSIS — Z79.899 IMMUNOSUPPRESSIVE MANAGEMENT ENCOUNTER FOLLOWING KIDNEY TRANSPLANT: ICD-10-CM

## 2024-08-05 DIAGNOSIS — Z94.0 KIDNEY REPLACED BY TRANSPLANT: Primary | ICD-10-CM

## 2024-08-05 LAB
ALBUMIN SERPL BCP-MCNC: 3.4 G/DL (ref 3.5–5.2)
ANION GAP SERPL CALC-SCNC: 7 MMOL/L (ref 8–16)
ANISOCYTOSIS BLD QL SMEAR: SLIGHT
BACTERIA SPEC ANAEROBE CULT: NORMAL
BASOPHILS NFR BLD: 2 % (ref 0–1.9)
BUN SERPL-MCNC: 37 MG/DL (ref 8–23)
CALCIUM SERPL-MCNC: 10.6 MG/DL (ref 8.7–10.5)
CHLORIDE SERPL-SCNC: 114 MMOL/L (ref 95–110)
CO2 SERPL-SCNC: 22 MMOL/L (ref 23–29)
CREAT SERPL-MCNC: 1.3 MG/DL (ref 0.5–1.4)
DIFFERENTIAL METHOD BLD: ABNORMAL
EOSINOPHIL NFR BLD: 3 % (ref 0–8)
ERYTHROCYTE [DISTWIDTH] IN BLOOD BY AUTOMATED COUNT: 14.5 % (ref 11.5–14.5)
EST. GFR  (NO RACE VARIABLE): >60 ML/MIN/1.73 M^2
GLUCOSE SERPL-MCNC: 104 MG/DL (ref 70–110)
HCT VFR BLD AUTO: 38.9 % (ref 40–54)
HGB BLD-MCNC: 11.9 G/DL (ref 14–18)
IMM GRANULOCYTES # BLD AUTO: ABNORMAL K/UL (ref 0–0.04)
IMM GRANULOCYTES NFR BLD AUTO: ABNORMAL % (ref 0–0.5)
LYMPHOCYTES NFR BLD: 8 % (ref 18–48)
MAGNESIUM SERPL-MCNC: 1.9 MG/DL (ref 1.6–2.6)
MCH RBC QN AUTO: 28.3 PG (ref 27–31)
MCHC RBC AUTO-ENTMCNC: 30.6 G/DL (ref 32–36)
MCV RBC AUTO: 93 FL (ref 82–98)
METAMYELOCYTES NFR BLD MANUAL: 6 %
MONOCYTES NFR BLD: 5 % (ref 4–15)
MYELOCYTES NFR BLD MANUAL: 4 %
NEUTROPHILS NFR BLD: 61 % (ref 38–73)
NEUTS BAND NFR BLD MANUAL: 11 %
NRBC BLD-RTO: 0 /100 WBC
PHOSPHATE SERPL-MCNC: 2.6 MG/DL (ref 2.7–4.5)
PLATELET # BLD AUTO: 525 K/UL (ref 150–450)
PLATELET BLD QL SMEAR: ABNORMAL
PMV BLD AUTO: 9.4 FL (ref 9.2–12.9)
POLYCHROMASIA BLD QL SMEAR: ABNORMAL
POTASSIUM SERPL-SCNC: 4.4 MMOL/L (ref 3.5–5.1)
RBC # BLD AUTO: 4.2 M/UL (ref 4.6–6.2)
SODIUM SERPL-SCNC: 143 MMOL/L (ref 136–145)
TACROLIMUS BLD-MCNC: 7.5 NG/ML (ref 5–15)
WBC # BLD AUTO: 5.17 K/UL (ref 3.9–12.7)

## 2024-08-05 PROCEDURE — 83735 ASSAY OF MAGNESIUM: CPT | Performed by: INTERNAL MEDICINE

## 2024-08-05 PROCEDURE — 80197 ASSAY OF TACROLIMUS: CPT | Performed by: INTERNAL MEDICINE

## 2024-08-05 PROCEDURE — 36415 COLL VENOUS BLD VENIPUNCTURE: CPT | Performed by: INTERNAL MEDICINE

## 2024-08-05 PROCEDURE — 85027 COMPLETE CBC AUTOMATED: CPT | Performed by: INTERNAL MEDICINE

## 2024-08-05 PROCEDURE — 85007 BL SMEAR W/DIFF WBC COUNT: CPT | Performed by: INTERNAL MEDICINE

## 2024-08-05 PROCEDURE — 80069 RENAL FUNCTION PANEL: CPT | Performed by: INTERNAL MEDICINE

## 2024-08-06 RX ORDER — MYCOPHENOLATE MOFETIL 250 MG/1
500 CAPSULE ORAL 2 TIMES DAILY
Qty: 120 CAPSULE | Refills: 11 | Status: SHIPPED | OUTPATIENT
Start: 2024-08-06

## 2024-08-15 PROBLEM — N18.5 CHRONIC KIDNEY DISEASE, STAGE V: Status: RESOLVED | Noted: 2023-01-06 | Resolved: 2024-08-15

## 2024-08-15 PROBLEM — N18.2 STAGE 2 CHRONIC KIDNEY DISEASE: Status: ACTIVE | Noted: 2023-01-06

## 2024-08-15 PROBLEM — Z92.29 HISTORY OF HEPATITIS B VACCINATION: Chronic | Status: RESOLVED | Noted: 2024-04-30 | Resolved: 2024-08-15

## 2024-08-20 ENCOUNTER — LAB VISIT (OUTPATIENT)
Dept: LAB | Facility: HOSPITAL | Age: 64
End: 2024-08-20
Attending: INTERNAL MEDICINE
Payer: MEDICARE

## 2024-08-20 DIAGNOSIS — N28.9 KIDNEY DISEASE: ICD-10-CM

## 2024-08-20 DIAGNOSIS — R80.9 PROTEINURIA, UNSPECIFIED TYPE: ICD-10-CM

## 2024-08-20 LAB
BACTERIA #/AREA URNS HPF: ABNORMAL /HPF
BILIRUB UR QL STRIP: NEGATIVE
CLARITY UR: ABNORMAL
COLOR UR: YELLOW
GLUCOSE UR QL STRIP: NEGATIVE
HGB UR QL STRIP: ABNORMAL
HYALINE CASTS #/AREA URNS LPF: 0 /LPF
KETONES UR QL STRIP: NEGATIVE
LEUKOCYTE ESTERASE UR QL STRIP: ABNORMAL
MICROSCOPIC COMMENT: ABNORMAL
NITRITE UR QL STRIP: NEGATIVE
PH UR STRIP: 6 [PH] (ref 5–8)
PROT UR QL STRIP: ABNORMAL
RBC #/AREA URNS HPF: >100 /HPF (ref 0–4)
SP GR UR STRIP: 1.01 (ref 1–1.03)
SQUAMOUS #/AREA URNS HPF: 1 /HPF
URN SPEC COLLECT METH UR: ABNORMAL
WBC #/AREA URNS HPF: 25 /HPF (ref 0–5)

## 2024-08-20 PROCEDURE — 84156 ASSAY OF PROTEIN URINE: CPT | Performed by: INTERNAL MEDICINE

## 2024-08-20 PROCEDURE — 81000 URINALYSIS NONAUTO W/SCOPE: CPT | Mod: PO | Performed by: INTERNAL MEDICINE

## 2024-08-21 ENCOUNTER — HOSPITAL ENCOUNTER (INPATIENT)
Facility: HOSPITAL | Age: 64
LOS: 1 days | Discharge: HOME | DRG: 699 | End: 2024-08-22
Attending: STUDENT IN AN ORGANIZED HEALTH CARE EDUCATION/TRAINING PROGRAM | Admitting: INTERNAL MEDICINE
Payer: MEDICARE

## 2024-08-21 DIAGNOSIS — Z94.0 STATUS POST DECEASED-DONOR KIDNEY TRANSPLANTATION: ICD-10-CM

## 2024-08-21 DIAGNOSIS — Z29.89 PROPHYLACTIC IMMUNOTHERAPY: ICD-10-CM

## 2024-08-21 DIAGNOSIS — R50.9 FEVER, UNSPECIFIED FEVER CAUSE: Primary | ICD-10-CM

## 2024-08-21 DIAGNOSIS — E11.22 TYPE 2 DIABETES MELLITUS WITH STAGE 3 CHRONIC KIDNEY DISEASE, WITH LONG-TERM CURRENT USE OF INSULIN, UNSPECIFIED WHETHER STAGE 3A OR 3B CKD: ICD-10-CM

## 2024-08-21 DIAGNOSIS — Z91.89 AT RISK FOR OPPORTUNISTIC INFECTIONS: ICD-10-CM

## 2024-08-21 DIAGNOSIS — Z79.60 LONG-TERM USE OF IMMUNOSUPPRESSANT MEDICATION: ICD-10-CM

## 2024-08-21 DIAGNOSIS — Z79.4 TYPE 2 DIABETES MELLITUS WITH STAGE 3 CHRONIC KIDNEY DISEASE, WITH LONG-TERM CURRENT USE OF INSULIN, UNSPECIFIED WHETHER STAGE 3A OR 3B CKD: ICD-10-CM

## 2024-08-21 DIAGNOSIS — E87.20 ACIDOSIS: ICD-10-CM

## 2024-08-21 DIAGNOSIS — Z94.0 KIDNEY REPLACED BY TRANSPLANT: ICD-10-CM

## 2024-08-21 DIAGNOSIS — R50.9 FEVER: ICD-10-CM

## 2024-08-21 DIAGNOSIS — E83.39 HYPOPHOSPHATEMIA: ICD-10-CM

## 2024-08-21 DIAGNOSIS — Z96.41 INSULIN PUMP IN PLACE: ICD-10-CM

## 2024-08-21 DIAGNOSIS — T86.898 URINE LEAK FROM TRANSPLANTED URETER: ICD-10-CM

## 2024-08-21 DIAGNOSIS — I10 ESSENTIAL HYPERTENSION: ICD-10-CM

## 2024-08-21 DIAGNOSIS — N18.30 TYPE 2 DIABETES MELLITUS WITH STAGE 3 CHRONIC KIDNEY DISEASE, WITH LONG-TERM CURRENT USE OF INSULIN, UNSPECIFIED WHETHER STAGE 3A OR 3B CKD: ICD-10-CM

## 2024-08-21 LAB
ADENOVIRUS: NOT DETECTED
ALBUMIN SERPL BCP-MCNC: 2.8 G/DL (ref 3.5–5.2)
ALBUMIN SERPL BCP-MCNC: 3.1 G/DL (ref 3.5–5.2)
ALLENS TEST: ABNORMAL
ALLENS TEST: NORMAL
ALP SERPL-CCNC: 87 U/L (ref 55–135)
ALT SERPL W/O P-5'-P-CCNC: 10 U/L (ref 10–44)
ANION GAP SERPL CALC-SCNC: 11 MMOL/L (ref 8–16)
ANION GAP SERPL CALC-SCNC: 9 MMOL/L (ref 8–16)
ANISOCYTOSIS BLD QL SMEAR: SLIGHT
ANISOCYTOSIS BLD QL SMEAR: SLIGHT
AST SERPL-CCNC: 15 U/L (ref 10–40)
BACTERIA #/AREA URNS AUTO: ABNORMAL /HPF
BASOPHILS # BLD AUTO: ABNORMAL K/UL (ref 0–0.2)
BASOPHILS NFR BLD: 0 % (ref 0–1.9)
BASOPHILS NFR BLD: 0 % (ref 0–1.9)
BILIRUB SERPL-MCNC: 0.6 MG/DL (ref 0.1–1)
BILIRUB UR QL STRIP: NEGATIVE
BORDETELLA PARAPERTUSSIS (IS1001): NOT DETECTED
BORDETELLA PERTUSSIS (PTXP): NOT DETECTED
BUN SERPL-MCNC: 45 MG/DL (ref 8–23)
BUN SERPL-MCNC: 49 MG/DL (ref 8–23)
CALCIUM SERPL-MCNC: 10 MG/DL (ref 8.7–10.5)
CALCIUM SERPL-MCNC: 10.3 MG/DL (ref 8.7–10.5)
CHLAMYDIA PNEUMONIAE: NOT DETECTED
CHLORIDE SERPL-SCNC: 103 MMOL/L (ref 95–110)
CHLORIDE SERPL-SCNC: 106 MMOL/L (ref 95–110)
CLARITY UR REFRACT.AUTO: CLEAR
CO2 SERPL-SCNC: 18 MMOL/L (ref 23–29)
CO2 SERPL-SCNC: 19 MMOL/L (ref 23–29)
COLOR UR AUTO: YELLOW
CORONAVIRUS 229E, COMMON COLD VIRUS: NOT DETECTED
CORONAVIRUS HKU1, COMMON COLD VIRUS: NOT DETECTED
CORONAVIRUS NL63, COMMON COLD VIRUS: NOT DETECTED
CORONAVIRUS OC43, COMMON COLD VIRUS: NOT DETECTED
CREAT SERPL-MCNC: 1.8 MG/DL (ref 0.5–1.4)
CREAT SERPL-MCNC: 2 MG/DL (ref 0.5–1.4)
CREAT UR-MCNC: 74 MG/DL (ref 23–375)
DIFFERENTIAL METHOD BLD: ABNORMAL
DIFFERENTIAL METHOD BLD: ABNORMAL
EOSINOPHIL # BLD AUTO: ABNORMAL K/UL (ref 0–0.5)
EOSINOPHIL NFR BLD: 0 % (ref 0–8)
EOSINOPHIL NFR BLD: 3 % (ref 0–8)
ERYTHROCYTE [DISTWIDTH] IN BLOOD BY AUTOMATED COUNT: 14.3 % (ref 11.5–14.5)
ERYTHROCYTE [DISTWIDTH] IN BLOOD BY AUTOMATED COUNT: 14.3 % (ref 11.5–14.5)
EST. GFR  (NO RACE VARIABLE): 36.4 ML/MIN/1.73 M^2
EST. GFR  (NO RACE VARIABLE): 41.3 ML/MIN/1.73 M^2
FLUBV RNA NPH QL NAA+NON-PROBE: NOT DETECTED
GLUCOSE BLOOD: 168
GLUCOSE BLOOD: 288
GLUCOSE BLOOD: 301
GLUCOSE BLOOD: 66
GLUCOSE SERPL-MCNC: 111 MG/DL (ref 70–110)
GLUCOSE SERPL-MCNC: 127 MG/DL (ref 70–110)
GLUCOSE UR QL STRIP: NEGATIVE
HCO3 UR-SCNC: 19.8 MMOL/L (ref 24–28)
HCT VFR BLD AUTO: 31.9 % (ref 40–54)
HCT VFR BLD AUTO: 33.8 % (ref 40–54)
HGB BLD-MCNC: 10.1 G/DL (ref 14–18)
HGB BLD-MCNC: 10.4 G/DL (ref 14–18)
HGB UR QL STRIP: ABNORMAL
HPIV1 RNA NPH QL NAA+NON-PROBE: NOT DETECTED
HPIV2 RNA NPH QL NAA+NON-PROBE: NOT DETECTED
HPIV3 RNA NPH QL NAA+NON-PROBE: NOT DETECTED
HPIV4 RNA NPH QL NAA+NON-PROBE: NOT DETECTED
HUMAN METAPNEUMOVIRUS: NOT DETECTED
HYALINE CASTS UR QL AUTO: 1 /LPF
HYPOCHROMIA BLD QL SMEAR: ABNORMAL
IMM GRANULOCYTES # BLD AUTO: ABNORMAL K/UL (ref 0–0.04)
IMM GRANULOCYTES # BLD AUTO: ABNORMAL K/UL (ref 0–0.04)
IMM GRANULOCYTES NFR BLD AUTO: ABNORMAL % (ref 0–0.5)
IMM GRANULOCYTES NFR BLD AUTO: ABNORMAL % (ref 0–0.5)
INFLUENZA A (SUBTYPES H1,H1-2009,H3): NOT DETECTED
INFLUENZA A, MOLECULAR: NEGATIVE
INFLUENZA B, MOLECULAR: NEGATIVE
KETONES UR QL STRIP: NEGATIVE
LDH SERPL L TO P-CCNC: 0.82 MMOL/L (ref 0.5–2.2)
LEUKOCYTE ESTERASE UR QL STRIP: ABNORMAL
LYMPHOCYTES # BLD AUTO: ABNORMAL K/UL (ref 1–4.8)
LYMPHOCYTES NFR BLD: 0 % (ref 18–48)
LYMPHOCYTES NFR BLD: 1 % (ref 18–48)
MAGNESIUM SERPL-MCNC: 1.8 MG/DL (ref 1.6–2.6)
MAGNESIUM SERPL-MCNC: 1.8 MG/DL (ref 1.6–2.6)
MCH RBC QN AUTO: 27.7 PG (ref 27–31)
MCH RBC QN AUTO: 28.4 PG (ref 27–31)
MCHC RBC AUTO-ENTMCNC: 30.8 G/DL (ref 32–36)
MCHC RBC AUTO-ENTMCNC: 31.7 G/DL (ref 32–36)
MCV RBC AUTO: 90 FL (ref 82–98)
MCV RBC AUTO: 90 FL (ref 82–98)
METAMYELOCYTES NFR BLD MANUAL: 4 %
MICROSCOPIC COMMENT: ABNORMAL
MONOCYTES # BLD AUTO: ABNORMAL K/UL (ref 0.3–1)
MONOCYTES NFR BLD: 11 % (ref 4–15)
MONOCYTES NFR BLD: 3 % (ref 4–15)
MYCOPLASMA PNEUMONIAE: NOT DETECTED
MYELOCYTES NFR BLD MANUAL: 1 %
MYELOCYTES NFR BLD MANUAL: 2 %
NEUTROPHILS NFR BLD: 71 % (ref 38–73)
NEUTROPHILS NFR BLD: 92 % (ref 38–73)
NEUTS BAND NFR BLD MANUAL: 4 %
NEUTS BAND NFR BLD MANUAL: 8 %
NITRITE UR QL STRIP: NEGATIVE
NRBC BLD-RTO: 0 /100 WBC
NRBC BLD-RTO: 0 /100 WBC
OHS QRS DURATION: 104 MS
OHS QRS DURATION: 106 MS
OHS QTC CALCULATION: 423 MS
OHS QTC CALCULATION: 432 MS
OVALOCYTES BLD QL SMEAR: ABNORMAL
PCO2 BLDA: 33.9 MMHG (ref 35–45)
PH SMN: 7.37 [PH] (ref 7.35–7.45)
PH UR STRIP: 5 [PH] (ref 5–8)
PHOSPHATE SERPL-MCNC: 2.4 MG/DL (ref 2.7–4.5)
PHOSPHATE SERPL-MCNC: 2.5 MG/DL (ref 2.7–4.5)
PLATELET # BLD AUTO: 169 K/UL (ref 150–450)
PLATELET # BLD AUTO: 184 K/UL (ref 150–450)
PLATELET BLD QL SMEAR: ABNORMAL
PMV BLD AUTO: 11.2 FL (ref 9.2–12.9)
PMV BLD AUTO: 11.8 FL (ref 9.2–12.9)
PO2 BLDA: 39 MMHG (ref 40–60)
POC BE: -5 MMOL/L
POC SATURATED O2: 72 % (ref 95–100)
POC TCO2: 21 MMOL/L (ref 24–29)
POCT GLUCOSE: 161 MG/DL (ref 70–110)
POIKILOCYTOSIS BLD QL SMEAR: SLIGHT
POLYCHROMASIA BLD QL SMEAR: ABNORMAL
POTASSIUM SERPL-SCNC: 3.9 MMOL/L (ref 3.5–5.1)
POTASSIUM SERPL-SCNC: 4.2 MMOL/L (ref 3.5–5.1)
PROCALCITONIN SERPL IA-MCNC: 1.16 NG/ML
PROT SERPL-MCNC: 6.4 G/DL (ref 6–8.4)
PROT UR QL STRIP: ABNORMAL
PROT UR-MCNC: 37 MG/DL (ref 0–15)
PROT/CREAT UR: 0.5 MG/G{CREAT} (ref 0–0.2)
RBC # BLD AUTO: 3.56 M/UL (ref 4.6–6.2)
RBC # BLD AUTO: 3.75 M/UL (ref 4.6–6.2)
RBC #/AREA URNS AUTO: 2 /HPF (ref 0–4)
RESPIRATORY INFECTION PANEL SOURCE: NORMAL
RSV RNA NPH QL NAA+NON-PROBE: NOT DETECTED
RV+EV RNA NPH QL NAA+NON-PROBE: NOT DETECTED
SAMPLE: ABNORMAL
SAMPLE: NORMAL
SARS-COV-2 RDRP RESP QL NAA+PROBE: NEGATIVE
SARS-COV-2 RNA RESP QL NAA+PROBE: NOT DETECTED
SITE: ABNORMAL
SITE: NORMAL
SODIUM SERPL-SCNC: 133 MMOL/L (ref 136–145)
SODIUM SERPL-SCNC: 133 MMOL/L (ref 136–145)
SP GR UR STRIP: 1.01 (ref 1–1.03)
SPECIMEN SOURCE: NORMAL
SPHEROCYTES BLD QL SMEAR: ABNORMAL
SQUAMOUS #/AREA URNS AUTO: 0 /HPF
TACROLIMUS BLD-MCNC: 12.8 NG/ML (ref 5–15)
URN SPEC COLLECT METH UR: ABNORMAL
WBC # BLD AUTO: 5.69 K/UL (ref 3.9–12.7)
WBC # BLD AUTO: 6.47 K/UL (ref 3.9–12.7)
WBC #/AREA URNS AUTO: 38 /HPF (ref 0–5)
WBC CLUMPS UR QL AUTO: ABNORMAL

## 2024-08-21 PROCEDURE — 87581 M.PNEUMON DNA AMP PROBE: CPT | Performed by: PHYSICIAN ASSISTANT

## 2024-08-21 PROCEDURE — 82803 BLOOD GASES ANY COMBINATION: CPT

## 2024-08-21 PROCEDURE — 20600001 HC STEP DOWN PRIVATE ROOM

## 2024-08-21 PROCEDURE — 87040 BLOOD CULTURE FOR BACTERIA: CPT | Mod: 59 | Performed by: PHYSICIAN ASSISTANT

## 2024-08-21 PROCEDURE — 85027 COMPLETE CBC AUTOMATED: CPT | Performed by: PHYSICIAN ASSISTANT

## 2024-08-21 PROCEDURE — 87086 URINE CULTURE/COLONY COUNT: CPT | Performed by: PHYSICIAN ASSISTANT

## 2024-08-21 PROCEDURE — 99233 SBSQ HOSP IP/OBS HIGH 50: CPT | Mod: ,,, | Performed by: PHYSICIAN ASSISTANT

## 2024-08-21 PROCEDURE — 96365 THER/PROPH/DIAG IV INF INIT: CPT

## 2024-08-21 PROCEDURE — 80053 COMPREHEN METABOLIC PANEL: CPT | Performed by: PHYSICIAN ASSISTANT

## 2024-08-21 PROCEDURE — 25000003 PHARM REV CODE 250: Performed by: PHYSICIAN ASSISTANT

## 2024-08-21 PROCEDURE — 80069 RENAL FUNCTION PANEL: CPT | Performed by: PHYSICIAN ASSISTANT

## 2024-08-21 PROCEDURE — 84100 ASSAY OF PHOSPHORUS: CPT | Performed by: PHYSICIAN ASSISTANT

## 2024-08-21 PROCEDURE — 81001 URINALYSIS AUTO W/SCOPE: CPT | Performed by: PHYSICIAN ASSISTANT

## 2024-08-21 PROCEDURE — U0002 COVID-19 LAB TEST NON-CDC: HCPCS | Performed by: PHYSICIAN ASSISTANT

## 2024-08-21 PROCEDURE — 83735 ASSAY OF MAGNESIUM: CPT | Mod: 91 | Performed by: PHYSICIAN ASSISTANT

## 2024-08-21 PROCEDURE — 93010 ELECTROCARDIOGRAM REPORT: CPT | Mod: ,,, | Performed by: INTERNAL MEDICINE

## 2024-08-21 PROCEDURE — 84145 PROCALCITONIN (PCT): CPT | Performed by: PHYSICIAN ASSISTANT

## 2024-08-21 PROCEDURE — 25500020 PHARM REV CODE 255

## 2024-08-21 PROCEDURE — 94761 N-INVAS EAR/PLS OXIMETRY MLT: CPT | Mod: XB

## 2024-08-21 PROCEDURE — 99223 1ST HOSP IP/OBS HIGH 75: CPT | Mod: ,,, | Performed by: PHYSICIAN ASSISTANT

## 2024-08-21 PROCEDURE — 80197 ASSAY OF TACROLIMUS: CPT | Performed by: PHYSICIAN ASSISTANT

## 2024-08-21 PROCEDURE — 99222 1ST HOSP IP/OBS MODERATE 55: CPT | Mod: ,,, | Performed by: NURSE PRACTITIONER

## 2024-08-21 PROCEDURE — 63600175 PHARM REV CODE 636 W HCPCS: Performed by: PHYSICIAN ASSISTANT

## 2024-08-21 PROCEDURE — 87502 INFLUENZA DNA AMP PROBE: CPT | Performed by: PHYSICIAN ASSISTANT

## 2024-08-21 PROCEDURE — 99222 1ST HOSP IP/OBS MODERATE 55: CPT | Mod: GC,,, | Performed by: INTERNAL MEDICINE

## 2024-08-21 PROCEDURE — 85007 BL SMEAR W/DIFF WBC COUNT: CPT | Mod: 91 | Performed by: PHYSICIAN ASSISTANT

## 2024-08-21 PROCEDURE — 63600175 PHARM REV CODE 636 W HCPCS: Performed by: NURSE PRACTITIONER

## 2024-08-21 PROCEDURE — 96367 TX/PROPH/DG ADDL SEQ IV INF: CPT

## 2024-08-21 PROCEDURE — 99285 EMERGENCY DEPT VISIT HI MDM: CPT | Mod: 25

## 2024-08-21 PROCEDURE — 85007 BL SMEAR W/DIFF WBC COUNT: CPT | Performed by: PHYSICIAN ASSISTANT

## 2024-08-21 PROCEDURE — 85027 COMPLETE CBC AUTOMATED: CPT | Mod: 91 | Performed by: PHYSICIAN ASSISTANT

## 2024-08-21 PROCEDURE — 83735 ASSAY OF MAGNESIUM: CPT | Performed by: PHYSICIAN ASSISTANT

## 2024-08-21 PROCEDURE — 87798 DETECT AGENT NOS DNA AMP: CPT | Performed by: PHYSICIAN ASSISTANT

## 2024-08-21 PROCEDURE — 93005 ELECTROCARDIOGRAM TRACING: CPT

## 2024-08-21 PROCEDURE — 87502 INFLUENZA DNA AMP PROBE: CPT

## 2024-08-21 PROCEDURE — 27000207 HC ISOLATION

## 2024-08-21 PROCEDURE — 99900035 HC TECH TIME PER 15 MIN (STAT)

## 2024-08-21 PROCEDURE — 83605 ASSAY OF LACTIC ACID: CPT

## 2024-08-21 RX ORDER — CEFEPIME HYDROCHLORIDE 2 G/1
INJECTION, POWDER, FOR SOLUTION INTRAVENOUS
Status: DISPENSED
Start: 2024-08-21 | End: 2024-08-21

## 2024-08-21 RX ORDER — TACROLIMUS 1 MG/1
3 CAPSULE ORAL 2 TIMES DAILY
Status: DISCONTINUED | OUTPATIENT
Start: 2024-08-21 | End: 2024-08-21

## 2024-08-21 RX ORDER — ACETAMINOPHEN 500 MG
1000 TABLET ORAL
Status: COMPLETED | OUTPATIENT
Start: 2024-08-21 | End: 2024-08-21

## 2024-08-21 RX ORDER — IBUPROFEN 200 MG
16 TABLET ORAL
Status: DISCONTINUED | OUTPATIENT
Start: 2024-08-21 | End: 2024-08-22 | Stop reason: HOSPADM

## 2024-08-21 RX ORDER — ACETAMINOPHEN 325 MG/1
650 TABLET ORAL EVERY 6 HOURS PRN
Status: DISCONTINUED | OUTPATIENT
Start: 2024-08-21 | End: 2024-08-22 | Stop reason: HOSPADM

## 2024-08-21 RX ORDER — VALGANCICLOVIR 450 MG/1
450 TABLET, FILM COATED ORAL DAILY
Status: DISCONTINUED | OUTPATIENT
Start: 2024-08-21 | End: 2024-08-21

## 2024-08-21 RX ORDER — AMLODIPINE BESYLATE 10 MG/1
10 TABLET ORAL DAILY
Status: DISCONTINUED | OUTPATIENT
Start: 2024-08-21 | End: 2024-08-22 | Stop reason: HOSPADM

## 2024-08-21 RX ORDER — CEFEPIME HYDROCHLORIDE 1 G/1
INJECTION, POWDER, FOR SOLUTION INTRAMUSCULAR; INTRAVENOUS
Status: DISCONTINUED
Start: 2024-08-21 | End: 2024-08-21 | Stop reason: WASHOUT

## 2024-08-21 RX ORDER — ATOVAQUONE 750 MG/5ML
1500 SUSPENSION ORAL DAILY
Status: DISCONTINUED | OUTPATIENT
Start: 2024-08-21 | End: 2024-08-22 | Stop reason: HOSPADM

## 2024-08-21 RX ORDER — SODIUM BICARBONATE 650 MG/1
650 TABLET ORAL 2 TIMES DAILY
Status: DISCONTINUED | OUTPATIENT
Start: 2024-08-21 | End: 2024-08-22

## 2024-08-21 RX ORDER — ACETAMINOPHEN 500 MG
TABLET ORAL
Status: DISPENSED
Start: 2024-08-21 | End: 2024-08-21

## 2024-08-21 RX ORDER — HEPARIN SODIUM 5000 [USP'U]/ML
5000 INJECTION, SOLUTION INTRAVENOUS; SUBCUTANEOUS EVERY 8 HOURS
Status: DISCONTINUED | OUTPATIENT
Start: 2024-08-21 | End: 2024-08-22 | Stop reason: HOSPADM

## 2024-08-21 RX ORDER — LEVOTHYROXINE SODIUM 50 UG/1
50 TABLET ORAL
Status: DISCONTINUED | OUTPATIENT
Start: 2024-08-21 | End: 2024-08-22 | Stop reason: HOSPADM

## 2024-08-21 RX ORDER — SODIUM CHLORIDE 0.9 % (FLUSH) 0.9 %
3 SYRINGE (ML) INJECTION
Status: DISCONTINUED | OUTPATIENT
Start: 2024-08-21 | End: 2024-08-22 | Stop reason: HOSPADM

## 2024-08-21 RX ORDER — IBUPROFEN 200 MG
24 TABLET ORAL
Status: DISCONTINUED | OUTPATIENT
Start: 2024-08-21 | End: 2024-08-22 | Stop reason: HOSPADM

## 2024-08-21 RX ORDER — TACROLIMUS 1 MG/1
2 CAPSULE ORAL 2 TIMES DAILY
Status: DISCONTINUED | OUTPATIENT
Start: 2024-08-21 | End: 2024-08-22 | Stop reason: HOSPADM

## 2024-08-21 RX ORDER — GLUCAGON 1 MG
1 KIT INJECTION
Status: DISCONTINUED | OUTPATIENT
Start: 2024-08-21 | End: 2024-08-22 | Stop reason: HOSPADM

## 2024-08-21 RX ORDER — VALGANCICLOVIR 450 MG/1
450 TABLET, FILM COATED ORAL DAILY
Status: DISCONTINUED | OUTPATIENT
Start: 2024-08-22 | End: 2024-08-22 | Stop reason: HOSPADM

## 2024-08-21 RX ORDER — INSULIN ASPART 100 [IU]/ML
1.2 INJECTION, SOLUTION INTRAVENOUS; SUBCUTANEOUS CONTINUOUS
Status: DISCONTINUED | OUTPATIENT
Start: 2024-08-21 | End: 2024-08-22 | Stop reason: HOSPADM

## 2024-08-21 RX ORDER — PREDNISONE 5 MG/1
5 TABLET ORAL DAILY
Status: DISCONTINUED | OUTPATIENT
Start: 2024-08-21 | End: 2024-08-22 | Stop reason: HOSPADM

## 2024-08-21 RX ORDER — CARVEDILOL 12.5 MG/1
25 TABLET ORAL 2 TIMES DAILY
Status: DISCONTINUED | OUTPATIENT
Start: 2024-08-21 | End: 2024-08-22 | Stop reason: HOSPADM

## 2024-08-21 RX ORDER — INSULIN ASPART 100 [IU]/ML
0-15 INJECTION, SOLUTION INTRAVENOUS; SUBCUTANEOUS
Status: DISCONTINUED | OUTPATIENT
Start: 2024-08-21 | End: 2024-08-22 | Stop reason: HOSPADM

## 2024-08-21 RX ORDER — ASPIRIN 81 MG/1
81 TABLET ORAL DAILY
Status: DISCONTINUED | OUTPATIENT
Start: 2024-08-21 | End: 2024-08-22 | Stop reason: HOSPADM

## 2024-08-21 RX ORDER — ONDANSETRON 8 MG/1
8 TABLET, ORALLY DISINTEGRATING ORAL EVERY 6 HOURS PRN
Status: DISCONTINUED | OUTPATIENT
Start: 2024-08-21 | End: 2024-08-22 | Stop reason: HOSPADM

## 2024-08-21 RX ORDER — SODIUM CHLORIDE 9 MG/ML
INJECTION, SOLUTION INTRAVENOUS CONTINUOUS
Status: DISCONTINUED | OUTPATIENT
Start: 2024-08-21 | End: 2024-08-21

## 2024-08-21 RX ADMIN — AMLODIPINE BESYLATE 10 MG: 10 TABLET ORAL at 08:08

## 2024-08-21 RX ADMIN — ASPIRIN 81 MG: 81 TABLET, COATED ORAL at 08:08

## 2024-08-21 RX ADMIN — INSULIN ASPART 15 UNITS: 100 INJECTION, SOLUTION INTRAVENOUS; SUBCUTANEOUS at 01:08

## 2024-08-21 RX ADMIN — VALGANCICLOVIR HYDROCHLORIDE 450 MG: 450 TABLET ORAL at 08:08

## 2024-08-21 RX ADMIN — CEFEPIME 2 G: 2 INJECTION, POWDER, FOR SOLUTION INTRAVENOUS at 02:08

## 2024-08-21 RX ADMIN — HEPARIN SODIUM 5000 UNITS: 5000 INJECTION INTRAVENOUS; SUBCUTANEOUS at 05:08

## 2024-08-21 RX ADMIN — CEFEPIME 1 G: 1 INJECTION, POWDER, FOR SOLUTION INTRAMUSCULAR; INTRAVENOUS at 02:08

## 2024-08-21 RX ADMIN — SODIUM BICARBONATE 650 MG TABLET 650 MG: at 08:08

## 2024-08-21 RX ADMIN — ACETAMINOPHEN 1000 MG: 500 TABLET ORAL at 02:08

## 2024-08-21 RX ADMIN — SODIUM CHLORIDE, POTASSIUM CHLORIDE, SODIUM LACTATE AND CALCIUM CHLORIDE 2844 ML: 600; 310; 30; 20 INJECTION, SOLUTION INTRAVENOUS at 02:08

## 2024-08-21 RX ADMIN — CARVEDILOL 25 MG: 12.5 TABLET, FILM COATED ORAL at 08:08

## 2024-08-21 RX ADMIN — HEPARIN SODIUM 5000 UNITS: 5000 INJECTION INTRAVENOUS; SUBCUTANEOUS at 01:08

## 2024-08-21 RX ADMIN — IOHEXOL 15 ML: 350 INJECTION, SOLUTION INTRAVENOUS at 10:08

## 2024-08-21 RX ADMIN — TACROLIMUS 2 MG: 1 CAPSULE ORAL at 05:08

## 2024-08-21 RX ADMIN — HEPARIN SODIUM 5000 UNITS: 5000 INJECTION INTRAVENOUS; SUBCUTANEOUS at 08:08

## 2024-08-21 RX ADMIN — PREDNISONE 5 MG: 5 TABLET ORAL at 08:08

## 2024-08-21 RX ADMIN — SODIUM CHLORIDE: 9 INJECTION, SOLUTION INTRAVENOUS at 05:08

## 2024-08-21 RX ADMIN — IOHEXOL 15 ML: 350 INJECTION, SOLUTION INTRAVENOUS at 11:08

## 2024-08-21 RX ADMIN — TACROLIMUS 3 MG: 1 CAPSULE ORAL at 08:08

## 2024-08-21 RX ADMIN — INSULIN ASPART 15 UNITS: 100 INJECTION, SOLUTION INTRAVENOUS; SUBCUTANEOUS at 04:08

## 2024-08-21 RX ADMIN — INSULIN ASPART 1.2 UNITS/HR: 100 INJECTION, SOLUTION INTRAVENOUS; SUBCUTANEOUS at 01:08

## 2024-08-21 RX ADMIN — DOCUSATE SODIUM 50 MG: 50 CAPSULE, LIQUID FILLED ORAL at 08:08

## 2024-08-21 RX ADMIN — LEVOTHYROXINE SODIUM 50 MCG: 50 TABLET ORAL at 05:08

## 2024-08-21 RX ADMIN — VANCOMYCIN HYDROCHLORIDE 2250 MG: 1.25 INJECTION, POWDER, LYOPHILIZED, FOR SOLUTION INTRAVENOUS at 04:08

## 2024-08-21 RX ADMIN — ATOVAQUONE 1500 MG: 750 SUSPENSION ORAL at 08:08

## 2024-08-21 RX ADMIN — INSULIN ASPART 15 UNITS: 100 INJECTION, SOLUTION INTRAVENOUS; SUBCUTANEOUS at 05:08

## 2024-08-21 NOTE — SUBJECTIVE & OBJECTIVE
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 Post op:  PCN G  x 3 +RPR donor culture  DGF: now resolved with baseline Cr ~ 1.3  Urine leak with perinephric collection managed medically; s/p drain, cramer, and neph tube. Cramer removed 4/5/24, JEAN-PIERRE drain removed 4/12/24, and PD catheter removed on 5/28/24.  PD cath removed 5/28/24     He now presents to the ED with chief complaint of fever x ~5 days. He reports he presented to an urgent care where he was tested for COVID, flu which were negative but had a fever of 102 and was recommended to present to the ED. In ED Tmax 103. He reports some urinary frequency along with cloudy urine and decreased UOP. He also endorses fatigue and leg cramps in both legs. He denies abdominal pain, diarrhea, nausea, vomiting. Lab work in ED remarkable for elevated creatinine. Plan for infectious work up, Kidney US, IV antibiotics, and infectious disease consult      (Not in a hospital admission)      Review of patient's allergies indicates:   Allergen Reactions    Allopurinol analogues Swelling    Adhesive tape-silicones     Allopurinol     Latex, natural rubber     Statins-hmg-coa reductase inhibitors Other (See Comments)     Muscle ache    Adhesive Rash       Past Medical History:   Diagnosis Date    Anemia     Cataract     Diabetes mellitus     Diabetes mellitus, type 2     Glaucoma     Gout, unspecified     HLD (hyperlipidemia)     Hypertension     Hypothyroidism, unspecified     Kidney failure     Renal disorder      Past Surgical History:   Procedure Laterality Date    COLONOSCOPY N/A 05/18/2022    Procedure: COLONOSCOPY;  Surgeon: Raul Dyer MD;  Location: [facility];  Service: Endoscopy;  Laterality: N/A;    DENTAL SURGERY      EYE SURGERY Bilateral     Cataract    INSERTION, CATHETER,  "DIALYSIS, PERITONEAL, LAPAROSCOPIC N/A 06/20/2023    Procedure: INSERTION, CATHETER, DIALYSIS, PERITONEAL, LAPAROSCOPIC;  Surgeon: Carlos Alberto Rodgers MD;  Location: [facility] OR;  Service: General;  Laterality: N/A;    KIDNEY TRANSPLANT N/A 2/29/2024    Procedure: TRANSPLANT, KIDNEY;  Surgeon: Pino Law Jr., MD;  Location: [facility] OR 2ND FLR;  Service: Transplant;  Laterality: N/A;    KNEE SURGERY Right     PERITONEAL CATHETER REMOVAL N/A 5/28/2024    Procedure: REMOVAL, CATHETER, DIALYSIS, PERITONEAL;  Surgeon: Cynthia Leary MD;  Location: [facility] OR 2ND FLR;  Service: Transplant;  Laterality: N/A;  One hour PD catheter removal     Family History       Problem Relation (Age of Onset)    Cancer Sister    Diabetes Father, Brother    Heart disease Father    Hypertension Father    Kidney disease Brother          Tobacco Use    Smoking status: Never     Passive exposure: Never    Smokeless tobacco: Never   Substance and Sexual Activity    Alcohol use: Never    Drug use: Never    Sexual activity: Not Currently        Review of Systems   Constitutional:  Positive for chills, fatigue and fever.   Respiratory:  Negative for shortness of breath.    Gastrointestinal:  Negative for abdominal pain, diarrhea, nausea and vomiting.   Genitourinary:  Positive for decreased urine volume and frequency.   Musculoskeletal:  Positive for myalgias.   Allergic/Immunologic: Positive for immunocompromised state.   Psychiatric/Behavioral:  Negative for agitation and confusion.      Objective:     Vital Signs (Most Recent):  Temp: 98.5 °F (36.9 °C) (08/21/24 0242)  Pulse: 82 (08/21/24 0215)  Resp: 17 (08/21/24 0215)  BP: 128/61 (08/21/24 0215)  SpO2: 98 % (08/21/24 0215)  Height: 5' 4" (162.6 cm)  Weight: 94.8 kg (208 lb 15.9 oz)  Body mass index is 35.87 kg/m².      Physical Exam  Vitals reviewed.   Pulmonary:      Effort: Pulmonary effort is normal.   Abdominal:      General: There is no distension.      Tenderness: There is no " abdominal tenderness. There is no guarding or rebound.   Musculoskeletal:      Right lower leg: Edema present.      Left lower leg: Edema present.   Neurological:      Mental Status: He is alert and oriented to person, place, and time.   Psychiatric:         Mood and Affect: Mood normal.         Behavior: Behavior normal.         Thought Content: Thought content normal.         Judgment: Judgment normal.          Laboratory  CBC:   Recent Labs   Lab 08/21/24 0217   WBC 6.47   RBC 3.75*   HGB 10.4*   HCT 33.8*      MCV 90   MCH 27.7   MCHC 30.8*     CMP:   Recent Labs   Lab 08/21/24 0217   *   CALCIUM 10.3   ALBUMIN 3.1*   PROT 6.4   *   K 4.2   CO2 19*      BUN 49*   CREATININE 2.0*   ALKPHOS 87   ALT 10   AST 15       Diagnostic Results:  Chest X-Ray: No results found for this or any previous visit.

## 2024-08-21 NOTE — H&P
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 Post op:  PCN G  x 3 +RPR donor culture  DGF: now resolved with baseline Cr ~ 1.3  Urine leak with perinephric collection managed medically; s/p drain, cramer, and neph tube. Cramer removed 4/5/24, JEAN-PIERRE drain removed 4/12/24, and PD catheter removed on 5/28/24.  PD cath removed 5/28/24     He now presents to the ED with chief complaint of fever x ~5 days. He reports he presented to an urgent care where he was tested for COVID, flu which were negative but had a fever of 102 and was recommended to present to the ED. In ED Tmax 103. He reports some urinary frequency along with cloudy urine and decreased UOP. He also endorses fatigue and leg cramps in both legs. He denies abdominal pain, diarrhea, nausea, vomiting. Lab work in ED remarkable for elevated creatinine. Plan for infectious work up, Kidney US, IV antibiotics, and infectious disease consult      (Not in a hospital admission)      Review of patient's allergies indicates:   Allergen Reactions    Allopurinol analogues Swelling    Adhesive tape-silicones     Allopurinol     Latex, natural rubber     Statins-hmg-coa reductase inhibitors Other (See Comments)     Muscle ache    Adhesive Rash       Past Medical History:   Diagnosis Date    Anemia     Cataract     Diabetes mellitus     Diabetes mellitus, type 2     Glaucoma     Gout, unspecified     HLD (hyperlipidemia)     Hypertension     Hypothyroidism, unspecified     Kidney failure     Renal disorder      Past Surgical History:   Procedure Laterality Date    COLONOSCOPY N/A 05/18/2022    Procedure: COLONOSCOPY;  Surgeon: Raul Dyer MD;  Location: [facility];  Service: Endoscopy;  Laterality: N/A;    DENTAL SURGERY      EYE SURGERY  "Bilateral     Cataract    INSERTION, CATHETER, DIALYSIS, PERITONEAL, LAPAROSCOPIC N/A 06/20/2023    Procedure: INSERTION, CATHETER, DIALYSIS, PERITONEAL, LAPAROSCOPIC;  Surgeon: Carlos Alberto Rodgers MD;  Location: [facility] OR;  Service: General;  Laterality: N/A;    KIDNEY TRANSPLANT N/A 2/29/2024    Procedure: TRANSPLANT, KIDNEY;  Surgeon: Pino Law Jr., MD;  Location: [facility] OR 2ND FLR;  Service: Transplant;  Laterality: N/A;    KNEE SURGERY Right     PERITONEAL CATHETER REMOVAL N/A 5/28/2024    Procedure: REMOVAL, CATHETER, DIALYSIS, PERITONEAL;  Surgeon: Cynthia Leary MD;  Location: [facility] OR 2ND FLR;  Service: Transplant;  Laterality: N/A;  One hour PD catheter removal     Family History       Problem Relation (Age of Onset)    Cancer Sister    Diabetes Father, Brother    Heart disease Father    Hypertension Father    Kidney disease Brother          Tobacco Use    Smoking status: Never     Passive exposure: Never    Smokeless tobacco: Never   Substance and Sexual Activity    Alcohol use: Never    Drug use: Never    Sexual activity: Not Currently        Review of Systems   Constitutional:  Positive for chills, fatigue and fever.   Respiratory:  Negative for shortness of breath.    Gastrointestinal:  Negative for abdominal pain, diarrhea, nausea and vomiting.   Genitourinary:  Positive for decreased urine volume and frequency.   Musculoskeletal:  Positive for myalgias.   Allergic/Immunologic: Positive for immunocompromised state.   Psychiatric/Behavioral:  Negative for agitation and confusion.      Objective:     Vital Signs (Most Recent):  Temp: 98.5 °F (36.9 °C) (08/21/24 0242)  Pulse: 82 (08/21/24 0215)  Resp: 17 (08/21/24 0215)  BP: 128/61 (08/21/24 0215)  SpO2: 98 % (08/21/24 0215)  Height: 5' 4" (162.6 cm)  Weight: 94.8 kg (208 lb 15.9 oz)  Body mass index is 35.87 kg/m².      Physical Exam  Vitals reviewed.   Pulmonary:      Effort: Pulmonary effort is normal.   Abdominal:      General: There is no " distension.      Tenderness: There is no abdominal tenderness. There is no guarding or rebound.   Musculoskeletal:      Right lower leg: Edema present.      Left lower leg: Edema present.   Neurological:      Mental Status: He is alert and oriented to person, place, and time.   Psychiatric:         Mood and Affect: Mood normal.         Behavior: Behavior normal.         Thought Content: Thought content normal.         Judgment: Judgment normal.          Laboratory  CBC:   Recent Labs   Lab 24   WBC 6.47   RBC 3.75*   HGB 10.4*   HCT 33.8*      MCV 90   MCH 27.7   MCHC 30.8*     CMP:   Recent Labs   Lab 24   *   CALCIUM 10.3   ALBUMIN 3.1*   PROT 6.4   *   K 4.2   CO2 19*      BUN 49*   CREATININE 2.0*   ALKPHOS 87   ALT 10   AST 15       Diagnostic Results:  Chest X-Ray: No results found for this or any previous visit.      Assessment/Plan:     Cardiac/Vascular  Essential hypertension  - Continue home antihypertensives      Renal/  Acidosis  - Continue bicarb      Status post -donor kidney transplantation  - Creatinine elevated above baseline  - IVF hydration  - Obtain Kidney US  - Monitor with daily labs       Immunology/Multi System  Prophylactic immunotherapy  - See long term use of immunosuppressant medication      Endocrine  Type 2 diabetes mellitus with kidney complication, with long-term current use of insulin  - Endocrine consulted for help with management       Palliative Care  Long-term use of immunosuppressant medication  - Continue prograf. Hold cellcept. Monitor prograf level daily, monitor for toxic side effects, and adjust for therapeutic dose      Other  * Fever  - Infectious work up in process (CMV PCR pending, blood cx, UA/cx, RVP)  - IV antibiotics started  - ID consult  - IVF for hydration  - Obtain Kidney US  - Continue to monitor               Discharge Planning: Not a candidate for discharge at this time      Medical decision making  for this encounter includes review of pertinent labs and diagnostic studies, assessment and planning, discussions with consulting providers, discussion with patient/family, and participation in multidisciplinary rounds. Time spent caring for patient: 60 minutes    Rachel Coreas PA-C  Kidney Transplant  [facility] - Emergency Dept

## 2024-08-21 NOTE — ASSESSMENT & PLAN NOTE
- Continue prograf. Hold cellcept. Monitor prograf level daily, monitor for toxic side effects, and adjust for therapeutic dose

## 2024-08-21 NOTE — ASSESSMENT & PLAN NOTE
At time of evaluation, pt meets criteria to continue home insulin pump usage.  - Has all adequate supplies   - Insulin pump site change on 08/19/2024.    - Bolus settings reviewed    - No changes to home regimen.   - Nurse to check BG qac/hs/0200 & record in epic   - Patient to input glucose into pump and use bolus wizard for prandial needs   - Will continue to monitor accuchecks and titrate insulin as clinically indicated .     - Discussed above plan with patient, patient verbalized understanding.   - Understands in case of pump malfunction or cognitive decline in which pt can no longer safely use insulin pump, will transition to [facility] MDI       If pump malfunctions or is disconnected, PLEASE CALL ENDOCRINE ON-CALL FOR ADDITIONAL ORDERS.

## 2024-08-21 NOTE — ASSESSMENT & PLAN NOTE
- Creatinine elevated above baseline  - IVF hydration  - Kidney US w/ subincisional fluid collection measuring 9.4 x 7.7 x 2.6 cm.   - Plan for CT scan  - Monitor with daily labs

## 2024-08-21 NOTE — ASSESSMENT & PLAN NOTE
- Infectious work up in process (CMV PCR pending, blood cx, UA/cx, RVP)  - IV antibiotics started  - ID consult  - IVF for hydration  - Obtain Kidney US  - Continue to monitor

## 2024-08-21 NOTE — HPI
Reason for Consult: Management of T2DM, Hyperglycemia      Surgical Procedure and Date: kidney transplant 2/29/2024     Diabetes diagnosis year: 1999 per chart review      Home Diabetes Medications:  Tandem insulin pump   ICR 1:5   ISF 1:25  Basal rate 1.2 u/hr + control IQ      How often checking glucose at home? >4 x day   BG readings on regimen: 180s-200s, occasional 300s  Hypoglycemia on the regimen?  Yes, once a week overnight, 2-3x during the week during the day   Missed doses on regimen?  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 Presented to the ED with chief complaint of fever x ~5 days. Endocrine consulted to manage hyperglycemia and type 2 diabetes in the context of infection. Patient utilized a home insulin pump to manage diabetes.     Lab Results   Component Value Date    HGBA1C 5.4 07/24/2024

## 2024-08-21 NOTE — SUBJECTIVE & OBJECTIVE
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 Post op:  PCN G  x 3 +RPR donor culture  DGF: now resolved with baseline Cr ~ 1.3  Urine leak with perinephric collection managed medically; s/p drain, cramer, and neph tube. Cramer removed 4/5/24, JEAN-PIERRE drain removed 4/12/24, and PD catheter removed on 5/28/24.  PD cath removed 5/28/24     He now presents to the ED with chief complaint of fever x ~5 days. He reports he presented to an urgent care where he was tested for COVID, flu which were negative but had a fever of 102 and was recommended to present to the ED. In ED Tmax 103. He reports some urinary frequency along with cloudy urine and decreased UOP. He also endorses fatigue and leg cramps in both legs. He denies abdominal pain, diarrhea, nausea, vomiting. Lab work in ED remarkable for elevated creatinine. Plan for infectious work up, Kidney US, IV antibiotics, and infectious disease consult    Mr. Lopez is a 64 y.o. year old male who is status post Kidney Transplant - 2/29/2024  (#1).  His maintenance immunosuppression consists of:   Immunosuppressants (From admission, onward)      Start     Stop Route Frequency Ordered    08/21/24 1800  tacrolimus capsule 2 mg         -- Oral 2 times daily 08/21/24 0942            Hospital Course:  Patient admitted with fevers. Infectious work-up initiated. Started on Vanc/ cefepime. UA with few bacteria. Cultures pending. Kidney US with subincisional fluid collection measuring 9.4 x 7.7 x 2.6 cm. Reviewed with surgeon. No plans to intervene at this time. Will await cultures. Patient with hx of fever of unknown origin. Will plan for CT scan. Cont IVF for now. Patient denies abdominal pains, N/V/D. VSS. Cont to monitor.      Past Medical, Surgical, Family, and Social History:   Unchanged from H&P.    Scheduled Meds:   amLODIPine  10 mg Oral Daily     aspirin  81 mg Oral Daily    atovaquone  1,500 mg Oral Daily    carvediloL  25 mg Oral BID    ceFEPime IV (PEDS and ADULTS)  1 g Intravenous Q12H    heparin (porcine)  5,000 Units Subcutaneous Q8H    levothyroxine  50 mcg Oral Before breakfast    predniSONE  5 mg Oral Daily    sodium bicarbonate  650 mg Oral BID    tacrolimus  2 mg Oral BID    [START ON 8/22/2024] valGANciclovir  450 mg Oral Daily     Continuous Infusions:   0.9% NaCl   Intravenous Continuous 75 mL/hr at 08/21/24 0550 New Bag at 08/21/24 0550    insulin aspart U-100  1.2 Units/hr Subcutaneous Continuous         PRN Meds:  Current Facility-Administered Medications:     acetaminophen, 650 mg, Oral, Q6H PRN    dextrose 10%, 12.5 g, Intravenous, PRN    dextrose 10%, 25 g, Intravenous, PRN    glucagon (human recombinant), 1 mg, Intramuscular, PRN    glucose, 16 g, Oral, PRN    glucose, 24 g, Oral, PRN    insulin aspart U-100, 0-15 Units, Subcutaneous, PRN    ondansetron, 8 mg, Oral, Q6H PRN    sodium chloride 0.9%, 3 mL, Intravenous, PRN    Intake/Output - Last 3 Shifts         08/19 0700  08/20 0659 08/20 0700 08/21 0659 08/21 0700 08/22 0659    P.O.  50     I.V. (mL/kg)  0 (0)     Other  0     Total Intake(mL/kg)  50 (0.5)     Urine (mL/kg/hr)  250 450 (0.9)    Emesis/NG output  0     Other  0     Stool  0     Blood  0     Total Output  250 450    Net  -200 -450           Urine Occurrence  1 x     Stool Occurrence  1 x     Emesis Occurrence  0 x              Review of Systems   Constitutional:  Positive for chills and fatigue. Negative for fever.   Respiratory:  Negative for shortness of breath.    Gastrointestinal:  Negative for abdominal pain, diarrhea, nausea and vomiting.   Genitourinary:  Positive for frequency. Negative for decreased urine volume and difficulty urinating.   Musculoskeletal:  Positive for myalgias.   Allergic/Immunologic: Positive for immunocompromised state.   Psychiatric/Behavioral:  Negative for agitation and confusion.      "  Objective:     Vital Signs (Most Recent):  Temp: 98.8 °F (37.1 °C) (08/21/24 1128)  Pulse: 74 (08/21/24 1132)  Resp: 16 (08/21/24 1128)  BP: 136/72 (08/21/24 1128)  SpO2: 96 % (08/21/24 1128) Vital Signs (24h Range):  Temp:  [97.8 °F (36.6 °C)-103 °F (39.4 °C)] 98.8 °F (37.1 °C)  Pulse:  [66-90] 74  Resp:  [16-18] 16  SpO2:  [95 %-98 %] 96 %  BP: (116-136)/(55-72) 136/72     Weight: 97 kg (213 lb 13.5 oz)  Height: 5' 4" (162.6 cm)  Body mass index is 36.71 kg/m².     Physical Exam  Vitals reviewed.   Pulmonary:      Effort: Pulmonary effort is normal.   Abdominal:      General: There is no distension.      Tenderness: There is no abdominal tenderness. There is no guarding or rebound.      Comments: Surgical incision well healed   Musculoskeletal:      Right lower leg: Edema present.      Left lower leg: Edema present.   Neurological:      Mental Status: He is alert and oriented to person, place, and time.   Psychiatric:         Mood and Affect: Mood normal.         Behavior: Behavior normal.         Thought Content: Thought content normal.         Judgment: Judgment normal.          Laboratory:  CBC:   Recent Labs   Lab 08/20/24  0946 08/21/24 0217 08/21/24 0427   WBC 5.90 6.47 5.69   RBC 4.21* 3.75* 3.56*   HGB 12.0* 10.4* 10.1*   HCT 38.9* 33.8* 31.9*    184 169   MCV 92 90 90   MCH 28.5 27.7 28.4   MCHC 30.8* 30.8* 31.7*     CMP:   Recent Labs   Lab 08/20/24  0946 08/21/24 0217 08/21/24 0427   GLU 97 111* 127*   CALCIUM 10.7* 10.3 10.0   ALBUMIN 3.5  3.5 3.1* 2.8*   PROT 7.0 6.4  --     133* 133*   K 4.8 4.2 3.9   CO2 21* 19* 18*    103 106   BUN 33* 49* 45*   CREATININE 1.7* 2.0* 1.8*   ALKPHOS 85 87  --    ALT 11 10  --    AST 17 15  --      Labs within the past 24 hours have been reviewed.    Diagnostic Results:  Obtain CT scan  "

## 2024-08-21 NOTE — PROGRESS NOTES
[facility] - Transplant Stepdown  Infectious Disease  Progress Note    Patient Name: Mark Lopez  MRN: [id number]  Admission Date: 8/21/2024  Length of Stay: 0 days  Attending Physician: Hope Alexander DO  Primary Care Provider: Loren Mensah MD    Isolation Status: Enhanced Respiratory, Enhanced Respiratory  Assessment/Plan:      Other  * Fever  Patient with recent kidney transplant presenting with recurring fevers with tmax of 103 F. Also has associate myalgias, activity change. Flu/RIP negative. U/a concerning for infection. US kidney showed some fluid collection near incision site. Of note, patient had similar collection drained in July and it was found to be non infectious.     - continue cefepime  - f/u blood and urine cultures  - f/u CT Abd/Pelvis to further assess fluid collection        Thank you for your consult. I will follow-up with patient. Please contact us if you have any additional questions.    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 Patient reports intermittent fevers started on 8/16/24 with a temp of 100.5 F. He reports over the next two days his temperature stayed in the low 100s until 8/19/24 when he presented to [facility] in [place] [facility] and had a temperature of 103 F. He was advised to go to the ED but went home because his temperature went down to 98.1 then 97.1 (baseline 97 F). Patient reports he proceeded to go to dinner in [place] and after dinner had a temperature of 102.4 at 11pm (8/20/24) so he came to the [facility] ED. Patient also reports fatigue, myalgias, and decreased urine output. Pt denies n/v/d, abd pain, cp, sob, or any other complaints.    Patient received vanc, cefepime, atovaquone, valcyte in ED. U/a and urine micro concerning for infection.  Pro miranda elevated. Blood/urine cultures in process. CXR showed previously noted changes. US kidney new resistive indices and small fluid collection. Of note, in July patient had fluid collection which was drained and non infectious.    Patient reports no sick contacts. Only drug reactions to allopurinol and statins, no reactions to antibiotics. No exposures through hobbies. No TB/HIV concerns.     ID was consulted for fever in recent transplant patient.       Past Medical History:   Diagnosis Date    Anemia     Cataract     Diabetes mellitus     Diabetes mellitus, type 2     Glaucoma     Gout, unspecified     HLD (hyperlipidemia)     Hypertension     Hypothyroidism, unspecified     Kidney failure     Renal disorder        Past Surgical History:   Procedure Laterality Date    COLONOSCOPY N/A 05/18/2022    Procedure: COLONOSCOPY;  Surgeon: Raul Dyer MD;  Location: [facility] ENDO;  Service: Endoscopy;  Laterality: N/A;    DENTAL SURGERY      EYE SURGERY Bilateral     Cataract    INSERTION, CATHETER, DIALYSIS, PERITONEAL, LAPAROSCOPIC N/A 06/20/2023    Procedure: INSERTION, CATHETER, DIALYSIS, PERITONEAL, LAPAROSCOPIC;  Surgeon: Carlos Alberto Rodgers MD;  Location: [facility] OR;  Service: General;  Laterality: N/A;    KIDNEY TRANSPLANT N/A 2/29/2024    Procedure: TRANSPLANT, KIDNEY;  Surgeon: Pino Law Jr., MD;  Location: [facility] OR [address] [place] [address];  Service: Transplant;  Laterality: N/A;    KNEE SURGERY Right     PERITONEAL CATHETER REMOVAL N/A 5/28/2024    Procedure: REMOVAL, CATHETER, DIALYSIS, PERITONEAL;  Surgeon: Cynthia Leary MD;  Location: [facility] OR [address] [place] [address];  Service: Transplant;  Laterality: N/A;  One hour PD catheter removal       Review of patient's allergies indicates:   Allergen Reactions    Allopurinol analogues Swelling    Adhesive tape-silicones     Allopurinol     Latex, natural rubber     Statins-hmg-coa reductase inhibitors Other (See Comments)     Muscle ache    Adhesive Rash        Medications:  Medications Prior to Admission   Medication Sig    acetaminophen (TYLENOL) 650 MG TbSR Take 1 tablet (650 mg total) by mouth every 8 (eight) hours as needed.    amLODIPine (NORVASC) 5 MG tablet Take 2 tablets (10 mg total) by mouth once daily.    aspirin (ECOTRIN) 81 MG EC tablet Take 1 tablet (81 mg total) by mouth once daily.    atovaquone (MEPRON) 750 mg/5 mL Susp oral liquid Take 10 mLs (1,500 mg total) by mouth once daily. Stop 8/27/24    blood-glucose sensor (DEXCOM G6 SENSOR) Neelima by Misc.(Non-Drug; Combo Route) route.    carvediloL (COREG) 12.5 MG tablet Take 2 tablets (25 mg total) by mouth 2 (two) times daily.    cetirizine (ZYRTEC) 10 MG tablet Take 1 tablet (10 mg total) by mouth once daily.    HUMALOG U-100 INSULIN 100 unit/mL injection Inject into the skin. FOR INSULIN PUMP (BASAL 1.2 UNITS/HR)    k phos di & mono-sod phos mono (K-PHOS-NEUTRAL) 250 mg Tab Take 1 tablet by mouth every 12 (twelve) hours.    latanoprost 0.005 % ophthalmic solution Place 1 drop into the right eye every other day. At night    levothyroxine (SYNTHROID) 50 MCG tablet Take 1 tablet (50 mcg total) by mouth before breakfast.    mycophenolate (CELLCEPT) 250 mg Cap Take 2 capsules (500 mg total) by mouth 2 (two) times daily. Z94.0; Kidney txp on 2/29/24    ONETOUCH DELICA PLUS LANCET 30 gauge Misc     predniSONE (DELTASONE) 5 MG tablet Take 1 tablet (5 mg total) by mouth once daily.    sodium bicarbonate 650 MG tablet Take 1 tablet (650 mg total) by mouth 2 (two) times daily.    subcutaneous insulin pump Misc Inject 1 Units/hr into the skin continuous. Insulin pump name - Tandum  Basal dose is 2 units/hr    T:SLIM X2 Crtg Inject into the skin.    tacrolimus (PROGRAF) 1 MG Cap Take 3 capsules (3 mg total) by mouth every 12 (twelve) hours. Z94.0;Kiney txp on 2/29/2024    valGANciclovir (VALCYTE) 450 mg Tab Take 1 tablet (450 mg total) by mouth once daily. Stop 8/27/24     Antibiotics (From admission, onward)       Start     Stop Route Frequency Ordered    08/21/24 1400  ceFEPIme (MAXIPIME) 1 g in D5W 100 mL IVPB (MB+)         -- IV Every 12 hours 08/21/24 0321          Antifungals (From admission, onward)      None          Antivirals (From admission, onward)          Stop Route Frequency     valGANciclovir         08/28/24 0859 Oral Daily             Immunization History   Administered Date(s) Administered    COVID-19, MRNA, LN-S, PF (Pfizer) (Gray Cap) 05/23/2022    COVID-19, MRNA, LN-S, PF (Pfizer) (Purple Cap) 01/29/2021, 02/18/2021, 10/14/2021    COVID-19, mRNA, LNP-S, bivalent booster, PF (PFIZER OMICRON) 09/16/2022, 08/07/2023    Influenza - Quadrivalent 12/10/2019    Influenza - Quadrivalent - MDCK 11/19/2020, 11/29/2022    Influenza - Quadrivalent - PF *Preferred* (6 months and older) 10/14/2021    Tdap 04/26/2021    Zoster 07/11/2013       Family History       Problem Relation (Age of Onset)    Cancer Sister    Diabetes Father, Brother    Heart disease Father    Hypertension Father    Kidney disease Brother          Social History     Socioeconomic History    Marital status: Single   Tobacco Use    Smoking status: Never     Passive exposure: Never    Smokeless tobacco: Never   Substance and Sexual Activity    Alcohol use: Never    Drug use: Never    Sexual activity: Not Currently   Social History Narrative    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     Social Determinants of Health     Financial Resource Strain: Low Risk  (3/15/2024)    Overall Financial Resource Strain (CARDIA)     Difficulty of Paying Living Expenses: Not very hard   Food Insecurity: No Food Insecurity (3/15/2024)    Hunger Vital Sign     Worried About Running Out of Food in the Last Year: Never true     Ran Out of Food in the Last Year: Never true   Transportation Needs: No Transportation Needs (3/15/2024)    PRAPARE - Transportation     Lack of Transportation (Medical): No     Lack of Transportation (Non-Medical): No   Stress: No Stress Concern Present  (3/15/2024)    Citizen of [place] [employer] - Occupational Stress Questionnaire     Feeling of Stress : Not at all   Housing Stability: Low Risk  (3/15/2024)    Housing Stability Vital Sign     Unable to Pay for Housing in the Last Year: No     Number of Places Lived in the Last Year: 1     Unstable Housing in the Last Year: No     Review of Systems   Constitutional:  Positive for activity change and fatigue. Negative for chills.   HENT:  Negative for congestion and sore throat.    Eyes:  Negative for visual disturbance.   Respiratory:  Negative for cough and shortness of breath.    Cardiovascular:  Negative for chest pain, palpitations and leg swelling.   Gastrointestinal:  Negative for abdominal pain, diarrhea, nausea and vomiting.   Genitourinary:  Positive for difficulty urinating. Negative for dysuria.   Musculoskeletal:  Positive for myalgias.   Skin:  Negative for rash.   Neurological:  Negative for weakness and headaches.   Psychiatric/Behavioral:  Negative for confusion.      Objective:     Vital Signs (Most Recent):  Temp: 98.8 °F (37.1 °C) (08/21/24 1128)  Pulse: 71 (08/21/24 1448)  Resp: 16 (08/21/24 1128)  BP: 136/72 (08/21/24 1128)  SpO2: 96 % (08/21/24 1128) Vital Signs (24h Range):  Temp:  [97.8 °F (36.6 °C)-103 °F (39.4 °C)] 98.8 °F (37.1 °C)  Pulse:  [66-90] 71  Resp:  [16-18] 16  SpO2:  [95 %-98 %] 96 %  BP: (116-136)/(55-72) 136/72     Weight: 97 kg (213 lb 13.5 oz)  Body mass index is 36.71 kg/m².    Estimated Creatinine Clearance: 43 mL/min (A) (based on SCr of 1.8 mg/dL (H)).     Physical Exam  Vitals reviewed.   Constitutional:       General: He is not in acute distress.     Appearance: Normal appearance.   HENT:      Head: Normocephalic and atraumatic.      Right Ear: External ear normal.      Left Ear: External ear normal.      Nose: Nose normal.      Mouth/Throat:      Mouth: Mucous membranes are moist.   Eyes:      Extraocular Movements: Extraocular movements intact.    Cardiovascular:      Rate and Rhythm: Normal rate and regular rhythm.   Pulmonary:      Effort: Pulmonary effort is normal. No respiratory distress.      Breath sounds: Normal breath sounds.   Abdominal:      General: Bowel sounds are normal. There is no distension.      Palpations: Abdomen is soft.      Tenderness: There is no abdominal tenderness.      Comments: S/p kidney transplant, abdominal incision not tender or erythematous   Musculoskeletal:         General: No tenderness. Normal range of motion.      Cervical back: Normal range of motion.      Right lower leg: No edema.      Left lower leg: No edema.   Skin:     General: Skin is warm and dry.   Neurological:      General: No focal deficit present.      Mental Status: He is alert and oriented to person, place, and time.   Psychiatric:         Mood and Affect: Mood normal.         Behavior: Behavior normal.          Significant Labs: All pertinent labs within the past 24 hours have been reviewed.    Significant Imaging: I have reviewed all pertinent imaging results/findings within the past 24 hours.

## 2024-08-21 NOTE — HPI
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 Patient reports intermittent fevers started on 8/16/24 with a temp of 100.5 F. He reports over the next two days his temperature stayed in the low 100s until 8/19/24 when he presented to [facility] in [place] [facility] and had a temperature of 103 F. He was advised to go to the ED but went home because his temperature went down to 98.1 then 97.1 (baseline 97 F). Patient reports he proceeded to go to dinner in [place] and after dinner had a temperature of 102.4 at 11pm (8/20/24) so he came to the [facility] ED. Patient also reports fatigue, myalgias, and decreased urine output. Pt denies n/v/d, abd pain, cp, sob, or any other complaints.    Patient received vanc, cefepime, atovaquone, valcyte in ED. U/a and urine micro concerning for infection. Pro miranda elevated. Blood/urine cultures in process. CXR showed previously noted changes. US kidney new resistive indices and small fluid collection. Of note, in July patient had fluid collection which was drained and non infectious.    Patient reports no sick contacts. Only drug reactions to allopurinol and statins, no reactions to antibiotics. No exposures through hobbies. No TB/HIV concerns.     ID was consulted for fever in recent transplant patient.

## 2024-08-21 NOTE — SUBJECTIVE & OBJECTIVE
Interval HPI:   Overnight events: No acute events overnight. Patient in room 30616/02106 A. Blood glucose stable. BG at and above goal on current insulin regimen (Home Insulin Pump). Steroid use- Prednisone 5 mg.    Renal function- Abnormal - Creatinine 1.8   Vasopressors-  None       Endocrine will continue to follow and manage insulin orders inpatient.         Diet diabetic  Calorie     Eatin%  Nausea: No  Hypoglycemia and intervention: No  Fever: No  TPN and/or TF: No    PMH, PSH, FH, SH updated and reviewed     ROS:  Review of Systems   Constitutional:  Negative for unexpected weight change.   Eyes:  Negative for visual disturbance.   Respiratory:  Negative for cough.    Cardiovascular:  Negative for chest pain.   Gastrointestinal:  Negative for nausea and vomiting.   Endocrine: Negative for polydipsia and polyuria.   Musculoskeletal:  Negative for back pain.   Skin:  Negative for rash.   Neurological:  Negative for syncope.   Psychiatric/Behavioral:  Negative for agitation and dysphoric mood.        Current Medications and/or Treatments Impacting Glycemic Control  Immunotherapy:    Immunosuppressants           Stop Route Frequency     tacrolimus capsule 2 mg         -- Oral 2 times daily          Steroids:   Hormones (From admission, onward)      Start     Stop Route Frequency Ordered    24 0900  predniSONE tablet 5 mg         -- Oral Daily 24 0316          Pressors:    Autonomic Drugs (From admission, onward)      None          Hyperglycemia/Diabetes Medications:   Antihyperglycemics (From admission, onward)      None             PHYSICAL EXAMINATION:  Vitals:    24 1132   BP:    Pulse: 74   Resp:    Temp:      Body mass index is 36.71 kg/m².     Physical Exam  Constitutional:       Appearance: He is well-developed.   HENT:      Head: Normocephalic.   Eyes:      Conjunctiva/sclera: Conjunctivae normal.   Pulmonary:      Effort: Pulmonary effort is normal.   Musculoskeletal:          General: Normal range of motion.   Skin:     General: Skin is warm.      Findings: No rash.   Neurological:      Mental Status: He is alert and oriented to person, place, and time.

## 2024-08-21 NOTE — PLAN OF CARE
Pt has call bell in reach, non slip socks on, and bedrails up x2. Pt encouraged to wash hands. He had a CT of abdomen with contrast today. He has his own insulin pump and a dexcom. I am recording the insulin doses and accucheck readings on the insulin form in the room and posting the glucose readings in the lab results section of his epic chart. No growth to date on any cultures. He has tele monitoring. Endocrine and ID are following patient. He is now on cefepime ivpb.

## 2024-08-21 NOTE — ED TRIAGE NOTES
Mark Pedro Luissilva Lopez, a 64 y.o. male presents to the ED w/ complaint of bilateral leg pain/weakness, fever 103.1, recent kidney transplant in feb 2024    Triage note:  Chief Complaint   Patient presents with    Leg Pain     Bilateral weakness    Fever     103.1; kidney transplant feb 2024    Weakness     Review of patient's allergies indicates:   Allergen Reactions    Allopurinol analogues Swelling    Adhesive tape-silicones     Allopurinol     Latex, natural rubber     Statins-hmg-coa reductase inhibitors Other (See Comments)     Muscle ache    Adhesive Rash     Past Medical History:   Diagnosis Date    Anemia     Cataract     Diabetes mellitus     Diabetes mellitus, type 2     Glaucoma     Gout, unspecified     HLD (hyperlipidemia)     Hypertension     Hypothyroidism, unspecified     Kidney failure     Renal disorder

## 2024-08-21 NOTE — PROGRESS NOTES
[facility]  Kidney Transplant  Progress Note      Reason for Follow-up: Reassessment of Kidney Transplant - 2/29/2024  (#1) recipient and management of immunosuppression.    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 Post op:  PCN G  x 3 +RPR donor culture  DGF: now resolved with baseline Cr ~ 1.3  Urine leak with perinephric collection managed medically; s/p drain, cramer, and neph tube. Cramer removed 4/5/24, JEAN-PIERRE drain removed 4/12/24, and PD catheter removed on 5/28/24.  PD cath removed 5/28/24     He now presents to the ED with chief complaint of fever x ~5 days. He reports he presented to an urgent care where he was tested for COVID, flu which were negative but had a fever of 102 and was recommended to present to the ED. In ED Tmax 103. He reports some urinary frequency along with cloudy urine and decreased UOP. He also endorses fatigue and leg cramps in both legs. He denies abdominal pain, diarrhea, nausea, vomiting. Lab work in ED remarkable for elevated creatinine. Plan for infectious work up, Kidney US, IV antibiotics, and infectious disease consult    Mr. Lopez is a 64 y.o. year old male who is status post Kidney Transplant - 2/29/2024  (#1).  His maintenance immunosuppression consists of:   Immunosuppressants (From admission, onward)      Start     Stop Route Frequency Ordered    08/21/24 1800  tacrolimus capsule 2 mg         -- Oral 2 times daily 08/21/24 0942            Hospital Course:  Patient admitted with fevers. Infectious work-up initiated. Started on Vanc/ cefepime. UA with few bacteria. Cultures pending. Kidney US with subincisional fluid collection  measuring 9.4 x 7.7 x 2.6 cm. Reviewed with surgeon. No plans to intervene at this time. Will await cultures. Patient with hx of fever of unknown origin. Will plan for CT scan. Cont IVF for now. Patient denies abdominal pains, N/V/D. VSS. Cont to monitor.      Past Medical, Surgical, Family, and Social History:   Unchanged from H&P.    Scheduled Meds:   amLODIPine  10 mg Oral Daily    aspirin  81 mg Oral Daily    atovaquone  1,500 mg Oral Daily    carvediloL  25 mg Oral BID    ceFEPime IV (PEDS and ADULTS)  1 g Intravenous Q12H    heparin (porcine)  5,000 Units Subcutaneous Q8H    levothyroxine  50 mcg Oral Before breakfast    predniSONE  5 mg Oral Daily    sodium bicarbonate  650 mg Oral BID    tacrolimus  2 mg Oral BID    [START ON 8/22/2024] valGANciclovir  450 mg Oral Daily     Continuous Infusions:   0.9% NaCl   Intravenous Continuous 75 mL/hr at 08/21/24 0550 New Bag at 08/21/24 0550    insulin aspart U-100  1.2 Units/hr Subcutaneous Continuous         PRN Meds:  Current Facility-Administered Medications:     acetaminophen, 650 mg, Oral, Q6H PRN    dextrose 10%, 12.5 g, Intravenous, PRN    dextrose 10%, 25 g, Intravenous, PRN    glucagon (human recombinant), 1 mg, Intramuscular, PRN    glucose, 16 g, Oral, PRN    glucose, 24 g, Oral, PRN    insulin aspart U-100, 0-15 Units, Subcutaneous, PRN    ondansetron, 8 mg, Oral, Q6H PRN    sodium chloride 0.9%, 3 mL, Intravenous, PRN    Intake/Output - Last 3 Shifts         08/19 0700  08/20 0659 08/20 0700 08/21 0659 08/21 0700  08/22 0659    P.O.  50     I.V. (mL/kg)  0 (0)     Other  0     Total Intake(mL/kg)  50 (0.5)     Urine (mL/kg/hr)  250 450 (0.9)    Emesis/NG output  0     Other  0     Stool  0     Blood  0     Total Output  250 450    Net  -200 -450           Urine Occurrence  1 x     Stool Occurrence  1 x     Emesis Occurrence  0 x              Review of Systems   Constitutional:  Positive for chills and fatigue. Negative for fever.   Respiratory:   "Negative for shortness of breath.    Gastrointestinal:  Negative for abdominal pain, diarrhea, nausea and vomiting.   Genitourinary:  Positive for frequency. Negative for decreased urine volume and difficulty urinating.   Musculoskeletal:  Positive for myalgias.   Allergic/Immunologic: Positive for immunocompromised state.   Psychiatric/Behavioral:  Negative for agitation and confusion.       Objective:     Vital Signs (Most Recent):  Temp: 98.8 °F (37.1 °C) (08/21/24 1128)  Pulse: 74 (08/21/24 1132)  Resp: 16 (08/21/24 1128)  BP: 136/72 (08/21/24 1128)  SpO2: 96 % (08/21/24 1128) Vital Signs (24h Range):  Temp:  [97.8 °F (36.6 °C)-103 °F (39.4 °C)] 98.8 °F (37.1 °C)  Pulse:  [66-90] 74  Resp:  [16-18] 16  SpO2:  [95 %-98 %] 96 %  BP: (116-136)/(55-72) 136/72     Weight: 97 kg (213 lb 13.5 oz)  Height: 5' 4" (162.6 cm)  Body mass index is 36.71 kg/m².     Physical Exam  Vitals reviewed.   Pulmonary:      Effort: Pulmonary effort is normal.   Abdominal:      General: There is no distension.      Tenderness: There is no abdominal tenderness. There is no guarding or rebound.      Comments: Surgical incision well healed   Musculoskeletal:      Right lower leg: Edema present.      Left lower leg: Edema present.   Neurological:      Mental Status: He is alert and oriented to person, place, and time.   Psychiatric:         Mood and Affect: Mood normal.         Behavior: Behavior normal.         Thought Content: Thought content normal.         Judgment: Judgment normal.          Laboratory:  CBC:   Recent Labs   Lab 08/20/24  0946 08/21/24 0217 08/21/24  0427   WBC 5.90 6.47 5.69   RBC 4.21* 3.75* 3.56*   HGB 12.0* 10.4* 10.1*   HCT 38.9* 33.8* 31.9*    184 169   MCV 92 90 90   MCH 28.5 27.7 28.4   MCHC 30.8* 30.8* 31.7*     CMP:   Recent Labs   Lab 08/20/24  0946 08/21/24 0217 08/21/24  0427   GLU 97 111* 127*   CALCIUM 10.7* 10.3 10.0   ALBUMIN 3.5  3.5 3.1* 2.8*   PROT 7.0 6.4  --     133* 133*   K 4.8 " 4.2 3.9   CO2 21* 19* 18*    103 106   BUN 33* 49* 45*   CREATININE 1.7* 2.0* 1.8*   ALKPHOS 85 87  --    ALT 11 10  --    AST 17 15  --      Labs within the past 24 hours have been reviewed.    Diagnostic Results:  Obtain CT scan  Assessment/Plan:     * Fever  - Infectious work up in process (CMV PCR pending, blood cx, UA/cx, RVP)  - IV antibiotics started  - ID consult  - IVF for hydration  - Kidney US w/ subincisional fluid collection measuring 9.4 x 7.7 x 2.6 cm. No plans to intervene at this moment. Will obtain CT scan.  - Continue to monitor       Acidosis  - Continue bicarb      Insulin pump in place  - endocrine consulted for BG management     At risk for opportunistic infections  - Valcyte for CMV prophylaxis.       Long-term use of immunosuppressant medication  - Continue prograf. Hold cellcept. Monitor prograf level daily, monitor for toxic side effects, and adjust for therapeutic dose      Prophylactic immunotherapy  - See long term use of immunosuppressant medication      Status post -donor kidney transplantation  - Creatinine elevated above baseline  - IVF hydration  - Kidney US w/ subincisional fluid collection measuring 9.4 x 7.7 x 2.6 cm.   - Plan for CT scan  - Monitor with daily labs       Essential hypertension  - Continue home antihypertensives      Type 2 diabetes mellitus with kidney complication, with long-term current use of insulin  - Endocrine consulted for help with management           Discharge Planning: not current candidate   Medical decision making for this encounter includes review of pertinent labs and diagnostic studies, assessment and planning, discussions with consulting providers, discussion with patient/family, and participation in multidisciplinary rounds. Time spent caring for patient: 60 minutes    Lilly La PA-C  Kidney Transplant  [facility] - Transplant [facility]

## 2024-08-21 NOTE — NURSING
Nurses Note -- 4 Eyes      8/21/2024   5:45 AM      Skin assessed during: Admit      [x] No Altered Skin Integrity Present    []Prevention Measures Documented      [] Yes- Altered Skin Integrity Present or Discovered   [] LDA Added if Not in Epic (Describe Wound)   [] New Altered Skin Integrity was Present on Admit and Documented in LDA   [] Wound Image Taken    Wound Care Consulted? No    Attending Nurse:  Kaitlynn Gardiner RN    Second RN/Staff Member: [place] RN

## 2024-08-21 NOTE — CONSULTS
[facility] - Transplant Step[facility]  Endocrinology  Diabetes Consult Note    Consult Requested by: No att. providers found   Reason for admit: Fever    HISTORY OF PRESENT ILLNESS:  Reason for Consult: Management of T2DM, Hyperglycemia      Surgical Procedure and Date: kidney transplant 2024     Diabetes diagnosis year:  per chart review      Home Diabetes Medications:  Tandem insulin pump   ICR 1:5   ISF 1:25  Basal rate 1.2 u/hr + control IQ      How often checking glucose at home? >4 x day   BG readings on regimen: 180s-200s, occasional 300s  Hypoglycemia on the regimen?  Yes, once a week overnight, 2-3x during the week during the day   Missed doses on regimen?  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 Presented to the ED with chief complaint of fever x ~5 days. Endocrine consulted to manage hyperglycemia and type 2 diabetes in the context of infection. Patient utilized a home insulin pump to manage diabetes.     Lab Results   Component Value Date    HGBA1C 5.4 2024         Interval HPI:   Overnight events: No acute events overnight. Patient in room 24594/78010 A. Blood glucose stable. BG at and above goal on current insulin regimen (Home Insulin Pump). Steroid use- Prednisone 5 mg.    Renal function- Abnormal - Creatinine 1.8   Vasopressors-  None       Endocrine will continue to follow and manage insulin orders inpatient.         Diet diabetic  Calorie     Eatin%  Nausea: No  Hypoglycemia and intervention: No  Fever: No  TPN and/or TF: No    PMH, PSH, FH, SH updated and reviewed     ROS:  Review of Systems   Constitutional:  Negative for unexpected weight change.    Eyes:  Negative for visual disturbance.   Respiratory:  Negative for cough.    Cardiovascular:  Negative for chest pain.   Gastrointestinal:  Negative for nausea and vomiting.   Endocrine: Negative for polydipsia and polyuria.   Musculoskeletal:  Negative for back pain.   Skin:  Negative for rash.   Neurological:  Negative for syncope.   Psychiatric/Behavioral:  Negative for agitation and dysphoric mood.        Current Medications and/or Treatments Impacting Glycemic Control  Immunotherapy:    Immunosuppressants           Stop Route Frequency     tacrolimus capsule 2 mg         -- Oral 2 times daily          Steroids:   Hormones (From admission, onward)      Start     Stop Route Frequency Ordered    08/21/24 0900  predniSONE tablet 5 mg         -- Oral Daily 08/21/24 0316          Pressors:    Autonomic Drugs (From admission, onward)      None          Hyperglycemia/Diabetes Medications:   Antihyperglycemics (From admission, onward)      None             PHYSICAL EXAMINATION:  Vitals:    08/21/24 1132   BP:    Pulse: 74   Resp:    Temp:      Body mass index is 36.71 kg/m².     Physical Exam  Constitutional:       Appearance: He is well-developed.   HENT:      Head: Normocephalic.   Eyes:      Conjunctiva/sclera: Conjunctivae normal.   Pulmonary:      Effort: Pulmonary effort is normal.   Musculoskeletal:         General: Normal range of motion.   Skin:     General: Skin is warm.      Findings: No rash.   Neurological:      Mental Status: He is alert and oriented to person, place, and time.            Labs Reviewed and Include   Recent Labs   Lab 08/21/24  0217 08/21/24  0427   * 127*   CALCIUM 10.3 10.0   ALBUMIN 3.1* 2.8*   PROT 6.4  --    * 133*   K 4.2 3.9   CO2 19* 18*    106   BUN 49* 45*   CREATININE 2.0* 1.8*   ALKPHOS 87  --    ALT 10  --    AST 15  --    BILITOT 0.6  --      Lab Results   Component Value Date    WBC 5.69 08/21/2024    HGB 10.1 (L) 08/21/2024    HCT 31.9 (L) 08/21/2024  "   MCV 90 2024     2024     No results for input(s): "TSH", "FREET4" in the last 168 hours.  Lab Results   Component Value Date    HGBA1C 5.4 2024       Nutritional status:   Body mass index is 36.71 kg/m².  Lab Results   Component Value Date    ALBUMIN 2.8 (L) 2024    ALBUMIN 3.1 (L) 2024    ALBUMIN 3.5 2024    ALBUMIN 3.5 2024     No results found for: "PREALBUMIN"    Estimated Creatinine Clearance: 43 mL/min (A) (based on SCr of 1.8 mg/dL (H)).    Accu-Checks  Recent Labs     24  0613   POCTGLUCOSE 161*        ASSESSMENT and PLAN    Renal/  Status post -donor kidney transplantation  Managed per primary team  Avoid hypoglycemia  Titrate insulin slowly to avoid hypoglycemia as the risk of hypoglycemia increases with decreased creatinine clearance.  Estimated Creatinine Clearance: 43 mL/min (A) (based on SCr of 1.8 mg/dL (H)).        Endocrine  Insulin pump in place  At time of evaluation, pt meets criteria to continue home insulin pump usage.  - Has all adequate supplies   - Insulin pump site change on 2024.    - Bolus settings reviewed    - No changes to home regimen.   - Nurse to check BG qac/hs/0200 & record in epic   - Patient to input glucose into pump and use bolus wizard for prandial needs   - Will continue to monitor accuchecks and titrate insulin as clinically indicated .     - Discussed above plan with patient, patient verbalized understanding.   - Understands in case of pump malfunction or cognitive decline in which pt can no longer safely use insulin pump, will transition to SC MDI       If pump malfunctions or is disconnected, PLEASE CALL ENDOCRINE ON-CALL FOR ADDITIONAL ORDERS.      Type 2 diabetes mellitus with kidney complication, with long-term current use of insulin  Endocrinology consulted for BG management.   BG goal 140-180    - Continue Home Insulin Pump  - BG checks AC/HS/0200  - Hypoglycemia protocol in place    ** Please " notify Endocrine for any change and/or advance in diet**  ** Please call Endocrine for any BG related issues **    Discharge Planning:   TBD. Please notify endocrinology prior to discharge.        Other  * Fever  Infection may elevate BG readings  On IV antibiotics  Managed per primary team  Avoid hypoglycemia            Plan discussed with patient, family, and RN at bedside.     Gilbert Green, DNP, FNP  Endocrinology  [facility] - Transplant Stepdown

## 2024-08-21 NOTE — SUBJECTIVE & OBJECTIVE
Past Medical History:   Diagnosis Date    Anemia     Cataract     Diabetes mellitus     Diabetes mellitus, type 2     Glaucoma     Gout, unspecified     HLD (hyperlipidemia)     Hypertension     Hypothyroidism, unspecified     Kidney failure     Renal disorder        Past Surgical History:   Procedure Laterality Date    COLONOSCOPY N/A 05/18/2022    Procedure: COLONOSCOPY;  Surgeon: Raul Dyer MD;  Location: [facility] ENDO;  Service: Endoscopy;  Laterality: N/A;    DENTAL SURGERY      EYE SURGERY Bilateral     Cataract    INSERTION, CATHETER, DIALYSIS, PERITONEAL, LAPAROSCOPIC N/A 06/20/2023    Procedure: INSERTION, CATHETER, DIALYSIS, PERITONEAL, LAPAROSCOPIC;  Surgeon: Carlos Alberto Rodgers MD;  Location: [facility] OR;  Service: General;  Laterality: N/A;    KIDNEY TRANSPLANT N/A 2/29/2024    Procedure: TRANSPLANT, KIDNEY;  Surgeon: Pino Law Jr., MD;  Location: [facility] OR [address] [place] [address];  Service: Transplant;  Laterality: N/A;    KNEE SURGERY Right     PERITONEAL CATHETER REMOVAL N/A 5/28/2024    Procedure: REMOVAL, CATHETER, DIALYSIS, PERITONEAL;  Surgeon: Cynthia Leary MD;  [facility];  Service: Transplant;  Laterality: N/A;  One hour PD catheter removal       Review of patient's allergies indicates:   Allergen Reactions    Allopurinol analogues Swelling    Adhesive tape-silicones     Allopurinol     Latex, natural rubber     Statins-hmg-coa reductase inhibitors Other (See Comments)     Muscle ache    Adhesive Rash       Medications:  Medications Prior to Admission   Medication Sig    acetaminophen (TYLENOL) 650 MG TbSR Take 1 tablet (650 mg total) by mouth every 8 (eight) hours as needed.    amLODIPine (NORVASC) 5 MG tablet Take 2 tablets (10 mg total) by mouth once daily.    aspirin (ECOTRIN) 81 MG EC tablet Take 1 tablet (81 mg total) by mouth once daily.    atovaquone (MEPRON) 750 mg/5 mL Susp oral liquid Take 10 mLs (1,500 mg total) by mouth once daily. Stop 8/27/24    blood-glucose  sensor (DEXCOM G6 SENSOR) Neelima by Misc.(Non-Drug; Combo Route) route.    carvediloL (COREG) 12.5 MG tablet Take 2 tablets (25 mg total) by mouth 2 (two) times daily.    cetirizine (ZYRTEC) 10 MG tablet Take 1 tablet (10 mg total) by mouth once daily.    HUMALOG U-100 INSULIN 100 unit/mL injection Inject into the skin. FOR INSULIN PUMP (BASAL 1.2 UNITS/HR)    k phos di & mono-sod phos mono (K-PHOS-NEUTRAL) 250 mg Tab Take 1 tablet by mouth every 12 (twelve) hours.    latanoprost 0.005 % ophthalmic solution Place 1 drop into the right eye every other day. At night    levothyroxine (SYNTHROID) 50 MCG tablet Take 1 tablet (50 mcg total) by mouth before breakfast.    mycophenolate (CELLCEPT) 250 mg Cap Take 2 capsules (500 mg total) by mouth 2 (two) times daily. Z94.0; Kidney txp on 2/29/24    ONETOUCH DELICA PLUS LANCET 30 gauge Misc     predniSONE (DELTASONE) 5 MG tablet Take 1 tablet (5 mg total) by mouth once daily.    sodium bicarbonate 650 MG tablet Take 1 tablet (650 mg total) by mouth 2 (two) times daily.    subcutaneous insulin pump Misc Inject 1 Units/hr into the skin continuous. Insulin pump name - Tandum  Basal dose is 2 units/hr    T:SLIM X2 Crtg Inject into the skin.    tacrolimus (PROGRAF) 1 MG Cap Take 3 capsules (3 mg total) by mouth every 12 (twelve) hours. Z94.0;Kiney txp on 2/29/2024    valGANciclovir (VALCYTE) 450 mg Tab Take 1 tablet (450 mg total) by mouth once daily. Stop 8/27/24     Antibiotics (From admission, onward)      Start     Stop Route Frequency Ordered    08/21/24 1400  ceFEPIme (MAXIPIME) 1 g in D5W 100 mL IVPB (MB+)         -- IV Every 12 hours 08/21/24 0321          Antifungals (From admission, onward)      None          Antivirals (From admission, onward)          Stop Route Frequency     valGANciclovir         08/28/24 0859 Oral Daily             Immunization History   Administered Date(s) Administered    COVID-19, MRNA, LN-S, PF (Pfizer) (Gray Cap) 05/23/2022    COVID-19, MRNA,  LN-S, PF (Pfizer) (Purple Cap) 01/29/2021, 02/18/2021, 10/14/2021    COVID-19, mRNA, LNP-S, bivalent booster, PF (PFIZER OMICRON) 09/16/2022, 08/07/2023    Influenza - Quadrivalent 12/10/2019    Influenza - Quadrivalent - MDCK 11/19/2020, 11/29/2022    Influenza - Quadrivalent - PF *Preferred* (6 months and older) 10/14/2021    Tdap 04/26/2021    Zoster 07/11/2013       Family History       Problem Relation (Age of Onset)    Cancer Sister    Diabetes Father, Brother    Heart disease Father    Hypertension Father    Kidney disease Brother          Social History     Socioeconomic History    Marital status: Single   Tobacco Use    Smoking status: Never     Passive exposure: Never    Smokeless tobacco: Never   Substance and Sexual Activity    Alcohol use: Never    Drug use: Never    Sexual activity: Not Currently   Social History Narrative    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     Social Determinants of Health     Financial Resource Strain: Low Risk  (3/15/2024)    Overall Financial Resource Strain (CARDIA)     Difficulty of Paying Living Expenses: Not very hard   Food Insecurity: No Food Insecurity (3/15/2024)    Hunger Vital Sign     Worried About Running Out of Food in the Last Year: Never true     Ran Out of Food in the Last Year: Never true   Transportation Needs: No Transportation Needs (3/15/2024)    PRAPARE - Transportation     Lack of Transportation (Medical): No     Lack of Transportation (Non-Medical): No   Stress: No Stress Concern Present (3/15/2024)    [place] [place] [employer] - Occupational Stress Questionnaire     Feeling of Stress : Not at all   Housing Stability: Low Risk  (3/15/2024)    Housing Stability Vital Sign     Unable to Pay for Housing in the Last Year: No     Number of Places Lived in the Last Year: 1     Unstable Housing in the Last Year: No     Review of Systems   Constitutional:  Positive for activity change and fatigue. Negative for chills.   HENT:  Negative for congestion and sore  throat.    Eyes:  Negative for visual disturbance.   Respiratory:  Negative for cough and shortness of breath.    Cardiovascular:  Negative for chest pain, palpitations and leg swelling.   Gastrointestinal:  Negative for abdominal pain, diarrhea, nausea and vomiting.   Genitourinary:  Positive for difficulty urinating. Negative for dysuria.   Musculoskeletal:  Positive for myalgias.   Skin:  Negative for rash.   Neurological:  Negative for weakness and headaches.   Psychiatric/Behavioral:  Negative for confusion.      Objective:     Vital Signs (Most Recent):  Temp: 98.8 °F (37.1 °C) (08/21/24 1128)  Pulse: 71 (08/21/24 1448)  Resp: 16 (08/21/24 1128)  BP: 136/72 (08/21/24 1128)  SpO2: 96 % (08/21/24 1128) Vital Signs (24h Range):  Temp:  [97.8 °F (36.6 °C)-103 °F (39.4 °C)] 98.8 °F (37.1 °C)  Pulse:  [66-90] 71  Resp:  [16-18] 16  SpO2:  [95 %-98 %] 96 %  BP: (116-136)/(55-72) 136/72     Weight: 97 kg (213 lb 13.5 oz)  Body mass index is 36.71 kg/m².    Estimated Creatinine Clearance: 43 mL/min (A) (based on SCr of 1.8 mg/dL (H)).     Physical Exam  Vitals reviewed.   Constitutional:       General: He is not in acute distress.     Appearance: Normal appearance.   HENT:      Head: Normocephalic and atraumatic.      Right Ear: External ear normal.      Left Ear: External ear normal.      Nose: Nose normal.      Mouth/Throat:      Mouth: Mucous membranes are moist.   Eyes:      Extraocular Movements: Extraocular movements intact.   Cardiovascular:      Rate and Rhythm: Normal rate and regular rhythm.   Pulmonary:      Effort: Pulmonary effort is normal. No respiratory distress.      Breath sounds: Normal breath sounds.   Abdominal:      General: Bowel sounds are normal. There is no distension.      Palpations: Abdomen is soft.      Tenderness: There is no abdominal tenderness.      Comments: S/p kidney transplant, abdominal incision not tender or erythematous   Musculoskeletal:         General: No tenderness. Normal  range of motion.      Cervical back: Normal range of motion.      Right lower leg: No edema.      Left lower leg: No edema.   Skin:     General: Skin is warm and dry.   Neurological:      General: No focal deficit present.      Mental Status: He is alert and oriented to person, place, and time.   Psychiatric:         Mood and Affect: Mood normal.         Behavior: Behavior normal.          Significant Labs: All pertinent labs within the past 24 hours have been reviewed.    Significant Imaging: I have reviewed all pertinent imaging results/findings within the past 24 hours.

## 2024-08-21 NOTE — ASSESSMENT & PLAN NOTE
- Continue home antihypertensives     How Severe Are Your Spot(S)?: mild What Is The Reason For Today's Visit?: Full Body Skin Examination What Is The Reason For Today's Visit? (Being Monitored For X): concerning skin lesions on an annual basis

## 2024-08-21 NOTE — ASSESSMENT & PLAN NOTE
Managed per primary team  Avoid hypoglycemia  Titrate insulin slowly to avoid hypoglycemia as the risk of hypoglycemia increases with decreased creatinine clearance.  Estimated Creatinine Clearance: 43 mL/min (A) (based on SCr of 1.8 mg/dL (H)).

## 2024-08-21 NOTE — NURSING
Pt arrived to [facility]SU from ED. VSS. No skin breakdown noted. OSIRIS ORANTES. [facility] aware of pt's arrival. Pt stated his family knows he is in the hospital. Pt oriented to room.

## 2024-08-21 NOTE — ED PROVIDER NOTES
Encounter Date: 8/21/2024       History     Chief Complaint   Patient presents with    Leg Pain     Bilateral weakness    Fever     103.1; kidney transplant feb 2024    Weakness     64-year-old male arrives to the emergency department for evaluation of fever.  Past medical history is significant for diabetes, hypertension and hyperlipidemia.  Patient also has hypothyroidism and history of kidney failure, kidney transplant spring of 2024.     Patient admitted last month for sepsis.  Since his discharge he has been doing well.  For the past couple of days the patient has had intermittent fevers and increased fatigue.  He was seen at urgent care and referred to the ER.  Upon my initial assessment he has a fever 103° F. he does endorse having cloudy urine and increased urinary frequency.  He appears well      Review of patient's allergies indicates:   Allergen Reactions    Allopurinol analogues Swelling    Adhesive tape-silicones     Allopurinol     Latex, natural rubber     Statins-hmg-coa reductase inhibitors Other (See Comments)     Muscle ache    Adhesive Rash     Past Medical History:   Diagnosis Date    Anemia     Cataract     Diabetes mellitus     Diabetes mellitus, type 2     Glaucoma     Gout, unspecified     HLD (hyperlipidemia)     Hypertension     Hypothyroidism, unspecified     Kidney failure     Renal disorder      Past Surgical History:   Procedure Laterality Date    COLONOSCOPY N/A 05/18/2022    Procedure: COLONOSCOPY;  Surgeon: Raul Dyer MD;  Location: [facility];  Service: Endoscopy;  Laterality: N/A;    DENTAL SURGERY      EYE SURGERY Bilateral     Cataract    INSERTION, CATHETER, DIALYSIS, PERITONEAL, LAPAROSCOPIC N/A 06/20/2023    Procedure: INSERTION, CATHETER, DIALYSIS, PERITONEAL, LAPAROSCOPIC;  Surgeon: Carlos Alberto Rodgers MD;  Location: [facility] OR;  Service: General;  Laterality: N/A;    KIDNEY TRANSPLANT N/A 2/29/2024    Procedure: TRANSPLANT, KIDNEY;  Surgeon: Pino Law Jr.,  MD;  Location: [address];  Service: Transplant;  Laterality: N/A;    KNEE SURGERY Right     PERITONEAL CATHETER REMOVAL N/A 5/28/2024    Procedure: REMOVAL, CATHETER, DIALYSIS, PERITONEAL;  Surgeon: Cynthia Leary MD;  Location: [address];  Service: Transplant;  Laterality: N/A;  One hour PD catheter removal     Family History   Problem Relation Name Age of Onset    Hypertension Father      Diabetes Father      Heart disease Father      Cancer Sister          MM    Diabetes Brother      Kidney disease Brother       Social History     Tobacco Use    Smoking status: Never     Passive exposure: Never    Smokeless tobacco: Never   Substance Use Topics    Alcohol use: Never    Drug use: Never     Review of Systems   Constitutional:  Positive for fatigue and fever.   HENT:  Negative for sore throat.    Respiratory:  Negative for shortness of breath.    Cardiovascular:  Negative for chest pain.   Gastrointestinal:  Negative for nausea.   Genitourinary:  Positive for frequency. Negative for dysuria.   Musculoskeletal:  Negative for back pain.   Skin:  Negative for rash.   Neurological:  Negative for weakness.   Hematological:  Does not bruise/bleed easily.       Physical Exam     Initial Vitals [08/21/24 0053]   BP Pulse Resp Temp SpO2   (!) 118/56 90 17 (!) 103 °F (39.4 °C) 95 %      MAP       --         Physical Exam    Constitutional: Vital signs are normal. He appears well-developed and well-nourished.   HENT:   Head: Normocephalic and atraumatic.   Right Ear: Hearing normal.   Left Ear: Hearing normal.   Normal exam   Eyes: Conjunctivae are normal.   Cardiovascular:  Normal rate and regular rhythm.           Pulmonary/Chest:   Normal exam   Abdominal: Abdomen is soft. Bowel sounds are normal.   Benign abdomen   Musculoskeletal:         General: Normal range of motion.     Neurological: He is alert and oriented to person, place, and time.   Skin: Skin is warm and intact.   Psychiatric: He has a normal  mood and affect. His speech is normal and behavior is normal. Cognition and memory are normal.         ED Course   Critical Care    Date/Time: 8/21/2024 3:25 AM    Performed by: Eron Arceo PA-C  Authorized by: Jose rTipp MD  Direct patient critical care time: 5 minutes  Additional history critical care time: 10 minutes  Ordering / reviewing critical care time: 5 minutes  Documentation critical care time: 7 minutes  Consulting other physicians critical care time: 7 minutes  Total critical care time (exclusive of procedural time) : 34 minutes  Critical care was necessary to treat or prevent imminent or life-threatening deterioration of the following conditions: sepsis.  Critical care was time spent personally by me on the following activities: review of old charts, re-evaluation of patient's condition, ordering and review of radiographic studies and ordering and review of laboratory studies.        Labs Reviewed   CBC W/ AUTO DIFFERENTIAL - Abnormal       Result Value    WBC 6.47      RBC 3.75 (*)     Hemoglobin 10.4 (*)     Hematocrit 33.8 (*)     MCV 90      MCH 27.7      MCHC 30.8 (*)     RDW 14.3      Platelets 184      MPV 11.2     COMPREHENSIVE METABOLIC PANEL - Abnormal    Sodium 133 (*)     Potassium 4.2      Chloride 103      CO2 19 (*)     Glucose 111 (*)     BUN 49 (*)     Creatinine 2.0 (*)     Calcium 10.3      Total Protein 6.4      Albumin 3.1 (*)     Total Bilirubin 0.6      Alkaline Phosphatase 87      AST 15      ALT 10      eGFR 36.4 (*)     Anion Gap 11     PROCALCITONIN - Abnormal    Procalcitonin 1.16 (*)    PHOSPHORUS - Abnormal    Phosphorus 2.4 (*)    ISTAT PROCEDURE - Abnormal    POC PH 7.374      POC PCO2 33.9 (*)     POC PO2 39 (*)     POC HCO3 19.8 (*)     POC BE -5 (*)     POC SATURATED O2 72      POC TCO2 21 (*)     Sample VENOUS      Site Other      Allens Test N/A     CULTURE, BLOOD   CULTURE, BLOOD   INFLUENZA A & B BY MOLECULAR   CULTURE, URINE   RESPIRATORY  INFECTION PANEL (PCR), NASOPHARYNGEAL   MAGNESIUM    Magnesium 1.8     URINALYSIS, REFLEX TO URINE CULTURE   TACROLIMUS LEVEL   RENAL FUNCTION PANEL   MAGNESIUM   CBC W/ AUTO DIFFERENTIAL   ISTAT LACTATE    POC Lactate 0.82      Sample VENOUS      Site Other      Allens Test N/A     POCT GLUCOSE MONITORING CONTINUOUS          Imaging Results              X-Ray Chest AP Portable (In process)                      Medications   lactated ringers bolus 2,844 mL (2,844 mLs Intravenous New Bag 8/21/24 0237)   vancomycin (VANCOCIN) 2,250 mg in D5W 500 mL IVPB (has no administration in time range)   amLODIPine tablet 10 mg (has no administration in time range)   aspirin EC tablet 81 mg (has no administration in time range)   atovaquone 750 mg/5 mL oral liquid 1,500 mg (has no administration in time range)   carvediloL tablet 25 mg (has no administration in time range)   levothyroxine tablet 50 mcg (has no administration in time range)   predniSONE tablet 5 mg (has no administration in time range)   sodium bicarbonate tablet 650 mg (has no administration in time range)   tacrolimus capsule 3 mg (has no administration in time range)   valGANciclovir tablet 450 mg (has no administration in time range)   sodium chloride 0.9% flush 3 mL (has no administration in time range)   ondansetron disintegrating tablet 8 mg (has no administration in time range)   acetaminophen tablet 650 mg (has no administration in time range)   heparin (porcine) injection 5,000 Units (has no administration in time range)   0.9%  NaCl infusion (has no administration in time range)   ceFEPIme (MAXIPIME) 1 g in D5W 100 mL IVPB (MB+) (has no administration in time range)   acetaminophen tablet 1,000 mg (1,000 mg Oral Given 8/21/24 0242)   ceFEPIme (MAXIPIME) 2 g in D5W 100 mL IVPB (MB+) (2 g Intravenous New Bag 8/21/24 0234)     Medical Decision Making  64-year-old male presenting with a fever  Differential:  Sepsis, bacteremia, UTI, fever of unknown origin,  COVID  Plan:   Sepsis workup, broad-spectrum antibiotics, aggressive fluid resuscitation, acetaminophen  I will discuss the case with the transplant surgery.    Transplant surgery will admit the patient    Amount and/or Complexity of Data Reviewed  Labs: ordered.  Radiology: ordered and independent interpretation performed.     Details: No acute cardiopulmonary process, no pneumonia    Risk  OTC drugs.                                      Clinical Impression:  Final diagnoses:  [R50.9] Fever                 Eron Arceo, PADONTE  08/21/24 0325

## 2024-08-21 NOTE — PROGRESS NOTES
Pharmacist Renal Dose Adjustment Note    Mark Lopez is a 64 y.o. male being treated with the medication Cefepime    Patient Data:    Vital Signs (Most Recent):  Temp: 98.5 °F (36.9 °C) (08/21/24 0242)  Pulse: 82 (08/21/24 0302)  Resp: 17 (08/21/24 0215)  BP: 125/61 (08/21/24 0302)  SpO2: 96 % (08/21/24 0302) Vital Signs (72h Range):  Temp:  [98.5 °F (36.9 °C)-103 °F (39.4 °C)]   Pulse:  [82-90]   Resp:  [17]   BP: (118-128)/(56-61)   SpO2:  [95 %-98 %]      Recent Labs   Lab 08/21/24 0217   CREATININE 2.0*     Serum creatinine: 2 mg/dL (H) 08/21/24 0217  Estimated creatinine clearance: 38.2 mL/min (A)    Medication:Cefepime dose: 1g frequency q8h will be changed to medication:Cefepime dose:1g frequency:q12h    Pharmacist's Name: Natalie Stephens

## 2024-08-21 NOTE — CONSULTS
[facility] - Transplant Stepdown  Infectious Disease  Consult Note    Patient Name: Mark Lopez  MRN: [id number]  Admission Date: 8/21/2024  Hospital Length of Stay: 0 days  Attending Physician: Hope Alexander DO  Primary Care Provider: Loren Mensah MD     Isolation Status: Enhanced Respiratory, Enhanced Respiratory    Patient information was obtained from patient, past medical records, and ER records.      Inpatient consult to Infectious Diseases  Consult performed by: Kourtney Jeffers MD  Consult ordered by: Rachel Coreas PA-C  Reason for consult: recurrent fevers in recent kidney transplant        Assessment/Plan:     Other  * Fever  Patient with recent kidney transplant presenting with recurring fevers with tmax of 103 F. Also has associate myalgias, activity change. Flu/RIP negative. U/a concerning for infection. US kidney showed some fluid collection near incision site. Of note, patient had similar collection drained in July and it was found to be non infectious.     - continue cefepime  - f/u blood and urine cultures  - f/u CT Abd/Pelvis to further assess fluid collection        Thank you for your consult. I will follow-up with patient. Please contact us if you have any additional questions.    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 Patient reports intermittent fevers started on 8/16/24 with a temp of 100.5 F. He reports over the next two days his temperature stayed in the low 100s until 8/19/24 when he presented to [facility] [place] [facility] and had a temperature of 103 F. He was advised to go to the ED but went home because his temperature went down to 98.1 then 97.1 (baseline 97 F). Patient reports he proceeded to go to dinner in [place] and after dinner had a  temperature of 102.4 at 11pm (8/20/24) so he came to the [facility] ED. Patient also reports fatigue, myalgias, and decreased urine output. Pt denies n/v/d, abd pain, cp, sob, or any other complaints.    Patient received vanc, cefepime, atovaquone, valcyte in ED. U/a and urine micro concerning for infection. Pro miranda elevated. Blood/urine cultures in process. CXR showed previously noted changes. US kidney new resistive indices and small fluid collection. Of note, in July patient had fluid collection which was drained and non infectious.    Patient reports no sick contacts. Only drug reactions to allopurinol and statins, no reactions to antibiotics. No exposures through hobbies. No TB/HIV concerns.     ID was consulted for fever in recent transplant patient.         Past Medical History:   Diagnosis Date    Anemia     Cataract     Diabetes mellitus     Diabetes mellitus, type 2     Glaucoma     Gout, unspecified     HLD (hyperlipidemia)     Hypertension     Hypothyroidism, unspecified     Kidney failure     Renal disorder        Past Surgical History:   Procedure Laterality Date    COLONOSCOPY N/A 05/18/2022    Procedure: COLONOSCOPY;  Surgeon: Raul Dyer MD;  Location: [facility];  Service: Endoscopy;  Laterality: N/A;    DENTAL SURGERY      EYE SURGERY Bilateral     Cataract    INSERTION, CATHETER, DIALYSIS, PERITONEAL, LAPAROSCOPIC N/A 06/20/2023    Procedure: INSERTION, CATHETER, DIALYSIS, PERITONEAL, LAPAROSCOPIC;  Surgeon: Carlos Alberto Rodgers MD;  Location: [facility] OR;  Service: General;  Laterality: N/A;    KIDNEY TRANSPLANT N/A 2/29/2024    Procedure: TRANSPLANT, KIDNEY;  Surgeon: Pino Law Jr., MD;  Location: [facility] OR [address] [place] [address];  Service: Transplant;  Laterality: N/A;    KNEE SURGERY Right     PERITONEAL CATHETER REMOVAL N/A 5/28/2024    Procedure: REMOVAL, CATHETER, DIALYSIS, PERITONEAL;  Surgeon: Cynthia Leary MD;  Location: [facility] OR [address] [place] [address];  Service: Transplant;  Laterality: N/A;  One hour PD  catheter removal       Review of patient's allergies indicates:   Allergen Reactions    Allopurinol analogues Swelling    Adhesive tape-silicones     Allopurinol     Latex, natural rubber     Statins-hmg-coa reductase inhibitors Other (See Comments)     Muscle ache    Adhesive Rash       Medications:  Medications Prior to Admission   Medication Sig    acetaminophen (TYLENOL) 650 MG TbSR Take 1 tablet (650 mg total) by mouth every 8 (eight) hours as needed.    amLODIPine (NORVASC) 5 MG tablet Take 2 tablets (10 mg total) by mouth once daily.    aspirin (ECOTRIN) 81 MG EC tablet Take 1 tablet (81 mg total) by mouth once daily.    atovaquone (MEPRON) 750 mg/5 mL Susp oral liquid Take 10 mLs (1,500 mg total) by mouth once daily. Stop 8/27/24    blood-glucose sensor (DEXCOM G6 SENSOR) Neelima by Misc.(Non-Drug; Combo Route) route.    carvediloL (COREG) 12.5 MG tablet Take 2 tablets (25 mg total) by mouth 2 (two) times daily.    cetirizine (ZYRTEC) 10 MG tablet Take 1 tablet (10 mg total) by mouth once daily.    HUMALOG U-100 INSULIN 100 unit/mL injection Inject into the skin. FOR INSULIN PUMP (BASAL 1.2 UNITS/HR)    k phos di & mono-sod phos mono (K-PHOS-NEUTRAL) 250 mg Tab Take 1 tablet by mouth every 12 (twelve) hours.    latanoprost 0.005 % ophthalmic solution Place 1 drop into the right eye every other day. At night    levothyroxine (SYNTHROID) 50 MCG tablet Take 1 tablet (50 mcg total) by mouth before breakfast.    mycophenolate (CELLCEPT) 250 mg Cap Take 2 capsules (500 mg total) by mouth 2 (two) times daily. Z94.0; Kidney txp on 2/29/24    ONETOUCH DELICA PLUS LANCET 30 gauge Misc     predniSONE (DELTASONE) 5 MG tablet Take 1 tablet (5 mg total) by mouth once daily.    sodium bicarbonate 650 MG tablet Take 1 tablet (650 mg total) by mouth 2 (two) times daily.    subcutaneous insulin pump Misc Inject 1 Units/hr into the skin continuous. Insulin pump name - Tandum  Basal dose is 2 units/hr    T:SLIM X2 Crtg Inject into  the skin.    tacrolimus (PROGRAF) 1 MG Cap Take 3 capsules (3 mg total) by mouth every 12 (twelve) hours. Z94.0;Kiney txp on 2/29/2024    valGANciclovir (VALCYTE) 450 mg Tab Take 1 tablet (450 mg total) by mouth once daily. Stop 8/27/24     Antibiotics (From admission, onward)      Start     Stop Route Frequency Ordered    08/21/24 1400  ceFEPIme (MAXIPIME) 1 g in D5W 100 mL IVPB (MB+)         -- IV Every 12 hours 08/21/24 0321          Antifungals (From admission, onward)      None          Antivirals (From admission, onward)          Stop Route Frequency     valGANciclovir         08/28/24 0859 Oral Daily             Immunization History   Administered Date(s) Administered    COVID-19, MRNA, LN-S, PF (Pfizer) (Gray Cap) 05/23/2022    COVID-19, MRNA, LN-S, PF (Pfizer) (Purple Cap) 01/29/2021, 02/18/2021, 10/14/2021    COVID-19, mRNA, LNP-S, bivalent booster, PF (PFIZER OMICRON) 09/16/2022, 08/07/2023    Influenza - Quadrivalent 12/10/2019    Influenza - Quadrivalent - MDCK 11/19/2020, 11/29/2022    Influenza - Quadrivalent - PF *Preferred* (6 months and older) 10/14/2021    Tdap 04/26/2021    Zoster 07/11/2013       Family History       Problem Relation (Age of Onset)    Cancer Sister    Diabetes Father, Brother    Heart disease Father    Hypertension Father    Kidney disease Brother          Social History     Socioeconomic History    Marital status: Single   Tobacco Use    Smoking status: Never     Passive exposure: Never    Smokeless tobacco: Never   Substance and Sexual Activity    Alcohol use: Never    Drug use: Never    Sexual activity: Not Currently   Social History Narrative    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     Social Determinants of Health     Financial Resource Strain: Low Risk  (3/15/2024)    Overall Financial Resource Strain (CARDIA)     Difficulty of Paying Living Expenses: Not very hard   Food Insecurity: No Food Insecurity (3/15/2024)    Hunger Vital Sign     Worried About Running Out of Food in the Last  Year: Never true     Ran Out of Food in the Last Year: Never true   Transportation Needs: No Transportation Needs (3/15/2024)    PRAPARE - Transportation     Lack of Transportation (Medical): No     Lack of Transportation (Non-Medical): No   Stress: No Stress Concern Present (3/15/2024)    [employer] - Occupational Stress Questionnaire     Feeling of Stress : Not at all   Housing Stability: Low Risk  (3/15/2024)    Housing Stability Vital Sign     Unable to Pay for Housing in the Last Year: No     Number of Places Lived in the Last Year: 1     Unstable Housing in the Last Year: No     Review of Systems   Constitutional:  Positive for activity change and fatigue. Negative for chills.   HENT:  Negative for congestion and sore throat.    Eyes:  Negative for visual disturbance.   Respiratory:  Negative for cough and shortness of breath.    Cardiovascular:  Negative for chest pain, palpitations and leg swelling.   Gastrointestinal:  Negative for abdominal pain, diarrhea, nausea and vomiting.   Genitourinary:  Positive for difficulty urinating. Negative for dysuria.   Musculoskeletal:  Positive for myalgias.   Skin:  Negative for rash.   Neurological:  Negative for weakness and headaches.   Psychiatric/Behavioral:  Negative for confusion.      Objective:     Vital Signs (Most Recent):  Temp: 98.8 °F (37.1 °C) (08/21/24 1128)  Pulse: 71 (08/21/24 1448)  Resp: 16 (08/21/24 1128)  BP: 136/72 (08/21/24 1128)  SpO2: 96 % (08/21/24 1128) Vital Signs (24h Range):  Temp:  [97.8 °F (36.6 °C)-103 °F (39.4 °C)] 98.8 °F (37.1 °C)  Pulse:  [66-90] 71  Resp:  [16-18] 16  SpO2:  [95 %-98 %] 96 %  BP: (116-136)/(55-72) 136/72     Weight: 97 kg (213 lb 13.5 oz)  Body mass index is 36.71 kg/m².    Estimated Creatinine Clearance: 43 mL/min (A) (based on SCr of 1.8 mg/dL (H)).     Physical Exam  Vitals reviewed.   Constitutional:       General: He is not in acute distress.     Appearance: Normal appearance.    HENT:      Head: Normocephalic and atraumatic.      Right Ear: External ear normal.      Left Ear: External ear normal.      Nose: Nose normal.      Mouth/Throat:      Mouth: Mucous membranes are moist.   Eyes:      Extraocular Movements: Extraocular movements intact.   Cardiovascular:      Rate and Rhythm: Normal rate and regular rhythm.   Pulmonary:      Effort: Pulmonary effort is normal. No respiratory distress.      Breath sounds: Normal breath sounds.   Abdominal:      General: Bowel sounds are normal. There is no distension.      Palpations: Abdomen is soft.      Tenderness: There is no abdominal tenderness.      Comments: S/p kidney transplant, abdominal incision not tender or erythematous   Musculoskeletal:         General: No tenderness. Normal range of motion.      Cervical back: Normal range of motion.      Right lower leg: No edema.      Left lower leg: No edema.   Skin:     General: Skin is warm and dry.   Neurological:      General: No focal deficit present.      Mental Status: He is alert and oriented to person, place, and time.   Psychiatric:         Mood and Affect: Mood normal.         Behavior: Behavior normal.          Significant Labs: All pertinent labs within the past 24 hours have been reviewed.    Significant Imaging: I have reviewed all pertinent imaging results/findings within the past 24 hours.

## 2024-08-21 NOTE — ED NOTES
Telemetry Verification   Patient placed on Telemetry Box  Verified with War Room  Box # [id number]   Monitor Tech SARIKA   Rate 81   Rhythm ARTIFACT

## 2024-08-21 NOTE — ASSESSMENT & PLAN NOTE
- Creatinine elevated above baseline  - IVF hydration  - Obtain Kidney US  - Monitor with daily labs

## 2024-08-21 NOTE — ASSESSMENT & PLAN NOTE
- Infectious work up in process (CMV PCR pending, blood cx, UA/cx, RVP)  - IV antibiotics started  - ID consult  - IVF for hydration  - Kidney US w/ subincisional fluid collection measuring 9.4 x 7.7 x 2.6 cm. No plans to intervene at this moment. Will obtain CT scan.  - Continue to monitor

## 2024-08-21 NOTE — HOSPITAL COURSE
Patient admitted with fevers. Infectious work-up initiated. Started on Vanc/ cefepime. UA with few bacteria. Cultures pending. Kidney US with subincisional fluid collection measuring 9.4 x 7.7 x 2.6 cm. Reviewed with surgeon. No plans to intervene at this time. Will await cultures. Patient with hx of fever of unknown origin. Will plan for CT scan. Cont IVF for now. Patient denies abdominal pains, N/V/D. VSS. Cont to monitor.

## 2024-08-21 NOTE — ASSESSMENT & PLAN NOTE
Patient with recent kidney transplant presenting with recurring fevers with tmax of 103 F. Also has associate myalgias, activity change. Flu/RIP negative. U/a concerning for infection. US kidney showed some fluid collection near incision site. Of note, patient had similar collection drained in July and it was found to be non infectious.     - continue cefepime  - f/u blood and urine cultures  - f/u CT Abd/Pelvis to further assess fluid collection

## 2024-08-21 NOTE — HPI
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 Post op:  PCN G  x 3 +RPR donor culture  DGF: now resolved with baseline Cr ~ 1.3  Urine leak with perinephric collection managed medically; s/p drain, cramer, and neph tube. Cramer removed 4/5/24, JEAN-PIERRE drain removed 4/12/24, and PD catheter removed on 5/28/24.  PD cath removed 5/28/24     He now presents to the ED with chief complaint of fever x ~5 days. He reports he presented to an urgent care where he was tested for COVID, flu which were negative but had a fever of 102 and was recommended to present to the ED. In ED Tmax 103. He reports some urinary frequency along with cloudy urine and decreased UOP. He also endorses fatigue and leg cramps in both legs. He denies abdominal pain, diarrhea, nausea, vomiting. Lab work in ED remarkable for elevated creatinine. Plan for infectious work up, Kidney US, IV antibiotics, and infectious disease consult

## 2024-08-22 VITALS
TEMPERATURE: 98 F | DIASTOLIC BLOOD PRESSURE: 64 MMHG | HEART RATE: 62 BPM | BODY MASS INDEX: 37.23 KG/M2 | WEIGHT: 218.06 LBS | OXYGEN SATURATION: 97 % | HEIGHT: 64 IN | RESPIRATION RATE: 18 BRPM | SYSTOLIC BLOOD PRESSURE: 135 MMHG

## 2024-08-22 LAB
ALBUMIN SERPL BCP-MCNC: 2.5 G/DL (ref 3.5–5.2)
ANION GAP SERPL CALC-SCNC: 7 MMOL/L (ref 8–16)
BACTERIA UR CULT: NO GROWTH
BASOPHILS NFR BLD: 0 % (ref 0–1.9)
BUN SERPL-MCNC: 34 MG/DL (ref 8–23)
CALCIUM SERPL-MCNC: 9.7 MG/DL (ref 8.7–10.5)
CHLORIDE SERPL-SCNC: 110 MMOL/L (ref 95–110)
CO2 SERPL-SCNC: 21 MMOL/L (ref 23–29)
CREAT SERPL-MCNC: 1.4 MG/DL (ref 0.5–1.4)
DIFFERENTIAL METHOD BLD: ABNORMAL
EOSINOPHIL NFR BLD: 2 % (ref 0–8)
ERYTHROCYTE [DISTWIDTH] IN BLOOD BY AUTOMATED COUNT: 14.2 % (ref 11.5–14.5)
EST. GFR  (NO RACE VARIABLE): 55.8 ML/MIN/1.73 M^2
GLUCOSE BLOOD: 161
GLUCOSE BLOOD: 176
GLUCOSE SERPL-MCNC: 90 MG/DL (ref 70–110)
HCT VFR BLD AUTO: 31.6 % (ref 40–54)
HGB BLD-MCNC: 9.9 G/DL (ref 14–18)
IMM GRANULOCYTES # BLD AUTO: ABNORMAL K/UL (ref 0–0.04)
IMM GRANULOCYTES NFR BLD AUTO: ABNORMAL % (ref 0–0.5)
LYMPHOCYTES NFR BLD: 1 % (ref 18–48)
MAGNESIUM SERPL-MCNC: 1.9 MG/DL (ref 1.6–2.6)
MCH RBC QN AUTO: 27.7 PG (ref 27–31)
MCHC RBC AUTO-ENTMCNC: 31.3 G/DL (ref 32–36)
MCV RBC AUTO: 88 FL (ref 82–98)
MONOCYTES NFR BLD: 4 % (ref 4–15)
NEUTROPHILS NFR BLD: 85 % (ref 38–73)
NEUTS BAND NFR BLD MANUAL: 8 %
NRBC BLD-RTO: 0 /100 WBC
PHOSPHATE SERPL-MCNC: 1.9 MG/DL (ref 2.7–4.5)
PLATELET # BLD AUTO: 178 K/UL (ref 150–450)
PLATELET BLD QL SMEAR: ABNORMAL
PMV BLD AUTO: 11.2 FL (ref 9.2–12.9)
POTASSIUM SERPL-SCNC: 4.1 MMOL/L (ref 3.5–5.1)
RBC # BLD AUTO: 3.58 M/UL (ref 4.6–6.2)
SODIUM SERPL-SCNC: 138 MMOL/L (ref 136–145)
TACROLIMUS BLD-MCNC: 7.8 NG/ML (ref 5–15)
WBC # BLD AUTO: 4.61 K/UL (ref 3.9–12.7)

## 2024-08-22 PROCEDURE — 25000003 PHARM REV CODE 250: Performed by: PHYSICIAN ASSISTANT

## 2024-08-22 PROCEDURE — 85007 BL SMEAR W/DIFF WBC COUNT: CPT | Performed by: PHYSICIAN ASSISTANT

## 2024-08-22 PROCEDURE — 36415 COLL VENOUS BLD VENIPUNCTURE: CPT | Performed by: PHYSICIAN ASSISTANT

## 2024-08-22 PROCEDURE — 83735 ASSAY OF MAGNESIUM: CPT | Performed by: PHYSICIAN ASSISTANT

## 2024-08-22 PROCEDURE — 85027 COMPLETE CBC AUTOMATED: CPT | Performed by: PHYSICIAN ASSISTANT

## 2024-08-22 PROCEDURE — 63600175 PHARM REV CODE 636 W HCPCS: Performed by: PHYSICIAN ASSISTANT

## 2024-08-22 PROCEDURE — 99232 SBSQ HOSP IP/OBS MODERATE 35: CPT | Mod: ,,, | Performed by: NURSE PRACTITIONER

## 2024-08-22 PROCEDURE — 99239 HOSP IP/OBS DSCHRG MGMT >30: CPT | Mod: ,,, | Performed by: PHYSICIAN ASSISTANT

## 2024-08-22 PROCEDURE — 80069 RENAL FUNCTION PANEL: CPT | Performed by: PHYSICIAN ASSISTANT

## 2024-08-22 PROCEDURE — 94761 N-INVAS EAR/PLS OXIMETRY MLT: CPT

## 2024-08-22 PROCEDURE — 80197 ASSAY OF TACROLIMUS: CPT | Performed by: PHYSICIAN ASSISTANT

## 2024-08-22 RX ORDER — SODIUM BICARBONATE 650 MG/1
1300 TABLET ORAL 2 TIMES DAILY
Status: DISCONTINUED | OUTPATIENT
Start: 2024-08-22 | End: 2024-08-22 | Stop reason: HOSPADM

## 2024-08-22 RX ORDER — AMOXICILLIN AND CLAVULANATE POTASSIUM 875; 125 MG/1; MG/1
1 TABLET, FILM COATED ORAL 2 TIMES DAILY
Qty: 14 TABLET | Refills: 0 | Status: SHIPPED | OUTPATIENT
Start: 2024-08-22 | End: 2024-08-29

## 2024-08-22 RX ORDER — SODIUM BICARBONATE 650 MG/1
1300 TABLET ORAL 2 TIMES DAILY
Qty: 120 TABLET | Refills: 11 | Status: SHIPPED | OUTPATIENT
Start: 2024-08-22 | End: 2025-08-22

## 2024-08-22 RX ORDER — TACROLIMUS 1 MG/1
2 CAPSULE ORAL EVERY 12 HOURS
Qty: 120 CAPSULE | Refills: 11 | Status: SHIPPED | OUTPATIENT
Start: 2024-08-22 | End: 2025-08-22

## 2024-08-22 RX ADMIN — PREDNISONE 5 MG: 5 TABLET ORAL at 08:08

## 2024-08-22 RX ADMIN — ATOVAQUONE 1500 MG: 750 SUSPENSION ORAL at 08:08

## 2024-08-22 RX ADMIN — INSULIN ASPART 6 UNITS: 100 INJECTION, SOLUTION INTRAVENOUS; SUBCUTANEOUS at 02:08

## 2024-08-22 RX ADMIN — SODIUM PHOSPHATE, MONOBASIC, MONOHYDRATE AND SODIUM PHOSPHATE, DIBASIC, ANHYDROUS 30 MMOL: 142; 276 INJECTION, SOLUTION INTRAVENOUS at 10:08

## 2024-08-22 RX ADMIN — ASPIRIN 81 MG: 81 TABLET, COATED ORAL at 08:08

## 2024-08-22 RX ADMIN — VALGANCICLOVIR HYDROCHLORIDE 450 MG: 450 TABLET ORAL at 08:08

## 2024-08-22 RX ADMIN — CARVEDILOL 25 MG: 12.5 TABLET, FILM COATED ORAL at 08:08

## 2024-08-22 RX ADMIN — AMLODIPINE BESYLATE 10 MG: 10 TABLET ORAL at 08:08

## 2024-08-22 RX ADMIN — HEPARIN SODIUM 5000 UNITS: 5000 INJECTION INTRAVENOUS; SUBCUTANEOUS at 02:08

## 2024-08-22 RX ADMIN — HEPARIN SODIUM 5000 UNITS: 5000 INJECTION INTRAVENOUS; SUBCUTANEOUS at 06:08

## 2024-08-22 RX ADMIN — CEFEPIME 1 G: 1 INJECTION, POWDER, FOR SOLUTION INTRAMUSCULAR; INTRAVENOUS at 02:08

## 2024-08-22 RX ADMIN — INSULIN ASPART 6 UNITS: 100 INJECTION, SOLUTION INTRAVENOUS; SUBCUTANEOUS at 04:08

## 2024-08-22 RX ADMIN — INSULIN ASPART 2 UNITS: 100 INJECTION, SOLUTION INTRAVENOUS; SUBCUTANEOUS at 08:08

## 2024-08-22 RX ADMIN — CEFEPIME 1 G: 1 INJECTION, POWDER, FOR SOLUTION INTRAMUSCULAR; INTRAVENOUS at 03:08

## 2024-08-22 RX ADMIN — TACROLIMUS 2 MG: 1 CAPSULE ORAL at 08:08

## 2024-08-22 RX ADMIN — DOCUSATE SODIUM 50 MG: 50 CAPSULE, LIQUID FILLED ORAL at 08:08

## 2024-08-22 RX ADMIN — DIBASIC SODIUM PHOSPHATE, MONOBASIC POTASSIUM PHOSPHATE AND MONOBASIC SODIUM PHOSPHATE 2 TABLET: 852; 155; 130 TABLET ORAL at 10:08

## 2024-08-22 RX ADMIN — LEVOTHYROXINE SODIUM 50 MCG: 50 TABLET ORAL at 06:08

## 2024-08-22 RX ADMIN — SODIUM BICARBONATE 650 MG TABLET 650 MG: at 08:08

## 2024-08-22 RX ADMIN — INSULIN ASPART 4 UNITS: 100 INJECTION, SOLUTION INTRAVENOUS; SUBCUTANEOUS at 02:08

## 2024-08-22 NOTE — PROGRESS NOTES
Discharge Medication Note:    Hospital Course:  Patient admitted for 5 day fever. No source found, ID consulted and patient to complete 7 day course of augmentin outpatient.    Met with Mark Lopez at discharge to review discharge medications and to update the blue medication card.           Medication List        START taking these medications      amoxicillin-clavulanate 875-125mg 875-125 mg per tablet  Commonly known as: AUGMENTIN  Take 1 tablet by mouth 2 (two) times daily for 7 days. Stop 8/29/24            CHANGE how you take these medications      k phos di & mono-sod phos mono 250 mg Tab  Commonly known as: K-Phos-NeutraL  Take 2 tablets by mouth every 12 (twelve) hours.  What changed: how much to take     sodium bicarbonate 650 MG tablet  Take 2 tablets (1,300 mg total) by mouth 2 (two) times daily.  What changed: how much to take     tacrolimus 1 MG Cap  Commonly known as: PROGRAF  Take 2 capsules (2 mg total) by mouth every 12 (twelve) hours. Z94.0;Kiney txp on 2/29/2024  What changed: how much to take            CONTINUE taking these medications      acetaminophen 650 MG Tbsr  Commonly known as: TYLENOL  Take 1 tablet (650 mg total) by mouth every 8 (eight) hours as needed.     amLODIPine 5 MG tablet  Commonly known as: NORVASC  Take 2 tablets (10 mg total) by mouth once daily.     aspirin 81 MG EC tablet  Commonly known as: ECOTRIN  Take 1 tablet (81 mg total) by mouth once daily.     atovaquone 750 mg/5 mL Susp oral liquid  Commonly known as: MEPRON  Take 10 mLs (1,500 mg total) by mouth once daily. Stop 8/27/24     carvediloL 12.5 MG tablet  Commonly known as: COREG  Take 2 tablets (25 mg total) by mouth 2 (two) times daily.     cetirizine 10 MG tablet  Commonly known as: ZYRTEC  Take 1 tablet (10 mg total) by mouth once daily.     DEXCOM G6 SENSOR Neelima  Generic drug: blood-glucose sensor     HumaLOG U-100 Insulin 100 unit/mL injection  Generic drug: insulin lispro     latanoprost 0.005 %  ophthalmic solution  Place 1 drop into the right eye every other day. At night     levothyroxine 50 MCG tablet  Commonly known as: SYNTHROID  Take 1 tablet (50 mcg total) by mouth before breakfast.     ONETOUCH DELICA PLUS LANCET 30 gauge Misc  Generic drug: lancets     predniSONE 5 MG tablet  Commonly known as: DELTASONE  Take 1 tablet (5 mg total) by mouth once daily.     subcutaneous insulin pump Misc  Inject 1 Units/hr into the skin continuous. Insulin pump name - Tandum  Basal dose is 2 units/hr     T:SLIM X2 Crtg  Generic drug: insulin pump cartridge     valGANciclovir 450 mg Tab  Commonly known as: VALCYTE  Take 1 tablet (450 mg total) by mouth once daily. Stop 8/27/24            STOP taking these medications      mycophenolate 250 mg Cap  Commonly known as: CELLCEPT               Where to Get Your Medications        These medications were sent to [facility]  [address] [place] [id number]      Hours: Always Open Phone: [phone]   amoxicillin-clavulanate 875-125mg 875-125 mg per tablet  k phos di & mono-sod phos mono 250 mg Tab  sodium bicarbonate 650 MG tablet  tacrolimus 1 MG Cap            The following medications have been placed on HOLD and should be restarted in the outpatient setting (when appropriate): MMF    Mark Lopez verbalized understanding and had the opportunity to ask questions.

## 2024-08-22 NOTE — PLAN OF CARE
Pt has call bell in reach, non slip socks on, and bedrails up x2. Pt encouraged to wash hands. He has his own insulin pump and a dexcom. I am recording the insulin doses and accucheck readings on the insulin form in the room and posting the glucose readings in the lab results section of his epic chart. No growth to date on any cultures. He has tele monitoring. Endocrine and ID are following patient. He is now on cefepime ivpb.

## 2024-08-22 NOTE — ASSESSMENT & PLAN NOTE
Endocrinology consulted for BG management.   BG goal 140-180    Excursions due to dextrose containing antibiotics, recommend patient bolus 2 units at the start of IV antibiotics containing dextrose to prevent BG excursions.   - Continue Home Insulin Pump  - BG checks /HS/0200  - Hypoglycemia protocol in place    ** Please notify Endocrine for any change and/or advance in diet**  ** Please call Endocrine for any BG related issues **    Discharge Planning:   TBD. Please notify endocrinology prior to discharge.

## 2024-08-22 NOTE — NURSING
I brought patient to the hospital entrance for his ride home. I helped carry all his belongings. IV has been removed. Telemetry has been removed and brought to . I have given patient his AVS discharge papers.

## 2024-08-22 NOTE — SUBJECTIVE & OBJECTIVE
"Interval HPI:   Overnight events: No acute events overnight. Patient in room 48453/93055 A. Blood glucose stable. BG at and above goal on current insulin regimen (SSI, prandial, and basal insulin ). Steroid use- Prednisone 5 mg.    Renal function- Normal   Vasopressors-  None       Endocrine will continue to follow and manage insulin orders inpatient.         Diet diabetic  Calorie     Eatin%  Nausea: No  Hypoglycemia and intervention: No  Fever: No  TPN and/or TF: No      /61 (BP Location: Left arm, Patient Position: Sitting)   Pulse 78   Temp 98.9 °F (37.2 °C) (Oral)   Resp 16   Ht 5' 4" (1.626 m)   Wt 98.9 kg (218 lb 0.6 oz)   SpO2 96%   BMI 37.43 kg/m²     Labs Reviewed and Include    Recent Labs   Lab 24  0719   GLU 90   CALCIUM 9.7   ALBUMIN 2.5*      K 4.1   CO2 21*      BUN 34*   CREATININE 1.4     Lab Results   Component Value Date    WBC 4.61 2024    HGB 9.9 (L) 2024    HCT 31.6 (L) 2024    MCV 88 2024     2024     No results for input(s): "TSH", "FREET4" in the last 168 hours.  Lab Results   Component Value Date    HGBA1C 5.4 2024       Nutritional status:   Body mass index is 37.43 kg/m².  Lab Results   Component Value Date    ALBUMIN 2.5 (L) 2024    ALBUMIN 2.8 (L) 2024    ALBUMIN 3.1 (L) 2024     No results found for: "PREALBUMIN"    Estimated Creatinine Clearance: 55.9 mL/min (based on SCr of 1.4 mg/dL).    Accu-Checks  Recent Labs     24  0613   POCTGLUCOSE 161*       Current Medications and/or Treatments Impacting Glycemic Control  Immunotherapy:    Immunosuppressants           Stop Route Frequency     tacrolimus capsule 2 mg         -- Oral 2 times daily          Steroids:   Hormones (From admission, onward)      Start     Stop Route Frequency Ordered    24 0900  predniSONE tablet 5 mg         -- Oral Daily 24 0316          Pressors:    Autonomic Drugs (From admission, onward) "      None          Hyperglycemia/Diabetes Medications:   Antihyperglycemics (From admission, onward)      Start     Stop Route Frequency Ordered    08/21/24 1315  insulin aspart U-100 insulin pump from home        Question Answer Comment   Target number 110    Basal Rate #1 1.2    Basal rate #1 time 00:00-24:00        -- SubQ Continuous 08/21/24 1205    08/21/24 1304  insulin aspart U-100 insulin pump from home 0-15 Units        Question Answer Comment   Target number 110    Carbohydrate coverage #1 I:5    Carbohydrate coverage #1 time 00:00-24:00    Sensitivity #1 1:25    Sensitivity #1 time 00:00-24:00        -- SubQ As needed (PRN) 08/21/24 1205

## 2024-08-22 NOTE — PROGRESS NOTES
[facility] - Transplant Stepdown  Infectious Disease  Progress Note    Patient Name: Mark Lopez  MRN: [id number]  Admission Date: 8/21/2024  Length of Stay: 1 days  Attending Physician: Hope Alexander DO  Primary Care Provider: Loren Mensah MD    Isolation Status: Enhanced Respiratory, Enhanced Respiratory  Assessment/Plan:      Other  * Fever  Patient with recent kidney transplant presenting with recurring fevers with tmax of 103 F. Also has associate myalgias, activity change. Flu/RIP negative. U/a concerning for infection. US kidney showed some fluid collection near incision site. Of note, patient had similar collection drained in July and it was found to be non infectious.     - discontinue cefepime, can discharge with augmentin 7 day course  - cmv undetectable, bk in process  - blood ngtd and urine culture negative  - CT Abd/Pelvis to further assess fluid collection, fluid collection smaller than previous. Unlikely infectious source.           Thank you for your consult. I will sign off. Please contact us if you have any additional questions.    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 Patient reports intermittent fevers started on 8/16/24 with a temp of 100.5 F. He reports over the next two days his temperature stayed in the low 100s until 8/19/24 when he presented to [facility] [place] [facility] and had a temperature of 103 F. He was advised to go to the ED but went home because his temperature went down to 98.1 then 97.1 (baseline 97 F). Patient reports he proceeded to go to dinner in [place] and after dinner had a temperature of 102.4 at 11pm (8/20/24) so he came to the [facility] ED. Patient also reports fatigue, myalgias, and decreased urine output. Pt denies n/v/d, abd pain,  cp, sob, or any other complaints.    Patient received vanc, cefepime, atovaquone, valcyte in ED. U/a and urine micro concerning for infection. Pro miranda elevated. Blood/urine cultures in process. CXR showed previously noted changes. US kidney new resistive indices and small fluid collection. Of note, in July patient had fluid collection which was drained and non infectious.    Patient reports no sick contacts. Only drug reactions to allopurinol and statins, no reactions to antibiotics. No exposures through hobbies. No TB/HIV concerns.     ID was consulted for fever in recent transplant patient.       Interval history: NAEON, afebrile. Urinated 1.1L and had bm. Still fatigued but no fevers. CT showed subincisional fluid collection is smaller than a few months ago. On cefepime.       Review of Systems   Constitutional:  Positive for activity change and fatigue. Negative for chills.   HENT:  Negative for congestion and sore throat.    Eyes:  Negative for visual disturbance.   Respiratory:  Negative for cough and shortness of breath.    Cardiovascular:  Negative for chest pain, palpitations and leg swelling.   Gastrointestinal:  Negative for abdominal pain, diarrhea, nausea and vomiting.   Genitourinary:  Positive for difficulty urinating. Negative for dysuria.   Musculoskeletal:  Positive for myalgias.   Skin:  Negative for rash.   Neurological:  Negative for weakness and headaches.   Psychiatric/Behavioral:  Negative for confusion.      Objective:     Vital Signs (Most Recent):  Temp: 98.3 °F (36.8 °C) (08/22/24 1153)  Pulse: 72 (08/22/24 1340)  Resp: 20 (08/22/24 1153)  BP: 134/62 (08/22/24 1153)  SpO2: 96 % (08/22/24 1153) Vital Signs (24h Range):  Temp:  [97.8 °F (36.6 °C)-99.6 °F (37.6 °C)] 98.3 °F (36.8 °C)  Pulse:  [61-84] 72  Resp:  [16-20] 20  SpO2:  [94 %-98 %] 96 %  BP: (132-141)/(61-63) 134/62     Weight: 98.9 kg (218 lb 0.6 oz)  Body mass index is 37.43 kg/m².    Estimated Creatinine Clearance: 55.9 mL/min  (based on SCr of 1.4 mg/dL).     Physical Exam  Vitals reviewed.   Constitutional:       General: He is not in acute distress.     Appearance: Normal appearance.   HENT:      Head: Normocephalic and atraumatic.      Right Ear: External ear normal.      Left Ear: External ear normal.      Nose: Nose normal.      Mouth/Throat:      Mouth: Mucous membranes are moist.   Eyes:      Extraocular Movements: Extraocular movements intact.   Cardiovascular:      Rate and Rhythm: Normal rate and regular rhythm.   Pulmonary:      Effort: Pulmonary effort is normal. No respiratory distress.      Breath sounds: Normal breath sounds.   Abdominal:      General: Bowel sounds are normal. There is no distension.      Palpations: Abdomen is soft.      Tenderness: There is no abdominal tenderness.      Comments: S/p kidney transplant, abdominal incision not tender or erythematous   Musculoskeletal:         General: No tenderness. Normal range of motion.      Cervical back: Normal range of motion.      Right lower leg: No edema.      Left lower leg: No edema.   Skin:     General: Skin is warm and dry.   Neurological:      General: No focal deficit present.      Mental Status: He is alert and oriented to person, place, and time.   Psychiatric:         Mood and Affect: Mood normal.         Behavior: Behavior normal.          Significant Labs: All pertinent labs within the past 24 hours have been reviewed.    Significant Imaging: I have reviewed all pertinent imaging results/findings within the past 24 hours.

## 2024-08-22 NOTE — ASSESSMENT & PLAN NOTE
Managed per primary team  Avoid hypoglycemia  Titrate insulin slowly to avoid hypoglycemia as the risk of hypoglycemia increases with decreased creatinine clearance.  Estimated Creatinine Clearance: 55.9 mL/min (based on SCr of 1.4 mg/dL).

## 2024-08-22 NOTE — SUBJECTIVE & OBJECTIVE
Interval history: NAEON, afebrile. Urinated 1.1L and had bm. Still fatigued but no fevers. CT showed subincisional fluid collection is smaller than a few months ago. On cefepime.       Review of Systems   Constitutional:  Positive for activity change and fatigue. Negative for chills.   HENT:  Negative for congestion and sore throat.    Eyes:  Negative for visual disturbance.   Respiratory:  Negative for cough and shortness of breath.    Cardiovascular:  Negative for chest pain, palpitations and leg swelling.   Gastrointestinal:  Negative for abdominal pain, diarrhea, nausea and vomiting.   Genitourinary:  Positive for difficulty urinating. Negative for dysuria.   Musculoskeletal:  Positive for myalgias.   Skin:  Negative for rash.   Neurological:  Negative for weakness and headaches.   Psychiatric/Behavioral:  Negative for confusion.      Objective:     Vital Signs (Most Recent):  Temp: 98.3 °F (36.8 °C) (08/22/24 1153)  Pulse: 72 (08/22/24 1340)  Resp: 20 (08/22/24 1153)  BP: 134/62 (08/22/24 1153)  SpO2: 96 % (08/22/24 1153) Vital Signs (24h Range):  Temp:  [97.8 °F (36.6 °C)-99.6 °F (37.6 °C)] 98.3 °F (36.8 °C)  Pulse:  [61-84] 72  Resp:  [16-20] 20  SpO2:  [94 %-98 %] 96 %  BP: (132-141)/(61-63) 134/62     Weight: 98.9 kg (218 lb 0.6 oz)  Body mass index is 37.43 kg/m².    Estimated Creatinine Clearance: 55.9 mL/min (based on SCr of 1.4 mg/dL).     Physical Exam  Vitals reviewed.   Constitutional:       General: He is not in acute distress.     Appearance: Normal appearance.   HENT:      Head: Normocephalic and atraumatic.      Right Ear: External ear normal.      Left Ear: External ear normal.      Nose: Nose normal.      Mouth/Throat:      Mouth: Mucous membranes are moist.   Eyes:      Extraocular Movements: Extraocular movements intact.   Cardiovascular:      Rate and Rhythm: Normal rate and regular rhythm.   Pulmonary:      Effort: Pulmonary effort is normal. No respiratory distress.      Breath sounds:  Normal breath sounds.   Abdominal:      General: Bowel sounds are normal. There is no distension.      Palpations: Abdomen is soft.      Tenderness: There is no abdominal tenderness.      Comments: S/p kidney transplant, abdominal incision not tender or erythematous   Musculoskeletal:         General: No tenderness. Normal range of motion.      Cervical back: Normal range of motion.      Right lower leg: No edema.      Left lower leg: No edema.   Skin:     General: Skin is warm and dry.   Neurological:      General: No focal deficit present.      Mental Status: He is alert and oriented to person, place, and time.   Psychiatric:         Mood and Affect: Mood normal.         Behavior: Behavior normal.          Significant Labs: All pertinent labs within the past 24 hours have been reviewed.    Significant Imaging: I have reviewed all pertinent imaging results/findings within the past 24 hours.

## 2024-08-22 NOTE — PLAN OF CARE
Pt AAOx4. Afebrile.VSS. No report of a fall this shift. No report of pain this shift. UO 1150 ml for this shift. Bed in the lowest setting, call light within reach.

## 2024-08-22 NOTE — ASSESSMENT & PLAN NOTE
Patient with recent kidney transplant presenting with recurring fevers with tmax of 103 F. Also has associate myalgias, activity change. Flu/RIP negative. U/a concerning for infection. US kidney showed some fluid collection near incision site. Of note, patient had similar collection drained in July and it was found to be non infectious.     - discontinue cefepime, can discharge with augmentin 7 day course  - cmv undetectable, bk in process  - blood ngtd and urine culture negative  - CT Abd/Pelvis to further assess fluid collection, fluid collection smaller than previous. Unlikely infectious source.

## 2024-08-22 NOTE — PROGRESS NOTES
Transplant Social Work Admit/Discharge Note     Met with patient to assess needs. Patient is a 64 y.o. single male, admitted for:    Acidosis [E87.20]  Status post -donor kidney transplantation [Z94.0]  Fever [R50.9]  Prophylactic immunotherapy [Z29.89]  Long-term use of immunosuppressant medication [Z79.60]  Essential hypertension [I10]  Fever, unspecified fever cause [R50.9]     Patient admitted from home on 2024 .  At this time, patient presents as alert and oriented x 4, pleasant, good eye contact, well groomed, recall good, concentration/judgement good, average intelligence, calm, communicative, cooperative, and asking and answering questions appropriately.  At this time, patients caregiver presents as  not present at this time .    Household/Family Systems     Patient is a  who works and resides at [employer]: [address] [place] [address]. 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 Patient reports Father Navjot still assists him with medications and rides.  Patient does not have dependents that are need of being cared for.     Patients primary caregiver is self and Father Julien, patients friend, phone number [phone].  Confirmed patients contact information is [phone] (home);   Telephone Information:   Mobile [phone]   .    During admission, patient's caregiver plans to stay in patient's room.  Confirmed patient and patients caregivers do have access to reliable transportation.    Cognitive Status/Learning     Patient reports reading ability as college and states patient reports he has had trouble reading very small print. Patient reports patient learns best by written information.   Needed: No.   Highest education level: Post-College Graduate Degree    Vocation/Disability   .  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    Adherence     Patient reports a high level of adherence to patients health care regimen.  Adherence counseling and education provided. Patient verbalizes understanding.    Substance Use    Patient reports the following substance usage.    Tobacco: none, patient denies any use.  Alcohol: none, patient denies any use.  Illicit Drugs/Non-prescribed Medications: none, patient denies any use.  Patient states clear understanding of the potential impact of substance use.  Substance abstinence/cessation counseling, education and resources provided and reviewed.     Services Utilizing/ADLS    Infusion Service: Prior to admission, patient utilizing? no  Home Health: Prior to admission, patient utilizing? no  DME: Prior to admission, no  Pulmonary/Cardiac Rehab: Prior to admission, no  Dialysis:  Prior to admission, no  Transplant Specialty Pharmacy:  Prior to admission, yes; [facility].    Prior to admission, patient reports patient was independent with ADLS and was driving.  Patient reports patient is able to care for self at this time..  Patient indicates a willingness to care for self once medically cleared to do so.    Insurance/Medications    Insured by   Payer/Plan Subscr  Sex Relation Sub. Ins. ID Effective Group Num   1. MEDICARE - [place]* KRISTIE MAURER 1960 Male Self [id number]                                    [address]   2. MEDICARE - [place]* KRISTIE MAURER 1960 Male Self [id number]                                    [address]      Primary Insurance (for UNOS reporting): Public Insurance - Medicare FFS (Fee For Service)  Secondary Insurance (for UNOS reporting): None    Patient reports patient is able to obtain and afford medications at this time and at time of discharge.    Living Will/Healthcare Power of     Patient states patient has a LW and/or HCPA.   provided education regarding LW and HCPA and the completion of forms.    Coping/Mental  Health    Patient is coping adequately with the aid of  family members, friends, and bib.  Patient denies mental health difficulties.  Patient denied needing or wanting resources or referrals at this time. Patient agrees to contact transplant team with needs, questions, or concerns as they arise.     Discharge Planning    At time of discharge, patient plans to return to patient's home under the care of self and other members of [facility] [place].  Patients friend will transport patient.  Per rounds today, expected discharge date is today 8/22/24 . Patient and patients caregiver  verbalize understanding and are involved in treatment planning and discharge process.    Additional Concerns     providing ongoing psychosocial support, education, resources and d/c planning as needed.  SW remains available.  remains available. Patient denies additional needs and/or concerns at this time. Patient verbalizes understanding and agreement with information reviewed, social work availability, and how to access available resources as needed.

## 2024-08-22 NOTE — PROGRESS NOTES
"[facility] - [facility]plant Step[facility]  Endocrinology  Progress Note    Admit Date: 2024     Reason for Consult: Management of T2DM, Hyperglycemia      Surgical Procedure and Date: kidney transplant 2024     Diabetes diagnosis year:  per chart review      Home Diabetes Medications:  Tandem insulin pump   ICR 1:5   ISF 1:25  Basal rate 1.2 u/hr + control IQ      How often checking glucose at home? >4 x day   BG readings on regimen: 180s-200s, occasional 300s  Hypoglycemia on the regimen?  Yes, once a week overnight, 2-3x during the week during the day   Missed doses on regimen?  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 Presented to the ED with chief complaint of fever x ~5 days. Endocrine consulted to manage hyperglycemia and type 2 diabetes in the context of infection. Patient utilized a home insulin pump to manage diabetes.     Lab Results   Component Value Date    HGBA1C 5.4 2024         Interval HPI:   Overnight events: No acute events overnight. Patient in room 24204/80596 A. Blood glucose stable. BG at and above goal on current insulin regimen (SSI, prandial, and basal insulin ). Steroid use- Prednisone 5 mg.    Renal function- Normal   Vasopressors-  None       Endocrine will continue to follow and manage insulin orders inpatient.         Diet diabetic  Calorie     Eatin%  Nausea: No  Hypoglycemia and intervention: No  Fever: No  TPN and/or TF: No      /61 (BP Location: Left arm, Patient Position: Sitting)   Pulse 78   Temp 98.9 °F (37.2 °C) (Oral)   Resp 16   Ht 5' 4" (1.626 m)   Wt 98.9 kg (218 lb 0.6 oz)   SpO2 96%   BMI 37.43 kg/m²     Labs " "Reviewed and Include    Recent Labs   Lab 08/22/24  0719   GLU 90   CALCIUM 9.7   ALBUMIN 2.5*      K 4.1   CO2 21*      BUN 34*   CREATININE 1.4     Lab Results   Component Value Date    WBC 4.61 08/22/2024    HGB 9.9 (L) 08/22/2024    HCT 31.6 (L) 08/22/2024    MCV 88 08/22/2024     08/22/2024     No results for input(s): "TSH", "FREET4" in the last 168 hours.  Lab Results   Component Value Date    HGBA1C 5.4 07/24/2024       Nutritional status:   Body mass index is 37.43 kg/m².  Lab Results   Component Value Date    ALBUMIN 2.5 (L) 08/22/2024    ALBUMIN 2.8 (L) 08/21/2024    ALBUMIN 3.1 (L) 08/21/2024     No results found for: "PREALBUMIN"    Estimated Creatinine Clearance: 55.9 mL/min (based on SCr of 1.4 mg/dL).    Accu-Checks  Recent Labs     08/21/24  0613   POCTGLUCOSE 161*       Current Medications and/or Treatments Impacting Glycemic Control  Immunotherapy:    Immunosuppressants           Stop Route Frequency     tacrolimus capsule 2 mg         -- Oral 2 times daily          Steroids:   Hormones (From admission, onward)      Start     Stop Route Frequency Ordered    08/21/24 0900  predniSONE tablet 5 mg         -- Oral Daily 08/21/24 0316          Pressors:    Autonomic Drugs (From admission, onward)      None          Hyperglycemia/Diabetes Medications:   Antihyperglycemics (From admission, onward)      Start     Stop Route Frequency Ordered    08/21/24 1315  insulin aspart U-100 insulin pump from home        Question Answer Comment   Target number 110    Basal Rate #1 1.2    Basal rate #1 time 00:00-24:00        -- SubQ Continuous 08/21/24 1205    08/21/24 1304  insulin aspart U-100 insulin pump from home 0-15 Units        Question Answer Comment   Target number 110    Carbohydrate coverage #1 I:5    Carbohydrate coverage #1 time 00:00-24:00    Sensitivity #1 1:25    Sensitivity #1 time 00:00-24:00        -- SubQ As needed (PRN) 08/21/24 1205            ASSESSMENT and " PLAN    Renal/  Status post -donor kidney transplantation  Managed per primary team  Avoid hypoglycemia  Titrate insulin slowly to avoid hypoglycemia as the risk of hypoglycemia increases with decreased creatinine clearance.  Estimated Creatinine Clearance: 55.9 mL/min (based on SCr of 1.4 mg/dL).        Endocrine  Insulin pump in place  At time of evaluation, pt meets criteria to continue home insulin pump usage.  - Has all adequate supplies   - Insulin pump site change on 2024.    - Bolus settings reviewed    - No changes to home regimen.   - Nurse to check BG qac/hs/0200 & record in epic   - Patient to input glucose into pump and use bolus wizard for prandial needs   - Will continue to monitor accuchecks and titrate insulin as clinically indicated .     - Discussed above plan with patient, patient verbalized understanding.   - Understands in case of pump malfunction or cognitive decline in which pt can no longer safely use insulin pump, will transition to SC MDI       If pump malfunctions or is disconnected, PLEASE CALL ENDOCRINE ON-CALL FOR ADDITIONAL ORDERS.      Type 2 diabetes mellitus with kidney complication, with long-term current use of insulin  Endocrinology consulted for BG management.   BG goal 140-180    Excursions due to dextrose containing antibiotics, recommend patient bolus 2 units at the start of IV antibiotics containing dextrose to prevent BG excursions.   - Continue Home Insulin Pump  - BG checks AC/HS/0200  - Hypoglycemia protocol in place    ** Please notify Endocrine for any change and/or advance in diet**  ** Please call Endocrine for any BG related issues **    Discharge Planning:   TBD. Please notify endocrinology prior to discharge.        Other  * Fever  Infection may elevate BG readings  On IV antibiotics  Managed per primary team  Avoid hypoglycemia             Gilbert Green, DNP, FNP  Endocrinology  [facility]

## 2024-08-22 NOTE — DISCHARGE SUMMARY
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 Post op:  PCN G  x 3 +RPR donor culture  DGF: now resolved with baseline Cr ~ 1.3  Urine leak with perinephric collection managed medically; s/p drain, cramer, and neph tube. Cramer removed 4/5/24, JEAN-PIERRE drain removed 4/12/24, and PD catheter removed on 5/28/24.  PD cath removed 5/28/24     He now presents to the ED with chief complaint of fever x ~5 days. He reports he presented to an urgent care where he was tested for COVID, flu which were negative but had a fever of 102 and was recommended to present to the ED. In ED Tmax 103. He reports some urinary frequency along with cloudy urine and decreased UOP. He also endorses fatigue and leg cramps in both legs. He denies abdominal pain, diarrhea, nausea, vomiting. Lab work in ED remarkable for elevated creatinine. Plan for infectious work up, Kidney US, IV antibiotics, and infectious disease consult  * No surgery found *     Hospital Course:    Patient admitted with fevers. Infectious work-up initiated. Started on Vanc/ cefepime. UA with few bacteria. Blood and urine cultures ngtd. Kidney US with subincisional fluid collection measuring 9.4 x 7.7 x 2.6 cm. CT scan obtained with small fluid collections, similar to prior.  Reviewed with surgeon. No plans to intervene at this time. Patient remains afebrile. Denies any N/V/D or abdominal pains. Plan to transition to PO Augmentin for 7 day course. Kidney function improved with IVF. Electrolytes replaced. Pt is stable and ready for  discharge. Encouraged to continue fluid intake. He has no complaints. He has met with PharmD and received education. Repeat labs next week. Pt expressed understanding of discharge instructions and importance of follow-up.      Length of Stay was longer than expected due to: Discharged as expected    Goals of Care Treatment Preferences:  Code Status: Full Code      Final Active Diagnoses:    Diagnosis Date Noted POA    PRINCIPAL PROBLEM:  Fever [R50.9] 2024 Yes    Acidosis [E87.20] 2024 Yes    Insulin pump in place [Z96.41] 03/10/2024 Not Applicable    Status post -donor kidney transplantation [Z94.0] 2024 Not Applicable    Prophylactic immunotherapy [Z29.89] 2024 Not Applicable    Long-term use of immunosuppressant medication [Z79.60] 2024 Not Applicable    At risk for opportunistic infections [Z91.89] 2024 Yes    Type 2 diabetes mellitus with kidney complication, with long-term current use of insulin [E11.29, Z79.4] 2021 Not Applicable    Essential hypertension [I10] 2021 Yes      Problems Resolved During this Admission:     Treatments: as above    Consults (From admission, onward)          Status Ordering Provider     Inpatient consult to Infectious Diseases  Once        Provider:  (Not yet assigned)    Completed JUANJAMI     Consult to Endocrinology  Once        Provider:  (Not yet assigned)    Completed JUANJAMI     Inpatient consult to Kidney Transplant Surgery  Once        Provider:  (Not yet assigned)    Acknowledged ROCKY BARRIENTOS            Pending Diagnostic Studies:       None          Significant Diagnostic Studies: Labs: CMP   Recent Labs   Lab 24  0217 24  0427 24  0719   * 133* 138   K 4.2 3.9 4.1    106 110   CO2 19* 18* 21*   * 127* 90   BUN 49* 45* 34*   CREATININE 2.0* 1.8* 1.4   CALCIUM 10.3 10.0 9.7   PROT 6.4  --   --    ALBUMIN 3.1* 2.8* 2.5*   BILITOT 0.6  --   --    ALKPHOS 87   --   --    AST 15  --   --    ALT 10  --   --    ANIONGAP 11 9 7*   , CBC   Recent Labs   Lab 08/21/24  0217 08/21/24  0427 08/22/24  0719   WBC 6.47 5.69 4.61   HGB 10.4* 10.1* 9.9*   HCT 33.8* 31.9* 31.6*    169 178   , and All labs within the past 24 hours have been reviewed    Discharged Condition: good    Disposition: Home or Self Care    Follow Up:    Patient Instructions:      Diet Adult Regular     Notify your health care provider if you experience any of the following:  temperature >100.4     Notify your health care provider if you experience any of the following:  persistent nausea and vomiting or diarrhea     Notify your health care provider if you experience any of the following:  severe uncontrolled pain     Notify your health care provider if you experience any of the following:  redness, tenderness, or signs of infection (pain, swelling, redness, odor or green/yellow discharge around incision site)     Notify your health care provider if you experience any of the following:  difficulty breathing or increased cough     Notify your health care provider if you experience any of the following:  severe persistent headache     Notify your health care provider if you experience any of the following:  worsening rash     Notify your health care provider if you experience any of the following:  persistent dizziness, light-headedness, or visual disturbances     Notify your health care provider if you experience any of the following:  increased confusion or weakness     Activity as tolerated     Medications:  Reconciled Home Medications:      Medication List        START taking these medications      amoxicillin-clavulanate 875-125mg 875-125 mg per tablet  Commonly known as: AUGMENTIN  Take 1 tablet by mouth 2 (two) times daily for 7 days. Stop 8/29/24            CHANGE how you take these medications      k phos di & mono-sod phos mono 250 mg Tab  Commonly known as: K-Phos-NeutraL  Take 2 tablets by mouth  every 12 (twelve) hours.  What changed: how much to take     sodium bicarbonate 650 MG tablet  Take 2 tablets (1,300 mg total) by mouth 2 (two) times daily.  What changed: how much to take     tacrolimus 1 MG Cap  Commonly known as: PROGRAF  Take 2 capsules (2 mg total) by mouth every 12 (twelve) hours. Z94.0;Jarethy txp on 2/29/2024  What changed: how much to take            CONTINUE taking these medications      acetaminophen 650 MG Tbsr  Commonly known as: TYLENOL  Take 1 tablet (650 mg total) by mouth every 8 (eight) hours as needed.     amLODIPine 5 MG tablet  Commonly known as: NORVASC  Take 2 tablets (10 mg total) by mouth once daily.     aspirin 81 MG EC tablet  Commonly known as: ECOTRIN  Take 1 tablet (81 mg total) by mouth once daily.     atovaquone 750 mg/5 mL Susp oral liquid  Commonly known as: MEPRON  Take 10 mLs (1,500 mg total) by mouth once daily. Stop 8/27/24     carvediloL 12.5 MG tablet  Commonly known as: COREG  Take 2 tablets (25 mg total) by mouth 2 (two) times daily.     cetirizine 10 MG tablet  Commonly known as: ZYRTEC  Take 1 tablet (10 mg total) by mouth once daily.     DEXCOM G6 SENSOR Neelima  Generic drug: blood-glucose sensor  by Misc.(Non-Drug; Combo Route) route.     HumaLOG U-100 Insulin 100 unit/mL injection  Generic drug: insulin lispro  Inject into the skin. FOR INSULIN PUMP (BASAL 1.2 UNITS/HR)     latanoprost 0.005 % ophthalmic solution  Place 1 drop into the right eye every other day. At night     levothyroxine 50 MCG tablet  Commonly known as: SYNTHROID  Take 1 tablet (50 mcg total) by mouth before breakfast.     ONETOUCH DELICA PLUS LANCET 30 gauge Misc  Generic drug: lancets     predniSONE 5 MG tablet  Commonly known as: DELTASONE  Take 1 tablet (5 mg total) by mouth once daily.     subcutaneous insulin pump Misc  Inject 1 Units/hr into the skin continuous. Insulin pump name - Tandum  Basal dose is 2 units/hr     T:SLIM X2 Crtg  Generic drug: insulin pump cartridge  Inject  into the skin.     valGANciclovir 450 mg Tab  Commonly known as: VALCYTE  Take 1 tablet (450 mg total) by mouth once daily. Stop 8/27/24            STOP taking these medications      mycophenolate 250 mg Cap  Commonly known as: CELLCEPT            Time spent caring for patient (Greater than 1/2 spent in direct face-to-face contact): > 30 minutes    Lilly La PA-C  Kidney Transplant  [facility] - Transplant Stepdown

## 2024-08-26 LAB
BACTERIA BLD CULT: NORMAL
BACTERIA BLD CULT: NORMAL

## 2024-08-27 ENCOUNTER — TELEPHONE (OUTPATIENT)
Dept: TRANSPLANT | Facility: CLINIC | Age: 64
End: 2024-08-27
Payer: MEDICARE

## 2024-08-27 DIAGNOSIS — Z94.0 IMMUNOSUPPRESSIVE MANAGEMENT ENCOUNTER FOLLOWING KIDNEY TRANSPLANT: Primary | ICD-10-CM

## 2024-08-27 DIAGNOSIS — Z79.899 IMMUNOSUPPRESSIVE MANAGEMENT ENCOUNTER FOLLOWING KIDNEY TRANSPLANT: Primary | ICD-10-CM

## 2024-08-28 ENCOUNTER — LAB VISIT (OUTPATIENT)
Dept: LAB | Facility: HOSPITAL | Age: 64
End: 2024-08-28
Attending: INTERNAL MEDICINE
Payer: MEDICARE

## 2024-08-28 DIAGNOSIS — B25.9 CYTOMEGALOVIRUS INFECTION, UNSPECIFIED CYTOMEGALOVIRAL INFECTION TYPE: ICD-10-CM

## 2024-08-28 DIAGNOSIS — Z94.0 IMMUNOSUPPRESSIVE MANAGEMENT ENCOUNTER FOLLOWING KIDNEY TRANSPLANT: ICD-10-CM

## 2024-08-28 DIAGNOSIS — Z94.0 KIDNEY REPLACED BY TRANSPLANT: ICD-10-CM

## 2024-08-28 DIAGNOSIS — Z79.899 IMMUNOSUPPRESSIVE MANAGEMENT ENCOUNTER FOLLOWING KIDNEY TRANSPLANT: ICD-10-CM

## 2024-08-28 LAB
ALBUMIN SERPL BCP-MCNC: 3.4 G/DL (ref 3.5–5.2)
ANION GAP SERPL CALC-SCNC: 9 MMOL/L (ref 8–16)
ANISOCYTOSIS BLD QL SMEAR: SLIGHT
BASOPHILS NFR BLD: 2 % (ref 0–1.9)
BUN SERPL-MCNC: 26 MG/DL (ref 8–23)
CALCIUM SERPL-MCNC: 10.4 MG/DL (ref 8.7–10.5)
CHLORIDE SERPL-SCNC: 111 MMOL/L (ref 95–110)
CO2 SERPL-SCNC: 22 MMOL/L (ref 23–29)
CREAT SERPL-MCNC: 1.4 MG/DL (ref 0.5–1.4)
DIFFERENTIAL METHOD BLD: ABNORMAL
EOSINOPHIL NFR BLD: 2 % (ref 0–8)
ERYTHROCYTE [DISTWIDTH] IN BLOOD BY AUTOMATED COUNT: 14.6 % (ref 11.5–14.5)
EST. GFR  (NO RACE VARIABLE): 55.8 ML/MIN/1.73 M^2
GLUCOSE SERPL-MCNC: 121 MG/DL (ref 70–110)
HCT VFR BLD AUTO: 39 % (ref 40–54)
HGB BLD-MCNC: 11.5 G/DL (ref 14–18)
HYPOCHROMIA BLD QL SMEAR: ABNORMAL
IMM GRANULOCYTES # BLD AUTO: ABNORMAL K/UL (ref 0–0.04)
IMM GRANULOCYTES NFR BLD AUTO: ABNORMAL % (ref 0–0.5)
LYMPHOCYTES NFR BLD: 8 % (ref 18–48)
MAGNESIUM SERPL-MCNC: 2 MG/DL (ref 1.6–2.6)
MCH RBC QN AUTO: 27.3 PG (ref 27–31)
MCHC RBC AUTO-ENTMCNC: 29.5 G/DL (ref 32–36)
MCV RBC AUTO: 92 FL (ref 82–98)
METAMYELOCYTES NFR BLD MANUAL: 6 %
MONOCYTES NFR BLD: 5 % (ref 4–15)
MYELOCYTES NFR BLD MANUAL: 5 %
NEUTROPHILS NFR BLD: 54 % (ref 38–73)
NEUTS BAND NFR BLD MANUAL: 18 %
NRBC BLD-RTO: 0 /100 WBC
OVALOCYTES BLD QL SMEAR: ABNORMAL
PHOSPHATE SERPL-MCNC: 3.4 MG/DL (ref 2.7–4.5)
PLATELET # BLD AUTO: 531 K/UL (ref 150–450)
PLATELET BLD QL SMEAR: ABNORMAL
PMV BLD AUTO: 9.9 FL (ref 9.2–12.9)
POIKILOCYTOSIS BLD QL SMEAR: SLIGHT
POTASSIUM SERPL-SCNC: 4.8 MMOL/L (ref 3.5–5.1)
RBC # BLD AUTO: 4.22 M/UL (ref 4.6–6.2)
SODIUM SERPL-SCNC: 142 MMOL/L (ref 136–145)
SPHEROCYTES BLD QL SMEAR: ABNORMAL
WBC # BLD AUTO: 4.67 K/UL (ref 3.9–12.7)

## 2024-08-28 PROCEDURE — 36415 COLL VENOUS BLD VENIPUNCTURE: CPT | Mod: PO | Performed by: INTERNAL MEDICINE

## 2024-08-28 PROCEDURE — 80197 ASSAY OF TACROLIMUS: CPT | Performed by: INTERNAL MEDICINE

## 2024-08-28 PROCEDURE — 85007 BL SMEAR W/DIFF WBC COUNT: CPT | Performed by: INTERNAL MEDICINE

## 2024-08-28 PROCEDURE — 85027 COMPLETE CBC AUTOMATED: CPT | Performed by: INTERNAL MEDICINE

## 2024-08-28 PROCEDURE — 83735 ASSAY OF MAGNESIUM: CPT | Performed by: INTERNAL MEDICINE

## 2024-08-28 PROCEDURE — 80069 RENAL FUNCTION PANEL: CPT | Performed by: INTERNAL MEDICINE

## 2024-08-29 ENCOUNTER — PATIENT MESSAGE (OUTPATIENT)
Dept: TRANSPLANT | Facility: CLINIC | Age: 64
End: 2024-08-29
Payer: MEDICARE

## 2024-08-29 DIAGNOSIS — E83.39 HYPOPHOSPHATEMIA: Primary | ICD-10-CM

## 2024-08-29 LAB
CMV DNA SPEC QL NAA+PROBE: NORMAL
CYTOMEGALOVIRUS PCR, QUANT: NOT DETECTED IU/ML
TACROLIMUS BLD-MCNC: 7 NG/ML (ref 5–15)

## 2024-08-29 NOTE — PHYSICIAN QUERY
Provider, Please document your best medical opinion regarding the etiology of        Fever            Provider Query Response:  Presumed bacterial UTI    To Respond To Query  Respond from In Basket  Navigate to the Hospital Chart Completion folder.  Highlight the Physician Query message and click New Note to complete the query.   Response appears in a new window.  After reviewing the chart, respond to the query by selecting the clinically appropriate option from the list and then hit Enter  Click Sign  Unselected options will disappear after signing the note.          **Message Replies To This Message or to the Query Will Not Be Taken as Responses Please Follow the Workflow**

## 2024-08-29 NOTE — TELEPHONE ENCOUNTER
Notified patient of Dr. Stokes review of 8/28/24 lab results via [facility].     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 Your phosphorus level is 3.4. He wants you to decrease your phosphorus dose to 250mg twice daily.     Veronica Herring         ----- Message from Keanu Stokes MD sent at 8/29/2024  9:06 AM CDT -----  Please lower KPN to 250 bid

## 2024-08-30 NOTE — PROGRESS NOTES
Kidney Post-Transplant Assessment    Referring Physician: Fredi Lyon  Current Nephrologist: Fredi Lyon    ORGAN: RIGHT KIDNEY  Donor Type: donation after brain death  PHS Increased Risk: yes  Cold Ischemia: 995 mins  Induction Medications:      Subjective:     CC:  Reassessment of renal allograft function and management of chronic immunosuppression.    HPI:  Mr. Lopez is a 64 y.o. year old  male who received a donation after brain death kidney transplant on 2/29/24.  He has CKD stage 2 - GFR 60-89 and his  BL creatinine ~ 1.4. He takes mycophenolate mofetil, prednisone, and tacrolimus for maintenance immunosuppression.     Pertinent HX:    urine leak with perinephric fluid collection managed medically. Patient currently with negative nephrostogram with PCN capped on 3/26, Curry removed on 4/5 and JEAN-PIERRE drain removed on 4/12. PCN removed on 5/8/24 . PD catheter removed on 5/28/24.     Recent hospitalizations or ER visits since his previous clinic visit.    -7/18/24: UC URI     Admitted 8/21-8/22/24 fever    Infectious work-up initiated. Started on Vanc/ cefepime. UA with few bacteria. Blood and urine cultures ngtd. Kidney US with subincisional fluid collection measuring 9.4 x 7.7 x 2.6 cm. CT scan obtained with small fluid collections, similar to prior.    -Reviewed with surgeon. No plans to intervene   transition to PO Augmentin for 7 day course.   Kidney function improved with IVF. Electrolytes replaced. Pt is stable and ready for discharge     LOV 5/29/24  Interval HX:  fx assessment     Intake-adequate intake   UOP-no problems reported   Peripheral edema-minimal   Appetite-denies N/V/D; tolerating IS meds well        BP  BP Readings from Last 3 Encounters:   09/04/24 137/66   08/22/24 135/64   07/28/24 124/63       Lab /diagnostic results reviewed with patient today.   All questions answered    Past Medical History:   Diagnosis Date    Anemia     Cataract     Diabetes mellitus     Diabetes mellitus,  "type 2     Glaucoma     Gout, unspecified     HLD (hyperlipidemia)     Hypertension     Hypothyroidism, unspecified     Kidney failure     Renal disorder        Review of Systems   Constitutional:  Negative for activity change, appetite change, chills, fatigue, fever and unexpected weight change.   HENT:  Negative for congestion, facial swelling, postnasal drip, rhinorrhea, sinus pressure, sore throat and trouble swallowing.    Eyes:  Negative for pain, redness and visual disturbance.   Respiratory:  Negative for cough, chest tightness, shortness of breath and wheezing.    Cardiovascular: Negative.  Negative for chest pain, palpitations and leg swelling.   Gastrointestinal:  Negative for abdominal pain, diarrhea, nausea and vomiting.   Genitourinary:  Negative for dysuria, flank pain and urgency.   Musculoskeletal:  Positive for arthralgias (hips and lateral thighs bilaterally). Negative for gait problem, neck pain and neck stiffness.   Skin:  Negative for rash.   Allergic/Immunologic: Positive for immunocompromised state. Negative for environmental allergies and food allergies.   Neurological:  Negative for dizziness, weakness, light-headedness and headaches.   Psychiatric/Behavioral:  Negative for agitation and confusion. The patient is not nervous/anxious.        Objective:     Blood pressure 137/66, pulse 64, temperature 97.2 °F (36.2 °C), height 5' 4" (1.626 m), weight 96.6 kg (212 lb 15.4 oz).body mass index is 36.56 kg/m².    Physical Exam  Constitutional:       Appearance: Normal appearance. He is well-developed.   HENT:      Head: Normocephalic.      Nose: Nose normal.   Eyes:      Conjunctiva/sclera: Conjunctivae normal.      Pupils: Pupils are equal, round, and reactive to light.   Cardiovascular:      Rate and Rhythm: Normal rate and regular rhythm.      Heart sounds: Normal heart sounds.   Pulmonary:      Effort: Pulmonary effort is normal.      Breath sounds: Normal breath sounds.   Abdominal:      " "General: Bowel sounds are normal.      Palpations: Abdomen is soft.      Comments: No bruit noted over the allograft      Musculoskeletal:      Cervical back: Normal range of motion and neck supple.   Skin:     General: Skin is warm and dry.   Neurological:      Mental Status: He is alert and oriented to person, place, and time.   Psychiatric:         Behavior: Behavior normal.         Labs:  Lab Results   Component Value Date    WBC 4.67 2024    HGB 11.5 (L) 2024    HCT 39.0 (L) 2024     2024    K 4.8 2024     (H) 2024    CO2 22 (L) 2024    BUN 26 (H) 2024    CREATININE 1.4 2024    EGFRNORACEVR 55.8 (A) 2024    GLUCOSE 176 2024    CALCIUM 10.4 2024    PHOS 3.4 2024    MG 2.0 2024    ALBUMIN 3.4 (L) 2024    AST 15 2024    ALT 10 2024    UTPCR 0.50 (H) 2024    .1 (H) 2024    TACROLIMUS 7.0 2024       No results found for: "EXTANC", "EXTWBC", "EXTSEGS", "EXTPLATELETS", "EXTHEMOGLOBI", "EXTHEMATOCRI", "EXTCREATININ", "EXTSODIUM", "EXTPOTASSIUM", "EXTBUN", "EXTCO2", "EXTCALCIUM", "EXTPHOSPHORU", "EXTGLUCOSE", "EXTALBUMIN", "EXTAST", "EXTALT", "EXTBILITOTAL", "EXTLIPASE", "EXTAMYLASE"    No results found for: "EXTCYCLOSLVL", "EXTSIROLIMUS", "EXTTACROLVL", "EXTPROTCRE", "EXTPTHINTACT", "EXTPROTEINUA", "EXTWBCUA", "EXTRBCUA"    Labs were reviewed with the patient.    Assessment:     1. Status post -donor kidney transplantation    2. Long-term use of immunosuppressant medication    3. Type 2 diabetes mellitus with stage 3a chronic kidney disease, with long-term current use of insulin    4. Essential hypertension    5. At risk for opportunistic infections        Plan:      KPN lowered on 24 to 250 mg BID   Repeat labs this AM are pending     1) s/p living related kidney transplant              - Graft function    -FK Trough 7.0   - cont on FK  2/2   - cont on Cellcept  500 mg " BID   -prednisone   5 mg QD   -Will continue to monitor for drug toxicities   -cont Augmentin as prescribed   Lab Results   Component Value Date    CREATININE 1.4 08/28/2024        Latest Reference Range & Units 5mo 4wk,  Kidney-Post 1 Year  08/28/24 08:50   eGFR >60 mL/min/1.73 m^2 55.8 !            2)  HTN: advise low salt diet and home BP monitoring  - Cont  norvasc, coreg , ASA  BP Readings from Last 3 Encounters:   09/04/24 137/66   08/22/24 135/64   07/28/24 124/63         type II DM:   -  MED/insulin-pump  REGIME   Mgmt deferred to endocrinology    Lab Results   Component Value Date    HGBA1C 5.4 07/24/2024         3) Hypophosphatemia, Hypomagnesemia, Secondary hyperparathyroidism:  - no intervention required ,will continue to monitor/ guidelines                -continue  calcitriol; kpn lowered on 8/30/24 tp 250 mg bid      Lab Results   Component Value Date    .1 (H) 08/20/2024    CALCIUM 10.4 08/28/2024    PHOS 3.4 08/28/2024       Latest Reference Range & Units 5mo 4wk,  Kidney-Post 1 Year  08/28/24 08:50   Magnesium  1.6 - 2.6 mg/dL 2.0         4) Metabolic acidosis/Electrolyte balance:  - no intervention required ,will continue to monitor/ guidelines    - cont on soddium bicarb    Lab Results   Component Value Date     08/28/2024    K 4.8 08/28/2024     (H) 08/28/2024    CO2 22 (L) 08/28/2024         5) Anemia/Cytopenias:   will monitor as per our guidelines. Medicine list reviewed including potential causes of drug-induced cytopenias                - no intervention required ,will continue to monitor/ guidelines    Neutropenia --resolved   Lab Results   Component Value Date    WBC 4.67 08/28/2024    HGB 11.5 (L) 08/28/2024    HCT 39.0 (L) 08/28/2024    MCV 92 08/28/2024     (H) 08/28/2024         6) Proteinuria: continue p/c ratio as per guidelines   Protein Creatinine Ratios:    Creatinine, Urine   Date Value Ref Range Status   08/20/2024 74.0 23.0 - 375.0 mg/dL Final    05/20/2024 61.0 23.0 - 375.0 mg/dL Final   04/29/2024 49.0 23.0 - 375.0 mg/dL Final     Protein, Urine Random   Date Value Ref Range Status   08/20/2024 37 (H) 0 - 15 mg/dL Final   05/20/2024 8 0 - 15 mg/dL Final   04/29/2024 17 (H) 0 - 15 mg/dL Final     Prot/Creat Ratio, Urine   Date Value Ref Range Status   08/20/2024 0.50 (H) 0.00 - 0.20 Final   05/20/2024 0.13 0.00 - 0.20 Final   04/29/2024 0.35 (H) 0.00 - 0.20 Final        .     7) CMV and BK virus infection screening:  will continue to monitor/ guidelines      Latest Reference Range & Units 5mo 4wk,  Kidney-Post 1 Year  08/28/24 08:50   Cytomegalovirus PCR, Quant <50 IU/mL Not Detected      Latest Reference Range & Units 5mo 3wk,  Kidney-Post 1 Year  08/20/24   BK Virus DNA, Blood Not detected  Not detected      Latest Reference Range & Units 5mo 3wk,  Kidney-Post 1 Year  08/20/24   BK Virus DNA PCR, Quant, Blood <125 Copies/mL <125           8) Weight education: provided during the clinic visit   Body mass index is 36.56 kg/m².        Follow-up:   Clinic: return to transplant clinic weekly for the first month after transplant; every 2 weeks during months 2-3; then at 6-, 9-, 12-, 18-, 24-, and 36- months post-transplant to reassess for complications from immunosuppression toxicity and monitor for rejection.  Annually thereafter.    Labs: since patient remains at high risk for rejection and drug-related complications that warrant close monitoring, labs will be ordered as follows: continue twice weekly CBC, renal panel, and drug level for first month; then same labs once weekly through 3rd month post-transplant.  Urine for UA and protein/creatinine ratio monthly.  Serum BK - PCR at 1-, 3-, 6-, 9-, 12-, 18-, 24-, 36- 48-, and 60 months post-transplant.  Hepatic panel at 1-, 2-, 3-, 6-, 9-, 12-, 18-, 24-, and 36- months post-transplant.    Sita A Paddock, NP       Education:   Material provided to the patient.  Patient reminded to call with any health changes  since these can be early signs of significant complications.  Also, I advised the patient to be sure any new medications or changes of old medications are discussed with either a pharmacist or physician knowledgeable with transplant to avoid rejection/drug toxicity related to significant drug interactions.    Patient advised that it is recommended that all transplanted patients, and their close contacts and household members receive Covid vaccination.    UNOS Patient Status  Functional Status: 90% - Able to carry on normal activity: minor symptoms of disease  Physical Capacity: No Limitations

## 2024-09-04 ENCOUNTER — OFFICE VISIT (OUTPATIENT)
Dept: TRANSPLANT | Facility: CLINIC | Age: 64
End: 2024-09-04
Payer: MEDICARE

## 2024-09-04 ENCOUNTER — LAB VISIT (OUTPATIENT)
Dept: LAB | Facility: HOSPITAL | Age: 64
End: 2024-09-04
Attending: INTERNAL MEDICINE
Payer: MEDICARE

## 2024-09-04 VITALS
BODY MASS INDEX: 36.35 KG/M2 | TEMPERATURE: 97 F | SYSTOLIC BLOOD PRESSURE: 137 MMHG | WEIGHT: 212.94 LBS | HEART RATE: 64 BPM | DIASTOLIC BLOOD PRESSURE: 66 MMHG | HEIGHT: 64 IN

## 2024-09-04 DIAGNOSIS — Z79.60 LONG-TERM USE OF IMMUNOSUPPRESSANT MEDICATION: ICD-10-CM

## 2024-09-04 DIAGNOSIS — Z94.0 IMMUNOSUPPRESSIVE MANAGEMENT ENCOUNTER FOLLOWING KIDNEY TRANSPLANT: ICD-10-CM

## 2024-09-04 DIAGNOSIS — I10 ESSENTIAL HYPERTENSION: ICD-10-CM

## 2024-09-04 DIAGNOSIS — E11.22 TYPE 2 DIABETES MELLITUS WITH STAGE 3A CHRONIC KIDNEY DISEASE, WITH LONG-TERM CURRENT USE OF INSULIN: ICD-10-CM

## 2024-09-04 DIAGNOSIS — Z79.4 TYPE 2 DIABETES MELLITUS WITH STAGE 3A CHRONIC KIDNEY DISEASE, WITH LONG-TERM CURRENT USE OF INSULIN: ICD-10-CM

## 2024-09-04 DIAGNOSIS — Z94.0 STATUS POST DECEASED-DONOR KIDNEY TRANSPLANTATION: Primary | ICD-10-CM

## 2024-09-04 DIAGNOSIS — Z91.89 AT RISK FOR OPPORTUNISTIC INFECTIONS: ICD-10-CM

## 2024-09-04 DIAGNOSIS — Z94.0 KIDNEY REPLACED BY TRANSPLANT: ICD-10-CM

## 2024-09-04 DIAGNOSIS — Z79.899 IMMUNOSUPPRESSIVE MANAGEMENT ENCOUNTER FOLLOWING KIDNEY TRANSPLANT: ICD-10-CM

## 2024-09-04 DIAGNOSIS — N18.31 TYPE 2 DIABETES MELLITUS WITH STAGE 3A CHRONIC KIDNEY DISEASE, WITH LONG-TERM CURRENT USE OF INSULIN: ICD-10-CM

## 2024-09-04 LAB
ALBUMIN SERPL BCP-MCNC: 3.5 G/DL (ref 3.5–5.2)
ANION GAP SERPL CALC-SCNC: 10 MMOL/L (ref 8–16)
ANISOCYTOSIS BLD QL SMEAR: SLIGHT
BASOPHILS NFR BLD: 2 % (ref 0–1.9)
BUN SERPL-MCNC: 32 MG/DL (ref 8–23)
CALCIUM SERPL-MCNC: 10.3 MG/DL (ref 8.7–10.5)
CHLORIDE SERPL-SCNC: 112 MMOL/L (ref 95–110)
CO2 SERPL-SCNC: 22 MMOL/L (ref 23–29)
CREAT SERPL-MCNC: 1.3 MG/DL (ref 0.5–1.4)
DIFFERENTIAL METHOD BLD: ABNORMAL
EOSINOPHIL NFR BLD: 7 % (ref 0–8)
ERYTHROCYTE [DISTWIDTH] IN BLOOD BY AUTOMATED COUNT: 15 % (ref 11.5–14.5)
EST. GFR  (NO RACE VARIABLE): >60 ML/MIN/1.73 M^2
GLUCOSE SERPL-MCNC: 126 MG/DL (ref 70–110)
HCT VFR BLD AUTO: 37.4 % (ref 40–54)
HGB BLD-MCNC: 11.5 G/DL (ref 14–18)
HYPOCHROMIA BLD QL SMEAR: ABNORMAL
IMM GRANULOCYTES # BLD AUTO: ABNORMAL K/UL (ref 0–0.04)
IMM GRANULOCYTES NFR BLD AUTO: ABNORMAL % (ref 0–0.5)
LYMPHOCYTES NFR BLD: 5 % (ref 18–48)
MAGNESIUM SERPL-MCNC: 2.1 MG/DL (ref 1.6–2.6)
MCH RBC QN AUTO: 28.6 PG (ref 27–31)
MCHC RBC AUTO-ENTMCNC: 30.7 G/DL (ref 32–36)
MCV RBC AUTO: 93 FL (ref 82–98)
METAMYELOCYTES NFR BLD MANUAL: 3 %
MONOCYTES NFR BLD: 14 % (ref 4–15)
MYELOCYTES NFR BLD MANUAL: 2 %
NEUTROPHILS NFR BLD: 54 % (ref 38–73)
NEUTS BAND NFR BLD MANUAL: 13 %
NRBC BLD-RTO: 0 /100 WBC
OVALOCYTES BLD QL SMEAR: ABNORMAL
PHOSPHATE SERPL-MCNC: 3.3 MG/DL (ref 2.7–4.5)
PLATELET # BLD AUTO: 457 K/UL (ref 150–450)
PLATELET BLD QL SMEAR: ABNORMAL
PMV BLD AUTO: 10.2 FL (ref 9.2–12.9)
POIKILOCYTOSIS BLD QL SMEAR: SLIGHT
POLYCHROMASIA BLD QL SMEAR: ABNORMAL
POTASSIUM SERPL-SCNC: 4.8 MMOL/L (ref 3.5–5.1)
RBC # BLD AUTO: 4.02 M/UL (ref 4.6–6.2)
SODIUM SERPL-SCNC: 144 MMOL/L (ref 136–145)
WBC # BLD AUTO: 6.51 K/UL (ref 3.9–12.7)

## 2024-09-04 PROCEDURE — 85007 BL SMEAR W/DIFF WBC COUNT: CPT | Performed by: INTERNAL MEDICINE

## 2024-09-04 PROCEDURE — 80069 RENAL FUNCTION PANEL: CPT | Performed by: INTERNAL MEDICINE

## 2024-09-04 PROCEDURE — 99999 PR PBB SHADOW E&M-EST. PATIENT-LVL IV: CPT | Mod: PBBFAC,,, | Performed by: NURSE PRACTITIONER

## 2024-09-04 PROCEDURE — 83735 ASSAY OF MAGNESIUM: CPT | Performed by: INTERNAL MEDICINE

## 2024-09-04 PROCEDURE — 99215 OFFICE O/P EST HI 40 MIN: CPT | Mod: S$PBB,,, | Performed by: NURSE PRACTITIONER

## 2024-09-04 PROCEDURE — 99214 OFFICE O/P EST MOD 30 MIN: CPT | Mod: PBBFAC | Performed by: NURSE PRACTITIONER

## 2024-09-04 PROCEDURE — 80197 ASSAY OF TACROLIMUS: CPT | Performed by: INTERNAL MEDICINE

## 2024-09-04 PROCEDURE — 36415 COLL VENOUS BLD VENIPUNCTURE: CPT | Mod: PO | Performed by: INTERNAL MEDICINE

## 2024-09-04 PROCEDURE — 85027 COMPLETE CBC AUTOMATED: CPT | Performed by: INTERNAL MEDICINE

## 2024-09-04 NOTE — LETTER
September 4, 2024        Fredi Lyon  [address] [facility] [address]  Phone: [phone]  Fax: [phone]             [facility] 1st Fl  [address]  [place] [address]  Phone: [phone]   Patient: Mark Lopez   MR Number: [id number]   YOB: 1960   Date of Visit: 9/4/2024       Dear Dr. Fredi Lyon    Thank you for referring Mark Lopez to me for evaluation. Attached you will find relevant portions of my assessment and plan of care.    If you have questions, please do not hesitate to call me. I look forward to following Mark Lopez along with you.    Sincerely,    Sita Hayden, NP    Enclosure    If you would like to receive this communication electronically, please contact [email or web address] or [phone] to request [employer] access.    [employer] is a tool which provides read-only access to select patient information with whom you have a relationship. Its easy to use and provides real time access to review your patients record including encounter summaries, notes, results, and demographic information.    If you feel you have received this communication in error or would no longer like to receive these types of communications, please e-mail [email or web address]

## 2024-09-05 DIAGNOSIS — Z94.0 KIDNEY REPLACED BY TRANSPLANT: Primary | ICD-10-CM

## 2024-09-05 LAB — TACROLIMUS BLD-MCNC: 4.4 NG/ML (ref 5–15)

## 2024-09-05 NOTE — TELEPHONE ENCOUNTER
Notified patient of Dr. Alexander's review of 9/4/24 lab results via [facility].     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 Alexander reviewed your 9/4/24 lab results Your prograf/tacrolimus level 4.4 is lower than it should be. It looks like the labs were drawn at 8:47AM. Was the level a true 12 hour level? Do you take your prograf between 8:30-8:45PM? If so, then it was a true 12 hour level. She wants you to increase your prograf dose to 3mg in the AM & 2mg in the PM. We'll need to recheck your labs as scheduled on 9/23/24.     Thanks,     Veronica       ----- Message from Hope Alexander DO sent at 9/5/2024  1:40 PM CDT -----  Near baseline graft function. Acceptable electrolytes  Low FK. Is it a true FK trough? If so, increase FK to 3/2 from 2 mg BID

## 2024-09-06 ENCOUNTER — PATIENT MESSAGE (OUTPATIENT)
Dept: TRANSPLANT | Facility: CLINIC | Age: 64
End: 2024-09-06
Payer: MEDICARE

## 2024-09-09 RX ORDER — TACROLIMUS 1 MG/1
CAPSULE ORAL
Qty: 150 CAPSULE | Refills: 11 | Status: SHIPPED | OUTPATIENT
Start: 2024-09-09 | End: 2025-09-09

## 2024-09-15 DIAGNOSIS — Z94.0 KIDNEY REPLACED BY TRANSPLANT: ICD-10-CM

## 2024-09-15 NOTE — ASSESSMENT & PLAN NOTE
- PCN G #1 given 3/1, 3/8 for +RPR donor cultures - needs weekly x 3 total per ID.      No Detail Level: Detailed

## 2024-09-24 DIAGNOSIS — Z94.0 KIDNEY REPLACED BY TRANSPLANT: ICD-10-CM

## 2024-09-24 DIAGNOSIS — Z94.0 KIDNEY REPLACED BY TRANSPLANT: Primary | ICD-10-CM

## 2024-09-24 RX ORDER — CARVEDILOL 12.5 MG/1
25 TABLET ORAL 2 TIMES DAILY
Qty: 60 TABLET | Refills: 3 | Status: CANCELLED | OUTPATIENT
Start: 2024-09-24

## 2024-09-24 RX ORDER — MYCOPHENOLATE MOFETIL 250 MG/1
500 CAPSULE ORAL 2 TIMES DAILY
Qty: 120 CAPSULE | Refills: 11 | Status: SHIPPED | OUTPATIENT
Start: 2024-09-24

## 2024-09-30 ENCOUNTER — PATIENT MESSAGE (OUTPATIENT)
Dept: TRANSPLANT | Facility: CLINIC | Age: 64
End: 2024-09-30
Payer: MEDICARE

## 2024-09-30 ENCOUNTER — TELEPHONE (OUTPATIENT)
Dept: TRANSPLANT | Facility: CLINIC | Age: 64
End: 2024-09-30
Payer: MEDICARE

## 2024-09-30 NOTE — TELEPHONE ENCOUNTER
Transplant  will contact patient to reschedule 10/7 lab appointment.     ----- Message from Demarco sent at 9/30/2024 10:40 AM CDT -----  Regarding: Reschedule Existing Appointment  Contact: [phone]  Reschedule Existing Appointment     Current appt date: 10/07/2024     Type of appt : Lab     Physician: Dr. Alexander     Reason for rescheduling: Can't make appt     Caller: Mark Lopez     Contact Preference:  [phone]

## 2024-09-30 NOTE — TELEPHONE ENCOUNTER
Coordinator replied to patient's message via [facility].     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 Alexander wants you to restart your Cellcept 500mg twice daily since you are no longer taking antibiotics and cleared of infection. So last week the new prescription was sent to [facility].     Nima,     Veronica     ----- Message from Demarco sent at 9/30/2024 10:36 AM CDT -----  Regarding: Consult/Advisory  Contact: [phone]  Consult/Advisory     Name Of Caller: Mark Lopez        Contact Preference:  [phone]     Nature of call: Pt is calling because the [facility] pharmacy sent him mycophenolate (CELLCEPT) 250 mg Cap and he states he wasn't told he needed to take it again. Please call back to further assist.

## 2024-10-09 ENCOUNTER — TELEPHONE (OUTPATIENT)
Dept: TRANSPLANT | Facility: CLINIC | Age: 64
End: 2024-10-09
Payer: MEDICARE

## 2024-10-09 ENCOUNTER — LAB VISIT (OUTPATIENT)
Dept: LAB | Facility: HOSPITAL | Age: 64
End: 2024-10-09
Attending: INTERNAL MEDICINE
Payer: MEDICARE

## 2024-10-09 DIAGNOSIS — Z79.899 IMMUNOSUPPRESSIVE MANAGEMENT ENCOUNTER FOLLOWING KIDNEY TRANSPLANT: ICD-10-CM

## 2024-10-09 DIAGNOSIS — Z94.0 KIDNEY REPLACED BY TRANSPLANT: ICD-10-CM

## 2024-10-09 DIAGNOSIS — Z94.0 IMMUNOSUPPRESSIVE MANAGEMENT ENCOUNTER FOLLOWING KIDNEY TRANSPLANT: ICD-10-CM

## 2024-10-09 LAB
ALBUMIN SERPL BCP-MCNC: 3.5 G/DL (ref 3.5–5.2)
ANION GAP SERPL CALC-SCNC: 6 MMOL/L (ref 8–16)
BASOPHILS # BLD AUTO: 0.06 K/UL (ref 0–0.2)
BASOPHILS NFR BLD: 0.8 % (ref 0–1.9)
BUN SERPL-MCNC: 37 MG/DL (ref 8–23)
CALCIUM SERPL-MCNC: 10.2 MG/DL (ref 8.7–10.5)
CHLORIDE SERPL-SCNC: 116 MMOL/L (ref 95–110)
CO2 SERPL-SCNC: 23 MMOL/L (ref 23–29)
CREAT SERPL-MCNC: 1.3 MG/DL (ref 0.5–1.4)
DIFFERENTIAL METHOD BLD: ABNORMAL
EOSINOPHIL # BLD AUTO: 0.3 K/UL (ref 0–0.5)
EOSINOPHIL NFR BLD: 3.2 % (ref 0–8)
ERYTHROCYTE [DISTWIDTH] IN BLOOD BY AUTOMATED COUNT: 15 % (ref 11.5–14.5)
EST. GFR  (NO RACE VARIABLE): >60 ML/MIN/1.73 M^2
GLUCOSE SERPL-MCNC: 95 MG/DL (ref 70–110)
HCT VFR BLD AUTO: 38.1 % (ref 40–54)
HGB BLD-MCNC: 12.1 G/DL (ref 14–18)
IMM GRANULOCYTES # BLD AUTO: 0.05 K/UL (ref 0–0.04)
IMM GRANULOCYTES NFR BLD AUTO: 0.6 % (ref 0–0.5)
LYMPHOCYTES # BLD AUTO: 1.6 K/UL (ref 1–4.8)
LYMPHOCYTES NFR BLD: 20.4 % (ref 18–48)
MAGNESIUM SERPL-MCNC: 1.8 MG/DL (ref 1.6–2.6)
MCH RBC QN AUTO: 28.3 PG (ref 27–31)
MCHC RBC AUTO-ENTMCNC: 31.8 G/DL (ref 32–36)
MCV RBC AUTO: 89 FL (ref 82–98)
MONOCYTES # BLD AUTO: 0.9 K/UL (ref 0.3–1)
MONOCYTES NFR BLD: 11.5 % (ref 4–15)
NEUTROPHILS # BLD AUTO: 4.9 K/UL (ref 1.8–7.7)
NEUTROPHILS NFR BLD: 63.5 % (ref 38–73)
NRBC BLD-RTO: 0 /100 WBC
PHOSPHATE SERPL-MCNC: 2.7 MG/DL (ref 2.7–4.5)
PLATELET # BLD AUTO: 211 K/UL (ref 150–450)
PMV BLD AUTO: 10.5 FL (ref 9.2–12.9)
POTASSIUM SERPL-SCNC: 5 MMOL/L (ref 3.5–5.1)
RBC # BLD AUTO: 4.27 M/UL (ref 4.6–6.2)
SODIUM SERPL-SCNC: 145 MMOL/L (ref 136–145)
WBC # BLD AUTO: 7.73 K/UL (ref 3.9–12.7)

## 2024-10-09 PROCEDURE — 36415 COLL VENOUS BLD VENIPUNCTURE: CPT | Mod: PO | Performed by: INTERNAL MEDICINE

## 2024-10-09 PROCEDURE — 80197 ASSAY OF TACROLIMUS: CPT | Performed by: INTERNAL MEDICINE

## 2024-10-09 PROCEDURE — 83735 ASSAY OF MAGNESIUM: CPT | Performed by: INTERNAL MEDICINE

## 2024-10-09 PROCEDURE — 80069 RENAL FUNCTION PANEL: CPT | Performed by: INTERNAL MEDICINE

## 2024-10-09 PROCEDURE — 85025 COMPLETE CBC W/AUTO DIFF WBC: CPT | Performed by: INTERNAL MEDICINE

## 2024-10-10 ENCOUNTER — PATIENT MESSAGE (OUTPATIENT)
Dept: TRANSPLANT | Facility: CLINIC | Age: 64
End: 2024-10-10
Payer: MEDICARE

## 2024-10-10 DIAGNOSIS — Z94.0 KIDNEY REPLACED BY TRANSPLANT: Primary | ICD-10-CM

## 2024-10-10 LAB — TACROLIMUS BLD-MCNC: 4.5 NG/ML (ref 5–15)

## 2024-10-10 RX ORDER — TACROLIMUS 1 MG/1
3 CAPSULE ORAL EVERY 12 HOURS
Qty: 180 CAPSULE | Refills: 11 | Status: SHIPPED | OUTPATIENT
Start: 2024-10-10 | End: 2025-10-10

## 2024-10-10 NOTE — TELEPHONE ENCOUNTER
Notified patient of Dr. Alexander's review of 10/9/24 lab results via [facility] message.     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 Alexander reviewed your 10/9/24 lab results. Your prograf level 4.5 is lower than it should be. She wants you to please increase your prograf dose to 3mg twice daily. We need to recheck your prograf level on 10/21/24. Will you be in town to have the labs drawn that day?    Thanks,     Veronica       ----- Message from Hope Alexander DO sent at 10/10/2024 11:18 AM CDT -----  Near baseline graft function. Acceptable electrolytes  Low FK. Increase FK to 3 BID from 3/2 and recheck FK next week

## 2024-10-21 ENCOUNTER — LAB VISIT (OUTPATIENT)
Dept: LAB | Facility: HOSPITAL | Age: 64
End: 2024-10-21
Attending: INTERNAL MEDICINE
Payer: MEDICARE

## 2024-10-21 DIAGNOSIS — Z79.899 IMMUNOSUPPRESSIVE MANAGEMENT ENCOUNTER FOLLOWING KIDNEY TRANSPLANT: ICD-10-CM

## 2024-10-21 DIAGNOSIS — Z94.0 IMMUNOSUPPRESSIVE MANAGEMENT ENCOUNTER FOLLOWING KIDNEY TRANSPLANT: ICD-10-CM

## 2024-10-21 PROCEDURE — 80197 ASSAY OF TACROLIMUS: CPT | Performed by: INTERNAL MEDICINE

## 2024-10-21 PROCEDURE — 36415 COLL VENOUS BLD VENIPUNCTURE: CPT | Mod: PO | Performed by: INTERNAL MEDICINE

## 2024-10-22 LAB — TACROLIMUS BLD-MCNC: 8.4 NG/ML (ref 5–15)

## 2024-10-25 ENCOUNTER — TELEPHONE (OUTPATIENT)
Dept: TRANSPLANT | Facility: CLINIC | Age: 64
End: 2024-10-25
Payer: MEDICARE

## 2024-10-25 DIAGNOSIS — Z94.0 KIDNEY REPLACED BY TRANSPLANT: ICD-10-CM

## 2024-10-27 RX ORDER — CARVEDILOL 12.5 MG/1
25 TABLET ORAL 2 TIMES DAILY
Qty: 360 TABLET | Refills: 3 | Status: SHIPPED | OUTPATIENT
Start: 2024-10-27 | End: 2025-10-27

## 2024-11-07 ENCOUNTER — TELEPHONE (OUTPATIENT)
Dept: TRANSPLANT | Facility: CLINIC | Age: 64
End: 2024-11-07
Payer: MEDICARE

## 2024-11-08 ENCOUNTER — TELEPHONE (OUTPATIENT)
Dept: TRANSPLANT | Facility: CLINIC | Age: 64
End: 2024-11-08
Payer: MEDICARE

## 2024-11-08 ENCOUNTER — LAB VISIT (OUTPATIENT)
Dept: LAB | Facility: HOSPITAL | Age: 64
End: 2024-11-08
Attending: INTERNAL MEDICINE
Payer: MEDICARE

## 2024-11-08 DIAGNOSIS — T86.10 COMPLICATION OF TRANSPLANTED KIDNEY, UNSPECIFIED COMPLICATION: ICD-10-CM

## 2024-11-08 DIAGNOSIS — N25.81 SECONDARY HYPERPARATHYROIDISM OF RENAL ORIGIN: ICD-10-CM

## 2024-11-08 DIAGNOSIS — N28.9 KIDNEY DISEASE: ICD-10-CM

## 2024-11-08 DIAGNOSIS — Z79.899 IMMUNOSUPPRESSIVE MANAGEMENT ENCOUNTER FOLLOWING KIDNEY TRANSPLANT: ICD-10-CM

## 2024-11-08 DIAGNOSIS — Z94.0 IMMUNOSUPPRESSIVE MANAGEMENT ENCOUNTER FOLLOWING KIDNEY TRANSPLANT: ICD-10-CM

## 2024-11-08 DIAGNOSIS — Z94.0 KIDNEY REPLACED BY TRANSPLANT: ICD-10-CM

## 2024-11-08 DIAGNOSIS — R80.9 PROTEINURIA, UNSPECIFIED TYPE: ICD-10-CM

## 2024-11-08 LAB
ALBUMIN SERPL BCP-MCNC: 3.7 G/DL (ref 3.5–5.2)
ALBUMIN SERPL BCP-MCNC: 3.7 G/DL (ref 3.5–5.2)
ALP SERPL-CCNC: 97 U/L (ref 40–150)
ALT SERPL W/O P-5'-P-CCNC: 14 U/L (ref 10–44)
ANION GAP SERPL CALC-SCNC: 10 MMOL/L (ref 8–16)
AST SERPL-CCNC: 17 U/L (ref 10–40)
BASOPHILS # BLD AUTO: 0.05 K/UL (ref 0–0.2)
BASOPHILS NFR BLD: 0.8 % (ref 0–1.9)
BILIRUB DIRECT SERPL-MCNC: 0.1 MG/DL (ref 0.1–0.3)
BILIRUB SERPL-MCNC: 0.3 MG/DL (ref 0.1–1)
BILIRUB UR QL STRIP: NEGATIVE
BUN SERPL-MCNC: 37 MG/DL (ref 8–23)
CALCIUM SERPL-MCNC: 10.2 MG/DL (ref 8.7–10.5)
CHLORIDE SERPL-SCNC: 112 MMOL/L (ref 95–110)
CLARITY UR: CLEAR
CO2 SERPL-SCNC: 22 MMOL/L (ref 23–29)
COLOR UR: YELLOW
CREAT SERPL-MCNC: 1.3 MG/DL (ref 0.5–1.4)
CREAT UR-MCNC: 81 MG/DL (ref 23–375)
DIFFERENTIAL METHOD BLD: ABNORMAL
EOSINOPHIL # BLD AUTO: 0.3 K/UL (ref 0–0.5)
EOSINOPHIL NFR BLD: 3.8 % (ref 0–8)
ERYTHROCYTE [DISTWIDTH] IN BLOOD BY AUTOMATED COUNT: 14.7 % (ref 11.5–14.5)
EST. GFR  (NO RACE VARIABLE): >60 ML/MIN/1.73 M^2
GLUCOSE SERPL-MCNC: 122 MG/DL (ref 70–110)
GLUCOSE UR QL STRIP: NEGATIVE
HCT VFR BLD AUTO: 41.9 % (ref 40–54)
HGB BLD-MCNC: 13.1 G/DL (ref 14–18)
HGB UR QL STRIP: ABNORMAL
IMM GRANULOCYTES # BLD AUTO: 0.02 K/UL (ref 0–0.04)
IMM GRANULOCYTES NFR BLD AUTO: 0.3 % (ref 0–0.5)
KETONES UR QL STRIP: NEGATIVE
LEUKOCYTE ESTERASE UR QL STRIP: NEGATIVE
LYMPHOCYTES # BLD AUTO: 1.6 K/UL (ref 1–4.8)
LYMPHOCYTES NFR BLD: 23.8 % (ref 18–48)
MAGNESIUM SERPL-MCNC: 1.9 MG/DL (ref 1.6–2.6)
MCH RBC QN AUTO: 28.2 PG (ref 27–31)
MCHC RBC AUTO-ENTMCNC: 31.3 G/DL (ref 32–36)
MCV RBC AUTO: 90 FL (ref 82–98)
MONOCYTES # BLD AUTO: 0.6 K/UL (ref 0.3–1)
MONOCYTES NFR BLD: 9.2 % (ref 4–15)
NEUTROPHILS # BLD AUTO: 4.1 K/UL (ref 1.8–7.7)
NEUTROPHILS NFR BLD: 62.1 % (ref 38–73)
NITRITE UR QL STRIP: NEGATIVE
NRBC BLD-RTO: 0 /100 WBC
PH UR STRIP: 6 [PH] (ref 5–8)
PHOSPHATE SERPL-MCNC: 3.5 MG/DL (ref 2.7–4.5)
PLATELET # BLD AUTO: 226 K/UL (ref 150–450)
PMV BLD AUTO: 10.2 FL (ref 9.2–12.9)
POTASSIUM SERPL-SCNC: 4.7 MMOL/L (ref 3.5–5.1)
PROT SERPL-MCNC: 6.7 G/DL (ref 6–8.4)
PROT UR QL STRIP: NEGATIVE
PROT UR-MCNC: 9 MG/DL (ref 0–15)
PROT/CREAT UR: 0.11 MG/G{CREAT} (ref 0–0.2)
PTH-INTACT SERPL-MCNC: 190.5 PG/ML (ref 9–77)
RBC # BLD AUTO: 4.64 M/UL (ref 4.6–6.2)
SODIUM SERPL-SCNC: 144 MMOL/L (ref 136–145)
SP GR UR STRIP: 1.02 (ref 1–1.03)
URN SPEC COLLECT METH UR: ABNORMAL
WBC # BLD AUTO: 6.55 K/UL (ref 3.9–12.7)

## 2024-11-08 PROCEDURE — 80197 ASSAY OF TACROLIMUS: CPT | Performed by: INTERNAL MEDICINE

## 2024-11-08 PROCEDURE — 83735 ASSAY OF MAGNESIUM: CPT | Performed by: INTERNAL MEDICINE

## 2024-11-08 PROCEDURE — 36415 COLL VENOUS BLD VENIPUNCTURE: CPT | Mod: PO | Performed by: INTERNAL MEDICINE

## 2024-11-08 PROCEDURE — 82570 ASSAY OF URINE CREATININE: CPT | Performed by: INTERNAL MEDICINE

## 2024-11-08 PROCEDURE — 83970 ASSAY OF PARATHORMONE: CPT | Performed by: INTERNAL MEDICINE

## 2024-11-08 PROCEDURE — 85025 COMPLETE CBC W/AUTO DIFF WBC: CPT | Performed by: INTERNAL MEDICINE

## 2024-11-08 PROCEDURE — 87799 DETECT AGENT NOS DNA QUANT: CPT | Performed by: INTERNAL MEDICINE

## 2024-11-08 PROCEDURE — 81003 URINALYSIS AUTO W/O SCOPE: CPT | Mod: PO | Performed by: INTERNAL MEDICINE

## 2024-11-08 PROCEDURE — 80069 RENAL FUNCTION PANEL: CPT | Performed by: INTERNAL MEDICINE

## 2024-11-08 PROCEDURE — 84450 TRANSFERASE (AST) (SGOT): CPT | Performed by: INTERNAL MEDICINE

## 2024-11-08 NOTE — TELEPHONE ENCOUNTER
----- Message from Saira sent at 11/8/2024 11:46 AM CST -----  Regarding: Consult/Advisory  Contact: pt @ [phone] (home)  Consult/Advisory     Name Of Caller: Mark Lopez     Contact Preference: [phone] (home) [phone] (work)     Nature of call: message is for Veronica, pt is calling to inform office that he was not able to get labs because he need orders. Asking for a urgent call back. Pt is at the lab now.

## 2024-11-09 DIAGNOSIS — T86.10 COMPLICATION OF TRANSPLANTED KIDNEY, UNSPECIFIED COMPLICATION: Primary | ICD-10-CM

## 2024-11-09 DIAGNOSIS — Z94.0 KIDNEY REPLACED BY TRANSPLANT: ICD-10-CM

## 2024-11-09 DIAGNOSIS — Z79.899 IMMUNOSUPPRESSIVE MANAGEMENT ENCOUNTER FOLLOWING KIDNEY TRANSPLANT: ICD-10-CM

## 2024-11-09 DIAGNOSIS — Z94.0 IMMUNOSUPPRESSIVE MANAGEMENT ENCOUNTER FOLLOWING KIDNEY TRANSPLANT: ICD-10-CM

## 2024-11-09 LAB — TACROLIMUS BLD-MCNC: 6.5 NG/ML (ref 5–15)

## 2024-11-10 NOTE — PROGRESS NOTES
Kidney Post-Transplant Assessment    Referring Physician: Fredi Lyon  Current Nephrologist: Fredi Lyon    ORGAN: RIGHT KIDNEY  Donor Type: donation after brain death  PHS Increased Risk: yes  Cold Ischemia: 995 mins  Induction Medications:      Subjective:     CC:  Reassessment of renal allograft function and management of chronic immunosuppression.    HPI:  Mr. Lopez is a 64 y.o. year old  male who received a donation after brain death kidney transplant on 2/29/24.  He has CKD stage 2 - GFR 60-89 and his  BL creatinine ~ 1.3. He takes mycophenolate mofetil, prednisone, and tacrolimus for maintenance immunosuppression.   Pertinent HX:    urine leak with perinephric fluid collection managed medically. Patient currently with negative nephrostogram with PCN capped on 3/26, Curry removed on 4/5 and JEAN-PIERRE drain removed on 4/12. PCN removed on 5/8/24 . PD catheter removed on 5/28/24.     Recent hospitalizations or ER visits since his previous clinic visit. No     LOV 9/4/24  Interval HX:     Intake-adequate intake   UOP-no problems reported   Peripheral edema-minimal   Appetite-denies N/V/D; tolerating IS meds well   Has chronic back/disc  issues and plans to f/u with orthopedic to discuss physical therapy   Reports gaining weight since  txp   Has not been monitoring BP at home --discussed keeping BP log and bringing it to next gen nephrology apt in ~ 1 month     BP  BP Readings from Last 3 Encounters:   11/11/24 (!) 173/77   09/04/24 137/66   08/22/24 135/64       Lab /diagnostic results reviewed with patient today.   All questions answered    Past Medical History:   Diagnosis Date    Anemia     Cataract     Diabetes mellitus     Diabetes mellitus, type 2     Glaucoma     Gout, unspecified     HLD (hyperlipidemia)     Hypertension     Hypothyroidism, unspecified     Kidney failure     Renal disorder        Review of Systems   Constitutional:  Positive for unexpected weight change (weight gain since txp).  "Negative for activity change, appetite change, chills, fatigue and fever.   HENT:  Negative for congestion, facial swelling, postnasal drip, rhinorrhea, sinus pressure, sore throat and trouble swallowing.    Eyes:  Negative for pain, redness and visual disturbance (wears glasses).   Respiratory:  Negative for cough, chest tightness, shortness of breath and wheezing.    Cardiovascular:  Positive for leg swelling. Negative for chest pain and palpitations.   Gastrointestinal:  Negative for abdominal pain, diarrhea, nausea and vomiting.   Genitourinary:  Negative for dysuria, flank pain and urgency.   Musculoskeletal:  Positive for arthralgias (hips and lateral thighs bilaterally) and back pain (chronic back/disc problems). Negative for gait problem, neck pain and neck stiffness.   Skin:  Negative for rash.   Allergic/Immunologic: Positive for immunocompromised state. Negative for environmental allergies and food allergies.   Neurological:  Negative for dizziness, weakness, light-headedness and headaches.   Psychiatric/Behavioral:  Negative for agitation and confusion. The patient is not nervous/anxious.        Objective:     Blood pressure (!) 173/77, pulse 67, temperature 97.3 °F (36.3 °C), temperature source Temporal, resp. rate 18, height 5' 4.02" (1.626 m), weight 107.4 kg (236 lb 12.4 oz), SpO2 97%.body mass index is 40.62 kg/m².    Physical Exam  Constitutional:       Appearance: Normal appearance. He is well-developed.   HENT:      Head: Normocephalic.      Nose: Nose normal.   Eyes:      Conjunctiva/sclera: Conjunctivae normal.      Pupils: Pupils are equal, round, and reactive to light.   Cardiovascular:      Rate and Rhythm: Normal rate and regular rhythm.      Heart sounds: Normal heart sounds.   Pulmonary:      Effort: Pulmonary effort is normal.      Breath sounds: Normal breath sounds.   Abdominal:      General: Bowel sounds are normal.      Palpations: Abdomen is soft.      Comments: No bruit noted over " "the allograft      Musculoskeletal:      Cervical back: Normal range of motion and neck supple.      Right lower leg: Edema (trace) present.      Left lower leg: Edema (trace) present.   Skin:     General: Skin is warm and dry.   Neurological:      Mental Status: He is alert and oriented to person, place, and time.   Psychiatric:         Behavior: Behavior normal.         Labs:  Lab Results   Component Value Date    WBC 6.55 2024    HGB 13.1 (L) 2024    HCT 41.9 2024     2024    K 4.7 2024     (H) 2024    CO2 22 (L) 2024    BUN 37 (H) 2024    CREATININE 1.3 2024    EGFRNORACEVR >60.0 2024    GLUCOSE 176 2024    CALCIUM 10.2 2024    PHOS 3.5 2024    MG 1.9 2024    ALBUMIN 3.7 2024    ALBUMIN 3.7 2024    AST 17 2024    ALT 14 2024    UTPCR 0.11 2024    .5 (H) 2024    TACROLIMUS 6.5 2024       No results found for: "EXTANC", "EXTWBC", "EXTSEGS", "EXTPLATELETS", "EXTHEMOGLOBI", "EXTHEMATOCRI", "EXTCREATININ", "EXTSODIUM", "EXTPOTASSIUM", "EXTBUN", "EXTCO2", "EXTCALCIUM", "EXTPHOSPHORU", "EXTGLUCOSE", "EXTALBUMIN", "EXTAST", "EXTALT", "EXTBILITOTAL", "EXTLIPASE", "EXTAMYLASE"    No results found for: "EXTCYCLOSLVL", "EXTSIROLIMUS", "EXTTACROLVL", "EXTPROTCRE", "EXTPTHINTACT", "EXTPROTEINUA", "EXTWBCUA", "EXTRBCUA"    Labs were reviewed with the patient.    Assessment:     1. Status post -donor kidney transplantation    2. Long-term use of immunosuppressant medication    3. Essential hypertension    4. Stage 2 chronic kidney disease    5. Type 2 diabetes mellitus with stage 2 chronic kidney disease, with long-term current use of insulin    6. At risk for opportunistic infections    7. Class 3 severe obesity due to excess calories with serious comorbidity and body mass index (BMI) of 40.0 to 44.9 in adult        Plan:   Has been taking  mg BID -->  lower KPN  250 mg " BID   Prograf trough pending   Discussed keeping BP log   F/u with gen nephrology ~ 1 month as scheduled   Arthralgias / back problems --encouraged to f/u with local orthopedic --mgmt deferred     1) s/p living related kidney transplant              - Graft function    -FK Trough  pending    - cont on FK  3/3   - cont on Cellcept  500 mg BID   -prednisone   5 mg QD   -Will continue to monitor for drug toxicities      Lab Results   Component Value Date    CREATININE 1.3 11/08/2024         Latest Reference Range & Units 8mo 1wk,  Kidney-Post 1 Year  11/08/24   eGFR >60 mL/min/1.73 m^2 >60.0          2)  HTN: advise low salt diet and home BP monitoring  - Cont  norvasc, coreg , ASA  BP Readings from Last 3 Encounters:   11/11/24 (!) 173/77   09/04/24 137/66   08/22/24 135/64         type II DM:   -  MED/insulin-pump  REGIME   Mgmt deferred to endocrinology    Lab Results   Component Value Date    HGBA1C 5.4 07/24/2024         3) Hypophosphatemia, Hypomagnesemia, Secondary hyperparathyroidism:  - no intervention required ,will continue to monitor/ guidelines                -continue  calcitriol;  lower KPN  250 mg BID      Lab Results   Component Value Date    .5 (H) 11/08/2024    CALCIUM 10.2 11/08/2024    PHOS 3.5 11/08/2024       Latest Reference Range & Units 8mo 1wk,  Kidney-Post 1 Year  11/08/24   Magnesium  1.6 - 2.6 mg/dL 1.9          4) Metabolic acidosis/Electrolyte balance:  - no intervention required ,will continue to monitor/ guidelines    - cont on sodium bicarb    Lab Results   Component Value Date     11/08/2024    K 4.7 11/08/2024     (H) 11/08/2024    CO2 22 (L) 11/08/2024         5) Anemia/Cytopenias:   will monitor as per our guidelines. Medicine list reviewed including potential causes of drug-induced cytopenias                - no intervention required ,will continue to monitor/ guidelines    Neutropenia --resolved   Lab Results   Component Value Date    WBC 6.55 11/08/2024     HGB 13.1 (L) 11/08/2024    HCT 41.9 11/08/2024    MCV 90 11/08/2024     11/08/2024         6) Proteinuria: continue p/c ratio as per guidelines   Protein Creatinine Ratios:       Prot/Creat Ratio, Urine   Date Value Ref Range Status   11/08/2024 0.11 0.00 - 0.20 Final   08/20/2024 0.50 (H) 0.00 - 0.20 Final   05/20/2024 0.13 0.00 - 0.20 Final        .     7) CMV and BK virus infection screening:  will continue to monitor/ guidelines      Latest Reference Range & Units 5mo 4wk,  Kidney-Post 1 Year  08/28/24 08:50   Cytomegalovirus PCR, Quant <50 IU/mL Not Detected      Latest Reference Range & Units 5mo 3wk,  Kidney-Post 1 Year  08/20/24   BK Virus DNA, Blood Not detected  Not detected      Latest Reference Range & Units 5mo 3wk,  Kidney-Post 1 Year  08/20/24   BK Virus DNA PCR, Quant, Blood <125 Copies/mL <125           8) Weight education: provided during the clinic visit   Body mass index is 40.62 kg/m².        Follow-up:   Clinic: return to transplant clinic weekly for the first month after transplant; every 2 weeks during months 2-3; then at 6-, 9-, 12-, 18-, 24-, and 36- months post-transplant to reassess for complications from immunosuppression toxicity and monitor for rejection.  Annually thereafter.    Labs: since patient remains at high risk for rejection and drug-related complications that warrant close monitoring, labs will be ordered as follows: continue twice weekly CBC, renal panel, and drug level for first month; then same labs once weekly through 3rd month post-transplant.  Urine for UA and protein/creatinine ratio monthly.  Serum BK - PCR at 1-, 3-, 6-, 9-, 12-, 18-, 24-, 36- 48-, and 60 months post-transplant.  Hepatic panel at 1-, 2-, 3-, 6-, 9-, 12-, 18-, 24-, and 36- months post-transplant.    Sita Hayden NP       Education:   Material provided to the patient.  Patient reminded to call with any health changes since these can be early signs of significant complications.  Also, I  advised the patient to be sure any new medications or changes of old medications are discussed with either a pharmacist or physician knowledgeable with transplant to avoid rejection/drug toxicity related to significant drug interactions.    Patient advised that it is recommended that all transplanted patients, and their close contacts and household members receive Covid vaccination.    UNOS Patient Status  Functional Status: 90% - Able to carry on normal activity: minor symptoms of disease  Physical Capacity: No Limitations

## 2024-11-11 ENCOUNTER — OFFICE VISIT (OUTPATIENT)
Dept: TRANSPLANT | Facility: CLINIC | Age: 64
End: 2024-11-11
Payer: MEDICARE

## 2024-11-11 VITALS
BODY MASS INDEX: 40.42 KG/M2 | TEMPERATURE: 97 F | DIASTOLIC BLOOD PRESSURE: 77 MMHG | SYSTOLIC BLOOD PRESSURE: 173 MMHG | WEIGHT: 236.75 LBS | HEART RATE: 67 BPM | RESPIRATION RATE: 18 BRPM | OXYGEN SATURATION: 97 % | HEIGHT: 64 IN

## 2024-11-11 DIAGNOSIS — Z79.60 LONG-TERM USE OF IMMUNOSUPPRESSANT MEDICATION: ICD-10-CM

## 2024-11-11 DIAGNOSIS — Z91.89 AT RISK FOR OPPORTUNISTIC INFECTIONS: ICD-10-CM

## 2024-11-11 DIAGNOSIS — E11.22 TYPE 2 DIABETES MELLITUS WITH STAGE 2 CHRONIC KIDNEY DISEASE, WITH LONG-TERM CURRENT USE OF INSULIN: ICD-10-CM

## 2024-11-11 DIAGNOSIS — N18.2 STAGE 2 CHRONIC KIDNEY DISEASE: ICD-10-CM

## 2024-11-11 DIAGNOSIS — N18.2 TYPE 2 DIABETES MELLITUS WITH STAGE 2 CHRONIC KIDNEY DISEASE, WITH LONG-TERM CURRENT USE OF INSULIN: ICD-10-CM

## 2024-11-11 DIAGNOSIS — E66.813 CLASS 3 SEVERE OBESITY DUE TO EXCESS CALORIES WITH SERIOUS COMORBIDITY AND BODY MASS INDEX (BMI) OF 40.0 TO 44.9 IN ADULT: ICD-10-CM

## 2024-11-11 DIAGNOSIS — E66.01 CLASS 3 SEVERE OBESITY DUE TO EXCESS CALORIES WITH SERIOUS COMORBIDITY AND BODY MASS INDEX (BMI) OF 40.0 TO 44.9 IN ADULT: ICD-10-CM

## 2024-11-11 DIAGNOSIS — I10 ESSENTIAL HYPERTENSION: ICD-10-CM

## 2024-11-11 DIAGNOSIS — Z79.4 TYPE 2 DIABETES MELLITUS WITH STAGE 2 CHRONIC KIDNEY DISEASE, WITH LONG-TERM CURRENT USE OF INSULIN: ICD-10-CM

## 2024-11-11 DIAGNOSIS — Z94.0 STATUS POST DECEASED-DONOR KIDNEY TRANSPLANTATION: Primary | ICD-10-CM

## 2024-11-11 PROCEDURE — 99215 OFFICE O/P EST HI 40 MIN: CPT | Mod: S$PBB,,, | Performed by: NURSE PRACTITIONER

## 2024-11-11 PROCEDURE — 99999 PR PBB SHADOW E&M-EST. PATIENT-LVL V: CPT | Mod: PBBFAC,,, | Performed by: NURSE PRACTITIONER

## 2024-11-11 PROCEDURE — 99215 OFFICE O/P EST HI 40 MIN: CPT | Mod: PBBFAC | Performed by: NURSE PRACTITIONER

## 2024-11-11 NOTE — LETTER
November 11, 2024        Fredi Lyon  [address] [facility] [address]  Phone: [phone]  Fax: [phone]             [facility] 1st Fl  [address]  [facility] [address]  Phone: [phone]   Patient: Mark Lopez   MR Number: [id number]   YOB: 1960   Date of Visit: 11/11/2024       Dear Dr. Fredi Lyon    Thank you for referring Mark Lopez to me for evaluation. Attached you will find relevant portions of my assessment and plan of care.    If you have questions, please do not hesitate to call me. I look forward to following Mark Lopez along with you.    Sincerely,    Sita Hayden, NP    Enclosure    If you would like to receive this communication electronically, please contact [email or web address] or [phone] to request [employer] access.    [employer] is a tool which provides read-only access to select patient information with whom you have a relationship. Its easy to use and provides real time access to review your patients record including encounter summaries, notes, results, and demographic information.    If you feel you have received this communication in error or would no longer like to receive these types of communications, please e-mail [email or web address]

## 2024-11-18 ENCOUNTER — LAB VISIT (OUTPATIENT)
Dept: LAB | Facility: HOSPITAL | Age: 64
End: 2024-11-18
Attending: INTERNAL MEDICINE
Payer: MEDICARE

## 2024-11-18 DIAGNOSIS — Z94.0 KIDNEY REPLACED BY TRANSPLANT: ICD-10-CM

## 2024-11-18 DIAGNOSIS — Z94.0 IMMUNOSUPPRESSIVE MANAGEMENT ENCOUNTER FOLLOWING KIDNEY TRANSPLANT: ICD-10-CM

## 2024-11-18 DIAGNOSIS — Z79.899 IMMUNOSUPPRESSIVE MANAGEMENT ENCOUNTER FOLLOWING KIDNEY TRANSPLANT: ICD-10-CM

## 2024-11-18 DIAGNOSIS — T86.10 COMPLICATION OF TRANSPLANTED KIDNEY, UNSPECIFIED COMPLICATION: ICD-10-CM

## 2024-11-18 LAB
ALBUMIN SERPL BCP-MCNC: 3.8 G/DL (ref 3.5–5.2)
ALBUMIN SERPL BCP-MCNC: 3.8 G/DL (ref 3.5–5.2)
ALP SERPL-CCNC: 108 U/L (ref 40–150)
ALT SERPL W/O P-5'-P-CCNC: 17 U/L (ref 10–44)
ANION GAP SERPL CALC-SCNC: 9 MMOL/L (ref 8–16)
AST SERPL-CCNC: 18 U/L (ref 10–40)
BASOPHILS # BLD AUTO: 0.05 K/UL (ref 0–0.2)
BASOPHILS NFR BLD: 0.7 % (ref 0–1.9)
BILIRUB DIRECT SERPL-MCNC: 0.1 MG/DL (ref 0.1–0.3)
BILIRUB SERPL-MCNC: 0.2 MG/DL (ref 0.1–1)
BUN SERPL-MCNC: 31 MG/DL (ref 8–23)
CALCIUM SERPL-MCNC: 10.3 MG/DL (ref 8.7–10.5)
CHLORIDE SERPL-SCNC: 112 MMOL/L (ref 95–110)
CO2 SERPL-SCNC: 22 MMOL/L (ref 23–29)
CREAT SERPL-MCNC: 1.3 MG/DL (ref 0.5–1.4)
DIFFERENTIAL METHOD BLD: ABNORMAL
EOSINOPHIL # BLD AUTO: 0.3 K/UL (ref 0–0.5)
EOSINOPHIL NFR BLD: 4.1 % (ref 0–8)
ERYTHROCYTE [DISTWIDTH] IN BLOOD BY AUTOMATED COUNT: 14.4 % (ref 11.5–14.5)
EST. GFR  (NO RACE VARIABLE): >60 ML/MIN/1.73 M^2
GLUCOSE SERPL-MCNC: 101 MG/DL (ref 70–110)
HCT VFR BLD AUTO: 43.4 % (ref 40–54)
HGB BLD-MCNC: 13.8 G/DL (ref 14–18)
IMM GRANULOCYTES # BLD AUTO: 0.03 K/UL (ref 0–0.04)
IMM GRANULOCYTES NFR BLD AUTO: 0.4 % (ref 0–0.5)
LYMPHOCYTES # BLD AUTO: 1.6 K/UL (ref 1–4.8)
LYMPHOCYTES NFR BLD: 23.4 % (ref 18–48)
MAGNESIUM SERPL-MCNC: 1.9 MG/DL (ref 1.6–2.6)
MCH RBC QN AUTO: 28.4 PG (ref 27–31)
MCHC RBC AUTO-ENTMCNC: 31.8 G/DL (ref 32–36)
MCV RBC AUTO: 89 FL (ref 82–98)
MONOCYTES # BLD AUTO: 0.6 K/UL (ref 0.3–1)
MONOCYTES NFR BLD: 9.3 % (ref 4–15)
NEUTROPHILS # BLD AUTO: 4.3 K/UL (ref 1.8–7.7)
NEUTROPHILS NFR BLD: 62.1 % (ref 38–73)
NRBC BLD-RTO: 0 /100 WBC
PHOSPHATE SERPL-MCNC: 3.3 MG/DL (ref 2.7–4.5)
PLATELET # BLD AUTO: 265 K/UL (ref 150–450)
PMV BLD AUTO: 10.6 FL (ref 9.2–12.9)
POTASSIUM SERPL-SCNC: 4.5 MMOL/L (ref 3.5–5.1)
PROT SERPL-MCNC: 6.8 G/DL (ref 6–8.4)
RBC # BLD AUTO: 4.86 M/UL (ref 4.6–6.2)
SODIUM SERPL-SCNC: 143 MMOL/L (ref 136–145)
WBC # BLD AUTO: 6.87 K/UL (ref 3.9–12.7)

## 2024-11-18 PROCEDURE — 87799 DETECT AGENT NOS DNA QUANT: CPT | Performed by: INTERNAL MEDICINE

## 2024-11-18 PROCEDURE — 36415 COLL VENOUS BLD VENIPUNCTURE: CPT | Mod: PO | Performed by: INTERNAL MEDICINE

## 2024-11-18 PROCEDURE — 84450 TRANSFERASE (AST) (SGOT): CPT | Performed by: INTERNAL MEDICINE

## 2024-11-18 PROCEDURE — 80069 RENAL FUNCTION PANEL: CPT | Performed by: INTERNAL MEDICINE

## 2024-11-18 PROCEDURE — 83735 ASSAY OF MAGNESIUM: CPT | Performed by: INTERNAL MEDICINE

## 2024-11-18 PROCEDURE — 85025 COMPLETE CBC W/AUTO DIFF WBC: CPT | Performed by: INTERNAL MEDICINE

## 2024-11-18 PROCEDURE — 80197 ASSAY OF TACROLIMUS: CPT | Performed by: INTERNAL MEDICINE

## 2024-11-19 LAB — TACROLIMUS BLD-MCNC: 7.5 NG/ML (ref 5–15)

## 2024-12-03 ENCOUNTER — LAB VISIT (OUTPATIENT)
Dept: LAB | Facility: HOSPITAL | Age: 64
End: 2024-12-03
Attending: INTERNAL MEDICINE
Payer: MEDICARE

## 2024-12-03 DIAGNOSIS — Z79.899 IMMUNOSUPPRESSIVE MANAGEMENT ENCOUNTER FOLLOWING KIDNEY TRANSPLANT: ICD-10-CM

## 2024-12-03 DIAGNOSIS — Z94.0 KIDNEY REPLACED BY TRANSPLANT: ICD-10-CM

## 2024-12-03 DIAGNOSIS — Z94.0 IMMUNOSUPPRESSIVE MANAGEMENT ENCOUNTER FOLLOWING KIDNEY TRANSPLANT: ICD-10-CM

## 2024-12-03 LAB
ALBUMIN SERPL BCP-MCNC: 3.8 G/DL (ref 3.5–5.2)
ANION GAP SERPL CALC-SCNC: 8 MMOL/L (ref 8–16)
BASOPHILS # BLD AUTO: 0.06 K/UL (ref 0–0.2)
BASOPHILS NFR BLD: 1 % (ref 0–1.9)
BUN SERPL-MCNC: 29 MG/DL (ref 8–23)
CALCIUM SERPL-MCNC: 10.2 MG/DL (ref 8.7–10.5)
CHLORIDE SERPL-SCNC: 110 MMOL/L (ref 95–110)
CO2 SERPL-SCNC: 21 MMOL/L (ref 23–29)
CREAT SERPL-MCNC: 1.2 MG/DL (ref 0.5–1.4)
DIFFERENTIAL METHOD BLD: ABNORMAL
EOSINOPHIL # BLD AUTO: 0.2 K/UL (ref 0–0.5)
EOSINOPHIL NFR BLD: 3.9 % (ref 0–8)
ERYTHROCYTE [DISTWIDTH] IN BLOOD BY AUTOMATED COUNT: 14.5 % (ref 11.5–14.5)
EST. GFR  (NO RACE VARIABLE): >60 ML/MIN/1.73 M^2
GLUCOSE SERPL-MCNC: 95 MG/DL (ref 70–110)
HCT VFR BLD AUTO: 45.4 % (ref 40–54)
HGB BLD-MCNC: 14.1 G/DL (ref 14–18)
IMM GRANULOCYTES # BLD AUTO: 0.02 K/UL (ref 0–0.04)
IMM GRANULOCYTES NFR BLD AUTO: 0.3 % (ref 0–0.5)
LYMPHOCYTES # BLD AUTO: 1.2 K/UL (ref 1–4.8)
LYMPHOCYTES NFR BLD: 19.3 % (ref 18–48)
MAGNESIUM SERPL-MCNC: 1.9 MG/DL (ref 1.6–2.6)
MCH RBC QN AUTO: 28.2 PG (ref 27–31)
MCHC RBC AUTO-ENTMCNC: 31.1 G/DL (ref 32–36)
MCV RBC AUTO: 91 FL (ref 82–98)
MONOCYTES # BLD AUTO: 0.6 K/UL (ref 0.3–1)
MONOCYTES NFR BLD: 10.1 % (ref 4–15)
NEUTROPHILS # BLD AUTO: 4 K/UL (ref 1.8–7.7)
NEUTROPHILS NFR BLD: 65.4 % (ref 38–73)
NRBC BLD-RTO: 0 /100 WBC
PHOSPHATE SERPL-MCNC: 2.8 MG/DL (ref 2.7–4.5)
PLATELET # BLD AUTO: 254 K/UL (ref 150–450)
PMV BLD AUTO: 10.4 FL (ref 9.2–12.9)
POTASSIUM SERPL-SCNC: 4.3 MMOL/L (ref 3.5–5.1)
RBC # BLD AUTO: 5 M/UL (ref 4.6–6.2)
SODIUM SERPL-SCNC: 139 MMOL/L (ref 136–145)
WBC # BLD AUTO: 6.11 K/UL (ref 3.9–12.7)

## 2024-12-03 PROCEDURE — 36415 COLL VENOUS BLD VENIPUNCTURE: CPT | Mod: PO | Performed by: INTERNAL MEDICINE

## 2024-12-03 PROCEDURE — 80197 ASSAY OF TACROLIMUS: CPT | Performed by: INTERNAL MEDICINE

## 2024-12-03 PROCEDURE — 85025 COMPLETE CBC W/AUTO DIFF WBC: CPT | Performed by: INTERNAL MEDICINE

## 2024-12-03 PROCEDURE — 83735 ASSAY OF MAGNESIUM: CPT | Performed by: INTERNAL MEDICINE

## 2024-12-03 PROCEDURE — 80069 RENAL FUNCTION PANEL: CPT | Performed by: INTERNAL MEDICINE

## 2024-12-04 LAB — TACROLIMUS BLD-MCNC: 7.8 NG/ML (ref 5–15)

## 2025-01-09 ENCOUNTER — LAB VISIT (OUTPATIENT)
Dept: LAB | Facility: HOSPITAL | Age: 65
End: 2025-01-09
Attending: INTERNAL MEDICINE
Payer: MEDICARE

## 2025-01-09 DIAGNOSIS — Z94.0 IMMUNOSUPPRESSIVE MANAGEMENT ENCOUNTER FOLLOWING KIDNEY TRANSPLANT: ICD-10-CM

## 2025-01-09 DIAGNOSIS — Z79.899 IMMUNOSUPPRESSIVE MANAGEMENT ENCOUNTER FOLLOWING KIDNEY TRANSPLANT: ICD-10-CM

## 2025-01-09 DIAGNOSIS — Z94.0 KIDNEY REPLACED BY TRANSPLANT: ICD-10-CM

## 2025-01-09 LAB
ALBUMIN SERPL BCP-MCNC: 4 G/DL (ref 3.5–5.2)
ANION GAP SERPL CALC-SCNC: 10 MMOL/L (ref 8–16)
BASOPHILS # BLD AUTO: 0.06 K/UL (ref 0–0.2)
BASOPHILS NFR BLD: 0.9 % (ref 0–1.9)
BUN SERPL-MCNC: 25 MG/DL (ref 8–23)
CALCIUM SERPL-MCNC: 10.5 MG/DL (ref 8.7–10.5)
CHLORIDE SERPL-SCNC: 108 MMOL/L (ref 95–110)
CO2 SERPL-SCNC: 23 MMOL/L (ref 23–29)
CREAT SERPL-MCNC: 1.3 MG/DL (ref 0.5–1.4)
DIFFERENTIAL METHOD BLD: ABNORMAL
EOSINOPHIL # BLD AUTO: 0.1 K/UL (ref 0–0.5)
EOSINOPHIL NFR BLD: 2.1 % (ref 0–8)
ERYTHROCYTE [DISTWIDTH] IN BLOOD BY AUTOMATED COUNT: 13.8 % (ref 11.5–14.5)
EST. GFR  (NO RACE VARIABLE): >60 ML/MIN/1.73 M^2
GLUCOSE SERPL-MCNC: 71 MG/DL (ref 70–110)
HCT VFR BLD AUTO: 45.3 % (ref 40–54)
HGB BLD-MCNC: 14.4 G/DL (ref 14–18)
IMM GRANULOCYTES # BLD AUTO: 0.02 K/UL (ref 0–0.04)
IMM GRANULOCYTES NFR BLD AUTO: 0.3 % (ref 0–0.5)
LYMPHOCYTES # BLD AUTO: 1.3 K/UL (ref 1–4.8)
LYMPHOCYTES NFR BLD: 19.4 % (ref 18–48)
MAGNESIUM SERPL-MCNC: 1.7 MG/DL (ref 1.6–2.6)
MCH RBC QN AUTO: 27.9 PG (ref 27–31)
MCHC RBC AUTO-ENTMCNC: 31.8 G/DL (ref 32–36)
MCV RBC AUTO: 88 FL (ref 82–98)
MONOCYTES # BLD AUTO: 0.6 K/UL (ref 0.3–1)
MONOCYTES NFR BLD: 9.1 % (ref 4–15)
NEUTROPHILS # BLD AUTO: 4.5 K/UL (ref 1.8–7.7)
NEUTROPHILS NFR BLD: 68.2 % (ref 38–73)
NRBC BLD-RTO: 0 /100 WBC
PHOSPHATE SERPL-MCNC: 3 MG/DL (ref 2.7–4.5)
PLATELET # BLD AUTO: 246 K/UL (ref 150–450)
PMV BLD AUTO: 11.1 FL (ref 9.2–12.9)
POTASSIUM SERPL-SCNC: 4.1 MMOL/L (ref 3.5–5.1)
RBC # BLD AUTO: 5.17 M/UL (ref 4.6–6.2)
SODIUM SERPL-SCNC: 141 MMOL/L (ref 136–145)
WBC # BLD AUTO: 6.6 K/UL (ref 3.9–12.7)

## 2025-01-09 PROCEDURE — 36415 COLL VENOUS BLD VENIPUNCTURE: CPT | Mod: PO | Performed by: INTERNAL MEDICINE

## 2025-01-09 PROCEDURE — 80069 RENAL FUNCTION PANEL: CPT | Performed by: INTERNAL MEDICINE

## 2025-01-09 PROCEDURE — 83735 ASSAY OF MAGNESIUM: CPT | Performed by: INTERNAL MEDICINE

## 2025-01-09 PROCEDURE — 85025 COMPLETE CBC W/AUTO DIFF WBC: CPT | Performed by: INTERNAL MEDICINE

## 2025-01-09 PROCEDURE — 80197 ASSAY OF TACROLIMUS: CPT | Performed by: INTERNAL MEDICINE

## 2025-01-10 ENCOUNTER — PATIENT MESSAGE (OUTPATIENT)
Dept: TRANSPLANT | Facility: CLINIC | Age: 65
End: 2025-01-10
Payer: MEDICARE

## 2025-01-10 ENCOUNTER — TELEPHONE (OUTPATIENT)
Dept: TRANSPLANT | Facility: CLINIC | Age: 65
End: 2025-01-10
Payer: MEDICARE

## 2025-01-10 LAB — TACROLIMUS BLD-MCNC: 27.3 NG/ML (ref 5–15)

## 2025-01-10 NOTE — TELEPHONE ENCOUNTER
Coordinator received message from patient reporting why his tacro level is elevated.     Patient states high level of tacrolimus is because  he forgot to take meds the night before so he took dosage hour and a half prior to having labs done Thursday pt wanted coord to know this information     Patient will repeat tacro level one day next week.     ----- Message from Hope Alexander DO sent at 1/10/2025 12:02 PM CST -----  Elevated FK level. Did he take his meds prior to labs today  Near baseline graft function. Acceptable electrolytes

## 2025-01-10 NOTE — TELEPHONE ENCOUNTER
----- Message from Leticia sent at 1/10/2025  8:57 AM CST -----  Regarding: Patient advice  Contact: Pt  [phone]            Name of Caller:  Father Mark      Contact Preference:[phone]    Nature of Call: States high levels of tacrolimus is because  he forgot to take meds the night before so he took dosage hour and a half prior to having labs done Thursday pt wanted coord to know this information   How Many Skin Cancers Have You Had?: one What Is The Reason For Today's Visit?: History of Non-Melanoma Skin Cancer When Was Your Last Cancer Diagnosed?: 2018

## 2025-01-15 ENCOUNTER — LAB VISIT (OUTPATIENT)
Dept: LAB | Facility: HOSPITAL | Age: 65
End: 2025-01-15
Attending: INTERNAL MEDICINE
Payer: MEDICARE

## 2025-01-15 DIAGNOSIS — Z79.899 IMMUNOSUPPRESSIVE MANAGEMENT ENCOUNTER FOLLOWING KIDNEY TRANSPLANT: ICD-10-CM

## 2025-01-15 DIAGNOSIS — Z94.0 IMMUNOSUPPRESSIVE MANAGEMENT ENCOUNTER FOLLOWING KIDNEY TRANSPLANT: ICD-10-CM

## 2025-01-15 PROCEDURE — 80197 ASSAY OF TACROLIMUS: CPT | Performed by: INTERNAL MEDICINE

## 2025-01-15 PROCEDURE — 36415 COLL VENOUS BLD VENIPUNCTURE: CPT | Mod: PO | Performed by: INTERNAL MEDICINE

## 2025-01-16 LAB — TACROLIMUS BLD-MCNC: 7.5 NG/ML (ref 5–15)

## 2025-02-04 ENCOUNTER — LAB VISIT (OUTPATIENT)
Dept: LAB | Facility: HOSPITAL | Age: 65
End: 2025-02-04
Attending: INTERNAL MEDICINE
Payer: MEDICARE

## 2025-02-04 DIAGNOSIS — Z94.0 KIDNEY REPLACED BY TRANSPLANT: ICD-10-CM

## 2025-02-04 DIAGNOSIS — Z79.899 IMMUNOSUPPRESSIVE MANAGEMENT ENCOUNTER FOLLOWING KIDNEY TRANSPLANT: ICD-10-CM

## 2025-02-04 DIAGNOSIS — T86.10 COMPLICATION OF TRANSPLANTED KIDNEY, UNSPECIFIED COMPLICATION: ICD-10-CM

## 2025-02-04 DIAGNOSIS — Z94.0 IMMUNOSUPPRESSIVE MANAGEMENT ENCOUNTER FOLLOWING KIDNEY TRANSPLANT: ICD-10-CM

## 2025-02-04 LAB
ALBUMIN SERPL BCP-MCNC: 3.6 G/DL (ref 3.5–5.2)
ALBUMIN SERPL BCP-MCNC: 3.6 G/DL (ref 3.5–5.2)
ALP SERPL-CCNC: 98 U/L (ref 40–150)
ALT SERPL W/O P-5'-P-CCNC: 14 U/L (ref 10–44)
ANION GAP SERPL CALC-SCNC: 9 MMOL/L (ref 8–16)
AST SERPL-CCNC: 17 U/L (ref 10–40)
BASOPHILS # BLD AUTO: 0.06 K/UL (ref 0–0.2)
BASOPHILS NFR BLD: 0.9 % (ref 0–1.9)
BILIRUB DIRECT SERPL-MCNC: 0.2 MG/DL (ref 0.1–0.3)
BILIRUB SERPL-MCNC: 0.5 MG/DL (ref 0.1–1)
BUN SERPL-MCNC: 20 MG/DL (ref 8–23)
CALCIUM SERPL-MCNC: 9.8 MG/DL (ref 8.7–10.5)
CHLORIDE SERPL-SCNC: 110 MMOL/L (ref 95–110)
CO2 SERPL-SCNC: 21 MMOL/L (ref 23–29)
CREAT SERPL-MCNC: 1.2 MG/DL (ref 0.5–1.4)
DIFFERENTIAL METHOD BLD: ABNORMAL
EOSINOPHIL # BLD AUTO: 0.2 K/UL (ref 0–0.5)
EOSINOPHIL NFR BLD: 3.6 % (ref 0–8)
ERYTHROCYTE [DISTWIDTH] IN BLOOD BY AUTOMATED COUNT: 13.7 % (ref 11.5–14.5)
EST. GFR  (NO RACE VARIABLE): >60 ML/MIN/1.73 M^2
GLUCOSE SERPL-MCNC: 83 MG/DL (ref 70–110)
HCT VFR BLD AUTO: 43.5 % (ref 40–54)
HGB BLD-MCNC: 13.9 G/DL (ref 14–18)
IMM GRANULOCYTES # BLD AUTO: 0.02 K/UL (ref 0–0.04)
IMM GRANULOCYTES NFR BLD AUTO: 0.3 % (ref 0–0.5)
LYMPHOCYTES # BLD AUTO: 1.3 K/UL (ref 1–4.8)
LYMPHOCYTES NFR BLD: 19.6 % (ref 18–48)
MAGNESIUM SERPL-MCNC: 1.7 MG/DL (ref 1.6–2.6)
MCH RBC QN AUTO: 27.9 PG (ref 27–31)
MCHC RBC AUTO-ENTMCNC: 32 G/DL (ref 32–36)
MCV RBC AUTO: 87 FL (ref 82–98)
MONOCYTES # BLD AUTO: 0.6 K/UL (ref 0.3–1)
MONOCYTES NFR BLD: 9.1 % (ref 4–15)
NEUTROPHILS # BLD AUTO: 4.5 K/UL (ref 1.8–7.7)
NEUTROPHILS NFR BLD: 66.5 % (ref 38–73)
NRBC BLD-RTO: 0 /100 WBC
PHOSPHATE SERPL-MCNC: 2.6 MG/DL (ref 2.7–4.5)
PLATELET # BLD AUTO: 262 K/UL (ref 150–450)
PMV BLD AUTO: 10.5 FL (ref 9.2–12.9)
POTASSIUM SERPL-SCNC: 4.2 MMOL/L (ref 3.5–5.1)
PROT SERPL-MCNC: 6.6 G/DL (ref 6–8.4)
RBC # BLD AUTO: 4.99 M/UL (ref 4.6–6.2)
SODIUM SERPL-SCNC: 140 MMOL/L (ref 136–145)
WBC # BLD AUTO: 6.7 K/UL (ref 3.9–12.7)

## 2025-02-04 PROCEDURE — 87799 DETECT AGENT NOS DNA QUANT: CPT | Performed by: INTERNAL MEDICINE

## 2025-02-04 PROCEDURE — 83735 ASSAY OF MAGNESIUM: CPT | Performed by: INTERNAL MEDICINE

## 2025-02-04 PROCEDURE — 85025 COMPLETE CBC W/AUTO DIFF WBC: CPT | Performed by: INTERNAL MEDICINE

## 2025-02-04 PROCEDURE — 80197 ASSAY OF TACROLIMUS: CPT | Performed by: INTERNAL MEDICINE

## 2025-02-04 PROCEDURE — 84450 TRANSFERASE (AST) (SGOT): CPT | Performed by: INTERNAL MEDICINE

## 2025-02-04 PROCEDURE — 36415 COLL VENOUS BLD VENIPUNCTURE: CPT | Mod: PO | Performed by: INTERNAL MEDICINE

## 2025-02-04 PROCEDURE — 80069 RENAL FUNCTION PANEL: CPT | Performed by: INTERNAL MEDICINE

## 2025-02-05 LAB — TACROLIMUS BLD-MCNC: 6.1 NG/ML (ref 5–15)

## 2025-02-13 ENCOUNTER — TELEPHONE (OUTPATIENT)
Dept: NEUROLOGY | Facility: CLINIC | Age: 65
End: 2025-02-13
Payer: MEDICARE

## 2025-02-13 NOTE — TELEPHONE ENCOUNTER
Spoke with patient to discuss appointment. Patient states he has been having a lot of difficulty walking and has noticed some weakness in thighs. Also has burning pain in legs. Patient is aware that we do not treat pain. Appointment scheduled for 11 am on 5/22/25 with Karla Mahoney. Time/date/location provided.

## 2025-02-13 NOTE — TELEPHONE ENCOUNTER
----- Message from Joi sent at 2/13/2025  4:06 PM CST -----  Regarding: appointment  Contact: patient  Type:  Sooner Appointment Request    Caller is requesting a sooner appointment.  Caller declined first available appointment listed below.  Caller will not accept being placed on the waitlist and is requesting a message be sent to doctor.    Name of Caller:  patient  When is the first available appointment?    Symptoms:  neuropathy diabetic burning in legs  Would the patient rather a call back or a response via [facility]? call  Best Call Back Number:  [phone] (home) [phone] (work)    Additional Information:  Please call patient to advise.  Thanks!

## 2025-03-17 DIAGNOSIS — R80.9 PROTEINURIA, UNSPECIFIED TYPE: ICD-10-CM

## 2025-03-17 DIAGNOSIS — N28.9 KIDNEY DISEASE: Primary | ICD-10-CM

## 2025-03-18 ENCOUNTER — TELEPHONE (OUTPATIENT)
Dept: NEUROLOGY | Facility: CLINIC | Age: 65
End: 2025-03-18
Payer: MEDICARE

## 2025-03-19 ENCOUNTER — LAB VISIT (OUTPATIENT)
Dept: LAB | Facility: HOSPITAL | Age: 65
End: 2025-03-19
Attending: INTERNAL MEDICINE
Payer: MEDICARE

## 2025-03-19 DIAGNOSIS — Z94.0 KIDNEY REPLACED BY TRANSPLANT: ICD-10-CM

## 2025-03-19 DIAGNOSIS — Z94.0 IMMUNOSUPPRESSIVE MANAGEMENT ENCOUNTER FOLLOWING KIDNEY TRANSPLANT: ICD-10-CM

## 2025-03-19 DIAGNOSIS — Z79.899 IMMUNOSUPPRESSIVE MANAGEMENT ENCOUNTER FOLLOWING KIDNEY TRANSPLANT: ICD-10-CM

## 2025-03-19 DIAGNOSIS — T86.10 COMPLICATION OF TRANSPLANTED KIDNEY, UNSPECIFIED COMPLICATION: ICD-10-CM

## 2025-03-19 LAB
ALBUMIN SERPL BCP-MCNC: 3.7 G/DL (ref 3.5–5.2)
ALBUMIN SERPL BCP-MCNC: 3.7 G/DL (ref 3.5–5.2)
ALP SERPL-CCNC: 108 U/L (ref 40–150)
ALT SERPL W/O P-5'-P-CCNC: 13 U/L (ref 10–44)
ANION GAP SERPL CALC-SCNC: 9 MMOL/L (ref 8–16)
AST SERPL-CCNC: 17 U/L (ref 10–40)
BASOPHILS # BLD AUTO: 0.06 K/UL (ref 0–0.2)
BASOPHILS NFR BLD: 0.8 % (ref 0–1.9)
BILIRUB DIRECT SERPL-MCNC: 0.1 MG/DL (ref 0.1–0.3)
BILIRUB SERPL-MCNC: 0.4 MG/DL (ref 0.1–1)
BUN SERPL-MCNC: 27 MG/DL (ref 8–23)
CALCIUM SERPL-MCNC: 9.7 MG/DL (ref 8.7–10.5)
CHLORIDE SERPL-SCNC: 110 MMOL/L (ref 95–110)
CO2 SERPL-SCNC: 22 MMOL/L (ref 23–29)
CREAT SERPL-MCNC: 1.2 MG/DL (ref 0.5–1.4)
DIFFERENTIAL METHOD BLD: ABNORMAL
EOSINOPHIL # BLD AUTO: 0.3 K/UL (ref 0–0.5)
EOSINOPHIL NFR BLD: 4.7 % (ref 0–8)
ERYTHROCYTE [DISTWIDTH] IN BLOOD BY AUTOMATED COUNT: 13.6 % (ref 11.5–14.5)
EST. GFR  (NO RACE VARIABLE): >60 ML/MIN/1.73 M^2
GLUCOSE SERPL-MCNC: 77 MG/DL (ref 70–110)
HCT VFR BLD AUTO: 44.4 % (ref 40–54)
HGB BLD-MCNC: 14 G/DL (ref 14–18)
IMM GRANULOCYTES # BLD AUTO: 0.08 K/UL (ref 0–0.04)
IMM GRANULOCYTES NFR BLD AUTO: 1.1 % (ref 0–0.5)
LYMPHOCYTES # BLD AUTO: 1.5 K/UL (ref 1–4.8)
LYMPHOCYTES NFR BLD: 20.2 % (ref 18–48)
MAGNESIUM SERPL-MCNC: 2 MG/DL (ref 1.6–2.6)
MCH RBC QN AUTO: 28.1 PG (ref 27–31)
MCHC RBC AUTO-ENTMCNC: 31.5 G/DL (ref 32–36)
MCV RBC AUTO: 89 FL (ref 82–98)
MONOCYTES # BLD AUTO: 0.6 K/UL (ref 0.3–1)
MONOCYTES NFR BLD: 8.9 % (ref 4–15)
NEUTROPHILS # BLD AUTO: 4.7 K/UL (ref 1.8–7.7)
NEUTROPHILS NFR BLD: 64.3 % (ref 38–73)
NRBC BLD-RTO: 0 /100 WBC
PHOSPHATE SERPL-MCNC: 3.1 MG/DL (ref 2.7–4.5)
PLATELET # BLD AUTO: 286 K/UL (ref 150–450)
PMV BLD AUTO: 10.7 FL (ref 9.2–12.9)
POTASSIUM SERPL-SCNC: 4.4 MMOL/L (ref 3.5–5.1)
PROT SERPL-MCNC: 6.5 G/DL (ref 6–8.4)
RBC # BLD AUTO: 4.98 M/UL (ref 4.6–6.2)
SODIUM SERPL-SCNC: 141 MMOL/L (ref 136–145)
WBC # BLD AUTO: 7.23 K/UL (ref 3.9–12.7)

## 2025-03-19 PROCEDURE — 87799 DETECT AGENT NOS DNA QUANT: CPT | Performed by: INTERNAL MEDICINE

## 2025-03-19 PROCEDURE — 36415 COLL VENOUS BLD VENIPUNCTURE: CPT | Mod: PO | Performed by: INTERNAL MEDICINE

## 2025-03-19 PROCEDURE — 84155 ASSAY OF PROTEIN SERUM: CPT | Performed by: INTERNAL MEDICINE

## 2025-03-19 PROCEDURE — 80069 RENAL FUNCTION PANEL: CPT | Performed by: INTERNAL MEDICINE

## 2025-03-19 PROCEDURE — 83735 ASSAY OF MAGNESIUM: CPT | Performed by: INTERNAL MEDICINE

## 2025-03-19 PROCEDURE — 80197 ASSAY OF TACROLIMUS: CPT | Performed by: INTERNAL MEDICINE

## 2025-03-19 PROCEDURE — 85025 COMPLETE CBC W/AUTO DIFF WBC: CPT | Performed by: INTERNAL MEDICINE

## 2025-03-20 ENCOUNTER — RESULTS FOLLOW-UP (OUTPATIENT)
Dept: TRANSPLANT | Facility: CLINIC | Age: 65
End: 2025-03-20

## 2025-03-20 LAB — TACROLIMUS BLD-MCNC: 6.5 NG/ML (ref 5–15)

## 2025-03-23 RX ORDER — CARVEDILOL 12.5 MG/1
12.5 TABLET ORAL 2 TIMES DAILY
Qty: 180 TABLET | Refills: 1 | OUTPATIENT
Start: 2025-03-23

## 2025-03-24 ENCOUNTER — TELEPHONE (OUTPATIENT)
Dept: NEUROLOGY | Facility: CLINIC | Age: 65
End: 2025-03-24
Payer: MEDICARE

## 2025-03-24 ENCOUNTER — RESULTS FOLLOW-UP (OUTPATIENT)
Dept: TRANSPLANT | Facility: HOSPITAL | Age: 65
End: 2025-03-24

## 2025-03-24 NOTE — TELEPHONE ENCOUNTER
Left message that the appt with Karla Mahoney on 5/20/25 at 1530 needs to be rescheduled.   Lab: [id number] Lab: -[id number]

## 2025-03-31 ENCOUNTER — OFFICE VISIT (OUTPATIENT)
Dept: TRANSPLANT | Facility: CLINIC | Age: 65
End: 2025-03-31
Payer: MEDICARE

## 2025-03-31 VITALS
HEART RATE: 61 BPM | WEIGHT: 249.13 LBS | DIASTOLIC BLOOD PRESSURE: 70 MMHG | TEMPERATURE: 97 F | BODY MASS INDEX: 42.53 KG/M2 | HEIGHT: 64 IN | RESPIRATION RATE: 18 BRPM | SYSTOLIC BLOOD PRESSURE: 147 MMHG | OXYGEN SATURATION: 95 %

## 2025-03-31 DIAGNOSIS — D84.821 IMMUNODEFICIENCY DUE TO LONG TERM IMMUNOSUPPRESSIVE DRUG THERAPY: ICD-10-CM

## 2025-03-31 DIAGNOSIS — Z94.0 STATUS POST DECEASED-DONOR KIDNEY TRANSPLANTATION: ICD-10-CM

## 2025-03-31 DIAGNOSIS — Z79.60 IMMUNODEFICIENCY DUE TO LONG TERM IMMUNOSUPPRESSIVE DRUG THERAPY: ICD-10-CM

## 2025-03-31 DIAGNOSIS — N18.2 STAGE 2 CHRONIC KIDNEY DISEASE: Primary | ICD-10-CM

## 2025-03-31 DIAGNOSIS — T45.1X5A IMMUNODEFICIENCY DUE TO LONG TERM IMMUNOSUPPRESSIVE DRUG THERAPY: ICD-10-CM

## 2025-03-31 PROBLEM — E11.22 TYPE 2 DIABETES MELLITUS WITH STAGE 2 CHRONIC KIDNEY DISEASE, WITH LONG-TERM CURRENT USE OF INSULIN: Status: ACTIVE | Noted: 2021-04-26

## 2025-03-31 PROBLEM — I12.9 BENIGN HYPERTENSION WITH CHRONIC KIDNEY DISEASE, STAGE II: Status: ACTIVE | Noted: 2021-04-26

## 2025-03-31 PROCEDURE — 1159F MED LIST DOCD IN RCRD: CPT | Mod: CPTII,S$GLB,, | Performed by: NURSE PRACTITIONER

## 2025-03-31 PROCEDURE — 3077F SYST BP >= 140 MM HG: CPT | Mod: CPTII,S$GLB,, | Performed by: NURSE PRACTITIONER

## 2025-03-31 PROCEDURE — 1101F PT FALLS ASSESS-DOCD LE1/YR: CPT | Mod: CPTII,S$GLB,, | Performed by: NURSE PRACTITIONER

## 2025-03-31 PROCEDURE — 3078F DIAST BP <80 MM HG: CPT | Mod: CPTII,S$GLB,, | Performed by: NURSE PRACTITIONER

## 2025-03-31 PROCEDURE — 3288F FALL RISK ASSESSMENT DOCD: CPT | Mod: CPTII,S$GLB,, | Performed by: NURSE PRACTITIONER

## 2025-03-31 PROCEDURE — 99999 PR PBB SHADOW E&M-EST. PATIENT-LVL IV: CPT | Mod: PBBFAC,,, | Performed by: NURSE PRACTITIONER

## 2025-03-31 PROCEDURE — 1125F AMNT PAIN NOTED PAIN PRSNT: CPT | Mod: CPTII,S$GLB,, | Performed by: NURSE PRACTITIONER

## 2025-03-31 PROCEDURE — 99214 OFFICE O/P EST MOD 30 MIN: CPT | Mod: S$GLB,,, | Performed by: NURSE PRACTITIONER

## 2025-03-31 PROCEDURE — 3008F BODY MASS INDEX DOCD: CPT | Mod: CPTII,S$GLB,, | Performed by: NURSE PRACTITIONER

## 2025-03-31 NOTE — PROGRESS NOTES
Kidney Post-Transplant Assessment    Referring Physician: Fredi Lyon  Current Nephrologist: Fredi Lyon    ORGAN: RIGHT KIDNEY  Donor Type: donation after brain death  PHS Increased Risk: yes  Cold Ischemia: 995 mins  Induction Medications:      Subjective:     CC:  Reassessment of renal allograft function and management of chronic immunosuppression.    HPI:  Mr. Lopez is a 65 y.o. year old  male who received a donation after brain death kidney transplant on 2/29/24.  He has CKD stage 2 - GFR 60-89 and his  BL creatinine ~ 1.3. He takes mycophenolate mofetil, prednisone, and tacrolimus for maintenance immunosuppression.   Pertinent HX:   DBD Ktx on 2/29/24 for presumed diabetic nephropathy (donor RPR+, CMV D+, R-, CIT ~16.5hrs). Post op:  PCN G  x 3 +RPR donor culture  DGF: now resolved with baseline Cr ~ 1.3  Urine leak with perinephric collection managed medically; s/p drain, cramer, and neph tube. Cramer removed 4/5/24, JEAN-PIERRE drain removed 4/12/24, and PD catheter removed on 5/28/24.  PD cath removed 5/28/24        Recent hospitalizations or ER visits since his previous clinic visit. No     Interval HX:  Next General Nephrology appointment 4/23/25  Intake-adequate intake   UOP-no problems reported   Peripheral edema-minimal   Appetite-denies N/V/D; tolerating IS meds well   Has chronic back/disc---plans on steroid injection soon  Reports gaining weight since  txp -- started diet yesterday    BP  BP Readings from Last 3 Encounters:   03/31/25 (!) 147/70   11/11/24 (!) 173/77   09/04/24 137/66       Lab /diagnostic results reviewed with patient today.   All questions answered    Past Medical History:   Diagnosis Date    Anemia     Cataract     Diabetes mellitus     Diabetes mellitus, type 2     Glaucoma     Gout, unspecified     HLD (hyperlipidemia)     Hypertension     Hypothyroidism, unspecified     Kidney failure     Renal disorder        Review of Systems   Constitutional:  Positive for unexpected  "weight change (weight gain since txp). Negative for activity change, appetite change, chills, fatigue and fever.   HENT:  Negative for congestion, facial swelling, postnasal drip, rhinorrhea, sinus pressure, sore throat and trouble swallowing.    Eyes:  Negative for pain, redness and visual disturbance (wears glasses).   Respiratory:  Negative for cough, chest tightness, shortness of breath and wheezing.    Cardiovascular:  Positive for leg swelling. Negative for chest pain and palpitations.   Gastrointestinal:  Negative for abdominal pain, diarrhea, nausea and vomiting.   Genitourinary:  Negative for dysuria, flank pain and urgency.   Musculoskeletal:  Positive for arthralgias (hips and lateral thighs bilaterally) and back pain (chronic back/disc problems). Negative for gait problem, neck pain and neck stiffness.   Skin:  Negative for rash.   Allergic/Immunologic: Positive for immunocompromised state. Negative for environmental allergies and food allergies.   Neurological:  Negative for dizziness, weakness, light-headedness and headaches.   Psychiatric/Behavioral:  Negative for agitation and confusion. The patient is not nervous/anxious.        Objective:     Blood pressure (!) 147/70, pulse 61, temperature 97.2 °F (36.2 °C), temperature source Tympanic, resp. rate 18, height 5' 4" (1.626 m), weight 113 kg (249 lb 1.9 oz), SpO2 95%.body mass index is 42.76 kg/m².    Physical Exam  Constitutional:       Appearance: Normal appearance. He is well-developed.   HENT:      Head: Normocephalic.      Nose: Nose normal.   Eyes:      Conjunctiva/sclera: Conjunctivae normal.      Pupils: Pupils are equal, round, and reactive to light.   Pulmonary:      Effort: Pulmonary effort is normal.   Abdominal:      Comments:      Musculoskeletal:      Cervical back: Normal range of motion and neck supple.      Right lower leg: Edema (trace) present.      Left lower leg: Edema (trace) present.   Skin:     General: Skin is warm and dry. " "  Neurological:      Mental Status: He is alert and oriented to person, place, and time.   Psychiatric:         Behavior: Behavior normal.         Labs:  Lab Results   Component Value Date    WBC 7.23 2025    HGB 14.0 2025    HCT 44.4 2025     2025    K 4.4 2025     2025    CO2 22 (L) 2025    BUN 27 (H) 2025    CREATININE 1.2 2025    EGFRNORACEVR >60.0 2025    GLUCOSE 176 2024    CALCIUM 9.7 2025    PHOS 3.1 2025    MG 2.0 2025    ALBUMIN 3.7 2025    ALBUMIN 3.7 2025    AST 17 2025    ALT 13 2025    UTPCR Unable to calculate 2025    .5 (H) 2024    TACROLIMUS 6.5 2025       No results found for: "EXTANC", "EXTWBC", "EXTSEGS", "EXTPLATELETS", "EXTHEMOGLOBI", "EXTHEMATOCRI", "EXTCREATININ", "EXTSODIUM", "EXTPOTASSIUM", "EXTBUN", "EXTCO2", "EXTCALCIUM", "EXTPHOSPHORU", "EXTGLUCOSE", "EXTALBUMIN", "EXTAST", "EXTALT", "EXTBILITOTAL", "EXTLIPASE", "EXTAMYLASE"    No results found for: "EXTCYCLOSLVL", "EXTSIROLIMUS", "EXTTACROLVL", "EXTPROTCRE", "EXTPTHINTACT", "EXTPROTEINUA", "EXTWBCUA", "EXTRBCUA"    Labs were reviewed with the patient.    Assessment:     1. Stage 2 chronic kidney disease    2. Status post -donor kidney transplantation    3. Immunodeficiency due to long term immunosuppressive drug therapy        Plan:   Renal function at baseline  No need for TXP refills  Discussed back injections---no need to hold TXP medications  Routine follow-up and labs    1) s/p living related kidney transplant              - Graft function, at baseline 1.2    -FK Trough 6.5   - cont on FK  3/3   - cont on Cellcept  500 mg BID   -prednisone 5 mg QD   -Will continue to monitor for drug toxicities      Lab Results   Component Value Date    CREATININE 1.2 2025      Latest Reference Range & Units 1yr 0mo,  Kidney-Post 2 Year  25   Creatinine 0.5 - 1.4 mg/dL 1.2   eGFR >60 " mL/min/1.73 m^2 >60.0   Glucose 70 - 110 mg/dL 77          2)  HTN: advise low salt diet and home BP monitoring  - Cont  norvasc, coreg , ASA  BP Readings from Last 3 Encounters:   11/11/24 (!) 173/77   09/04/24 137/66   08/22/24 135/64         type II DM:   -  MED/insulin-pump  REGIME   Mgmt deferred to endocrinology    Lab Results   Component Value Date    HGBA1C 5.4 07/24/2024         3) Hypophosphatemia, Hypomagnesemia, Secondary hyperparathyroidism:  - no intervention required ,will continue to monitor/ guidelines                -continue  calcitriol;  lower KPN  250 mg BID      Lab Results   Component Value Date    .5 (H) 11/08/2024    CALCIUM 9.7 03/19/2025    PHOS 3.1 03/19/2025      Latest Reference Range & Units 1yr 0mo,  Kidney-Post 2 Year  03/19/25   Magnesium  1.6 - 2.6 mg/dL 2.0          4) Metabolic acidosis/Electrolyte balance:  - no intervention required ,will continue to monitor/ guidelines    - cont on sodium bicarb    Lab Results   Component Value Date     03/19/2025    K 4.4 03/19/2025     03/19/2025    CO2 22 (L) 03/19/2025         5) Anemia/Cytopenias:   will monitor as per our guidelines. Medicine list reviewed including potential causes of drug-induced cytopenias                - no intervention required ,will continue to monitor/ guidelines    Neutropenia --resolved   Lab Results   Component Value Date    WBC 7.23 03/19/2025    HGB 14.0 03/19/2025    HCT 44.4 03/19/2025    MCV 89 03/19/2025     03/19/2025         6) Proteinuria: continue p/c ratio as per guidelines   Protein Creatinine Ratios:       Prot/Creat Ratio, Urine   Date Value Ref Range Status   03/19/2025 Unable to calculate 0.00 - 0.20 Final   02/04/2025 0.25 (H) 0.00 - 0.20 Final   11/08/2024 0.11 0.00 - 0.20 Final        .     7) CMV and BK virus infection screening:  will continue to monitor/ guidelines      Latest Reference Range & Units 5mo 4wk,  Kidney-Post 1 Year  08/28/24 08:50   Cytomegalovirus  PCR, Quant <50 IU/mL Not Detected      Latest Reference Range & Units 1yr 0mo,  Kidney-Post 2 Year  03/19/25   BK Virus DNA, Blood Not detected  Not detected       8) Weight education: provided during the clinic visit   There is no height or weight on file to calculate BMI.        Follow-up:   Clinic: return to transplant clinic weekly for the first month after transplant; every 2 weeks during months 2-3; then at 6-, 9-, 12-, 18-, 24-, and 36- months post-transplant to reassess for complications from immunosuppression toxicity and monitor for rejection.  Annually thereafter.    Labs: since patient remains at high risk for rejection and drug-related complications that warrant close monitoring, labs will be ordered as follows: continue twice weekly CBC, renal panel, and drug level for first month; then same labs once weekly through 3rd month post-transplant.  Urine for UA and protein/creatinine ratio monthly.  Serum BK - PCR at 1-, 3-, 6-, 9-, 12-, 18-, 24-, 36- 48-, and 60 months post-transplant.  Hepatic panel at 1-, 2-, 3-, 6-, 9-, 12-, 18-, 24-, and 36- months post-transplant.    Beatrice Zee NP       Education:   Material provided to the patient.  Patient reminded to call with any health changes since these can be early signs of significant complications.  Also, I advised the patient to be sure any new medications or changes of old medications are discussed with either a pharmacist or physician knowledgeable with transplant to avoid rejection/drug toxicity related to significant drug interactions.    Patient advised that it is recommended that all transplanted patients, and their close contacts and household members receive Covid vaccination.    UNOS Patient Status  Functional Status: 90% - Able to carry on normal activity: minor symptoms of disease  Physical Capacity: No Limitations

## 2025-03-31 NOTE — LETTER
March 31, 2025        Fredi Lyon  [address] [facility] [address]  Phone: [phone]  Fax: [phone]             [facility] 1st Fl  [address]  [facility] [address]  Phone: [phone]   Patient: Mark Lopez   MR Number: [id number]   YOB: 1960   Date of Visit: 3/31/2025       Dear Dr. Fredi Lyon    Thank you for referring Mark Lopez to me for evaluation. Attached you will find relevant portions of my assessment and plan of care.    If you have questions, please do not hesitate to call me. I look forward to following Mark Lopez along with you.    Sincerely,    Beatrice Zee, NP    Enclosure    If you would like to receive this communication electronically, please contact [email or web address] or [phone] to request [employer] access.    [employer] is a tool which provides read-only access to select patient information with whom you have a relationship. Its easy to use and provides real time access to review your patients record including encounter summaries, notes, results, and demographic information.    If you feel you have received this communication in error or would no longer like to receive these types of communications, please e-mail [email or web address]

## 2025-06-30 ENCOUNTER — TELEPHONE (OUTPATIENT)
Dept: TRANSPLANT | Facility: CLINIC | Age: 65
End: 2025-06-30
Payer: MEDICARE

## 2025-06-30 NOTE — TELEPHONE ENCOUNTER
Coordinator returned patient's call. Patient called to inquire if he could have dental cleanings. Informed patient it's ok for him to have routine dental cleanings. Also provided patient with our transplant fax # to have the clearance form sent to us. Patient verbalized understanding.     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 You can have someone from the dentist office fax a dental clearance form over to us.  Our transplant fax # [phone].     Thanks,     Veronica

## 2025-07-29 DIAGNOSIS — Z94.0 STATUS POST DECEASED-DONOR KIDNEY TRANSPLANTATION: ICD-10-CM

## 2025-07-29 RX ORDER — PREDNISONE 5 MG/1
5 TABLET ORAL DAILY
Qty: 30 TABLET | Refills: 11 | Status: SHIPPED | OUTPATIENT
Start: 2025-07-29

## 2025-08-18 ENCOUNTER — LAB VISIT (OUTPATIENT)
Dept: LAB | Facility: HOSPITAL | Age: 65
End: 2025-08-18
Attending: INTERNAL MEDICINE
Payer: MEDICARE

## 2025-08-18 DIAGNOSIS — Z79.899 IMMUNOSUPPRESSIVE MANAGEMENT ENCOUNTER FOLLOWING KIDNEY TRANSPLANT: ICD-10-CM

## 2025-08-18 DIAGNOSIS — Z94.0 KIDNEY REPLACED BY TRANSPLANT: ICD-10-CM

## 2025-08-18 DIAGNOSIS — Z94.0 IMMUNOSUPPRESSIVE MANAGEMENT ENCOUNTER FOLLOWING KIDNEY TRANSPLANT: ICD-10-CM

## 2025-08-18 DIAGNOSIS — T86.10 COMPLICATION OF TRANSPLANTED KIDNEY, UNSPECIFIED COMPLICATION: ICD-10-CM

## 2025-08-18 LAB
ABSOLUTE EOSINOPHIL (OHS): 0.23 K/UL
ABSOLUTE MONOCYTE (OHS): 0.76 K/UL (ref 0.3–1)
ABSOLUTE NEUTROPHIL COUNT (OHS): 5.96 K/UL (ref 1.8–7.7)
ALBUMIN SERPL BCP-MCNC: 3.5 G/DL (ref 3.5–5.2)
ALP SERPL-CCNC: 67 UNIT/L (ref 40–150)
ALT SERPL W/O P-5'-P-CCNC: 11 UNIT/L (ref 0–55)
ANION GAP (OHS): 6 MMOL/L (ref 8–16)
AST SERPL-CCNC: 15 UNIT/L (ref 0–50)
BASOPHILS # BLD AUTO: 0.03 K/UL
BASOPHILS NFR BLD AUTO: 0.4 %
BILIRUB DIRECT SERPL-MCNC: 0.1 MG/DL (ref 0.1–0.3)
BILIRUB SERPL-MCNC: 0.4 MG/DL (ref 0.1–1)
BUN SERPL-MCNC: 21 MG/DL (ref 8–23)
CALCIUM SERPL-MCNC: 10.1 MG/DL (ref 8.7–10.5)
CHLORIDE SERPL-SCNC: 109 MMOL/L (ref 95–110)
CO2 SERPL-SCNC: 26 MMOL/L (ref 23–29)
CREAT SERPL-MCNC: 1.2 MG/DL (ref 0.5–1.4)
ERYTHROCYTE [DISTWIDTH] IN BLOOD BY AUTOMATED COUNT: 14.6 % (ref 11.5–14.5)
GFR SERPLBLD CREATININE-BSD FMLA CKD-EPI: >60 ML/MIN/1.73/M2
GLUCOSE SERPL-MCNC: 79 MG/DL (ref 70–110)
HCT VFR BLD AUTO: 41.7 % (ref 40–54)
HGB BLD-MCNC: 13.2 GM/DL (ref 14–18)
IMM GRANULOCYTES # BLD AUTO: 0.05 K/UL (ref 0–0.04)
IMM GRANULOCYTES NFR BLD AUTO: 0.6 % (ref 0–0.5)
LYMPHOCYTES # BLD AUTO: 1.5 K/UL (ref 1–4.8)
MAGNESIUM SERPL-MCNC: 1.5 MG/DL (ref 1.6–2.6)
MCH RBC QN AUTO: 27.8 PG (ref 27–31)
MCHC RBC AUTO-ENTMCNC: 31.7 G/DL (ref 32–36)
MCV RBC AUTO: 88 FL (ref 82–98)
NUCLEATED RBC (/100WBC) (OHS): 0 /100 WBC
PHOSPHATE SERPL-MCNC: 2.1 MG/DL (ref 2.7–4.5)
PLATELET # BLD AUTO: 258 K/UL (ref 150–450)
PMV BLD AUTO: 10.9 FL (ref 9.2–12.9)
POTASSIUM SERPL-SCNC: 4.3 MMOL/L (ref 3.5–5.1)
PROT SERPL-MCNC: 6.6 GM/DL (ref 6–8.4)
RBC # BLD AUTO: 4.74 M/UL (ref 4.6–6.2)
RELATIVE EOSINOPHIL (OHS): 2.7 %
RELATIVE LYMPHOCYTE (OHS): 17.6 % (ref 18–48)
RELATIVE MONOCYTE (OHS): 8.9 % (ref 4–15)
RELATIVE NEUTROPHIL (OHS): 69.8 % (ref 38–73)
SODIUM SERPL-SCNC: 141 MMOL/L (ref 136–145)
WBC # BLD AUTO: 8.53 K/UL (ref 3.9–12.7)

## 2025-08-18 PROCEDURE — 80197 ASSAY OF TACROLIMUS: CPT

## 2025-08-18 PROCEDURE — 87799 DETECT AGENT NOS DNA QUANT: CPT

## 2025-08-18 PROCEDURE — 82310 ASSAY OF CALCIUM: CPT

## 2025-08-18 PROCEDURE — 36415 COLL VENOUS BLD VENIPUNCTURE: CPT | Mod: PO

## 2025-08-18 PROCEDURE — 80076 HEPATIC FUNCTION PANEL: CPT

## 2025-08-18 PROCEDURE — 83735 ASSAY OF MAGNESIUM: CPT

## 2025-08-18 PROCEDURE — 85025 COMPLETE CBC W/AUTO DIFF WBC: CPT

## 2025-08-18 PROCEDURE — 84100 ASSAY OF PHOSPHORUS: CPT

## 2025-08-19 ENCOUNTER — RESULTS FOLLOW-UP (OUTPATIENT)
Dept: TRANSPLANT | Facility: HOSPITAL | Age: 65
End: 2025-08-19
Payer: MEDICARE

## 2025-08-19 DIAGNOSIS — Z94.0 KIDNEY REPLACED BY TRANSPLANT: Primary | ICD-10-CM

## 2025-08-19 DIAGNOSIS — E87.20 METABOLIC ACIDOSIS: ICD-10-CM

## 2025-08-19 LAB — TACROLIMUS BLD-MCNC: 10.4 NG/ML (ref 5–15)

## 2025-08-21 LAB
W BK VIRUS DNA, QUALITATIVE, PLASMA: NOT DETECTED
W BK VIRUS DNA, QUANTITATIVE, PLASMA: <50 IU/ML
W LOG BK VIRUS DNA, PLASMA: <1.7 LOG (10) IU/ML

## 2025-08-22 RX ORDER — SODIUM BICARBONATE 650 MG/1
650 TABLET ORAL 2 TIMES DAILY
Qty: 60 TABLET | Refills: 6 | Status: SHIPPED | OUTPATIENT
Start: 2025-08-22

## 2025-08-22 RX ORDER — TACROLIMUS 1 MG/1
CAPSULE ORAL
Qty: 150 CAPSULE | Refills: 11 | Status: SHIPPED | OUTPATIENT
Start: 2025-08-22

## 2025-08-25 ENCOUNTER — OFFICE VISIT (OUTPATIENT)
Dept: TRANSPLANT | Facility: CLINIC | Age: 65
End: 2025-08-25
Payer: MEDICARE

## 2025-08-25 VITALS
OXYGEN SATURATION: 97 % | SYSTOLIC BLOOD PRESSURE: 143 MMHG | TEMPERATURE: 98 F | RESPIRATION RATE: 18 BRPM | BODY MASS INDEX: 41.55 KG/M2 | WEIGHT: 243.38 LBS | DIASTOLIC BLOOD PRESSURE: 65 MMHG | HEART RATE: 77 BPM | HEIGHT: 64 IN

## 2025-08-25 DIAGNOSIS — Z94.0 KIDNEY REPLACED BY TRANSPLANT: ICD-10-CM

## 2025-08-25 DIAGNOSIS — N18.2 TYPE 2 DIABETES MELLITUS WITH STAGE 2 CHRONIC KIDNEY DISEASE, WITH LONG-TERM CURRENT USE OF INSULIN: ICD-10-CM

## 2025-08-25 DIAGNOSIS — D84.821 IMMUNODEFICIENCY DUE TO LONG TERM IMMUNOSUPPRESSIVE DRUG THERAPY: ICD-10-CM

## 2025-08-25 DIAGNOSIS — Z94.0 STATUS POST DECEASED-DONOR KIDNEY TRANSPLANTATION: Primary | ICD-10-CM

## 2025-08-25 DIAGNOSIS — N18.2 BENIGN HYPERTENSION WITH CHRONIC KIDNEY DISEASE, STAGE II: ICD-10-CM

## 2025-08-25 DIAGNOSIS — E11.22 TYPE 2 DIABETES MELLITUS WITH STAGE 2 CHRONIC KIDNEY DISEASE, WITH LONG-TERM CURRENT USE OF INSULIN: ICD-10-CM

## 2025-08-25 DIAGNOSIS — N18.2 STAGE 2 CHRONIC KIDNEY DISEASE: ICD-10-CM

## 2025-08-25 DIAGNOSIS — Z79.4 TYPE 2 DIABETES MELLITUS WITH STAGE 2 CHRONIC KIDNEY DISEASE, WITH LONG-TERM CURRENT USE OF INSULIN: ICD-10-CM

## 2025-08-25 DIAGNOSIS — Z79.60 IMMUNODEFICIENCY DUE TO LONG TERM IMMUNOSUPPRESSIVE DRUG THERAPY: ICD-10-CM

## 2025-08-25 DIAGNOSIS — T45.1X5A IMMUNODEFICIENCY DUE TO LONG TERM IMMUNOSUPPRESSIVE DRUG THERAPY: ICD-10-CM

## 2025-08-25 DIAGNOSIS — I12.9 BENIGN HYPERTENSION WITH CHRONIC KIDNEY DISEASE, STAGE II: ICD-10-CM

## 2025-08-25 PROCEDURE — 1159F MED LIST DOCD IN RCRD: CPT | Mod: CPTII,S$GLB,, | Performed by: NURSE PRACTITIONER

## 2025-08-25 PROCEDURE — 3078F DIAST BP <80 MM HG: CPT | Mod: CPTII,S$GLB,, | Performed by: NURSE PRACTITIONER

## 2025-08-25 PROCEDURE — 3044F HG A1C LEVEL LT 7.0%: CPT | Mod: CPTII,S$GLB,, | Performed by: NURSE PRACTITIONER

## 2025-08-25 PROCEDURE — 3288F FALL RISK ASSESSMENT DOCD: CPT | Mod: CPTII,S$GLB,, | Performed by: NURSE PRACTITIONER

## 2025-08-25 PROCEDURE — 99999 PR PBB SHADOW E&M-EST. PATIENT-LVL IV: CPT | Mod: PBBFAC,,, | Performed by: NURSE PRACTITIONER

## 2025-08-25 PROCEDURE — 99215 OFFICE O/P EST HI 40 MIN: CPT | Mod: S$GLB,,, | Performed by: NURSE PRACTITIONER

## 2025-08-25 PROCEDURE — 1101F PT FALLS ASSESS-DOCD LE1/YR: CPT | Mod: CPTII,S$GLB,, | Performed by: NURSE PRACTITIONER

## 2025-08-25 PROCEDURE — 1125F AMNT PAIN NOTED PAIN PRSNT: CPT | Mod: CPTII,S$GLB,, | Performed by: NURSE PRACTITIONER

## 2025-08-25 PROCEDURE — 3008F BODY MASS INDEX DOCD: CPT | Mod: CPTII,S$GLB,, | Performed by: NURSE PRACTITIONER

## 2025-08-25 PROCEDURE — 3077F SYST BP >= 140 MM HG: CPT | Mod: CPTII,S$GLB,, | Performed by: NURSE PRACTITIONER

## 2025-08-25 RX ORDER — MYCOPHENOLATE MOFETIL 250 MG/1
500 CAPSULE ORAL 2 TIMES DAILY
Qty: 120 CAPSULE | Refills: 11 | Status: SHIPPED | OUTPATIENT
Start: 2025-08-25

## 2025-08-25 RX ORDER — FUROSEMIDE 20 MG/1
20 TABLET ORAL
COMMUNITY
Start: 2025-04-25

## 2025-08-25 RX ORDER — TIRZEPATIDE 2.5 MG/.5ML
2.5 INJECTION, SOLUTION SUBCUTANEOUS WEEKLY
COMMUNITY
Start: 2025-05-14 | End: 2026-05-09

## 2025-08-25 RX ORDER — CALCIUM CARBONATE 500(1250)
2 TABLET,CHEWABLE ORAL DAILY
COMMUNITY

## (undated) DEVICE — TRAY MINOR GEN SURG OMC

## (undated) DEVICE — STAPLER SKIN PROXIMATE WIDE

## (undated) DEVICE — SUT PROLENE 5-0 36IN C-1

## (undated) DEVICE — SUT SILK 3-0 STRANDS 30IN

## (undated) DEVICE — FOLEY BLLN 20FR 3WAY 5CC

## (undated) DEVICE — PACK KIDNEY TRANSPLANT CUSTOM

## (undated) DEVICE — Device

## (undated) DEVICE — SUT VICRYL PLUS 3-0 SH 18IN

## (undated) DEVICE — SLIDE STERILE FROSTED

## (undated) DEVICE — TRAY CATH FOL SIL URIMTR 16FR

## (undated) DEVICE — CONTAINER SPECIMEN OR STER 4OZ

## (undated) DEVICE — DRAPE SLUSH WARMER WITH DISC

## (undated) DEVICE — DRESSING ABSRBNT ISLAND 3.6X8

## (undated) DEVICE — STOCKINETTE 2INX36

## (undated) DEVICE — TOWEL OR XRAY WHITE 17X26IN

## (undated) DEVICE — ELECTRODE REM PLYHSV RETURN 9

## (undated) DEVICE — SOL NS 1000CC

## (undated) DEVICE — DRESSING TRANS 4X4 TEGADERM

## (undated) DEVICE — SUT PDS BV 6-0

## (undated) DEVICE — SUT 0 VICRYL / UR6 (J603)

## (undated) DEVICE — GAUZE SPONGE 4X4 12PLY

## (undated) DEVICE — STRIP WOUND PK BIGUANIDE 36X.5

## (undated) DEVICE — PLUG CATHETER STERILE FOLEY

## (undated) DEVICE — CLIPPER BLADE MOD 4406 (CAREF)

## (undated) DEVICE — SUT 1 36IN PDS II VIO MONO

## (undated) DEVICE — SUT PROLENE 0 CT1 30IN BLUE

## (undated) DEVICE — SET IRR URLGY 2LINE UNIV SPIKE

## (undated) DEVICE — APPLICATOR CHLORAPREP ORN 26ML

## (undated) DEVICE — SUT 2-0 12-18IN SILK

## (undated) DEVICE — HEMOSTAT SURGICEL NU-KNIT 6X9

## (undated) DEVICE — TIP YANKAUERS BULB NO VENT

## (undated) DEVICE — DECANTER FLUID TRNSF WHITE 9IN

## (undated) DEVICE — PUNCH AORTIC 4.8MM

## (undated) DEVICE — SUT SILK 2-0 STRANDS 30IN

## (undated) DEVICE — ADHESIVE DERMABOND ADVANCED

## (undated) DEVICE — SUT ETHILON 3-0 PS2 18 BLK

## (undated) DEVICE — SYR ONLY LUER LOCK 20CC

## (undated) DEVICE — HEMOSTAT SURGICEL NUKNIT 3X4IN

## (undated) DEVICE — KIT COLLECTION E SWAB REGULAR

## (undated) DEVICE — ADHESIVE MASTISOL VIAL 48/BX

## (undated) DEVICE — SUT MCRYL PLUS 4-0 PS2 27IN

## (undated) DEVICE — DRAPE LAP T SHT W/ INSTR PAD

## (undated) DEVICE — SUT 3-0 12-18IN SILK

## (undated) DEVICE — SUT 4-0 12-18IN SILK BLACK

## (undated) DEVICE — SUT PROLENE 6-0 BV-1 30IN

## (undated) DEVICE — GOWN SURGICAL X-LARGE